# Patient Record
Sex: FEMALE | Race: WHITE | NOT HISPANIC OR LATINO | URBAN - METROPOLITAN AREA
[De-identification: names, ages, dates, MRNs, and addresses within clinical notes are randomized per-mention and may not be internally consistent; named-entity substitution may affect disease eponyms.]

---

## 2017-01-03 ENCOUNTER — OUTPATIENT (OUTPATIENT)
Dept: OUTPATIENT SERVICES | Facility: HOSPITAL | Age: 72
LOS: 1 days | End: 2017-01-03
Payer: MEDICARE

## 2017-01-03 DIAGNOSIS — M86.19 OTHER ACUTE OSTEOMYELITIS, MULTIPLE SITES: ICD-10-CM

## 2017-01-03 PROCEDURE — 36569 INSJ PICC 5 YR+ W/O IMAGING: CPT

## 2017-01-03 PROCEDURE — 76937 US GUIDE VASCULAR ACCESS: CPT | Mod: 26

## 2017-01-03 PROCEDURE — C1751: CPT

## 2017-01-03 PROCEDURE — 76937 US GUIDE VASCULAR ACCESS: CPT

## 2017-01-03 PROCEDURE — 77001 FLUOROGUIDE FOR VEIN DEVICE: CPT

## 2017-01-03 PROCEDURE — 77001 FLUOROGUIDE FOR VEIN DEVICE: CPT | Mod: 26

## 2017-01-05 DIAGNOSIS — M86.9 OSTEOMYELITIS, UNSPECIFIED: ICD-10-CM

## 2017-01-05 DIAGNOSIS — Z45.2 ENCOUNTER FOR ADJUSTMENT AND MANAGEMENT OF VASCULAR ACCESS DEVICE: ICD-10-CM

## 2017-08-01 ENCOUNTER — APPOINTMENT (OUTPATIENT)
Dept: OTOLARYNGOLOGY | Facility: CLINIC | Age: 72
End: 2017-08-01
Payer: MEDICARE

## 2017-08-01 VITALS
WEIGHT: 115 LBS | BODY MASS INDEX: 22.58 KG/M2 | HEIGHT: 60 IN | HEART RATE: 61 BPM | SYSTOLIC BLOOD PRESSURE: 131 MMHG | DIASTOLIC BLOOD PRESSURE: 61 MMHG

## 2017-08-01 PROCEDURE — 92567 TYMPANOMETRY: CPT

## 2017-08-01 PROCEDURE — 92557 COMPREHENSIVE HEARING TEST: CPT

## 2017-08-01 PROCEDURE — G0268 REMOVAL OF IMPACTED WAX MD: CPT

## 2017-08-01 PROCEDURE — 99214 OFFICE O/P EST MOD 30 MIN: CPT | Mod: 25

## 2017-08-01 RX ORDER — REPAGLINIDE 0.5 MG/1
0.5 TABLET ORAL
Refills: 0 | Status: ACTIVE | COMMUNITY

## 2017-08-01 RX ORDER — GLIPIZIDE 5 MG/1
5 TABLET ORAL
Refills: 0 | Status: ACTIVE | COMMUNITY

## 2017-08-01 RX ORDER — IBUPROFEN 200 MG
600 CAPSULE ORAL
Refills: 0 | Status: ACTIVE | COMMUNITY

## 2017-08-01 RX ORDER — FLUTICASONE PROPIONATE AND SALMETEROL 50; 250 UG/1; UG/1
250-50 POWDER RESPIRATORY (INHALATION)
Refills: 0 | Status: ACTIVE | COMMUNITY

## 2017-08-01 RX ORDER — ESOMEPRAZOLE MAGNESIUM 40 MG/1
40 GRANULE, DELAYED RELEASE ORAL
Refills: 0 | Status: ACTIVE | COMMUNITY

## 2017-08-01 RX ORDER — FUROSEMIDE 20 MG/1
20 TABLET ORAL
Refills: 0 | Status: ACTIVE | COMMUNITY

## 2017-08-01 RX ORDER — LACTASE 3000 UNIT
TABLET ORAL
Refills: 0 | Status: ACTIVE | COMMUNITY

## 2017-08-01 RX ORDER — ALENDRONATE SODIUM 70 MG/1
70 TABLET ORAL
Refills: 0 | Status: ACTIVE | COMMUNITY

## 2017-08-01 RX ORDER — METFORMIN HYDROCHLORIDE 1000 MG/1
1000 TABLET, COATED ORAL
Refills: 0 | Status: ACTIVE | COMMUNITY

## 2017-08-01 RX ORDER — ASPIRIN 81 MG
81 TABLET,CHEWABLE ORAL
Refills: 0 | Status: ACTIVE | COMMUNITY

## 2017-08-01 RX ORDER — MULTIVITAMIN
TABLET ORAL
Refills: 0 | Status: ACTIVE | COMMUNITY

## 2017-08-01 RX ORDER — DICYCLOMINE HYDROCHLORIDE 10 MG/5ML
10 SOLUTION ORAL
Refills: 0 | Status: ACTIVE | COMMUNITY

## 2017-08-01 RX ORDER — DILTIAZEM HYDROCHLORIDE 240 MG/1
240 CAPSULE, COATED, EXTENDED RELEASE ORAL
Refills: 0 | Status: ACTIVE | COMMUNITY

## 2017-08-01 RX ORDER — PREDNISONE 5 MG/1
5 TABLET ORAL
Refills: 0 | Status: ACTIVE | COMMUNITY

## 2017-10-18 ENCOUNTER — INPATIENT (INPATIENT)
Facility: HOSPITAL | Age: 72
LOS: 5 days | Discharge: HOME | DRG: 698 | End: 2017-10-24
Attending: INTERNAL MEDICINE | Admitting: INTERNAL MEDICINE
Payer: MEDICARE

## 2017-10-18 VITALS
HEART RATE: 118 BPM | TEMPERATURE: 98 F | RESPIRATION RATE: 18 BRPM | OXYGEN SATURATION: 97 % | DIASTOLIC BLOOD PRESSURE: 99 MMHG | WEIGHT: 115.08 LBS | SYSTOLIC BLOOD PRESSURE: 178 MMHG

## 2017-10-18 DIAGNOSIS — J45.909 UNSPECIFIED ASTHMA, UNCOMPLICATED: ICD-10-CM

## 2017-10-18 DIAGNOSIS — I10 ESSENTIAL (PRIMARY) HYPERTENSION: ICD-10-CM

## 2017-10-18 DIAGNOSIS — F03.90 UNSPECIFIED DEMENTIA, UNSPECIFIED SEVERITY, WITHOUT BEHAVIORAL DISTURBANCE, PSYCHOTIC DISTURBANCE, MOOD DISTURBANCE, AND ANXIETY: ICD-10-CM

## 2017-10-18 DIAGNOSIS — E87.8 OTHER DISORDERS OF ELECTROLYTE AND FLUID BALANCE, NOT ELSEWHERE CLASSIFIED: ICD-10-CM

## 2017-10-18 DIAGNOSIS — E11.9 TYPE 2 DIABETES MELLITUS WITHOUT COMPLICATIONS: ICD-10-CM

## 2017-10-18 DIAGNOSIS — N28.0 ISCHEMIA AND INFARCTION OF KIDNEY: ICD-10-CM

## 2017-10-18 DIAGNOSIS — K52.9 NONINFECTIVE GASTROENTERITIS AND COLITIS, UNSPECIFIED: ICD-10-CM

## 2017-10-18 LAB
ALBUMIN SERPL ELPH-MCNC: 3.6 G/DL — SIGNIFICANT CHANGE UP (ref 3.3–5)
ALP SERPL-CCNC: 95 U/L — SIGNIFICANT CHANGE UP (ref 40–120)
ALT FLD-CCNC: 14 U/L RC — SIGNIFICANT CHANGE UP (ref 10–45)
ANION GAP SERPL CALC-SCNC: 19 MMOL/L — HIGH (ref 5–17)
APPEARANCE UR: CLEAR — SIGNIFICANT CHANGE UP
AST SERPL-CCNC: 18 U/L — SIGNIFICANT CHANGE UP (ref 10–40)
BILIRUB SERPL-MCNC: 0.8 MG/DL — SIGNIFICANT CHANGE UP (ref 0.2–1.2)
BILIRUB UR-MCNC: NEGATIVE — SIGNIFICANT CHANGE UP
BUN SERPL-MCNC: 14 MG/DL — SIGNIFICANT CHANGE UP (ref 7–23)
CALCIUM SERPL-MCNC: 9 MG/DL — SIGNIFICANT CHANGE UP (ref 8.4–10.5)
CHLORIDE SERPL-SCNC: 84 MMOL/L — LOW (ref 96–108)
CO2 SERPL-SCNC: 27 MMOL/L — SIGNIFICANT CHANGE UP (ref 22–31)
COLOR SPEC: YELLOW — SIGNIFICANT CHANGE UP
CREAT SERPL-MCNC: 0.85 MG/DL — SIGNIFICANT CHANGE UP (ref 0.5–1.3)
DIFF PNL FLD: ABNORMAL
GAS PNL BLDV: SIGNIFICANT CHANGE UP
GLUCOSE BLDC GLUCOMTR-MCNC: 137 MG/DL — HIGH (ref 70–99)
GLUCOSE BLDC GLUCOMTR-MCNC: 154 MG/DL — HIGH (ref 70–99)
GLUCOSE SERPL-MCNC: 172 MG/DL — HIGH (ref 70–99)
GLUCOSE UR QL: 500
HCT VFR BLD CALC: 49.7 % — HIGH (ref 34.5–45)
HGB BLD-MCNC: 17.6 G/DL — HIGH (ref 11.5–15.5)
KETONES UR-MCNC: ABNORMAL
LEUKOCYTE ESTERASE UR-ACNC: ABNORMAL
LIDOCAIN IGE QN: 40 U/L — SIGNIFICANT CHANGE UP (ref 7–60)
MCHC RBC-ENTMCNC: 28.9 PG — SIGNIFICANT CHANGE UP (ref 27–34)
MCHC RBC-ENTMCNC: 35.4 GM/DL — SIGNIFICANT CHANGE UP (ref 32–36)
MCV RBC AUTO: 81.6 FL — SIGNIFICANT CHANGE UP (ref 80–100)
NITRITE UR-MCNC: NEGATIVE — SIGNIFICANT CHANGE UP
PH UR: 6.5 — SIGNIFICANT CHANGE UP (ref 5–8)
PLATELET # BLD AUTO: 229 K/UL — SIGNIFICANT CHANGE UP (ref 150–400)
POTASSIUM SERPL-MCNC: 2.9 MMOL/L — CRITICAL LOW (ref 3.5–5.3)
POTASSIUM SERPL-SCNC: 2.9 MMOL/L — CRITICAL LOW (ref 3.5–5.3)
PROT SERPL-MCNC: 6.7 G/DL — SIGNIFICANT CHANGE UP (ref 6–8.3)
PROT UR-MCNC: 150 MG/DL
RBC # BLD: 6.09 M/UL — HIGH (ref 3.8–5.2)
RBC # FLD: 11.8 % — SIGNIFICANT CHANGE UP (ref 10.3–14.5)
SODIUM SERPL-SCNC: 130 MMOL/L — LOW (ref 135–145)
SP GR SPEC: 1.01 — LOW (ref 1.01–1.02)
TROPONIN T SERPL-MCNC: <0.01 NG/ML — SIGNIFICANT CHANGE UP (ref 0–0.06)
UROBILINOGEN FLD QL: NEGATIVE — SIGNIFICANT CHANGE UP
WBC # BLD: 14.6 K/UL — HIGH (ref 3.8–10.5)
WBC # FLD AUTO: 14.6 K/UL — HIGH (ref 3.8–10.5)

## 2017-10-18 PROCEDURE — 76705 ECHO EXAM OF ABDOMEN: CPT | Mod: 26

## 2017-10-18 PROCEDURE — 74177 CT ABD & PELVIS W/CONTRAST: CPT | Mod: 26

## 2017-10-18 PROCEDURE — 99285 EMERGENCY DEPT VISIT HI MDM: CPT | Mod: 25

## 2017-10-18 PROCEDURE — 93010 ELECTROCARDIOGRAM REPORT: CPT

## 2017-10-18 RX ORDER — SIMVASTATIN 20 MG/1
20 TABLET, FILM COATED ORAL AT BEDTIME
Qty: 0 | Refills: 0 | Status: DISCONTINUED | OUTPATIENT
Start: 2017-10-18 | End: 2017-10-24

## 2017-10-18 RX ORDER — DEXTROSE 50 % IN WATER 50 %
12.5 SYRINGE (ML) INTRAVENOUS ONCE
Qty: 0 | Refills: 0 | Status: DISCONTINUED | OUTPATIENT
Start: 2017-10-18 | End: 2017-10-24

## 2017-10-18 RX ORDER — SODIUM CHLORIDE 9 MG/ML
500 INJECTION INTRAMUSCULAR; INTRAVENOUS; SUBCUTANEOUS ONCE
Qty: 0 | Refills: 0 | Status: COMPLETED | OUTPATIENT
Start: 2017-10-18 | End: 2017-10-18

## 2017-10-18 RX ORDER — INSULIN LISPRO 100/ML
VIAL (ML) SUBCUTANEOUS
Qty: 0 | Refills: 0 | Status: DISCONTINUED | OUTPATIENT
Start: 2017-10-18 | End: 2017-10-20

## 2017-10-18 RX ORDER — ASPIRIN/CALCIUM CARB/MAGNESIUM 324 MG
81 TABLET ORAL DAILY
Qty: 0 | Refills: 0 | Status: DISCONTINUED | OUTPATIENT
Start: 2017-10-18 | End: 2017-10-23

## 2017-10-18 RX ORDER — DEXTROSE 50 % IN WATER 50 %
25 SYRINGE (ML) INTRAVENOUS ONCE
Qty: 0 | Refills: 0 | Status: DISCONTINUED | OUTPATIENT
Start: 2017-10-18 | End: 2017-10-24

## 2017-10-18 RX ORDER — PIPERACILLIN AND TAZOBACTAM 4; .5 G/20ML; G/20ML
3.38 INJECTION, POWDER, LYOPHILIZED, FOR SOLUTION INTRAVENOUS EVERY 8 HOURS
Qty: 0 | Refills: 0 | Status: DISCONTINUED | OUTPATIENT
Start: 2017-10-18 | End: 2017-10-23

## 2017-10-18 RX ORDER — PIPERACILLIN AND TAZOBACTAM 4; .5 G/20ML; G/20ML
3.38 INJECTION, POWDER, LYOPHILIZED, FOR SOLUTION INTRAVENOUS ONCE
Qty: 0 | Refills: 0 | Status: COMPLETED | OUTPATIENT
Start: 2017-10-18 | End: 2017-10-18

## 2017-10-18 RX ORDER — DILTIAZEM HCL 120 MG
240 CAPSULE, EXT RELEASE 24 HR ORAL DAILY
Qty: 0 | Refills: 0 | Status: DISCONTINUED | OUTPATIENT
Start: 2017-10-18 | End: 2017-10-24

## 2017-10-18 RX ORDER — ONDANSETRON 8 MG/1
4 TABLET, FILM COATED ORAL ONCE
Qty: 0 | Refills: 0 | Status: COMPLETED | OUTPATIENT
Start: 2017-10-18 | End: 2017-10-18

## 2017-10-18 RX ORDER — POTASSIUM CHLORIDE 20 MEQ
10 PACKET (EA) ORAL ONCE
Qty: 0 | Refills: 0 | Status: COMPLETED | OUTPATIENT
Start: 2017-10-18 | End: 2017-10-18

## 2017-10-18 RX ORDER — ENOXAPARIN SODIUM 100 MG/ML
60 INJECTION SUBCUTANEOUS ONCE
Qty: 0 | Refills: 0 | Status: DISCONTINUED | OUTPATIENT
Start: 2017-10-18 | End: 2017-10-18

## 2017-10-18 RX ORDER — INSULIN LISPRO 100/ML
VIAL (ML) SUBCUTANEOUS AT BEDTIME
Qty: 0 | Refills: 0 | Status: DISCONTINUED | OUTPATIENT
Start: 2017-10-18 | End: 2017-10-24

## 2017-10-18 RX ORDER — MORPHINE SULFATE 50 MG/1
2 CAPSULE, EXTENDED RELEASE ORAL ONCE
Qty: 0 | Refills: 0 | Status: DISCONTINUED | OUTPATIENT
Start: 2017-10-18 | End: 2017-10-18

## 2017-10-18 RX ORDER — ENOXAPARIN SODIUM 100 MG/ML
50 INJECTION SUBCUTANEOUS ONCE
Qty: 0 | Refills: 0 | Status: COMPLETED | OUTPATIENT
Start: 2017-10-18 | End: 2017-10-18

## 2017-10-18 RX ORDER — PANTOPRAZOLE SODIUM 20 MG/1
40 TABLET, DELAYED RELEASE ORAL
Qty: 0 | Refills: 0 | Status: DISCONTINUED | OUTPATIENT
Start: 2017-10-18 | End: 2017-10-24

## 2017-10-18 RX ORDER — DEXTROSE 50 % IN WATER 50 %
1 SYRINGE (ML) INTRAVENOUS ONCE
Qty: 0 | Refills: 0 | Status: DISCONTINUED | OUTPATIENT
Start: 2017-10-18 | End: 2017-10-24

## 2017-10-18 RX ORDER — SODIUM CHLORIDE 9 MG/ML
1000 INJECTION, SOLUTION INTRAVENOUS
Qty: 0 | Refills: 0 | Status: DISCONTINUED | OUTPATIENT
Start: 2017-10-18 | End: 2017-10-19

## 2017-10-18 RX ORDER — GLUCAGON INJECTION, SOLUTION 0.5 MG/.1ML
1 INJECTION, SOLUTION SUBCUTANEOUS ONCE
Qty: 0 | Refills: 0 | Status: DISCONTINUED | OUTPATIENT
Start: 2017-10-18 | End: 2017-10-24

## 2017-10-18 RX ORDER — METFORMIN HYDROCHLORIDE 850 MG/1
0 TABLET ORAL
Qty: 0 | Refills: 0 | COMMUNITY

## 2017-10-18 RX ORDER — SODIUM CHLORIDE 9 MG/ML
1000 INJECTION, SOLUTION INTRAVENOUS
Qty: 0 | Refills: 0 | Status: DISCONTINUED | OUTPATIENT
Start: 2017-10-18 | End: 2017-10-24

## 2017-10-18 RX ORDER — ACETAMINOPHEN WITH CODEINE 300MG-30MG
1 TABLET ORAL EVERY 4 HOURS
Qty: 0 | Refills: 0 | Status: DISCONTINUED | OUTPATIENT
Start: 2017-10-18 | End: 2017-10-24

## 2017-10-18 RX ORDER — PIPERACILLIN AND TAZOBACTAM 4; .5 G/20ML; G/20ML
3.38 INJECTION, POWDER, LYOPHILIZED, FOR SOLUTION INTRAVENOUS ONCE
Qty: 0 | Refills: 0 | Status: DISCONTINUED | OUTPATIENT
Start: 2017-10-18 | End: 2017-10-18

## 2017-10-18 RX ORDER — FAMOTIDINE 10 MG/ML
20 INJECTION INTRAVENOUS
Qty: 0 | Refills: 0 | Status: DISCONTINUED | OUTPATIENT
Start: 2017-10-18 | End: 2017-10-24

## 2017-10-18 RX ORDER — ENOXAPARIN SODIUM 100 MG/ML
40 INJECTION SUBCUTANEOUS DAILY
Qty: 0 | Refills: 0 | Status: DISCONTINUED | OUTPATIENT
Start: 2017-10-18 | End: 2017-10-18

## 2017-10-18 RX ORDER — FAMOTIDINE 10 MG/ML
40 INJECTION INTRAVENOUS ONCE
Qty: 0 | Refills: 0 | Status: DISCONTINUED | OUTPATIENT
Start: 2017-10-18 | End: 2017-10-18

## 2017-10-18 RX ADMIN — SODIUM CHLORIDE 500 MILLILITER(S): 9 INJECTION INTRAMUSCULAR; INTRAVENOUS; SUBCUTANEOUS at 11:36

## 2017-10-18 RX ADMIN — Medication 100 MILLIEQUIVALENT(S): at 13:45

## 2017-10-18 RX ADMIN — FAMOTIDINE 20 MILLIGRAM(S): 10 INJECTION INTRAVENOUS at 22:34

## 2017-10-18 RX ADMIN — Medication 240 MILLIGRAM(S): at 22:34

## 2017-10-18 RX ADMIN — PIPERACILLIN AND TAZOBACTAM 200 GRAM(S): 4; .5 INJECTION, POWDER, LYOPHILIZED, FOR SOLUTION INTRAVENOUS at 18:15

## 2017-10-18 RX ADMIN — SIMVASTATIN 20 MILLIGRAM(S): 20 TABLET, FILM COATED ORAL at 22:35

## 2017-10-18 RX ADMIN — Medication 100 MILLIEQUIVALENT(S): at 15:00

## 2017-10-18 RX ADMIN — ONDANSETRON 4 MILLIGRAM(S): 8 TABLET, FILM COATED ORAL at 11:36

## 2017-10-18 RX ADMIN — ENOXAPARIN SODIUM 50 MILLIGRAM(S): 100 INJECTION SUBCUTANEOUS at 23:28

## 2017-10-18 RX ADMIN — SODIUM CHLORIDE 500 MILLILITER(S): 9 INJECTION INTRAMUSCULAR; INTRAVENOUS; SUBCUTANEOUS at 13:53

## 2017-10-18 RX ADMIN — FAMOTIDINE 20 MILLIGRAM(S): 10 INJECTION INTRAVENOUS at 11:08

## 2017-10-18 RX ADMIN — SODIUM CHLORIDE 50 MILLILITER(S): 9 INJECTION, SOLUTION INTRAVENOUS at 22:55

## 2017-10-18 NOTE — H&P ADULT - HISTORY OF PRESENT ILLNESS
72y F hx of HTN, DM, Asthma ,mild dementia, AFib here with  nausea since Saturday. Today she noticed abdominal pain.  Last BM 2 days ago. Passing flatus. No fever, chills, cp, sob, urinary sx. She stopped anticoagulation few years back .

## 2017-10-18 NOTE — H&P ADULT - PROBLEM SELECTOR PLAN 1
Exact cause not know but has H/O PAF so possibly Embolic infarcts. renal consulted. Will get clearance from GI for starting AC Lovenox .

## 2017-10-18 NOTE — ED ADULT NURSE REASSESSMENT NOTE - NS ED NURSE REASSESS COMMENT FT1
pts ct scan + for colitis. pt pending admission for iv abx. second round of IV potassium currently infusing, zosyn to be administered after potassium completion. VS stable. afebrile. family at the bedside. safety and fall precautions maintained.

## 2017-10-18 NOTE — ED PROVIDER NOTE - ATTENDING CONTRIBUTION TO CARE
72y F hx of HTN, DM, mild dementia, AFib here with abd pain and nausea for last 3-4 days. Pt accompanied by  who reporst that she has been complaining of r sided abd discomfort as well as nausea and dec appetite. Reporst has been laying in bed for past couple of days feeling weak, fatigued. Has not vomited though reports persistent nausea. Last BM 2 days ago. Passing flatus. No fever, chills, cp, sob, urinary sx. On exam   Gen: WNWD thin elderly female NAD  HEENT: NCAT PERRL EOMI normal pharynx dry mucous memb  Neck: supple  CV: RRR, no murmur  Lung: CTA BL  Abd: +BS soft RLQ TTP, no grr, neg murphys  Ext: wwp, palp pulses, FROMx4, no cce  Neuro: Awake alert CN grossly intact, sensation intact, motor 5/5 throughout  AP: 72y F hx HTN, DM, Afib here with RLQ pain on palpation, nausea, anorexia. R/O appy vs colitis vs UTI. Labs, CTAP, UA. 72y F hx of HTN, DM, mild dementia, AFib here with abd pain and nausea for last 3-4 days. Pt accompanied by  who reporst that she has been complaining of r sided abd discomfort as well as nausea and dec appetite. Reporst has been laying in bed for past couple of days feeling weak, fatigued. Minimal PO intake and not taking medications. Has not vomited though reports persistent nausea. Last BM 2 days ago. Passing flatus. No fever, chills, cp, sob, urinary sx. On exam   Gen: WNWD thin elderly female NAD  HEENT: NCAT PERRL EOMI normal pharynx dry mucous memb  Neck: supple  CV: RRR, no murmur  Lung: CTA BL  Abd: +BS soft R mid and and RLQ TTP, no grr, neg murphys  Ext: wwp, palp pulses, FROMx4, no cce  Neuro: Awake alert CN grossly intact, sensation intact, motor 5/5 throughout  AP: 72y F hx HTN, DM, pAF not on AC here with RLQ pain on palpation, nausea, anorexia. R/O appy vs colitis vs UTI. Labs, CTAP, UA, IV hydration, antiemetics, EKG, re-eval

## 2017-10-18 NOTE — ED ADULT NURSE NOTE - PMH
Asthma    Atrial fibrillation, chronic    Dementia    Diabetes mellitus    Hypertension    Type 2 diabetes mellitus

## 2017-10-18 NOTE — H&P ADULT - ASSESSMENT
72y F hx of HTN, DM, Asthma ,mild dementia, AFib here with  nausea since Saturday. Today she noticed abdominal pain.  Last BM 2 days ago. Passing flatus. No fever, chills, cp, sob, urinary sx. She stopped anticoagulation few years back

## 2017-10-18 NOTE — H&P ADULT - NEGATIVE GENERAL GENITOURINARY SYMPTOMS
no flank pain L/no gas in urine/no bladder infections/no dysuria/no urine discoloration/no incontinence/no hematuria/no flank pain R/no urinary hesitancy/no renal colic

## 2017-10-18 NOTE — ED PROVIDER NOTE - PHYSICAL EXAMINATION
Abd: inspection; no purpura, hematoma  Palpation; tenderness upper quadrants bilaterally, no rebound tenderness or rigidity  Bowel sounds in all quadrants

## 2017-10-18 NOTE — CONSULT NOTE ADULT - ASSESSMENT
it is unclear what the etiology of the patients pain and vomiting is from.  This may be ischemic colitis or infarct related.  will review ct with radiology in am.  I agree with antibiotics.  I think that the risk benefit is clearly in favor of a/c at thist [facility].  Discussed with family at [facility].  non surgical abdomen.  may need  colonosocpy at some point. agree with echo tomorrow.

## 2017-10-18 NOTE — CONSULT NOTE ADULT - SUBJECTIVE AND OBJECTIVE BOX
Patient is a 72y old  Female who presents with a chief complaint of Nausea with abdominal pain. (18 Oct 2017 17:14)      HPI:  72y F hx of HTN, DM, Asthma ,mild dementia, AFib here with  nausea since Saturday. Today she noticed abdominal pain.  Last BM 2 days ago. Passing flatus. No fever, chills, cp, sob, urinary sx. She stopped anticoagulation few years back . (18 Oct 2017 17:14)      PAST MEDICAL & SURGICAL HISTORY:  Dementia  Hypertension  Type 2 diabetes mellitus  Atrial fibrillation, chronic  Diabetes mellitus  Asthma  Diabetes Mellitus  HTN (Hypertension)  Asthma  Afib      MEDICATIONS  (STANDING):  dextrose 5%. 1000 milliLiter(s) (50 mL/Hr) IV Continuous <Continuous>  dextrose 50% Injectable 12.5 Gram(s) IV Push once  dextrose 50% Injectable 25 Gram(s) IV Push once  dextrose 50% Injectable 25 Gram(s) IV Push once  enoxaparin Injectable 40 milliGRAM(s) SubCutaneous daily  famotidine    Tablet 20 milliGRAM(s) Oral two times a day  insulin lispro (HumaLOG) corrective regimen sliding scale   SubCutaneous three times a day before meals  insulin lispro (HumaLOG) corrective regimen sliding scale   SubCutaneous at bedtime  piperacillin/tazobactam IVPB. 3.375 Gram(s) IV Intermittent every 8 hours  sodium chloride 0.9% 1000 milliLiter(s) (50 mL/Hr) IV Continuous <Continuous>      Allergies    Avelox (Pruritus)  Levaquin (Unknown)    Intolerances        SOCIAL HISTORY:  Denies ETOh,Smoking,     FAMILY HISTORY:  No pertinent family history in first degree relatives      REVIEW OF SYSTEMS:    CONSTITUTIONAL: No weakness, fevers or chills  EYES/ENT: No visual changes;  No vertigo or throat pain   NECK: No pain or stiffness  RESPIRATORY: No cough, wheezing, hemoptysis; No shortness of breath  CARDIOVASCULAR: No chest pain or palpitations  GASTROINTESTINAL: No abdominal or epigastric pain. No nausea, vomiting, or hematemesis; No diarrhea or constipation. No melena or hematochezia.  GENITOURINARY: No dysuria, frequency or hematuria  NEUROLOGICAL: No numbness or weakness  SKIN: No itching, burning, rashes, or lesions   All other review of systems is negative unless indicated above.    VITAL:  T(C): , Max: 36.7 (10-18-17 @ 18:19)  T(F): , Max: 98.1 (10-18-17 @ 18:19)  HR: 116 (10-18-17 @ 18:19)  BP: 180/97 (10-18-17 @ 18:19)  BP(mean): --  RR: 18 (10-18-17 @ 18:19)  SpO2: 97% (10-18-17 @ 18:19)  Wt(kg): --    I and O's:      Weight (kg): 52.2 (10-18 @ 09:35)    PHYSICAL EXAM:    Constitutional: NAD  HEENT: PERRLA,   Neck: No JVD  Respiratory: CTA B/L  Cardiovascular: S1 and S2  Gastrointestinal: BS+, soft, NT/ND  Extremities: No peripheral edema  Neurological: A/O x 3, no focal deficits  Psychiatric: Normal mood, normal affect  : No Morales  Skin: No rashes  Access: Not applicable  Back: No CVA tenderness    LABS:                        17.6   14.6  )-----------( 229      ( 18 Oct 2017 11:06 )             49.7     10-18    130<L>  |  84<L>  |  14  ----------------------------<  172<H>  2.9<LL>   |  27  |  0.85    Ca    9.0      18 Oct 2017 11:06  Mg     1.7     10-18    TPro  6.7  /  Alb  3.6  /  TBili  0.8  /  DBili  x   /  AST  18  /  ALT  14  /  AlkPhos  95  10-18          RADIOLOGY & ADDITIONAL STUDIES:

## 2017-10-18 NOTE — CONSULT NOTE ADULT - SUBJECTIVE AND OBJECTIVE BOX
HPI:  Ms. Boyle is a 72 year-old woman with history of multiple medical problems including hypertension, diabetes mellitus, atrial fibrillation, who presented to the [facility] ER today with nausea x 3-4 days, and abdominal pain x 1 day. She was noted on admission to have electrolyte abnormalities including sodium of 130 and potassium of 2.9meq/L; therefore, a renal consultation was requested.      PAST MEDICAL & SURGICAL HISTORY:  Dementia  Hypertension  Type 2 diabetes mellitus  Atrial fibrillation, chronic  Diabetes mellitus  Asthma  Diabetes Mellitus  HTN (Hypertension)  Asthma  Afib    Allergies  Avelox (Pruritus)  Levaquin (Unknown)    SOCIAL HISTORY:  Denies ETOh,Smoking,     FAMILY HISTORY:  No pertinent family history in first degree relatives      REVIEW OF SYSTEMS:  CONSTITUTIONAL: No weakness, fevers or chills  EYES/ENT: No visual changes;  No vertigo or throat pain   NECK: No pain or stiffness  RESPIRATORY: No cough, wheezing, hemoptysis; No shortness of breath  CARDIOVASCULAR: No chest pain or palpitations  GASTROINTESTINAL:  (+)nausea, (+)abdominal pain; no diarrhea  GENITOURINARY: No dysuria, frequency or hematuria  NEUROLOGICAL: No numbness or weakness  SKIN: No itching, burning, rashes, or lesions   All other review of systems is negative unless indicated above.    VITAL:  T(C): , Max: 36.5 (10-18-17 @ 09:35)  T(F): , Max: 97.7 (10-18-17 @ 09:35)  HR: 108 (10-18-17 @ 13:56)  BP: 169/95 (10-18-17 @ 13:56)  BP(mean): --  RR: 18 (10-18-17 @ 13:56)  SpO2: 97% (10-18-17 @ 13:56)      PHYSICAL EXAM:  Constitutional: NAD, Alert  HEENT: NCAT, MMM  Neck: Supple, No JVD  Respiratory: CTA-b/l  Cardiovascular: RRR s1s2, no m/r/g  Gastrointestinal: BS+, soft, NT/ND  Extremities: No peripheral edema b/l  Neurological: no focal deficits; strength grossly intact  Psychiatric: Normal mood, normal affect  Back: no CVAT b/l  Skin: No rashes, no nevi    LABS:                        17.6   14.6  )-----------( 229      ( 18 Oct 2017 11:06 )             49.7     Na(130)/K(2.9)/Cl(84)/HCO3(27)/BUN(14)/Cr(0.85)Glu(172)/Ca(9.0)/Mg(1.7)/PO4(--)    10-18 @ 11:06    AG 19    Urinalysis Basic - ( 18 Oct 2017 12:53 )    Color: Yellow / Appearance: Clear / S.008 / pH: x  Gluc: x / Ketone: Moderate  / Bili: Negative / Urobili: Negative   Blood: x / Protein: 150 mg/dL / Nitrite: Negative   Leuk Esterase: Large / RBC: 5-10 /HPF / WBC 26-50 /HPF   Sq Epi: x / Non Sq Epi: OCC /HPF / Bacteria: Moderate /HPF    (2017) - BUN/creatinine 10/0.46    IMAGING:      < from: CT Abdomen and Pelvis w/ Oral Cont and w/ IV Cont (10.18.17 @ 14:51) >  IMPRESSION:  1.  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 Pyelonephritis is felt to be less   likely.  2.  Mural thickening of the ascending colon, possibly colitis.          IMPRESSION:  (1)Hypokalemia - whole body deficit most likely - due to hypovolemia + metabolic alkalosis.    (2)Hyponatremia - hypovolemic/poor osmotic intake-associated    (3)Acid-base  - elevated anion gap (19); mildly elevated HCO3 at 27. Combined ketoacidosis and vomiting-associated metabolic alkalosis? Is the ketoacidosis from DKA? If so, we should not give D5 in the IVF...if it is from starvation, then D5 would be indicated...    (4)SHANE - although her creatinine does not appear elevated (0.85mg/dL), it is substantially higher than it was in January.  Has renal infarcts on CT...likely that the elevated creatinine is in part from parenchymal renal loss. Likely also prerenal azotemia in association with dehydration    (5)Renal infarcts - unclear etiology. Potentially afib-associated. Is anticoagulation indicated here? I would argue for it.    RECOMMEND:  (1)Favor anticoagulation  (2)NS+20meq/L KCl @ 75cc/h - place D5 in the IVF?  (3)BMP daily  (4)Dose new meds for GFR 40-50ml/min      Thank you for involving [facility] in this patient's care.    With warm regards,    Yuniel Boateng MD   [facility]  [phone] HPI:  Ms. Boyle is a 72 year-old woman with history of multiple medical problems including hypertension, diabetes mellitus, atrial fibrillation, who presented to the [facility] ER today with nausea x 3-4 days, and abdominal pain x 1 day. She was noted on admission to have electrolyte abnormalities including sodium of 130 and potassium of 2.9meq/L; therefore, a renal consultation was requested.      PAST MEDICAL & SURGICAL HISTORY:  Dementia  Hypertension  Type 2 diabetes mellitus  Atrial fibrillation, chronic  Diabetes mellitus  Asthma  Diabetes Mellitus  HTN (Hypertension)  Asthma  Afib    Allergies  Avelox (Pruritus)  Levaquin (Unknown)    SOCIAL HISTORY:  Denies ETOh,Smoking,     FAMILY HISTORY:  No pertinent family history in first degree relatives      REVIEW OF SYSTEMS:  CONSTITUTIONAL: No weakness, fevers or chills  EYES/ENT: No visual changes;  No vertigo or throat pain   NECK: No pain or stiffness  RESPIRATORY: No cough, wheezing, hemoptysis; No shortness of breath  CARDIOVASCULAR: No chest pain or palpitations  GASTROINTESTINAL:  (+)nausea, (+)abdominal pain; no diarrhea  GENITOURINARY: No dysuria, frequency or hematuria  NEUROLOGICAL: No numbness or weakness  SKIN: No itching, burning, rashes, or lesions   All other review of systems is negative unless indicated above.    VITAL:  T(C): , Max: 36.5 (10-18-17 @ 09:35)  T(F): , Max: 97.7 (10-18-17 @ 09:35)  HR: 108 (10-18-17 @ 13:56)  BP: 169/95 (10-18-17 @ 13:56)  BP(mean): --  RR: 18 (10-18-17 @ 13:56)  SpO2: 97% (10-18-17 @ 13:56)      PHYSICAL EXAM:  Constitutional: NAD, Alert  HEENT: NCAT, MMM  Neck: Supple, No JVD  Respiratory: CTA-b/l  Cardiovascular: RRR s1s2, no m/r/g  Gastrointestinal: BS+, soft, NT/ND  Extremities: No peripheral edema b/l  Neurological: no focal deficits; strength grossly intact  Psychiatric: Normal mood, normal affect  Back: no CVAT b/l  Skin: No rashes, no nevi    LABS:                        17.6   14.6  )-----------( 229      ( 18 Oct 2017 11:06 )             49.7     Na(130)/K(2.9)/Cl(84)/HCO3(27)/BUN(14)/Cr(0.85)Glu(172)/Ca(9.0)/Mg(1.7)/PO4(--)    10-18 @ 11:06    AG 19    Urinalysis Basic - ( 18 Oct 2017 12:53 )    Color: Yellow / Appearance: Clear / S.008 / pH: x  Gluc: x / Ketone: Moderate  / Bili: Negative / Urobili: Negative   Blood: x / Protein: 150 mg/dL / Nitrite: Negative   Leuk Esterase: Large / RBC: 5-10 /HPF / WBC 26-50 /HPF   Sq Epi: x / Non Sq Epi: OCC /HPF / Bacteria: Moderate /HPF    (2017) - BUN/creatinine 10/0.46    IMAGING:      < from: CT Abdomen and Pelvis w/ Oral Cont and w/ IV Cont (10.18.17 @ 14:51) >  IMPRESSION:  1.  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 Pyelonephritis is felt to be less   likely.  2.  Mural thickening of the ascending colon, possibly colitis.          IMPRESSION:  (1)Hypokalemia - whole body deficit most likely - due to hypovolemia + metabolic alkalosis.    (2)Hyponatremia - hypovolemic/poor osmotic intake-associated    (3)Acid-base  - elevated anion gap (19); mildly elevated HCO3 at 27. Combined ketoacidosis and vomiting-associated metabolic alkalosis? Is the ketoacidosis from DKA? If so, we should not give D5 in the IVF...if it is from starvation, then D5 would be indicated...seems more likely DKA than starvation ketoacidosis.    (4)SHANE - although her creatinine does not appear elevated (0.85mg/dL), it is substantially higher than it was in January.  Has renal infarcts on CT...likely that the elevated creatinine is in part from parenchymal renal loss. Likely also prerenal azotemia in association with dehydration    (5)Renal infarcts - unclear etiology. Potentially afib-associated. Is anticoagulation indicated here? I would argue for it.    RECOMMEND:  (1)Favor anticoagulation  (2)NS+40meq/L KCl @ 60cc/h   (3)BMP daily  (4)Dose new meds for GFR 40-50ml/min      Thank you for involving [facility] in this patient's care.    With warm regards,    Yuniel Boateng MD   [facility]  [phone] HPI:  Ms. Boyle is a 72 year-old woman with history of multiple medical problems including hypertension, diabetes mellitus, atrial fibrillation, who presented to the [facility] ER today with nausea x 3-4 days, and abdominal pain x 1 day. She was noted on admission to have electrolyte abnormalities including sodium of 130 and potassium of 2.9meq/L; she was noted on CT to have renal infarcts. Therefore, a renal consultation was requested.    Ms. Boyle's daughter is at bedside at assists in providing history. They deny notable urinary changes. They deny fever or chills. They deny flank pain/back pain. They deny recent diarrhea. She has not eating anything for the past 4 days, they tell me. They inform me that over the summer, her right 1st toe developed redness and the toenail fell off.    In the ER, she has received isotonic IVF boluses, and she has received 20meq of IV KCl.    PAST MEDICAL & SURGICAL HISTORY:  Dementia  Hypertension  Type 2 diabetes mellitus  Atrial fibrillation, chronic  Diabetes mellitus  Asthma  Diabetes Mellitus  HTN (Hypertension)  Asthma  Afib    Allergies  Avelox (Pruritus)  Levaquin (Unknown)    SOCIAL HISTORY:  Denies ETOh,Smoking,     FAMILY HISTORY:  No pertinent family history in first degree relatives      REVIEW OF SYSTEMS:  CONSTITUTIONAL: No weakness, fevers or chills  EYES/ENT: No visual changes;  No vertigo or throat pain   NECK: No pain or stiffness  RESPIRATORY: No cough, wheezing, hemoptysis; No shortness of breath  CARDIOVASCULAR: No chest pain or palpitations  GASTROINTESTINAL:  (+)nausea, (+)abdominal pain; no diarrhea  GENITOURINARY: No dysuria, frequency or hematuria  NEUROLOGICAL: No numbness or weakness  SKIN: (+)right 1st toe rash/loss of toenail  All other review of systems is negative unless indicated above.    VITAL:  T(C): , Max: 36.5 (10-18-17 @ 09:35)  T(F): , Max: 97.7 (10-18-17 @ 09:35)  HR: 108 (10-18-17 @ 13:56)  BP: 169/95 (10-18-17 @ 13:56)  BP(mean): --  RR: 18 (10-18-17 @ 13:56)  SpO2: 97% (10-18-17 @ 13:56)      PHYSICAL EXAM:  Constitutional: NAD, Alert  HEENT: NCAT, MMM  Neck: Supple, No JVD  Respiratory: CTA-b/l  Cardiovascular: RRR s1s2, no m/r/g  Gastrointestinal: BS+, soft, NT/ND  Extremities: No peripheral edema b/l  Neurological: no focal deficits; strength grossly intact  Psychiatric: Normal mood, normal affect  Back: no CVAT b/l  Skin: No rashes, no nevi    LABS:                        17.6   14.6  )-----------( 229      ( 18 Oct 2017 11:06 )             49.7     Na(130)/K(2.9)/Cl(84)/HCO3(27)/BUN(14)/Cr(0.85)Glu(172)/Ca(9.0)/Mg(1.7)/PO4(--)    10-18 @ 11:06    AG 19    Urinalysis Basic - ( 18 Oct 2017 12:53 )    Color: Yellow / Appearance: Clear / S.008 / pH: x  Gluc: x / Ketone: Moderate  / Bili: Negative / Urobili: Negative   Blood: x / Protein: 150 mg/dL / Nitrite: Negative   Leuk Esterase: Large / RBC: 5-10 /HPF / WBC 26-50 /HPF   Sq Epi: x / Non Sq Epi: OCC /HPF / Bacteria: Moderate /HPF    (2017) - BUN/creatinine 10/0.46    IMAGING:      < from: CT Abdomen and Pelvis w/ Oral Cont and w/ IV Cont (10.18.17 @ 14:51) >  IMPRESSION:  1.  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 Pyelonephritis is felt to be less   likely.  2.  Mural thickening of the ascending colon, possibly colitis.          IMPRESSION:  (1)Hypokalemia - whole body deficit most likely - due to hypovolemia + metabolic alkalosis.    (2)Hyponatremia - hypovolemic/poor osmotic intake-associated    (3)Acid-base  - elevated anion gap (19); mildly elevated HCO3 at 27. Combined ketoacidosis and vomiting-associated metabolic alkalosis? Is the ketoacidosis from DKA? If so, we should not give D5 in the IVF...if it is from starvation, then D5 would be indicated. Risk of giving D5 outweighs benefit for now.    (4)SHANE - although her creatinine does not appear elevated (0.85mg/dL), it is substantially higher than it was in January.  Has renal infarcts on CT...likely that the elevated creatinine is in part from parenchymal renal loss. Likely also prerenal azotemia in association with dehydration    (5)Renal infarcts -  Likely afib-associated. Doubt endocarditis. Is anticoagulation indicated here? I would argue for it.    (6)HTN - acceptable for now    RECOMMEND:  (1)Favor anticoagulation once cleared by GI  (2)NS+40meq/L KCl @ 50cc/h   (3)BMP daily  (4)Dose new meds for GFR 40-50ml/min  (5)No ACEI/ARB/diuretics for now; if antihypertensives are needing, could opt for Norvasc or Hydralazine    Thank you for involving [facility] in this patient's care.    With warm regards,    Yuniel Boateng MD   [facility]  [phone]

## 2017-10-18 NOTE — ED PROVIDER NOTE - CARE PLAN
Principal Discharge DX:	Renal infarct Principal Discharge DX:	Renal infarct  Secondary Diagnosis:	Colitis  Secondary Diagnosis:	Dehydration

## 2017-10-18 NOTE — ED ADULT NURSE NOTE - OBJECTIVE STATEMENT
72 y.o female pmh HTN, a.fib, dementia presenting to ED accompanied by her  c/o N/V x 3 days. pt states she has been unable to tolerate PO and has been vomiting for the passed 3 days with nausea. pt denies any abdominal pain, diarrhea, chills, fevers, body aches, or bloody vomit. moving her bowels as normal. denies any burning upon urination or frequency. abdomen soft and tender upon palpation but denies the need for pain medication. labs obtained.  remains at the bedside,. pt pending ct scan of abdomen. safety and fall precautions maintained. call bell at the bedside.

## 2017-10-18 NOTE — H&P ADULT - PROBLEM SELECTOR PLAN 4
In NSR with few PVCS . D/W Cardiologist  Dr. Tamayo and planned for LEANNA in AM. Will start Lovenox once cleared by GI. Replacing Electrolytes and will recheck her TFT

## 2017-10-18 NOTE — ED PROVIDER NOTE - OBJECTIVE STATEMENT
72y F hx of HTN, DM, mild dementia, AFib here with abd pain and nausea for last 3-4 days. Complains of right sided discomfort as well as nausea and dec appetite. Last BM 2 days ago. Passing flatus. No fever, chills, cp, sob, urinary sx. She is not on any anticoagulation for her paroxysmal atrial fibrillation as per Dr. Adams. PMD.

## 2017-10-18 NOTE — ED PROVIDER NOTE - MEDICAL DECISION MAKING DETAILS
Admit to medical floor. Patient will benefit from inpatient admission and work up of cause of recent renal infarcts and possible ischemic colitis.  Cardio to tentatively perform LEANNA in the morning to evaluate for endocarditic cause. Admit to medical floor. Patient will benefit from inpatient admission and work up of cause of recent renal infarcts and possible ischemic colitis.  Cardio to tentatively perform LEANNA in the morning to evaluate for endocarditic cause. When seen in the ED, she was in normal sinus rhythm. Will let admitting team/cardiology restart anticoagulation if indicated. Admit to medical floor. Patient will benefit from inpatient admission and work up of cause of recent renal infarcts and possible ischemic colitis.  Cardio to tentatively perform LEANNA in the morning to evaluate for endocarditic cause. When seen in the ED, she was in normal sinus rhythm. Will let admitting team/cardiology initiate anticoagulation if indicated.  Sadie: See attending statement below

## 2017-10-18 NOTE — H&P ADULT - NEGATIVE CARDIOVASCULAR SYMPTOMS
no chest pain/no dyspnea on exertion/no paroxysmal nocturnal dyspnea/no peripheral edema/no orthopnea/no palpitations/no claudication

## 2017-10-18 NOTE — PROVIDER CONTACT NOTE (OTHER) - ACTION/TREATMENT ORDERED:
Will continue to monitor pt. Awaiting lab results of Metabolic Will reassess v/s for high BP at 4AM. Will continue to monitor pt. Awaiting lab results of Metabolic

## 2017-10-19 DIAGNOSIS — E87.2 ACIDOSIS: ICD-10-CM

## 2017-10-19 DIAGNOSIS — D72.829 ELEVATED WHITE BLOOD CELL COUNT, UNSPECIFIED: ICD-10-CM

## 2017-10-19 DIAGNOSIS — E78.5 HYPERLIPIDEMIA, UNSPECIFIED: ICD-10-CM

## 2017-10-19 DIAGNOSIS — E07.9 DISORDER OF THYROID, UNSPECIFIED: ICD-10-CM

## 2017-10-19 LAB
-  COAGULASE NEGATIVE STAPHYLOCOCCUS: SIGNIFICANT CHANGE UP
ACETONE SERPL-MCNC: ABNORMAL
ALBUMIN SERPL ELPH-MCNC: 3.1 G/DL — LOW (ref 3.3–5)
ALP SERPL-CCNC: 93 U/L — SIGNIFICANT CHANGE UP (ref 40–120)
ALT FLD-CCNC: 17 U/L — SIGNIFICANT CHANGE UP (ref 10–45)
ANION GAP SERPL CALC-SCNC: 18 MMOL/L — HIGH (ref 5–17)
ANION GAP SERPL CALC-SCNC: 23 MMOL/L — HIGH (ref 5–17)
ANION GAP SERPL CALC-SCNC: 24 MMOL/L — HIGH (ref 5–17)
ANION GAP SERPL CALC-SCNC: 27 MMOL/L — HIGH (ref 5–17)
APPEARANCE UR: CLEAR — SIGNIFICANT CHANGE UP
AST SERPL-CCNC: 24 U/L — SIGNIFICANT CHANGE UP (ref 10–40)
B-OH-BUTYR SERPL-SCNC: 2.3 MMOL/L — HIGH
B-OH-BUTYR SERPL-SCNC: 3.9 MMOL/L — HIGH
B-OH-BUTYR SERPL-SCNC: 4.8 MMOL/L — HIGH
BACTERIA # UR AUTO: ABNORMAL /HPF
BILIRUB SERPL-MCNC: 0.6 MG/DL — SIGNIFICANT CHANGE UP (ref 0.2–1.2)
BILIRUB UR-MCNC: NEGATIVE — SIGNIFICANT CHANGE UP
BUN SERPL-MCNC: 10 MG/DL — SIGNIFICANT CHANGE UP (ref 7–23)
BUN SERPL-MCNC: 10 MG/DL — SIGNIFICANT CHANGE UP (ref 7–23)
BUN SERPL-MCNC: 12 MG/DL — SIGNIFICANT CHANGE UP (ref 7–23)
BUN SERPL-MCNC: 9 MG/DL — SIGNIFICANT CHANGE UP (ref 7–23)
CALCIUM SERPL-MCNC: 7.5 MG/DL — LOW (ref 8.4–10.5)
CALCIUM SERPL-MCNC: 7.7 MG/DL — LOW (ref 8.4–10.5)
CALCIUM SERPL-MCNC: 8.3 MG/DL — LOW (ref 8.4–10.5)
CALCIUM SERPL-MCNC: 8.6 MG/DL — SIGNIFICANT CHANGE UP (ref 8.4–10.5)
CHLORIDE SERPL-SCNC: 87 MMOL/L — LOW (ref 96–108)
CHLORIDE SERPL-SCNC: 88 MMOL/L — LOW (ref 96–108)
CHLORIDE SERPL-SCNC: 98 MMOL/L — SIGNIFICANT CHANGE UP (ref 96–108)
CHLORIDE SERPL-SCNC: 99 MMOL/L — SIGNIFICANT CHANGE UP (ref 96–108)
CO2 SERPL-SCNC: 12 MMOL/L — LOW (ref 22–31)
CO2 SERPL-SCNC: 15 MMOL/L — LOW (ref 22–31)
CO2 SERPL-SCNC: 19 MMOL/L — LOW (ref 22–31)
CO2 SERPL-SCNC: 22 MMOL/L — SIGNIFICANT CHANGE UP (ref 22–31)
COLOR SPEC: SIGNIFICANT CHANGE UP
CORTIS AM PEAK SERPL-MCNC: 30.4 UG/DL — HIGH (ref 6–18.4)
CREAT SERPL-MCNC: 0.44 MG/DL — LOW (ref 0.5–1.3)
CREAT SERPL-MCNC: 0.66 MG/DL — SIGNIFICANT CHANGE UP (ref 0.5–1.3)
CREAT SERPL-MCNC: 0.67 MG/DL — SIGNIFICANT CHANGE UP (ref 0.5–1.3)
CREAT SERPL-MCNC: 0.75 MG/DL — SIGNIFICANT CHANGE UP (ref 0.5–1.3)
CULTURE RESULTS: SIGNIFICANT CHANGE UP
DIFF PNL FLD: ABNORMAL
EPI CELLS # UR: SIGNIFICANT CHANGE UP /HPF
GAS PNL BLDV: SIGNIFICANT CHANGE UP
GLUCOSE BLDC GLUCOMTR-MCNC: 143 MG/DL — HIGH (ref 70–99)
GLUCOSE BLDC GLUCOMTR-MCNC: 149 MG/DL — HIGH (ref 70–99)
GLUCOSE BLDC GLUCOMTR-MCNC: 152 MG/DL — HIGH (ref 70–99)
GLUCOSE BLDC GLUCOMTR-MCNC: 159 MG/DL — HIGH (ref 70–99)
GLUCOSE BLDC GLUCOMTR-MCNC: 166 MG/DL — HIGH (ref 70–99)
GLUCOSE BLDC GLUCOMTR-MCNC: 203 MG/DL — HIGH (ref 70–99)
GLUCOSE SERPL-MCNC: 137 MG/DL — HIGH (ref 70–99)
GLUCOSE SERPL-MCNC: 157 MG/DL — HIGH (ref 70–99)
GLUCOSE SERPL-MCNC: 163 MG/DL — HIGH (ref 70–99)
GLUCOSE SERPL-MCNC: 187 MG/DL — HIGH (ref 70–99)
GLUCOSE UR QL: >1000
GRAM STN FLD: SIGNIFICANT CHANGE UP
HBA1C BLD-MCNC: 6.4 % — HIGH (ref 4–5.6)
HCT VFR BLD CALC: 47.5 % — HIGH (ref 34.5–45)
HCT VFR BLD CALC: 47.8 % — HIGH (ref 34.5–45)
HGB BLD-MCNC: 16.8 G/DL — HIGH (ref 11.5–15.5)
HGB BLD-MCNC: 16.8 G/DL — HIGH (ref 11.5–15.5)
KETONES UR-MCNC: ABNORMAL
LEUKOCYTE ESTERASE UR-ACNC: ABNORMAL
MAGNESIUM SERPL-MCNC: 1.6 MG/DL — SIGNIFICANT CHANGE UP (ref 1.6–2.6)
MAGNESIUM SERPL-MCNC: 1.7 MG/DL — SIGNIFICANT CHANGE UP (ref 1.6–2.6)
MAGNESIUM SERPL-MCNC: 2.3 MG/DL — SIGNIFICANT CHANGE UP (ref 1.6–2.6)
MCHC RBC-ENTMCNC: 27.4 PG — SIGNIFICANT CHANGE UP (ref 27–34)
MCHC RBC-ENTMCNC: 27.7 PG — SIGNIFICANT CHANGE UP (ref 27–34)
MCHC RBC-ENTMCNC: 35.1 GM/DL — SIGNIFICANT CHANGE UP (ref 32–36)
MCHC RBC-ENTMCNC: 35.4 GM/DL — SIGNIFICANT CHANGE UP (ref 32–36)
MCV RBC AUTO: 78 FL — LOW (ref 80–100)
MCV RBC AUTO: 78.3 FL — LOW (ref 80–100)
METHOD TYPE: SIGNIFICANT CHANGE UP
NITRITE UR-MCNC: NEGATIVE — SIGNIFICANT CHANGE UP
PH UR: 5.5 — SIGNIFICANT CHANGE UP (ref 5–8)
PHOSPHATE SERPL-MCNC: 2.9 MG/DL — SIGNIFICANT CHANGE UP (ref 2.5–4.5)
PHOSPHATE SERPL-MCNC: 3.6 MG/DL — SIGNIFICANT CHANGE UP (ref 2.5–4.5)
PHOSPHATE SERPL-MCNC: 4.7 MG/DL — HIGH (ref 2.5–4.5)
PLATELET # BLD AUTO: 232 K/UL — SIGNIFICANT CHANGE UP (ref 150–400)
PLATELET # BLD AUTO: 252 K/UL — SIGNIFICANT CHANGE UP (ref 150–400)
POTASSIUM SERPL-MCNC: 3.1 MMOL/L — LOW (ref 3.5–5.3)
POTASSIUM SERPL-MCNC: 3.4 MMOL/L — LOW (ref 3.5–5.3)
POTASSIUM SERPL-MCNC: 3.5 MMOL/L — SIGNIFICANT CHANGE UP (ref 3.5–5.3)
POTASSIUM SERPL-MCNC: 3.6 MMOL/L — SIGNIFICANT CHANGE UP (ref 3.5–5.3)
POTASSIUM SERPL-SCNC: 3.1 MMOL/L — LOW (ref 3.5–5.3)
POTASSIUM SERPL-SCNC: 3.4 MMOL/L — LOW (ref 3.5–5.3)
POTASSIUM SERPL-SCNC: 3.5 MMOL/L — SIGNIFICANT CHANGE UP (ref 3.5–5.3)
POTASSIUM SERPL-SCNC: 3.6 MMOL/L — SIGNIFICANT CHANGE UP (ref 3.5–5.3)
PROT SERPL-MCNC: 6.6 G/DL — SIGNIFICANT CHANGE UP (ref 6–8.3)
PROT UR-MCNC: 30 MG/DL
RBC # BLD: 6.07 M/UL — HIGH (ref 3.8–5.2)
RBC # BLD: 6.13 M/UL — HIGH (ref 3.8–5.2)
RBC # FLD: 12.9 % — SIGNIFICANT CHANGE UP (ref 10.3–14.5)
RBC # FLD: 13.3 % — SIGNIFICANT CHANGE UP (ref 10.3–14.5)
RBC CASTS # UR COMP ASSIST: ABNORMAL /HPF (ref 0–2)
SODIUM SERPL-SCNC: 132 MMOL/L — LOW (ref 135–145)
SODIUM SERPL-SCNC: 132 MMOL/L — LOW (ref 135–145)
SODIUM SERPL-SCNC: 134 MMOL/L — LOW (ref 135–145)
SODIUM SERPL-SCNC: 134 MMOL/L — LOW (ref 135–145)
SP GR SPEC: 1.01 — SIGNIFICANT CHANGE UP (ref 1.01–1.02)
SPECIMEN SOURCE: SIGNIFICANT CHANGE UP
SPECIMEN SOURCE: SIGNIFICANT CHANGE UP
T4 FREE SERPL-MCNC: 1.9 NG/DL — HIGH (ref 0.9–1.8)
TSH SERPL-MCNC: 0.7 UIU/ML — SIGNIFICANT CHANGE UP (ref 0.27–4.2)
UROBILINOGEN FLD QL: NEGATIVE — SIGNIFICANT CHANGE UP
WBC # BLD: 20.69 K/UL — HIGH (ref 3.8–10.5)
WBC # BLD: 21.54 K/UL — HIGH (ref 3.8–10.5)
WBC # FLD AUTO: 20.69 K/UL — HIGH (ref 3.8–10.5)
WBC # FLD AUTO: 21.54 K/UL — HIGH (ref 3.8–10.5)
WBC UR QL: SIGNIFICANT CHANGE UP /HPF (ref 0–5)

## 2017-10-19 PROCEDURE — 99291 CRITICAL CARE FIRST HOUR: CPT

## 2017-10-19 PROCEDURE — 93306 TTE W/DOPPLER COMPLETE: CPT | Mod: 26

## 2017-10-19 PROCEDURE — 93312 ECHO TRANSESOPHAGEAL: CPT | Mod: 26

## 2017-10-19 PROCEDURE — 99223 1ST HOSP IP/OBS HIGH 75: CPT | Mod: GC

## 2017-10-19 PROCEDURE — 99222 1ST HOSP IP/OBS MODERATE 55: CPT

## 2017-10-19 RX ORDER — SODIUM CHLORIDE 9 MG/ML
1000 INJECTION, SOLUTION INTRAVENOUS
Qty: 0 | Refills: 0 | Status: DISCONTINUED | OUTPATIENT
Start: 2017-10-19 | End: 2017-10-22

## 2017-10-19 RX ORDER — INSULIN GLARGINE 100 [IU]/ML
5 INJECTION, SOLUTION SUBCUTANEOUS AT BEDTIME
Qty: 0 | Refills: 0 | Status: DISCONTINUED | OUTPATIENT
Start: 2017-10-19 | End: 2017-10-24

## 2017-10-19 RX ORDER — POTASSIUM CHLORIDE 20 MEQ
10 PACKET (EA) ORAL
Qty: 0 | Refills: 0 | Status: DISCONTINUED | OUTPATIENT
Start: 2017-10-19 | End: 2017-10-19

## 2017-10-19 RX ORDER — SODIUM CHLORIDE 9 MG/ML
1000 INJECTION INTRAMUSCULAR; INTRAVENOUS; SUBCUTANEOUS ONCE
Qty: 0 | Refills: 0 | Status: COMPLETED | OUTPATIENT
Start: 2017-10-19 | End: 2017-10-19

## 2017-10-19 RX ORDER — POTASSIUM CHLORIDE 20 MEQ
10 PACKET (EA) ORAL
Qty: 0 | Refills: 0 | Status: COMPLETED | OUTPATIENT
Start: 2017-10-19 | End: 2017-10-19

## 2017-10-19 RX ORDER — MAGNESIUM SULFATE 500 MG/ML
2 VIAL (ML) INJECTION ONCE
Qty: 0 | Refills: 0 | Status: COMPLETED | OUTPATIENT
Start: 2017-10-19 | End: 2017-10-19

## 2017-10-19 RX ORDER — METOCLOPRAMIDE HCL 10 MG
5 TABLET ORAL ONCE
Qty: 0 | Refills: 0 | Status: COMPLETED | OUTPATIENT
Start: 2017-10-19 | End: 2017-10-19

## 2017-10-19 RX ORDER — ENOXAPARIN SODIUM 100 MG/ML
50 INJECTION SUBCUTANEOUS
Qty: 0 | Refills: 0 | Status: DISCONTINUED | OUTPATIENT
Start: 2017-10-19 | End: 2017-10-23

## 2017-10-19 RX ORDER — SODIUM CHLORIDE 9 MG/ML
1000 INJECTION INTRAMUSCULAR; INTRAVENOUS; SUBCUTANEOUS ONCE
Qty: 0 | Refills: 0 | Status: DISCONTINUED | OUTPATIENT
Start: 2017-10-19 | End: 2017-10-19

## 2017-10-19 RX ORDER — LISINOPRIL 2.5 MG/1
5 TABLET ORAL DAILY
Qty: 0 | Refills: 0 | Status: DISCONTINUED | OUTPATIENT
Start: 2017-10-19 | End: 2017-10-24

## 2017-10-19 RX ORDER — CALCIUM GLUCONATE 100 MG/ML
1 VIAL (ML) INTRAVENOUS ONCE
Qty: 0 | Refills: 0 | Status: COMPLETED | OUTPATIENT
Start: 2017-10-19 | End: 2017-10-19

## 2017-10-19 RX ORDER — VANCOMYCIN HCL 1 G
1000 VIAL (EA) INTRAVENOUS EVERY 12 HOURS
Qty: 0 | Refills: 0 | Status: DISCONTINUED | OUTPATIENT
Start: 2017-10-19 | End: 2017-10-23

## 2017-10-19 RX ADMIN — INSULIN GLARGINE 5 UNIT(S): 100 INJECTION, SOLUTION SUBCUTANEOUS at 22:19

## 2017-10-19 RX ADMIN — SODIUM CHLORIDE 1000 MILLILITER(S): 9 INJECTION INTRAMUSCULAR; INTRAVENOUS; SUBCUTANEOUS at 10:30

## 2017-10-19 RX ADMIN — PIPERACILLIN AND TAZOBACTAM 25 GRAM(S): 4; .5 INJECTION, POWDER, LYOPHILIZED, FOR SOLUTION INTRAVENOUS at 04:09

## 2017-10-19 RX ADMIN — Medication 200 GRAM(S): at 20:16

## 2017-10-19 RX ADMIN — Medication 240 MILLIGRAM(S): at 05:23

## 2017-10-19 RX ADMIN — SODIUM CHLORIDE 1333.33 MILLILITER(S): 9 INJECTION INTRAMUSCULAR; INTRAVENOUS; SUBCUTANEOUS at 09:01

## 2017-10-19 RX ADMIN — SIMVASTATIN 20 MILLIGRAM(S): 20 TABLET, FILM COATED ORAL at 21:07

## 2017-10-19 RX ADMIN — FAMOTIDINE 20 MILLIGRAM(S): 10 INJECTION INTRAVENOUS at 17:15

## 2017-10-19 RX ADMIN — Medication 81 MILLIGRAM(S): at 12:14

## 2017-10-19 RX ADMIN — PANTOPRAZOLE SODIUM 40 MILLIGRAM(S): 20 TABLET, DELAYED RELEASE ORAL at 05:24

## 2017-10-19 RX ADMIN — SODIUM CHLORIDE 500 MILLILITER(S): 9 INJECTION INTRAMUSCULAR; INTRAVENOUS; SUBCUTANEOUS at 05:20

## 2017-10-19 RX ADMIN — ENOXAPARIN SODIUM 50 MILLIGRAM(S): 100 INJECTION SUBCUTANEOUS at 12:18

## 2017-10-19 RX ADMIN — Medication 100 MILLIEQUIVALENT(S): at 05:20

## 2017-10-19 RX ADMIN — PIPERACILLIN AND TAZOBACTAM 25 GRAM(S): 4; .5 INJECTION, POWDER, LYOPHILIZED, FOR SOLUTION INTRAVENOUS at 12:14

## 2017-10-19 RX ADMIN — FAMOTIDINE 20 MILLIGRAM(S): 10 INJECTION INTRAVENOUS at 05:23

## 2017-10-19 RX ADMIN — Medication 100 MILLIEQUIVALENT(S): at 06:34

## 2017-10-19 RX ADMIN — SODIUM CHLORIDE 75 MILLILITER(S): 9 INJECTION, SOLUTION INTRAVENOUS at 17:16

## 2017-10-19 RX ADMIN — SODIUM CHLORIDE 2000 MILLILITER(S): 9 INJECTION INTRAMUSCULAR; INTRAVENOUS; SUBCUTANEOUS at 05:30

## 2017-10-19 RX ADMIN — LISINOPRIL 5 MILLIGRAM(S): 2.5 TABLET ORAL at 12:18

## 2017-10-19 RX ADMIN — Medication 5 MILLIGRAM(S): at 04:08

## 2017-10-19 RX ADMIN — PIPERACILLIN AND TAZOBACTAM 25 GRAM(S): 4; .5 INJECTION, POWDER, LYOPHILIZED, FOR SOLUTION INTRAVENOUS at 20:27

## 2017-10-19 RX ADMIN — SODIUM CHLORIDE 50 MILLILITER(S): 9 INJECTION, SOLUTION INTRAVENOUS at 12:14

## 2017-10-19 RX ADMIN — Medication 100 MILLIEQUIVALENT(S): at 04:08

## 2017-10-19 RX ADMIN — Medication 50 GRAM(S): at 17:15

## 2017-10-19 RX ADMIN — Medication 5 MILLIGRAM(S): at 05:24

## 2017-10-19 NOTE — CONSULT NOTE ADULT - ASSESSMENT
72y F hx of HTN, T2DM on metformin, Asthma ,mild dementia, AFib who presents with abn,nv found to have colitis, uti and severe dehydration MICU consulted for DKA.     # HAGMA with ketoacidosis 2/2 to likely starvation ketoacidosis, less likely DKA.   - FS low, 150's, not on invokana like medications on only oral agents at home, no po intake for 1 week, severe hemoconcentration, positive UTI, high AG, positive ketones. Ketones in the urine and glucose in the urine.   - Start aggressive IVF at least 3 L, severe dehydration, NPO, Q4h ISS and FS.  - At risk for thiamine depletion and refeeding syndrome. Need frequent, BMP for refeeding including mg,phos,k,ca.   - Would start thiamin supplementation now.  - After IVF, please repeat Ua, serum ketones, bmp and will reassess.    # UTI/colitis  - F/U culture results, c/w zosyn.   - Would consider infectious workup for colitis.    Dae Hyeon Kim [id number]  Department of Internal Medicine  Pager [phone]

## 2017-10-19 NOTE — CONSULT NOTE ADULT - PROBLEM SELECTOR RECOMMENDATION 9
-patient with anion gap acidosis with AG uptrending at 27. Normal pH. Positive ketone and B-hyroxybuturate 4.8. Patient also with moderate ketones in urine and 500 glucose.  -patient with FS consistently <160 and receiving no insulin coverage.   -Patient does reports to not eating/drinking for past 5 days and may be due to starvation ketosis with evidence of hemoconcentration and electrolyte abnormalities, however also r/o euglycemic DKA. Patient not on any SGLT2 inhibitors that would predispose and patient Type DM  -would recheck UA  -monitor BMP and serum ketones  -c/w IVF  -monitor FS -patient with anion gap acidosis with AG uptrending at 27. Normal pH. Positive ketone and B-hyroxybuturate 4.8. Patient also with moderate ketones in urine and 500 glucose. No lactate.  -patient with FS consistently <160 and receiving no insulin coverage.   -Patient does reports to not eating/drinking for past 5 days and may be due to starvation ketosis with evidence of hemoconcentration and electrolyte abnormalities, however also r/o euglycemic DKA. Patient not on any SGLT2 inhibitors that would predispose and patient Type DM  -would recheck UA  -monitor BMP and serum ketones  -c/w IVF  -monitor FS -patient with anion gap acidosis with AG uptrending at 27. Normal pH. Positive ketone and B-hyroxybuturate 4.8. Patient also with moderate ketones in urine and 500 glucose. No lactate.  -patient with FS consistently <160 and receiving no insulin coverage.   -Patient does reports to not eating/drinking for past 5 days and may be due to starvation ketosis with evidence of hemoconcentration and electrolyte abnormalities, less likely euglycemic DKA. Patient not on any SGLT2 inhibitors that would predispose.  -would recheck UA  -monitor BMP and serum ketones  -start a diet for a patient, add glucerna. monitor AG and ketones q12  -c/w IVF  -monitor FS and c/w HISS -patient with anion gap acidosis with AG uptrending at 27. Normal pH. Positive ketone and B-hyroxybuturate 4.8. Patient also with moderate ketones in urine and 500 glucose. No lactate.  -patient with FS consistently <160 and receiving no insulin coverage.   -Patient does reports to not eating/drinking for past 5 days and may be due to starvation ketosis with evidence of hemoconcentration and electrolyte abnormalities, less likely euglycemic DKA. Patient not on any SGLT2 inhibitors that would predispose on previous insulin, or pregnancy. Most likely from starvation.   -would recheck UA  -monitor BMP and serum ketones  -start a diet for a patient, add glucerna. Nutrition eval and calor count.  monitor AG and ketones q12  -c/w IVF  -monitor FS and c/w HISS, add Lantus 5 units qhs.

## 2017-10-19 NOTE — CONSULT NOTE ADULT - PROBLEM SELECTOR RECOMMENDATION 4
-on cardizem and lisinopril. Monitor BP. Consider adding other agent, goal SBP<140/90.
-planning LEANNA  -cardio input noted

## 2017-10-19 NOTE — CHART NOTE - NSCHARTNOTEFT_GEN_A_CORE
1:50 AM 10/19    Notified by RN patient with PAF for 4.5 seconds HR up to 190s. Patient seen and examined at bedside. Complained of nausea and weakness associated with her stomach pain for which she was admitted for. Denies chest pain, palpitations, dyspnea, n/v, dizziness, headache. Vital signs stable. Patient with history of PAF, received home dose of Cardizem today. Will continue to monitor on tele, give Reglan x 1 for nausea, increase fluids to 75 x 2 hours. Will add magnesium and phosphorous to AM labs.     Nisha Marrero PA-C  #[id number]

## 2017-10-19 NOTE — CONSULT NOTE ADULT - PROBLEM SELECTOR RECOMMENDATION 5
-check lipid profile and consider if needs higher intensity statin given risk factors  -currently on simvastatin 20mg.

## 2017-10-19 NOTE — PROGRESS NOTE ADULT - SUBJECTIVE AND OBJECTIVE BOX
No pain, no shortness of breath      VITAL:  T(C): , Max: 36.9 (10-19-17 @ 01:41)  T(F): , Max: 98.4 (10-19-17 @ 01:41)  HR: 89 (10-19-17 @ 04:23)  BP: 157/77 (10-19-17 @ 04:23)  BP(mean): --  RR: 20 (10-19-17 @ 04:23)  SpO2: 95% (10-19-17 @ 04:23)      PHYSICAL EXAM:  Constitutional: NAD, Alert  HEENT: NCAT, MMM  Neck: Supple, No JVD  Respiratory: CTA-b/l  Cardiovascular: RRR s1s2, no m/r/g  Gastrointestinal: BS+, soft, NT/ND  Extremities: No peripheral edema b/l  Neurological: no focal deficits; strength grossly intact  Psychiatric: Normal mood, normal affect  Back: no CVAT b/l  Skin: No rashes, no nevi      LABS:                        16.8   21.54 )-----------( 252      ( 19 Oct 2017 07:25 )             47.5     Na(132)/K(3.4)/Cl(87)/HCO3(22)/BUN(10)/Cr(0.44)Glu(137)/Ca(8.3)/Mg(--)/PO4(--)    10-19 @ 02:01  Na(130)/K(2.9)/Cl(84)/HCO3(27)/BUN(14)/Cr(0.85)Glu(172)/Ca(9.0)/Mg(1.7)/PO4(--)    10-18 @ 11:06    Urinalysis Basic - ( 18 Oct 2017 12:53 )  Color: Yellow / Appearance: Clear / S.008 / pH: x  Gluc: x / Ketone: Moderate  / Bili: Negative / Urobili: Negative   Blood: x / Protein: 150 mg/dL / Nitrite: Negative   Leuk Esterase: Large / RBC: 5-10 /HPF / WBC 26-50 /HPF   Sq Epi: x / Non Sq Epi: OCC /HPF / Bacteria: Moderate /HPF      IMPRESSION: 72F w/ HTN, DM2, and AFib, 10/18/17 a/w abdominal pain/nausea, c/b appreciation of SHANE, hypokalemia, colitis, and renal infarcts    (1)Hypokalemia - whole body deficit; improving with repletion    (2)Hyponatremia - hypovolemic/poor osmotic intake-associated; improving    (3)Acid-base  - metabolic acidosis with increased anion gap - ketoacidosis - starvation versus diabetic ketoacidosis. Metabolic alkalosis due to contraction/vomiting. AG trending up (19==>23)    (4)SHANE - prerenal - resolved as of today s/p IVF resuscitation    (5)Renal infarcts -  Likely afib-associated. Doubt endocarditis. Likely to need A/C.    (6)HTN - acceptable for now      RECOMMEND:  (1)F/U LEANNA; A/C once endocarditis ruled out  (2)Can continue NS+40meq/L KCl @ 50cc/h   (3)BMP daily  (4)Dose new meds for GFR >60 ml/min  (5)No ACEI/ARB/diuretics for now; if antihypertensives are needed, could opt for Norvasc or Hydralazine        Yuniel Boateng MD  [facility]  [phone] (+)nausea, (+)abdominal pain      VITAL:  T(C): , Max: 36.9 (10-19-17 @ 01:41)  T(F): , Max: 98.4 (10-19-17 @ 01:41)  HR: 89 (10-19-17 @ 04:23)  BP: 157/77 (10-19-17 @ 04:23)  BP(mean): --  RR: 20 (10-19-17 @ 04:23)  SpO2: 95% (10-19-17 @ 04:23)      PHYSICAL EXAM:  Constitutional: Anxious, mildly confused  HEENT: NCAT, MMM  Neck: Supple, No JVD  Respiratory: CTA-b/l  Cardiovascular: RRR s1s2, no m/r/g  Gastrointestinal: BS+, soft, NT/ND  Extremities: No peripheral edema b/l  Neurological: no focal deficits; strength grossly intact  Psychiatric: Normal mood, normal affect  Back: no CVAT b/l  Skin: No rashes, no nevi      LABS:                        16.8   21.54 )-----------( 252      ( 19 Oct 2017 07:25 )             47.5     Na(132)/K(3.4)/Cl(87)/HCO3(22)/BUN(10)/Cr(0.44)Glu(137)/Ca(8.3)/Mg(--)/PO4(--)    10-19 @ 02:01  Na(130)/K(2.9)/Cl(84)/HCO3(27)/BUN(14)/Cr(0.85)Glu(172)/Ca(9.0)/Mg(1.7)/PO4(--)    10-18 @ 11:06    Urinalysis Basic - ( 18 Oct 2017 12:53 )  Color: Yellow / Appearance: Clear / S.008 / pH: x  Gluc: x / Ketone: Moderate  / Bili: Negative / Urobili: Negative   Blood: x / Protein: 150 mg/dL / Nitrite: Negative   Leuk Esterase: Large / RBC: 5-10 /HPF / WBC 26-50 /HPF   Sq Epi: x / Non Sq Epi: OCC /HPF / Bacteria: Moderate /HPF      IMPRESSION: 72F w/ HTN, DM2, and AFib, 10/18/17 a/w abdominal pain/nausea, c/b appreciation of SHANE, hypokalemia, colitis, and renal infarcts    (1)Hypokalemia - whole body deficit; improving with repletion    (2)Hyponatremia - hypovolemic/poor osmotic intake-associated; improving    (3)Acid-base  - metabolic acidosis with increased anion gap - ketoacidosis - starvation versus diabetic ketoacidosis. Metabolic alkalosis due to contraction/vomiting. AG trending up (19==>23)    (4)SHANE - prerenal - resolved as of today s/p IVF resuscitation    (5)Renal infarcts -  Likely afib-associated. Doubt endocarditis. Likely to need A/C.    (6)HTN - acceptable for now      RECOMMEND:  (1)F/U LEANNA; A/C once endocarditis ruled out  (2)Can continue NS+40meq/L KCl @ 50cc/h   (3)BMP daily  (4)Dose new meds for GFR >60 ml/min  (5)No ACEI/ARB/diuretics for now; if antihypertensives are needed, could opt for Norvasc or Hydralazine        Yuniel Boateng MD  [facility]  [phone]

## 2017-10-19 NOTE — CHART NOTE - NSCHARTNOTEFT_GEN_A_CORE
23:20 10/19/17     Notified by RN patient with episode of PATS  x 2 seconds. Patient seen and examined resting comfortably in bed. Denies any acute chest pain, palpitations, dyspnea, headache, lightheadedness, dizziness. BMP pending currently, will follow electrolytes and replete as needed. Will add magnesium and phosphorous to AM labs. Patient with elevated blood pressure since admission, had not received home dose of blood pressure medications, asymptomatic. Will give home medications and continue to monitor. Will continue to monitor on telemetry.      Nisha Marrero PA-C  #[id number]

## 2017-10-19 NOTE — PROVIDER CONTACT NOTE (OTHER) - ACTION/TREATMENT ORDERED:
Awaiting orders at this time. As per MICU consult, awaiting orders for STAT f/s, followed by f/s q4hrs. Awaiting order for 1 L NS bolus X3 (over 3 hours). Will continue to monitor pt.

## 2017-10-19 NOTE — CONSULT NOTE ADULT - ASSESSMENT
72y F hx of HTN, DM (non-insulin dep, a1C 6.4), Asthma (on chronic steroids) ,mild dementia, AFib (not on A/C), osteoporosis (on bisphosphonate) here with  nausea. Found with colitis, possible b/l renal infarcts, and UTI. Also with anion gap metabolic acidosis with positive ketones which may be 2/2 severe dehydration vs. r/o euglycemic DKA. 72y F hx of HTN, DM (non-insulin dep, a1C 6.4), Asthma (on chronic steroids) ,mild dementia, AFib (not on A/C), osteoporosis (on bisphosphonate) here with  nausea. Found with colitis, possible b/l renal infarcts, and UTI. Also with anion gap metabolic acidosis with positive ketones which may be 2/2 severe dehydration vs. r/o euglycemic DKA (high risk patient with high level decision-making).

## 2017-10-19 NOTE — CONSULT NOTE ADULT - PROBLEM SELECTOR RECOMMENDATION 3
-patient found with Ft4 1.9.   -check TSH, TT3, and recheck FT4
-likely related to colitis and renal infarcts  -f/u cx

## 2017-10-19 NOTE — PROGRESS NOTE ADULT - SUBJECTIVE AND OBJECTIVE BOX
INTERVAL HPI/OVERNIGHT EVENTS: S/P LEANNA .Seen and examined In cath recovery with daughter by bedside. Doing well and want to eat.   Vital Signs Last 24 Hrs  T(C): 36.6 (19 Oct 2017 17:07), Max: 36.9 (19 Oct 2017 01:41)  T(F): 97.8 (19 Oct 2017 17:07), Max: 98.4 (19 Oct 2017 01:41)  HR: 78 (19 Oct 2017 17:07) (70 - 116)  BP: 115/63 (19 Oct 2017 17:07) (115/63 - 185/80)  BP(mean): --  RR: 18 (19 Oct 2017 17:07) (17 - 20)  SpO2: 97% (19 Oct 2017 17:07) (93% - 97%)  I&O's Summary    18 Oct 2017 07:01  -  19 Oct 2017 07:00  --------------------------------------------------------  IN: 1600 mL / OUT: 0 mL / NET: 1600 mL    19 Oct 2017 07:01  -  19 Oct 2017 17:23  --------------------------------------------------------  IN: 2250 mL / OUT: 1100 mL / NET: 1150 mL      MEDICATIONS  (STANDING):  aspirin enteric coated 81 milliGRAM(s) Oral daily  calcium gluconate IVPB 1 Gram(s) IV Intermittent once  dextrose 5%. 1000 milliLiter(s) (50 mL/Hr) IV Continuous <Continuous>  dextrose 50% Injectable 12.5 Gram(s) IV Push once  dextrose 50% Injectable 25 Gram(s) IV Push once  dextrose 50% Injectable 25 Gram(s) IV Push once  diltiazem    milliGRAM(s) Oral daily  enoxaparin Injectable 50 milliGRAM(s) SubCutaneous two times a day  famotidine    Tablet 20 milliGRAM(s) Oral two times a day  insulin glargine Injectable (LANTUS) 5 Unit(s) SubCutaneous at bedtime  insulin lispro (HumaLOG) corrective regimen sliding scale   SubCutaneous three times a day before meals  insulin lispro (HumaLOG) corrective regimen sliding scale   SubCutaneous at bedtime  lisinopril 5 milliGRAM(s) Oral daily  pantoprazole    Tablet 40 milliGRAM(s) Oral before breakfast  piperacillin/tazobactam IVPB. 3.375 Gram(s) IV Intermittent every 8 hours  predniSONE   Tablet 5 milliGRAM(s) Oral daily  simvastatin 20 milliGRAM(s) Oral at bedtime  sodium chloride 0.9% 1000 milliLiter(s) (75 mL/Hr) IV Continuous <Continuous>    MEDICATIONS  (PRN):  acetaminophen 300 mG/codeine 30 mG 1 Tablet(s) Oral every 4 hours PRN Moderate Pain (4 - 6)  dextrose Gel 1 Dose(s) Oral once PRN Blood Glucose LESS THAN 70 milliGRAM(s)/deciliter  glucagon  Injectable 1 milliGRAM(s) IntraMuscular once PRN Glucose LESS THAN 70 milligrams/deciliter    LABS:                        16.8   21.54 )-----------( 252      ( 19 Oct 2017 07:25 )             47.5     10-19    134<L>  |  98  |  9   ----------------------------<  157<H>  3.6   |  12<L>  |  0.67    Ca    7.5<L>      19 Oct 2017 12:54  Phos  3.6     10-19  Mg     1.6     10-19    TPro  6.6  /  Alb  3.1<L>  /  TBili  0.6  /  DBili  x   /  AST  24  /  ALT  17  /  AlkPhos  93  10-19      Urinalysis Basic - ( 18 Oct 2017 12:53 )    Color: Yellow / Appearance: Clear / S.008 / pH: x  Gluc: x / Ketone: Moderate  / Bili: Negative / Urobili: Negative   Blood: x / Protein: 150 mg/dL / Nitrite: Negative   Leuk Esterase: Large / RBC: 5-10 /HPF / WBC 26-50 /HPF   Sq Epi: x / Non Sq Epi: OCC /HPF / Bacteria: Moderate /HPF      CAPILLARY BLOOD GLUCOSE      POCT Blood Glucose.: 149 mg/dL (19 Oct 2017 15:10)  POCT Blood Glucose.: 143 mg/dL (19 Oct 2017 08:55)  POCT Blood Glucose.: 159 mg/dL (19 Oct 2017 05:17)  POCT Blood Glucose.: 152 mg/dL (19 Oct 2017 01:58)  POCT Blood Glucose.: 154 mg/dL (18 Oct 2017 21:40)  POCT Blood Glucose.: 137 mg/dL (18 Oct 2017 18:25)        Urinalysis Basic - ( 18 Oct 2017 12:53 )    Color: Yellow / Appearance: Clear / S.008 / pH: x  Gluc: x / Ketone: Moderate  / Bili: Negative / Urobili: Negative   Blood: x / Protein: 150 mg/dL / Nitrite: Negative   Leuk Esterase: Large / RBC: 5-10 /HPF / WBC 26-50 /HPF   Sq Epi: x / Non Sq Epi: OCC /HPF / Bacteria: Moderate /HPF      REVIEW OF SYSTEMS:  CONSTITUTIONAL: No fever, weight loss, or fatigue  EYES: No eye pain, visual disturbances, or discharge  ENMT:  No difficulty hearing, tinnitus, vertigo; No sinus or throat pain  NECK: No pain or stiffness  BREASTS: No pain, masses, or nipple discharge  RESPIRATORY: No cough, wheezing, chills or hemoptysis; No shortness of breath  CARDIOVASCULAR: No chest pain, palpitations, dizziness, or leg swelling  GASTROINTESTINAL: No abdominal or epigastric pain. No nausea, vomiting, or hematemesis; No diarrhea or constipation. No melena or hematochezia.  GENITOURINARY: No dysuria, frequency, hematuria, or incontinence  NEUROLOGICAL: No headaches, memory loss, loss of strength, numbness, or tremors  SKIN: No itching, burning, rashes, or lesions     RADIOLOGY & ADDITIONAL TESTS:    Consultant(s) Notes Reviewed:  [x ] YES  [ ] NO    PHYSICAL EXAM:  GENERAL: NAD, well-groomed, well-developed, not in any distress ,  HEAD:  Atraumatic, Normocephalic  EYES: EOMI, PERRLA, conjunctiva and sclera clear  ENMT: No tonsillar erythema, exudates, or enlargement; Moist mucous membranes, Good dentition, No lesions  NECK: Supple, No JVD, Normal thyroid  NERVOUS SYSTEM:  Alert & Oriented X3, No focal deficit   CHEST/LUNG: Good air entry bilateral with no  rales, rhonchi, wheezing, or rubs  HEART: Regular rate and rhythm; SS murmurs,  ABDOMEN: Soft, Nontender, Nondistended; Bowel sounds present  EXTREMITIES:  2+ Peripheral Pulses, No clubbing, cyanosis, or edema  SKIN: No rashes or lesions    Care Discussed with Consultants/Other Providers [ x] YES  [ ] NO

## 2017-10-19 NOTE — PROVIDER CONTACT NOTE (OTHER) - SITUATION
MICU consulted for concern that pt is in DKA. Discussed plan with PARKER Cameron and MD Julien (MICU); due to severe dehydration, 1L NS Bolus X3 (over 3 hours) to be ordered

## 2017-10-19 NOTE — CONSULT NOTE ADULT - ASSESSMENT
72y old  Female who presents with a chief complaint of Nausea with abdominal pain with colitis on CT and renal infarcts

## 2017-10-19 NOTE — CONSULT NOTE ADULT - SUBJECTIVE AND OBJECTIVE BOX
CRUZITO BATES 72y Female  MRN-[id number]    Patient is a 72y old  Female who presents with a chief complaint of Nausea with abdominal pain. (18 Oct 2017 17:14)      HPI:  72y F hx of HTN, DM, Asthma ,mild dementia, AFib here with  nausea since Saturday. Today she noticed abdominal pain.  Last BM 2 days ago. Passing flatus. No fever, chills, cp, sob, urinary sx. She stopped anticoagulation few years back . (18 Oct 2017 17:14)      PAST MEDICAL & SURGICAL HISTORY:  Dementia  Hypertension  Type 2 diabetes mellitus  Atrial fibrillation, chronic  Diabetes mellitus  Asthma  Diabetes Mellitus  HTN (Hypertension)  Asthma  Afib      Allergies    Avelox (Pruritus)  Levaquin (Unknown)    Intolerances        ANTIMICROBIALS:  piperacillin/tazobactam IVPB. 3.375 every 8 hours      MEDICATIONS  (STANDING):  aspirin enteric coated 81 milliGRAM(s) Oral daily  dextrose 5%. 1000 milliLiter(s) (50 mL/Hr) IV Continuous <Continuous>  dextrose 50% Injectable 12.5 Gram(s) IV Push once  dextrose 50% Injectable 25 Gram(s) IV Push once  dextrose 50% Injectable 25 Gram(s) IV Push once  diltiazem    milliGRAM(s) Oral daily  enoxaparin Injectable 50 milliGRAM(s) SubCutaneous two times a day  famotidine    Tablet 20 milliGRAM(s) Oral two times a day  insulin lispro (HumaLOG) corrective regimen sliding scale   SubCutaneous three times a day before meals  insulin lispro (HumaLOG) corrective regimen sliding scale   SubCutaneous at bedtime  lisinopril 5 milliGRAM(s) Oral daily  pantoprazole    Tablet 40 milliGRAM(s) Oral before breakfast  piperacillin/tazobactam IVPB. 3.375 Gram(s) IV Intermittent every 8 hours  predniSONE   Tablet 5 milliGRAM(s) Oral daily  simvastatin 20 milliGRAM(s) Oral at bedtime  sodium chloride 0.9% 1000 milliLiter(s) (50 mL/Hr) IV Continuous <Continuous>      Social History  Smoking:  Etoh:  Drug use:      FAMILY HISTORY:  No pertinent family history in first degree relatives      Vital Signs Last 24 Hrs  T(C): 36.4 (19 Oct 2017 04:23), Max: 36.9 (19 Oct 2017 01:41)  T(F): 97.6 (19 Oct 2017 04:23), Max: 98.4 (19 Oct 2017 01:41)  HR: 89 (19 Oct 2017 04:23) (70 - 116)  BP: 157/77 (19 Oct 2017 04:23) (127/72 - 185/80)  BP(mean): --  RR: 20 (19 Oct 2017 04:23) (17 - 20)  SpO2: 95% (19 Oct 2017 04:23) (93% - 97%)    CBC Full  -  ( 19 Oct 2017 07:25 )  WBC Count : 21.54 K/uL  Hemoglobin : 16.8 g/dL  Hematocrit : 47.5 %  Platelet Count - Automated : 252 K/uL  Mean Cell Volume : 78.3 fl  Mean Cell Hemoglobin : 27.7 pg  Mean Cell Hemoglobin Concentration : 35.4 gm/dL  Auto Neutrophil # : x  Auto Lymphocyte # : x  Auto Monocyte # : x  Auto Eosinophil # : x  Auto Basophil # : x  Auto Neutrophil % : x  Auto Lymphocyte % : x  Auto Monocyte % : x  Auto Eosinophil % : x  Auto Basophil % : x    10-19    134<L>  |  88<L>  |  12  ----------------------------<  163<H>  3.1<L>   |  19<L>  |  0.66    Ca    8.6      19 Oct 2017 07:25  Phos  4.7     10-19  Mg     1.7     10-19    TPro  6.6  /  Alb  3.1<L>  /  TBili  0.6  /  DBili  x   /  AST  24  /  ALT  17  /  AlkPhos  93  10-19    LIVER FUNCTIONS - ( 19 Oct 2017 07:25 )  Alb: 3.1 g/dL / Pro: 6.6 g/dL / ALK PHOS: 93 U/L / ALT: 17 U/L / AST: 24 U/L / GGT: x           Urinalysis Basic - ( 18 Oct 2017 12:53 )    Color: Yellow / Appearance: Clear / S.008 / pH: x  Gluc: x / Ketone: Moderate  / Bili: Negative / Urobili: Negative   Blood: x / Protein: 150 mg/dL / Nitrite: Negative   Leuk Esterase: Large / RBC: 5-10 /HPF / WBC 26-50 /HPF   Sq Epi: x / Non Sq Epi: OCC /HPF / Bacteria: Moderate /HPF        MICROBIOLOGY:          v              RADIOLOGY CRUZITO BATES 72y Female  [id number]    Patient is a 72y old  Female who presents with a chief complaint of Nausea with abdominal pain. (18 Oct 2017 17:14)      HPI:  72y F hx of HTN, DM, Asthma ,mild dementia, AFib here with  nausea since Saturday. Today she noticed abdominal pain.  Last BM 2 days ago. Passing flatus. No fever, chills, cp, sob, urinary sx. She stopped anticoagulation few years back . (18 Oct 2017 17:14)    Ct abd shows colitis and B/L renal infarcts    No vomiting/diarrhea/dysuria/hematuria     Also with Afib    PAST MEDICAL & SURGICAL HISTORY:  Dementia  Hypertension  Type 2 diabetes mellitus  Atrial fibrillation, chronic  Diabetes mellitus  Asthma  Diabetes Mellitus  HTN (Hypertension)  Asthma  Afib      Allergies    Avelox (Pruritus)  Levaquin (Unknown)    Intolerances        ANTIMICROBIALS:  piperacillin/tazobactam IVPB. 3.375 every 8 hours      MEDICATIONS  (STANDING):  aspirin enteric coated 81 milliGRAM(s) Oral daily  dextrose 5%. 1000 milliLiter(s) (50 mL/Hr) IV Continuous <Continuous>  dextrose 50% Injectable 12.5 Gram(s) IV Push once  dextrose 50% Injectable 25 Gram(s) IV Push once  dextrose 50% Injectable 25 Gram(s) IV Push once  diltiazem    milliGRAM(s) Oral daily  enoxaparin Injectable 50 milliGRAM(s) SubCutaneous two times a day  famotidine    Tablet 20 milliGRAM(s) Oral two times a day  insulin lispro (HumaLOG) corrective regimen sliding scale   SubCutaneous three times a day before meals  insulin lispro (HumaLOG) corrective regimen sliding scale   SubCutaneous at bedtime  lisinopril 5 milliGRAM(s) Oral daily  pantoprazole    Tablet 40 milliGRAM(s) Oral before breakfast  piperacillin/tazobactam IVPB. 3.375 Gram(s) IV Intermittent every 8 hours  predniSONE   Tablet 5 milliGRAM(s) Oral daily  simvastatin 20 milliGRAM(s) Oral at bedtime  sodium chloride 0.9% 1000 milliLiter(s) (50 mL/Hr) IV Continuous <Continuous>      Social History  Smoking: no   Etoh: no  Drug use: no      FAMILY HISTORY:  No pertinent family history in first degree relatives      Vital Signs Last 24 Hrs  T(C): 36.4 (19 Oct 2017 04:23), Max: 36.9 (19 Oct 2017 01:41)  T(F): 97.6 (19 Oct 2017 04:23), Max: 98.4 (19 Oct 2017 01:41)  HR: 89 (19 Oct 2017 04:23) (70 - 116)  BP: 157/77 (19 Oct 2017 04:23) (127/72 - 185/80)  BP(mean): --  RR: 20 (19 Oct 2017 04:23) (17 - 20)  SpO2: 95% (19 Oct 2017 04:23) (93% - 97%)    CBC Full  -  ( 19 Oct 2017 07:25 )  WBC Count : 21.54 K/uL  Hemoglobin : 16.8 g/dL  Hematocrit : 47.5 %  Platelet Count - Automated : 252 K/uL  Mean Cell Volume : 78.3 fl  Mean Cell Hemoglobin : 27.7 pg  Mean Cell Hemoglobin Concentration : 35.4 gm/dL  Auto Neutrophil # : x  Auto Lymphocyte # : x  Auto Monocyte # : x  Auto Eosinophil # : x  Auto Basophil # : x  Auto Neutrophil % : x  Auto Lymphocyte % : x  Auto Monocyte % : x  Auto Eosinophil % : x  Auto Basophil % : x    10-19    134<L>  |  88<L>  |  12  ----------------------------<  163<H>  3.1<L>   |  19<L>  |  0.66    Ca    8.6      19 Oct 2017 07:25  Phos  4.7     10-19  Mg     1.7     10-19    TPro  6.6  /  Alb  3.1<L>  /  TBili  0.6  /  DBili  x   /  AST  24  /  ALT  17  /  AlkPhos  93  10-19    LIVER FUNCTIONS - ( 19 Oct 2017 07:25 )  Alb: 3.1 g/dL / Pro: 6.6 g/dL / ALK PHOS: 93 U/L / ALT: 17 U/L / AST: 24 U/L / GGT: x           Urinalysis Basic - ( 18 Oct 2017 12:53 )    Color: Yellow / Appearance: Clear / S.008 / pH: x  Gluc: x / Ketone: Moderate  / Bili: Negative / Urobili: Negative   Blood: x / Protein: 150 mg/dL / Nitrite: Negative   Leuk Esterase: Large / RBC: 5-10 /HPF / WBC 26-50 /HPF   Sq Epi: x / Non Sq Epi: OCC /HPF / Bacteria: Moderate /HPF        MICROBIOLOGY:  Bcx/urine cx testing    RADIOLOGY  CT Abdomen and Pelvis w/ Oral Cont and w/ IV Cont (10.18.17 @ 14:51) >  1.  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 Pyelonephritis is felt to be less   likely.  2.  Mural thickening of the ascending colon, possibly colitis.

## 2017-10-19 NOTE — CHART NOTE - NSCHARTNOTEFT_GEN_A_CORE
Notified by RN; blood cultures returned positive for gram positive cocci in aerobic bottle. Pt. only currently on Zosyn. Will add vancomycin 1000 mg BID for now. F/u w/ ID in AM. Continue to monitor pt. throughout the night. F/u w/ primary team in AM.    -Yeyo Light PA-C. #[id number]

## 2017-10-19 NOTE — CHART NOTE - NSCHARTNOTEFT_GEN_A_CORE
5:00 AM 10/19    Endorsed by ED NP to follow up pt. anion gap for possible concern of DKA given history of DM and elevated anion gap although normal sugars. Repeat anion gap of 23 discussed with Dr. Julien, further lab work to rule out DKA was ordered, endocrine was consulted, MICU consulted. Given patients recent history of decreased PO intake and review of labs, endocrine and MICU agree lab abnormalities secondary to diagnosis of severe dehydration rather than DKA. Patient to be transferred to MICU for further management and observation. Dr. Julien aware of patients change of status.     Nisha Marrero PA-C  #[id number]

## 2017-10-19 NOTE — PROVIDER CONTACT NOTE (OTHER) - RECOMMENDATIONS
ANTONIO Cameron made aware; awaiting orders
PA notified
PA notified.
Plan discussed with PA and MD Julien

## 2017-10-19 NOTE — PROGRESS NOTE ADULT - ASSESSMENT
likely ischemic colitis. continue abx.  poss colon at some point. no contrainidcation to a/c.  for echo today.

## 2017-10-19 NOTE — CONSULT NOTE ADULT - CONSULT REASON
Afib with renal infarcts
HAGMA, ketoacidosis.
acidosis, r/o DKA
colitis
colitis, need for a/c
Renal infarcts/hypokalemia

## 2017-10-19 NOTE — PROGRESS NOTE ADULT - SUBJECTIVE AND OBJECTIVE BOX
CRUZITO BATES:[id number],   72yFemale followed for: abdominal pain  Avelox (Pruritus)  Levaquin (Unknown)    PAST MEDICAL & SURGICAL HISTORY:  Dementia  Hypertension  Type 2 diabetes mellitus  Atrial fibrillation, chronic  Diabetes mellitus  Asthma  Diabetes Mellitus  HTN (Hypertension)  Asthma  Afib    FAMILY HISTORY:  No pertinent family history in first degree relatives    MEDICATIONS  (STANDING):  aspirin enteric coated 81 milliGRAM(s) Oral daily  dextrose 5%. 1000 milliLiter(s) (50 mL/Hr) IV Continuous <Continuous>  dextrose 50% Injectable 12.5 Gram(s) IV Push once  dextrose 50% Injectable 25 Gram(s) IV Push once  dextrose 50% Injectable 25 Gram(s) IV Push once  diltiazem    milliGRAM(s) Oral daily  famotidine    Tablet 20 milliGRAM(s) Oral two times a day  insulin lispro (HumaLOG) corrective regimen sliding scale   SubCutaneous three times a day before meals  insulin lispro (HumaLOG) corrective regimen sliding scale   SubCutaneous at bedtime  pantoprazole    Tablet 40 milliGRAM(s) Oral before breakfast  piperacillin/tazobactam IVPB. 3.375 Gram(s) IV Intermittent every 8 hours  predniSONE   Tablet 5 milliGRAM(s) Oral daily  simvastatin 20 milliGRAM(s) Oral at bedtime  sodium chloride 0.9% 1000 milliLiter(s) (50 mL/Hr) IV Continuous <Continuous>  sodium chloride 0.9% Bolus 1000 milliLiter(s) IV Bolus once    MEDICATIONS  (PRN):  acetaminophen 300 mG/codeine 30 mG 1 Tablet(s) Oral every 4 hours PRN Moderate Pain (4 - 6)  dextrose Gel 1 Dose(s) Oral once PRN Blood Glucose LESS THAN 70 milliGRAM(s)/deciliter  glucagon  Injectable 1 milliGRAM(s) IntraMuscular once PRN Glucose LESS THAN 70 milligrams/deciliter      Vital Signs Last 24 Hrs  T(C): 36.4 (19 Oct 2017 04:23), Max: 36.9 (19 Oct 2017 01:41)  T(F): 97.6 (19 Oct 2017 04:23), Max: 98.4 (19 Oct 2017 01:41)  HR: 89 (19 Oct 2017 04:23) (70 - 118)  BP: 157/77 (19 Oct 2017 04:23) (127/72 - 185/80)  BP(mean): --  RR: 20 (19 Oct 2017 04:23) (17 - 20)  SpO2: 95% (19 Oct 2017 04:23) (93% - 97%)  nc/at  s1s2  cta  soft, nt, nd no guarding or rebound  no c/c/e    CBC Full  -  ( 19 Oct 2017 04:46 )  WBC Count : 20.69 K/uL  Hemoglobin : 16.8 g/dL  Hematocrit : 47.8 %  Platelet Count - Automated : 232 K/uL  Mean Cell Volume : 78.0 fl  Mean Cell Hemoglobin : 27.4 pg  Mean Cell Hemoglobin Concentration : 35.1 gm/dL  Auto Neutrophil # : x  Auto Lymphocyte # : x  Auto Monocyte # : x  Auto Eosinophil # : x  Auto Basophil # : x  Auto Neutrophil % : x  Auto Lymphocyte % : x  Auto Monocyte % : x  Auto Eosinophil % : x  Auto Basophil % : x    10-19    132<L>  |  87<L>  |  10  ----------------------------<  137<H>  3.4<L>   |  22  |  0.44<L>    Ca    8.3<L>      19 Oct 2017 02:01  Mg     1.7     10-18    TPro  6.7  /  Alb  3.6  /  TBili  0.8  /  DBili  x   /  AST  18  /  ALT  14  /  AlkPhos  95  10-18

## 2017-10-19 NOTE — CONSULT NOTE ADULT - SUBJECTIVE AND OBJECTIVE BOX
CARDIOLOGY CONSULT NOTE  PROVIDER: Polo Tamayo MD  DATE: 10/19/17  REASON: Afib with renal infarcts    HPI:  72y F hx of paroxysmal Afib (not on AC), HTN, DM, Asthma, mild dementia, originally presenting with nausea and abdominal pain. She also complains of right flank pain that started three days ago. No fever, chills, urinary sx. No hematuria, hematomchezia, melena, hematemesis. Denies CP, SOB, palpitations, diaphoresis, dizziness, syncope. She was on anticoagulation in the past but stopped it a few years back (unknown reason).    PAST MEDICAL & SURGICAL HISTORY:  Dementia  Hypertension  Type 2 diabetes mellitus  Atrial fibrillation, paroxysmal  Diabetes mellitus  Asthma  Diabetes Mellitus  HTN (Hypertension)  Asthma  Afib        MEDICATIONS:  MEDICATIONS  (STANDING):  aspirin enteric coated 81 milliGRAM(s) Oral daily  dextrose 5%. 1000 milliLiter(s) (50 mL/Hr) IV Continuous <Continuous>  dextrose 50% Injectable 12.5 Gram(s) IV Push once  dextrose 50% Injectable 25 Gram(s) IV Push once  dextrose 50% Injectable 25 Gram(s) IV Push once  diltiazem    milliGRAM(s) Oral daily  famotidine    Tablet 20 milliGRAM(s) Oral two times a day  insulin lispro (HumaLOG) corrective regimen sliding scale   SubCutaneous three times a day before meals  insulin lispro (HumaLOG) corrective regimen sliding scale   SubCutaneous at bedtime  pantoprazole    Tablet 40 milliGRAM(s) Oral before breakfast  piperacillin/tazobactam IVPB. 3.375 Gram(s) IV Intermittent every 8 hours  predniSONE   Tablet 5 milliGRAM(s) Oral daily  simvastatin 20 milliGRAM(s) Oral at bedtime  sodium chloride 0.9% 1000 milliLiter(s) (50 mL/Hr) IV Continuous <Continuous>  sodium chloride 0.9% Bolus 1000 milliLiter(s) IV Bolus once    MEDICATIONS  (PRN):  acetaminophen 300 mG/codeine 30 mG 1 Tablet(s) Oral every 4 hours PRN Moderate Pain (4 - 6)  dextrose Gel 1 Dose(s) Oral once PRN Blood Glucose LESS THAN 70 milliGRAM(s)/deciliter  glucagon  Injectable 1 milliGRAM(s) IntraMuscular once PRN Glucose LESS THAN 70 milligrams/deciliter      FAMILY HISTORY:  No pertinent family history in first degree relatives      SOCIAL HISTORY:  no tobacco, alcohol or drugs      Allergies  Avelox (Pruritus)  Levaquin (Unknown)      REVIEW OF SYSTEMS:  CONSTITUTIONAL: No fever, weight loss, or fatigue  EYES: No eye pain, visual disturbances, or discharge  ENMT:  No difficulty hearing, tinnitus, vertigo; No sinus or throat pain  NECK: No pain or stiffness  RESPIRATORY: No cough, wheezing, chills or hemoptysis; No Shortness of Breath  CARDIOVASCULAR: No chest pain, palpitations, loss of consciousness, dizziness, or leg swelling  GASTROINTESTINAL: No abdominal or epigastric pain. No nausea, vomiting, or hematemesis; No diarrhea or constipation. No melena or hematochezia.  GENITOURINARY: No dysuria, frequency, hematuria, or incontinence  NEUROLOGICAL: No headaches, memory loss, loss of strength, numbness, or tremors  SKIN: No itching, burning, rashes, or lesions   	    PHYSICAL EXAM:  Vital Signs Last 24 Hrs  T(C): 36.4 (19 Oct 2017 04:23), Max: 36.9 (19 Oct 2017 01:41)  T(F): 97.6 (19 Oct 2017 04:23), Max: 98.4 (19 Oct 2017 01:41)  HR: 89 (19 Oct 2017 04:23) (70 - 116)  BP: 157/77 (19 Oct 2017 04:23) (127/72 - 185/80)  BP(mean): --  RR: 20 (19 Oct 2017 04:23) (17 - 20)  SpO2: 95% (19 Oct 2017 04:23) (93% - 97%)  I&O's Summary    18 Oct 2017 07:01  -  19 Oct 2017 07:00  --------------------------------------------------------  IN: 1600 mL / OUT: 0 mL / NET: 1600 mL    19 Oct 2017 07:01  -  19 Oct 2017 10:20  --------------------------------------------------------  IN: 2000 mL / OUT: 300 mL / NET: 1700 mL        Telemetry:  sinus/sinus tach 90's-120's, PAF x 5 seconds    General: NAD, A+Ox3	  HEENT:   No JVD, no carotid bruit	  Lymphatic: No lymphadenopathy  Cardiovascular: RRR, Normal S1 and S2, 2/6 HSM  Respiratory: Lungs clear to auscultation	  Gastrointestinal:  Soft, Non-tender, + BS	  Skin: No rashes, No ecchymoses, No cyanosis	  Neurologic: Non-focal  Extremities: No clubbing, cyanosis or edema  Vascular: normal peripheral pulses palpable bilaterally    	  LABS:	 	                          16.8   21.54 )-----------( 252      ( 19 Oct 2017 07:25 )             47.5     10-19    132<L>  |  87<L>  |  10  ----------------------------<  137<H>  3.4<L>   |  22  |  0.44<L>    Ca    8.3<L>      19 Oct 2017 02:01  Mg     1.7     10-18    TPro  6.7  /  Alb  3.6  /  TBili  0.8  /  DBili  x   /  AST  18  /  ALT  14  /  AlkPhos  95  10-18      TSH: Thyroid Stimulating Hormone, Serum: 0.70 uIU/mL (10-19 @ 07:25)      HEALTH ISSUES - PROBLEM Dx:  Hypertension: Hypertension  Dementia: Dementia  Asthma: Asthma  Type 2 diabetes mellitus: Type 2 diabetes mellitus  Electrolyte abnormality: Electrolyte abnormality  Colitis: Colitis  Renal infarct: Renal infarct          ASSESSMENT/PLAN: 	  72y F hx of paroxysmal Afib (not on AC), HTN, DM, Asthma, mild dementia  1. PAF - pt with renal infarcts on CT a/p. Also with likely ischemic bowel. Suspicion for cardiac source. Will obtain a LEANNA today. GI OK with AC, but will hold dose this AM, and resume after LEANNA. Can start lovenox post-LEANNA. Will rule out endocarditis or thrombus.   2. Renal infarcts - Pt also has an elevated WBC count and a murmur, making endocarditis possible. She is on antibiotics. AC to be resumed post-LEANNA.  3. HTN - pt is on cardizem. Will increase regimen for better BP control.

## 2017-10-19 NOTE — CONSULT NOTE ADULT - ATTENDING COMMENTS
Santo Hall  Attending Physician   Division of Infectious Disease  Pager #[phone]  After 5pm/weekend #[phone]    I am away on 10/20 and will return 10/23. ID available #[phone].
This is a 72y F hx of HTN, T2DM on metformin, Asthma ,mild dementia, AFib who presents with abdominal pain ,nv found to have colitis, utI- Micu consulted for ketoacidosis- Patient now with -  1. Sepsis due to colitis and rt. pyelonephritis- Panculture with broad spectrum ABX and de-escalate accordingly ( please send stool studies etc), serial abdominal exams, monitor UOP and lactate.   2. Severe dehydration and advance starvation ketosis (HAGMA)  - patient endorses decrease PO intake X several  days coupled with colitis.- aggressive fluid hydrations , patient has DM2 but no history of prior DKA and not on any medications known to cause euglycemic DKA-  NPO with RISS, repeat all labs (acetone, BMP),  as well as UA post hydration , endocrine c/s , will continue to monitor if deemed euglycemic DKA will be treated accordingly in MICU  3. Hypo-osmolar hyponatremia in the setting of hypovolemia and hemoconcentration- correct by 10 Meq / 24 hours  4  Other metabolic derangements ( hypokalemia, hypomagnesemia and hypochloremia)- monitor and replete,    At risk for thiamine depletion and refeeding syndrome. Need frequent, BMP for refeeding including mg, phos ,k, ca.   - Would start thiamin supplementation now.  Care and treatment as detailed above unless otherwise stated .  No need for MICU at this juncture. Case discussed with patient, staff, team and specialist on board.
Agree with assessment and plan as above by Dr. Frederick. Reviewed all pertinent labs, glucose values, and imaging studies. Modifications made as indicated above.     Rod Sosa D.O  [phone]

## 2017-10-19 NOTE — CONSULT NOTE ADULT - SUBJECTIVE AND OBJECTIVE BOX
CHIEF COMPLAINT: Decreased PO, abn pain, Nausea.    HPI:    72y F hx of HTN, T2DM on metformin, Asthma ,mild dementia, AFib who presents with abn,nv found to have colitis, uti and severe dehydration MICU consulted for DKA.     The patient presented to ED 2/2 decreased PO abn pain since monday. Since that time she had nearly no po intake. She denies any diarrhea, constipation since sat. No change in stool prior. no fever, chills. States decreased urine output, no dysruia but suspected poor historian. IN the ED CT shows possible renal infarcts colitis. Unclear etiology normal lactate. Workup pending. Also severely dehydrated with baseline hg of 10-11 on admission at 17, [facility],     PAST MEDICAL & SURGICAL HISTORY:  Dementia  Hypertension  Type 2 diabetes mellitus  Atrial fibrillation, chronic  Diabetes mellitus  Asthma  Diabetes Mellitus  HTN (Hypertension)  Asthma  Afib      FAMILY HISTORY:  No pertinent family history in first degree relatives      SOCIAL HISTORY:  Smoking: [ ] Never Smoked [ ] Former Smoker (__ packs x ___ years) [ ] Current Smoker  (__ packs x ___ years)  Substance Use: [ ] Never Used [ ] Used ____  EtOH Use:  Marital Status: [ ] Single [ ]  [ ]  [ ]   Sexual History:   Occupation:  Recent Travel:  Country of Birth:  Advance Directives:    Allergies    Avelox (Pruritus)  Levaquin (Unknown)    Intolerances        HOME MEDICATIONS:    REVIEW OF SYSTEMS:  Constitutional: [ ] negative [ ] fevers [ ] chills [ ] weight loss [ ] weight gain  HEENT: [ ] negative [ ] dry eyes [ ] eye irritation [ ] postnasal drip [ ] nasal congestion  CV: [ ] negative  [ ] chest pain [ ] orthopnea [ ] palpitations [ ] murmur  Resp: [ ] negative [ ] cough [ ] shortness of breath [ ] dyspnea [ ] wheezing [ ] sputum [ ] hemoptysis  GI: [ ] negative [ ] nausea [ ] vomiting [ ] diarrhea [ ] constipation [ ] abd pain [ ] dysphagia   : [ ] negative [ ] dysuria [ ] nocturia [ ] hematuria [ ] increased urinary frequency  Musculoskeletal: [ ] negative [ ] back pain [ ] myalgias [ ] arthralgias [ ] fracture  Skin: [ ] negative [ ] rash [ ] itch  Neurological: [ ] negative [ ] headache [ ] dizziness [ ] syncope [ ] weakness [ ] numbness  Psychiatric: [ ] negative [ ] anxiety [ ] depression  Endocrine: [ ] negative [ ] diabetes [ ] thyroid problem  Hematologic/Lymphatic: [ ] negative [ ] anemia [ ] bleeding problem  Allergic/Immunologic: [ ] negative [ ] itchy eyes [ ] nasal discharge [ ] hives [ ] angioedema  [ ] All other systems negative  [ ] Unable to assess ROS because ________    OBJECTIVE:  ICU Vital Signs Last 24 Hrs  T(C): 36.4 (19 Oct 2017 04:23), Max: 36.9 (19 Oct 2017 01:41)  T(F): 97.6 (19 Oct 2017 04:23), Max: 98.4 (19 Oct 2017 01:41)  HR: 89 (19 Oct 2017 04:23) (70 - 118)  BP: 157/77 (19 Oct 2017 04:23) (127/72 - 185/80)  BP(mean): --  ABP: --  ABP(mean): --  RR: 20 (19 Oct 2017 04:23) (17 - 20)  SpO2: 95% (19 Oct 2017 04:23) (93% - 97%)        CAPILLARY BLOOD GLUCOSE      POCT Blood Glucose.: 152 mg/dL (19 Oct 2017 01:58)      PHYSICAL EXAM:  General:   HEENT:   Lymph Nodes:  Neck:   Respiratory:   Cardiovascular:   Abdomen:   Extremities:   Skin:   Neurological:  Psychiatry:    LINES:     HOSPITAL MEDICATIONS:  aspirin enteric coated 81 milliGRAM(s) Oral daily    piperacillin/tazobactam IVPB. 3.375 Gram(s) IV Intermittent every 8 hours    diltiazem    milliGRAM(s) Oral daily    dextrose 50% Injectable 12.5 Gram(s) IV Push once  dextrose 50% Injectable 25 Gram(s) IV Push once  dextrose 50% Injectable 25 Gram(s) IV Push once  dextrose Gel 1 Dose(s) Oral once PRN  glucagon  Injectable 1 milliGRAM(s) IntraMuscular once PRN  insulin lispro (HumaLOG) corrective regimen sliding scale   SubCutaneous three times a day before meals  insulin lispro (HumaLOG) corrective regimen sliding scale   SubCutaneous at bedtime  predniSONE   Tablet 5 milliGRAM(s) Oral daily  simvastatin 20 milliGRAM(s) Oral at bedtime      acetaminophen 300 mG/codeine 30 mG 1 Tablet(s) Oral every 4 hours PRN    famotidine    Tablet 20 milliGRAM(s) Oral two times a day  pantoprazole    Tablet 40 milliGRAM(s) Oral before breakfast        dextrose 5%. 1000 milliLiter(s) IV Continuous <Continuous>  potassium chloride  10 mEq/100 mL IVPB 10 milliEquivalent(s) IV Intermittent every 1 hour  sodium chloride 0.9% 1000 milliLiter(s) IV Continuous <Continuous>  sodium chloride 0.9% Bolus 1000 milliLiter(s) IV Bolus once  sodium chloride 0.9% Bolus 1000 milliLiter(s) IV Bolus once            LABS:                        16.8   20.69 )-----------( 232      ( 19 Oct 2017 04:46 )             47.8     Hgb Trend: 16.8<--, 17.6<--  10-19    132<L>  |  87<L>  |  10  ----------------------------<  137<H>  3.4<L>   |  22  |  0.44<L>    Ca    8.3<L>      19 Oct 2017 02:01  Mg     1.7     10-18    TPro  6.7  /  Alb  3.6  /  TBili  0.8  /  DBili  x   /  AST  18  /  ALT  14  /  AlkPhos  95  10-18    Creatinine Trend: 0.44<--, 0.85<--    Urinalysis Basic - ( 18 Oct 2017 12:53 )    Color: Yellow / Appearance: Clear / S.008 / pH: x  Gluc: x / Ketone: Moderate  / Bili: Negative / Urobili: Negative   Blood: x / Protein: 150 mg/dL / Nitrite: Negative   Leuk Esterase: Large / RBC: 5-10 /HPF / WBC 26-50 /HPF   Sq Epi: x / Non Sq Epi: OCC /HPF / Bacteria: Moderate /HPF        Venous Blood Gas:  10-19 @ 03:40  7.36/39/54/21/85  VBG Lactate: 1.4  Venous Blood Gas:  10-18 @ 11:06  7.43/47/37/31/68  VBG Lactate: 2.0      MICROBIOLOGY:     RADIOLOGY:  [ ] Reviewed and interpreted by me    EKG: CHIEF COMPLAINT: Decreased PO, abn pain, Nausea.    HPI:    72y F hx of HTN, T2DM on metformin, Asthma ,mild dementia, AFib who presents with abn,nv found to have colitis, uti and severe dehydration MICU consulted for DKA.     The patient presented to ED 2/2 decreased PO abn pain since monday. Since that time she had nearly no po intake. She denies any diarrhea, constipation since sat. No change in stool prior. no fever, chills. States decreased urine output, no dysruia but suspected poor historian. IN the ED CT shows possible renal infarcts colitis. Unclear etiology normal lactate. Workup pending. Also severely dehydrated with baseline hg of 10-11 on admission at 17, [facility], HAGMA with ketosis. normal pH.  s/p 1 L fluids.     PAST MEDICAL & SURGICAL HISTORY:  Dementia  Hypertension  Type 2 diabetes mellitus  Atrial fibrillation, chronic  Diabetes mellitus  Asthma  Diabetes Mellitus  HTN (Hypertension)  Asthma  Afib      FAMILY HISTORY:  No pertinent family history in first degree relatives      SOCIAL HISTORY:  Smoking: [ x] Never Smoked [ ] Former Smoker (__ packs x ___ years) [ ] Current Smoker  (__ packs x ___ years)  Substance Use: [x ] Never Used [ ] Used ____  EtOH Use: Rare social issues  Marital Status: [ ] Single [ x]  [ ]  [ ]   Sexual History:   Occupation:  Recent Travel:  Country of Birth:  Advance Directives:    Allergies    Avelox (Pruritus)  Levaquin (Unknown)    Intolerances        HOME MEDICATIONS:    REVIEW OF SYSTEMS:  Constitutional: [x ] negative [ ] fevers [ ] chills [ ] weight loss [ ] weight gain  HEENT: [x ] negative [ ] dry eyes [ ] eye irritation [ ] postnasal drip [ ] nasal congestion  CV: [x ] negative  [ ] chest pain [ ] orthopnea [ ] palpitations [ ] murmur  Resp: [x ] negative [ ] cough [ ] shortness of breath [ ] dyspnea [ ] wheezing [ ] sputum [ ] hemoptysis  GI: [ ] negative [x ] nausea [x ] vomiting [ ] diarrhea [x ] constipation [x ] abd pain [ ] dysphagia   : [ ] negative [ ] dysuria [ ] nocturia [ ] hematuria [ ] increased urinary frequency  Musculoskeletal: [ ] negative [ ] back pain [ ] myalgias [ ] arthralgias [ ] fracture, chronic hip pain  Skin: [ ] negative [ ] rash [ ] itch  Neurological: [ x] negative [ ] headache [ ] dizziness [ ] syncope [ ] weakness [ ] numbness  Psychiatric: [x ] negative [ ] anxiety [ ] depression  Endocrine: [ ] negative [ ] diabetes [ ] thyroid problem  Hematologic/Lymphatic: [ ] negative [ ] anemia [ ] bleeding problem  Allergic/Immunologic: [ ] negative [ ] itchy eyes [ ] nasal discharge [ ] hives [ ] angioedema  [ ] All other systems negative  [ ] Unable to assess ROS because ________    OBJECTIVE:  ICU Vital Signs Last 24 Hrs  T(C): 36.4 (19 Oct 2017 04:23), Max: 36.9 (19 Oct 2017 01:41)  T(F): 97.6 (19 Oct 2017 04:23), Max: 98.4 (19 Oct 2017 01:41)  HR: 89 (19 Oct 2017 04:23) (70 - 118)  BP: 157/77 (19 Oct 2017 04:23) (127/72 - 185/80)  BP(mean): --  ABP: --  ABP(mean): --  RR: 20 (19 Oct 2017 04:23) (17 - 20)  SpO2: 95% (19 Oct 2017 04:23) (93% - 97%)        CAPILLARY BLOOD GLUCOSE      POCT Blood Glucose.: 152 mg/dL (19 Oct 2017 01:58)    PHYSICAL EXAM:    Constitutional: well-developed; thin female.  Eyes: PERRL; EOMI  ENMT: Normal oropharnxy, no oral lesions, no erythema, no exudates, dry mucosal membranes.   Neck:  Supple; no JVD; normal thyroid gland  MSK/Back: Moving all ext, no effusions, erythema.  Respiratory: airway patent; breath sounds equal; good air movement, no wheezing, no crackes, no rhonchi. no increase in WOB  Cardiovascular: regular rate and rhythm  no rub , no murmur, no gallops.   Gastrointestinal: soft; no distention, normal BS, mild non focal tenderness, no rebound, no guarding, no mass, no organomegaly, no ascites.  Genitourinary:  Extremities: no clubbing; no cyanosis; no pedal edema, non-tender to palpation, DP and Radial pulses intact.  Neurological: alert and oriented x 2; responds to pain; responds to verbal commands; sensation intact: CN nerves grossly intact.   Skin: warm and dry; color normal: no rash: no ulcers  Psychiatric: Calm, no SI/HI        LINES:     HOSPITAL MEDICATIONS:  aspirin enteric coated 81 milliGRAM(s) Oral daily    piperacillin/tazobactam IVPB. 3.375 Gram(s) IV Intermittent every 8 hours    diltiazem    milliGRAM(s) Oral daily    dextrose 50% Injectable 12.5 Gram(s) IV Push once  dextrose 50% Injectable 25 Gram(s) IV Push once  dextrose 50% Injectable 25 Gram(s) IV Push once  dextrose Gel 1 Dose(s) Oral once PRN  glucagon  Injectable 1 milliGRAM(s) IntraMuscular once PRN  insulin lispro (HumaLOG) corrective regimen sliding scale   SubCutaneous three times a day before meals  insulin lispro (HumaLOG) corrective regimen sliding scale   SubCutaneous at bedtime  predniSONE   Tablet 5 milliGRAM(s) Oral daily  simvastatin 20 milliGRAM(s) Oral at bedtime      acetaminophen 300 mG/codeine 30 mG 1 Tablet(s) Oral every 4 hours PRN    famotidine    Tablet 20 milliGRAM(s) Oral two times a day  pantoprazole    Tablet 40 milliGRAM(s) Oral before breakfast        dextrose 5%. 1000 milliLiter(s) IV Continuous <Continuous>  potassium chloride  10 mEq/100 mL IVPB 10 milliEquivalent(s) IV Intermittent every 1 hour  sodium chloride 0.9% 1000 milliLiter(s) IV Continuous <Continuous>  sodium chloride 0.9% Bolus 1000 milliLiter(s) IV Bolus once  sodium chloride 0.9% Bolus 1000 milliLiter(s) IV Bolus once            LABS:                        16.8   20.69 )-----------( 232      ( 19 Oct 2017 04:46 )             47.8     Hgb Trend: 16.8<--, 17.6<--  10-19    132<L>  |  87<L>  |  10  ----------------------------<  137<H>  3.4<L>   |  22  |  0.44<L>    Ca    8.3<L>      19 Oct 2017 02:01  Mg     1.7     10-18    TPro  6.7  /  Alb  3.6  /  TBili  0.8  /  DBili  x   /  AST  18  /  ALT  14  /  AlkPhos  95  10-    Creatinine Trend: 0.44<--, 0.85<--    Urinalysis Basic - ( 18 Oct 2017 12:53 )    Color: Yellow / Appearance: Clear / S.008 / pH: x  Gluc: x / Ketone: Moderate  / Bili: Negative / Urobili: Negative   Blood: x / Protein: 150 mg/dL / Nitrite: Negative   Leuk Esterase: Large / RBC: 5-10 /HPF / WBC 26-50 /HPF   Sq Epi: x / Non Sq Epi: OCC /HPF / Bacteria: Moderate /HPF        Venous Blood Gas:  10-19 @ 03:40  7.36/39/54/21/85  VBG Lactate: 1.4  Venous Blood Gas:  10-18 @ 11:06  7.43/47/37/31/68  VBG Lactate: 2.0      MICROBIOLOGY:     RADIOLOGY:  [ ] Reviewed and interpreted by me    CT A/P  1. 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 Pyelonephritis is felt to be less  likely.  2. Mural thickening of the ascending colon, possibly colitis.    EKG:  CHIEF COMPLAINT: Decreased PO, abn pain, Nausea.    HPI:    72y F hx of HTN, T2DM on metformin, Asthma ,mild dementia, AFib who presents with abn,nv found to have colitis, uti and severe dehydration MICU consulted for DKA.     The patient presented to ED 2/2 decreased PO abn pain since monday. Since that time she had nearly no po intake. She denies any diarrhea, constipation since sat. No change in stool prior. no fever, chills. States decreased urine output, no dysruia but suspected poor historian. IN the ED CT shows possible renal infarcts colitis. Unclear etiology normal lactate. Workup pending. Also severely dehydrated with baseline hg of 10-11 on admission at 17, [facility], HAGMA with ketosis. normal pH.  s/p 1 L fluids.     PAST MEDICAL & SURGICAL HISTORY:  Dementia  Hypertension  Type 2 diabetes mellitus  Atrial fibrillation, chronic  Diabetes mellitus  Asthma  Diabetes Mellitus  HTN (Hypertension)  Asthma  Afib      FAMILY HISTORY:  No pertinent family history in first degree relatives      SOCIAL HISTORY:  Smoking: [ x] Never Smoked [ ] Former Smoker (__ packs x ___ years) [ ] Current Smoker  (__ packs x ___ years)  Substance Use: [x ] Never Used [ ] Used ____  EtOH Use: Rare social issues  Marital Status: [ ] Single [ x]  [ ]  [ ]   Sexual History:   Occupation:  Recent Travel:  Country of Birth:  Advance Directives:    Allergies    Avelox (Pruritus)  Levaquin (Unknown)    Intolerances        HOME MEDICATIONS:    REVIEW OF SYSTEMS:  Constitutional: [x ] negative [ ] fevers [ ] chills [ ] weight loss [ ] weight gain  HEENT: [x ] negative [ ] dry eyes [ ] eye irritation [ ] postnasal drip [ ] nasal congestion  CV: [x ] negative  [ ] chest pain [ ] orthopnea [ ] palpitations [ ] murmur  Resp: [x ] negative [ ] cough [ ] shortness of breath [ ] dyspnea [ ] wheezing [ ] sputum [ ] hemoptysis  GI: [ ] negative [x ] nausea [x ] vomiting [ ] diarrhea [x ] constipation [x ] abd pain [ ] dysphagia   : [ ] negative [ ] dysuria [ ] nocturia [ ] hematuria [ ] increased urinary frequency  Musculoskeletal: [ ] negative [ ] back pain [ ] myalgias [ ] arthralgias [ ] fracture, chronic hip pain  Skin: [ ] negative [ ] rash [ ] itch  Neurological: [ x] negative [ ] headache [ ] dizziness [ ] syncope [ ] weakness [ ] numbness  Psychiatric: [x ] negative [ ] anxiety [ ] depression  Endocrine: [ ] negative [ ] diabetes [ ] thyroid problem  Hematologic/Lymphatic: [ ] negative [ ] anemia [ ] bleeding problem  Allergic/Immunologic: [ ] negative [ ] itchy eyes [ ] nasal discharge [ ] hives [ ] angioedema  [ ] All other systems negative  [ ] Unable to assess ROS because ________    OBJECTIVE:  ICU Vital Signs Last 24 Hrs  T(C): 36.4 (19 Oct 2017 04:23), Max: 36.9 (19 Oct 2017 01:41)  T(F): 97.6 (19 Oct 2017 04:23), Max: 98.4 (19 Oct 2017 01:41)  HR: 89 (19 Oct 2017 04:23) (70 - 118)  BP: 157/77 (19 Oct 2017 04:23) (127/72 - 185/80)  BP(mean): --  ABP: --  ABP(mean): --  RR: 20 (19 Oct 2017 04:23) (17 - 20)  SpO2: 95% (19 Oct 2017 04:23) (93% - 97%)        CAPILLARY BLOOD GLUCOSE      POCT Blood Glucose.: 152 mg/dL (19 Oct 2017 01:58)    PHYSICAL EXAM:    Constitutional: well-developed; thin female.  Eyes: PERRL; EOMI  ENMT: Normal oropharnxy, no oral lesions, no erythema, no exudates, dry mucosal membranes.   Neck:  Supple; no JVD; normal thyroid gland  MSK/Back: Moving all ext, no effusions, erythema.  Respiratory: airway patent; breath sounds equal; good air movement, no wheezing, no crackes, no rhonchi. no increase in WOB  Cardiovascular: regular rate and rhythm  no rub , no murmur, no gallops.   Gastrointestinal: soft; no distention, normal BS, mild non focal tenderness, no rebound, no guarding, no mass, no organomegaly, no ascites.  Genitourinary:  Extremities: no clubbing; no cyanosis; no pedal edema, non-tender to palpation, DP and Radial pulses intact.  Neurological: alert and oriented x 2; responds to pain; responds to verbal commands; sensation intact: CN nerves grossly intact.   Skin: warm and dry; color normal: no rash: no ulcers  Psychiatric: Calm, no SI/HI        LINES:     HOSPITAL MEDICATIONS:  aspirin enteric coated 81 milliGRAM(s) Oral daily    piperacillin/tazobactam IVPB. 3.375 Gram(s) IV Intermittent every 8 hours    diltiazem    milliGRAM(s) Oral daily    dextrose 50% Injectable 12.5 Gram(s) IV Push once  dextrose 50% Injectable 25 Gram(s) IV Push once  dextrose 50% Injectable 25 Gram(s) IV Push once  dextrose Gel 1 Dose(s) Oral once PRN  glucagon  Injectable 1 milliGRAM(s) IntraMuscular once PRN  insulin lispro (HumaLOG) corrective regimen sliding scale   SubCutaneous three times a day before meals  insulin lispro (HumaLOG) corrective regimen sliding scale   SubCutaneous at bedtime  predniSONE   Tablet 5 milliGRAM(s) Oral daily  simvastatin 20 milliGRAM(s) Oral at bedtime      acetaminophen 300 mG/codeine 30 mG 1 Tablet(s) Oral every 4 hours PRN    famotidine    Tablet 20 milliGRAM(s) Oral two times a day  pantoprazole    Tablet 40 milliGRAM(s) Oral before breakfast        dextrose 5%. 1000 milliLiter(s) IV Continuous <Continuous>  potassium chloride  10 mEq/100 mL IVPB 10 milliEquivalent(s) IV Intermittent every 1 hour  sodium chloride 0.9% 1000 milliLiter(s) IV Continuous <Continuous>  sodium chloride 0.9% Bolus 1000 milliLiter(s) IV Bolus once  sodium chloride 0.9% Bolus 1000 milliLiter(s) IV Bolus once            LABS:                        16.8   20.69 )-----------( 232      ( 19 Oct 2017 04:46 )             47.8     Hgb Trend: 16.8<--, 17.6<--  10-19    132<L>  |  87<L>  |  10  ----------------------------<  137<H>  3.4<L>   |  22  |  0.44<L>    Ca    8.3<L>      19 Oct 2017 02:01  Mg     1.7     10-18    TPro  6.7  /  Alb  3.6  /  TBili  0.8  /  DBili  x   /  AST  18  /  ALT  14  /  AlkPhos  95  10-18    Creatinine Trend: 0.44<--, 0.85<--    Urinalysis Basic - ( 18 Oct 2017 12:53 )    Color: Yellow / Appearance: Clear / S.008 / pH: x  Gluc: x / Ketone: Moderate  / Bili: Negative / Urobili: Negative   Blood: x / Protein: 150 mg/dL / Nitrite: Negative   Leuk Esterase: Large / RBC: 5-10 /HPF / WBC 26-50 /HPF   Sq Epi: x / Non Sq Epi: OCC /HPF / Bacteria: Moderate /HPF        Venous Blood Gas:  10-19 @ 03:40  7.36/39/54/21/85  VBG Lactate: 1.4  Venous Blood Gas:  10-18 @ 11:06  7.43/47/37/31/68  VBG Lactate: 2.0      MICROBIOLOGY:     RADIOLOGY:  [ ] Reviewed and interpreted by me    CT A/P  1. 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 Pyelonephritis is felt to be less  likely.  2. Mural thickening of the ascending colon, possibly colitis.    EKG:

## 2017-10-19 NOTE — CONSULT NOTE ADULT - SUBJECTIVE AND OBJECTIVE BOX
HPI:  72y F hx of HTN, DM (non-insulin dep, a1C 6.4), Asthma (on chronic steroids) ,mild dementia, AFib (not on A/C), osteoporosis (on bisphosphonate) here with  nausea since Saturday. Today she noticed abdominal pain.  Last BM 2 days ago. Passing flatus. No fever, chills, cp, sob, urinary sx. She stopped anticoagulation few years back . As per patient and daughter at bedside patient had not been eating/drinking much for 5 days. Had some nausea but no vomiting. Denies any abd pain, just reports feeling lethargy and no appetite. No recent travel, sick contacts. Patient was admitted to medicine. Found on CT scan with b/l renal infarcts and concern for colitis. Pending LEANNA to r/o thrombotic event. Patient also found with electroytes abnormalities with concern for re-feeding syndrome as well as an AG metabolic acidosis. MICU consulted for possible DKA and deemed not a candidate.    Patient reports having DM for over 40 years. Has never been on insulin. No reported complications of DM of retinipathy, nephropathy, or neuropathy. Patient currently on metformin 1000mg BID, prandin 0.5mg TID, glipizide 5mg BID. Patient also currently on prednisone 5mg qd for her asthma and reports taking same dose for at least 5 yrs. Denies ever having DKA or being hospitalized for complications related to DM. Has been taking all of her medications.          PAST MEDICAL & SURGICAL HISTORY:  Dementia  Hypertension  Type 2 diabetes mellitus  Atrial fibrillation, chronic  Diabetes mellitus  Asthma  Diabetes Mellitus  HTN (Hypertension)  Asthma  Afib      FAMILY HISTORY:  No pertinent family history in first degree relatives      Social History: No smoking, alcohol use.    Outpatient Medications:  metformin 1000mg BID  prandin 0.5mg TID  glipizide 5mg BID  prednisone 5mg qd  furosemide 20mg qd  diltiazem ER 240mg qd  nexium 40mg  diclycomine 10mg TID  lactaid  calcium 600mg BID  vitamin D 2000U  B6 100mg  B12 500mg  [id number] qd  alendronate 70mg q weekly  advair 250/50 BID    MEDICATIONS  (STANDING):  aspirin enteric coated 81 milliGRAM(s) Oral daily  dextrose 5%. 1000 milliLiter(s) (50 mL/Hr) IV Continuous <Continuous>  dextrose 50% Injectable 12.5 Gram(s) IV Push once  dextrose 50% Injectable 25 Gram(s) IV Push once  dextrose 50% Injectable 25 Gram(s) IV Push once  diltiazem    milliGRAM(s) Oral daily  enoxaparin Injectable 50 milliGRAM(s) SubCutaneous two times a day  famotidine    Tablet 20 milliGRAM(s) Oral two times a day  insulin lispro (HumaLOG) corrective regimen sliding scale   SubCutaneous three times a day before meals  insulin lispro (HumaLOG) corrective regimen sliding scale   SubCutaneous at bedtime  lisinopril 5 milliGRAM(s) Oral daily  pantoprazole    Tablet 40 milliGRAM(s) Oral before breakfast  piperacillin/tazobactam IVPB. 3.375 Gram(s) IV Intermittent every 8 hours  predniSONE   Tablet 5 milliGRAM(s) Oral daily  simvastatin 20 milliGRAM(s) Oral at bedtime  sodium chloride 0.9% 1000 milliLiter(s) (50 mL/Hr) IV Continuous <Continuous>    MEDICATIONS  (PRN):  acetaminophen 300 mG/codeine 30 mG 1 Tablet(s) Oral every 4 hours PRN Moderate Pain (4 - 6)  dextrose Gel 1 Dose(s) Oral once PRN Blood Glucose LESS THAN 70 milliGRAM(s)/deciliter  glucagon  Injectable 1 milliGRAM(s) IntraMuscular once PRN Glucose LESS THAN 70 milligrams/deciliter      Allergies    Avelox (Pruritus)  Levaquin (Unknown)    Intolerances      Review of Systems:  Constitutional: No fever. Has decreased appetite  Eyes: No blurry vision  Neuro: No tremors  HEENT: No pain  Cardiovascular: No chest pain, palpitations  Respiratory: No SOB, no cough  GI: Has nausea. No vomiting, abdominal pain  : No dysuria  Skin: no rash  Endocrine: no polyuria, polydipsia  Hem/lymph: no swelling  Osteoporosis: no fractures    ALL OTHER SYSTEMS REVIEWED AND NEGATIVE      PHYSICAL EXAM:  VITALS: T(C): 36.4 (10-19-17 @ 04:23)  T(F): 97.6 (10-19-17 @ 04:23), Max: 98.4 (10-19-17 @ 01:41)  HR: 98 (10-19-17 @ 12:25) (70 - 116)  BP: 153/68 (10-19-17 @ 12:25) (127/72 - 185/80)  RR:  (17 - 20)  SpO2:  (93% - 97%)    GENERAL: NAD, well-groomed, well-developed, thin appearing  EYES: No proptosis, no lid lag, anicteric  HEENT:  Atraumatic, Normocephalic, moist mucous membranes  THYROID: Normal size, no palpable nodules  RESPIRATORY: Clear to auscultation bilaterally; No rales, rhonchi, wheezing  CARDIOVASCULAR: Regular rate and rhythm; No murmurs; no peripheral edema  GI: Soft, nontender, non distended, normal bowel sounds  SKIN: Dry, intact, No rashes or lesions  MUSCULOSKELETAL: Full range of motion, generalized weakness  NEURO: sensation intact, extraocular movements intact, no tremor  PSYCH: Alert and oriented x 3  CUSHING'S SIGNS: no striae    CAPILLARY BLOOD GLUCOSE    POCT Blood Glucose.: 143 mg/dL (19 Oct 2017 08:55)  POCT Blood Glucose.: 159 mg/dL (19 Oct 2017 05:17)  POCT Blood Glucose.: 152 mg/dL (19 Oct 2017 01:58)  POCT Blood Glucose.: 154 mg/dL (18 Oct 2017 21:40)  POCT Blood Glucose.: 137 mg/dL (18 Oct 2017 18:25)                            16.8   21.54 )-----------( 252      ( 19 Oct 2017 07:25 )             47.5       10-19    134<L>  |  88<L>  |  12  ----------------------------<  163<H>  3.1<L>   |  19<L>  |  0.66    EGFR if : 101  EGFR if non : 87    Ca    8.6      10-19  Mg     1.7     10-19  Phos  4.7     10-19    TPro  6.6  /  Alb  3.1<L>  /  TBili  0.6  /  DBili  x   /  AST  24  /  ALT  17  /  AlkPhos  93  10-19      Thyroid Function Tests:  10-19 @ 07:25 TSH 0.70 FreeT4 1.9 T3 -- Anti TPO -- Anti Thyroglobulin Ab -- TSI --      Hemoglobin A1C, Whole Blood: 6.4 % <H> [4.0 - 5.6] (10-19-17 @ 07:25) B-hydroxybut 4.8            Radiology:

## 2017-10-19 NOTE — CONSULT NOTE ADULT - PROBLEM SELECTOR RECOMMENDATION 2
-well controlled with A1C 6.4 with no evidence of DM-related complications  -hold PO meds of metformin, prandin, and glipizide -well controlled with A1C 6.4 with no evidence of DM-related complications  -hold PO meds of metformin, prandin, and glipizide  Start Lantus 5 units qhs. May need humalog as patient on steroids. Will c/w correction scale and adjust as needed.  Goal glucose 100-180  Plan to d/c on metformin

## 2017-10-20 DIAGNOSIS — E88.89 OTHER SPECIFIED METABOLIC DISORDERS: ICD-10-CM

## 2017-10-20 DIAGNOSIS — E11.65 TYPE 2 DIABETES MELLITUS WITH HYPERGLYCEMIA: ICD-10-CM

## 2017-10-20 LAB
ANION GAP SERPL CALC-SCNC: 14 MMOL/L — SIGNIFICANT CHANGE UP (ref 5–17)
BUN SERPL-MCNC: 9 MG/DL — SIGNIFICANT CHANGE UP (ref 7–23)
CALCIUM SERPL-MCNC: 8.1 MG/DL — LOW (ref 8.4–10.5)
CHLORIDE SERPL-SCNC: 102 MMOL/L — SIGNIFICANT CHANGE UP (ref 96–108)
CO2 SERPL-SCNC: 18 MMOL/L — LOW (ref 22–31)
CREAT SERPL-MCNC: 0.73 MG/DL — SIGNIFICANT CHANGE UP (ref 0.5–1.3)
CULTURE RESULTS: SIGNIFICANT CHANGE UP
GLUCOSE BLDC GLUCOMTR-MCNC: 129 MG/DL — HIGH (ref 70–99)
GLUCOSE BLDC GLUCOMTR-MCNC: 135 MG/DL — HIGH (ref 70–99)
GLUCOSE BLDC GLUCOMTR-MCNC: 136 MG/DL — HIGH (ref 70–99)
GLUCOSE BLDC GLUCOMTR-MCNC: 188 MG/DL — HIGH (ref 70–99)
GLUCOSE BLDC GLUCOMTR-MCNC: 206 MG/DL — HIGH (ref 70–99)
GLUCOSE SERPL-MCNC: 141 MG/DL — HIGH (ref 70–99)
HCT VFR BLD CALC: 36.4 % — SIGNIFICANT CHANGE UP (ref 34.5–45)
HGB BLD-MCNC: 12.5 G/DL — SIGNIFICANT CHANGE UP (ref 11.5–15.5)
MAGNESIUM SERPL-MCNC: 2.1 MG/DL — SIGNIFICANT CHANGE UP (ref 1.6–2.6)
MCHC RBC-ENTMCNC: 27.1 PG — SIGNIFICANT CHANGE UP (ref 27–34)
MCHC RBC-ENTMCNC: 34.3 GM/DL — SIGNIFICANT CHANGE UP (ref 32–36)
MCV RBC AUTO: 78.8 FL — LOW (ref 80–100)
ORGANISM # SPEC MICROSCOPIC CNT: SIGNIFICANT CHANGE UP
ORGANISM # SPEC MICROSCOPIC CNT: SIGNIFICANT CHANGE UP
PHOSPHATE SERPL-MCNC: 2.2 MG/DL — LOW (ref 2.5–4.5)
PLATELET # BLD AUTO: 176 K/UL — SIGNIFICANT CHANGE UP (ref 150–400)
POTASSIUM SERPL-MCNC: 3.1 MMOL/L — LOW (ref 3.5–5.3)
POTASSIUM SERPL-SCNC: 3.1 MMOL/L — LOW (ref 3.5–5.3)
RBC # BLD: 4.62 M/UL — SIGNIFICANT CHANGE UP (ref 3.8–5.2)
RBC # FLD: 13.6 % — SIGNIFICANT CHANGE UP (ref 10.3–14.5)
SODIUM SERPL-SCNC: 134 MMOL/L — LOW (ref 135–145)
SPECIMEN SOURCE: SIGNIFICANT CHANGE UP
T3 SERPL-MCNC: 67 NG/DL — LOW (ref 80–200)
T4 FREE SERPL-MCNC: 1.7 NG/DL — SIGNIFICANT CHANGE UP (ref 0.9–1.8)
TSH SERPL-MCNC: 0.64 UIU/ML — SIGNIFICANT CHANGE UP (ref 0.27–4.2)
WBC # BLD: 15.59 K/UL — HIGH (ref 3.8–10.5)
WBC # FLD AUTO: 15.59 K/UL — HIGH (ref 3.8–10.5)

## 2017-10-20 PROCEDURE — 99232 SBSQ HOSP IP/OBS MODERATE 35: CPT

## 2017-10-20 RX ORDER — BUDESONIDE AND FORMOTEROL FUMARATE DIHYDRATE 160; 4.5 UG/1; UG/1
2 AEROSOL RESPIRATORY (INHALATION)
Qty: 0 | Refills: 0 | Status: DISCONTINUED | OUTPATIENT
Start: 2017-10-20 | End: 2017-10-24

## 2017-10-20 RX ORDER — INSULIN LISPRO 100/ML
VIAL (ML) SUBCUTANEOUS
Qty: 0 | Refills: 0 | Status: DISCONTINUED | OUTPATIENT
Start: 2017-10-20 | End: 2017-10-24

## 2017-10-20 RX ORDER — POTASSIUM CHLORIDE 20 MEQ
40 PACKET (EA) ORAL EVERY 4 HOURS
Qty: 0 | Refills: 0 | Status: COMPLETED | OUTPATIENT
Start: 2017-10-20 | End: 2017-10-20

## 2017-10-20 RX ORDER — INSULIN LISPRO 100/ML
2 VIAL (ML) SUBCUTANEOUS
Qty: 0 | Refills: 0 | Status: DISCONTINUED | OUTPATIENT
Start: 2017-10-20 | End: 2017-10-24

## 2017-10-20 RX ADMIN — ENOXAPARIN SODIUM 50 MILLIGRAM(S): 100 INJECTION SUBCUTANEOUS at 11:16

## 2017-10-20 RX ADMIN — PIPERACILLIN AND TAZOBACTAM 25 GRAM(S): 4; .5 INJECTION, POWDER, LYOPHILIZED, FOR SOLUTION INTRAVENOUS at 11:17

## 2017-10-20 RX ADMIN — SODIUM CHLORIDE 75 MILLILITER(S): 9 INJECTION, SOLUTION INTRAVENOUS at 18:09

## 2017-10-20 RX ADMIN — Medication 40 MILLIEQUIVALENT(S): at 17:24

## 2017-10-20 RX ADMIN — Medication 40 MILLIEQUIVALENT(S): at 14:30

## 2017-10-20 RX ADMIN — ENOXAPARIN SODIUM 50 MILLIGRAM(S): 100 INJECTION SUBCUTANEOUS at 00:30

## 2017-10-20 RX ADMIN — Medication 240 MILLIGRAM(S): at 05:14

## 2017-10-20 RX ADMIN — INSULIN GLARGINE 5 UNIT(S): 100 INJECTION, SOLUTION SUBCUTANEOUS at 22:13

## 2017-10-20 RX ADMIN — FAMOTIDINE 20 MILLIGRAM(S): 10 INJECTION INTRAVENOUS at 17:24

## 2017-10-20 RX ADMIN — SIMVASTATIN 20 MILLIGRAM(S): 20 TABLET, FILM COATED ORAL at 21:09

## 2017-10-20 RX ADMIN — Medication 81 MILLIGRAM(S): at 11:16

## 2017-10-20 RX ADMIN — LISINOPRIL 5 MILLIGRAM(S): 2.5 TABLET ORAL at 05:14

## 2017-10-20 RX ADMIN — Medication 1: at 17:26

## 2017-10-20 RX ADMIN — Medication 5 MILLIGRAM(S): at 05:14

## 2017-10-20 RX ADMIN — BUDESONIDE AND FORMOTEROL FUMARATE DIHYDRATE 2 PUFF(S): 160; 4.5 AEROSOL RESPIRATORY (INHALATION) at 18:09

## 2017-10-20 RX ADMIN — ENOXAPARIN SODIUM 50 MILLIGRAM(S): 100 INJECTION SUBCUTANEOUS at 17:24

## 2017-10-20 RX ADMIN — Medication 2 UNIT(S): at 17:26

## 2017-10-20 RX ADMIN — PIPERACILLIN AND TAZOBACTAM 25 GRAM(S): 4; .5 INJECTION, POWDER, LYOPHILIZED, FOR SOLUTION INTRAVENOUS at 03:58

## 2017-10-20 RX ADMIN — FAMOTIDINE 20 MILLIGRAM(S): 10 INJECTION INTRAVENOUS at 05:14

## 2017-10-20 RX ADMIN — PANTOPRAZOLE SODIUM 40 MILLIGRAM(S): 20 TABLET, DELAYED RELEASE ORAL at 05:14

## 2017-10-20 RX ADMIN — Medication 250 MILLIGRAM(S): at 07:30

## 2017-10-20 RX ADMIN — Medication 250 MILLIGRAM(S): at 17:27

## 2017-10-20 RX ADMIN — PIPERACILLIN AND TAZOBACTAM 25 GRAM(S): 4; .5 INJECTION, POWDER, LYOPHILIZED, FOR SOLUTION INTRAVENOUS at 20:02

## 2017-10-20 RX ADMIN — Medication 2: at 11:16

## 2017-10-20 NOTE — DIETITIAN INITIAL EVALUATION ADULT. - PERTINENT LABORATORY DATA
Point of Care Testing BG: (10/19) 143-203, (10/20) 135-188. (10/19) Potassium 3.1, Glucose 141, Phosphorus 2.2

## 2017-10-20 NOTE — PROGRESS NOTE ADULT - SUBJECTIVE AND OBJECTIVE BOX
INTERVAL HPI/OVERNIGHT EVENTS: feeling better .   Vital Signs Last 24 Hrs  T(C): 36.4 (20 Oct 2017 04:14), Max: 36.9 (19 Oct 2017 20:29)  T(F): 97.6 (20 Oct 2017 04:14), Max: 98.4 (19 Oct 2017 20:29)  HR: 80 (20 Oct 2017 08:00) (78 - 85)  BP: 145/69 (20 Oct 2017 04:14) (111/62 - 145/69)  BP(mean): --  RR: 19 (20 Oct 2017 04:14) (18 - 19)  SpO2: 95% (20 Oct 2017 04:14) (93% - 97%)  I&O's Summary    19 Oct 2017 07:01  -  20 Oct 2017 07:00  --------------------------------------------------------  IN: 3870 mL / OUT: 1700 mL / NET: 2170 mL    20 Oct 2017 07:01  -  20 Oct 2017 14:28  --------------------------------------------------------  IN: 800 mL / OUT: 300 mL / NET: 500 mL      MEDICATIONS  (STANDING):  aspirin enteric coated 81 milliGRAM(s) Oral daily  dextrose 5%. 1000 milliLiter(s) (50 mL/Hr) IV Continuous <Continuous>  dextrose 50% Injectable 12.5 Gram(s) IV Push once  dextrose 50% Injectable 25 Gram(s) IV Push once  dextrose 50% Injectable 25 Gram(s) IV Push once  diltiazem    milliGRAM(s) Oral daily  enoxaparin Injectable 50 milliGRAM(s) SubCutaneous two times a day  famotidine    Tablet 20 milliGRAM(s) Oral two times a day  insulin glargine Injectable (LANTUS) 5 Unit(s) SubCutaneous at bedtime  insulin lispro (HumaLOG) corrective regimen sliding scale   SubCutaneous three times a day before meals  insulin lispro (HumaLOG) corrective regimen sliding scale   SubCutaneous at bedtime  insulin lispro Injectable (HumaLOG) 2 Unit(s) SubCutaneous three times a day before meals  lisinopril 5 milliGRAM(s) Oral daily  pantoprazole    Tablet 40 milliGRAM(s) Oral before breakfast  piperacillin/tazobactam IVPB. 3.375 Gram(s) IV Intermittent every 8 hours  potassium chloride    Tablet ER 40 milliEquivalent(s) Oral every 4 hours  predniSONE   Tablet 5 milliGRAM(s) Oral daily  simvastatin 20 milliGRAM(s) Oral at bedtime  sodium chloride 0.9% 1000 milliLiter(s) (75 mL/Hr) IV Continuous <Continuous>  vancomycin  IVPB 1000 milliGRAM(s) IV Intermittent every 12 hours    MEDICATIONS  (PRN):  acetaminophen 300 mG/codeine 30 mG 1 Tablet(s) Oral every 4 hours PRN Moderate Pain (4 - 6)  dextrose Gel 1 Dose(s) Oral once PRN Blood Glucose LESS THAN 70 milliGRAM(s)/deciliter  glucagon  Injectable 1 milliGRAM(s) IntraMuscular once PRN Glucose LESS THAN 70 milligrams/deciliter    LABS:                        12.5   15.59 )-----------( 176      ( 20 Oct 2017 07:44 )             36.4     10-20    134<L>  |  102  |  9   ----------------------------<  141<H>  3.1<L>   |  18<L>  |  0.73    Ca    8.1<L>      20 Oct 2017 08:02  Phos  2.2     10-20  Mg     2.1     10-20    TPro  6.6  /  Alb  3.1<L>  /  TBili  0.6  /  DBili  x   /  AST  24  /  ALT  17  /  AlkPhos  93  10-19      Urinalysis Basic - ( 19 Oct 2017 17:17 )    Color: PL Yellow / Appearance: Clear / S.014 / pH: x  Gluc: x / Ketone: Large  / Bili: Negative / Urobili: Negative   Blood: x / Protein: 30 mg/dL / Nitrite: Negative   Leuk Esterase: Trace / RBC: 2-5 /HPF / WBC 2-5 /HPF   Sq Epi: x / Non Sq Epi: Occasional /HPF / Bacteria: Few /HPF      CAPILLARY BLOOD GLUCOSE      POCT Blood Glucose.: 188 mg/dL (20 Oct 2017 10:09)  POCT Blood Glucose.: 136 mg/dL (20 Oct 2017 06:12)  POCT Blood Glucose.: 135 mg/dL (20 Oct 2017 02:30)  POCT Blood Glucose.: 166 mg/dL (19 Oct 2017 22:16)  POCT Blood Glucose.: 203 mg/dL (19 Oct 2017 19:14)  POCT Blood Glucose.: 149 mg/dL (19 Oct 2017 15:10)        Urinalysis Basic - ( 19 Oct 2017 17:17 )    Color: PL Yellow / Appearance: Clear / S.014 / pH: x  Gluc: x / Ketone: Large  / Bili: Negative / Urobili: Negative   Blood: x / Protein: 30 mg/dL / Nitrite: Negative   Leuk Esterase: Trace / RBC: 2-5 /HPF / WBC 2-5 /HPF   Sq Epi: x / Non Sq Epi: Occasional /HPF / Bacteria: Few /HPF          Consultant(s) Notes Reviewed:  [x ] YES  [ ] NO    PHYSICAL EXAM:  GENERAL: NAD, well-groomed, well-developed ,not in any distress ,  HEAD:  Atraumatic, Normocephalic  EYES: EOMI, PERRLA, conjunctiva and sclera clear  ENMT: No tonsillar erythema, exudates, or enlargement; Moist mucous membranes, Good dentition, No lesions  NECK: Supple, No JVD, Normal thyroid  NERVOUS SYSTEM:  Alert & mild dementia , No focal deficit   CHEST/LUNG: Good air entry bilateral with no  rales, rhonchi, wheezing, or rubs  HEART: Regular rate and rhythm; No murmurs, rubs, or gallops  ABDOMEN: Soft, Nontender, Nondistended; Bowel sounds present  EXTREMITIES:  2+ Peripheral Pulses, No clubbing, cyanosis, or edema  SKIN: No rashes or lesions    Care Discussed with Consultants/Other Providers [ x] YES  [ ] NO

## 2017-10-20 NOTE — DIETITIAN INITIAL EVALUATION ADULT. - OTHER INFO
Nutrition consult received for calorie count. Per chart pt is a 72F w/ HTN, DM2, and AFib, 10/18/17 a/w abdominal pain/nausea, c/b appreciation of SHANE, hypokalemia, colitis, and renal infarcts. Pt was on clear liquid diet since last night, plan to be advanced for dinner tonight.  Three day calorie count to be initiated tomorrow. Pt denies any GI distress at this time. Last BM today, noted to be loose- likely due to starting antibiotics. Per pt  pounds, current weight 106 pounds- 9 pound weight loss. Pt unsure of exact time frame of weight loss, within last few months.  Per previous RD note (2/16/12) 115 pounds. Nutrition consult received for calorie count. Per chart pt is a 72F w/ HTN, DM2, and AFib, 10/18/17 a/w abdominal pain/nausea, c/b appreciation of SHANE, hypokalemia, colitis, and renal infarcts. Pt was on clear liquid diet since last night, plan to be advanced for dinner tonight.  Three day calorie count to be initiated tomorrow. Pt denies any GI distress at this time. Last BM today, noted to be loose- likely due to starting antibiotics. Per pt  pounds, current weight 106 pounds- 9 pound weight loss. Pt unsure of exact time frame of weight loss, approximately 1 month.  Per previous RD note (2/16/12) 115 pounds.

## 2017-10-20 NOTE — DIETITIAN INITIAL EVALUATION ADULT. - ENERGY NEEDS
Ht:  60 inches Wt:106 pounds BMI: 20.8 kg/m2 IBW: 100 pounds(+/-10%) %%  +1 bilateral foot and ankle edema.No pressure ulcers documented.

## 2017-10-20 NOTE — CHART NOTE - NSCHARTNOTEFT_GEN_A_CORE
Upon Nutritional Assessment by the Registered Dietitian your patient was determined to meet criteria / has evidence of the following diagnosis/diagnoses:          [ ]  Mild Protein Calorie Malnutrition        [ ]  Moderate Protein Calorie Malnutrition        [X ] Severe Protein Calorie Malnutrition        [ ] Unspecified Protein Calorie Malnutrition        [ ] Underweight / BMI <19        [ ] Morbid Obesity / BMI > 40      Findings as based on:  [X] Comprehensive nutrition assessment   [ X] Nutrition Focused Physical Exam: Mild orbital fat loss, moderate muscle loss (shoulders), mild muscle loss (clavicles).  [X ] Other:   1. 7.8% wt loss [id number]   2. suboptimal po intake x1week    Nutrition Plan/Recommendations:    1. Medical team to advance diet to Consistent CHO later today  2. Provide Glucerna- 2 per day (additional 440cal, 19.8gm protein daily).   3. Per NP three day calorie count to be initiated tomorrow  (10/21)  4. RD to follow up with completion of calorie count    PROVIDER Section:     By signing this assessment you are acknowledging and agree with the diagnosis/diagnoses assigned by the Registered Dietitian    Comments:

## 2017-10-20 NOTE — PROGRESS NOTE ADULT - SUBJECTIVE AND OBJECTIVE BOX
CARDIOLOGY FOLLOW UP NOTE - DR. RAYO WORKMAN    Patient states no new complaints.    MEDICATIONS  (STANDING):  aspirin enteric coated 81 milliGRAM(s) Oral daily  dextrose 5%. 1000 milliLiter(s) (50 mL/Hr) IV Continuous <Continuous>  dextrose 50% Injectable 12.5 Gram(s) IV Push once  dextrose 50% Injectable 25 Gram(s) IV Push once  dextrose 50% Injectable 25 Gram(s) IV Push once  diltiazem    milliGRAM(s) Oral daily  enoxaparin Injectable 50 milliGRAM(s) SubCutaneous two times a day  famotidine    Tablet 20 milliGRAM(s) Oral two times a day  insulin glargine Injectable (LANTUS) 5 Unit(s) SubCutaneous at bedtime  insulin lispro (HumaLOG) corrective regimen sliding scale   SubCutaneous three times a day before meals  insulin lispro (HumaLOG) corrective regimen sliding scale   SubCutaneous at bedtime  lisinopril 5 milliGRAM(s) Oral daily  pantoprazole    Tablet 40 milliGRAM(s) Oral before breakfast  piperacillin/tazobactam IVPB. 3.375 Gram(s) IV Intermittent every 8 hours  predniSONE   Tablet 5 milliGRAM(s) Oral daily  simvastatin 20 milliGRAM(s) Oral at bedtime  sodium chloride 0.9% 1000 milliLiter(s) (75 mL/Hr) IV Continuous <Continuous>  vancomycin  IVPB 1000 milliGRAM(s) IV Intermittent every 12 hours        PHYSICAL EXAM:  Vital Signs Last 24 Hrs  T(C): 36.4 (20 Oct 2017 04:14), Max: 36.9 (19 Oct 2017 20:29)  T(F): 97.6 (20 Oct 2017 04:14), Max: 98.4 (19 Oct 2017 20:29)  HR: 84 (20 Oct 2017 04:14) (78 - 98)  BP: 145/69 (20 Oct 2017 04:14) (111/62 - 153/68)  BP(mean): --  RR: 19 (20 Oct 2017 04:14) (18 - 19)  SpO2: 95% (20 Oct 2017 04:14) (93% - 97%)  I&O's Summary    19 Oct 2017 07:01  -  20 Oct 2017 07:00  --------------------------------------------------------  IN: 3870 mL / OUT: 1700 mL / NET: 2170 mL    20 Oct 2017 07:01  -  20 Oct 2017 09:50  --------------------------------------------------------  IN: 250 mL / OUT: 0 mL / NET: 250 mL        Telemetry:  sinus 80's-90's, APCs    General: NAD	  HEENT:   No JVD	  Cardiovascular: RRR, Normal S1 and S2, 2/6 HSM  Respiratory: Lungs clear to auscultation	  Gastrointestinal:  Soft, Non-tender, + BS	  Extremities: No clubbing, cyanosis or edema    	    LABS:                          16.8   21.54 )-----------( 252      ( 19 Oct 2017 07:25 )             47.5     10-19    132<L>  |  99  |  10  ----------------------------<  187<H>  3.5   |  15<L>  |  0.75    Ca    7.7<L>      19 Oct 2017 22:20  Phos  2.9     10-19  Mg     2.3     10-19    TPro  6.6  /  Alb  3.1<L>  /  TBili  0.6  /  DBili  x   /  AST  24  /  ALT  17  /  AlkPhos  93  10-19    < from: Transesophageal Echocardiogram (10.19.17 @ 20:38) >  Conclusions:  1. Aortic Root: 2.6 cm.  Moderate calcific atheroma noted in aortic arch/descending  aorta.  2. Normal left atrium.  LA volume index = 16 cc/m2.  Lipomatous hypertrophy of interatrial septum.   No left  atrial or left atrial appendage thrombus.   Normal left  atrial appendage function (velocity= 0.9m/s).  3. Increased relative wall thickness with normal left  ventricular mass index, consistent with concentric left  ventricular remodeling.  4. Hyperdynamic left ventricle.  5. Mild diastolicdysfunction (Stage I).    < end of copied text >      HEALTH ISSUES - PROBLEM Dx:  Hyperlipidemia: Hyperlipidemia  Thyroid dysfunction: Thyroid dysfunction  Acidosis: Acidosis  Leukocytosis: Leukocytosis  Hypertension: Hypertension  Dementia: Dementia  Asthma: Asthma  Type 2 diabetes mellitus: Type 2 diabetes mellitus  Electrolyte abnormality: Electrolyte abnormality  Colitis: Colitis  Renal infarct: Renal infarct        Assessment and Plan:  72y F hx of paroxysmal Afib (not on AC), HTN, DM, Asthma, mild dementia - with renal infarcts and colitis (likely ischemic)  1. PAF - pt with renal infarcts on CT a/p. Also with likely ischemic bowel. Suspicion for cardiac source. LEANNA showed no PFO, no thrombus, and no endocarditis. Still this may be due to an small atrial thrombus that embolized. Continue lovenox. Switch to Eliquis when no further procedures are planned. Continue cardizem.  2. Renal infarcts - as above.  3. HTN - better with addition of lisinopril. Continue cardizem.

## 2017-10-20 NOTE — PROGRESS NOTE ADULT - ASSESSMENT
71 y/o F w/ hx of Type 2 DM on oral meds controlled with A1c of 6.4% admitted with metabolic acidosis likely starvation ketosis given poor po intake for the past few days with dehydration.

## 2017-10-20 NOTE — PROGRESS NOTE ADULT - SUBJECTIVE AND OBJECTIVE BOX
No pain, no shortness of breath      VITAL:  T(C): , Max: 36.9 (10-19-17 @ 20:29)  T(F): , Max: 98.4 (10-19-17 @ 20:29)  HR: 84 (10-20-17 @ 04:14)  BP: 145/69 (10-20-17 @ 04:14)  BP(mean): --  RR: 19 (10-20-17 @ 04:14)  SpO2: 95% (10-20-17 @ 04:14)  Wt(kg): --      PHYSICAL EXAM:  Constitutional: NAD; Alert  HEENT:  NCAT; DMM  Neck: No JVD; suppler  Respiratory: CTA-b/l  Cardiac: RRR s1s2  Gastrointestinal: BS+, soft, NT/ND  Urologic: No andrade  Extremities: No peripheral edema  Back: No CVAT b/l    LABS:                        16.8   21.54 )-----------( 252      ( 19 Oct 2017 07:25 )             47.5     Na(132)/K(3.5)/Cl(99)/HCO3(15)/BUN(10)/Cr(0.75)Glu(187)/Ca(7.7)/Mg(2.3)/PO4(2.9)    10-19 @ 22:20  Na(134)/K(3.6)/Cl(98)/HCO3(12)/BUN(9)/Cr(0.67)Glu(157)/Ca(7.5)/Mg(1.6)/PO4(3.6)    10-19 @ 12:54  Na(134)/K(3.1)/Cl(88)/HCO3(19)/BUN(12)/Cr(0.66)Glu(163)/Ca(8.6)/Mg(1.7)/PO4(4.7)    10-19 @ 07:25  Na(132)/K(3.4)/Cl(87)/HCO3(22)/BUN(10)/Cr(0.44)Glu(137)/Ca(8.3)/Mg(--)/PO4(--)    10-19 @ 02:01  Na(130)/K(2.9)/Cl(84)/HCO3(27)/BUN(14)/Cr(0.85)Glu(172)/Ca(9.0)/Mg(1.7)/PO4(--)    10-18 @ 11:06    Urinalysis Basic - ( 19 Oct 2017 17:17 )  Color: PL Yellow / Appearance: Clear / S.014 / pH: x  Gluc: x / Ketone: Large  / Bili: Negative / Urobili: Negative   Blood: x / Protein: 30 mg/dL / Nitrite: Negative   Leuk Esterase: Trace / RBC: 2-5 /HPF / WBC 2-5 /HPF   Sq Epi: x / Non Sq Epi: Occasional /HPF / Bacteria: Few /HPF    BCX (10/18) -  btl (+)staph    IMAGING:  < from: Transesophageal Echocardiogram (10.19.17 @ 20:38) >  Conclusions:  1. Aortic Root: 2.6 cm.  Moderate calcific atheroma noted in aortic arch/descending  aorta.  2. Normal left atrium.  LA volume index = 16 cc/m2.  Lipomatous hypertrophy of interatrial septum.   No left  atrial or left atrial appendage thrombus.   Normal left  atrial appendage function (velocity= 0.9m/s).  3. Increased relative wall thickness with normal left  ventricular mass index, consistent with concentric left  ventricular remodeling.  4. Hyperdynamic left ventricle.  5. Mild diastolicdysfunction (Stage I).  *** Compared with echocardiogram of 2012, no  significant changes noted.        IMPRESSION: 72F w/ HTN, DM2, and AFib, 10/18/17 a/w abdominal pain/nausea, c/b appreciation of SHANE, hypokalemia, colitis, and renal infarcts    (1)Hypokalemia - whole body deficit; improving with repletion    (2)Hyponatremia - hypovolemic/poor osmotic intake-associated; acceptable for now    (3)Acid-base  - metabolic acidosis with increased anion gap - ketoacidosis - improving    (4)SHANE - fluctuating creatinine based on volume status - appears that baseline creatinine is 0.4-0.5mg/dL    (5)Renal infarcts -  Likely afib-associated. No vegetation seen on LEANNA.    (6)HTN - acceptable for now      RECOMMEND:  (1)Anticoagulation  (2)Can continue NS+40meq/L KCl @ 50cc/h   (3)BMP daily  (4)Dose new meds for GFR >60 ml/min  (5)No ACEI/ARB/diuretics for now; if antihypertensives are needed, could opt for Norvasc or Hydralazine              Yuniel Boateng MD  [facility],   [phone] No pain, no shortness of breath      VITAL:  T(C): , Max: 36.9 (10-19-17 @ 20:29)  T(F): , Max: 98.4 (10-19-17 @ 20:29)  HR: 84 (10-20-17 @ 04:14)  BP: 145/69 (10-20-17 @ 04:14)  BP(mean): --  RR: 19 (10-20-17 @ 04:14)  SpO2: 95% (10-20-17 @ 04:14)  Wt(kg): --      PHYSICAL EXAM:  Constitutional: NAD; Alert  HEENT:  NCAT; DMM  Neck: No JVD; suppler  Respiratory: CTA-b/l  Cardiac: RRR s1s2  Gastrointestinal: BS+, soft, NT/ND  Urologic: No andrade  Extremities: No peripheral edema  Back: No CVAT b/l    LABS:                        16.8   21.54 )-----------( 252      ( 19 Oct 2017 07:25 )             47.5     Na(132)/K(3.5)/Cl(99)/HCO3(15)/BUN(10)/Cr(0.75)Glu(187)/Ca(7.7)/Mg(2.3)/PO4(2.9)    10-19 @ 22:20  Na(134)/K(3.6)/Cl(98)/HCO3(12)/BUN(9)/Cr(0.67)Glu(157)/Ca(7.5)/Mg(1.6)/PO4(3.6)    10-19 @ 12:54  Na(134)/K(3.1)/Cl(88)/HCO3(19)/BUN(12)/Cr(0.66)Glu(163)/Ca(8.6)/Mg(1.7)/PO4(4.7)    10-19 @ 07:25  Na(132)/K(3.4)/Cl(87)/HCO3(22)/BUN(10)/Cr(0.44)Glu(137)/Ca(8.3)/Mg(--)/PO4(--)    10-19 @ 02:01  Na(130)/K(2.9)/Cl(84)/HCO3(27)/BUN(14)/Cr(0.85)Glu(172)/Ca(9.0)/Mg(1.7)/PO4(--)    10-18 @ 11:06    Urinalysis Basic - ( 19 Oct 2017 17:17 )  Color: PL Yellow / Appearance: Clear / S.014 / pH: x  Gluc: x / Ketone: Large  / Bili: Negative / Urobili: Negative   Blood: x / Protein: 30 mg/dL / Nitrite: Negative   Leuk Esterase: Trace / RBC: 2-5 /HPF / WBC 2-5 /HPF   Sq Epi: x / Non Sq Epi: Occasional /HPF / Bacteria: Few /HPF    BCX (10/18) -  btl (+)staph    IMAGING:  < from: Transesophageal Echocardiogram (10.19.17 @ 20:38) >  Conclusions:  1. Aortic Root: 2.6 cm.  Moderate calcific atheroma noted in aortic arch/descending  aorta.  2. Normal left atrium.  LA volume index = 16 cc/m2.  Lipomatous hypertrophy of interatrial septum.   No left  atrial or left atrial appendage thrombus.   Normal left  atrial appendage function (velocity= 0.9m/s).  3. Increased relative wall thickness with normal left  ventricular mass index, consistent with concentric left  ventricular remodeling.  4. Hyperdynamic left ventricle.  5. Mild diastolicdysfunction (Stage I).  *** Compared with echocardiogram of 2012, no  significant changes noted.        IMPRESSION: 72F w/ HTN, DM2, and AFib, 10/18/17 a/w abdominal pain/nausea, c/b appreciation of SHANE, hypokalemia, colitis, and renal infarcts    (1)Hypokalemia - whole body deficit; improving with repletion    (2)Hyponatremia - hypovolemic/poor osmotic intake-associated; acceptable for now. On mildly hypertonic IVF.    (3)Acid-base  - metabolic acidosis with increased anion gap - ketoacidosis - improving    (4)SHANE - fluctuating creatinine based on volume status - appears that baseline creatinine is 0.4-0.5mg/dL. ACEI was just added; this may account for the mild rise in creatinine over the past 24 hours.    (5)Renal infarcts -  Likely afib-associated. No vegetation seen on LEANNA.    (6)HTN - now on Lisinopril and Cardizem. Acceptable for now.      RECOMMEND:  (1)Anticoagulation  (2)Can continue IVF as ordered  (3)Antihypertensives as ordered  (4)BMP daily  (5)Encourage PO intake; Glucerna if needed              Yuniel Boateng MD  [facility]  [phone] No pain, no shortness of breath      VITAL:  T(C): , Max: 36.9 (10-19-17 @ 20:29)  T(F): , Max: 98.4 (10-19-17 @ 20:29)  HR: 84 (10-20-17 @ 04:14)  BP: 145/69 (10-20-17 @ 04:14)  BP(mean): --  RR: 19 (10-20-17 @ 04:14)  SpO2: 95% (10-20-17 @ 04:14)  Wt(kg): --      PHYSICAL EXAM:  Constitutional: NAD; Alert  HEENT:  NCAT; DMM  Neck: No JVD; suppler  Respiratory: CTA-b/l  Cardiac: RRR s1s2  Gastrointestinal: BS+, soft, NT/ND  Urologic: No andrade  Extremities: No peripheral edema  Back: No CVAT b/l    LABS:                        16.8   21.54 )-----------( 252      ( 19 Oct 2017 07:25 )             47.5     Na(132)/K(3.5)/Cl(99)/HCO3(15)/BUN(10)/Cr(0.75)Glu(187)/Ca(7.7)/Mg(2.3)/PO4(2.9)    10-19 @ 22:20  Na(134)/K(3.6)/Cl(98)/HCO3(12)/BUN(9)/Cr(0.67)Glu(157)/Ca(7.5)/Mg(1.6)/PO4(3.6)    10-19 @ 12:54  Na(134)/K(3.1)/Cl(88)/HCO3(19)/BUN(12)/Cr(0.66)Glu(163)/Ca(8.6)/Mg(1.7)/PO4(4.7)    10-19 @ 07:25  Na(132)/K(3.4)/Cl(87)/HCO3(22)/BUN(10)/Cr(0.44)Glu(137)/Ca(8.3)/Mg(--)/PO4(--)    10-19 @ 02:01  Na(130)/K(2.9)/Cl(84)/HCO3(27)/BUN(14)/Cr(0.85)Glu(172)/Ca(9.0)/Mg(1.7)/PO4(--)    10-18 @ 11:06    Urinalysis Basic - ( 19 Oct 2017 17:17 )  Color: PL Yellow / Appearance: Clear / S.014 / pH: x  Gluc: x / Ketone: Large  / Bili: Negative / Urobili: Negative   Blood: x / Protein: 30 mg/dL / Nitrite: Negative   Leuk Esterase: Trace / RBC: 2-5 /HPF / WBC 2-5 /HPF   Sq Epi: x / Non Sq Epi: Occasional /HPF / Bacteria: Few /HPF    BCX (10/18) -  btl (+)staph    IMAGING:  < from: Transesophageal Echocardiogram (10.19.17 @ 20:38) >  Conclusions:  1. Aortic Root: 2.6 cm.  Moderate calcific atheroma noted in aortic arch/descending  aorta.  2. Normal left atrium.  LA volume index = 16 cc/m2.  Lipomatous hypertrophy of interatrial septum.   No left  atrial or left atrial appendage thrombus.   Normal left  atrial appendage function (velocity= 0.9m/s).  3. Increased relative wall thickness with normal left  ventricular mass index, consistent with concentric left  ventricular remodeling.  4. Hyperdynamic left ventricle.  5. Mild diastolicdysfunction (Stage I).  *** Compared with echocardiogram of 2012, no  significant changes noted.        IMPRESSION: 72F w/ HTN, DM2, and AFib, 10/18/17 a/w abdominal pain/nausea, c/b appreciation of SHANE, hypokalemia, colitis, and renal infarcts    (1)Hypokalemia - whole body deficit; improving with repletion    (2)Hyponatremia - hypovolemic/poor osmotic intake-associated; acceptable for now. On mildly hypertonic IVF.    (3)Acid-base  - metabolic acidosis with increased anion gap - ketoacidosis - improving    (4)SHANE - fluctuating creatinine based on volume status - appears that baseline creatinine is 0.4-0.5mg/dL. ACEI was just added; this may account for the mild rise in creatinine over the past 24 hours.    (5)Renal infarcts -  Likely afib-associated. No vegetation seen on LEANNA.    (6)HTN - now on Lisinopril and Cardizem. Acceptable for now.      RECOMMEND:  (1)Anticoagulation  (2)Can continue IVF as ordered  (3)Antihypertensives as ordered  (4)BMP daily  (5)Encourage PO intake; Glucerna TID              Yuniel Boateng MD  [facility]  [phone] Complains of fatigue and malaise. Denies nausea.      VITAL:  T(C): , Max: 36.9 (10-19-17 @ 20:29)  T(F): , Max: 98.4 (10-19-17 @ 20:29)  HR: 84 (10-20-17 @ 04:14)  BP: 145/69 (10-20-17 @ 04:14)  BP(mean): --  RR: 19 (10-20-17 @ 04:14)  SpO2: 95% (10-20-17 @ 04:14)  Wt(kg): --      PHYSICAL EXAM:  Constitutional: lethargic, mildly confused  HEENT:  NCAT; DMM  Neck: No JVD; supple  Respiratory: CTA-b/l  Cardiac: RRR s1s2  Gastrointestinal: BS+, soft, NT/ND  Urologic: No andrade  Extremities: No peripheral edema  Back: No CVAT b/l    LABS:                        16.8   21.54 )-----------( 252      ( 19 Oct 2017 07:25 )             47.5     Na(132)/K(3.5)/Cl(99)/HCO3(15)/BUN(10)/Cr(0.75)Glu(187)/Ca(7.7)/Mg(2.3)/PO4(2.9)    10-19 @ 22:20  Na(134)/K(3.6)/Cl(98)/HCO3(12)/BUN(9)/Cr(0.67)Glu(157)/Ca(7.5)/Mg(1.6)/PO4(3.6)    10-19 @ 12:54  Na(134)/K(3.1)/Cl(88)/HCO3(19)/BUN(12)/Cr(0.66)Glu(163)/Ca(8.6)/Mg(1.7)/PO4(4.7)    10-19 @ 07:25  Na(132)/K(3.4)/Cl(87)/HCO3(22)/BUN(10)/Cr(0.44)Glu(137)/Ca(8.3)/Mg(--)/PO4(--)    10-19 @ 02:01  Na(130)/K(2.9)/Cl(84)/HCO3(27)/BUN(14)/Cr(0.85)Glu(172)/Ca(9.0)/Mg(1.7)/PO4(--)    10-18 @ 11:06    Urinalysis Basic - ( 19 Oct 2017 17:17 )  Color: PL Yellow / Appearance: Clear / S.014 / pH: x  Gluc: x / Ketone: Large  / Bili: Negative / Urobili: Negative   Blood: x / Protein: 30 mg/dL / Nitrite: Negative   Leuk Esterase: Trace / RBC: 2-5 /HPF / WBC 2-5 /HPF   Sq Epi: x / Non Sq Epi: Occasional /HPF / Bacteria: Few /HPF    BCX (10/18) -  btl (+)staph    IMAGING:  < from: Transesophageal Echocardiogram (10.19.17 @ 20:38) >  Conclusions:  1. Aortic Root: 2.6 cm.  Moderate calcific atheroma noted in aortic arch/descending  aorta.  2. Normal left atrium.  LA volume index = 16 cc/m2.  Lipomatous hypertrophy of interatrial septum.   No left  atrial or left atrial appendage thrombus.   Normal left  atrial appendage function (velocity= 0.9m/s).  3. Increased relative wall thickness with normal left  ventricular mass index, consistent with concentric left  ventricular remodeling.  4. Hyperdynamic left ventricle.  5. Mild diastolicdysfunction (Stage I).  *** Compared with echocardiogram of 2012, no  significant changes noted.        IMPRESSION: 72F w/ HTN, DM2, and AFib, 10/18/17 a/w abdominal pain/nausea, c/b appreciation of SHANE, hypokalemia, colitis, and renal infarcts    (1)Hypokalemia - whole body deficit; improving with repletion    (2)Hyponatremia - hypovolemic/poor osmotic intake-associated; acceptable for now. On mildly hypertonic IVF.    (3)Acid-base  - metabolic acidosis with increased anion gap - ketoacidosis - improving    (4)SHANE - fluctuating creatinine based on volume status - appears that baseline creatinine is 0.4-0.5mg/dL. ACEI was just added; this may account for the mild rise in creatinine over the past 24 hours.    (5)Renal infarcts -  Likely afib-associated. No vegetation seen on LEANNA.    (6)HTN - now on Lisinopril and Cardizem. Acceptable for now.      RECOMMEND:  (1)Anticoagulation  (2)Can continue IVF as ordered  (3)Antihypertensives as ordered  (4)BMP daily  (5)Encourage PO intake; Glucerna TID              Yuniel Boateng MD  [facility]  [phone]

## 2017-10-20 NOTE — PROGRESS NOTE ADULT - ASSESSMENT
72y F hx of HTN, DM, Asthma ,mild dementia, AFib here with  nausea since Saturday. Today she noticed abdominal pain.  Last BM 2 days ago. Passing flatus. No fever, chills, cp, sob, urinary sx. She stopped anticoagulation few years back     Problem/Plan - 1:  ·  Problem: Renal infarct.  Plan: Exact cause not know but has H/O PAF so possibly Embolic infarcts. On AC Lovenox . Will change to PO Eliquis on DC . cardiology follow up noted.      Problem/Plan - 2:  ·  Problem: Colitis with Leucocytosis.  Plan: IV ABXS . GI and  ID helping. Blood Cultures positive for G+ cocci . ID helping. IV Abxs per ID.       Problem/Plan - 3:  ·  Problem: Electrolyte abnormality.  Plan: Replacing. Renal following.      Problem/Plan - 4:  ·  Problem: R/O Atrial fibrillation, chronic.  Plan: S/P LEANNA < from: Transesophageal Echocardiogram (10.19.17 @ 20:38) >  ------------------------------------------------------------------------  Conclusions:  1. Aortic Root: 2.6 cm.  Moderate calcific atheroma noted in aortic arch/descending  aorta.  2. Normal left atrium.  LA volume index = 16 cc/m2.  Lipomatous hypertrophy of interatrial septum.   No left  atrial or left atrial appendage thrombus.   Normal left  atrial appendage function (velocity= 0.9m/s).  3. Increased relative wall thickness with normal left  ventricular mass index, consistent with concentric left  ventricular remodeling.  4. Hyperdynamic left ventricle.  5. Mild diastolicdysfunction (Stage I).  *** Compared with echocardiogram of 6/14/2012, no  significant changes noted.    < end of copied text >   . On Lovenox . Replacing Electrolytes and will recheck her TFT. Will Dc on Eliquis.      Problem/Plan - 5:  ·  Problem: Type 2 diabetes mellitus .  Plan: Holding PO meds and only SSI for now as not eating . Endo helping.       Problem/Plan - 6:  Problem: Anion Gap Metabolic Acidosis with DM : Plan: Improving  Endo and Renal helping.      Problem/Plan - 7:  ·  Problem: Dementia.  Plan: Mild . Supportive care .      Problem/Plan - 8:  ·  Problem: Hypertension.  Plan: BP meds with parameters.

## 2017-10-20 NOTE — DIETITIAN INITIAL EVALUATION ADULT. - ADHERENCE
Pt does not follow a modified diet PTA, pt with T2DM, noted to have HgbA1c (10/19) 6.4%, indicating good glycemic control.

## 2017-10-20 NOTE — DIETITIAN INITIAL EVALUATION ADULT. - ORAL INTAKE PTA
poor/Pt reports poor intake PTA for the past week, pt says she is not a good eater. Typical day as follows: breakfast: coffee and toast, lunch: bologna sandwich, dinner: pasta. Pt takes MVI PTA. NKFA

## 2017-10-20 NOTE — PROGRESS NOTE ADULT - SUBJECTIVE AND OBJECTIVE BOX
Chief Complaint: Evaluating this 73 y/o F for starvation ketosis and hx of Type 2 DM      Interval History: Reports fatigue and malaise, feels like she is going to "pass out." Denies chest pain, shortness of breath, nausea or vomiting. Has no appetite. Not eating much.     MEDICATIONS  (STANDING):  aspirin enteric coated 81 milliGRAM(s) Oral daily  dextrose 5%. 1000 milliLiter(s) (50 mL/Hr) IV Continuous <Continuous>  dextrose 50% Injectable 12.5 Gram(s) IV Push once  dextrose 50% Injectable 25 Gram(s) IV Push once  dextrose 50% Injectable 25 Gram(s) IV Push once  diltiazem    milliGRAM(s) Oral daily  enoxaparin Injectable 50 milliGRAM(s) SubCutaneous two times a day  famotidine    Tablet 20 milliGRAM(s) Oral two times a day  insulin glargine Injectable (LANTUS) 5 Unit(s) SubCutaneous at bedtime  insulin lispro (HumaLOG) corrective regimen sliding scale   SubCutaneous three times a day before meals  insulin lispro (HumaLOG) corrective regimen sliding scale   SubCutaneous at bedtime  insulin lispro Injectable (HumaLOG) 2 Unit(s) SubCutaneous three times a day before meals  lisinopril 5 milliGRAM(s) Oral daily  pantoprazole    Tablet 40 milliGRAM(s) Oral before breakfast  piperacillin/tazobactam IVPB. 3.375 Gram(s) IV Intermittent every 8 hours  potassium chloride    Tablet ER 40 milliEquivalent(s) Oral every 4 hours  predniSONE   Tablet 5 milliGRAM(s) Oral daily  simvastatin 20 milliGRAM(s) Oral at bedtime  sodium chloride 0.9% 1000 milliLiter(s) (75 mL/Hr) IV Continuous <Continuous>  vancomycin  IVPB 1000 milliGRAM(s) IV Intermittent every 12 hours    MEDICATIONS  (PRN):  acetaminophen 300 mG/codeine 30 mG 1 Tablet(s) Oral every 4 hours PRN Moderate Pain (4 - 6)  dextrose Gel 1 Dose(s) Oral once PRN Blood Glucose LESS THAN 70 milliGRAM(s)/deciliter  glucagon  Injectable 1 milliGRAM(s) IntraMuscular once PRN Glucose LESS THAN 70 milligrams/deciliter      Allergies    Avelox (Pruritus)  Levaquin (Unknown)    Intolerances      Review of Systems:  Constitutional: No fever +fatigue  Eyes: No blurry vision  Cardiovascular: No chest pain  Respiratory: No SOB  GI: No abdominal pain, No nausea, No vomiting  Endocrine: as noted in HPI    All other negative      PHYSICAL EXAM:  VITALS: T(C): 36.6 (10-20-17 @ 12:28)  T(F): 97.9 (10-20-17 @ 12:28), Max: 98.4 (10-19-17 @ 20:29)  HR: 82 (10-20-17 @ 12:28) (78 - 85)  BP: 112/72 (10-20-17 @ 12:28) (111/62 - 145/69)  RR:  (18 - 19)  SpO2:  (93% - 97%)  Wt(kg): --  GENERAL: NAD at this time  EYES: EOMI, No proptosis  HEENT:  Atraumatic, Normocephalic, poor dentition  RESPIRATORY: Clear to auscultation bilaterally, full excursion, non labored  CARDIOVASCULAR: Regular rhythm; normal S1/S2, no peripheral edema  GI: Soft, nontender, non distended, normal bowel sounds  SKIN: Warm and dry  PSYCH: flat affect      POCT Blood Glucose.: 188 mg/dL (10-20-17 @ 10:09)  POCT Blood Glucose.: 136 mg/dL (10-20-17 @ 06:12)  POCT Blood Glucose.: 135 mg/dL (10-20-17 @ 02:30)  POCT Blood Glucose.: 166 mg/dL (10-19-17 @ 22:16)  POCT Blood Glucose.: 203 mg/dL (10-19-17 @ 19:14)  POCT Blood Glucose.: 149 mg/dL (10-19-17 @ 15:10)  POCT Blood Glucose.: 143 mg/dL (10-19-17 @ 08:55)  POCT Blood Glucose.: 159 mg/dL (10-19-17 @ 05:17)  POCT Blood Glucose.: 152 mg/dL (10-19-17 @ 01:58)  POCT Blood Glucose.: 154 mg/dL (10-18-17 @ 21:40)  POCT Blood Glucose.: 137 mg/dL (10-18-17 @ 18:25)        10-20    134<L>  |  102  |  9   ----------------------------<  141<H>  3.1<L>   |  18<L>  |  0.73    EGFR if : 94  EGFR if non : 81    Ca    8.1<L>      10-20  Mg     2.1     10-20  Phos  2.2     10-20    TPro  6.6  /  Alb  3.1<L>  /  TBili  0.6  /  DBili  x   /  AST  24  /  ALT  17  /  AlkPhos  93  10-19        Thyroid Function Tests:  10-20 @ 08:02 TSH 0.64 FreeT4 1.7 T3 67 Anti TPO -- Anti Thyroglobulin Ab -- TSI --  10-19 @ 07:25 TSH 0.70 FreeT4 1.9 T3 -- Anti TPO -- Anti Thyroglobulin Ab -- TSI --      Hemoglobin A1C, Whole Blood: 6.4 % <H> [4.0 - 5.6] (10-19-17 @ 07:25)

## 2017-10-20 NOTE — PROGRESS NOTE ADULT - SUBJECTIVE AND OBJECTIVE BOX
Patient is a 72y old  Female who presents with a chief complaint of Nausea with abdominal pain. (18 Oct 2017 17:14)    Being followed by ID for leucocytosis      pt resting quietly  daughter in room  nausea improved  No cough,SOB,CP  No N/V/D  No abd pain  No urinary complaints  No HA  No joint or limb pain  No other complaints    PAST MEDICAL & SURGICAL HISTORY:  Dementia  Hypertension  Type 2 diabetes mellitus  Atrial fibrillation, chronic  Diabetes mellitus  Asthma  Diabetes Mellitus  HTN (Hypertension)  Asthma  Afib      Antimicrobials:    piperacillin/tazobactam IVPB. 3.375 Gram(s) IV Intermittent every 8 hours  vancomycin  IVPB 1000 milliGRAM(s) IV Intermittent every 12 hours    MEDICATIONS  (STANDING):  aspirin enteric coated 81 milliGRAM(s) Oral daily  dextrose 5%. 1000 milliLiter(s) (50 mL/Hr) IV Continuous <Continuous>  dextrose 50% Injectable 12.5 Gram(s) IV Push once  dextrose 50% Injectable 25 Gram(s) IV Push once  dextrose 50% Injectable 25 Gram(s) IV Push once  diltiazem    milliGRAM(s) Oral daily  enoxaparin Injectable 50 milliGRAM(s) SubCutaneous two times a day  famotidine    Tablet 20 milliGRAM(s) Oral two times a day  insulin glargine Injectable (LANTUS) 5 Unit(s) SubCutaneous at bedtime  insulin lispro (HumaLOG) corrective regimen sliding scale   SubCutaneous three times a day before meals  insulin lispro (HumaLOG) corrective regimen sliding scale   SubCutaneous at bedtime  insulin lispro Injectable (HumaLOG) 2 Unit(s) SubCutaneous three times a day before meals  lisinopril 5 milliGRAM(s) Oral daily  pantoprazole    Tablet 40 milliGRAM(s) Oral before breakfast  piperacillin/tazobactam IVPB. 3.375 Gram(s) IV Intermittent every 8 hours  potassium chloride    Tablet ER 40 milliEquivalent(s) Oral every 4 hours  predniSONE   Tablet 5 milliGRAM(s) Oral daily  simvastatin 20 milliGRAM(s) Oral at bedtime  sodium chloride 0.9% 1000 milliLiter(s) (75 mL/Hr) IV Continuous <Continuous>  vancomycin  IVPB 1000 milliGRAM(s) IV Intermittent every 12 hours      Vital Signs Last 24 Hrs  T(C): 36.6 (10-20-17 @ 12:28), Max: 36.9 (10-19-17 @ 20:29)  T(F): 97.9 (10-20-17 @ 12:28), Max: 98.4 (10-19-17 @ 20:29)  HR: 82 (10-20-17 @ 12:28) (78 - 85)  BP: 112/72 (10-20-17 @ 12:28) (111/62 - 145/69)  BP(mean): --  RR: 18 (10-20-17 @ 12:28) (18 - 19)  SpO2: 96% (10-20-17 @ 12:28) (93% - 97%)    Physical Exam:    Constitutional well preserved,comfortable,pleasant    HEENT PERRLA EOMI,No pallor or icterus  missing teeth    No oral exudate or erythema    Neck supple no JVD or LN    Chest Good AE,CTA    CVS  S1 S2     Abd soft BS normal No tenderness no masses    Ext No cyanosis clubbing  slight edema  partially missing toe nail on left    IV site no erythema tenderness or discharge    Joints no swelling or LOM    CNS- moves all 4 syymetrically    Lab Data:                          12.5   15.59 )-----------( 176      ( 20 Oct 2017 07:44 )             36.4       10-20    134<L>  |  102  |  9   ----------------------------<  141<H>  3.1<L>   |  18<L>  |  0.73    Ca    8.1<L>      20 Oct 2017 08:02  Phos  2.2     10-20  Mg     2.1     10-20    TPro  6.6  /  Alb  3.1<L>  /  TBili  0.6  /  DBili  x   /  AST  24  /  ALT  17  /  AlkPhos  93  10-19      Urinalysis Basic - ( 19 Oct 2017 17:17 )    Color: PL Yellow / Appearance: Clear / S.014 / pH: x  Gluc: x / Ketone: Large  / Bili: Negative / Urobili: Negative   Blood: x / Protein: 30 mg/dL / Nitrite: Negative   Leuk Esterase: Trace / RBC: 2-5 /HPF / WBC 2-5 /HPF   Sq Epi: x / Non Sq Epi: Occasional /HPF / Bacteria: Few /HPF        .Blood Blood  10-18-17   Growth in aerobic bottle:  Gram Positive Cocci in Clusters  ***Blood Panel PCR results on this specimen are available  approximately 3 hours after the Gram stain result.***  Gram stain, PCR, and/or culture results may not always  correspond due to difference in methodologies.  ************************************************************  This PCR assay was performed using [employer].  The following targets are tested for: Enterococcus,  vancomycin resistant enterococci, Listeria monocytogenes,  coagulase negative staphylococci, S. aureus,  methicillin resistant S. aureus, Streptococcus agalactiae  (Group B), S. pneumoniae, S. pyogenes (Group A),  Acinetobacter baumannii, Enterobacter cloacae, E. coli,  Klebsiella oxytoca, K. pneumoniae, Proteus sp.,  Serratia marcescens, Haemophilus influenzae,  Neisseria meningitidis, Pseudomonas aeruginosa, Candida  albicans, C. glabrata, C krusei, C parapsilosis,  C. tropicalis and the KPC resistance gene.  --  Blood Culture PCR      .Urine Clean Catch (Midstream)  10-18-17   >100,000 CFU/ml Streptococcus agalactiae (Group B)  Group B streptococci are susceptible to ampicillin,  penicillin and cefazolin, but may be resistant to  erythromycin and clindamycin.  Recommendations for intrapartum prophylaxis for Group B  streptococci are penicillin or ampicillin.  <10,000 CFU/ml Normal Urogenital speedy present  --  --

## 2017-10-20 NOTE — PROGRESS NOTE ADULT - ASSESSMENT
72y old  Female who presents with a chief complaint of Nausea with abdominal pain with colitis on CT and renal infarcts      Team suspects findings are more likely are from embolic events from a fib but will continue the abs none the less, in case    Repeat blood cultures pending, suspect CNS is a procurement contaminant    vanco dose seems high for pts weight and age, please check    ID service will be covering over the weekend. Please call for acute issues or questions. [phone]

## 2017-10-20 NOTE — DIETITIAN INITIAL EVALUATION ADULT. - PHYSICAL APPEARANCE
thin appearance, Nutrition focused physical exam performed after obtaining consent from patient. Mild orbital fat loss, moderate muscle loss shoulders, mild muscle loss clavicles./other (specify) other (specify)/thin appearance, Nutrition focused physical exam performed after obtaining consent from patient. Mild orbital fat loss, moderate muscle loss (shoulders), mild muscle loss (clavicles).

## 2017-10-21 LAB
ANION GAP SERPL CALC-SCNC: 12 MMOL/L — SIGNIFICANT CHANGE UP (ref 5–17)
BUN SERPL-MCNC: 5 MG/DL — LOW (ref 7–23)
C DIFF GDH STL QL: NEGATIVE — SIGNIFICANT CHANGE UP
C DIFF GDH STL QL: SIGNIFICANT CHANGE UP
CALCIUM SERPL-MCNC: 7.9 MG/DL — LOW (ref 8.4–10.5)
CHLORIDE SERPL-SCNC: 103 MMOL/L — SIGNIFICANT CHANGE UP (ref 96–108)
CO2 SERPL-SCNC: 20 MMOL/L — LOW (ref 22–31)
CREAT SERPL-MCNC: 0.8 MG/DL — SIGNIFICANT CHANGE UP (ref 0.5–1.3)
GLUCOSE BLDC GLUCOMTR-MCNC: 110 MG/DL — HIGH (ref 70–99)
GLUCOSE BLDC GLUCOMTR-MCNC: 120 MG/DL — HIGH (ref 70–99)
GLUCOSE BLDC GLUCOMTR-MCNC: 172 MG/DL — HIGH (ref 70–99)
GLUCOSE BLDC GLUCOMTR-MCNC: 174 MG/DL — HIGH (ref 70–99)
GLUCOSE BLDC GLUCOMTR-MCNC: 223 MG/DL — HIGH (ref 70–99)
GLUCOSE BLDC GLUCOMTR-MCNC: 87 MG/DL — SIGNIFICANT CHANGE UP (ref 70–99)
GLUCOSE SERPL-MCNC: 130 MG/DL — HIGH (ref 70–99)
HCT VFR BLD CALC: 35.6 % — SIGNIFICANT CHANGE UP (ref 34.5–45)
HGB BLD-MCNC: 12.3 G/DL — SIGNIFICANT CHANGE UP (ref 11.5–15.5)
MCHC RBC-ENTMCNC: 27.2 PG — SIGNIFICANT CHANGE UP (ref 27–34)
MCHC RBC-ENTMCNC: 34.6 GM/DL — SIGNIFICANT CHANGE UP (ref 32–36)
MCV RBC AUTO: 78.6 FL — LOW (ref 80–100)
PLATELET # BLD AUTO: 120 K/UL — LOW (ref 150–400)
POTASSIUM SERPL-MCNC: 3.1 MMOL/L — LOW (ref 3.5–5.3)
POTASSIUM SERPL-SCNC: 3.1 MMOL/L — LOW (ref 3.5–5.3)
RBC # BLD: 4.53 M/UL — SIGNIFICANT CHANGE UP (ref 3.8–5.2)
RBC # FLD: 13.6 % — SIGNIFICANT CHANGE UP (ref 10.3–14.5)
SODIUM SERPL-SCNC: 135 MMOL/L — SIGNIFICANT CHANGE UP (ref 135–145)
VANCOMYCIN TROUGH SERPL-MCNC: 14.5 UG/ML — SIGNIFICANT CHANGE UP (ref 10–20)
WBC # BLD: 12.05 K/UL — HIGH (ref 3.8–10.5)
WBC # FLD AUTO: 12.05 K/UL — HIGH (ref 3.8–10.5)

## 2017-10-21 PROCEDURE — 99232 SBSQ HOSP IP/OBS MODERATE 35: CPT

## 2017-10-21 RX ORDER — POTASSIUM CHLORIDE 20 MEQ
40 PACKET (EA) ORAL EVERY 4 HOURS
Qty: 0 | Refills: 0 | Status: COMPLETED | OUTPATIENT
Start: 2017-10-21 | End: 2017-10-21

## 2017-10-21 RX ADMIN — Medication 81 MILLIGRAM(S): at 11:32

## 2017-10-21 RX ADMIN — ENOXAPARIN SODIUM 50 MILLIGRAM(S): 100 INJECTION SUBCUTANEOUS at 17:43

## 2017-10-21 RX ADMIN — Medication 1: at 16:48

## 2017-10-21 RX ADMIN — PIPERACILLIN AND TAZOBACTAM 25 GRAM(S): 4; .5 INJECTION, POWDER, LYOPHILIZED, FOR SOLUTION INTRAVENOUS at 11:32

## 2017-10-21 RX ADMIN — PIPERACILLIN AND TAZOBACTAM 25 GRAM(S): 4; .5 INJECTION, POWDER, LYOPHILIZED, FOR SOLUTION INTRAVENOUS at 20:47

## 2017-10-21 RX ADMIN — SIMVASTATIN 20 MILLIGRAM(S): 20 TABLET, FILM COATED ORAL at 21:41

## 2017-10-21 RX ADMIN — Medication 2 UNIT(S): at 16:48

## 2017-10-21 RX ADMIN — Medication 250 MILLIGRAM(S): at 18:46

## 2017-10-21 RX ADMIN — Medication 40 MILLIEQUIVALENT(S): at 17:43

## 2017-10-21 RX ADMIN — Medication 5 MILLIGRAM(S): at 05:22

## 2017-10-21 RX ADMIN — Medication 250 MILLIGRAM(S): at 07:46

## 2017-10-21 RX ADMIN — Medication 40 MILLIEQUIVALENT(S): at 15:12

## 2017-10-21 RX ADMIN — Medication 1: at 11:34

## 2017-10-21 RX ADMIN — Medication 2 UNIT(S): at 08:01

## 2017-10-21 RX ADMIN — FAMOTIDINE 20 MILLIGRAM(S): 10 INJECTION INTRAVENOUS at 05:22

## 2017-10-21 RX ADMIN — BUDESONIDE AND FORMOTEROL FUMARATE DIHYDRATE 2 PUFF(S): 160; 4.5 AEROSOL RESPIRATORY (INHALATION) at 17:43

## 2017-10-21 RX ADMIN — ENOXAPARIN SODIUM 50 MILLIGRAM(S): 100 INJECTION SUBCUTANEOUS at 05:22

## 2017-10-21 RX ADMIN — LISINOPRIL 5 MILLIGRAM(S): 2.5 TABLET ORAL at 05:22

## 2017-10-21 RX ADMIN — Medication 240 MILLIGRAM(S): at 05:22

## 2017-10-21 RX ADMIN — FAMOTIDINE 20 MILLIGRAM(S): 10 INJECTION INTRAVENOUS at 17:43

## 2017-10-21 RX ADMIN — PANTOPRAZOLE SODIUM 40 MILLIGRAM(S): 20 TABLET, DELAYED RELEASE ORAL at 05:22

## 2017-10-21 RX ADMIN — Medication 2 UNIT(S): at 11:34

## 2017-10-21 RX ADMIN — PIPERACILLIN AND TAZOBACTAM 25 GRAM(S): 4; .5 INJECTION, POWDER, LYOPHILIZED, FOR SOLUTION INTRAVENOUS at 03:26

## 2017-10-21 RX ADMIN — BUDESONIDE AND FORMOTEROL FUMARATE DIHYDRATE 2 PUFF(S): 160; 4.5 AEROSOL RESPIRATORY (INHALATION) at 05:23

## 2017-10-21 RX ADMIN — Medication 40 MILLIEQUIVALENT(S): at 11:45

## 2017-10-21 RX ADMIN — INSULIN GLARGINE 5 UNIT(S): 100 INJECTION, SOLUTION SUBCUTANEOUS at 21:41

## 2017-10-21 NOTE — PROGRESS NOTE ADULT - SUBJECTIVE AND OBJECTIVE BOX
CRUZITO BATES:[id number],   72yFemale followed for:  Avelox (Pruritus)  Levaquin (Unknown)    PAST MEDICAL & SURGICAL HISTORY:  Dementia  Hypertension  Type 2 diabetes mellitus  Atrial fibrillation, chronic  Diabetes mellitus  Asthma  Diabetes Mellitus  HTN (Hypertension)  Asthma  Afib    FAMILY HISTORY:  No pertinent family history in first degree relatives    MEDICATIONS  (STANDING):  aspirin enteric coated 81 milliGRAM(s) Oral daily  buDESOnide 160 MICROgram(s)/formoterol 4.5 MICROgram(s) Inhaler 2 Puff(s) Inhalation two times a day  dextrose 5%. 1000 milliLiter(s) (50 mL/Hr) IV Continuous <Continuous>  dextrose 50% Injectable 12.5 Gram(s) IV Push once  dextrose 50% Injectable 25 Gram(s) IV Push once  dextrose 50% Injectable 25 Gram(s) IV Push once  diltiazem    milliGRAM(s) Oral daily  enoxaparin Injectable 50 milliGRAM(s) SubCutaneous two times a day  famotidine    Tablet 20 milliGRAM(s) Oral two times a day  insulin glargine Injectable (LANTUS) 5 Unit(s) SubCutaneous at bedtime  insulin lispro (HumaLOG) corrective regimen sliding scale   SubCutaneous three times a day before meals  insulin lispro (HumaLOG) corrective regimen sliding scale   SubCutaneous at bedtime  insulin lispro Injectable (HumaLOG) 2 Unit(s) SubCutaneous three times a day before meals  lisinopril 5 milliGRAM(s) Oral daily  pantoprazole    Tablet 40 milliGRAM(s) Oral before breakfast  piperacillin/tazobactam IVPB. 3.375 Gram(s) IV Intermittent every 8 hours  predniSONE   Tablet 5 milliGRAM(s) Oral daily  simvastatin 20 milliGRAM(s) Oral at bedtime  sodium chloride 0.9% 1000 milliLiter(s) (75 mL/Hr) IV Continuous <Continuous>  vancomycin  IVPB 1000 milliGRAM(s) IV Intermittent every 12 hours    MEDICATIONS  (PRN):  acetaminophen 300 mG/codeine 30 mG 1 Tablet(s) Oral every 4 hours PRN Moderate Pain (4 - 6)  dextrose Gel 1 Dose(s) Oral once PRN Blood Glucose LESS THAN 70 milliGRAM(s)/deciliter  glucagon  Injectable 1 milliGRAM(s) IntraMuscular once PRN Glucose LESS THAN 70 milligrams/deciliter      Vital Signs Last 24 Hrs  T(C): 36.6 (21 Oct 2017 09:00), Max: 37.2 (20 Oct 2017 20:51)  T(F): 97.9 (21 Oct 2017 09:00), Max: 98.9 (20 Oct 2017 20:51)  HR: 72 (21 Oct 2017 09:00) (72 - 95)  BP: 142/68 (21 Oct 2017 09:00) (112/72 - 153/70)  BP(mean): --  RR: 18 (21 Oct 2017 09:00) (18 - 20)  SpO2: 97% (21 Oct 2017 09:00) (95% - 98%)  nc/at  s1s2  cta  soft, nt, nd no guarding or rebound  no c/c/e    CBC Full  -  ( 21 Oct 2017 08:48 )  WBC Count : 12.05 K/uL  Hemoglobin : 12.3 g/dL  Hematocrit : 35.6 %  Platelet Count - Automated : 120 K/uL  Mean Cell Volume : 78.6 fl  Mean Cell Hemoglobin : 27.2 pg  Mean Cell Hemoglobin Concentration : 34.6 gm/dL  Auto Neutrophil # : x  Auto Lymphocyte # : x  Auto Monocyte # : x  Auto Eosinophil # : x  Auto Basophil # : x  Auto Neutrophil % : x  Auto Lymphocyte % : x  Auto Monocyte % : x  Auto Eosinophil % : x  Auto Basophil % : x    10-21    135  |  103  |  5<L>  ----------------------------<  130<H>  3.1<L>   |  20<L>  |  0.80    Ca    7.9<L>      21 Oct 2017 09:04  Phos  2.2     10-20  Mg     2.1     10-20

## 2017-10-21 NOTE — PROGRESS NOTE ADULT - ASSESSMENT
72y F hx of HTN, DM, Asthma ,mild dementia, AFib here with  nausea since Saturday. Today she noticed abdominal pain.  Last BM 2 days ago. Passing flatus. No fever, chills, cp, sob, urinary sx. She stopped anticoagulation few years back     Problem/Plan - 1:  ·  Problem: Renal infarct.  Plan: Exact cause not know but has H/O PAF so possibly Embolic infarcts. On AC Lovenox . Will change to PO Eliquis on DC .      Problem/Plan - 2:  ·  Problem: Colitis with Leucocytosis with Bacteremia .  Plan: IV ABXS . GI and  ID helping. Blood Cultures positive for G+ cocci .  IV Abxs per ID.       Problem/Plan - 3:  ·  Problem: Electrolyte abnormality.  Plan: Replacing. Renal following.      Problem/Plan - 4:  ·  Problem: R/O Atrial fibrillation, chronic.  Plan: S/P LEANNA < from: Transesophageal Echocardiogram (10.19.17 @ 20:38) >  ------------------------------------------------------------------------  Conclusions:  1. Aortic Root: 2.6 cm.  Moderate calcific atheroma noted in aortic arch/descending  aorta.  2. Normal left atrium.  LA volume index = 16 cc/m2.  Lipomatous hypertrophy of interatrial septum.   No left  atrial or left atrial appendage thrombus.   Normal left  atrial appendage function (velocity= 0.9m/s).  3. Increased relative wall thickness with normal left  ventricular mass index, consistent with concentric left  ventricular remodeling.  4. Hyperdynamic left ventricle.  5. Mild diastolicdysfunction (Stage I).  *** Compared with echocardiogram of 6/14/2012, no  significant changes noted.    < end of copied text >   . On Lovenox . Replacing Electrolytes and Free T4 little high but endo helping .  Will Dc on Eliquis.      Problem/Plan - 5:  ·  Problem: Type 2 diabetes mellitus .  Plan: Holding PO meds and only SSI for now as not eating . Endo helping.       Problem/Plan - 6:  Problem: Anion Gap Metabolic Acidosis with DM : Plan: Improving  Endo and Renal helping.      Problem/Plan - 7:  ·  Problem: Diarrhea ; .  Plan: Stool tests pending and GI helping.      Problem/Plan - 8:  ·  Problem: Hypertension.  Plan: BP meds with parameters.

## 2017-10-21 NOTE — PROGRESS NOTE ADULT - ASSESSMENT
IMPRESSION: 72F w/ HTN, DM2, and AFib, 10/18/17 a/w abdominal pain/nausea, c/b appreciation of SHANE, hypokalemia, colitis, and renal infarcts  (1)Hypokalemia - whole body deficit; improving with repletion  (2)Hyponatremia - hypovolemic/poor osmotic intake-associated; acceptable for now. On mildly hypertonic IVF. Improved  (3)Acid-base  - metabolic acidosis with increased anion gap - ketoacidosis - improving  (4)SHANE - fluctuating creatinine based on volume status - appears that baseline creatinine is 0.4-0.5mg/dL. Renal fxn stable  (5)Renal infarcts -  Likely afib-associated. No vegetation seen on LEANNA.  (6)HTN - now on Lisinopril and Cardizem. Acceptable for now.    RECOMMEND:  (1)Anticoagulation per primary team  (2)Can limit IVF  (3)Antihypertensives as ordered  (4)BMP daily  (5)Encourage PO intake  (6)avoid NSAIDs and nephrotoxins as able    Cecy Samayoa NP  [facility]  [phone] IMPRESSION: 72F w/ HTN, DM2, and AFib, 10/18/17 a/w abdominal pain/nausea, c/b appreciation of SHANE, hypokalemia, colitis, and renal infarcts  (1)Hypokalemia - whole body deficit; supplemented  (2)Hyponatremia - hypovolemic/poor osmotic intake-associated; acceptable for now. On mildly hypertonic IVF. Improved  (3)Acid-base  - metabolic acidosis with increased anion gap - ketoacidosis - improving  (4)SHANE - fluctuating creatinine based on volume status - appears that baseline creatinine is 0.4-0.5mg/dL. Renal fxn stable  (5)Renal infarcts -  Likely afib-associated. No vegetation seen on LEANNA.  (6)HTN - now on Lisinopril and Cardizem. Acceptable for now.    RECOMMEND:  (1)Anticoagulation per primary team  (2)Can limit IVF  (3)Antihypertensives as ordered  (4)BMP daily  (5)Encourage PO intake  (6)avoid NSAIDs and nephrotoxins as able  (7)a/w KCL 40 meq po x 3 doses     Cecy Samayoa NP  [facility]  [phone]

## 2017-10-21 NOTE — PROGRESS NOTE ADULT - SUBJECTIVE AND OBJECTIVE BOX
INTERVAL HPI/OVERNIGHT EVENTS: Feel better. Had Diarrhea yesterday.   Vital Signs Last 24 Hrs  T(C): 36.6 (21 Oct 2017 11:56), Max: 37.2 (20 Oct 2017 20:51)  T(F): 97.9 (21 Oct 2017 11:56), Max: 98.9 (20 Oct 2017 20:51)  HR: 80 (21 Oct 2017 11:56) (72 - 95)  BP: 122/69 (21 Oct 2017 11:56) (122/69 - 153/70)  BP(mean): --  RR: 18 (21 Oct 2017 11:56) (18 - 20)  SpO2: 100% (21 Oct 2017 11:56) (95% - 100%)  I&O's Summary    20 Oct 2017 07:  -  21 Oct 2017 07:00  --------------------------------------------------------  IN: 3810 mL / OUT: 950 mL / NET: 2860 mL    21 Oct 2017 07:01  -  21 Oct 2017 13:14  --------------------------------------------------------  IN: 720 mL / OUT: 0 mL / NET: 720 mL      MEDICATIONS  (STANDING):  aspirin enteric coated 81 milliGRAM(s) Oral daily  buDESOnide 160 MICROgram(s)/formoterol 4.5 MICROgram(s) Inhaler 2 Puff(s) Inhalation two times a day  dextrose 5%. 1000 milliLiter(s) (50 mL/Hr) IV Continuous <Continuous>  dextrose 50% Injectable 12.5 Gram(s) IV Push once  dextrose 50% Injectable 25 Gram(s) IV Push once  dextrose 50% Injectable 25 Gram(s) IV Push once  diltiazem    milliGRAM(s) Oral daily  enoxaparin Injectable 50 milliGRAM(s) SubCutaneous two times a day  famotidine    Tablet 20 milliGRAM(s) Oral two times a day  insulin glargine Injectable (LANTUS) 5 Unit(s) SubCutaneous at bedtime  insulin lispro (HumaLOG) corrective regimen sliding scale   SubCutaneous three times a day before meals  insulin lispro (HumaLOG) corrective regimen sliding scale   SubCutaneous at bedtime  insulin lispro Injectable (HumaLOG) 2 Unit(s) SubCutaneous three times a day before meals  lisinopril 5 milliGRAM(s) Oral daily  pantoprazole    Tablet 40 milliGRAM(s) Oral before breakfast  piperacillin/tazobactam IVPB. 3.375 Gram(s) IV Intermittent every 8 hours  potassium chloride    Tablet ER 40 milliEquivalent(s) Oral every 4 hours  predniSONE   Tablet 5 milliGRAM(s) Oral daily  simvastatin 20 milliGRAM(s) Oral at bedtime  sodium chloride 0.9% 1000 milliLiter(s) (75 mL/Hr) IV Continuous <Continuous>  vancomycin  IVPB 1000 milliGRAM(s) IV Intermittent every 12 hours    MEDICATIONS  (PRN):  acetaminophen 300 mG/codeine 30 mG 1 Tablet(s) Oral every 4 hours PRN Moderate Pain (4 - 6)  dextrose Gel 1 Dose(s) Oral once PRN Blood Glucose LESS THAN 70 milliGRAM(s)/deciliter  glucagon  Injectable 1 milliGRAM(s) IntraMuscular once PRN Glucose LESS THAN 70 milligrams/deciliter    LABS:                        12.3   12.05 )-----------( 120      ( 21 Oct 2017 08:48 )             35.6     10-21    135  |  103  |  5<L>  ----------------------------<  130<H>  3.1<L>   |  20<L>  |  0.80    Ca    7.9<L>      21 Oct 2017 09:04  Phos  2.2     10-20  Mg     2.1     10-20        Urinalysis Basic - ( 19 Oct 2017 17:17 )    Color: PL Yellow / Appearance: Clear / S.014 / pH: x  Gluc: x / Ketone: Large  / Bili: Negative / Urobili: Negative   Blood: x / Protein: 30 mg/dL / Nitrite: Negative   Leuk Esterase: Trace / RBC: 2-5 /HPF / WBC 2-5 /HPF   Sq Epi: x / Non Sq Epi: Occasional /HPF / Bacteria: Few /HPF      CAPILLARY BLOOD GLUCOSE      POCT Blood Glucose.: 174 mg/dL (21 Oct 2017 11:09)  POCT Blood Glucose.: 120 mg/dL (21 Oct 2017 07:31)  POCT Blood Glucose.: 87 mg/dL (21 Oct 2017 05:16)  POCT Blood Glucose.: 110 mg/dL (21 Oct 2017 00:56)  POCT Blood Glucose.: 129 mg/dL (20 Oct 2017 21:15)  POCT Blood Glucose.: 206 mg/dL (20 Oct 2017 16:25)        Urinalysis Basic - ( 19 Oct 2017 17:17 )    Color: PL Yellow / Appearance: Clear / S.014 / pH: x  Gluc: x / Ketone: Large  / Bili: Negative / Urobili: Negative   Blood: x / Protein: 30 mg/dL / Nitrite: Negative   Leuk Esterase: Trace / RBC: 2-5 /HPF / WBC 2-5 /HPF   Sq Epi: x / Non Sq Epi: Occasional /HPF / Bacteria: Few /HPF      Consultant(s) Notes Reviewed:  [x ] YES  [ ] NO    PHYSICAL EXAM:  GENERAL: NAD, well-groomed, well-developed, not in any distress ,  HEAD:  Atraumatic, Normocephalic  EYES: EOMI, PERRLA, conjunctiva and sclera clear  ENMT: No tonsillar erythema, exudates, or enlargement; Moist mucous membranes, Good dentition, No lesions  NECK: Supple, No JVD, Normal thyroid  NERVOUS SYSTEM:  Alert & Oriented X3 with mild dementia . No focal deficit   CHEST/LUNG: Good air entry bilateral with no  rales, rhonchi, wheezing, or rubs  HEART: Regular rate and rhythm; No murmurs, rubs, or gallops  ABDOMEN: Soft, Nontender, Nondistended; Bowel sounds present  EXTREMITIES:  2+ Peripheral Pulses, No clubbing, cyanosis, or edema  SKIN: No rashes or lesions    Care Discussed with Consultants/Other Providers [ x] YES  [ ] NO

## 2017-10-21 NOTE — PROGRESS NOTE ADULT - ASSESSMENT
72 f with h/o Paroxysmal A-fib was admitted for abd pain and nausea was found to have colitis and also b/l renal infarcts vs pyelo with leukocytosis. She was started on vanco and zosyn but the colitis and renal findings appear to be due to embolic events and A-fib. She has no fever or chills, no dysuria or CVAT with still diarrhea ( with blood)  had negative echo and a blood cx with coag neg staph likely contaminant, awaiting repeat culture results     colitis likely ischemic with renal infarcts due to A-fib, leukocytosis likely reactive  coag neg staph in blood cx likely contaminant    c/w zosyn 3.375 q 8 for now  decrease vanco to 1 g q d, for now till the blood culture comes back  f/u the repeat cultures

## 2017-10-21 NOTE — PROGRESS NOTE ADULT - SUBJECTIVE AND OBJECTIVE BOX
Follow Up:  leukocytosis colitis     Interval History: 72 f with h/o Paroxismal A-fib was admitted for abd pain and nausea was found to have colitis and also b/l renal infarcts vs pyelo with leukocytosis. She was started on vanco and zosyn but the colitis and renal findings appear to be due to embolic events and A-fib. She has no fever or chills, no dysuria or CVAT with still diarrhea ( with blood)  had negative echo and a blood cx with coag neg staph likely contaminant, awaiting repeat culture results    ROS:      All other systems negative    Constitutional: no fever, no chills  HEENT:  no vision changes, no sore throat, no rhinorrhea  Cardiovascular:  no chest pain, no palpitation  Respiratory:  no SOB, no cough  GI:  +_dirrhea with occasional blood, no abd pain, no vomiting  urinary: no dysuria, no hematuria, no flank pain  musculoskeletal:  no joint pain, no joint swelling  skin:  no rash  neurology:  no headache, no seizure, no change in mental status      Allergies  Avelox (Pruritus)  Levaquin (Unknown)        ANTIMICROBIALS:  piperacillin/tazobactam IVPB. 3.375 every 8 hours  vancomycin  IVPB 1000 every 12 hours      OTHER MEDS:  acetaminophen 300 mG/codeine 30 mG 1 Tablet(s) Oral every 4 hours PRN  aspirin enteric coated 81 milliGRAM(s) Oral daily  buDESOnide 160 MICROgram(s)/formoterol 4.5 MICROgram(s) Inhaler 2 Puff(s) Inhalation two times a day  dextrose 5%. 1000 milliLiter(s) IV Continuous <Continuous>  dextrose 50% Injectable 12.5 Gram(s) IV Push once  dextrose 50% Injectable 25 Gram(s) IV Push once  dextrose 50% Injectable 25 Gram(s) IV Push once  dextrose Gel 1 Dose(s) Oral once PRN  diltiazem    milliGRAM(s) Oral daily  enoxaparin Injectable 50 milliGRAM(s) SubCutaneous two times a day  famotidine    Tablet 20 milliGRAM(s) Oral two times a day  glucagon  Injectable 1 milliGRAM(s) IntraMuscular once PRN  insulin glargine Injectable (LANTUS) 5 Unit(s) SubCutaneous at bedtime  insulin lispro (HumaLOG) corrective regimen sliding scale   SubCutaneous three times a day before meals  insulin lispro (HumaLOG) corrective regimen sliding scale   SubCutaneous at bedtime  insulin lispro Injectable (HumaLOG) 2 Unit(s) SubCutaneous three times a day before meals  lisinopril 5 milliGRAM(s) Oral daily  pantoprazole    Tablet 40 milliGRAM(s) Oral before breakfast  potassium chloride    Tablet ER 40 milliEquivalent(s) Oral every 4 hours  predniSONE   Tablet 5 milliGRAM(s) Oral daily  simvastatin 20 milliGRAM(s) Oral at bedtime  sodium chloride 0.9% 1000 milliLiter(s) IV Continuous <Continuous>      Vital Signs Last 24 Hrs  T(C): 36.6 (21 Oct 2017 11:56), Max: 37.2 (20 Oct 2017 20:51)  T(F): 97.9 (21 Oct 2017 11:56), Max: 98.9 (20 Oct 2017 20:51)  HR: 80 (21 Oct 2017 11:56) (72 - 95)  BP: 122/69 (21 Oct 2017 11:56) (112/72 - 153/70)  BP(mean): --  RR: 18 (21 Oct 2017 11:56) (18 - 20)  SpO2: 100% (21 Oct 2017 11:56) (95% - 100%)    Physical Exam:  General:    NAD,  non toxic  HEENT:    no oropharyngeal lesions,   no LAD,   neck supple  Cardiovascular:     irregular S1, S2,  no murmur  Respiratory:    clear b/l,    no wheezing  abd:     soft,   BS +,   no tenderness,    no organomegaly  :   no CVAT,  no suprapubic tenderness,   no  andrade  Musculoskeletal:   no joint swelling,   no edema  Skin:    no rash  Neurologic:     no focal deficit                          12.3   12.05 )-----------( 120      ( 21 Oct 2017 08:48 )             35.6       10-21    135  |  103  |  5<L>  ----------------------------<  130<H>  3.1<L>   |  20<L>  |  0.80    Ca    7.9<L>      21 Oct 2017 09:04  Phos  2.2     10-20  Mg     2.1     10-20        Urinalysis Basic - ( 19 Oct 2017 17:17 )    Color: PL Yellow / Appearance: Clear / S.014 / pH: x  Gluc: x / Ketone: Large  / Bili: Negative / Urobili: Negative   Blood: x / Protein: 30 mg/dL / Nitrite: Negative   Leuk Esterase: Trace / RBC: 2-5 /HPF / WBC 2-5 /HPF   Sq Epi: x / Non Sq Epi: Occasional /HPF / Bacteria: Few /HPF        MICROBIOLOGY:  v  .Blood Blood  10-18-17   Growth in aerobic bottle: Coag Negative Staphylococcus  Single set isolate, possible contaminant. Contact  Microbiology if susceptibility testing clinically  indicated.  ***Blood Panel PCR results on this specimen are available  approximately 3 hours after the Gram stain result.***  Gram stain, PCR, and/or culture results may not always  correspond due to difference in methodologies.  ************************************************************  This PCR assay was performed using [employer].  The following targets are tested for: Enterococcus,  vancomycin resistant enterococci, Listeria monocytogenes,  coagulase negative staphylococci, S. aureus,  methicillin resistant S. aureus, Streptococcus agalactiae  (Group B), S. pneumoniae, S.pyogenes (Group A),  Acinetobacter baumannii, Enterobacter cloacae, E. coli,  Klebsiella oxytoca, K. pneumoniae, Proteus sp.,  Serratia marcescens, Haemophilus influenzae,  Neisseria meningitidis, Pseudomonas aeruginosa, Candida  albicans, C. glabrata, C krusei, C parapsilosis,  C. tropicalis and the KPC resistance gene.  --  Blood Culture PCR      .Urine Clean Catch (Midstream)  10-18-17   >100,000 CFU/ml Streptococcus agalactiae (Group B)  Group B streptococci are susceptible to ampicillin,  penicillin and cefazolin, but may be resistant to  erythromycin and clindamycin.  Recommendations for intrapartum prophylaxis for Group B  streptococci are penicillin or ampicillin.  <10,000 CFU/ml Normal Urogenital speedy present  --  --                RADIOLOGY:    < from: CT Abdomen and Pelvis w/ Oral Cont and w/ IV Cont (10.18.17 @ 14:51) >  1.  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 Pyelonephritis is felt to be less   likely.  2.  Mural thickening of the ascending colon, possibly colitis.

## 2017-10-21 NOTE — PROGRESS NOTE ADULT - SUBJECTIVE AND OBJECTIVE BOX
[facility] [phone]    Patient seen and examined; NAD except she is stating she wants her advair prescribed.     MEDICATIONS  (STANDING):  aspirin enteric coated 81 milliGRAM(s) Oral daily  buDESOnide 160 MICROgram(s)/formoterol 4.5 MICROgram(s) Inhaler 2 Puff(s) Inhalation two times a day  dextrose 5%. 1000 milliLiter(s) (50 mL/Hr) IV Continuous <Continuous>  dextrose 50% Injectable 12.5 Gram(s) IV Push once  dextrose 50% Injectable 25 Gram(s) IV Push once  dextrose 50% Injectable 25 Gram(s) IV Push once  diltiazem    milliGRAM(s) Oral daily  enoxaparin Injectable 50 milliGRAM(s) SubCutaneous two times a day  famotidine    Tablet 20 milliGRAM(s) Oral two times a day  insulin glargine Injectable (LANTUS) 5 Unit(s) SubCutaneous at bedtime  insulin lispro (HumaLOG) corrective regimen sliding scale   SubCutaneous three times a day before meals  insulin lispro (HumaLOG) corrective regimen sliding scale   SubCutaneous at bedtime  insulin lispro Injectable (HumaLOG) 2 Unit(s) SubCutaneous three times a day before meals  lisinopril 5 milliGRAM(s) Oral daily  pantoprazole    Tablet 40 milliGRAM(s) Oral before breakfast  piperacillin/tazobactam IVPB. 3.375 Gram(s) IV Intermittent every 8 hours  potassium chloride    Tablet ER 40 milliEquivalent(s) Oral every 4 hours  predniSONE   Tablet 5 milliGRAM(s) Oral daily  simvastatin 20 milliGRAM(s) Oral at bedtime  sodium chloride 0.9% 1000 milliLiter(s) (75 mL/Hr) IV Continuous <Continuous>  vancomycin  IVPB 1000 milliGRAM(s) IV Intermittent every 12 hours    VITAL:  T(C): , Max: 37.2 (10-20-17 @ 20:51)  T(F): , Max: 98.9 (10-20-17 @ 20:51)  HR: 80 (10-21-17 @ 11:56)  BP: 122/69 (10-21-17 @ 11:56)  BP(mean): --  RR: 18 (10-21-17 @ 11:56)  SpO2: 100% (10-21-17 @ 11:56)  Wt(kg): --  I and O's:    10-20 @ 07:  -  10-21 @ 07:00  --------------------------------------------------------  IN: 3810 mL / OUT: 950 mL / NET: 2860 mL    10-21 @ 07:  -  10-21 @ 13:38  --------------------------------------------------------  IN: 720 mL / OUT: 0 mL / NET: 720 mL    PHYSICAL EXAM:  Constitutional: NAD  Respiratory: diminished BS, R pleurex  Cardiovascular: S1 and S2  Gastrointestinal: BS+, soft, NT/ND  Extremities: + edema  Neurological: AAO  : No Morales  Access: Not applicable    LABS:                        12.3   12.05 )-----------( 120      ( 21 Oct 2017 08:48 )             35.6     10-21    135  |  103  |  5<L>  ----------------------------<  130<H>  3.1<L>   |  20<L>  |  0.80    Ca    7.9<L>      21 Oct 2017 09:04  Phos  2.2     10-20  Mg     2.1     10-20    Urine Studies:  Urinalysis Basic - ( 19 Oct 2017 17:17 )    Color: PL Yellow / Appearance: Clear / S.014 / pH: x  Gluc: x / Ketone: Large  / Bili: Negative / Urobili: Negative   Blood: x / Protein: 30 mg/dL / Nitrite: Negative   Leuk Esterase: Trace / RBC: 2-5 /HPF / WBC 2-5 /HPF   Sq Epi: x / Non Sq Epi: Occasional /HPF / Bacteria: Few /HPF [facility] [phone]    Patient seen and examined; NAD except she is stating she wants her advair prescribed.     MEDICATIONS  (STANDING):  aspirin enteric coated 81 milliGRAM(s) Oral daily  buDESOnide 160 MICROgram(s)/formoterol 4.5 MICROgram(s) Inhaler 2 Puff(s) Inhalation two times a day  dextrose 5%. 1000 milliLiter(s) (50 mL/Hr) IV Continuous <Continuous>  dextrose 50% Injectable 12.5 Gram(s) IV Push once  dextrose 50% Injectable 25 Gram(s) IV Push once  dextrose 50% Injectable 25 Gram(s) IV Push once  diltiazem    milliGRAM(s) Oral daily  enoxaparin Injectable 50 milliGRAM(s) SubCutaneous two times a day  famotidine    Tablet 20 milliGRAM(s) Oral two times a day  insulin glargine Injectable (LANTUS) 5 Unit(s) SubCutaneous at bedtime  insulin lispro (HumaLOG) corrective regimen sliding scale   SubCutaneous three times a day before meals  insulin lispro (HumaLOG) corrective regimen sliding scale   SubCutaneous at bedtime  insulin lispro Injectable (HumaLOG) 2 Unit(s) SubCutaneous three times a day before meals  lisinopril 5 milliGRAM(s) Oral daily  pantoprazole    Tablet 40 milliGRAM(s) Oral before breakfast  piperacillin/tazobactam IVPB. 3.375 Gram(s) IV Intermittent every 8 hours  potassium chloride    Tablet ER 40 milliEquivalent(s) Oral every 4 hours  predniSONE   Tablet 5 milliGRAM(s) Oral daily  simvastatin 20 milliGRAM(s) Oral at bedtime  sodium chloride 0.9% 1000 milliLiter(s) (75 mL/Hr) IV Continuous <Continuous>  vancomycin  IVPB 1000 milliGRAM(s) IV Intermittent every 12 hours    VITAL:  T(C): , Max: 37.2 (10-20-17 @ 20:51)  T(F): , Max: 98.9 (10-20-17 @ 20:51)  HR: 80 (10-21-17 @ 11:56)  BP: 122/69 (10-21-17 @ 11:56)  BP(mean): --  RR: 18 (10-21-17 @ 11:56)  SpO2: 100% (10-21-17 @ 11:56)  Wt(kg): --  I and O's:    10-20 @ 07:  -  10-21 @ 07:00  --------------------------------------------------------  IN: 3810 mL / OUT: 950 mL / NET: 2860 mL    10-21 @ 07:01  -  10-21 @ 13:38  --------------------------------------------------------  IN: 720 mL / OUT: 0 mL / NET: 720 mL    PHYSICAL EXAM:  Constitutional: NAD  Respiratory: diminished BS  Cardiovascular: S1 and S2  Gastrointestinal: BS+, soft, NT/ND  Extremities: + edema  Neurological: AAO  : No Morales  Access: Not applicable    LABS:                        12.3   12.05 )-----------( 120      ( 21 Oct 2017 08:48 )             35.6     10-21    135  |  103  |  5<L>  ----------------------------<  130<H>  3.1<L>   |  20<L>  |  0.80    Ca    7.9<L>      21 Oct 2017 09:04  Phos  2.2     10-20  Mg     2.1     10-20    Urine Studies:  Urinalysis Basic - ( 19 Oct 2017 17:17 )    Color: PL Yellow / Appearance: Clear / S.014 / pH: x  Gluc: x / Ketone: Large  / Bili: Negative / Urobili: Negative   Blood: x / Protein: 30 mg/dL / Nitrite: Negative   Leuk Esterase: Trace / RBC: 2-5 /HPF / WBC 2-5 /HPF   Sq Epi: x / Non Sq Epi: Occasional /HPF / Bacteria: Few /HPF

## 2017-10-22 LAB
ANION GAP SERPL CALC-SCNC: 10 MMOL/L — SIGNIFICANT CHANGE UP (ref 5–17)
BUN SERPL-MCNC: 5 MG/DL — LOW (ref 7–23)
CALCIUM SERPL-MCNC: 8.4 MG/DL — SIGNIFICANT CHANGE UP (ref 8.4–10.5)
CHLORIDE SERPL-SCNC: 106 MMOL/L — SIGNIFICANT CHANGE UP (ref 96–108)
CO2 SERPL-SCNC: 21 MMOL/L — LOW (ref 22–31)
CREAT SERPL-MCNC: 0.91 MG/DL — SIGNIFICANT CHANGE UP (ref 0.5–1.3)
GLUCOSE BLDC GLUCOMTR-MCNC: 118 MG/DL — HIGH (ref 70–99)
GLUCOSE BLDC GLUCOMTR-MCNC: 144 MG/DL — HIGH (ref 70–99)
GLUCOSE BLDC GLUCOMTR-MCNC: 181 MG/DL — HIGH (ref 70–99)
GLUCOSE BLDC GLUCOMTR-MCNC: 189 MG/DL — HIGH (ref 70–99)
GLUCOSE SERPL-MCNC: 145 MG/DL — HIGH (ref 70–99)
HCT VFR BLD CALC: 34.5 % — SIGNIFICANT CHANGE UP (ref 34.5–45)
HGB BLD-MCNC: 11.5 G/DL — SIGNIFICANT CHANGE UP (ref 11.5–15.5)
MAGNESIUM SERPL-MCNC: 1.7 MG/DL — SIGNIFICANT CHANGE UP (ref 1.6–2.6)
MCHC RBC-ENTMCNC: 26.7 PG — LOW (ref 27–34)
MCHC RBC-ENTMCNC: 33.3 GM/DL — SIGNIFICANT CHANGE UP (ref 32–36)
MCV RBC AUTO: 80 FL — SIGNIFICANT CHANGE UP (ref 80–100)
PLATELET # BLD AUTO: 142 K/UL — LOW (ref 150–400)
POTASSIUM SERPL-MCNC: 4.3 MMOL/L — SIGNIFICANT CHANGE UP (ref 3.5–5.3)
POTASSIUM SERPL-SCNC: 4.3 MMOL/L — SIGNIFICANT CHANGE UP (ref 3.5–5.3)
RBC # BLD: 4.31 M/UL — SIGNIFICANT CHANGE UP (ref 3.8–5.2)
RBC # FLD: 14.3 % — SIGNIFICANT CHANGE UP (ref 10.3–14.5)
SODIUM SERPL-SCNC: 137 MMOL/L — SIGNIFICANT CHANGE UP (ref 135–145)
WBC # BLD: 10.05 K/UL — SIGNIFICANT CHANGE UP (ref 3.8–10.5)
WBC # FLD AUTO: 10.05 K/UL — SIGNIFICANT CHANGE UP (ref 3.8–10.5)

## 2017-10-22 PROCEDURE — 71010: CPT | Mod: 26

## 2017-10-22 RX ORDER — INFLUENZA VIRUS VACCINE 15; 15; 15; 15 UG/.5ML; UG/.5ML; UG/.5ML; UG/.5ML
0.5 SUSPENSION INTRAMUSCULAR ONCE
Qty: 0 | Refills: 0 | Status: DISCONTINUED | OUTPATIENT
Start: 2017-10-22 | End: 2017-10-24

## 2017-10-22 RX ORDER — MAGNESIUM OXIDE 400 MG ORAL TABLET 241.3 MG
400 TABLET ORAL DAILY
Qty: 0 | Refills: 0 | Status: COMPLETED | OUTPATIENT
Start: 2017-10-22 | End: 2017-10-24

## 2017-10-22 RX ADMIN — Medication 2 UNIT(S): at 07:45

## 2017-10-22 RX ADMIN — Medication 1: at 17:01

## 2017-10-22 RX ADMIN — Medication 2 UNIT(S): at 17:01

## 2017-10-22 RX ADMIN — ENOXAPARIN SODIUM 50 MILLIGRAM(S): 100 INJECTION SUBCUTANEOUS at 17:00

## 2017-10-22 RX ADMIN — PIPERACILLIN AND TAZOBACTAM 25 GRAM(S): 4; .5 INJECTION, POWDER, LYOPHILIZED, FOR SOLUTION INTRAVENOUS at 11:43

## 2017-10-22 RX ADMIN — Medication 5 MILLIGRAM(S): at 06:32

## 2017-10-22 RX ADMIN — SODIUM CHLORIDE 75 MILLILITER(S): 9 INJECTION, SOLUTION INTRAVENOUS at 00:26

## 2017-10-22 RX ADMIN — PIPERACILLIN AND TAZOBACTAM 25 GRAM(S): 4; .5 INJECTION, POWDER, LYOPHILIZED, FOR SOLUTION INTRAVENOUS at 21:05

## 2017-10-22 RX ADMIN — Medication 240 MILLIGRAM(S): at 06:31

## 2017-10-22 RX ADMIN — PANTOPRAZOLE SODIUM 40 MILLIGRAM(S): 20 TABLET, DELAYED RELEASE ORAL at 06:31

## 2017-10-22 RX ADMIN — MAGNESIUM OXIDE 400 MG ORAL TABLET 400 MILLIGRAM(S): 241.3 TABLET ORAL at 17:00

## 2017-10-22 RX ADMIN — INSULIN GLARGINE 5 UNIT(S): 100 INJECTION, SOLUTION SUBCUTANEOUS at 21:40

## 2017-10-22 RX ADMIN — FAMOTIDINE 20 MILLIGRAM(S): 10 INJECTION INTRAVENOUS at 17:00

## 2017-10-22 RX ADMIN — Medication 81 MILLIGRAM(S): at 11:44

## 2017-10-22 RX ADMIN — Medication 250 MILLIGRAM(S): at 06:46

## 2017-10-22 RX ADMIN — Medication 250 MILLIGRAM(S): at 17:04

## 2017-10-22 RX ADMIN — Medication 2 UNIT(S): at 11:52

## 2017-10-22 RX ADMIN — BUDESONIDE AND FORMOTEROL FUMARATE DIHYDRATE 2 PUFF(S): 160; 4.5 AEROSOL RESPIRATORY (INHALATION) at 17:00

## 2017-10-22 RX ADMIN — SIMVASTATIN 20 MILLIGRAM(S): 20 TABLET, FILM COATED ORAL at 21:05

## 2017-10-22 RX ADMIN — LISINOPRIL 5 MILLIGRAM(S): 2.5 TABLET ORAL at 06:32

## 2017-10-22 RX ADMIN — PIPERACILLIN AND TAZOBACTAM 25 GRAM(S): 4; .5 INJECTION, POWDER, LYOPHILIZED, FOR SOLUTION INTRAVENOUS at 04:07

## 2017-10-22 RX ADMIN — Medication 100 MILLIGRAM(S): at 04:31

## 2017-10-22 RX ADMIN — BUDESONIDE AND FORMOTEROL FUMARATE DIHYDRATE 2 PUFF(S): 160; 4.5 AEROSOL RESPIRATORY (INHALATION) at 06:32

## 2017-10-22 RX ADMIN — ENOXAPARIN SODIUM 50 MILLIGRAM(S): 100 INJECTION SUBCUTANEOUS at 06:32

## 2017-10-22 RX ADMIN — FAMOTIDINE 20 MILLIGRAM(S): 10 INJECTION INTRAVENOUS at 06:31

## 2017-10-22 NOTE — PROGRESS NOTE ADULT - ASSESSMENT
IMPRESSION: 72F w/ HTN, DM2, and AFib, 10/18/17 a/w abdominal pain/nausea, c/b appreciation of SHANE, hypokalemia, colitis, and renal infarcts  (1)Hypokalemia - whole body deficit; improved  (2)Hyponatremia - hypovolemic/poor osmotic intake-associated; acceptable for now. On mildly hypertonic IVF. improved  (3)Acid-base  - metabolic acidosis with increased anion gap - ketoacidosis - improved  (4)SHANE - fluctuating creatinine based on volume status - appears that baseline creatinine is 0.4-0.5mg/dL. Renal fxn stable  (5)Renal infarcts -  Likely afib-associated. No vegetation seen on LEANNA.  (6)HTN - now on Lisinopril and Cardizem. Acceptable for now.    RECOMMEND:  (1)Anticoagulation per primary team  (2)A/W d/c IVF, although likely not vol overload as cxray appears clear  (3)Antihypertensives as ordered  (4)BMP daily  (5)Encourage PO intake  (6)avoid NSAIDs and nephrotoxins as able  (7)give mag ox 400 mg po daily x 3 days     Daughter updated at bedside    Cecy Samayoa NP  [facility]  [phone]

## 2017-10-22 NOTE — PROGRESS NOTE ADULT - SUBJECTIVE AND OBJECTIVE BOX
CRUZITO BATES:[id number],   72yFemale followed for:  Avelox (Pruritus)  Levaquin (Unknown)    PAST MEDICAL & SURGICAL HISTORY:  Dementia  Hypertension  Type 2 diabetes mellitus  Atrial fibrillation, chronic  Diabetes mellitus  Asthma  Diabetes Mellitus  HTN (Hypertension)  Asthma  Afib    FAMILY HISTORY:  No pertinent family history in first degree relatives    MEDICATIONS  (STANDING):  aspirin enteric coated 81 milliGRAM(s) Oral daily  buDESOnide 160 MICROgram(s)/formoterol 4.5 MICROgram(s) Inhaler 2 Puff(s) Inhalation two times a day  dextrose 5%. 1000 milliLiter(s) (50 mL/Hr) IV Continuous <Continuous>  dextrose 50% Injectable 12.5 Gram(s) IV Push once  dextrose 50% Injectable 25 Gram(s) IV Push once  dextrose 50% Injectable 25 Gram(s) IV Push once  diltiazem    milliGRAM(s) Oral daily  enoxaparin Injectable 50 milliGRAM(s) SubCutaneous two times a day  famotidine    Tablet 20 milliGRAM(s) Oral two times a day  insulin glargine Injectable (LANTUS) 5 Unit(s) SubCutaneous at bedtime  insulin lispro (HumaLOG) corrective regimen sliding scale   SubCutaneous three times a day before meals  insulin lispro (HumaLOG) corrective regimen sliding scale   SubCutaneous at bedtime  insulin lispro Injectable (HumaLOG) 2 Unit(s) SubCutaneous three times a day before meals  lisinopril 5 milliGRAM(s) Oral daily  pantoprazole    Tablet 40 milliGRAM(s) Oral before breakfast  piperacillin/tazobactam IVPB. 3.375 Gram(s) IV Intermittent every 8 hours  predniSONE   Tablet 5 milliGRAM(s) Oral daily  simvastatin 20 milliGRAM(s) Oral at bedtime  sodium chloride 0.9% 1000 milliLiter(s) (75 mL/Hr) IV Continuous <Continuous>  vancomycin  IVPB 1000 milliGRAM(s) IV Intermittent every 12 hours    MEDICATIONS  (PRN):  acetaminophen 300 mG/codeine 30 mG 1 Tablet(s) Oral every 4 hours PRN Moderate Pain (4 - 6)  dextrose Gel 1 Dose(s) Oral once PRN Blood Glucose LESS THAN 70 milliGRAM(s)/deciliter  glucagon  Injectable 1 milliGRAM(s) IntraMuscular once PRN Glucose LESS THAN 70 milligrams/deciliter      Vital Signs Last 24 Hrs  T(C): 36.4 (22 Oct 2017 04:41), Max: 36.8 (21 Oct 2017 21:02)  T(F): 97.6 (22 Oct 2017 04:41), Max: 98.3 (21 Oct 2017 21:02)  HR: 87 (22 Oct 2017 04:41) (80 - 98)  BP: 136/75 (22 Oct 2017 04:41) (122/69 - 150/64)  BP(mean): --  RR: 18 (22 Oct 2017 04:41) (18 - 18)  SpO2: 100% (22 Oct 2017 04:41) (99% - 100%)  nc/at  s1s2  cta  soft, nt, nd no guarding or rebound  no c/c/e    CBC Full  -  ( 21 Oct 2017 08:48 )  WBC Count : 12.05 K/uL  Hemoglobin : 12.3 g/dL  Hematocrit : 35.6 %  Platelet Count - Automated : 120 K/uL  Mean Cell Volume : 78.6 fl  Mean Cell Hemoglobin : 27.2 pg  Mean Cell Hemoglobin Concentration : 34.6 gm/dL  Auto Neutrophil # : x  Auto Lymphocyte # : x  Auto Monocyte # : x  Auto Eosinophil # : x  Auto Basophil # : x  Auto Neutrophil % : x  Auto Lymphocyte % : x  Auto Monocyte % : x  Auto Eosinophil % : x  Auto Basophil % : x    10-21    135  |  103  |  5<L>  ----------------------------<  130<H>  3.1<L>   |  20<L>  |  0.80    Ca    7.9<L>      21 Oct 2017 09:04

## 2017-10-22 NOTE — PROGRESS NOTE ADULT - SUBJECTIVE AND OBJECTIVE BOX
[facility] [phone]    Patient seen and examined; she is in NAD    MEDICATIONS  (STANDING):  aspirin enteric coated 81 milliGRAM(s) Oral daily  buDESOnide 160 MICROgram(s)/formoterol 4.5 MICROgram(s) Inhaler 2 Puff(s) Inhalation two times a day  dextrose 5%. 1000 milliLiter(s) (50 mL/Hr) IV Continuous <Continuous>  dextrose 50% Injectable 12.5 Gram(s) IV Push once  dextrose 50% Injectable 25 Gram(s) IV Push once  dextrose 50% Injectable 25 Gram(s) IV Push once  diltiazem    milliGRAM(s) Oral daily  enoxaparin Injectable 50 milliGRAM(s) SubCutaneous two times a day  famotidine    Tablet 20 milliGRAM(s) Oral two times a day  influenza   Vaccine 0.5 milliLiter(s) IntraMuscular once  insulin glargine Injectable (LANTUS) 5 Unit(s) SubCutaneous at bedtime  insulin lispro (HumaLOG) corrective regimen sliding scale   SubCutaneous three times a day before meals  insulin lispro (HumaLOG) corrective regimen sliding scale   SubCutaneous at bedtime  insulin lispro Injectable (HumaLOG) 2 Unit(s) SubCutaneous three times a day before meals  lisinopril 5 milliGRAM(s) Oral daily  magnesium oxide 400 milliGRAM(s) Oral daily  pantoprazole    Tablet 40 milliGRAM(s) Oral before breakfast  piperacillin/tazobactam IVPB. 3.375 Gram(s) IV Intermittent every 8 hours  predniSONE   Tablet 5 milliGRAM(s) Oral daily  simvastatin 20 milliGRAM(s) Oral at bedtime  vancomycin  IVPB 1000 milliGRAM(s) IV Intermittent every 12 hours    VITAL:  T(C): , Max: 36.8 (10-21-17 @ 21:02)  T(F): , Max: 98.3 (10-21-17 @ 21:02)  HR: 101 (10-22-17 @ 11:54)  BP: 154/73 (10-22-17 @ 11:54)  BP(mean): --  RR: 18 (10-22-17 @ 11:54)  SpO2: 97% (10-22-17 @ 11:54)  Wt(kg): --  I and O's:    10-21 @ 07:01  -  10-22 @ 07:00  --------------------------------------------------------  IN: 2320 mL / OUT: 850 mL / NET: 1470 mL    10-22 @ 07:01  -  10-22 @ 13:55  --------------------------------------------------------  IN: 720 mL / OUT: 0 mL / NET: 720 mL      REVIEW OF SYSTEMS:  CONSTITUTIONAL: No fevers or chills  RESPIRATORY: + dyspnea   CARDIOVASCULAR: No CP or palpitations  GASTROINTESTINAL: No abd pain, n/v/d/constipation  GENITOURINARY: No dysuria, frequency or hematuria  NEUROLOGICAL: No numbness or weakness  All other review of systems is negative unless indicated above.    PHYSICAL EXAM:  Constitutional: NAD  Respiratory: CTA B/L  Cardiovascular: S1 and S2  Gastrointestinal: BS+, soft, NT/ND  Extremities: + edema  Neurological: AAO   : No Morales  Access: Not applicable    LABS:                        11.5   10.05 )-----------( 142      ( 22 Oct 2017 08:55 )             34.5     10-22    137  |  106  |  5<L>  ----------------------------<  145<H>  4.3   |  21<L>  |  0.91    Ca    8.4      22 Oct 2017 08:53  Mg     1.7     10-22

## 2017-10-22 NOTE — PROGRESS NOTE ADULT - SUBJECTIVE AND OBJECTIVE BOX
INTERVAL HPI/OVERNIGHT EVENTS: daughter in room . I feel weak and sob otherwise fine.   Vital Signs Last 24 Hrs  T(C): 36.7 (22 Oct 2017 11:54), Max: 36.8 (21 Oct 2017 21:02)  T(F): 98 (22 Oct 2017 11:54), Max: 98.3 (21 Oct 2017 21:02)  HR: 101 (22 Oct 2017 11:54) (87 - 101)  BP: 154/73 (22 Oct 2017 11:54) (136/75 - 154/73)  BP(mean): --  RR: 18 (22 Oct 2017 11:54) (18 - 18)  SpO2: 97% (22 Oct 2017 11:54) (97% - 100%)  I&O's Summary    21 Oct 2017 07:01  -  22 Oct 2017 07:00  --------------------------------------------------------  IN: 2320 mL / OUT: 850 mL / NET: 1470 mL    22 Oct 2017 07:01  -  22 Oct 2017 13:11  --------------------------------------------------------  IN: 720 mL / OUT: 0 mL / NET: 720 mL      MEDICATIONS  (STANDING):  aspirin enteric coated 81 milliGRAM(s) Oral daily  buDESOnide 160 MICROgram(s)/formoterol 4.5 MICROgram(s) Inhaler 2 Puff(s) Inhalation two times a day  dextrose 5%. 1000 milliLiter(s) (50 mL/Hr) IV Continuous <Continuous>  dextrose 50% Injectable 12.5 Gram(s) IV Push once  dextrose 50% Injectable 25 Gram(s) IV Push once  dextrose 50% Injectable 25 Gram(s) IV Push once  diltiazem    milliGRAM(s) Oral daily  enoxaparin Injectable 50 milliGRAM(s) SubCutaneous two times a day  famotidine    Tablet 20 milliGRAM(s) Oral two times a day  influenza   Vaccine 0.5 milliLiter(s) IntraMuscular once  insulin glargine Injectable (LANTUS) 5 Unit(s) SubCutaneous at bedtime  insulin lispro (HumaLOG) corrective regimen sliding scale   SubCutaneous three times a day before meals  insulin lispro (HumaLOG) corrective regimen sliding scale   SubCutaneous at bedtime  insulin lispro Injectable (HumaLOG) 2 Unit(s) SubCutaneous three times a day before meals  lisinopril 5 milliGRAM(s) Oral daily  magnesium oxide 400 milliGRAM(s) Oral daily  pantoprazole    Tablet 40 milliGRAM(s) Oral before breakfast  piperacillin/tazobactam IVPB. 3.375 Gram(s) IV Intermittent every 8 hours  predniSONE   Tablet 5 milliGRAM(s) Oral daily  simvastatin 20 milliGRAM(s) Oral at bedtime  vancomycin  IVPB 1000 milliGRAM(s) IV Intermittent every 12 hours    MEDICATIONS  (PRN):  acetaminophen 300 mG/codeine 30 mG 1 Tablet(s) Oral every 4 hours PRN Moderate Pain (4 - 6)  dextrose Gel 1 Dose(s) Oral once PRN Blood Glucose LESS THAN 70 milliGRAM(s)/deciliter  glucagon  Injectable 1 milliGRAM(s) IntraMuscular once PRN Glucose LESS THAN 70 milligrams/deciliter    LABS:                        11.5   10.05 )-----------( 142      ( 22 Oct 2017 08:55 )             34.5     10-22    137  |  106  |  5<L>  ----------------------------<  145<H>  4.3   |  21<L>  |  0.91    Ca    8.4      22 Oct 2017 08:53  Mg     1.7     10-22          CAPILLARY BLOOD GLUCOSE      POCT Blood Glucose.: 144 mg/dL (22 Oct 2017 11:22)  POCT Blood Glucose.: 118 mg/dL (22 Oct 2017 07:21)  POCT Blood Glucose.: 223 mg/dL (21 Oct 2017 21:14)  POCT Blood Glucose.: 172 mg/dL (21 Oct 2017 15:54)          REVIEW OF SYSTEMS:  CONSTITUTIONAL: No fever, weight loss, or fatigue  EYES: No eye pain, visual disturbances, or discharge  ENMT:  No difficulty hearing, tinnitus, vertigo; No sinus or throat pain  NECK: No pain or stiffness  BREASTS: No pain, masses, or nipple discharge  RESPIRATORY: No cough, wheezing, chills or hemoptysis; No shortness of breath  CARDIOVASCULAR: No chest pain, palpitations, dizziness, or leg swelling  GASTROINTESTINAL: No abdominal or epigastric pain. No nausea, vomiting, or hematemesis; No diarrhea or constipation. No melena or hematochezia.  GENITOURINARY: No dysuria, frequency, hematuria, or incontinence  NEUROLOGICAL: No headaches, memory loss, loss of strength, numbness, or tremors  SKIN: No itching, burning, rashes, or lesions   LYMPH NODES: No enlarged glands  ENDOCRINE: No heat or cold intolerance; No hair loss  MUSCULOSKELETAL: No joint pain or swelling; No muscle, back, or extremity pain  PSYCHIATRIC: No depression, anxiety, mood swings, or difficulty sleeping  HEME/LYMPH: No easy bruising, or bleeding gums  ALLERY AND IMMUNOLOGIC: No hives or eczema    RADIOLOGY & ADDITIONAL TESTS:    Consultant(s) Notes Reviewed:  [x ] YES  [ ] NO    PHYSICAL EXAM:  GENERAL: NAD, well-groomed, well-developed, not in any distress ,  HEAD:  Atraumatic, Normocephalic  EYES: EOMI, PERRLA, conjunctiva and sclera clear  ENMT: No tonsillar erythema, exudates, or enlargement; Moist mucous membranes, Good dentition, No lesions  NECK: Supple, No JVD, Normal thyroid  NERVOUS SYSTEM:  Alert & Oriented X3, No focal deficit   CHEST/LUNG: Good air entry bilateral with few rales, rhonchi,   HEART: Regular rate and rhythm; No murmurs, rubs, or gallops  ABDOMEN: Soft, Nontender, Nondistended; Bowel sounds present  EXTREMITIES:  2+ Peripheral Pulses, No clubbing, cyanosis, or edema  SKIN: No rashes or lesions    Care Discussed with Consultants/Other Providers [ x] YES  [ ] NO

## 2017-10-22 NOTE — PROGRESS NOTE ADULT - ASSESSMENT
72y F hx of HTN, DM, Asthma ,mild dementia, AFib here with  nausea since Saturday. Today she noticed abdominal pain.  Last BM 2 days ago. Passing flatus. No fever, chills, cp, sob, urinary sx. She stopped anticoagulation few years back     Problem/Plan - 1:  ·  Problem: Renal infarct.  Plan: Exact cause not know but has H/O PAF so possibly Embolic infarcts. On AC Lovenox . Will change to PO Eliquis on DC .      Problem/Plan - 2:  ·  Problem: Colitis with Leucocytosis with Bacteremia .  Plan: IV ABXS . GI and  ID helping. Blood Cultures positive for G+ cocci .Repeat blood cultures negative so far.      Problem/Plan - 3:  ·  Problem: Electrolyte abnormality.  Plan: Corrected .  Renal following.      Problem/Plan - 4:  ·  Problem: R/O Atrial fibrillation, chronic.  Plan: S/P LEANNA < from: Transesophageal Echocardiogram (10.19.17 @ 20:38) >  ------------------------------------------------------------------------  Conclusions:  1. Aortic Root: 2.6 cm.  Moderate calcific atheroma noted in aortic arch/descending  aorta.  2. Normal left atrium.  LA volume index = 16 cc/m2.  Lipomatous hypertrophy of interatrial septum.   No left  atrial or left atrial appendage thrombus.   Normal left  atrial appendage function (velocity= 0.9m/s).  3. Increased relative wall thickness with normal left  ventricular mass index, consistent with concentric left  ventricular remodeling.  4. Hyperdynamic left ventricle.  5. Mild diastolicdysfunction (Stage I).  *** Compared with echocardiogram of 6/14/2012, no  significant changes noted.    < end of copied text >   . On Lovenox . Electrolytes normal and Free T4 little high but endo helping .  Will Dc on Eliquis.      Problem/Plan - 5:  ·  Problem: Type 2 diabetes mellitus .  Plan: Holding PO meds and only SSI for now as not eating . Endo helping.       Problem/Plan - 6:  Problem: Anion Gap Metabolic Acidosis with DM : Plan: Anion Gap corrected.        Problem/Plan - 7:  ·  Problem: Diarrhea ; .  Plan: C diff negative.        Problem/Plan - 8:  ·  Problem: Hypertension.  Plan: BP meds with parameters.   Problem/Plan - 8:  . problem : Dyspnea; ? fluid Overload so IVF dcd . X ray chest pending.

## 2017-10-23 LAB
ANION GAP SERPL CALC-SCNC: 12 MMOL/L — SIGNIFICANT CHANGE UP (ref 5–17)
BUN SERPL-MCNC: 6 MG/DL — LOW (ref 7–23)
CALCIUM SERPL-MCNC: 9.2 MG/DL — SIGNIFICANT CHANGE UP (ref 8.4–10.5)
CHLORIDE SERPL-SCNC: 104 MMOL/L — SIGNIFICANT CHANGE UP (ref 96–108)
CO2 SERPL-SCNC: 25 MMOL/L — SIGNIFICANT CHANGE UP (ref 22–31)
CREAT SERPL-MCNC: 0.94 MG/DL — SIGNIFICANT CHANGE UP (ref 0.5–1.3)
CULTURE RESULTS: SIGNIFICANT CHANGE UP
GLUCOSE BLDC GLUCOMTR-MCNC: 103 MG/DL — HIGH (ref 70–99)
GLUCOSE BLDC GLUCOMTR-MCNC: 150 MG/DL — HIGH (ref 70–99)
GLUCOSE BLDC GLUCOMTR-MCNC: 186 MG/DL — HIGH (ref 70–99)
GLUCOSE BLDC GLUCOMTR-MCNC: 222 MG/DL — HIGH (ref 70–99)
GLUCOSE SERPL-MCNC: 135 MG/DL — HIGH (ref 70–99)
HCT VFR BLD CALC: 36.9 % — SIGNIFICANT CHANGE UP (ref 34.5–45)
HGB BLD-MCNC: 12.6 G/DL — SIGNIFICANT CHANGE UP (ref 11.5–15.5)
MAGNESIUM SERPL-MCNC: 1.8 MG/DL — SIGNIFICANT CHANGE UP (ref 1.6–2.6)
MCHC RBC-ENTMCNC: 27.2 PG — SIGNIFICANT CHANGE UP (ref 27–34)
MCHC RBC-ENTMCNC: 34.1 GM/DL — SIGNIFICANT CHANGE UP (ref 32–36)
MCV RBC AUTO: 79.7 FL — LOW (ref 80–100)
PLATELET # BLD AUTO: 152 K/UL — SIGNIFICANT CHANGE UP (ref 150–400)
POTASSIUM SERPL-MCNC: 3.5 MMOL/L — SIGNIFICANT CHANGE UP (ref 3.5–5.3)
POTASSIUM SERPL-SCNC: 3.5 MMOL/L — SIGNIFICANT CHANGE UP (ref 3.5–5.3)
RBC # BLD: 4.63 M/UL — SIGNIFICANT CHANGE UP (ref 3.8–5.2)
RBC # FLD: 14.6 % — HIGH (ref 10.3–14.5)
SODIUM SERPL-SCNC: 141 MMOL/L — SIGNIFICANT CHANGE UP (ref 135–145)
SPECIMEN SOURCE: SIGNIFICANT CHANGE UP
WBC # BLD: 8.84 K/UL — SIGNIFICANT CHANGE UP (ref 3.8–10.5)
WBC # FLD AUTO: 8.84 K/UL — SIGNIFICANT CHANGE UP (ref 3.8–10.5)

## 2017-10-23 PROCEDURE — 99232 SBSQ HOSP IP/OBS MODERATE 35: CPT | Mod: GC

## 2017-10-23 PROCEDURE — 99232 SBSQ HOSP IP/OBS MODERATE 35: CPT

## 2017-10-23 PROCEDURE — 93010 ELECTROCARDIOGRAM REPORT: CPT

## 2017-10-23 RX ORDER — POTASSIUM CHLORIDE 20 MEQ
40 PACKET (EA) ORAL ONCE
Qty: 0 | Refills: 0 | Status: COMPLETED | OUTPATIENT
Start: 2017-10-23 | End: 2017-10-23

## 2017-10-23 RX ORDER — APIXABAN 2.5 MG/1
2.5 TABLET, FILM COATED ORAL EVERY 12 HOURS
Qty: 0 | Refills: 0 | Status: DISCONTINUED | OUTPATIENT
Start: 2017-10-23 | End: 2017-10-24

## 2017-10-23 RX ADMIN — ENOXAPARIN SODIUM 50 MILLIGRAM(S): 100 INJECTION SUBCUTANEOUS at 05:01

## 2017-10-23 RX ADMIN — Medication 2 UNIT(S): at 18:18

## 2017-10-23 RX ADMIN — Medication 250 MILLIGRAM(S): at 05:01

## 2017-10-23 RX ADMIN — INSULIN GLARGINE 5 UNIT(S): 100 INJECTION, SOLUTION SUBCUTANEOUS at 21:37

## 2017-10-23 RX ADMIN — Medication 81 MILLIGRAM(S): at 13:03

## 2017-10-23 RX ADMIN — FAMOTIDINE 20 MILLIGRAM(S): 10 INJECTION INTRAVENOUS at 05:00

## 2017-10-23 RX ADMIN — Medication 40 MILLIEQUIVALENT(S): at 21:36

## 2017-10-23 RX ADMIN — Medication: at 13:04

## 2017-10-23 RX ADMIN — FAMOTIDINE 20 MILLIGRAM(S): 10 INJECTION INTRAVENOUS at 18:14

## 2017-10-23 RX ADMIN — LISINOPRIL 5 MILLIGRAM(S): 2.5 TABLET ORAL at 05:00

## 2017-10-23 RX ADMIN — PANTOPRAZOLE SODIUM 40 MILLIGRAM(S): 20 TABLET, DELAYED RELEASE ORAL at 05:00

## 2017-10-23 RX ADMIN — Medication 5 MILLIGRAM(S): at 05:01

## 2017-10-23 RX ADMIN — BUDESONIDE AND FORMOTEROL FUMARATE DIHYDRATE 2 PUFF(S): 160; 4.5 AEROSOL RESPIRATORY (INHALATION) at 18:14

## 2017-10-23 RX ADMIN — Medication 240 MILLIGRAM(S): at 05:00

## 2017-10-23 RX ADMIN — Medication 2 UNIT(S): at 13:04

## 2017-10-23 RX ADMIN — Medication 100 MILLIGRAM(S): at 05:00

## 2017-10-23 RX ADMIN — Medication 2 UNIT(S): at 08:43

## 2017-10-23 RX ADMIN — PIPERACILLIN AND TAZOBACTAM 25 GRAM(S): 4; .5 INJECTION, POWDER, LYOPHILIZED, FOR SOLUTION INTRAVENOUS at 13:00

## 2017-10-23 RX ADMIN — PIPERACILLIN AND TAZOBACTAM 25 GRAM(S): 4; .5 INJECTION, POWDER, LYOPHILIZED, FOR SOLUTION INTRAVENOUS at 05:01

## 2017-10-23 RX ADMIN — BUDESONIDE AND FORMOTEROL FUMARATE DIHYDRATE 2 PUFF(S): 160; 4.5 AEROSOL RESPIRATORY (INHALATION) at 05:01

## 2017-10-23 RX ADMIN — ENOXAPARIN SODIUM 50 MILLIGRAM(S): 100 INJECTION SUBCUTANEOUS at 18:14

## 2017-10-23 RX ADMIN — MAGNESIUM OXIDE 400 MG ORAL TABLET 400 MILLIGRAM(S): 241.3 TABLET ORAL at 13:04

## 2017-10-23 RX ADMIN — SIMVASTATIN 20 MILLIGRAM(S): 20 TABLET, FILM COATED ORAL at 21:36

## 2017-10-23 NOTE — PROGRESS NOTE ADULT - SUBJECTIVE AND OBJECTIVE BOX
CRUZITO BATES 72y [id number]    Patient is a 72y old  Female who presents with a chief complaint of Nausea with abdominal pain. (18 Oct 2017 17:14)      Follow Up/CC:  leukocytosis    Interval History/ROS: no complaints    Allergies    Avelox (Pruritus)  Levaquin (Unknown)    Intolerances        ANTIMICROBIALS:  piperacillin/tazobactam IVPB. 3.375 every 8 hours  vancomycin  IVPB 1000 every 12 hours      MEDICATIONS  (STANDING):  aspirin enteric coated 81 milliGRAM(s) Oral daily  buDESOnide 160 MICROgram(s)/formoterol 4.5 MICROgram(s) Inhaler 2 Puff(s) Inhalation two times a day  dextrose 5%. 1000 milliLiter(s) (50 mL/Hr) IV Continuous <Continuous>  dextrose 50% Injectable 12.5 Gram(s) IV Push once  dextrose 50% Injectable 25 Gram(s) IV Push once  dextrose 50% Injectable 25 Gram(s) IV Push once  diltiazem    milliGRAM(s) Oral daily  enoxaparin Injectable 50 milliGRAM(s) SubCutaneous two times a day  famotidine    Tablet 20 milliGRAM(s) Oral two times a day  influenza   Vaccine 0.5 milliLiter(s) IntraMuscular once  insulin glargine Injectable (LANTUS) 5 Unit(s) SubCutaneous at bedtime  insulin lispro (HumaLOG) corrective regimen sliding scale   SubCutaneous three times a day before meals  insulin lispro (HumaLOG) corrective regimen sliding scale   SubCutaneous at bedtime  insulin lispro Injectable (HumaLOG) 2 Unit(s) SubCutaneous three times a day before meals  lisinopril 5 milliGRAM(s) Oral daily  magnesium oxide 400 milliGRAM(s) Oral daily  pantoprazole    Tablet 40 milliGRAM(s) Oral before breakfast  piperacillin/tazobactam IVPB. 3.375 Gram(s) IV Intermittent every 8 hours  predniSONE   Tablet 5 milliGRAM(s) Oral daily  simvastatin 20 milliGRAM(s) Oral at bedtime  vancomycin  IVPB 1000 milliGRAM(s) IV Intermittent every 12 hours    MEDICATIONS  (PRN):  acetaminophen 300 mG/codeine 30 mG 1 Tablet(s) Oral every 4 hours PRN Moderate Pain (4 - 6)  dextrose Gel 1 Dose(s) Oral once PRN Blood Glucose LESS THAN 70 milliGRAM(s)/deciliter  glucagon  Injectable 1 milliGRAM(s) IntraMuscular once PRN Glucose LESS THAN 70 milligrams/deciliter        Vital Signs Last 24 Hrs  T(C): 36.7 (23 Oct 2017 11:57), Max: 36.8 (22 Oct 2017 18:37)  T(F): 98 (23 Oct 2017 11:57), Max: 98.2 (22 Oct 2017 18:37)  HR: 98 (23 Oct 2017 11:57) (98 - 110)  BP: 108/82 (23 Oct 2017 11:57) (108/82 - 154/70)  BP(mean): --  RR: 18 (23 Oct 2017 11:57) (18 - 18)  SpO2: 99% (23 Oct 2017 11:57) (98% - 99%)    CBC Full  -  ( 23 Oct 2017 07:17 )  WBC Count : 8.84 K/uL  Hemoglobin : 12.6 g/dL  Hematocrit : 36.9 %  Platelet Count - Automated : 152 K/uL  Mean Cell Volume : 79.7 fl  Mean Cell Hemoglobin : 27.2 pg  Mean Cell Hemoglobin Concentration : 34.1 gm/dL  Auto Neutrophil # : x  Auto Lymphocyte # : x  Auto Monocyte # : x  Auto Eosinophil # : x  Auto Basophil # : x  Auto Neutrophil % : x  Auto Lymphocyte % : x  Auto Monocyte % : x  Auto Eosinophil % : x  Auto Basophil % : x    10-23    141  |  104  |  6<L>  ----------------------------<  135<H>  3.5   |  25  |  0.94    Ca    9.2      23 Oct 2017 07:31  Mg     1.7     10-22            MICROBIOLOGY:  .Blood Blood-Peripheral  10-21-17   No growth to date.  --  --      .Blood Blood  10-18-17   Growth in aerobic bottle: Coag Negative Staphylococcus  Single set isolate, possible contaminant. Contact  Microbiology if susceptibility testing clinically  indicated.  ***Blood Panel PCR results on this specimen are available  approximately 3 hours after the Gram stain result.***  Gram stain, PCR, and/or culture results may not always  correspond due to difference in methodologies.  ************************************************************  This PCR assay was performed using [employer].  The following targets are tested for: Enterococcus,  vancomycin resistant enterococci, Listeria monocytogenes,  coagulase negative staphylococci, S. aureus,  methicillin resistant S. aureus, Streptococcus agalactiae  (Group B), S. pneumoniae, S.pyogenes (Group A),  Acinetobacter baumannii, Enterobacter cloacae, E. coli,  Klebsiella oxytoca, K. pneumoniae, Proteus sp.,  Serratia marcescens, Haemophilus influenzae,  Neisseria meningitidis, Pseudomonas aeruginosa, Candida  albicans, C. glabrata, C krusei, C parapsilosis,  C. tropicalis and the KPC resistance gene.  --  Blood Culture PCR      .Urine Clean Catch (Midstream)  10-18-17   >100,000 CFU/ml Streptococcus agalactiae (Group B)  Group B streptococci are susceptible to ampicillin,  penicillin and cefazolin, but may be resistant to  erythromycin and clindamycin.  Recommendations for intrapartum prophylaxis for Group B  streptococci are penicillin or ampicillin.  <10,000 CFU/ml Normal Urogenital speedy present  --  --      Clostridium difficile GDH Toxins A&amp;B, EIA:   Negative (10-21-17 @ 13:12)  Clostridium difficile GDH Interpretation: Negative for toxigenic C. Difficile.  This specimen is negative for C.  Difficile glutamate dehydrogenase (GDH) antigen and negative for C.  Difficile Toxins A & B, by EIA.  GDH is a highly sensitive screening  marker for C. Difficile that is produced in large amounts by all C.  Difficile strains, both toxigenic and nontoxigenic.  This assay has not  been validated as a test of cure.  Repeat testing during the same episode  of diarrhea is of limited value and is discouraged.  The results of this  assay should always be interpreted in conjunction with pateint's clinical  history. (10-21-17 @ 13:12)      RADIOLOGY

## 2017-10-23 NOTE — CHART NOTE - NSCHARTNOTEFT_GEN_A_CORE
Nutrition Follow-up for calorie count.   Pt admitted with metabolic acidosis likely starvation ketosis given poor po intake with dehydration- now resolved. Pt with hx HTN, DM and mild dementia   Calorie count started 10/20 for dinner- 10/22. Pt noted with fair-poor po intake in flow sheets, items from outside of hospital on calorie count- unclear details.     Source: Patient [X]    Family [ ]     other [X] Comprehensive review of medical records, PCA     Diet : Consistent Carbohydrate with snack, Glucerna x2/day.       Patient reports nausea has resolved no reports of emesis. Pt reports diarrhea has also resolved. +BMs today     PO intake:  Pt reports improving po intake since admission. Pt wasn't sure if she had Glucerna shake, none documented on calorie count. Per PCA pt did not like vanilla. Pt willing to try chocolate Glucerna and also chocolate diet health shake.      Source for PO intake [x] Patient [ ] family [x] chart [x] staff [X] calorie count      Enteral /Parenteral Nutrition:       Current Weight: Weight (kg): 48.2 (10-18 @ 21:45) 10/18: 106.2 pounds   % Weight Change    Pertinent Medications: MEDICATIONS  (STANDING):  aspirin enteric coated 81 milliGRAM(s) Oral daily  buDESOnide 160 MICROgram(s)/formoterol 4.5 MICROgram(s) Inhaler 2 Puff(s) Inhalation two times a day  dextrose 5%. 1000 milliLiter(s) (50 mL/Hr) IV Continuous <Continuous>  dextrose 50% Injectable 12.5 Gram(s) IV Push once  dextrose 50% Injectable 25 Gram(s) IV Push once  dextrose 50% Injectable 25 Gram(s) IV Push once  diltiazem    milliGRAM(s) Oral daily  enoxaparin Injectable 50 milliGRAM(s) SubCutaneous two times a day  famotidine    Tablet 20 milliGRAM(s) Oral two times a day  influenza   Vaccine 0.5 milliLiter(s) IntraMuscular once  insulin glargine Injectable (LANTUS) 5 Unit(s) SubCutaneous at bedtime  insulin lispro (HumaLOG) corrective regimen sliding scale   SubCutaneous three times a day before meals  insulin lispro (HumaLOG) corrective regimen sliding scale   SubCutaneous at bedtime  insulin lispro Injectable (HumaLOG) 2 Unit(s) SubCutaneous three times a day before meals  lisinopril 5 milliGRAM(s) Oral daily  magnesium oxide 400 milliGRAM(s) Oral daily  pantoprazole    Tablet 40 milliGRAM(s) Oral before breakfast  predniSONE   Tablet 5 milliGRAM(s) Oral daily  simvastatin 20 milliGRAM(s) Oral at bedtime    MEDICATIONS  (PRN):  acetaminophen 300 mG/codeine 30 mG 1 Tablet(s) Oral every 4 hours PRN Moderate Pain (4 - 6)  dextrose Gel 1 Dose(s) Oral once PRN Blood Glucose LESS THAN 70 milliGRAM(s)/deciliter  glucagon  Injectable 1 milliGRAM(s) IntraMuscular once PRN Glucose LESS THAN 70 milligrams/deciliter    Pertinent Labs:  10-23 Na141 mmol/L Glu 135 mg/dL<H> K+ 3.5 mmol/L Cr  0.94 mg/dL BUN 6 mg/dL<L> Phos n/a   Alb n/a   PAB n/a     fingersticks x2/days: 118-189mg/dL     Skin: 1+ right leg, ankle and foot and 2+ left leg, ankle and foot edema.     Estimated Needs:   [x] no change since previous assessment  [ ] recalculated:       Previous Nutrition Diagnosis:      [x] Malnutrition          Nutrition Diagnosis is [X] ongoing  [ ] resolved [ ] not applicable          New Nutrition Diagnosis: [X] not applicable    [ ] Inadequate Protein Energy Intake [ ]Inadequate Oral Intake [ ] Excessive Energy Intake     [ ] Underweight [ ] Increased Nutrient Needs [ ] Overweight/Obesity     [ ] Altered GI Function [ ] Unintended Weight Loss [ ] Food & Nutrition Related Knowledge Deficit[ ] Limited Adherence to nutrition related recommendations [ ] Malnutrition  [ ] other: Free text       Related to:      As evidenced by:      Interventions:     Recommend    [ ] Change Diet To:    [ ] Nutrition Supplement    [ ] Nutrition Support    [ ] Other:        Monitoring and Evaluation:     [ ] PO intake [ ] Tolerance to diet prescription [ ] weights [ ] follow up per protocol    [ ] other: Nutrition Follow-up for calorie count.   Pt admitted with metabolic acidosis likely starvation ketosis given poor po intake with dehydration- now resolved. Pt with hx HTN, DM and mild dementia       Source: Patient [X]    Family [ ]     other [X] Comprehensive review of medical records, PCA     Diet : Consistent Carbohydrate with snack, Glucerna x2/day.       Patient reports nausea has resolved no reports of emesis. Pt reports diarrhea has also resolved. +BMs today     PO intake:  Pt reports improving po intake since admission. Pt wasn't sure if she had Glucerna shake, none documented on calorie count. Per PCA pt did not like vanilla. Pt willing to try chocolate Glucerna and also chocolate diet health shake. Per PCA pt with variable po intake. Calorie count started 10/20 for dinner through 10/22. Pt noted with fair-poor po intake in flow sheets, items from outside of hospital on calorie count- unclear details, unable to accurately calculate calories and protein. Pt diagnosed with severe Malnutrition, no plan for enteral nutrition at this time. Repeat calorie count deemed unnecessary at this time.      Source for PO intake [x] Patient [ ] family [x] chart [x] staff [X] calorie count      Enteral /Parenteral Nutrition: n/a       Current Weight: Weight (kg): 48.2 (10-18 @ 21:45) 10/18: 106.2 pounds   % Weight Change    Pertinent Medications: MEDICATIONS  (STANDING):  aspirin enteric coated 81 milliGRAM(s) Oral daily  buDESOnide 160 MICROgram(s)/formoterol 4.5 MICROgram(s) Inhaler 2 Puff(s) Inhalation two times a day  dextrose 5%. 1000 milliLiter(s) (50 mL/Hr) IV Continuous <Continuous>  dextrose 50% Injectable 12.5 Gram(s) IV Push once  dextrose 50% Injectable 25 Gram(s) IV Push once  dextrose 50% Injectable 25 Gram(s) IV Push once  diltiazem    milliGRAM(s) Oral daily  enoxaparin Injectable 50 milliGRAM(s) SubCutaneous two times a day  famotidine    Tablet 20 milliGRAM(s) Oral two times a day  influenza   Vaccine 0.5 milliLiter(s) IntraMuscular once  insulin glargine Injectable (LANTUS) 5 Unit(s) SubCutaneous at bedtime  insulin lispro (HumaLOG) corrective regimen sliding scale   SubCutaneous three times a day before meals  insulin lispro (HumaLOG) corrective regimen sliding scale   SubCutaneous at bedtime  insulin lispro Injectable (HumaLOG) 2 Unit(s) SubCutaneous three times a day before meals  lisinopril 5 milliGRAM(s) Oral daily  magnesium oxide 400 milliGRAM(s) Oral daily  pantoprazole    Tablet 40 milliGRAM(s) Oral before breakfast  predniSONE   Tablet 5 milliGRAM(s) Oral daily  simvastatin 20 milliGRAM(s) Oral at bedtime    MEDICATIONS  (PRN):  acetaminophen 300 mG/codeine 30 mG 1 Tablet(s) Oral every 4 hours PRN Moderate Pain (4 - 6)  dextrose Gel 1 Dose(s) Oral once PRN Blood Glucose LESS THAN 70 milliGRAM(s)/deciliter  glucagon  Injectable 1 milliGRAM(s) IntraMuscular once PRN Glucose LESS THAN 70 milligrams/deciliter    Pertinent Labs:  10-23 Na141 mmol/L Glu 135 mg/dL<H> K+ 3.5 mmol/L Cr  0.94 mg/dL BUN 6 mg/dL<L> Phos n/a   Alb n/a   PAB n/a     fingersticks x2/days: 118-189mg/dL     Skin: 1+ right leg, ankle and foot and 2+ left leg, ankle and foot edema.     Estimated Needs:   [x] no change since previous assessment  [ ] recalculated:       Previous Nutrition Diagnosis:      [x] Malnutrition          Nutrition Diagnosis is [X] ongoing  [ ] resolved [ ] not applicable          New Nutrition Diagnosis: [X] not applicable       Interventions: Continue Glucerna x2/day and pt will try diet health shake.       Recommend  1) Continue Glucerna x2/day and pt will try diet health shake.   2) Encourage po intake with nutrient dense foods.   3) Provide food preferences as able.   4) Monitor weight, lab values, skin, po intake and GI tolerance.        Monitoring and Evaluation:   follow up per protocol  RD to remain available for further nutritional interventions as indicated.   Cecy Sandoval, MS, RD, CDN #[phone]

## 2017-10-23 NOTE — PROGRESS NOTE ADULT - SUBJECTIVE AND OBJECTIVE BOX
INTERVAL HPI/OVERNIGHT EVENTS: Feel fine and eating lunch   Vital Signs Last 24 Hrs  T(C): 36.7 (23 Oct 2017 11:57), Max: 36.8 (22 Oct 2017 18:37)  T(F): 98 (23 Oct 2017 11:57), Max: 98.2 (22 Oct 2017 18:37)  HR: 98 (23 Oct 2017 11:57) (98 - 110)  BP: 108/82 (23 Oct 2017 11:57) (108/82 - 154/70)  BP(mean): --  RR: 18 (23 Oct 2017 11:57) (18 - 18)  SpO2: 99% (23 Oct 2017 11:57) (98% - 99%)  I&O's Summary    22 Oct 2017 07:01  -  23 Oct 2017 07:00  --------------------------------------------------------  IN: 2060 mL / OUT: 850 mL / NET: 1210 mL      MEDICATIONS  (STANDING):  aspirin enteric coated 81 milliGRAM(s) Oral daily  buDESOnide 160 MICROgram(s)/formoterol 4.5 MICROgram(s) Inhaler 2 Puff(s) Inhalation two times a day  dextrose 5%. 1000 milliLiter(s) (50 mL/Hr) IV Continuous <Continuous>  dextrose 50% Injectable 12.5 Gram(s) IV Push once  dextrose 50% Injectable 25 Gram(s) IV Push once  dextrose 50% Injectable 25 Gram(s) IV Push once  diltiazem    milliGRAM(s) Oral daily  enoxaparin Injectable 50 milliGRAM(s) SubCutaneous two times a day  famotidine    Tablet 20 milliGRAM(s) Oral two times a day  influenza   Vaccine 0.5 milliLiter(s) IntraMuscular once  insulin glargine Injectable (LANTUS) 5 Unit(s) SubCutaneous at bedtime  insulin lispro (HumaLOG) corrective regimen sliding scale   SubCutaneous three times a day before meals  insulin lispro (HumaLOG) corrective regimen sliding scale   SubCutaneous at bedtime  insulin lispro Injectable (HumaLOG) 2 Unit(s) SubCutaneous three times a day before meals  lisinopril 5 milliGRAM(s) Oral daily  magnesium oxide 400 milliGRAM(s) Oral daily  pantoprazole    Tablet 40 milliGRAM(s) Oral before breakfast  piperacillin/tazobactam IVPB. 3.375 Gram(s) IV Intermittent every 8 hours  predniSONE   Tablet 5 milliGRAM(s) Oral daily  simvastatin 20 milliGRAM(s) Oral at bedtime  vancomycin  IVPB 1000 milliGRAM(s) IV Intermittent every 12 hours    MEDICATIONS  (PRN):  acetaminophen 300 mG/codeine 30 mG 1 Tablet(s) Oral every 4 hours PRN Moderate Pain (4 - 6)  dextrose Gel 1 Dose(s) Oral once PRN Blood Glucose LESS THAN 70 milliGRAM(s)/deciliter  glucagon  Injectable 1 milliGRAM(s) IntraMuscular once PRN Glucose LESS THAN 70 milligrams/deciliter    LABS:                        12.6   8.84  )-----------( 152      ( 23 Oct 2017 07:17 )             36.9     10-23    141  |  104  |  6<L>  ----------------------------<  135<H>  3.5   |  25  |  0.94    Ca    9.2      23 Oct 2017 07:31  Mg     1.7     10-22          CAPILLARY BLOOD GLUCOSE      POCT Blood Glucose.: 186 mg/dL (23 Oct 2017 11:10)  POCT Blood Glucose.: 150 mg/dL (23 Oct 2017 07:16)  POCT Blood Glucose.: 189 mg/dL (22 Oct 2017 21:30)  POCT Blood Glucose.: 181 mg/dL (22 Oct 2017 16:22)          Consultant(s) Notes Reviewed:  [x ] YES  [ ] NO    PHYSICAL EXAM:  GENERAL: NAD, well-groomed, well-developed, not in any distress ,  HEAD:  Atraumatic, Normocephalic  EYES: EOMI, PERRLA, conjunctiva and sclera clear  ENMT: No tonsillar erythema, exudates, or enlargement; Moist mucous membranes, Good dentition, No lesions  NECK: Supple, No JVD, Normal thyroid  NERVOUS SYSTEM:  Alert & mild dementia , No focal deficit   CHEST/LUNG: Good air entry bilateral with no  rales, rhonchi, wheezing, or rubs  HEART: Regular rate and rhythm; No murmurs, rubs, or gallops  ABDOMEN: Soft, Nontender, Nondistended; Bowel sounds present  EXTREMITIES:  2+ Peripheral Pulses, No clubbing, cyanosis, or edema      Care Discussed with Consultants/Other Providers [ x] YES  [ ] NO

## 2017-10-23 NOTE — PROGRESS NOTE ADULT - ASSESSMENT
72y F hx of HTN, DM, Asthma ,mild dementia, AFib here with  nausea since Saturday. Today she noticed abdominal pain.  Last BM 2 days ago. Passing flatus. No fever, chills, cp, sob, urinary sx. She stopped anticoagulation few years back     Problem/Plan - 1:  ·  Problem: Renal infarct.  Plan: Exact cause not know but has H/O PAF so possibly Embolic infarcts. On AC Lovenox . Will change to PO Eliquis on DC .      Problem/Plan - 2:  ·  Problem: Colitis with Leucocytosis with Bacteremia .  Plan: IV ABXS . GI and  ID helping. Blood Cultures positive for G+ cocci .Repeat blood cultures negative so far.      Problem/Plan - 3:  ·  Problem: Electrolyte abnormality.  Plan: Corrected .  Renal following.      Problem/Plan - 4:  ·  Problem: R/O Atrial fibrillation, chronic.  Plan: S/P LEANNA < from: Transesophageal Echocardiogram (10.19.17 @ 20:38) >  ------------------------------------------------------------------------  Conclusions:  1. Aortic Root: 2.6 cm.  Moderate calcific atheroma noted in aortic arch/descending  aorta.  2. Normal left atrium.  LA volume index = 16 cc/m2.  Lipomatous hypertrophy of interatrial septum.   No left  atrial or left atrial appendage thrombus.   Normal left  atrial appendage function (velocity= 0.9m/s).  3. Increased relative wall thickness with normal left  ventricular mass index, consistent with concentric left  ventricular remodeling.  4. Hyperdynamic left ventricle.  5. Mild diastolicdysfunction (Stage I).  *** Compared with echocardiogram of 6/14/2012, no  significant changes noted.    < end of copied text >   . On Lovenox . Electrolytes normal and Free T4 little high but endo helping .  Will Dc on Eliquis.      Problem/Plan - 5:  ·  Problem: Type 2 diabetes mellitus .  Plan: Holding PO meds and only SSI for now as not eating . Endo helping.       Problem/Plan - 6:  Problem: Anion Gap Metabolic Acidosis with DM : Plan: Secondary to Diabetic ketoacidosis . Corrected.       Problem/Plan - 7:  ·  Problem: Diarrhea ; .  Plan: C diff negative.  Resolved ,      Problem/Plan - 8:  ·  Problem: Hypertension.  Plan: BP meds with parameters.   Problem/Plan - 8:  . problem : Dyspnea; ? fluid Overload so IVF dcd . X ray chest pending.     Disposition : DC planning . PT consulted.

## 2017-10-23 NOTE — PROGRESS NOTE ADULT - SUBJECTIVE AND OBJECTIVE BOX
CRUZITO BATES:[id number],   72yFemale followed for:  Avelox (Pruritus)  Levaquin (Unknown)    PAST MEDICAL & SURGICAL HISTORY:  Dementia  Hypertension  Type 2 diabetes mellitus  Atrial fibrillation, chronic  Diabetes mellitus  Asthma  Diabetes Mellitus  HTN (Hypertension)  Asthma  Afib    FAMILY HISTORY:  No pertinent family history in first degree relatives    MEDICATIONS  (STANDING):  aspirin enteric coated 81 milliGRAM(s) Oral daily  buDESOnide 160 MICROgram(s)/formoterol 4.5 MICROgram(s) Inhaler 2 Puff(s) Inhalation two times a day  dextrose 5%. 1000 milliLiter(s) (50 mL/Hr) IV Continuous <Continuous>  dextrose 50% Injectable 12.5 Gram(s) IV Push once  dextrose 50% Injectable 25 Gram(s) IV Push once  dextrose 50% Injectable 25 Gram(s) IV Push once  diltiazem    milliGRAM(s) Oral daily  enoxaparin Injectable 50 milliGRAM(s) SubCutaneous two times a day  famotidine    Tablet 20 milliGRAM(s) Oral two times a day  influenza   Vaccine 0.5 milliLiter(s) IntraMuscular once  insulin glargine Injectable (LANTUS) 5 Unit(s) SubCutaneous at bedtime  insulin lispro (HumaLOG) corrective regimen sliding scale   SubCutaneous three times a day before meals  insulin lispro (HumaLOG) corrective regimen sliding scale   SubCutaneous at bedtime  insulin lispro Injectable (HumaLOG) 2 Unit(s) SubCutaneous three times a day before meals  lisinopril 5 milliGRAM(s) Oral daily  magnesium oxide 400 milliGRAM(s) Oral daily  pantoprazole    Tablet 40 milliGRAM(s) Oral before breakfast  piperacillin/tazobactam IVPB. 3.375 Gram(s) IV Intermittent every 8 hours  predniSONE   Tablet 5 milliGRAM(s) Oral daily  simvastatin 20 milliGRAM(s) Oral at bedtime  vancomycin  IVPB 1000 milliGRAM(s) IV Intermittent every 12 hours    MEDICATIONS  (PRN):  acetaminophen 300 mG/codeine 30 mG 1 Tablet(s) Oral every 4 hours PRN Moderate Pain (4 - 6)  dextrose Gel 1 Dose(s) Oral once PRN Blood Glucose LESS THAN 70 milliGRAM(s)/deciliter  glucagon  Injectable 1 milliGRAM(s) IntraMuscular once PRN Glucose LESS THAN 70 milligrams/deciliter      Vital Signs Last 24 Hrs  T(C): 36.4 (23 Oct 2017 05:00), Max: 36.8 (22 Oct 2017 18:37)  T(F): 97.5 (23 Oct 2017 05:00), Max: 98.2 (22 Oct 2017 18:37)  HR: 108 (23 Oct 2017 05:00) (101 - 110)  BP: 154/70 (23 Oct 2017 05:00) (151/79 - 154/73)  BP(mean): --  RR: 18 (23 Oct 2017 05:00) (18 - 18)  SpO2: 98% (23 Oct 2017 05:00) (97% - 98%)  nc/at  s1s2  cta  soft, nt, nd no guarding or rebound  no c/c/e    CBC Full  -  ( 23 Oct 2017 07:17 )  WBC Count : 8.84 K/uL  Hemoglobin : 12.6 g/dL  Hematocrit : 36.9 %  Platelet Count - Automated : 152 K/uL  Mean Cell Volume : 79.7 fl  Mean Cell Hemoglobin : 27.2 pg  Mean Cell Hemoglobin Concentration : 34.1 gm/dL  Auto Neutrophil # : x  Auto Lymphocyte # : x  Auto Monocyte # : x  Auto Eosinophil # : x  Auto Basophil # : x  Auto Neutrophil % : x  Auto Lymphocyte % : x  Auto Monocyte % : x  Auto Eosinophil % : x  Auto Basophil % : x    10-23    141  |  104  |  6<L>  ----------------------------<  135<H>  3.5   |  25  |  0.94    Ca    9.2      23 Oct 2017 07:31  Mg     1.7     10-22

## 2017-10-23 NOTE — PROGRESS NOTE ADULT - SUBJECTIVE AND OBJECTIVE BOX
CARDIOLOGY FOLLOW UP NOTE - DR. RAYO WORKMAN    Patient states no new complaints.    MEDICATIONS  (STANDING):  aspirin enteric coated 81 milliGRAM(s) Oral daily  buDESOnide 160 MICROgram(s)/formoterol 4.5 MICROgram(s) Inhaler 2 Puff(s) Inhalation two times a day  dextrose 5%. 1000 milliLiter(s) (50 mL/Hr) IV Continuous <Continuous>  dextrose 50% Injectable 12.5 Gram(s) IV Push once  dextrose 50% Injectable 25 Gram(s) IV Push once  dextrose 50% Injectable 25 Gram(s) IV Push once  diltiazem    milliGRAM(s) Oral daily  enoxaparin Injectable 50 milliGRAM(s) SubCutaneous two times a day  famotidine    Tablet 20 milliGRAM(s) Oral two times a day  influenza   Vaccine 0.5 milliLiter(s) IntraMuscular once  insulin glargine Injectable (LANTUS) 5 Unit(s) SubCutaneous at bedtime  insulin lispro (HumaLOG) corrective regimen sliding scale   SubCutaneous three times a day before meals  insulin lispro (HumaLOG) corrective regimen sliding scale   SubCutaneous at bedtime  insulin lispro Injectable (HumaLOG) 2 Unit(s) SubCutaneous three times a day before meals  lisinopril 5 milliGRAM(s) Oral daily  magnesium oxide 400 milliGRAM(s) Oral daily  pantoprazole    Tablet 40 milliGRAM(s) Oral before breakfast  potassium chloride    Tablet ER 40 milliEquivalent(s) Oral once  predniSONE   Tablet 5 milliGRAM(s) Oral daily  simvastatin 20 milliGRAM(s) Oral at bedtime        PHYSICAL EXAM:  Vital Signs Last 24 Hrs  T(C): 36.7 (23 Oct 2017 11:57), Max: 36.8 (22 Oct 2017 18:37)  T(F): 98 (23 Oct 2017 11:57), Max: 98.2 (22 Oct 2017 18:37)  HR: 98 (23 Oct 2017 11:57) (98 - 110)  BP: 108/82 (23 Oct 2017 11:57) (108/82 - 154/70)  BP(mean): --  RR: 18 (23 Oct 2017 11:57) (18 - 18)  SpO2: 99% (23 Oct 2017 11:57) (98% - 99%)  I&O's Summary    22 Oct 2017 07:01  -  23 Oct 2017 07:00  --------------------------------------------------------  IN: 2060 mL / OUT: 850 mL / NET: 1210 mL    23 Oct 2017 07:01  -  23 Oct 2017 18:36  --------------------------------------------------------  IN: 160 mL / OUT: 550 mL / NET: -390 mL        Telemetry:  sinus/sinus tach 80's-110's, PACs    General: NAD	  HEENT:   No JVD	  Cardiovascular: RRR, Normal S1 and S2, 2/6 HSM  Respiratory: decreased BS in bases	  Gastrointestinal:  Soft, Non-tender, + BS	  Extremities: trace-1+ bilateral lower extremity edema    	    LABS:                          12.6   8.84  )-----------( 152      ( 23 Oct 2017 07:17 )             36.9     10-23    141  |  104  |  6<L>  ----------------------------<  135<H>  3.5   |  25  |  0.94    Ca    9.2      23 Oct 2017 07:31  Mg     1.7     10-22        HEALTH ISSUES - PROBLEM Dx:  Type 2 diabetes mellitus with hyperglycemia, without long-term current use of insulin: Type 2 diabetes mellitus with hyperglycemia, without long-term current use of insulin  Ketosis: Ketosis  Hyperlipidemia: Hyperlipidemia  Thyroid dysfunction: Thyroid dysfunction  Acidosis: Acidosis  Leukocytosis: Leukocytosis  Hypertension: Hypertension  Dementia: Dementia  Asthma: Asthma  Type 2 diabetes mellitus: Type 2 diabetes mellitus  Electrolyte abnormality: Electrolyte abnormality  Colitis: Colitis  Renal infarct: Renal infarct        Assessment and Plan:  72y F hx of paroxysmal Afib (not on AC), HTN, DM, Asthma, mild dementia - with renal infarcts and colitis (likely ischemic)  1. PAF - pt with renal infarcts on CT a/p. Also with likely ischemic bowel. Suspicion for cardiac source. LEANNA showed no PFO, no thrombus, and no endocarditis. Still this may be due to an small atrial thrombus that embolized. Switch to Eliquis 2.5mg BID. Continue cardizem.  2. Renal infarcts - as above.  3. HTN - Continue cardizem and lisniopril.  4. leg edema - pt's daughter states that it is chronic. Better when her legs are up. Pt just ate dinner in a chair. Recommend leg elevation when possible. Cardizem may also be playing a role, but it is important medication in the setting of Afib. Will consider switching to a B-blocker (pt has asthma).

## 2017-10-23 NOTE — PROGRESS NOTE ADULT - ASSESSMENT
71 y/o F w/ hx of Type 2 DM on oral meds controlled with A1c of 6.4%, hx of asthma on chronic prednisone, afibb admitted with metabolic acidosis likely starvation ketosis given poor po intake for the past few days with dehydration, now resolving.

## 2017-10-23 NOTE — PROGRESS NOTE ADULT - SUBJECTIVE AND OBJECTIVE BOX
No pain, no shortness of breath      VITAL:  T(C): , Max: 36.8 (10-22-17 @ 18:37)  T(F): , Max: 98.2 (10-22-17 @ 18:37)  HR: 108 (10-23-17 @ 05:00)  BP: 154/70 (10-23-17 @ 05:00)  BP(mean): --  RR: 18 (10-23-17 @ 05:00)  SpO2: 98% (10-23-17 @ 05:00)  Wt(kg): --      PHYSICAL EXAM:  Constitutional: lethargic, mildly confused  HEENT:  NCAT; DMM  Neck: No JVD; supple  Respiratory: CTA-b/l  Cardiac: RRR s1s2  Gastrointestinal: BS+, soft, NT/ND  Urologic: No andrade  Extremities: No peripheral edema  Back: No CVAT b/l      LABS:                        12.6   8.84  )-----------( 152      ( 23 Oct 2017 07:17 )             36.9     Na(141)/K(3.5)/Cl(104)/HCO3(25)/BUN(6)/Cr(0.94)Glu(135)/Ca(9.2)/Mg(--)/PO4(--)    10-23 @ 07:31  Na(137)/K(4.3)/Cl(106)/HCO3(21)/BUN(5)/Cr(0.91)Glu(145)/Ca(8.4)/Mg(1.7)/PO4(--)    10-22 @ 08:53  Na(135)/K(3.1)/Cl(103)/HCO3(20)/BUN(5)/Cr(0.80)Glu(130)/Ca(7.9)/Mg(--)/PO4(--)    10-21 @ 09:04      IMPRESSION: 72F w/ HTN, DM2, and AFib, 10/18/17 a/w abdominal pain/nausea, c/b appreciation of SHANE, hypokalemia, colitis, and renal infarcts    (1)Hypokalemia - whole body deficit; improved, s/p repletion    (2)Hyponatremia - hypovolemic/poor osmotic intake-associated; resolved    (3)Acid-base  - ketoacidosis (starvation/diabetes) - resolved    (4)ID - GPC baceteremia - on IV vanco    (5)Renal infarcts -  Likely afib-associated. No vegetation seen on LEANNA.    (6)HTN - now on Lisinopril and Cardizem. Acceptable for now.      RECOMMEND:  (1)Lovenox as ordered  (2)Antihypertensives as ordered  (3)BMP daily  (4)Encourage PO intake; Glucerna TID  (5)Trend Vanco levels          Yuniel Boateng MD  [facility]  [phone] Admits to generalized weakness and malaise. Admits to cough. Denies pain.      VITAL:  T(C): , Max: 36.8 (10-22-17 @ 18:37)  T(F): , Max: 98.2 (10-22-17 @ 18:37)  HR: 108 (10-23-17 @ 05:00)  BP: 154/70 (10-23-17 @ 05:00)  BP(mean): --  RR: 18 (10-23-17 @ 05:00)  SpO2: 98% (10-23-17 @ 05:00)  Wt(kg): --      PHYSICAL EXAM:  Constitutional: lethargic, mildly confused  HEENT:  NCAT; DMM  Neck: No JVD; supple  Respiratory: CTA-b/l  Cardiac: RRR s1s2  Gastrointestinal: BS+, soft, NT/ND  Urologic: No andrade  Extremities: No peripheral edema  Back: No CVAT b/l      LABS:                        12.6   8.84  )-----------( 152      ( 23 Oct 2017 07:17 )             36.9     Na(141)/K(3.5)/Cl(104)/HCO3(25)/BUN(6)/Cr(0.94)Glu(135)/Ca(9.2)/Mg(--)/PO4(--)    10-23 @ 07:31  Na(137)/K(4.3)/Cl(106)/HCO3(21)/BUN(5)/Cr(0.91)Glu(145)/Ca(8.4)/Mg(1.7)/PO4(--)    10-22 @ 08:53  Na(135)/K(3.1)/Cl(103)/HCO3(20)/BUN(5)/Cr(0.80)Glu(130)/Ca(7.9)/Mg(--)/PO4(--)    10-21 @ 09:04      IMPRESSION: 72F w/ HTN, DM2, and AFib, 10/18/17 a/w abdominal pain/nausea, c/b appreciation of SHANE, hypokalemia, colitis, and renal infarcts    (1)Hypokalemia - whole body deficit; improved, s/p repletion    (2)Hyponatremia - hypovolemic/poor osmotic intake-associated; resolved    (3)Acid-base  - ketoacidosis (starvation/diabetes) - resolved    (4)ID - GPC baceteremia - on IV vanco    (5)Renal infarcts -  Likely afib-associated. No vegetation seen on LEANNA.    (6)HTN - now on Lisinopril and Cardizem. Acceptable for now.      RECOMMEND:  (1)Lovenox as ordered  (2)Antihypertensives as ordered  (3)BMP daily  (4)Encourage PO intake; Glucerna TID  (5)Trend Vanco levels          Yuniel Boateng MD  [facility]  [phone]

## 2017-10-23 NOTE — PROGRESS NOTE ADULT - SUBJECTIVE AND OBJECTIVE BOX
Chief Complaint: Evaluating this 73 y/o F for starvation ketosis and hx of Type 2 DM      Interval History: Patient reports feeling overall better. Has been eating, but still not much appetite. Has been eating small meals. No N/V, abd pain, D/C.     MEDICATIONS  (STANDING):  aspirin enteric coated 81 milliGRAM(s) Oral daily  buDESOnide 160 MICROgram(s)/formoterol 4.5 MICROgram(s) Inhaler 2 Puff(s) Inhalation two times a day  dextrose 5%. 1000 milliLiter(s) (50 mL/Hr) IV Continuous <Continuous>  dextrose 50% Injectable 12.5 Gram(s) IV Push once  dextrose 50% Injectable 25 Gram(s) IV Push once  dextrose 50% Injectable 25 Gram(s) IV Push once  diltiazem    milliGRAM(s) Oral daily  enoxaparin Injectable 50 milliGRAM(s) SubCutaneous two times a day  famotidine    Tablet 20 milliGRAM(s) Oral two times a day  influenza   Vaccine 0.5 milliLiter(s) IntraMuscular once  insulin glargine Injectable (LANTUS) 5 Unit(s) SubCutaneous at bedtime  insulin lispro (HumaLOG) corrective regimen sliding scale   SubCutaneous three times a day before meals  insulin lispro (HumaLOG) corrective regimen sliding scale   SubCutaneous at bedtime  insulin lispro Injectable (HumaLOG) 2 Unit(s) SubCutaneous three times a day before meals  lisinopril 5 milliGRAM(s) Oral daily  magnesium oxide 400 milliGRAM(s) Oral daily  pantoprazole    Tablet 40 milliGRAM(s) Oral before breakfast  piperacillin/tazobactam IVPB. 3.375 Gram(s) IV Intermittent every 8 hours  predniSONE   Tablet 5 milliGRAM(s) Oral daily  simvastatin 20 milliGRAM(s) Oral at bedtime  vancomycin  IVPB 1000 milliGRAM(s) IV Intermittent every 12 hours    MEDICATIONS  (PRN):  acetaminophen 300 mG/codeine 30 mG 1 Tablet(s) Oral every 4 hours PRN Moderate Pain (4 - 6)  dextrose Gel 1 Dose(s) Oral once PRN Blood Glucose LESS THAN 70 milliGRAM(s)/deciliter  glucagon  Injectable 1 milliGRAM(s) IntraMuscular once PRN Glucose LESS THAN 70 milligrams/deciliter      Allergies    Avelox (Pruritus)  Levaquin (Unknown)    Intolerances      ROS All other negative except as above      PHYSICAL EXAM:  Vital Signs Last 24 Hrs  T(C): 36.4 (23 Oct 2017 05:00), Max: 36.8 (22 Oct 2017 18:37)  T(F): 97.5 (23 Oct 2017 05:00), Max: 98.2 (22 Oct 2017 18:37)  HR: 108 (23 Oct 2017 05:00) (101 - 110)  BP: 154/70 (23 Oct 2017 05:00) (151/79 - 154/73)  BP(mean): --  RR: 18 (23 Oct 2017 05:00) (18 - 18)  SpO2: 98% (23 Oct 2017 05:00) (97% - 98%)  Wt(kg): --    GENERAL: NAD at this time  EYES: EOMI, No proptosis  HEENT:  Atraumatic, Normocephalic, poor dentition  RESPIRATORY: Clear to auscultation bilaterally  CARDIOVASCULAR: Regular rhythm; normal S1/S2, no peripheral edema  GI: Soft, nontender, non distended, normal bowel sounds  SKIN: Warm and dry  PSYCH: flat affect, AAOX3      CAPILLARY BLOOD GLUCOSE      POCT Blood Glucose.: 150 mg/dL (23 Oct 2017 07:16)                                 Hum 2U  POCT Blood Glucose.: 189 mg/dL (22 Oct 2017 21:30)        Lantus 5U  POCT Blood Glucose.: 181 mg/dL (22 Oct 2017 16:22)                                 Hum 2U                 HISS 1U  POCT Blood Glucose.: 144 mg/dL (22 Oct 2017 11:22)                                 Hum 2U        LABS  10-23    141  |  104  |  6<L>  ----------------------------<  135<H>  3.5   |  25  |  0.94  10-22    137  |  106  |  5<L>  ----------------------------<  145<H>  4.3   |  21<L>  |  0.91  10-21    135  |  103  |  5<L>  ----------------------------<  130<H>  3.1<L>   |  20<L>  |  0.80    Ca    9.2      23 Oct 2017 07:31  Ca    8.4      22 Oct 2017 08:53  Ca    7.9<L>      21 Oct 2017 09:04  Mg     1.7     10-22                              12.6   8.84  )-----------( 152      ( 23 Oct 2017 07:17 )             36.9                         11.5   10.05 )-----------( 142      ( 22 Oct 2017 08:55 )             34.5                         12.3   12.05 )-----------( 120      ( 21 Oct 2017 08:48 )             35.6        Thyroid Function Tests:  10-20 @ 08:02 TSH 0.64 FreeT4 1.7 T3 67 Anti TPO -- Anti Thyroglobulin Ab -- TSI --  10-19 @ 07:25 TSH 0.70 FreeT4 1.9 T3 -- Anti TPO -- Anti Thyroglobulin Ab -- TSI --      Hemoglobin A1C, Whole Blood: 6.4 % <H> [4.0 - 5.6] (10-19-17 @ 07:25)

## 2017-10-23 NOTE — PROGRESS NOTE ADULT - ASSESSMENT
72 f with h/o Paroxysmal A-fib was admitted for abd pain and nausea was found to have colitis and also b/l renal infarcts vs pyelo with leukocytosis.

## 2017-10-24 ENCOUNTER — TRANSCRIPTION ENCOUNTER (OUTPATIENT)
Age: 72
End: 2017-10-24

## 2017-10-24 VITALS
RESPIRATION RATE: 18 BRPM | SYSTOLIC BLOOD PRESSURE: 138 MMHG | HEART RATE: 107 BPM | DIASTOLIC BLOOD PRESSURE: 80 MMHG | OXYGEN SATURATION: 96 % | TEMPERATURE: 98 F

## 2017-10-24 LAB
ANION GAP SERPL CALC-SCNC: 11 MMOL/L — SIGNIFICANT CHANGE UP (ref 5–17)
APTT BLD: 34 SEC — SIGNIFICANT CHANGE UP (ref 27.5–37.4)
BUN SERPL-MCNC: 6 MG/DL — LOW (ref 7–23)
CALCIUM SERPL-MCNC: 8.6 MG/DL — SIGNIFICANT CHANGE UP (ref 8.4–10.5)
CHLORIDE SERPL-SCNC: 105 MMOL/L — SIGNIFICANT CHANGE UP (ref 96–108)
CO2 SERPL-SCNC: 27 MMOL/L — SIGNIFICANT CHANGE UP (ref 22–31)
CREAT SERPL-MCNC: 0.72 MG/DL — SIGNIFICANT CHANGE UP (ref 0.5–1.3)
GLUCOSE BLDC GLUCOMTR-MCNC: 111 MG/DL — HIGH (ref 70–99)
GLUCOSE BLDC GLUCOMTR-MCNC: 140 MG/DL — HIGH (ref 70–99)
GLUCOSE BLDC GLUCOMTR-MCNC: 179 MG/DL — HIGH (ref 70–99)
GLUCOSE SERPL-MCNC: 97 MG/DL — SIGNIFICANT CHANGE UP (ref 70–99)
HCT VFR BLD CALC: 34 % — LOW (ref 34.5–45)
HGB BLD-MCNC: 11.3 G/DL — LOW (ref 11.5–15.5)
INR BLD: 1.14 RATIO — SIGNIFICANT CHANGE UP (ref 0.88–1.16)
MCHC RBC-ENTMCNC: 26.8 PG — LOW (ref 27–34)
MCHC RBC-ENTMCNC: 33.2 GM/DL — SIGNIFICANT CHANGE UP (ref 32–36)
MCV RBC AUTO: 80.6 FL — SIGNIFICANT CHANGE UP (ref 80–100)
PLATELET # BLD AUTO: 175 K/UL — SIGNIFICANT CHANGE UP (ref 150–400)
POTASSIUM SERPL-MCNC: 3.8 MMOL/L — SIGNIFICANT CHANGE UP (ref 3.5–5.3)
POTASSIUM SERPL-SCNC: 3.8 MMOL/L — SIGNIFICANT CHANGE UP (ref 3.5–5.3)
PROTHROM AB SERPL-ACNC: 12.4 SEC — SIGNIFICANT CHANGE UP (ref 9.8–12.7)
RBC # BLD: 4.22 M/UL — SIGNIFICANT CHANGE UP (ref 3.8–5.2)
RBC # FLD: 14.8 % — HIGH (ref 10.3–14.5)
SODIUM SERPL-SCNC: 143 MMOL/L — SIGNIFICANT CHANGE UP (ref 135–145)
WBC # BLD: 7.78 K/UL — SIGNIFICANT CHANGE UP (ref 3.8–10.5)
WBC # FLD AUTO: 7.78 K/UL — SIGNIFICANT CHANGE UP (ref 3.8–10.5)

## 2017-10-24 PROCEDURE — 99232 SBSQ HOSP IP/OBS MODERATE 35: CPT

## 2017-10-24 RX ORDER — REPAGLINIDE 1 MG/1
1 TABLET ORAL
Qty: 0 | Refills: 0 | COMMUNITY

## 2017-10-24 RX ORDER — DILTIAZEM HCL 120 MG
1 CAPSULE, EXT RELEASE 24 HR ORAL
Qty: 0 | Refills: 0 | COMMUNITY

## 2017-10-24 RX ORDER — B-COMPLEX WITH VITAMIN C
3 CAPSULE ORAL
Qty: 0 | Refills: 0 | COMMUNITY

## 2017-10-24 RX ORDER — DILTIAZEM HCL 120 MG
1 CAPSULE, EXT RELEASE 24 HR ORAL
Qty: 0 | Refills: 0 | COMMUNITY
Start: 2017-10-24

## 2017-10-24 RX ORDER — ASPIRIN/CALCIUM CARB/MAGNESIUM 324 MG
1 TABLET ORAL
Qty: 0 | Refills: 0 | COMMUNITY

## 2017-10-24 RX ORDER — WARFARIN SODIUM 2.5 MG/1
1 TABLET ORAL
Qty: 30 | Refills: 0 | OUTPATIENT
Start: 2017-10-24 | End: 2017-11-23

## 2017-10-24 RX ORDER — LISINOPRIL 2.5 MG/1
1 TABLET ORAL
Qty: 30 | Refills: 0 | OUTPATIENT
Start: 2017-10-24 | End: 2017-11-23

## 2017-10-24 RX ORDER — APIXABAN 2.5 MG/1
1 TABLET, FILM COATED ORAL
Qty: 60 | Refills: 0 | OUTPATIENT
Start: 2017-10-24 | End: 2017-11-23

## 2017-10-24 RX ORDER — CALCIUM CARBONATE 500(1250)
1 TABLET ORAL
Qty: 0 | Refills: 0 | COMMUNITY

## 2017-10-24 RX ORDER — WARFARIN SODIUM 2.5 MG/1
2.5 TABLET ORAL ONCE
Qty: 0 | Refills: 0 | Status: COMPLETED | OUTPATIENT
Start: 2017-10-24 | End: 2017-10-24

## 2017-10-24 RX ORDER — FUROSEMIDE 40 MG
1 TABLET ORAL
Qty: 0 | Refills: 0 | COMMUNITY

## 2017-10-24 RX ORDER — SIMVASTATIN 20 MG/1
1 TABLET, FILM COATED ORAL
Qty: 0 | Refills: 0 | COMMUNITY
Start: 2017-10-24

## 2017-10-24 RX ORDER — ECHINACEA PURPUREA EXTRACT 125 MG
1 TABLET ORAL
Qty: 0 | Refills: 0 | COMMUNITY

## 2017-10-24 RX ORDER — SIMVASTATIN 20 MG/1
1 TABLET, FILM COATED ORAL
Qty: 0 | Refills: 0 | COMMUNITY

## 2017-10-24 RX ADMIN — Medication 2 UNIT(S): at 17:49

## 2017-10-24 RX ADMIN — MAGNESIUM OXIDE 400 MG ORAL TABLET 400 MILLIGRAM(S): 241.3 TABLET ORAL at 12:34

## 2017-10-24 RX ADMIN — Medication: at 17:49

## 2017-10-24 RX ADMIN — PANTOPRAZOLE SODIUM 40 MILLIGRAM(S): 20 TABLET, DELAYED RELEASE ORAL at 06:02

## 2017-10-24 RX ADMIN — APIXABAN 2.5 MILLIGRAM(S): 2.5 TABLET, FILM COATED ORAL at 06:02

## 2017-10-24 RX ADMIN — LISINOPRIL 5 MILLIGRAM(S): 2.5 TABLET ORAL at 09:55

## 2017-10-24 RX ADMIN — WARFARIN SODIUM 2.5 MILLIGRAM(S): 2.5 TABLET ORAL at 19:57

## 2017-10-24 RX ADMIN — BUDESONIDE AND FORMOTEROL FUMARATE DIHYDRATE 2 PUFF(S): 160; 4.5 AEROSOL RESPIRATORY (INHALATION) at 09:56

## 2017-10-24 RX ADMIN — FAMOTIDINE 20 MILLIGRAM(S): 10 INJECTION INTRAVENOUS at 17:49

## 2017-10-24 RX ADMIN — Medication 5 MILLIGRAM(S): at 09:56

## 2017-10-24 RX ADMIN — Medication 2 UNIT(S): at 08:13

## 2017-10-24 RX ADMIN — BUDESONIDE AND FORMOTEROL FUMARATE DIHYDRATE 2 PUFF(S): 160; 4.5 AEROSOL RESPIRATORY (INHALATION) at 17:49

## 2017-10-24 RX ADMIN — Medication 2 UNIT(S): at 12:33

## 2017-10-24 RX ADMIN — FAMOTIDINE 20 MILLIGRAM(S): 10 INJECTION INTRAVENOUS at 06:02

## 2017-10-24 RX ADMIN — Medication 240 MILLIGRAM(S): at 06:02

## 2017-10-24 NOTE — PROGRESS NOTE ADULT - SUBJECTIVE AND OBJECTIVE BOX
CARDIOLOGY FOLLOW UP NOTE - DR. RAYO WORKMAN    Patient states no new complaints.    MEDICATIONS  (STANDING):  apixaban 2.5 milliGRAM(s) Oral every 12 hours  buDESOnide 160 MICROgram(s)/formoterol 4.5 MICROgram(s) Inhaler 2 Puff(s) Inhalation two times a day  dextrose 5%. 1000 milliLiter(s) (50 mL/Hr) IV Continuous <Continuous>  dextrose 50% Injectable 12.5 Gram(s) IV Push once  dextrose 50% Injectable 25 Gram(s) IV Push once  dextrose 50% Injectable 25 Gram(s) IV Push once  diltiazem    milliGRAM(s) Oral daily  famotidine    Tablet 20 milliGRAM(s) Oral two times a day  influenza   Vaccine 0.5 milliLiter(s) IntraMuscular once  insulin glargine Injectable (LANTUS) 5 Unit(s) SubCutaneous at bedtime  insulin lispro (HumaLOG) corrective regimen sliding scale   SubCutaneous three times a day before meals  insulin lispro (HumaLOG) corrective regimen sliding scale   SubCutaneous at bedtime  insulin lispro Injectable (HumaLOG) 2 Unit(s) SubCutaneous three times a day before meals  lisinopril 5 milliGRAM(s) Oral daily  magnesium oxide 400 milliGRAM(s) Oral daily  pantoprazole    Tablet 40 milliGRAM(s) Oral before breakfast  predniSONE   Tablet 5 milliGRAM(s) Oral daily  simvastatin 20 milliGRAM(s) Oral at bedtime        PHYSICAL EXAM:  Vital Signs Last 24 Hrs  T(C): 36.6 (24 Oct 2017 04:00), Max: 36.8 (23 Oct 2017 20:30)  T(F): 97.8 (24 Oct 2017 04:00), Max: 98.3 (23 Oct 2017 20:30)  HR: 93 (24 Oct 2017 04:00) (93 - 105)  BP: 137/69 (24 Oct 2017 04:00) (108/82 - 144/79)  BP(mean): --  RR: 18 (24 Oct 2017 04:00) (18 - 18)  SpO2: 96% (24 Oct 2017 04:00) (96% - 99%)  I&O's Summary    23 Oct 2017 07:01  -  24 Oct 2017 07:00  --------------------------------------------------------  IN: 400 mL / OUT: 550 mL / NET: -150 mL        Telemetry:  sinus/sinus tach 80's-100's, PACs    General: NAD	  HEENT:   No JVD	  Cardiovascular: RRR, Normal S1 and S2, 2/6 HSM  Respiratory: decreased BS in bases	  Gastrointestinal:  Soft, Non-tender, + BS	  Extremities: trace bilateral lower extremity edema (pt is in bed)    	    LABS:                          11.3   7.78  )-----------( 175      ( 24 Oct 2017 07:35 )             34.0     10-24    143  |  105  |  6<L>  ----------------------------<  97  3.8   |  27  |  0.72    Ca    8.6      24 Oct 2017 07:26        HEALTH ISSUES - PROBLEM Dx:  Type 2 diabetes mellitus with hyperglycemia, without long-term current use of insulin: Type 2 diabetes mellitus with hyperglycemia, without long-term current use of insulin  Ketosis: Ketosis  Hyperlipidemia: Hyperlipidemia  Thyroid dysfunction: Thyroid dysfunction  Acidosis: Acidosis  Leukocytosis: Leukocytosis  Hypertension: Hypertension  Dementia: Dementia  Asthma: Asthma  Type 2 diabetes mellitus: Type 2 diabetes mellitus  Electrolyte abnormality: Electrolyte abnormality  Colitis: Colitis  Renal infarct: Renal infarct        Assessment and Plan:  72y F hx of paroxysmal Afib (not on AC), HTN, DM, Asthma, mild dementia - with renal infarcts and colitis (likely ischemic)  1. PAF - pt with renal infarcts on CT a/p. Also with likely ischemic bowel. Suspicion for cardiac source. LEANNA showed no PFO, no thrombus, and no endocarditis. Still this may be due to an small atrial thrombus that embolized. On Eliquis 2.5mg BID. Continue cardizem.  2. Renal infarcts/ischemic colitius - as above. GI is OK with AC.  3. HTN - Continue cardizem and lisniopril.  4. leg edema - chronic. Recommend leg elevation when possible. Cardizem may also be playing a role, but it is important medication in the setting of Afib. If edema worsens will consider switching to a different AV obed blocker.

## 2017-10-24 NOTE — PROGRESS NOTE ADULT - SUBJECTIVE AND OBJECTIVE BOX
INTERVAL HPI/OVERNIGHT EVENTS:  Eating lunch   Vital Signs Last 24 Hrs  T(C): 36.6 (24 Oct 2017 12:15), Max: 36.8 (23 Oct 2017 20:30)  T(F): 97.8 (24 Oct 2017 12:15), Max: 98.3 (23 Oct 2017 20:30)  HR: 102 (24 Oct 2017 12:15) (93 - 111)  BP: 133/63 (24 Oct 2017 12:15) (133/63 - 144/79)  BP(mean): --  RR: 18 (24 Oct 2017 12:15) (18 - 18)  SpO2: 97% (24 Oct 2017 12:15) (96% - 98%)  I&O's Summary    23 Oct 2017 07:01  -  24 Oct 2017 07:00  --------------------------------------------------------  IN: 400 mL / OUT: 550 mL / NET: -150 mL      MEDICATIONS  (STANDING):  apixaban 2.5 milliGRAM(s) Oral every 12 hours  buDESOnide 160 MICROgram(s)/formoterol 4.5 MICROgram(s) Inhaler 2 Puff(s) Inhalation two times a day  dextrose 5%. 1000 milliLiter(s) (50 mL/Hr) IV Continuous <Continuous>  dextrose 50% Injectable 12.5 Gram(s) IV Push once  dextrose 50% Injectable 25 Gram(s) IV Push once  dextrose 50% Injectable 25 Gram(s) IV Push once  diltiazem    milliGRAM(s) Oral daily  famotidine    Tablet 20 milliGRAM(s) Oral two times a day  influenza   Vaccine 0.5 milliLiter(s) IntraMuscular once  insulin glargine Injectable (LANTUS) 5 Unit(s) SubCutaneous at bedtime  insulin lispro (HumaLOG) corrective regimen sliding scale   SubCutaneous three times a day before meals  insulin lispro (HumaLOG) corrective regimen sliding scale   SubCutaneous at bedtime  insulin lispro Injectable (HumaLOG) 2 Unit(s) SubCutaneous three times a day before meals  lisinopril 5 milliGRAM(s) Oral daily  pantoprazole    Tablet 40 milliGRAM(s) Oral before breakfast  predniSONE   Tablet 5 milliGRAM(s) Oral daily  simvastatin 20 milliGRAM(s) Oral at bedtime    MEDICATIONS  (PRN):  acetaminophen 300 mG/codeine 30 mG 1 Tablet(s) Oral every 4 hours PRN Moderate Pain (4 - 6)  dextrose Gel 1 Dose(s) Oral once PRN Blood Glucose LESS THAN 70 milliGRAM(s)/deciliter  glucagon  Injectable 1 milliGRAM(s) IntraMuscular once PRN Glucose LESS THAN 70 milligrams/deciliter    LABS:                        11.3   7.78  )-----------( 175      ( 24 Oct 2017 07:35 )             34.0     10-24    143  |  105  |  6<L>  ----------------------------<  97  3.8   |  27  |  0.72    Ca    8.6      24 Oct 2017 07:26          CAPILLARY BLOOD GLUCOSE      POCT Blood Glucose.: 140 mg/dL (24 Oct 2017 11:27)  POCT Blood Glucose.: 111 mg/dL (24 Oct 2017 07:31)  POCT Blood Glucose.: 222 mg/dL (23 Oct 2017 21:14)  POCT Blood Glucose.: 103 mg/dL (23 Oct 2017 16:30)        Consultant(s) Notes Reviewed:  [x ] YES  [ ] NO    PHYSICAL EXAM:  GENERAL: NAD, well-groomed, well-developed ,not in any distress ,  HEAD:  Atraumatic, Normocephalic  EYES: EOMI, PERRLA, conjunctiva and sclera clear  ENMT: No tonsillar erythema, exudates, or enlargement; Moist mucous membranes, Good dentition, No lesions  NECK: Supple, No JVD, Normal thyroid  NERVOUS SYSTEM:  Alert & mild dementia , No focal deficit   CHEST/LUNG: Good air entry bilateral with no  rales, rhonchi, wheezing, or rubs  HEART: Regular rate and rhythm; No murmurs, rubs, or gallops  ABDOMEN: Soft, Nontender, Nondistended; Bowel sounds present  EXTREMITIES:  2+ Peripheral Pulses, No clubbing, cyanosis, or edema  SKIN: No rashes or lesions    Care Discussed with Consultants/Other Providers [ x] YES  [ ] NO

## 2017-10-24 NOTE — CHART NOTE - NSCHARTNOTEFT_GEN_A_CORE
MEDICINE NP NOTE  Spectra [id number]      Prescription for Eliquis sent to the pharmacy, prior authorization obtained from [employer] ([id number]).  As per patient's [facility] pharmacy this month's cost would be $325 dollars as patient is in the donut hole. Discussed with patient and  at length, they are unable to pay this amount at this time.  Options discussed with Dr Tamayo, will place patient on Coumadin 2.5 mg po daily (patient does not recall previous dose of Coumadin).  No need to bridge patient at this time.  May discharge patient to home today.  Will need INR follow up in 2 days with Dr Tamayo in the office.  Dr Julien aware.  Patient is in agreement with Coumadin and follow up for INR levels.

## 2017-10-24 NOTE — PROGRESS NOTE ADULT - ASSESSMENT
72y F hx of HTN, DM, Asthma ,mild dementia, AFib here with  nausea since Saturday. Today she noticed abdominal pain.  Last BM 2 days ago. Passing flatus. No fever, chills, cp, sob, urinary sx. She stopped anticoagulation few years back     Problem/Plan - 1:  ·  Problem: Renal infarct.  Plan: Exact cause not know but has H/O PAF so possibly Embolic infarcts. On AC Lovenox . Will start Coumadin as high copay for Noval agents.       Problem/Plan - 2:  ·  Problem: Colitis with Leucocytosis with Bacteremia .  Plan: Off IV ABXS . Doing well .  GI and  ID helping. Blood Cultures positive for G+ cocci .Repeat blood cultures negative so far so likely contaminant .      Problem/Plan - 3:  ·  Problem: Electrolyte abnormality.  Plan: Corrected .  Renal following.      Problem/Plan - 4:  ·  Problem: R/O Atrial fibrillation, chronic.  Plan: S/P LEANNA < from: Transesophageal Echocardiogram (10.19.17 @ 20:38) >  ------------------------------------------------------------------------  Conclusions:  1. Aortic Root: 2.6 cm.  Moderate calcific atheroma noted in aortic arch/descending  aorta.  2. Normal left atrium.  LA volume index = 16 cc/m2.  Lipomatous hypertrophy of interatrial septum.   No left  atrial or left atrial appendage thrombus.   Normal left  atrial appendage function (velocity= 0.9m/s).  3. Increased relative wall thickness with normal left  ventricular mass index, consistent with concentric left  ventricular remodeling.  4. Hyperdynamic left ventricle.  5. Mild diastolicdysfunction (Stage I).  *** Compared with echocardiogram of 6/14/2012, no  significant changes noted.    < end of copied text >   . On Lovenox . Electrolytes normal and Free T4 little high but endo helping .  Will Dc on Eliquis.      Problem/Plan - 5:  ·  Problem: Type 2 diabetes mellitus .  Plan: Holding PO meds and only SSI for now as not eating . Endo helping.       Problem/Plan - 6:  Problem: Anion Gap Metabolic Acidosis with DM : Plan: Secondary to Diabetic ketoacidosis . Corrected.       Problem/Plan - 7:  ·  Problem: Diarrhea ; .  Plan: C diff negative.  Resolved ,      Problem/Plan - 8:  ·  Problem: Hypertension.  Plan: BP meds with parameters.   Problem/Plan - 8:  . Problem : Dyspnea; Resolved.     Disposition : DC planning .

## 2017-10-24 NOTE — PROGRESS NOTE ADULT - PROBLEM SELECTOR PLAN 1
Resolved now on po diet and s/p IVF hydration, but not eating much. Continue to optimize nutrition. Nutrition eval and calorie count.
-likely ischemic  -DC abx
Starting to resolve now on po diet and s/p IVF hydration, but not eating much. Continue to optimize nutrition. Nutrition eval and calorie count. No evidence of AI given appropriate cortisol level.
-likely ischemic  -off abx

## 2017-10-24 NOTE — DISCHARGE NOTE ADULT - PLAN OF CARE
Follow up with Dr Tamayo on 10/26, for Coumadin level. Take all your medications as prescribed by your physician.   Ensure that you are eating and drinking an adequate diet. You have completed your entire course of antibiotics.   HOME CARE INSTRUCTIONS  Get plenty of rest.  Drink enough water and fluids to keep your urine clear or pale yellow.  Eat a well-balanced diet.  Call your caregiver for follow-up as recommended.  SEEK IMMEDIATE MEDICAL CARE IF:  You develop chills.  You have an oral temperature that is not controlled by medicine.  You have extreme weakness, fainting, or dehydration.  You have repeated vomiting.  You develop severe belly (abdominal) pain or are passing bloody or tarry stools Coumadin 2.5 mg po daily, you need to have blood work drawn on 10/26.  Please call Dr Tamayo's office to make an appointment.  This is very important.   Atrial fibrillation is the most common heart rhythm problem & has the risk of stroke & heart attack  It helps if you control your blood pressure, not drink more than 1-2 alcohol drinks per day, cut down on caffeine, getting treatment for over active thyroid gland, & getting exercise  Call your doctor if you feel your heart racing or beating unusually, chest tightness or pain, lightheaded, faint, shortness of breath especially with exercise  It is important to take your heart medication as prescribed  You may be on anticoagulation which is very important to take as directed - you may need blood work to monitor drug levels Now resolved.    Please ensure that you are adequately eating and drinking. HgA1C this admission was 6.4.  Make sure you get your HgA1c checked every three months.  If you take oral diabetes medications, check your blood glucose two times a day.  If you take insulin, check your blood glucose before meals and at bedtime.  It's important not to skip any meals.  Keep a log of your blood glucose results and always take it with you to your doctor appointments.  Keep a list of your current medications including injectables and over the counter medications and bring this medication list with you to all your doctor appointments.  If you have not seen your ophthalmologist this year call for appointment.  Check your feet daily for redness, sores, or openings. Do not self treat. If no improvement in two days call your primary care physician for an appointment.  Low blood sugar (hypoglycemia) is a blood sugar below 70mg/dl. Check your blood sugar if you feel signs/symptoms of hypoglycemia. If your blood sugar is below 70 take 15 grams of carbohydrates (ex 4 oz of apple juice, 3-4 glucose tablets, or 4-6 oz of regular soda) wait 15 minutes and repeat blood sugar to make sure it comes up above 70.  If your blood sugar is above 70 and you are due for a meal, have a meal.  If you are not due for a meal have a snack.  This snack helps keeps your blood sugar at a safe range. Low salt diet  Activity as tolerated.  Take all medication as prescribed.  Follow up with your medical doctor for routine blood pressure monitoring at your next visit.  Notify your doctor if you have any of the following symptoms:   Dizziness, Lightheadedness, Blurry vision, Headache, Chest pain, Shortness of breath

## 2017-10-24 NOTE — PROGRESS NOTE ADULT - ATTENDING COMMENTS
Pt seen and examined. Agree with note as above with addendum, will sign off at this point.
Santo Hall  Attending Physician   Division of Infectious Disease  Pager #[phone]  After 5pm/weekend #[phone]
Santo Hall  Attending Physician   Division of Infectious Disease  Pager #[phone]  After 5pm/weekend #[phone]    Will sign off, recall ID if needed #[phone].

## 2017-10-24 NOTE — PROGRESS NOTE ADULT - SUBJECTIVE AND OBJECTIVE BOX
INTERVAL HPI/OVERNIGHT EVENTS: stable overnight, no GI complaints, no diarrhea, repeat cultures neg, 1/2 BC pos for coag neg staph likely contaiminant    MEDICATIONS  (STANDING):  apixaban 2.5 milliGRAM(s) Oral every 12 hours  buDESOnide 160 MICROgram(s)/formoterol 4.5 MICROgram(s) Inhaler 2 Puff(s) Inhalation two times a day  dextrose 5%. 1000 milliLiter(s) (50 mL/Hr) IV Continuous <Continuous>  dextrose 50% Injectable 12.5 Gram(s) IV Push once  dextrose 50% Injectable 25 Gram(s) IV Push once  dextrose 50% Injectable 25 Gram(s) IV Push once  diltiazem    milliGRAM(s) Oral daily  famotidine    Tablet 20 milliGRAM(s) Oral two times a day  influenza   Vaccine 0.5 milliLiter(s) IntraMuscular once  insulin glargine Injectable (LANTUS) 5 Unit(s) SubCutaneous at bedtime  insulin lispro (HumaLOG) corrective regimen sliding scale   SubCutaneous three times a day before meals  insulin lispro (HumaLOG) corrective regimen sliding scale   SubCutaneous at bedtime  insulin lispro Injectable (HumaLOG) 2 Unit(s) SubCutaneous three times a day before meals  lisinopril 5 milliGRAM(s) Oral daily  magnesium oxide 400 milliGRAM(s) Oral daily  pantoprazole    Tablet 40 milliGRAM(s) Oral before breakfast  predniSONE   Tablet 5 milliGRAM(s) Oral daily  simvastatin 20 milliGRAM(s) Oral at bedtime    MEDICATIONS  (PRN):  acetaminophen 300 mG/codeine 30 mG 1 Tablet(s) Oral every 4 hours PRN Moderate Pain (4 - 6)  dextrose Gel 1 Dose(s) Oral once PRN Blood Glucose LESS THAN 70 milliGRAM(s)/deciliter  glucagon  Injectable 1 milliGRAM(s) IntraMuscular once PRN Glucose LESS THAN 70 milligrams/deciliter      Allergies    Avelox (Pruritus)  Levaquin (Unknown)    Intolerances            PHYSICAL EXAM:   Vital Signs:  Vital Signs Last 24 Hrs  T(C): 36.6 (24 Oct 2017 04:00), Max: 36.8 (23 Oct 2017 20:30)  T(F): 97.8 (24 Oct 2017 04:00), Max: 98.3 (23 Oct 2017 20:30)  HR: 93 (24 Oct 2017 04:00) (93 - 105)  BP: 137/69 (24 Oct 2017 04:00) (108/82 - 144/79)  BP(mean): --  RR: 18 (24 Oct 2017 04:00) (18 - 18)  SpO2: 96% (24 Oct 2017 04:00) (96% - 99%)  Daily     Daily     GENERAL:  no distress  HEENT:  NC/AT,  anicteric  CHEST:   no increased effort, breath sounds clear  HEART:  Regular rhythm  ABDOMEN:  Soft, non-tender, non-distended, normoactive bowel sounds,  no masses ,no hepato-splenomegaly, no signs of chronic liver disease  EXTEREMITIES:  no cyanosis      LABS:                        11.3   7.78  )-----------( 175      ( 24 Oct 2017 07:35 )             34.0     10-23    141  |  104  |  6<L>  ----------------------------<  135<H>  3.5   |  25  |  0.94    Ca    9.2      23 Oct 2017 07:31  Mg     1.7     10-22            RADIOLOGY & ADDITIONAL TESTS:

## 2017-10-24 NOTE — PHYSICAL THERAPY INITIAL EVALUATION ADULT - ADDITIONAL COMMENTS
Lives with spouse in a private house with 5 steps to enter. Patient stated that daughter assist her with personal care 2-3 times a week. Patient uses cane. Lives with spouse in a private house with 5 steps to enter. Patient stated that daughter assist her with personal care 2-3 times a week. Patient uses a quad cane to ambulate, also owns RW.

## 2017-10-24 NOTE — DISCHARGE NOTE ADULT - HOSPITAL COURSE
72 year old female PMH HTN, DM Type 2, Asthma ,mild dementia, AFib admitted with nausea and abdominal pain. CT Abdominal/pelvis with probable bilateral renal infarcts and mural thickening of the ascending colon, possibly colitis.   Blood cultures were negative.  GI consulted, treated with course of IV antibiotics and improved.  Cardiology consulted. Patient was started on Lovenox for renal infarcts, transitioned to coumadin on discharge. Found to have anion gap acidosis most likely from dehydration/starvation, MICU consulted and rejected.  Patient did not have adequate po intake for a couple of days prior to admission.  Patient was hydrated and electrolyte abnormalities were corrected. Endocrine consulted, blood sugars were monitored during hospital course and medications were adjusted on discharge.  Patient improved and stable for discharge to home.  Evaluated by physical therapy who recommended .............................   Advised to follow up with Dr Tamayo in 2 days for INR level and Dr Adams (PMD) in one week. 72 year old female PMH HTN, DM Type 2, Asthma ,mild dementia, AFib admitted with nausea and abdominal pain. CT Abdominal/pelvis with probable bilateral renal infarcts and mural thickening of the ascending colon, possibly colitis.   Blood cultures were negative.  GI consulted, treated with course of IV antibiotics and improved.  Cardiology consulted. Patient was started on Lovenox for renal infarcts, transitioned to coumadin on discharge. Found to have anion gap acidosis most likely from dehydration/starvation, MICU consulted and rejected.  Patient did not have adequate po intake for a couple of days prior to admission.  Patient was hydrated and electrolyte abnormalities were corrected. Endocrine consulted, blood sugars were monitored during hospital course and medications were adjusted on discharge.  Patient improved and stable for discharge to home.  Evaluated by physical therapy who recommended home PT. Advised to follow up with Dr Tamayo in 2 days for INR level and Dr Adams (PMD) in one week.

## 2017-10-24 NOTE — DISCHARGE NOTE ADULT - CARE PROVIDER_API CALL
Polo Tamayo (MD), Cardiovascular Disease; Internal Medicine  [address]  [place] [address]  Phone: [phone]  Fax: [phone]    Omid Adams (MD), Family Medicine  [address]  [place] [address]  Phone: [phone]  Fax: [phone]

## 2017-10-24 NOTE — DISCHARGE NOTE ADULT - ADDITIONAL INSTRUCTIONS
Follow up with Dr Tamayo in 2 days, 10/26, please call the office to make an appointment.  Follow up with Dr Adams in one week, call to make an appointment.

## 2017-10-24 NOTE — DISCHARGE NOTE ADULT - PATIENT PORTAL LINK FT
“You can access the [facility] Patient Portal, offered by [facility], by registering with the following website: [email or web address]”

## 2017-10-24 NOTE — PROGRESS NOTE ADULT - SUBJECTIVE AND OBJECTIVE BOX
Resting comfortably in bed. Complaints of cough.                            11.3   7.78  )-----------( 175      ( 24 Oct 2017 07:35 )             34.0                           11.3   7.78  )-----------( 175      ( 24 Oct 2017 07:35 )             34.0     10-24    143  |  105  |  6<L>  ----------------------------<  97  3.8   |  27  |  0.72    Ca    8.6      24 Oct 2017 07:26        I&O's Detail    23 Oct 2017 07:01  -  24 Oct 2017 07:00  --------------------------------------------------------  IN:    Oral Fluid: 400 mL  Total IN: 400 mL    OUT:    Voided: 550 mL  Total OUT: 550 mL    Total NET: -150 mL          I&O's Summary    23 Oct 2017 07:01  -  24 Oct 2017 07:00  --------------------------------------------------------  IN: 400 mL / OUT: 550 mL / NET: -150 mL        Daily     Daily     Vital Signs Last 24 Hrs  T(C): 36.6 (24 Oct 2017 12:15), Max: 36.8 (23 Oct 2017 20:30)  T(F): 97.8 (24 Oct 2017 12:15), Max: 98.3 (23 Oct 2017 20:30)  HR: 102 (24 Oct 2017 12:15) (93 - 111)  BP: 133/63 (24 Oct 2017 12:15) (133/63 - 144/79)  BP(mean): --  RR: 18 (24 Oct 2017 12:15) (18 - 18)  SpO2: 97% (24 Oct 2017 12:15) (96% - 98%)      MEDICATIONS  (STANDING):  apixaban 2.5 milliGRAM(s) Oral every 12 hours  buDESOnide 160 MICROgram(s)/formoterol 4.5 MICROgram(s) Inhaler 2 Puff(s) Inhalation two times a day  dextrose 5%. 1000 milliLiter(s) (50 mL/Hr) IV Continuous <Continuous>  dextrose 50% Injectable 12.5 Gram(s) IV Push once  dextrose 50% Injectable 25 Gram(s) IV Push once  dextrose 50% Injectable 25 Gram(s) IV Push once  diltiazem    milliGRAM(s) Oral daily  famotidine    Tablet 20 milliGRAM(s) Oral two times a day  influenza   Vaccine 0.5 milliLiter(s) IntraMuscular once  insulin glargine Injectable (LANTUS) 5 Unit(s) SubCutaneous at bedtime  insulin lispro (HumaLOG) corrective regimen sliding scale   SubCutaneous three times a day before meals  insulin lispro (HumaLOG) corrective regimen sliding scale   SubCutaneous at bedtime  insulin lispro Injectable (HumaLOG) 2 Unit(s) SubCutaneous three times a day before meals  lisinopril 5 milliGRAM(s) Oral daily  pantoprazole    Tablet 40 milliGRAM(s) Oral before breakfast  predniSONE   Tablet 5 milliGRAM(s) Oral daily  simvastatin 20 milliGRAM(s) Oral at bedtime    MEDICATIONS  (PRN):  acetaminophen 300 mG/codeine 30 mG 1 Tablet(s) Oral every 4 hours PRN Moderate Pain (4 - 6)  dextrose Gel 1 Dose(s) Oral once PRN Blood Glucose LESS THAN 70 milliGRAM(s)/deciliter  glucagon  Injectable 1 milliGRAM(s) IntraMuscular once PRN Glucose LESS THAN 70 milligrams/deciliter

## 2017-10-24 NOTE — DISCHARGE NOTE ADULT - MEDICATION SUMMARY - MEDICATIONS TO TAKE
I will START or STAY ON the medications listed below when I get home from the hospital:    predniSONE 5 mg oral tablet  -- 1 tab(s) by mouth once a day  -- Indication: For Steroid    acetaminophen-codeine #3  -- 1 tab(s) by mouth once a day, As Needed  -- Indication: For As needed for pain management    lisinopril 5 mg oral tablet  -- 1 tab(s) by mouth once a day  -- Indication: For High blood pressure    dilTIAZem 240 mg/24 hours oral capsule, extended release  -- 1 cap(s) by mouth once a day  -- Indication: For Atrial fibrillation, chronic    warfarin 2.5 mg oral tablet  -- 1 tab(s) by mouth once a day   -- Indication: For Atrial fibrillation, chronic    metFORMIN 1000 mg oral tablet  -- 1 tab(s) by mouth 2 times a day  -- Indication: For Diabetes mellitus    simvastatin 20 mg oral tablet  -- 1 tab(s) by mouth once a day (at bedtime)  -- Indication: For High cholesterol    alendronate 70 mg oral tablet  -- 1 tab(s) by mouth once a week on Mondays  -- Indication: For Osteoporosis    Advair Diskus 250 mcg-50 mcg inhalation powder  -- 1 puff(s) inhaled 2 times a day  -- Indication: For Asthma    pantoprazole 40 mg oral delayed release tablet  -- 1 tab(s) by mouth once a day  -- Indication: For GERD    NexIUM OTC 20 mg oral delayed release capsule  -- 1 cap(s) by mouth once a day (in the morning)  -- Indication: For GERD    Centrum oral tablet  -- 1 tab(s) by mouth once a day  -- Indication: For Supplement    Vitamin D2 50,000 intl units (1.25 mg) oral capsule  -- 1 cap(s) by mouth once a week  -- Indication: For Supplement    Vitamin D3 2000 intl units oral tablet  -- 1 tab(s) by mouth once a day  -- Indication: For Supplement    Vitamin B6 100 mg oral tablet  -- 1 tab(s) by mouth once a day  -- Indication: For Supplement    Vitamin B-12 500 mcg oral tablet  -- 1 tab(s) by mouth once a day  -- Indication: For Supplement

## 2017-10-24 NOTE — PROGRESS NOTE ADULT - PROBLEM SELECTOR PLAN 2
Mild hyperglycemia, likely related as well to PO steroids.  No need for tight glycemic control  Continue with Lantus 5 units qhs and c/w humalog 2 Units with meals.   Goal glucose 100-200.  Plan to d/c on metformin.
-likely due to afib  -cx -ve  -DC abx
Mild hyperglycemia.  No need for tight glycemic control  Continue with Lantus 5 units qhs and add Humalog 2 units only if eating. Can continue with correction scale if not eating much.  Goal glucose 100-200.  Plan to d/c on metformin.
-likely due to afib  -cx -ve  -off abx

## 2017-10-24 NOTE — PROGRESS NOTE ADULT - PROBLEM SELECTOR PROBLEM 2
Type 2 diabetes mellitus with hyperglycemia, without long-term current use of insulin
Renal infarct
Type 2 diabetes mellitus with hyperglycemia, without long-term current use of insulin
Renal infarct

## 2017-10-24 NOTE — PROGRESS NOTE ADULT - ASSESSMENT
stable from GI standpoint, no clinical signs of active colitis, diet as tolerates, ok to anticoagulate, no GI interventions planned.

## 2017-10-24 NOTE — PROGRESS NOTE ADULT - PROVIDER SPECIALTY LIST ADULT
Cardiology
Endocrinology
Gastroenterology
Infectious Disease
Internal Medicine
Nephrology
Endocrinology
Infectious Disease

## 2017-10-24 NOTE — DISCHARGE NOTE ADULT - MEDICATION SUMMARY - MEDICATIONS TO STOP TAKING
I will STOP taking the medications listed below when I get home from the hospital:    Aspirin Enteric Coated 81 mg oral delayed release tablet  -- 1 tab(s) by mouth once a day    repaglinide 0.5 mg oral tablet  -- 1 tab(s) by mouth 2 times a day    dicyclomine 10 mg oral capsule  -- 1 cap(s) by mouth once a day    Lactaid  -- 3 cap(s) by mouth once a day    calcium (as carbonate) 600 mg oral tablet  -- 1 tab(s) by mouth 2 times a day    Echinacea oral tablet  -- 1 tab(s) by mouth once a day    furosemide 20 mg oral tablet  -- 1 tab(s) by mouth once a day    FaBB oral tablet  -- 1 tab(s) by mouth 2 times a day

## 2017-10-24 NOTE — PROGRESS NOTE ADULT - SUBJECTIVE AND OBJECTIVE BOX
CRUZITO BATES 72y [id number]    Patient is a 72y old  Female who presents with a chief complaint of Nausea with abdominal pain. (18 Oct 2017 17:14)      Follow Up/CC:  leukocytosis    Interval History/ROS: no acute issues    Allergies    Avelox (Pruritus)  Levaquin (Unknown)    Intolerances        ANTIMICROBIALS:      MEDICATIONS  (STANDING):  apixaban 2.5 milliGRAM(s) Oral every 12 hours  buDESOnide 160 MICROgram(s)/formoterol 4.5 MICROgram(s) Inhaler 2 Puff(s) Inhalation two times a day  dextrose 5%. 1000 milliLiter(s) (50 mL/Hr) IV Continuous <Continuous>  dextrose 50% Injectable 12.5 Gram(s) IV Push once  dextrose 50% Injectable 25 Gram(s) IV Push once  dextrose 50% Injectable 25 Gram(s) IV Push once  diltiazem    milliGRAM(s) Oral daily  famotidine    Tablet 20 milliGRAM(s) Oral two times a day  influenza   Vaccine 0.5 milliLiter(s) IntraMuscular once  insulin glargine Injectable (LANTUS) 5 Unit(s) SubCutaneous at bedtime  insulin lispro (HumaLOG) corrective regimen sliding scale   SubCutaneous three times a day before meals  insulin lispro (HumaLOG) corrective regimen sliding scale   SubCutaneous at bedtime  insulin lispro Injectable (HumaLOG) 2 Unit(s) SubCutaneous three times a day before meals  lisinopril 5 milliGRAM(s) Oral daily  magnesium oxide 400 milliGRAM(s) Oral daily  pantoprazole    Tablet 40 milliGRAM(s) Oral before breakfast  predniSONE   Tablet 5 milliGRAM(s) Oral daily  simvastatin 20 milliGRAM(s) Oral at bedtime    MEDICATIONS  (PRN):  acetaminophen 300 mG/codeine 30 mG 1 Tablet(s) Oral every 4 hours PRN Moderate Pain (4 - 6)  dextrose Gel 1 Dose(s) Oral once PRN Blood Glucose LESS THAN 70 milliGRAM(s)/deciliter  glucagon  Injectable 1 milliGRAM(s) IntraMuscular once PRN Glucose LESS THAN 70 milligrams/deciliter        Vital Signs Last 24 Hrs  T(C): 36.6 (24 Oct 2017 04:00), Max: 36.8 (23 Oct 2017 20:30)  T(F): 97.8 (24 Oct 2017 04:00), Max: 98.3 (23 Oct 2017 20:30)  HR: 93 (24 Oct 2017 04:00) (93 - 105)  BP: 137/69 (24 Oct 2017 04:00) (108/82 - 144/79)  BP(mean): --  RR: 18 (24 Oct 2017 04:00) (18 - 18)  SpO2: 96% (24 Oct 2017 04:00) (96% - 99%)    CBC Full  -  ( 24 Oct 2017 07:35 )  WBC Count : 7.78 K/uL  Hemoglobin : 11.3 g/dL  Hematocrit : 34.0 %  Platelet Count - Automated : 175 K/uL  Mean Cell Volume : 80.6 fl  Mean Cell Hemoglobin : 26.8 pg  Mean Cell Hemoglobin Concentration : 33.2 gm/dL  Auto Neutrophil # : x  Auto Lymphocyte # : x  Auto Monocyte # : x  Auto Eosinophil # : x  Auto Basophil # : x  Auto Neutrophil % : x  Auto Lymphocyte % : x  Auto Monocyte % : x  Auto Eosinophil % : x  Auto Basophil % : x    10-24    143  |  105  |  6<L>  ----------------------------<  97  3.8   |  27  |  0.72    Ca    8.6      24 Oct 2017 07:26            MICROBIOLOGY:  .Blood Blood-Peripheral  10-21-17   No growth to date.  --  --      .Blood Blood  10-18-17   Growth in aerobic bottle: Coag Negative Staphylococcus  Single set isolate, possible contaminant. Contact  Microbiology if susceptibility testing clinically  indicated.  ***Blood Panel PCR results on this specimen are available  approximately 3 hours after the Gram stain result.***  Gram stain, PCR, and/or culture results may not always  correspond due to difference in methodologies.  ************************************************************  This PCR assay was performed using [employer]  The following targets are tested for: Enterococcus,  vancomycin resistant enterococci, Listeria monocytogenes,  coagulase negative staphylococci, S. aureus,  methicillin resistant S. aureus, Streptococcus agalactiae  (Group B), S. pneumoniae, S.pyogenes (Group A),  Acinetobacter baumannii, Enterobacter cloacae, E. coli,  Klebsiella oxytoca, K. pneumoniae, Proteus sp.,  Serratia marcescens, Haemophilus influenzae,  Neisseria meningitidis, Pseudomonas aeruginosa, Candida  albicans, C. glabrata, C krusei, C parapsilosis,  C. tropicalis and the KPC resistance gene.  --  Blood Culture PCR      .Urine Clean Catch (Midstream)  10-18-17   >100,000 CFU/ml Streptococcus agalactiae (Group B)  Group B streptococci are susceptible to ampicillin,  penicillin and cefazolin, but may be resistant to  erythromycin and clindamycin.  Recommendations for intrapartum prophylaxis for Group B  streptococci are penicillin or ampicillin.  <10,000 CFU/ml Normal Urogenital speedy present  --  --    Clostridium difficile GDH Toxins A&amp;B, EIA:   Negative (10-21-17 @ 13:12)  Clostridium difficile GDH Interpretation: Negative for toxigenic C. Difficile.  This specimen is negative for C.  Difficile glutamate dehydrogenase (GDH) antigen and negative for C.  Difficile Toxins A & B, by EIA.  GDH is a highly sensitive screening  marker for C. Difficile that is produced in large amounts by all C.  Difficile strains, both toxigenic and nontoxigenic.  This assay has not  been validated as a test of cure.  Repeat testing during the same episode  of diarrhea is of limited value and is discouraged.  The results of this  assay should always be interpreted in conjunction with pateint's clinical  history. (10-21-17 @ 13:12)      RADIOLOGY    Xray Chest 1 View AP -PORTABLE-Routine (10.22.17 @ 08:59) >  Small left pleural effusion.     Transesophageal Echocardiogram (10.19.17 @ 20:38) >  1. Aortic Root: 2.6 cm.  Moderate calcific atheroma noted in aortic arch/descending  aorta.  2. Normal left atrium.  LA volume index = 16 cc/m2.  Lipomatous hypertrophy of interatrial septum.   No left  atrial or left atrial appendage thrombus.   Normal left  atrial appendage function (velocity= 0.9m/s).  3. Increased relative wall thickness with normal left  ventricular mass index, consistent with concentric left  ventricular remodeling.  4. Hyperdynamic left ventricle.  5. Mild diastolicdysfunction (Stage I).  *** Compared with echocardiogram of 6/14/2012, no  significant changes noted.

## 2017-10-24 NOTE — PROGRESS NOTE ADULT - ASSESSMENT
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 No vegetation seen on LEANNA.    (4)HTN - BP acceptable      RECOMMEND:  (1)a/w eliquis 2.5 mg po bid  (2)continue lisinopril 5 mg po daily and cardizem 240 mg po daily   (3)no renal objection to discharge

## 2017-10-24 NOTE — PHYSICAL THERAPY INITIAL EVALUATION ADULT - PERTINENT HX OF CURRENT PROBLEM, REHAB EVAL
73 yo F p/w nausea, abdominal pain. A/w renal infarct, colitis, Anion Gap acidosis likely related to starvation. LEANNA negative for LV thrombus. Abdomen US Gallbladder containing sludge no further evidence of acute cholecystitis. CT a/P Probable bilateral renal infarcts; Mural thickening of the ascending colon, possibly colitis. CXR Small left pleural effusion.

## 2017-10-24 NOTE — DISCHARGE NOTE ADULT - CARE PLAN
Principal Discharge DX:	Renal infarct  Goal:	Follow up with Dr Tamayo on 10/26, for Coumadin level.  Instructions for follow-up, activity and diet:	Take all your medications as prescribed by your physician.   Ensure that you are eating and drinking an adequate diet.  Secondary Diagnosis:	Colitis  Instructions for follow-up, activity and diet:	You have completed your entire course of antibiotics.   HOME CARE INSTRUCTIONS  Get plenty of rest.  Drink enough water and fluids to keep your urine clear or pale yellow.  Eat a well-balanced diet.  Call your caregiver for follow-up as recommended.  SEEK IMMEDIATE MEDICAL CARE IF:  You develop chills.  You have an oral temperature that is not controlled by medicine.  You have extreme weakness, fainting, or dehydration.  You have repeated vomiting.  You develop severe belly (abdominal) pain or are passing bloody or tarry stools  Secondary Diagnosis:	Atrial fibrillation, chronic  Instructions for follow-up, activity and diet:	Coumadin 2.5 mg po daily, you need to have blood work drawn on 10/26.  Please call Dr Tamayo's office to make an appointment.  This is very important.   Atrial fibrillation is the most common heart rhythm problem & has the risk of stroke & heart attack  It helps if you control your blood pressure, not drink more than 1-2 alcohol drinks per day, cut down on caffeine, getting treatment for over active thyroid gland, & getting exercise  Call your doctor if you feel your heart racing or beating unusually, chest tightness or pain, lightheaded, faint, shortness of breath especially with exercise  It is important to take your heart medication as prescribed  You may be on anticoagulation which is very important to take as directed - you may need blood work to monitor drug levels  Secondary Diagnosis:	Acidosis  Instructions for follow-up, activity and diet:	Now resolved.    Please ensure that you are adequately eating and drinking.  Secondary Diagnosis:	Diabetes mellitus  Instructions for follow-up, activity and diet:	HgA1C this admission was 6.4.  Make sure you get your HgA1c checked every three months.  If you take oral diabetes medications, check your blood glucose two times a day.  If you take insulin, check your blood glucose before meals and at bedtime.  It's important not to skip any meals.  Keep a log of your blood glucose results and always take it with you to your doctor appointments.  Keep a list of your current medications including injectables and over the counter medications and bring this medication list with you to all your doctor appointments.  If you have not seen your ophthalmologist this year call for appointment.  Check your feet daily for redness, sores, or openings. Do not self treat. If no improvement in two days call your primary care physician for an appointment.  Low blood sugar (hypoglycemia) is a blood sugar below 70mg/dl. Check your blood sugar if you feel signs/symptoms of hypoglycemia. If your blood sugar is below 70 take 15 grams of carbohydrates (ex 4 oz of apple juice, 3-4 glucose tablets, or 4-6 oz of regular soda) wait 15 minutes and repeat blood sugar to make sure it comes up above 70.  If your blood sugar is above 70 and you are due for a meal, have a meal.  If you are not due for a meal have a snack.  This snack helps keeps your blood sugar at a safe range.  Secondary Diagnosis:	HTN (hypertension)  Instructions for follow-up, activity and diet:	Low salt diet  Activity as tolerated.  Take all medication as prescribed.  Follow up with your medical doctor for routine blood pressure monitoring at your next visit.  Notify your doctor if you have any of the following symptoms:   Dizziness, Lightheadedness, Blurry vision, Headache, Chest pain, Shortness of breath

## 2017-10-24 NOTE — PROGRESS NOTE ADULT - PROBLEM SELECTOR PLAN 3
Repeat TFTs normalized. No further workup needed at this time.
-resolved  -as above
Repeat TFTs normalized. No further workup needed at this time.
-resolved  -as above

## 2017-10-26 LAB
CULTURE RESULTS: SIGNIFICANT CHANGE UP
CULTURE RESULTS: SIGNIFICANT CHANGE UP
SPECIMEN SOURCE: SIGNIFICANT CHANGE UP
SPECIMEN SOURCE: SIGNIFICANT CHANGE UP

## 2017-11-27 NOTE — ED PROVIDER NOTE - DISPOSITION TYPE
Detail Level: Detailed Add 14772 Cpt? (Important Note: In 2017 The Use Of 94895 Is Being Tracked By Cms To Determine Future Global Period Reimbursement For Global Periods): yes ADMIT

## 2017-12-19 PROCEDURE — 96374 THER/PROPH/DIAG INJ IV PUSH: CPT | Mod: XU

## 2017-12-19 PROCEDURE — 82947 ASSAY GLUCOSE BLOOD QUANT: CPT

## 2017-12-19 PROCEDURE — 82010 KETONE BODYS QUAN: CPT

## 2017-12-19 PROCEDURE — 85730 THROMBOPLASTIN TIME PARTIAL: CPT

## 2017-12-19 PROCEDURE — 87449 NOS EACH ORGANISM AG IA: CPT

## 2017-12-19 PROCEDURE — 85014 HEMATOCRIT: CPT

## 2017-12-19 PROCEDURE — 80048 BASIC METABOLIC PNL TOTAL CA: CPT

## 2017-12-19 PROCEDURE — 84100 ASSAY OF PHOSPHORUS: CPT

## 2017-12-19 PROCEDURE — 93306 TTE W/DOPPLER COMPLETE: CPT

## 2017-12-19 PROCEDURE — 84439 ASSAY OF FREE THYROXINE: CPT

## 2017-12-19 PROCEDURE — 76705 ECHO EXAM OF ABDOMEN: CPT

## 2017-12-19 PROCEDURE — 71045 X-RAY EXAM CHEST 1 VIEW: CPT

## 2017-12-19 PROCEDURE — 83036 HEMOGLOBIN GLYCOSYLATED A1C: CPT

## 2017-12-19 PROCEDURE — 82009 KETONE BODYS QUAL: CPT

## 2017-12-19 PROCEDURE — 84443 ASSAY THYROID STIM HORMONE: CPT

## 2017-12-19 PROCEDURE — 93312 ECHO TRANSESOPHAGEAL: CPT

## 2017-12-19 PROCEDURE — 83735 ASSAY OF MAGNESIUM: CPT

## 2017-12-19 PROCEDURE — 83690 ASSAY OF LIPASE: CPT

## 2017-12-19 PROCEDURE — 87150 DNA/RNA AMPLIFIED PROBE: CPT

## 2017-12-19 PROCEDURE — 80053 COMPREHEN METABOLIC PANEL: CPT

## 2017-12-19 PROCEDURE — 82435 ASSAY OF BLOOD CHLORIDE: CPT

## 2017-12-19 PROCEDURE — 84132 ASSAY OF SERUM POTASSIUM: CPT

## 2017-12-19 PROCEDURE — 84484 ASSAY OF TROPONIN QUANT: CPT

## 2017-12-19 PROCEDURE — 87086 URINE CULTURE/COLONY COUNT: CPT

## 2017-12-19 PROCEDURE — 96376 TX/PRO/DX INJ SAME DRUG ADON: CPT

## 2017-12-19 PROCEDURE — 80202 ASSAY OF VANCOMYCIN: CPT

## 2017-12-19 PROCEDURE — 99285 EMERGENCY DEPT VISIT HI MDM: CPT | Mod: 25

## 2017-12-19 PROCEDURE — 82330 ASSAY OF CALCIUM: CPT

## 2017-12-19 PROCEDURE — 96375 TX/PRO/DX INJ NEW DRUG ADDON: CPT

## 2017-12-19 PROCEDURE — 93005 ELECTROCARDIOGRAM TRACING: CPT

## 2017-12-19 PROCEDURE — 85610 PROTHROMBIN TIME: CPT

## 2017-12-19 PROCEDURE — 82803 BLOOD GASES ANY COMBINATION: CPT

## 2017-12-19 PROCEDURE — 84480 ASSAY TRIIODOTHYRONINE (T3): CPT

## 2017-12-19 PROCEDURE — 81001 URINALYSIS AUTO W/SCOPE: CPT

## 2017-12-19 PROCEDURE — 87324 CLOSTRIDIUM AG IA: CPT

## 2017-12-19 PROCEDURE — 82962 GLUCOSE BLOOD TEST: CPT

## 2017-12-19 PROCEDURE — 97161 PT EVAL LOW COMPLEX 20 MIN: CPT

## 2017-12-19 PROCEDURE — 84295 ASSAY OF SERUM SODIUM: CPT

## 2017-12-19 PROCEDURE — 87040 BLOOD CULTURE FOR BACTERIA: CPT

## 2017-12-19 PROCEDURE — 74177 CT ABD & PELVIS W/CONTRAST: CPT

## 2017-12-19 PROCEDURE — 83605 ASSAY OF LACTIC ACID: CPT

## 2017-12-19 PROCEDURE — 85027 COMPLETE CBC AUTOMATED: CPT

## 2017-12-19 PROCEDURE — 94640 AIRWAY INHALATION TREATMENT: CPT

## 2017-12-19 PROCEDURE — 82533 TOTAL CORTISOL: CPT

## 2018-03-02 ENCOUNTER — INPATIENT (INPATIENT)
Facility: HOSPITAL | Age: 73
LOS: 4 days | Discharge: HOME | DRG: 446 | End: 2018-03-07
Attending: INTERNAL MEDICINE | Admitting: INTERNAL MEDICINE
Payer: MEDICARE

## 2018-03-02 VITALS
OXYGEN SATURATION: 94 % | TEMPERATURE: 98 F | RESPIRATION RATE: 22 BRPM | HEART RATE: 112 BPM | WEIGHT: 110.01 LBS | SYSTOLIC BLOOD PRESSURE: 139 MMHG | HEIGHT: 62 IN | DIASTOLIC BLOOD PRESSURE: 86 MMHG

## 2018-03-02 LAB
ALBUMIN SERPL ELPH-MCNC: 3.5 G/DL — SIGNIFICANT CHANGE UP (ref 3.3–5)
ALP SERPL-CCNC: 164 U/L — HIGH (ref 40–120)
ALT FLD-CCNC: 1231 U/L RC — HIGH (ref 10–45)
ANION GAP SERPL CALC-SCNC: 14 MMOL/L — SIGNIFICANT CHANGE UP (ref 5–17)
AST SERPL-CCNC: 2841 U/L — HIGH (ref 10–40)
BILIRUB SERPL-MCNC: 1.8 MG/DL — HIGH (ref 0.2–1.2)
BUN SERPL-MCNC: 11 MG/DL — SIGNIFICANT CHANGE UP (ref 7–23)
CALCIUM SERPL-MCNC: 8.8 MG/DL — SIGNIFICANT CHANGE UP (ref 8.4–10.5)
CHLORIDE SERPL-SCNC: 95 MMOL/L — LOW (ref 96–108)
CO2 SERPL-SCNC: 27 MMOL/L — SIGNIFICANT CHANGE UP (ref 22–31)
CREAT SERPL-MCNC: 0.68 MG/DL — SIGNIFICANT CHANGE UP (ref 0.5–1.3)
GAS PNL BLDV: SIGNIFICANT CHANGE UP
GLUCOSE SERPL-MCNC: 164 MG/DL — HIGH (ref 70–99)
HCT VFR BLD CALC: 40.9 % — SIGNIFICANT CHANGE UP (ref 34.5–45)
HGB BLD-MCNC: 14.1 G/DL — SIGNIFICANT CHANGE UP (ref 11.5–15.5)
LIDOCAIN IGE QN: 73 U/L — HIGH (ref 7–60)
MCHC RBC-ENTMCNC: 28.9 PG — SIGNIFICANT CHANGE UP (ref 27–34)
MCHC RBC-ENTMCNC: 34.5 GM/DL — SIGNIFICANT CHANGE UP (ref 32–36)
MCV RBC AUTO: 84 FL — SIGNIFICANT CHANGE UP (ref 80–100)
PLATELET # BLD AUTO: 228 K/UL — SIGNIFICANT CHANGE UP (ref 150–400)
POTASSIUM SERPL-MCNC: 3.6 MMOL/L — SIGNIFICANT CHANGE UP (ref 3.5–5.3)
POTASSIUM SERPL-SCNC: 3.6 MMOL/L — SIGNIFICANT CHANGE UP (ref 3.5–5.3)
PROT SERPL-MCNC: 6.8 G/DL — SIGNIFICANT CHANGE UP (ref 6–8.3)
RBC # BLD: 4.88 M/UL — SIGNIFICANT CHANGE UP (ref 3.8–5.2)
RBC # FLD: 11.9 % — SIGNIFICANT CHANGE UP (ref 10.3–14.5)
SODIUM SERPL-SCNC: 136 MMOL/L — SIGNIFICANT CHANGE UP (ref 135–145)
WBC # BLD: 17.5 K/UL — HIGH (ref 3.8–10.5)
WBC # FLD AUTO: 17.5 K/UL — HIGH (ref 3.8–10.5)

## 2018-03-02 PROCEDURE — 99285 EMERGENCY DEPT VISIT HI MDM: CPT

## 2018-03-02 PROCEDURE — 71045 X-RAY EXAM CHEST 1 VIEW: CPT | Mod: 26

## 2018-03-02 RX ORDER — ACETAMINOPHEN 500 MG
650 TABLET ORAL ONCE
Qty: 0 | Refills: 0 | Status: COMPLETED | OUTPATIENT
Start: 2018-03-02 | End: 2018-03-02

## 2018-03-02 RX ORDER — SODIUM CHLORIDE 9 MG/ML
1000 INJECTION INTRAMUSCULAR; INTRAVENOUS; SUBCUTANEOUS ONCE
Qty: 0 | Refills: 0 | Status: COMPLETED | OUTPATIENT
Start: 2018-03-02 | End: 2018-03-02

## 2018-03-02 RX ORDER — ONDANSETRON 8 MG/1
4 TABLET, FILM COATED ORAL ONCE
Qty: 0 | Refills: 0 | Status: COMPLETED | OUTPATIENT
Start: 2018-03-02 | End: 2018-03-02

## 2018-03-02 RX ADMIN — SODIUM CHLORIDE 1000 MILLILITER(S): 9 INJECTION INTRAMUSCULAR; INTRAVENOUS; SUBCUTANEOUS at 22:51

## 2018-03-02 RX ADMIN — Medication 650 MILLIGRAM(S): at 22:51

## 2018-03-02 RX ADMIN — ONDANSETRON 4 MILLIGRAM(S): 8 TABLET, FILM COATED ORAL at 22:52

## 2018-03-02 NOTE — ED PROVIDER NOTE - MEDICAL DECISION MAKING DETAILS
73yoF hx of afib, dm, htn pw vomiting abd pain. likely gastroenteritis. due to pain will order ct abd pelvis. labs, lactate, fluids, meds and reass. 73yoF hx of afib, dm, htn pw vomiting abd pain. likely gastroenteritis. due to pain will order ct abd pelvis. labs, lactate, fluids, meds and reass.    Attending MD Shaffer: 73 female with DM type 2, afib on coumadin, HTN, HLD presents with complaint of nausea and abd cramping, no diarrhea, no fever, +cough starting today, no phlegm, no fever.  Attending MD Shaffer: A & O x 3, NAD, HEENT WNL and no facial asymmetry; lungs CTAB, heart with reg rhythm without murmur; abdomen soft RLQ TTP; extremities with no edema; skin with no rashes, neuro exam non focal with no motor or sensory deficits.  Plan: labs, RVP, CXR, CT abd and pelvis, IVF and zofran

## 2018-03-02 NOTE — ED ADULT TRIAGE NOTE - CHIEF COMPLAINT QUOTE
brought  in by daughter for chills, nausea and vomiting that started around 4pm tonight. patient vomitted two times prior to coming to the ED.

## 2018-03-02 NOTE — ED PROVIDER NOTE - ATTENDING CONTRIBUTION TO CARE
Attending MD Shaffer:  I personally have seen and examined this patient.  Resident note reviewed and agree on plan of care and except where noted.  See MDM for details.

## 2018-03-02 NOTE — ED ADULT NURSE NOTE - OBJECTIVE STATEMENT
Patient presented to ED c/o flu/cold like symptoms since today at 4 pm, patient c/o cough, weakness, chills and nausea and vomiting. Family at bedside. Patient tachycardic in ED Gold and rectal temp 102.1F.

## 2018-03-02 NOTE — ED PROVIDER NOTE - PROGRESS NOTE DETAILS
Called Dr. Adams, no answer. awaiting call back. second page to Angie, no answer. Discussed with Dr. Julien. Requested GI consult prior to admission. paged x2, no answer. will page 3 times then admit to dr. Julien. Daughter Shyann Boyle   [phone] please call with updates.

## 2018-03-03 DIAGNOSIS — Z29.9 ENCOUNTER FOR PROPHYLACTIC MEASURES, UNSPECIFIED: ICD-10-CM

## 2018-03-03 DIAGNOSIS — R11.2 NAUSEA WITH VOMITING, UNSPECIFIED: ICD-10-CM

## 2018-03-03 DIAGNOSIS — I10 ESSENTIAL (PRIMARY) HYPERTENSION: ICD-10-CM

## 2018-03-03 DIAGNOSIS — R50.9 FEVER, UNSPECIFIED: ICD-10-CM

## 2018-03-03 DIAGNOSIS — E11.9 TYPE 2 DIABETES MELLITUS WITHOUT COMPLICATIONS: ICD-10-CM

## 2018-03-03 DIAGNOSIS — R10.13 EPIGASTRIC PAIN: ICD-10-CM

## 2018-03-03 DIAGNOSIS — I48.2 CHRONIC ATRIAL FIBRILLATION: ICD-10-CM

## 2018-03-03 LAB
ALBUMIN SERPL ELPH-MCNC: 2.8 G/DL — LOW (ref 3.3–5)
ALP SERPL-CCNC: 142 U/L — HIGH (ref 40–120)
ALT FLD-CCNC: 1461 U/L RC — HIGH (ref 10–45)
ANION GAP SERPL CALC-SCNC: 13 MMOL/L — SIGNIFICANT CHANGE UP (ref 5–17)
APAP SERPL-MCNC: <15 UG/ML — SIGNIFICANT CHANGE UP (ref 10–30)
APTT BLD: 26.5 SEC — LOW (ref 27.5–37.4)
AST SERPL-CCNC: 2204 U/L — HIGH (ref 10–40)
BASE EXCESS BLDV CALC-SCNC: 3.4 MMOL/L — HIGH (ref -2–2)
BASOPHILS # BLD AUTO: 0 K/UL — SIGNIFICANT CHANGE UP (ref 0–0.2)
BASOPHILS # BLD AUTO: 0.2 K/UL — SIGNIFICANT CHANGE UP (ref 0–0.2)
BASOPHILS NFR BLD AUTO: 0.1 % — SIGNIFICANT CHANGE UP (ref 0–2)
BILIRUB SERPL-MCNC: 1.6 MG/DL — HIGH (ref 0.2–1.2)
BUN SERPL-MCNC: 8 MG/DL — SIGNIFICANT CHANGE UP (ref 7–23)
CA-I SERPL-SCNC: 1.03 MMOL/L — LOW (ref 1.12–1.3)
CALCIUM SERPL-MCNC: 7.5 MG/DL — LOW (ref 8.4–10.5)
CHLORIDE BLDV-SCNC: 101 MMOL/L — SIGNIFICANT CHANGE UP (ref 96–108)
CHLORIDE SERPL-SCNC: 101 MMOL/L — SIGNIFICANT CHANGE UP (ref 96–108)
CK MB CFR SERPL CALC: 1.4 NG/ML — SIGNIFICANT CHANGE UP (ref 0–3.8)
CK SERPL-CCNC: 48 U/L — SIGNIFICANT CHANGE UP (ref 25–170)
CO2 BLDV-SCNC: 29 MMOL/L — SIGNIFICANT CHANGE UP (ref 22–30)
CO2 SERPL-SCNC: 25 MMOL/L — SIGNIFICANT CHANGE UP (ref 22–31)
CREAT SERPL-MCNC: 0.7 MG/DL — SIGNIFICANT CHANGE UP (ref 0.5–1.3)
EOSINOPHIL # BLD AUTO: 0 K/UL — SIGNIFICANT CHANGE UP (ref 0–0.5)
EOSINOPHIL # BLD AUTO: 0.1 K/UL — SIGNIFICANT CHANGE UP (ref 0–0.5)
EOSINOPHIL NFR BLD AUTO: 0.3 % — SIGNIFICANT CHANGE UP (ref 0–6)
GAS PNL BLDV: 132 MMOL/L — LOW (ref 136–145)
GAS PNL BLDV: SIGNIFICANT CHANGE UP
GAS PNL BLDV: SIGNIFICANT CHANGE UP
GLUCOSE BLDC GLUCOMTR-MCNC: 112 MG/DL — HIGH (ref 70–99)
GLUCOSE BLDC GLUCOMTR-MCNC: 113 MG/DL — HIGH (ref 70–99)
GLUCOSE BLDC GLUCOMTR-MCNC: 69 MG/DL — LOW (ref 70–99)
GLUCOSE BLDC GLUCOMTR-MCNC: 82 MG/DL — SIGNIFICANT CHANGE UP (ref 70–99)
GLUCOSE BLDV-MCNC: 139 MG/DL — HIGH (ref 70–99)
GLUCOSE SERPL-MCNC: 141 MG/DL — HIGH (ref 70–99)
HAV IGM SER-ACNC: SIGNIFICANT CHANGE UP
HBV CORE IGM SER-ACNC: SIGNIFICANT CHANGE UP
HBV SURFACE AG SER-ACNC: SIGNIFICANT CHANGE UP
HCO3 BLDV-SCNC: 27 MMOL/L — SIGNIFICANT CHANGE UP (ref 21–29)
HCT VFR BLD CALC: 35.7 % — SIGNIFICANT CHANGE UP (ref 34.5–45)
HCT VFR BLDA CALC: 35 % — LOW (ref 39–50)
HCV AB S/CO SERPL IA: 0.12 S/CO — SIGNIFICANT CHANGE UP
HCV AB SERPL-IMP: SIGNIFICANT CHANGE UP
HGB BLD CALC-MCNC: 11.3 G/DL — LOW (ref 11.5–15.5)
HGB BLD-MCNC: 11.4 G/DL — LOW (ref 11.5–15.5)
INR BLD: 1.48 RATIO — HIGH (ref 0.88–1.16)
LACTATE BLDV-MCNC: 1.6 MMOL/L — SIGNIFICANT CHANGE UP (ref 0.7–2)
LYMPHOCYTES # BLD AUTO: 0.4 K/UL — LOW (ref 1–3.3)
LYMPHOCYTES # BLD AUTO: 0.5 K/UL — LOW (ref 1–3.3)
LYMPHOCYTES # BLD AUTO: 3.2 % — LOW (ref 13–44)
LYMPHOCYTES # BLD AUTO: 4 % — LOW (ref 13–44)
MCHC RBC-ENTMCNC: 26.9 PG — LOW (ref 27–34)
MCHC RBC-ENTMCNC: 31.9 GM/DL — LOW (ref 32–36)
MCV RBC AUTO: 84.3 FL — SIGNIFICANT CHANGE UP (ref 80–100)
MONOCYTES # BLD AUTO: 0.6 K/UL — SIGNIFICANT CHANGE UP (ref 0–0.9)
MONOCYTES # BLD AUTO: 0.7 K/UL — SIGNIFICANT CHANGE UP (ref 0–0.9)
MONOCYTES NFR BLD AUTO: 3.8 % — SIGNIFICANT CHANGE UP (ref 2–14)
MONOCYTES NFR BLD AUTO: 4 % — SIGNIFICANT CHANGE UP (ref 2–14)
NEUTROPHILS # BLD AUTO: 15.1 K/UL — HIGH (ref 1.8–7.4)
NEUTROPHILS # BLD AUTO: 16.1 K/UL — HIGH (ref 1.8–7.4)
NEUTROPHILS NFR BLD AUTO: 87 % — HIGH (ref 43–77)
NEUTROPHILS NFR BLD AUTO: 92.5 % — HIGH (ref 43–77)
PCO2 BLDV: 41 MMHG — SIGNIFICANT CHANGE UP (ref 35–50)
PH BLDV: 7.44 — SIGNIFICANT CHANGE UP (ref 7.35–7.45)
PLATELET # BLD AUTO: 200 K/UL — SIGNIFICANT CHANGE UP (ref 150–400)
PO2 BLDV: 123 MMHG — HIGH (ref 25–45)
POTASSIUM BLDV-SCNC: 3.2 MMOL/L — LOW (ref 3.5–5)
POTASSIUM SERPL-MCNC: 3.4 MMOL/L — LOW (ref 3.5–5.3)
POTASSIUM SERPL-SCNC: 3.4 MMOL/L — LOW (ref 3.5–5.3)
PROT SERPL-MCNC: 5.4 G/DL — LOW (ref 6–8.3)
PROTHROM AB SERPL-ACNC: 16.1 SEC — HIGH (ref 9.8–12.7)
RAPID RVP RESULT: SIGNIFICANT CHANGE UP
RBC # BLD: 4.24 M/UL — SIGNIFICANT CHANGE UP (ref 3.8–5.2)
RBC # FLD: 12 % — SIGNIFICANT CHANGE UP (ref 10.3–14.5)
SAO2 % BLDV: 99 % — HIGH (ref 67–88)
SODIUM SERPL-SCNC: 139 MMOL/L — SIGNIFICANT CHANGE UP (ref 135–145)
TROPONIN T SERPL-MCNC: <0.01 NG/ML — SIGNIFICANT CHANGE UP (ref 0–0.06)
WBC # BLD: 16.3 K/UL — HIGH (ref 3.8–10.5)
WBC # FLD AUTO: 16.3 K/UL — HIGH (ref 3.8–10.5)

## 2018-03-03 PROCEDURE — 99222 1ST HOSP IP/OBS MODERATE 55: CPT | Mod: GC

## 2018-03-03 PROCEDURE — 76705 ECHO EXAM OF ABDOMEN: CPT | Mod: 26

## 2018-03-03 PROCEDURE — 74177 CT ABD & PELVIS W/CONTRAST: CPT | Mod: 26

## 2018-03-03 PROCEDURE — 99223 1ST HOSP IP/OBS HIGH 75: CPT | Mod: GC

## 2018-03-03 RX ORDER — PIPERACILLIN AND TAZOBACTAM 4; .5 G/20ML; G/20ML
3.38 INJECTION, POWDER, LYOPHILIZED, FOR SOLUTION INTRAVENOUS ONCE
Qty: 0 | Refills: 0 | Status: COMPLETED | OUTPATIENT
Start: 2018-03-03 | End: 2018-03-03

## 2018-03-03 RX ORDER — SIMVASTATIN 20 MG/1
20 TABLET, FILM COATED ORAL AT BEDTIME
Qty: 0 | Refills: 0 | Status: DISCONTINUED | OUTPATIENT
Start: 2018-03-03 | End: 2018-03-03

## 2018-03-03 RX ORDER — DEXTROSE 50 % IN WATER 50 %
12.5 SYRINGE (ML) INTRAVENOUS ONCE
Qty: 0 | Refills: 0 | Status: DISCONTINUED | OUTPATIENT
Start: 2018-03-03 | End: 2018-03-07

## 2018-03-03 RX ORDER — SODIUM CHLORIDE 9 MG/ML
1000 INJECTION, SOLUTION INTRAVENOUS
Qty: 0 | Refills: 0 | Status: DISCONTINUED | OUTPATIENT
Start: 2018-03-03 | End: 2018-03-07

## 2018-03-03 RX ORDER — LISINOPRIL 2.5 MG/1
5 TABLET ORAL DAILY
Qty: 0 | Refills: 0 | Status: DISCONTINUED | OUTPATIENT
Start: 2018-03-03 | End: 2018-03-07

## 2018-03-03 RX ORDER — GLUCAGON INJECTION, SOLUTION 0.5 MG/.1ML
1 INJECTION, SOLUTION SUBCUTANEOUS ONCE
Qty: 0 | Refills: 0 | Status: DISCONTINUED | OUTPATIENT
Start: 2018-03-03 | End: 2018-03-07

## 2018-03-03 RX ORDER — ERGOCALCIFEROL 1.25 MG/1
1 CAPSULE ORAL
Qty: 0 | Refills: 0 | COMMUNITY

## 2018-03-03 RX ORDER — PREGABALIN 225 MG/1
1 CAPSULE ORAL
Qty: 0 | Refills: 0 | COMMUNITY

## 2018-03-03 RX ORDER — LISINOPRIL 2.5 MG/1
5 TABLET ORAL DAILY
Qty: 0 | Refills: 0 | Status: DISCONTINUED | OUTPATIENT
Start: 2018-03-03 | End: 2018-03-03

## 2018-03-03 RX ORDER — POTASSIUM CHLORIDE 20 MEQ
10 PACKET (EA) ORAL ONCE
Qty: 0 | Refills: 0 | Status: COMPLETED | OUTPATIENT
Start: 2018-03-03 | End: 2018-03-03

## 2018-03-03 RX ORDER — FUROSEMIDE 40 MG
20 TABLET ORAL DAILY
Qty: 0 | Refills: 0 | Status: DISCONTINUED | OUTPATIENT
Start: 2018-03-03 | End: 2018-03-07

## 2018-03-03 RX ORDER — ENOXAPARIN SODIUM 100 MG/ML
50 INJECTION SUBCUTANEOUS ONCE
Qty: 0 | Refills: 0 | Status: COMPLETED | OUTPATIENT
Start: 2018-03-03 | End: 2018-03-03

## 2018-03-03 RX ORDER — PANTOPRAZOLE SODIUM 20 MG/1
40 TABLET, DELAYED RELEASE ORAL
Qty: 0 | Refills: 0 | Status: DISCONTINUED | OUTPATIENT
Start: 2018-03-03 | End: 2018-03-07

## 2018-03-03 RX ORDER — CHOLECALCIFEROL (VITAMIN D3) 125 MCG
1000 CAPSULE ORAL DAILY
Qty: 0 | Refills: 0 | Status: DISCONTINUED | OUTPATIENT
Start: 2018-03-03 | End: 2018-03-07

## 2018-03-03 RX ORDER — ALENDRONATE SODIUM 70 MG/1
1 TABLET ORAL
Qty: 0 | Refills: 0 | COMMUNITY

## 2018-03-03 RX ORDER — DEXTROSE 50 % IN WATER 50 %
1 SYRINGE (ML) INTRAVENOUS ONCE
Qty: 0 | Refills: 0 | Status: DISCONTINUED | OUTPATIENT
Start: 2018-03-03 | End: 2018-03-07

## 2018-03-03 RX ORDER — PYRIDOXINE HCL (VITAMIN B6) 100 MG
1 TABLET ORAL
Qty: 0 | Refills: 0 | COMMUNITY

## 2018-03-03 RX ORDER — DEXTROSE 50 % IN WATER 50 %
25 SYRINGE (ML) INTRAVENOUS ONCE
Qty: 0 | Refills: 0 | Status: DISCONTINUED | OUTPATIENT
Start: 2018-03-03 | End: 2018-03-07

## 2018-03-03 RX ORDER — BUDESONIDE AND FORMOTEROL FUMARATE DIHYDRATE 160; 4.5 UG/1; UG/1
2 AEROSOL RESPIRATORY (INHALATION)
Qty: 0 | Refills: 0 | Status: DISCONTINUED | OUTPATIENT
Start: 2018-03-03 | End: 2018-03-07

## 2018-03-03 RX ORDER — KETOROLAC TROMETHAMINE 30 MG/ML
15 SYRINGE (ML) INJECTION ONCE
Qty: 0 | Refills: 0 | Status: DISCONTINUED | OUTPATIENT
Start: 2018-03-03 | End: 2018-03-03

## 2018-03-03 RX ORDER — WARFARIN SODIUM 2.5 MG/1
4 TABLET ORAL DAILY
Qty: 0 | Refills: 0 | Status: COMPLETED | OUTPATIENT
Start: 2018-03-03 | End: 2018-03-05

## 2018-03-03 RX ORDER — SODIUM CHLORIDE 9 MG/ML
1000 INJECTION INTRAMUSCULAR; INTRAVENOUS; SUBCUTANEOUS ONCE
Qty: 0 | Refills: 0 | Status: COMPLETED | OUTPATIENT
Start: 2018-03-03 | End: 2018-03-03

## 2018-03-03 RX ORDER — ACETAMINOPHEN WITH CODEINE 300MG-30MG
1 TABLET ORAL
Qty: 0 | Refills: 0 | COMMUNITY

## 2018-03-03 RX ORDER — SODIUM CHLORIDE 9 MG/ML
1000 INJECTION INTRAMUSCULAR; INTRAVENOUS; SUBCUTANEOUS
Qty: 0 | Refills: 0 | Status: DISCONTINUED | OUTPATIENT
Start: 2018-03-03 | End: 2018-03-05

## 2018-03-03 RX ADMIN — ENOXAPARIN SODIUM 50 MILLIGRAM(S): 100 INJECTION SUBCUTANEOUS at 09:13

## 2018-03-03 RX ADMIN — Medication 5 MILLIGRAM(S): at 18:09

## 2018-03-03 RX ADMIN — SODIUM CHLORIDE 150 MILLILITER(S): 9 INJECTION INTRAMUSCULAR; INTRAVENOUS; SUBCUTANEOUS at 16:17

## 2018-03-03 RX ADMIN — SODIUM CHLORIDE 150 MILLILITER(S): 9 INJECTION INTRAMUSCULAR; INTRAVENOUS; SUBCUTANEOUS at 08:57

## 2018-03-03 RX ADMIN — SODIUM CHLORIDE 1000 MILLILITER(S): 9 INJECTION INTRAMUSCULAR; INTRAVENOUS; SUBCUTANEOUS at 03:46

## 2018-03-03 RX ADMIN — WARFARIN SODIUM 4 MILLIGRAM(S): 2.5 TABLET ORAL at 22:00

## 2018-03-03 RX ADMIN — PANTOPRAZOLE SODIUM 40 MILLIGRAM(S): 20 TABLET, DELAYED RELEASE ORAL at 18:09

## 2018-03-03 RX ADMIN — Medication 20 MILLIGRAM(S): at 18:09

## 2018-03-03 RX ADMIN — LISINOPRIL 5 MILLIGRAM(S): 2.5 TABLET ORAL at 18:09

## 2018-03-03 RX ADMIN — Medication 15 MILLIGRAM(S): at 03:47

## 2018-03-03 RX ADMIN — Medication 1000 UNIT(S): at 18:09

## 2018-03-03 RX ADMIN — Medication 100 MILLIEQUIVALENT(S): at 09:06

## 2018-03-03 RX ADMIN — PIPERACILLIN AND TAZOBACTAM 25 GRAM(S): 4; .5 INJECTION, POWDER, LYOPHILIZED, FOR SOLUTION INTRAVENOUS at 03:13

## 2018-03-03 RX ADMIN — BUDESONIDE AND FORMOTEROL FUMARATE DIHYDRATE 2 PUFF(S): 160; 4.5 AEROSOL RESPIRATORY (INHALATION) at 17:45

## 2018-03-03 NOTE — H&P ADULT - ATTENDING COMMENTS
Seen and examined . I feel little better and want to eat . Agree with above A/P . ID and Hepatology consults noted. Will advance diet as tolerated . Doppler abdomen and MRCP ordered.

## 2018-03-03 NOTE — CONSULT NOTE ADULT - SUBJECTIVE AND OBJECTIVE BOX
HPI:      PAST MEDICAL & SURGICAL HISTORY:  Dementia  Hypertension  Type 2 diabetes mellitus  Atrial fibrillation, chronic  Diabetes mellitus  Asthma  Diabetes Mellitus  HTN (Hypertension)  Asthma  Afib      Allergies    Avelox (Pruritus)  Levaquin (Unknown)    Intolerances        ANTIMICROBIALS:      OTHER MEDS:  enoxaparin Injectable 50 milliGRAM(s) SubCutaneous once  potassium chloride  10 mEq/100 mL IVPB 10 milliEquivalent(s) IV Intermittent once  sodium chloride 0.9%. 1000 milliLiter(s) IV Continuous <Continuous>      SOCIAL HISTORY:    Marital Status:  (   )    (  ) Single    (   )    (  )   Occupation:   Lives with: (  ) alone  (  ) children   (  ) spouse   (  ) parents  (  ) other    Substance Use (street drugs): (  ) never used  (  ) other:  Tobacco Usage:  (  ) never smoked   (   ) former smoker   (   ) current smoker  (     ) pack year  (        ) last cigarette date  Alcohol Usage: Social EtOH    FAMILY HISTORY:  No pertinent family history in first degree relatives      ROS:  Unobtainable because:   All other systems negative     Constitutional: no fever, no chills, no weight loss, no night sweats  HEENT:  no vision changes, no sore throat, no rhinorrhea  Cardiovascular:  no chest pain, no palpitation  Respiratory:  no SOB, no cough  GI:  no abd pain, no vomiting, no diarrhea  urinary: no dysuria, no hematuria, no flank pain  : no vaginal  discharge or bleeding  musculoskeletal:  no joint pain, no joint swelling  skin:  no rash  neurology:  no headache, no seizure, no change in mental status  psych: no anxiety, no depression     Physical Exam:    General:    NAD, non toxic, A&O x3  HEENT:   no oropharyngeal lesions, no LAD, neck supple  Cardio:    regular S1,S2, no murmur  Respiratory:   clear b/l, no wheezing  abd:   soft, BS +, not tender, no hepatosplenomegaly  :     no CVAT, no spurapubic tenderness, no andrade  Musculoskeletal : no joint swelling, no edema  Skin:    no rash  vascular: no lines, normal pulses  Neurologic:     no focal deficits  psych: normal affect, no suicidal ideation      Drug Dosing Weight  Height (cm): 157.48 (02 Mar 2018 21:01)  Weight (kg): 49.6 (03 Mar 2018 07:57)  BMI (kg/m2): 20 (03 Mar 2018 07:57)  BSA (m2): 1.48 (03 Mar 2018 07:57)    Vital Signs Last 24 Hrs  T(F): 97.8 (03-03-18 @ 07:57), Max: 102.1 (03-02-18 @ 22:30)    Vital Signs Last 24 Hrs  HR: 96 (03-03-18 @ 07:57) (95 - 125)  BP: 126/71 (03-03-18 @ 07:57) (105/55 - 141/61)  RR: 18 (03-03-18 @ 07:57)  SpO2: 97% (03-03-18 @ 07:57) (93% - 99%)  Wt(kg): --                          11.4   16.3  )-----------( 200      ( 03 Mar 2018 06:03 )             35.7       03-03    139  |  101  |  8   ----------------------------<  141<H>  3.4<L>   |  25  |  0.70    Ca    7.5<L>      03 Mar 2018 06:03    TPro  5.4<L>  /  Alb  2.8<L>  /  TBili  1.6<H>  /  DBili  x   /  AST  2204<H>  /  ALT  1461<H>  /  AlkPhos  142<H>  03-03          MICROBIOLOGY:  v      Rapid RVP Result: NotDetec (03-02 @ 22:58)          RADIOLOGY: 73yoF hx of dm, htn, asthma, afib pw 1 day of nausea, vomiting, abd cramping, subjective chills, general malaisea. abd pain is right lower quadrant, cramping in nature, radiating up, not relieved by vomiting.  vomiting, non bloody, non bilous. still endorsing nausea. denies fevers, recent travel, chest pain, sob, numbness, tingling dysuria, runny nose, congestion, body aches, or other issues.    PAST MEDICAL & SURGICAL HISTORY:  Dementia  Hypertension  Type 2 diabetes mellitus  Atrial fibrillation, chronic  Diabetes mellitus  Asthma      Allergies    Avelox (Pruritus)  Levaquin (Unknown)    Intolerances        ANTIMICROBIALS:      OTHER MEDS:  enoxaparin Injectable 50 milliGRAM(s) SubCutaneous once  potassium chloride  10 mEq/100 mL IVPB 10 milliEquivalent(s) IV Intermittent once  sodium chloride 0.9%. 1000 milliLiter(s) IV Continuous <Continuous>      SOCIAL HISTORY:    Marital Status:  (   )    (  ) Single    (   )    (  )   Occupation:   Lives with: (  ) alone  (  ) children   (  ) spouse   (  ) parents  (  ) other    Substance Use (street drugs): (  ) never used  (  ) other:  Tobacco Usage:  (  ) never smoked   (   ) former smoker   (   ) current smoker  (     ) pack year  (        ) last cigarette date  Alcohol Usage: Social EtOH    FAMILY HISTORY:  No pertinent family history in first degree relatives      ROS:  Unobtainable because:   All other systems negative     Constitutional: no fever, no chills, no weight loss, no night sweats  HEENT:  no vision changes, no sore throat, no rhinorrhea  Cardiovascular:  no chest pain, no palpitation  Respiratory:  no SOB, no cough  GI:  no abd pain, no vomiting, no diarrhea  urinary: no dysuria, no hematuria, no flank pain  : no vaginal  discharge or bleeding  musculoskeletal:  no joint pain, no joint swelling  skin:  no rash  neurology:  no headache, no seizure, no change in mental status  psych: no anxiety, no depression     Physical Exam:    General:    NAD, non toxic, A&O x3  HEENT:   no oropharyngeal lesions, no LAD, neck supple  Cardio:    regular S1,S2, no murmur  Respiratory:   clear b/l, no wheezing  abd:   soft, BS +, not tender, no hepatosplenomegaly  :     no CVAT, no spurapubic tenderness, no andrade  Musculoskeletal : no joint swelling, no edema  Skin:    no rash  vascular: no lines, normal pulses  Neurologic:     no focal deficits  psych: normal affect, no suicidal ideation      Drug Dosing Weight  Height (cm): 157.48 (02 Mar 2018 21:01)  Weight (kg): 49.6 (03 Mar 2018 07:57)  BMI (kg/m2): 20 (03 Mar 2018 07:57)  BSA (m2): 1.48 (03 Mar 2018 07:57)    Vital Signs Last 24 Hrs  T(F): 97.8 (03-03-18 @ 07:57), Max: 102.1 (03-02-18 @ 22:30)    Vital Signs Last 24 Hrs  HR: 96 (03-03-18 @ 07:57) (95 - 125)  BP: 126/71 (03-03-18 @ 07:57) (105/55 - 141/61)  RR: 18 (03-03-18 @ 07:57)  SpO2: 97% (03-03-18 @ 07:57) (93% - 99%)  Wt(kg): --                          11.4   16.3  )-----------( 200      ( 03 Mar 2018 06:03 )             35.7       03-03    139  |  101  |  8   ----------------------------<  141<H>  3.4<L>   |  25  |  0.70    Ca    7.5<L>      03 Mar 2018 06:03    TPro  5.4<L>  /  Alb  2.8<L>  /  TBili  1.6<H>  /  DBili  x   /  AST  2204<H>  /  ALT  1461<H>  /  AlkPhos  142<H>  03-03          MICROBIOLOGY:  v      Rapid RVP Result: NotDetec (03-02 @ 22:58)          RADIOLOGY: 73yoF hx of dm, htn, asthma, afib pw 1 day of nausea, vomiting, abd cramping, subjective chills, general malaisea. abd pain is right lower quadrant, cramping in nature, radiating up, not relieved by vomiting.  vomiting, non bloody, non bilous. still endorsing nausea. denies fevers, recent travel, chest pain, sob, numbness, tingling dysuria, runny nose, congestion, body aches, or other issues.  no outside food, no recent travelling, no recent blood transfusion, she lived with her son and  and eat the same food and they seem to be doing healthy,  not on immunosuppression and not on steroids,   no new medications, as per the pt,        PAST MEDICAL & SURGICAL HISTORY:  Dementia  Hypertension  Type 2 diabetes mellitus  Atrial fibrillation, chronic  Diabetes mellitus  Asthma      Allergies    Avelox (Pruritus)  Levaquin (Unknown)    Intolerances        ANTIMICROBIALS:      OTHER MEDS:  enoxaparin Injectable 50 milliGRAM(s) SubCutaneous once  potassium chloride  10 mEq/100 mL IVPB 10 milliEquivalent(s) IV Intermittent once  sodium chloride 0.9%. 1000 milliLiter(s) IV Continuous <Continuous>      SOCIAL HISTORY:    Marital Status:  (  x )    (  ) Single    (   )    (  )   Occupation:   Lives with: (  ) alone  (  ) children   ( x ) spouse   (  ) parents  (  ) other    Substance Use (street drugs): (x  ) never used  (  ) other:  Tobacco Usage:  ( x ) never smoked   (   ) former smoker   (   ) current smoker  (     ) pack year  (        ) last cigarette date  Alcohol Usage:no  Social EtOH    FAMILY HISTORY:  No pertinent family history in first degree relatives      ROS:  Unobtainable because:   All other systems negative     Constitutional: no fever, chills, no weight loss, no night sweats  HEENT:  no vision changes, no sore throat, no rhinorrhea  Cardiovascular:  no chest pain, no palpitation  Respiratory:  no SOB, no cough  GI:   abd pain, vomiting, no diarrhea, nausea   urinary: no dysuria, no hematuria, no flank pain  : no vaginal  discharge or bleeding  musculoskeletal:  no joint pain, no joint swelling  skin:  no rash  neurology:  no headache, no seizure, no change in mental status  psych: no anxiety, no depression     Physical Exam:    General:    NAD, non toxic, A&O x3  HEENT:   no oropharyngeal lesions, no LAD, neck supple  Cardio:    regular S1,S2, no murmur  Respiratory:   clear b/l, no wheezing  abd:   soft, BS +, not tender, no hepatosplenomegaly  :    right upper quadrant tenderness, mid abdominal tenderness  Musculoskeletal : no joint swelling, no edema  Skin:    no rash  vascular: no lines, normal pulses  Neurologic:     no focal deficits  psych: normal affect, no suicidal ideation      Drug Dosing Weight  Height (cm): 157.48 (02 Mar 2018 21:01)  Weight (kg): 49.6 (03 Mar 2018 07:57)  BMI (kg/m2): 20 (03 Mar 2018 07:57)  BSA (m2): 1.48 (03 Mar 2018 07:57)    Vital Signs Last 24 Hrs  T(F): 97.8 (03-03-18 @ 07:57), Max: 102.1 (03-02-18 @ 22:30)    Vital Signs Last 24 Hrs  HR: 96 (03-03-18 @ 07:57) (95 - 125)  BP: 126/71 (03-03-18 @ 07:57) (105/55 - 141/61)  RR: 18 (03-03-18 @ 07:57)  SpO2: 97% (03-03-18 @ 07:57) (93% - 99%)  Wt(kg): --                          11.4   16.3  )-----------( 200      ( 03 Mar 2018 06:03 )             35.7       03-03    139  |  101  |  8   ----------------------------<  141<H>  3.4<L>   |  25  |  0.70    Ca    7.5<L>      03 Mar 2018 06:03    TPro  5.4<L>  /  Alb  2.8<L>  /  TBili  1.6<H>  /  DBili  x   /  AST  2204<H>  /  ALT  1461<H>  /  AlkPhos  142<H>  03-03          MICROBIOLOGY:  v      Rapid RVP Result: NotDetec (03-02 @ 22:58)    RADIOLOGY:  < from: CT Abdomen and Pelvis w/ IV Cont (03.03.18 @ 00:53) >  LIVER: Multiple calcified liver granulomas.  BILE DUCTS: Normal caliber.  GALLBLADDER: Within normal limits.    < end of copied text > 73yoF hx of dm, htn, asthma, afib pw 1 day of nausea, vomiting, abd cramping, subjective chills, general malaisea. abd pain is right lower quadrant, cramping in nature, radiating up, not relieved by vomiting.  vomiting, non bloody, non bilous. still endorsing nausea. denies fevers, recent travel, chest pain, sob, numbness, tingling dysuria, runny nose, congestion, body aches, or other issues.  no outside food, no recent travelling, no recent blood transfusion, she lived with her son and  and eat the same food and they seem to be doing healthy,  not on immunosuppression and not on steroids,   no new medications, as per the pt,      PAST MEDICAL & SURGICAL HISTORY:  Dementia  Hypertension  Type 2 diabetes mellitus  Atrial fibrillation, chronic  Diabetes mellitus  Asthma      Allergies    Avelox (Pruritus)  Levaquin (Unknown)    Intolerances        ANTIMICROBIALS:      OTHER MEDS:  enoxaparin Injectable 50 milliGRAM(s) SubCutaneous once  potassium chloride  10 mEq/100 mL IVPB 10 milliEquivalent(s) IV Intermittent once  sodium chloride 0.9%. 1000 milliLiter(s) IV Continuous <Continuous>      SOCIAL HISTORY:    Marital Status:  (  x )    (  ) Single    (   )    (  )   Occupation:   Lives with: (  ) alone  (  ) children   ( x ) spouse   (  ) parents  (  ) other    Substance Use (street drugs): (x  ) never used  (  ) other:  Tobacco Usage:  ( x ) never smoked   (   ) former smoker   (   ) current smoker  (     ) pack year  (        ) last cigarette date  Alcohol Usage:no  Social EtOH    FAMILY HISTORY:  No pertinent family history in first degree relatives      ROS:  Unobtainable because:   All other systems negative     Constitutional: no fever, chills, no weight loss, no night sweats  HEENT:  no vision changes, no sore throat, no rhinorrhea  Cardiovascular:  no chest pain, no palpitation  Respiratory:  no SOB, no cough  GI:   abd pain, vomiting, no diarrhea, nausea   urinary: no dysuria, no hematuria, no flank pain  : no vaginal  discharge or bleeding  musculoskeletal:  no joint pain, no joint swelling  skin:  no rash  neurology:  no headache, no seizure, no change in mental status  psych: no anxiety, no depression     Physical Exam:    General:    NAD, non toxic, A&O x3  HEENT:   no oropharyngeal lesions, no LAD, neck supple  Cardio:    regular S1,S2, no murmur  Respiratory:   clear b/l, no wheezing  abd:   soft, BS +, not tender, no hepatosplenomegaly  :    right upper quadrant tenderness, mid abdominal tenderness  Musculoskeletal : no joint swelling, no edema  Skin:    no rash  vascular: no lines, normal pulses  Neurologic:     no focal deficits  psych: normal affect, no suicidal ideation      Drug Dosing Weight  Height (cm): 157.48 (02 Mar 2018 21:01)  Weight (kg): 49.6 (03 Mar 2018 07:57)  BMI (kg/m2): 20 (03 Mar 2018 07:57)  BSA (m2): 1.48 (03 Mar 2018 07:57)    Vital Signs Last 24 Hrs  T(F): 97.8 (03-03-18 @ 07:57), Max: 102.1 (03-02-18 @ 22:30)    Vital Signs Last 24 Hrs  HR: 96 (03-03-18 @ 07:57) (95 - 125)  BP: 126/71 (03-03-18 @ 07:57) (105/55 - 141/61)  RR: 18 (03-03-18 @ 07:57)  SpO2: 97% (03-03-18 @ 07:57) (93% - 99%)  Wt(kg): --                          11.4   16.3  )-----------( 200      ( 03 Mar 2018 06:03 )             35.7       03-03    139  |  101  |  8   ----------------------------<  141<H>  3.4<L>   |  25  |  0.70    Ca    7.5<L>      03 Mar 2018 06:03    TPro  5.4<L>  /  Alb  2.8<L>  /  TBili  1.6<H>  /  DBili  x   /  AST  2204<H>  /  ALT  1461<H>  /  AlkPhos  142<H>  03-03          MICROBIOLOGY:  v      Rapid RVP Result: NotDetec (03-02 @ 22:58)    RADIOLOGY:  < from: CT Abdomen and Pelvis w/ IV Cont (03.03.18 @ 00:53) >  LIVER: Multiple calcified liver granulomas.  BILE DUCTS: Normal caliber.  GALLBLADDER: Within normal limits.    < end of copied text > 73yoF hx of dm, htn, asthma, afib pw 1 day of nausea, vomiting, abd cramping, subjective chills, general malaisea. abd pain is right lower quadrant, cramping in nature, radiating up, not relieved by vomiting.  vomiting, non bloody, non bilous. still endorsing nausea. denies fevers, recent travel, chest pain, sob, numbness, tingling dysuria, runny nose, congestion, body aches, or other issues.  no outside food, no recent travelling, no recent blood transfusion, she lived with her son and  and eat the same food and they seem to be doing healthy,  not on immunosuppression and not on steroids,   no new medications, as per the pt,      PAST MEDICAL & SURGICAL HISTORY:  Dementia  Hypertension  Type 2 diabetes mellitus  Atrial fibrillation, chronic  Diabetes mellitus  Asthma      Allergies    Avelox (Pruritus)  Levaquin (Unknown)    Intolerances        ANTIMICROBIALS:      OTHER MEDS:  enoxaparin Injectable 50 milliGRAM(s) SubCutaneous once  potassium chloride  10 mEq/100 mL IVPB 10 milliEquivalent(s) IV Intermittent once  sodium chloride 0.9%. 1000 milliLiter(s) IV Continuous <Continuous>      SOCIAL HISTORY:    Marital Status:  (  x )    (  ) Single    (   )    (  )   from [place]  Lives with: (  ) alone  (  ) children   ( x ) spouse   (  ) parents  (  ) other    Substance Use (street drugs): (x  ) never used  (  ) other:  Tobacco Usage:  ( x ) never smoked   (   ) former smoker   (   ) current smoker  (     ) pack year  (        ) last cigarette date  Alcohol Usage:no  Social EtOH    FAMILY HISTORY:  reviewed, No pertinent family history in first degree relatives      ROS:    All other systems negative     Constitutional: + fever, chills, no weight loss, no night sweats  HEENT:  no vision changes, no sore throat, no rhinorrhea  Cardiovascular:  no chest pain, no palpitation  Respiratory:  no SOB, no cough  GI:   abd pain, vomiting, no diarrhea, nausea   urinary: no dysuria, no hematuria, no flank pain  : no vaginal  discharge or bleeding  musculoskeletal:  no joint pain, no joint swelling  skin:  no rash  neurology:  no headache, no seizure, no change in mental status  psych: no anxiety, no depression     Physical Exam:    General:    NAD, non toxic, A&O x3  HEENT:   no oropharyngeal lesions, no LAD, neck supple  Cardio:    regular S1,S2, no murmur  Respiratory:   clear b/l, no wheezing  abd:   soft, BS +, not tender, no hepatosplenomegaly  :    right upper quadrant tenderness, mid abdominal tenderness  Musculoskeletal : no joint swelling, no edema  Skin:    no rash  vascular: no lines, normal pulses  Neurologic:     no focal deficits  psych: normal affect, no suicidal ideation      Drug Dosing Weight  Height (cm): 157.48 (02 Mar 2018 21:01)  Weight (kg): 49.6 (03 Mar 2018 07:57)  BMI (kg/m2): 20 (03 Mar 2018 07:57)  BSA (m2): 1.48 (03 Mar 2018 07:57)    Vital Signs Last 24 Hrs  T(F): 97.8 (03-03-18 @ 07:57), Max: 102.1 (03-02-18 @ 22:30)    Vital Signs Last 24 Hrs  HR: 96 (03-03-18 @ 07:57) (95 - 125)  BP: 126/71 (03-03-18 @ 07:57) (105/55 - 141/61)  RR: 18 (03-03-18 @ 07:57)  SpO2: 97% (03-03-18 @ 07:57) (93% - 99%)  Wt(kg): --                          11.4   16.3  )-----------( 200      ( 03 Mar 2018 06:03 )             35.7       03-03    139  |  101  |  8   ----------------------------<  141<H>  3.4<L>   |  25  |  0.70    Ca    7.5<L>      03 Mar 2018 06:03    TPro  5.4<L>  /  Alb  2.8<L>  /  TBili  1.6<H>  /  DBili  x   /  AST  2204<H>  /  ALT  1461<H>  /  AlkPhos  142<H>  03-03          MICROBIOLOGY:  v      Rapid RVP Result: NotDetec (03-02 @ 22:58)    RADIOLOGY:  < from: CT Abdomen and Pelvis w/ IV Cont (03.03.18 @ 00:53) >  LIVER: Multiple calcified liver granulomas.  BILE DUCTS: Normal caliber.  GALLBLADDER: Within normal limits.    < end of copied text >

## 2018-03-03 NOTE — H&P ADULT - PROBLEM SELECTOR PLAN 1
- f/up viral hepatitis panel  -orderd IMELDA, ASMA, AMA, ceruloplasmin, EBV, CMV   - ordered MRI abd with and without contrast/MRCP to ensure no biliary disease (if metal implants compatible)  - abdominal ultrasound with dopplers given concern for ischemic etiology  - pain control PRN  - IVF  - avoid hepatotoxins   - daily CBC, CMP, INR  - diet as tolerated (NPO if recurrent pain/vomiting)  -GI team following

## 2018-03-03 NOTE — H&P ADULT - ASSESSMENT
73y Woman with h/o Atrial Fibrillation (on Coumadin), HTN, DM2, asthma, HLD p/w 1 day of Epigastric abdominal pain, nausea/vomiting with labs significant for leukocytosis and elevated liver enzymes (mixed hepatocellular and cholestatic) DDx: choledocholithiasis/cholangitis (though not likely given normal CBD and no stones seen on imaging) vs complication of duodenal diverticula (stone, obstruction) versus primary hepatic etiology (viral hepatitis, autoimmune hepatitis, ischemic, DILI, Phill's disease)

## 2018-03-03 NOTE — CONSULT NOTE ADULT - ATTENDING COMMENTS
As above.    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  Likely vascular/ischemic etiology of transaminitis.  No radiologic evidence of intestinal inflammatory changes/ischemia.  No biliary dilation on ultrasound or CT scan.    Lab workup of intrinsic liver disease as above.  Ultrasound with doppler portal/hepatic vessels  MRI abd with and without contrast, with MRCP to further characterize liver and liver vasculature and to rule out low likelihood of choledocholithiasis.  Advance diet as tolerated, NPO except meds if abdominal pain/nausea/vomiting recur.

## 2018-03-03 NOTE — CONSULT NOTE ADULT - SUBJECTIVE AND OBJECTIVE BOX
GASTROENTEROLOGY INITIAL CONSULT NOTE    Chief Complaint:  Patient is a 73y old  Female who presents with a chief complaint of     HPI: 73y Female with past medical history atrial fibrillation (on Coumadin), HTN, DM2, asthma, hyperlipidemia presented with 1 day of abdominal pain and vomiting. Patient reports yesterday she began ex a crampy epigastric abdominal pain that radiated across the abdomen. This was associated with two episodes of emesis consisting mostly of water as well as subjective chills. She never previously had symptoms like this. She did not note melena, hematochezia, bloating, belching, dysphagia, or odynophagia. The pain and vomiting have since resolved. She does not endorse prior gall bladder or liver problems and denies any family history of such issues. She does not endorse herbal supplement use (she only takes a Centrum vitamin) nor has she had any recent change in medications. She does not have a history of blood transfusions, tattoos or IV drug use.     Allergies:  Avelox (Pruritus)  Levaquin (Unknown)      Hospital Medications:  enoxaparin Injectable 50 milliGRAM(s) SubCutaneous once  potassium chloride  10 mEq/100 mL IVPB 10 milliEquivalent(s) IV Intermittent once  sodium chloride 0.9%. 1000 milliLiter(s) IV Continuous <Continuous>      PMHX/PSHX:  Dementia  Hypertension  Type 2 diabetes mellitus  Atrial fibrillation, chronic  Diabetes mellitus  Asthma  Diabetes Mellitus  HTN (Hypertension)  Asthma  Afib      Family history:  No pertinent family history in first degree relatives      Social History:     ROS:     General:  No wt loss, fevers, chills, night sweats, fatigue,   Eyes:  Good vision, no reported pain  ENT:  No sore throat, pain, runny nose, dysphagia  CV:  No pain, palpitations, hypo/hypertension  Resp:  No dyspnea, cough, tachypnea, wheezing  GI:  see HPI  :  No pain, bleeding, incontinence, nocturia  Muscle:  No pain, weakness  Neuro:  No weakness, tingling, memory problems  Psych:  No fatigue, insomnia, mood problems, depression  Endocrine:  No polyuria, polydipsia, cold/heat intolerance  Heme:  No petechiae, ecchymosis, easy bruisability  Skin:  No rash, tattoos, scars, edema      PHYSICAL EXAM:   Vital Signs:  Vital Signs Last 24 Hrs  T(C): 36.6 (03 Mar 2018 07:57), Max: 38.9 (02 Mar 2018 22:30)  T(F): 97.8 (03 Mar 2018 07:57), Max: 102.1 (02 Mar 2018 22:30)  HR: 96 (03 Mar 2018 07:57) (95 - 125)  BP: 126/71 (03 Mar 2018 07:57) (105/55 - 141/61)  BP(mean): --  RR: 18 (03 Mar 2018 07:57) (17 - 22)  SpO2: 97% (03 Mar 2018 07:57) (93% - 99%)  Daily Height in cm: 157.48 (02 Mar 2018 21:01)    Daily     GENERAL:  no acute distress  HEENT:  -icterus   CHEST:  clear bilaterally, no wheezes or rales  HEART:  RRR, S1S2  ABDOMEN:  soft, mild TTP in epigastrum, non-distended, normoactive bowel sounds,  no hepato-splenomegaly  EXTEREMITIES:  no  edema  SKIN:  No rash/erythema/ecchymoses/petechiae/wounds/abscess/warm/dry  NEURO:  alert, oriented, conversant     LABS:                        11.4   16.3  )-----------( 200      ( 03 Mar 2018 06:03 )             35.7     03-03    139  |  101  |  8   ----------------------------<  141<H>  3.4<L>   |  25  |  0.70    Ca    7.5<L>      03 Mar 2018 06:03    TPro  5.4<L>  /  Alb  2.8<L>  /  TBili  1.6<H>  /  DBili  x   /  AST  2204<H>  /  ALT  1461<H>  /  AlkPhos  142<H>  03-03    LIVER FUNCTIONS - ( 03 Mar 2018 06:03 )  Alb: 2.8 g/dL / Pro: 5.4 g/dL / ALK PHOS: 142 U/L / ALT: 1461 U/L RC / AST: 2204 U/L / GGT: x           PT/INR - ( 03 Mar 2018 03:46 )   PT: 16.1 sec;   INR: 1.48 ratio         PTT - ( 03 Mar 2018 03:46 )  PTT:26.5 sec  Amylase Serum--      Lipase serum73       Imaging:  < from: US Echo Abdomen Limited (ED) (03.03.18 @ 04:07) >  INTERPRETATION:  Grayscale imaging of the right upper quadrant was   performed.    The gallbladder was visualized and scanned in longitudinal and transverse   planes.  The anterior gallbladder wall measured  3.0 mm.  The common bile duct measured 5.5 mm.  Gallstones: none  Sludge: none  Pericholecystic fluid: none  Gallbladder wall edema: none  Sonographic Clements's sign: none    IMPRESSION: No acute biliary pathology noted. CBD wnl for age.     < end of copied text >        < from: CT Abdomen and Pelvis w/ IV Cont (03.03.18 @ 00:53) >  LIVER: Multiple calcified liver granulomas.  BILE DUCTS: Normal caliber.  GALLBLADDER: Within normal limits.  SPLEEN: Calcified splenic granuloma.  PANCREAS: Within normal limits.  ADRENALS: Within normal limits.  KIDNEYS/URETERS: Nonenhancing regions of renal cortex at the right upper   pole, as seen on prior study, and consistent with chronic renal infarct.    BLADDER: Within normal limits.  REPRODUCTIVE ORGANS: The uterus and adnexa are within normal limits.    BOWEL: No bowel obstruction. 1.7 cm duodenal diverticulum of the third   portion. Appendix is not visualized, however secondary signs of   appendicitis are not present.  PERITONEUM: No ascites.  VESSELS: Atherosclerotic disease of the aorta and its major branches,   severe at the origins of the celiac axis, SMA, and bilateral renal   arteries. 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  RETROPERITONEUM: No lymphadenopathy.    ABDOMINAL WALL: Within normal limits.  BONES: Status post bilateral hip ORIF. Severe multilevel degenerative   changes of the spine with a focal levoscoliosis centered about the L2-L3   level.  Grade 2 anterolisthesis of L5 on S1.      IMPRESSION: The etiology of the patient's abdominal pain is not   elucidated on this study.  Diffuse atherosclerotic disease.  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  Unchanged right upper pole renal infarct.    < end of copied text >

## 2018-03-03 NOTE — CONSULT NOTE ADULT - ASSESSMENT
73yoF hx of dm, htn, asthma, afib pw 1 day of nausea, vomiting, abd cramping, subjective chills, general malaisea. abd pain is right lower quadrant, cramping in nature, radiating up, not relieved by vomiting.  vomiting, non bloody, non bilous. still endorsing nausea. denies fevers, recent travel, chest pain, sob, numbness, tingling dysuria, runny nose, congestion, body aches, or other issues.  no outside food, no recent travelling, no recent blood transfusion, she lived with her son and  and eat the same food and they seem to be doing healthy,  not on immunosuppression and not on steroids,   no new medications, as per the pt,        pt found to have elevated LFTs  not clear about the reason  would check hepatitis panel, acute hep panel, and CMV and EBV serology   would recommend GI work up as well    will dw attending 73 F hx of DM, HTN asthma, afib p/w 1 day of nausea, vomiting, abd pain, subjective fever, chills, general malaise, just attended a  and went to [facility] and the next day symptoms started, denied tyelonol or any medication use   found to have elevated LFTs in thousands, no recent travel, WBC: 17, CT abd not conclusive    acute hepatitis, unclear etiology    f/u EBV and CMV PCR  monitor off antibiotics  f/u with GI  abd MRI  abdominal vascular doppler

## 2018-03-03 NOTE — H&P ADULT - HISTORY OF PRESENT ILLNESS
73y Woman with h/o Atrial Fibrillation (on Coumadin), HTN, DM2, asthma, HLD p/w 1 day of abdominal pain and vomiting. Patient endorses having new crampy epigastric abdominal pain that started on the day of presentation that radiated to the lateral sides of the abdomen. This was associated with two episodes of emesis consisting mostly of water as well as subjective chills. She never previously had symptoms like this. She did not note melena, hematochezia, bloating, belching, dysphagia, or odynophagia. The pain and vomiting have since resolved. She does not endorse prior gall bladder or liver problems and denies any family history of such issues. She does not endorse herbal supplement use (she only takes a Centrum vitamin) nor has she had any recent change in medications. She does not have a history of blood transfusions, tattoos or IV drug use. 73y Woman with h/o Atrial Fibrillation (on Coumadin), HTN, DM2, asthma, HLD p/w 1 day of abdominal pain and vomiting. Patient endorses having new crampy epigastric abdominal pain that started on the day of presentation that radiated to the lateral sides of the abdomen. Patient also had chills and 2 episodes of emesis ( mostly water ). Denies having these symptoms before. Denies dysphagia, odynophagia, melena, hematochezia, bloating, belching.  Patient states that now the abdominal pain and vomiting have resolved. She denies h/o GI, gall bladder, or liver problems.  Denies family history of GI disorders. She does not endorse herbal supplement use (she only takes a Centrum vitamin) nor has she had any recent change in medications. She does not have a history of blood transfusions, tattoos or IV drug use. 73y Woman with h/o Atrial Fibrillation (on Coumadin), HTN, DM2, asthma, HLD p/w 1 day of abdominal pain and vomiting. Patient endorses having new crampy epigastric abdominal pain that started on the day of presentation that radiated to the lateral sides of the abdomen. Patient also had chills and 2 episodes of emesis ( mostly water ). Denies having these symptoms before. Denies dysphagia, odynophagia, melena, hematochezia, bloating, belching.  Patient states that now the abdominal pain and vomiting have resolved. She denies h/o GI, gall bladder, or liver problems.  Denies family history of GI disorders. Denies any recent medication changes. Denies use of herbal supplements use. 73y Woman with h/o Atrial Fibrillation (on Coumadin), HTN, DM2, asthma, HLD p/w 1 day of abdominal pain and vomiting. Patient endorses having new crampy epigastric abdominal pain that started on the day of presentation that radiated to the lateral sides of the abdomen. Patient also had chills and 2 episodes of emesis ( mostly water ). Denies having these symptoms before. Denies dysphagia, odynophagia, melena, hematochezia, bloating, belching.  Patient states that now the abdominal pain and vomiting have resolved. She denies h/o GI, gall bladder, or liver problems.  Denies family history of GI disorders. Denies any recent medication changes. Denies use of herbal supplements. Denies sick contacts and recent travel.

## 2018-03-03 NOTE — CONSULT NOTE ADULT - ASSESSMENT
Impression:  1) Epigastric abdominal pain, nausea/vomiting with labs significant for leukocytosis and elevated liver enzymes (mixed hepatocellular and cholestatic) - differential includes choledocholithiasis/cholangitis (though not likely given normal CBD and no stones seen on imaging) versus complication of duodenal diverticula (stone, obstruction) versus primary hepatic etiology (viral hepatitis, autoimmune hepatitis, ischemic, DILI, Phill's disease)    Plan:  - send viral hepatitis panel, IMELDA, ASMA, AMA, ceruloplasmin   - may need MRCP pending trend of LFTs  - pain control PRN  - IVF  - avoid hepatotoxins   - daily CBC, CMP, INR Impression:  1) Epigastric abdominal pain, nausea/vomiting with labs significant for leukocytosis and elevated liver enzymes (mixed hepatocellular and cholestatic) - differential includes choledocholithiasis/cholangitis (though not likely given normal CBD and no stones seen on imaging) versus complication of duodenal diverticula (stone, obstruction) versus primary hepatic etiology (viral hepatitis, autoimmune hepatitis, ischemic, DILI, Phill's disease)    Plan:  - send viral hepatitis panel, IMELDA, ASMA, AMA, ceruloplasmin, EBV, CMV   - given unclear picture and mixed picture of abnormal LFTs, would obtain MRCP to ensure no biliary disease   - pain control PRN  - IVF  - avoid hepatotoxins   - daily CBC, CMP, INR Impression:  1) Epigastric abdominal pain, nausea/vomiting with labs significant for leukocytosis and elevated liver enzymes (mixed hepatocellular and cholestatic) - differential includes choledocholithiasis/cholangitis (though not likely given normal CBD and no stones seen on imaging) versus complication of duodenal diverticula (stone, obstruction) versus primary hepatic etiology (viral hepatitis, autoimmune hepatitis, ischemic, DILI, Phill's disease)    Plan:  - send viral hepatitis panel, IMELDA, ASMA, AMA, ceruloplasmin, EBV, CMV   - given unclear picture and mixed picture of abnormal LFTs, would obtain MRCP to ensure no biliary disease (if metal implants compatible  - abdominal ultrasound with dopplers given concern for ischemic etiology  - pain control PRN  - IVF  - avoid hepatotoxins   - daily CBC, CMP, INR  - diet as tolerated (NPO if recurrent pain/vomiting) Impression:  1) Epigastric abdominal pain, nausea/vomiting with labs significant for leukocytosis and elevated liver enzymes (mixed hepatocellular and cholestatic) - differential includes choledocholithiasis/cholangitis (though not likely given normal CBD and no stones seen on imaging) versus complication of duodenal diverticula (stone, obstruction) versus primary hepatic etiology (viral hepatitis, autoimmune hepatitis, ischemic, DILI, Phill's disease)    Plan:  - send viral hepatitis panel, IMELDA, ASMA, AMA, ceruloplasmin, EBV, CMV   - given unclear picture and mixed picture of abnormal LFTs, would obtain MRI abd with and without contrast/MRCP to ensure no biliary disease (if metal implants compatible  - abdominal ultrasound with dopplers given concern for ischemic etiology  - pain control PRN  - IVF  - avoid hepatotoxins   - daily CBC, CMP, INR  - diet as tolerated (NPO if recurrent pain/vomiting)

## 2018-03-04 LAB
ALBUMIN SERPL ELPH-MCNC: 2.6 G/DL — LOW (ref 3.3–5)
ALP SERPL-CCNC: 144 U/L — HIGH (ref 40–120)
ALT FLD-CCNC: 830 U/L RC — HIGH (ref 10–45)
ANION GAP SERPL CALC-SCNC: 12 MMOL/L — SIGNIFICANT CHANGE UP (ref 5–17)
APTT BLD: 31.4 SEC — SIGNIFICANT CHANGE UP (ref 27.5–37.4)
AST SERPL-CCNC: 425 U/L — HIGH (ref 10–40)
BASOPHILS # BLD AUTO: 0 K/UL — SIGNIFICANT CHANGE UP (ref 0–0.2)
BASOPHILS NFR BLD AUTO: 0.1 % — SIGNIFICANT CHANGE UP (ref 0–2)
BILIRUB SERPL-MCNC: 1.5 MG/DL — HIGH (ref 0.2–1.2)
BUN SERPL-MCNC: 4 MG/DL — LOW (ref 7–23)
CALCIUM SERPL-MCNC: 7.4 MG/DL — LOW (ref 8.4–10.5)
CHLORIDE SERPL-SCNC: 101 MMOL/L — SIGNIFICANT CHANGE UP (ref 96–108)
CO2 SERPL-SCNC: 25 MMOL/L — SIGNIFICANT CHANGE UP (ref 22–31)
CREAT SERPL-MCNC: 0.65 MG/DL — SIGNIFICANT CHANGE UP (ref 0.5–1.3)
EOSINOPHIL # BLD AUTO: 0.2 K/UL — SIGNIFICANT CHANGE UP (ref 0–0.5)
EOSINOPHIL NFR BLD AUTO: 1.9 % — SIGNIFICANT CHANGE UP (ref 0–6)
GLUCOSE BLDC GLUCOMTR-MCNC: 128 MG/DL — HIGH (ref 70–99)
GLUCOSE BLDC GLUCOMTR-MCNC: 131 MG/DL — HIGH (ref 70–99)
GLUCOSE BLDC GLUCOMTR-MCNC: 140 MG/DL — HIGH (ref 70–99)
GLUCOSE BLDC GLUCOMTR-MCNC: 186 MG/DL — HIGH (ref 70–99)
GLUCOSE SERPL-MCNC: 149 MG/DL — HIGH (ref 70–99)
HBA1C BLD-MCNC: 6.7 % — HIGH (ref 4–5.6)
HCT VFR BLD CALC: 37.1 % — SIGNIFICANT CHANGE UP (ref 34.5–45)
HGB BLD-MCNC: 11.8 G/DL — SIGNIFICANT CHANGE UP (ref 11.5–15.5)
INR BLD: 2.05 RATIO — HIGH (ref 0.88–1.16)
LYMPHOCYTES # BLD AUTO: 0.6 K/UL — LOW (ref 1–3.3)
LYMPHOCYTES # BLD AUTO: 6.1 % — LOW (ref 13–44)
MAGNESIUM SERPL-MCNC: 1.4 MG/DL — LOW (ref 1.6–2.6)
MCHC RBC-ENTMCNC: 27.4 PG — SIGNIFICANT CHANGE UP (ref 27–34)
MCHC RBC-ENTMCNC: 31.8 GM/DL — LOW (ref 32–36)
MCV RBC AUTO: 85.9 FL — SIGNIFICANT CHANGE UP (ref 80–100)
MONOCYTES # BLD AUTO: 0.3 K/UL — SIGNIFICANT CHANGE UP (ref 0–0.9)
MONOCYTES NFR BLD AUTO: 3.8 % — SIGNIFICANT CHANGE UP (ref 2–14)
NEUTROPHILS # BLD AUTO: 8 K/UL — HIGH (ref 1.8–7.4)
NEUTROPHILS NFR BLD AUTO: 88.1 % — HIGH (ref 43–77)
PHOSPHATE SERPL-MCNC: 3 MG/DL — SIGNIFICANT CHANGE UP (ref 2.5–4.5)
PLATELET # BLD AUTO: 169 K/UL — SIGNIFICANT CHANGE UP (ref 150–400)
POTASSIUM SERPL-MCNC: 3.3 MMOL/L — LOW (ref 3.5–5.3)
POTASSIUM SERPL-SCNC: 3.3 MMOL/L — LOW (ref 3.5–5.3)
PROT SERPL-MCNC: 5.5 G/DL — LOW (ref 6–8.3)
PROTHROM AB SERPL-ACNC: 22.5 SEC — HIGH (ref 9.8–12.7)
RBC # BLD: 4.32 M/UL — SIGNIFICANT CHANGE UP (ref 3.8–5.2)
RBC # FLD: 11.9 % — SIGNIFICANT CHANGE UP (ref 10.3–14.5)
SODIUM SERPL-SCNC: 138 MMOL/L — SIGNIFICANT CHANGE UP (ref 135–145)
WBC # BLD: 9.1 K/UL — SIGNIFICANT CHANGE UP (ref 3.8–10.5)
WBC # FLD AUTO: 9.1 K/UL — SIGNIFICANT CHANGE UP (ref 3.8–10.5)

## 2018-03-04 PROCEDURE — 74181 MRI ABDOMEN W/O CONTRAST: CPT | Mod: 26

## 2018-03-04 PROCEDURE — 99232 SBSQ HOSP IP/OBS MODERATE 35: CPT | Mod: GC

## 2018-03-04 RX ORDER — INSULIN LISPRO 100/ML
VIAL (ML) SUBCUTANEOUS
Qty: 0 | Refills: 0 | Status: DISCONTINUED | OUTPATIENT
Start: 2018-03-04 | End: 2018-03-07

## 2018-03-04 RX ORDER — ALPRAZOLAM 0.25 MG
0.25 TABLET ORAL ONCE
Qty: 0 | Refills: 0 | Status: DISCONTINUED | OUTPATIENT
Start: 2018-03-04 | End: 2018-03-07

## 2018-03-04 RX ORDER — INSULIN LISPRO 100/ML
VIAL (ML) SUBCUTANEOUS AT BEDTIME
Qty: 0 | Refills: 0 | Status: DISCONTINUED | OUTPATIENT
Start: 2018-03-04 | End: 2018-03-07

## 2018-03-04 RX ORDER — MAGNESIUM SULFATE 500 MG/ML
1 VIAL (ML) INJECTION ONCE
Qty: 0 | Refills: 0 | Status: COMPLETED | OUTPATIENT
Start: 2018-03-04 | End: 2018-03-04

## 2018-03-04 RX ADMIN — Medication 20 MILLIGRAM(S): at 05:48

## 2018-03-04 RX ADMIN — BUDESONIDE AND FORMOTEROL FUMARATE DIHYDRATE 2 PUFF(S): 160; 4.5 AEROSOL RESPIRATORY (INHALATION) at 17:58

## 2018-03-04 RX ADMIN — BUDESONIDE AND FORMOTEROL FUMARATE DIHYDRATE 2 PUFF(S): 160; 4.5 AEROSOL RESPIRATORY (INHALATION) at 05:55

## 2018-03-04 RX ADMIN — SODIUM CHLORIDE 150 MILLILITER(S): 9 INJECTION INTRAMUSCULAR; INTRAVENOUS; SUBCUTANEOUS at 05:52

## 2018-03-04 RX ADMIN — Medication 5 MILLIGRAM(S): at 05:48

## 2018-03-04 RX ADMIN — Medication 100 GRAM(S): at 17:49

## 2018-03-04 RX ADMIN — LISINOPRIL 5 MILLIGRAM(S): 2.5 TABLET ORAL at 05:48

## 2018-03-04 RX ADMIN — Medication 1: at 13:10

## 2018-03-04 RX ADMIN — PANTOPRAZOLE SODIUM 40 MILLIGRAM(S): 20 TABLET, DELAYED RELEASE ORAL at 05:49

## 2018-03-04 RX ADMIN — WARFARIN SODIUM 4 MILLIGRAM(S): 2.5 TABLET ORAL at 22:44

## 2018-03-04 RX ADMIN — SODIUM CHLORIDE 150 MILLILITER(S): 9 INJECTION INTRAMUSCULAR; INTRAVENOUS; SUBCUTANEOUS at 22:44

## 2018-03-04 NOTE — PROGRESS NOTE ADULT - SUBJECTIVE AND OBJECTIVE BOX
GASTROENTEROLOGY FOLLOW-UP NOTE    Chief Complaint:  Patient is a 73y old  Female who presents with a chief complaint of Abdominal pain, fevers (03 Mar 2018 13:25)      Interval Events:   - Continues to be without abdominal pain, nausea, vomiting  - Tolerating PO diet   - Afebrile    Allergies:  Avelox (Pruritus)  Levaquin (Unknown)      Hospital Medications:  buDESOnide  80 MICROgram(s)/formoterol 4.5 MICROgram(s) Inhaler 2 Puff(s) Inhalation two times a day  cholecalciferol 1000 Unit(s) Oral daily  dextrose 5%. 1000 milliLiter(s) IV Continuous <Continuous>  dextrose 50% Injectable 12.5 Gram(s) IV Push once  dextrose 50% Injectable 25 Gram(s) IV Push once  dextrose 50% Injectable 25 Gram(s) IV Push once  dextrose Gel 1 Dose(s) Oral once PRN  furosemide    Tablet 20 milliGRAM(s) Oral daily  glucagon  Injectable 1 milliGRAM(s) IntraMuscular once PRN  lisinopril 5 milliGRAM(s) Oral daily  pantoprazole    Tablet 40 milliGRAM(s) Oral before breakfast  predniSONE   Tablet 5 milliGRAM(s) Oral daily  sodium chloride 0.9%. 1000 milliLiter(s) IV Continuous <Continuous>  warfarin 4 milliGRAM(s) Oral daily      PMHX/PSHX:  Dementia  Hypertension  Type 2 diabetes mellitus  Atrial fibrillation, chronic  Diabetes mellitus  Asthma  Diabetes Mellitus  HTN (Hypertension)  Asthma  Afib      Family history:  No pertinent family history in first degree relatives      ROS:     General:  No wt loss, fevers, chills, night sweats, fatigue,   Eyes:  Good vision, no reported pain  ENT:  No sore throat, pain, runny nose, dysphagia  CV:  No pain, palpitations, hypo/hypertension  Resp:  No dyspnea, cough, tachypnea, wheezing  GI:  see HPI  :  No pain, bleeding, incontinence, nocturia  Muscle:  No pain, weakness  Neuro:  No weakness, tingling, memory problems  Psych:  No fatigue, insomnia, mood problems, depression  Endocrine:  No polyuria, polydipsia, cold/heat intolerance  Heme:  No petechiae, ecchymosis, easy bruisability  Skin:  No rash, tattoos, scars, edema      PHYSICAL EXAM:   Vital Signs:  Vital Signs Last 24 Hrs  T(C): 36.7 (04 Mar 2018 05:11), Max: 37.1 (03 Mar 2018 20:17)  T(F): 98.1 (04 Mar 2018 05:11), Max: 98.7 (03 Mar 2018 20:17)  HR: 80 (04 Mar 2018 05:55) (80 - 97)  BP: 125/74 (04 Mar 2018 05:11) (122/67 - 144/72)  BP(mean): --  RR: 18 (04 Mar 2018 05:11) (18 - 20)  SpO2: 95% (04 Mar 2018 05:11) (95% - 98%)  Daily     Daily     GENERAL:  no acute distress  HEENT:  -icterus   CHEST:  clear bilaterally, no wheezes or rales  HEART:  RRR, S1S2  ABDOMEN:  soft, non-tender, non-distended, normoactive bowel sounds,  no hepato-splenomegaly  EXTEREMITIES:  no  edema  SKIN:  No rash/erythema/ecchymoses/petechiae/wounds/abscess/warm/dry  NEURO:  alert, oriented, conversant     LABS:                        11.8   9.1   )-----------( 169      ( 04 Mar 2018 06:47 )             37.1     03-04    138  |  101  |  4<L>  ----------------------------<  149<H>  3.3<L>   |  25  |  0.65    Ca    7.4<L>      04 Mar 2018 06:47  Phos  3.0     03-04  Mg     1.4     03-04    TPro  5.5<L>  /  Alb  2.6<L>  /  TBili  1.5<H>  /  DBili  x   /  AST  425<H>  /  ALT  830<H>  /  AlkPhos  144<H>  03-04    LIVER FUNCTIONS - ( 04 Mar 2018 06:47 )  Alb: 2.6 g/dL / Pro: 5.5 g/dL / ALK PHOS: 144 U/L / ALT: 830 U/L RC / AST: 425 U/L / GGT: x           PT/INR - ( 04 Mar 2018 06:47 )   PT: 22.5 sec;   INR: 2.05 ratio         PTT - ( 04 Mar 2018 06:47 )  PTT:31.4 sec        Imaging:

## 2018-03-04 NOTE — PROGRESS NOTE ADULT - ASSESSMENT
73y Woman with h/o Atrial Fibrillation (on Coumadin), HTN, DM2, asthma, HLD p/w 1 day of Epigastric abdominal pain, nausea/vomiting with labs significant for leukocytosis and elevated liver enzymes (mixed hepatocellular and cholestatic) DDx: choledocholithiasis/cholangitis (though not likely given normal CBD and no stones seen on imaging) vs complication of duodenal diverticula (stone, obstruction) versus primary hepatic etiology (viral hepatitis, autoimmune hepatitis, ischemic, DILI, Phill's disease)     Problem/Plan - 1:  ·  Problem: Epigastric abdominal pain with abnormal LFT .  Plan: - Improving.   -f/up viral hepatitis panel  -orderd IMELDA, ASMA, AMA, ceruloplasmin, EBV, CMV   - ordered MRI abd with and without contrast/MRCP to ensure no biliary disease (if metal implants compatible)  - abdominal ultrasound with dopplers given concern for ischemic etiology  - pain control PRN  - IVF  - avoid hepatotoxins   - daily CBC, CMP, INR  - diet as tolerated (NPO if recurrent pain/vomiting)  -GI help appreciated.      Problem/Plan - 2:  ·  Problem: Fever.  Plan: Now resolved. No more Leucocytosis .   ID team following  -f/up EBV, CMV, hep panel.      Problem/Plan - 3:  ·  Problem: Nausea and vomiting.  Plan: Now resolved.  -zofran PRN.      Problem/Plan - 4:  ·  Problem: Atrial fibrillation, chronic.  Plan: INR therapeutic .   -c/w home coumadin dose.      Problem/Plan - 5:  ·  Problem: Type 2 diabetes mellitus.  Plan: Blood sugars in good control.  ISS.      Problem/Plan - 6:  Problem: Hypertension. Plan: c/w home medications.     Problem/Plan - 7:  ·  Problem: Need for prophylactic measure.  Plan: c/w home coumadin.

## 2018-03-04 NOTE — PROGRESS NOTE ADULT - ASSESSMENT
73 F hx of DM, HTN asthma, afib p/w 1 day of nausea, vomiting, abd pain, subjective fever, chills, general malaise, just attended a  and went to [place] and the next day symptoms started, denied tyelonol or any medication use   found to have elevated LFTs in thousands, no recent travel, WBC: 17, CT abd not conclusive, hepatitis panel negative    acute hepatitis, unclear etiology    f/u EBV and CMV PCR  f/u auto immune w/u  monitor off antibiotics  f/u with GI  abd MRI  abdominal vascular doppler

## 2018-03-04 NOTE — PROGRESS NOTE ADULT - ASSESSMENT
Impression:  1) Epigastric abdominal pain, nausea/vomiting and elevated liver enzymes (hepatocellular >> cholestatic) - suspect vascular etiology given abrupt onset/offset, history of vascular disease/renal infarct, rapid resolution in transaminitis. Other possibilities includes choledocholithiasis/cholangitis (though not likely given normal CBD and no stones seen on imaging)  versus other primary hepatic etiologies (autoimmune hepatitis, DILI, Phill's disease, infection)    Plan:  - follow-up remainder of primary liver workup (IMELDA, ASMA, AMA, ceruloplasmin, EBV, CMV)  - viral hepatitis panel noted (HCV nonreactive, HAV IgM negative, HBsAg/HBcIgM negative)   - obtain MRCP and abdominal ultrasound with doppler   - avoid hepatotoxins   - daily CBC, CMP, INR  - diet as tolerated (NPO if recurrent pain/vomiting)

## 2018-03-04 NOTE — PROGRESS NOTE ADULT - SUBJECTIVE AND OBJECTIVE BOX
Follow Up:  acute hepatitis    Interval History: pt clinically improved, no more abd pain or nausea, vomiting, the LFTs, significantly better    ROS:      All other systems negative    Constitutional: no fever, no chills  HEENT:  no vision changes, no sore throat, no rhinorrhea  Cardiovascular:  no chest pain, no palpitation  Respiratory:  no SOB, no cough  GI:  no abd pain, no vomiting, no diarrhea  urinary: no dysuria, no hematuria, no flank pain  musculoskeletal:  no joint pain, no joint swelling  skin:  no rash  neurology:  no headache, no seizure, no change in mental status        Allergies  Avelox (Pruritus)  Levaquin (Unknown)        ANTIMICROBIALS:      OTHER MEDS:  buDESOnide  80 MICROgram(s)/formoterol 4.5 MICROgram(s) Inhaler 2 Puff(s) Inhalation two times a day  cholecalciferol 1000 Unit(s) Oral daily  dextrose 5%. 1000 milliLiter(s) IV Continuous <Continuous>  dextrose 50% Injectable 12.5 Gram(s) IV Push once  dextrose 50% Injectable 25 Gram(s) IV Push once  dextrose 50% Injectable 25 Gram(s) IV Push once  dextrose Gel 1 Dose(s) Oral once PRN  furosemide    Tablet 20 milliGRAM(s) Oral daily  glucagon  Injectable 1 milliGRAM(s) IntraMuscular once PRN  insulin lispro (HumaLOG) corrective regimen sliding scale   SubCutaneous three times a day before meals  insulin lispro (HumaLOG) corrective regimen sliding scale   SubCutaneous at bedtime  lisinopril 5 milliGRAM(s) Oral daily  pantoprazole    Tablet 40 milliGRAM(s) Oral before breakfast  predniSONE   Tablet 5 milliGRAM(s) Oral daily  sodium chloride 0.9%. 1000 milliLiter(s) IV Continuous <Continuous>  warfarin 4 milliGRAM(s) Oral daily      Vital Signs Last 24 Hrs  T(C): 36.7 (04 Mar 2018 12:32), Max: 37.1 (03 Mar 2018 20:17)  T(F): 98.1 (04 Mar 2018 12:32), Max: 98.7 (03 Mar 2018 20:17)  HR: 87 (04 Mar 2018 12:32) (80 - 97)  BP: 128/71 (04 Mar 2018 12:32) (122/67 - 144/72)  BP(mean): --  RR: 18 (04 Mar 2018 12:32) (18 - 20)  SpO2: 96% (04 Mar 2018 12:32) (95% - 98%)    Physical Exam:  General:    NAD,  non toxic, A&O x 3  HEENT:    no oropharyngeal lesions,   no LAD,   neck supple  Cardio:     regular S1, S2,  no murmur  Respiratory:    clear b/l,    no wheezing  abd:     soft,   BS +,   no tenderness,    no organomegaly  :   no CVAT,  no suprapubic tenderness,   no  andrade  Musculoskeletal:   no joint swelling,   no edema  vascular: no lines, normal pulses  Skin:    no rash  Neurologic:     no focal deficit  psych: normal affect, no suicidal ideation                          11.8   9.1   )-----------( 169      ( 04 Mar 2018 06:47 )             37.1       03-04    138  |  101  |  4<L>  ----------------------------<  149<H>  3.3<L>   |  25  |  0.65    Ca    7.4<L>      04 Mar 2018 06:47  Phos  3.0     03-04  Mg     1.4     03-04    TPro  5.5<L>  /  Alb  2.6<L>  /  TBili  1.5<H>  /  DBili  x   /  AST  425<H>  /  ALT  830<H>  /  AlkPhos  144<H>  03-04          MICROBIOLOGY:  v      Rapid RVP Result: NotDetec (03-02 @ 22:58)        RADIOLOGY:    < from: CT Abdomen and Pelvis w/ IV Cont (03.03.18 @ 00:53) >    IMPRESSION: The etiology of the patient's abdominal pain is not   elucidated on this study.  Diffuse atherosclerotic disease.  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  Unchanged right upper pole renal infarct.        < end of copied text >

## 2018-03-04 NOTE — PROGRESS NOTE ADULT - SUBJECTIVE AND OBJECTIVE BOX
INTERVAL HPI/OVERNIGHT EVENTS: I feel better .   Vital Signs Last 24 Hrs  T(C): 36.7 (04 Mar 2018 12:32), Max: 37.1 (03 Mar 2018 20:17)  T(F): 98.1 (04 Mar 2018 12:32), Max: 98.7 (03 Mar 2018 20:17)  HR: 87 (04 Mar 2018 12:32) (80 - 97)  BP: 128/71 (04 Mar 2018 12:32) (122/67 - 144/72)  BP(mean): --  RR: 18 (04 Mar 2018 12:32) (18 - 20)  SpO2: 96% (04 Mar 2018 12:32) (95% - 98%)  I&O's Summary    03 Mar 2018 07:01  -  04 Mar 2018 07:00  --------------------------------------------------------  IN: 3040 mL / OUT: 0 mL / NET: 3040 mL      MEDICATIONS  (STANDING):  buDESOnide  80 MICROgram(s)/formoterol 4.5 MICROgram(s) Inhaler 2 Puff(s) Inhalation two times a day  cholecalciferol 1000 Unit(s) Oral daily  dextrose 5%. 1000 milliLiter(s) (50 mL/Hr) IV Continuous <Continuous>  dextrose 50% Injectable 12.5 Gram(s) IV Push once  dextrose 50% Injectable 25 Gram(s) IV Push once  dextrose 50% Injectable 25 Gram(s) IV Push once  furosemide    Tablet 20 milliGRAM(s) Oral daily  insulin lispro (HumaLOG) corrective regimen sliding scale   SubCutaneous three times a day before meals  insulin lispro (HumaLOG) corrective regimen sliding scale   SubCutaneous at bedtime  lisinopril 5 milliGRAM(s) Oral daily  pantoprazole    Tablet 40 milliGRAM(s) Oral before breakfast  predniSONE   Tablet 5 milliGRAM(s) Oral daily  sodium chloride 0.9%. 1000 milliLiter(s) (150 mL/Hr) IV Continuous <Continuous>  warfarin 4 milliGRAM(s) Oral daily    MEDICATIONS  (PRN):  ALPRAZolam 0.25 milliGRAM(s) Oral once PRN anxiety  dextrose Gel 1 Dose(s) Oral once PRN Blood Glucose LESS THAN 70 milliGRAM(s)/deciliter  glucagon  Injectable 1 milliGRAM(s) IntraMuscular once PRN Glucose LESS THAN 70 milligrams/deciliter    LABS:                        11.8   9.1   )-----------( 169      ( 04 Mar 2018 06:47 )             37.1     03-04    138  |  101  |  4<L>  ----------------------------<  149<H>  3.3<L>   |  25  |  0.65    Ca    7.4<L>      04 Mar 2018 06:47  Phos  3.0     03-04  Mg     1.4     03-04    TPro  5.5<L>  /  Alb  2.6<L>  /  TBili  1.5<H>  /  DBili  x   /  AST  425<H>  /  ALT  830<H>  /  AlkPhos  144<H>  03-04    PT/INR - ( 04 Mar 2018 06:47 )   PT: 22.5 sec;   INR: 2.05 ratio         PTT - ( 04 Mar 2018 06:47 )  PTT:31.4 sec    CAPILLARY BLOOD GLUCOSE      POCT Blood Glucose.: 186 mg/dL (04 Mar 2018 12:59)  POCT Blood Glucose.: 140 mg/dL (04 Mar 2018 08:34)  POCT Blood Glucose.: 113 mg/dL (03 Mar 2018 18:15)  POCT Blood Glucose.: 69 mg/dL (03 Mar 2018 17:54)          REVIEW OF SYSTEMS:  CONSTITUTIONAL: No fever, weight loss, or fatigue  EYES: No eye pain, visual disturbances, or discharge  ENMT:  No difficulty hearing, tinnitus, vertigo; No sinus or throat pain  NECK: No pain or stiffness  BREASTS: No pain, masses, or nipple discharge  RESPIRATORY: No cough, wheezing, chills or hemoptysis; No shortness of breath  CARDIOVASCULAR: No chest pain, palpitations, dizziness, or leg swelling  GASTROINTESTINAL: No abdominal or epigastric pain. No nausea, vomiting, or hematemesis; No diarrhea or constipation. No melena or hematochezia.  GENITOURINARY: No dysuria, frequency, hematuria, or incontinence  NEUROLOGICAL: No headaches, memory loss, loss of strength, numbness, or tremors  SKIN: No itching, burning, rashes, or lesions   LYMPH NODES: No enlarged glands  ENDOCRINE: No heat or cold intolerance; No hair loss  MUSCULOSKELETAL: No joint pain or swelling; No muscle, back, or extremity pain  PSYCHIATRIC: No depression, anxiety, mood swings, or difficulty sleeping  HEME/LYMPH: No easy bruising, or bleeding gums  ALLERY AND IMMUNOLOGIC: No hives or eczema    RADIOLOGY & ADDITIONAL TESTS:    Consultant(s) Notes Reviewed:  [x ] YES  [ ] NO    PHYSICAL EXAM:  GENERAL: NAD, well-groomed, well-developed, not in any distress ,  HEAD:  Atraumatic, Normocephalic  EYES: EOMI, PERRLA, conjunctiva and sclera clear  ENMT: No tonsillar erythema, exudates, or enlargement; Moist mucous membranes, Good dentition, No lesions  NECK: Supple, No JVD, Normal thyroid  NERVOUS SYSTEM:  Alert & Oriented X3, No focal deficit   CHEST/LUNG: Good air entry bilateral with no  rales, rhonchi, wheezing, or rubs  HEART: Regular rate and rhythm; No murmurs, rubs, or gallops  ABDOMEN: Soft, Nontender, Nondistended; Bowel sounds present  EXTREMITIES:  2+ Peripheral Pulses, No clubbing, cyanosis, or edema  SKIN: No rashes or lesions    Care Discussed with Consultants/Other Providers [ x] YES  [ ] NO

## 2018-03-05 ENCOUNTER — TRANSCRIPTION ENCOUNTER (OUTPATIENT)
Age: 73
End: 2018-03-05

## 2018-03-05 LAB
ALBUMIN SERPL ELPH-MCNC: 3.1 G/DL — LOW (ref 3.3–5)
ALP SERPL-CCNC: 182 U/L — HIGH (ref 40–120)
ALT FLD-CCNC: 632 U/L RC — HIGH (ref 10–45)
ANA TITR SER: NEGATIVE — SIGNIFICANT CHANGE UP
ANION GAP SERPL CALC-SCNC: 10 MMOL/L — SIGNIFICANT CHANGE UP (ref 5–17)
ANION GAP SERPL CALC-SCNC: 14 MMOL/L — SIGNIFICANT CHANGE UP (ref 5–17)
AST SERPL-CCNC: 142 U/L — HIGH (ref 10–40)
BILIRUB SERPL-MCNC: 0.8 MG/DL — SIGNIFICANT CHANGE UP (ref 0.2–1.2)
BUN SERPL-MCNC: <4 MG/DL — LOW (ref 7–23)
BUN SERPL-MCNC: <4 MG/DL — LOW (ref 7–23)
CALCIUM SERPL-MCNC: 8.3 MG/DL — LOW (ref 8.4–10.5)
CALCIUM SERPL-MCNC: 8.3 MG/DL — LOW (ref 8.4–10.5)
CERULOPLASMIN SERPL-MCNC: 28 MG/DL — SIGNIFICANT CHANGE UP (ref 20–60)
CHLORIDE SERPL-SCNC: 103 MMOL/L — SIGNIFICANT CHANGE UP (ref 96–108)
CHLORIDE SERPL-SCNC: 104 MMOL/L — SIGNIFICANT CHANGE UP (ref 96–108)
CMV DNA CSF QL NAA+PROBE: SIGNIFICANT CHANGE UP
CO2 SERPL-SCNC: 26 MMOL/L — SIGNIFICANT CHANGE UP (ref 22–31)
CO2 SERPL-SCNC: 27 MMOL/L — SIGNIFICANT CHANGE UP (ref 22–31)
CREAT SERPL-MCNC: 0.62 MG/DL — SIGNIFICANT CHANGE UP (ref 0.5–1.3)
CREAT SERPL-MCNC: 0.68 MG/DL — SIGNIFICANT CHANGE UP (ref 0.5–1.3)
EBV EA AB SER IA-ACNC: <5 U/ML — SIGNIFICANT CHANGE UP
EBV EA AB TITR SER IF: POSITIVE
EBV EA IGG SER-ACNC: NEGATIVE — SIGNIFICANT CHANGE UP
EBV NA IGG SER IA-ACNC: 186 U/ML — HIGH
EBV PATRN SPEC IB-IMP: SIGNIFICANT CHANGE UP
EBV VCA IGG AVIDITY SER QL IA: POSITIVE
EBV VCA IGM SER IA-ACNC: 111 U/ML — HIGH
EBV VCA IGM SER IA-ACNC: <10 U/ML — SIGNIFICANT CHANGE UP
EBV VCA IGM TITR FLD: NEGATIVE — SIGNIFICANT CHANGE UP
GLUCOSE BLDC GLUCOMTR-MCNC: 104 MG/DL — HIGH (ref 70–99)
GLUCOSE BLDC GLUCOMTR-MCNC: 120 MG/DL — HIGH (ref 70–99)
GLUCOSE BLDC GLUCOMTR-MCNC: 147 MG/DL — HIGH (ref 70–99)
GLUCOSE BLDC GLUCOMTR-MCNC: 226 MG/DL — HIGH (ref 70–99)
GLUCOSE SERPL-MCNC: 115 MG/DL — HIGH (ref 70–99)
GLUCOSE SERPL-MCNC: 163 MG/DL — HIGH (ref 70–99)
HCT VFR BLD CALC: 40.2 % — SIGNIFICANT CHANGE UP (ref 34.5–45)
HGB BLD-MCNC: 13.7 G/DL — SIGNIFICANT CHANGE UP (ref 11.5–15.5)
INR BLD: 2.56 RATIO — HIGH (ref 0.88–1.16)
MAGNESIUM SERPL-MCNC: 1.6 MG/DL — SIGNIFICANT CHANGE UP (ref 1.6–2.6)
MCHC RBC-ENTMCNC: 29.2 PG — SIGNIFICANT CHANGE UP (ref 27–34)
MCHC RBC-ENTMCNC: 34.2 GM/DL — SIGNIFICANT CHANGE UP (ref 32–36)
MCV RBC AUTO: 85.5 FL — SIGNIFICANT CHANGE UP (ref 80–100)
PHOSPHATE SERPL-MCNC: 3 MG/DL — SIGNIFICANT CHANGE UP (ref 2.5–4.5)
PLATELET # BLD AUTO: 206 K/UL — SIGNIFICANT CHANGE UP (ref 150–400)
POTASSIUM SERPL-MCNC: 2.8 MMOL/L — CRITICAL LOW (ref 3.5–5.3)
POTASSIUM SERPL-MCNC: 3.7 MMOL/L — SIGNIFICANT CHANGE UP (ref 3.5–5.3)
POTASSIUM SERPL-SCNC: 2.8 MMOL/L — CRITICAL LOW (ref 3.5–5.3)
POTASSIUM SERPL-SCNC: 3.7 MMOL/L — SIGNIFICANT CHANGE UP (ref 3.5–5.3)
PROT SERPL-MCNC: 6.5 G/DL — SIGNIFICANT CHANGE UP (ref 6–8.3)
PROTHROM AB SERPL-ACNC: 28.2 SEC — HIGH (ref 9.8–12.7)
RBC # BLD: 4.71 M/UL — SIGNIFICANT CHANGE UP (ref 3.8–5.2)
RBC # FLD: 12 % — SIGNIFICANT CHANGE UP (ref 10.3–14.5)
SODIUM SERPL-SCNC: 141 MMOL/L — SIGNIFICANT CHANGE UP (ref 135–145)
SODIUM SERPL-SCNC: 143 MMOL/L — SIGNIFICANT CHANGE UP (ref 135–145)
WBC # BLD: 8.8 K/UL — SIGNIFICANT CHANGE UP (ref 3.8–10.5)
WBC # FLD AUTO: 8.8 K/UL — SIGNIFICANT CHANGE UP (ref 3.8–10.5)

## 2018-03-05 PROCEDURE — 99232 SBSQ HOSP IP/OBS MODERATE 35: CPT

## 2018-03-05 PROCEDURE — 76705 ECHO EXAM OF ABDOMEN: CPT | Mod: 26,RT

## 2018-03-05 PROCEDURE — 99232 SBSQ HOSP IP/OBS MODERATE 35: CPT | Mod: GC

## 2018-03-05 PROCEDURE — 93975 VASCULAR STUDY: CPT | Mod: 26

## 2018-03-05 RX ORDER — SODIUM CHLORIDE 9 MG/ML
1000 INJECTION INTRAMUSCULAR; INTRAVENOUS; SUBCUTANEOUS
Qty: 0 | Refills: 0 | Status: DISCONTINUED | OUTPATIENT
Start: 2018-03-05 | End: 2018-03-05

## 2018-03-05 RX ORDER — POTASSIUM CHLORIDE 20 MEQ
40 PACKET (EA) ORAL ONCE
Qty: 0 | Refills: 0 | Status: COMPLETED | OUTPATIENT
Start: 2018-03-05 | End: 2018-03-05

## 2018-03-05 RX ORDER — POTASSIUM CHLORIDE 20 MEQ
10 PACKET (EA) ORAL
Qty: 0 | Refills: 0 | Status: COMPLETED | OUTPATIENT
Start: 2018-03-05 | End: 2018-03-05

## 2018-03-05 RX ADMIN — Medication 100 MILLIEQUIVALENT(S): at 08:50

## 2018-03-05 RX ADMIN — SODIUM CHLORIDE 75 MILLILITER(S): 9 INJECTION INTRAMUSCULAR; INTRAVENOUS; SUBCUTANEOUS at 12:47

## 2018-03-05 RX ADMIN — Medication 40 MILLIEQUIVALENT(S): at 08:50

## 2018-03-05 RX ADMIN — BUDESONIDE AND FORMOTEROL FUMARATE DIHYDRATE 2 PUFF(S): 160; 4.5 AEROSOL RESPIRATORY (INHALATION) at 17:24

## 2018-03-05 RX ADMIN — WARFARIN SODIUM 4 MILLIGRAM(S): 2.5 TABLET ORAL at 21:17

## 2018-03-05 RX ADMIN — PANTOPRAZOLE SODIUM 40 MILLIGRAM(S): 20 TABLET, DELAYED RELEASE ORAL at 05:16

## 2018-03-05 RX ADMIN — Medication 20 MILLIGRAM(S): at 05:17

## 2018-03-05 RX ADMIN — SODIUM CHLORIDE 75 MILLILITER(S): 9 INJECTION INTRAMUSCULAR; INTRAVENOUS; SUBCUTANEOUS at 09:56

## 2018-03-05 RX ADMIN — Medication 100 MILLIEQUIVALENT(S): at 10:32

## 2018-03-05 RX ADMIN — BUDESONIDE AND FORMOTEROL FUMARATE DIHYDRATE 2 PUFF(S): 160; 4.5 AEROSOL RESPIRATORY (INHALATION) at 06:38

## 2018-03-05 RX ADMIN — Medication 100 MILLIEQUIVALENT(S): at 12:47

## 2018-03-05 RX ADMIN — Medication 1000 UNIT(S): at 12:48

## 2018-03-05 RX ADMIN — LISINOPRIL 5 MILLIGRAM(S): 2.5 TABLET ORAL at 05:15

## 2018-03-05 RX ADMIN — Medication 5 MILLIGRAM(S): at 05:15

## 2018-03-05 NOTE — DISCHARGE NOTE ADULT - HOSPITAL COURSE
73y Woman with h/o Atrial Fibrillation (on Coumadin), HTN, DM2, asthma, HLD p/w 1 day of Epigastric abdominal pain, nausea/vomiting with labs significant for leukocytosis and elevated liver enzymes (mixed hepatocellular and cholestatic) DDx: choledocholithiasis/cholangitis (though not likely given normal CBD and no stones seen on imaging) vs complication of duodenal diverticula (stone, obstruction) versus primary hepatic etiology (viral hepatitis, autoimmune hepatitis, ischemic, DILI, Phill's disease) 73y Woman with h/o Atrial Fibrillation (on Coumadin), HTN, DM2, asthma, HLD p/w 1 day of Epigastric abdominal pain, nausea/vomiting with labs significant for leukocytosis and elevated liver enzymes (mixed hepatocellular and cholestatic) DDx: choledocholithiasis/cholangitis (though not likely given normal CBD and no stones seen on imaging) vs complication of duodenal diverticula (stone, obstruction) versus primary hepatic etiology (viral hepatitis, autoimmune hepatitis, ischemic, DILI, Phill's disease) Ultrasound of abdomen showed cholelithiasis without sonographic evidence of cholecystitis. No biliary   ductal dilatation. Patent hepatic vasculature with normal direction portal flow. MR abdomen showed no focal hepatic lesion, steatosis or biliary ductal dilatation. Likely cholelithiasis. Surgery was consulted and recommended cholecystectomy during this admission, however, patient refused and wished to go home and follow up as an outpatient. Patient was [place] on risks and benefits and given a follow up appointment. 73y Woman with h/o Atrial Fibrillation (on Coumadin), HTN, DM2, asthma, HLD p/w 1 day of Epigastric abdominal pain, nausea/vomiting with labs significant for leukocytosis and elevated liver enzymes (mixed hepatocellular and cholestatic) DDx: choledocholithiasis/cholangitis (though not likely given normal CBD and no stones seen on imaging) vs complication of duodenal diverticula (stone, obstruction) versus primary hepatic etiology (viral hepatitis, autoimmune hepatitis, ischemic, DILI, Phill's disease) Ultrasound of abdomen showed cholelithiasis without sonographic evidence of cholecystitis. No biliary ductal dilatation. Patent hepatic vasculature with normal direction portal flow. MR abdomen showed no focal hepatic lesion, steatosis or biliary ductal dilatation. Likely cholelithiasis. Surgery was consulted and recommended cholecystectomy during this admission, however, patient refused and wished to go home and follow up as an outpatient. Patient was [place] on risks and benefits and given a follow up appointment.

## 2018-03-05 NOTE — DISCHARGE NOTE ADULT - MEDICATION SUMMARY - MEDICATIONS TO TAKE
I will START or STAY ON the medications listed below when I get home from the hospital:    predniSONE 5 mg oral tablet  -- 1 tab(s) by mouth once a day  -- Indication: For Need for prophylactic measure    lisinopril 5 mg oral tablet  -- 1 tab(s) by mouth once a day  -- Indication: For Hypertension    warfarin 4 mg oral tablet  -- 1 tab(s) by mouth once a day  -- Indication: For Atrial fibrillation, chronic    metFORMIN 1000 mg oral tablet  -- 1 tab(s) by mouth 2 times a day  -- Indication: For Type 2 diabetes mellitus    simvastatin 20 mg oral tablet  -- 1 tab(s) by mouth once a day (at bedtime)  -- Indication: For Need for prophylactic measure    Advair Diskus 250 mcg-50 mcg inhalation powder  -- 1 puff(s) inhaled 2 times a day  -- Indication: For Need for prophylactic measure    furosemide 20 mg oral tablet  -- 1 tab(s) by mouth once a day  -- Indication: For Hypertension    pantoprazole 40 mg oral delayed release tablet  -- 1 tab(s) by mouth once a day  -- Indication: For GERD    NexIUM OTC 20 mg oral delayed release capsule  -- 1 cap(s) by mouth once a day (in the morning)  -- Indication: For GERD    Centrum oral tablet  -- 1 tab(s) by mouth once a day  -- Indication: For Need for prophylactic measure    Vitamin D3 2000 intl units oral tablet  -- 1 tab(s) by mouth once a day  -- Indication: For Need for prophylactic measure I will START or STAY ON the medications listed below when I get home from the hospital:    predniSONE 5 mg oral tablet  -- 1 tab(s) by mouth once a day  -- Indication: For Need for prophylactic measure    lisinopril 5 mg oral tablet  -- 1 tab(s) by mouth once a day  -- Indication: For Hypertension    warfarin 3 mg oral tablet  -- 1 tab(s) by mouth once a day  -- Indication: For Atrial fibrillation, chronic    metFORMIN 1000 mg oral tablet  -- 1 tab(s) by mouth 2 times a day  -- Indication: For Type 2 diabetes mellitus    simvastatin 20 mg oral tablet  -- 1 tab(s) by mouth once a day (at bedtime)  -- Indication: For Need for prophylactic measure    Advair Diskus 250 mcg-50 mcg inhalation powder  -- 1 puff(s) inhaled 2 times a day  -- Indication: For Need for prophylactic measure    furosemide 20 mg oral tablet  -- 1 tab(s) by mouth once a day  -- Indication: For Hypertension    pantoprazole 40 mg oral delayed release tablet  -- 1 tab(s) by mouth once a day  -- Indication: For GERD    NexIUM OTC 20 mg oral delayed release capsule  -- 1 cap(s) by mouth once a day (in the morning)  -- Indication: For GERD    Centrum oral tablet  -- 1 tab(s) by mouth once a day  -- Indication: For Need for prophylactic measure    Vitamin D3 2000 intl units oral tablet  -- 1 tab(s) by mouth once a day  -- Indication: For Need for prophylactic measure I will START or STAY ON the medications listed below when I get home from the hospital:    predniSONE 5 mg oral tablet  -- 1 tab(s) by mouth once a day  -- Indication: For Need for prophylactic measure    lisinopril 5 mg oral tablet  -- 1 tab(s) by mouth once a day  -- Indication: For Hypertension    warfarin 3 mg oral tablet  -- 1 tab(s) by mouth once a day  -- Indication: For Atrial fibrillation, chronic    metFORMIN 1000 mg oral tablet  -- 1 tab(s) by mouth 2 times a day  -- Indication: For Type 2 diabetes mellitus    simvastatin 20 mg oral tablet  -- 1 tab(s) by mouth once a day (at bedtime)  -- Indication: For Need for prophylactic measure    Advair Diskus 250 mcg-50 mcg inhalation powder  -- 1 puff(s) inhaled 2 times a day  -- Indication: For Need for prophylactic measure    furosemide 20 mg oral tablet  -- 1 tab(s) by mouth once a day  -- Indication: For Hypertension    K-Tab 20 mEq oral tablet, extended release  -- 1 tab(s) by mouth once a day   -- It is very important that you take or use this exactly as directed.  Do not skip doses or discontinue unless directed by your doctor.  Medication should be taken with plenty of water.  Take with food or milk.    -- Indication: For Hypokalemia    pantoprazole 40 mg oral delayed release tablet  -- 1 tab(s) by mouth once a day  -- Indication: For GERD    NexIUM OTC 20 mg oral delayed release capsule  -- 1 cap(s) by mouth once a day (in the morning)  -- Indication: For GERD    Centrum oral tablet  -- 1 tab(s) by mouth once a day  -- Indication: For Need for prophylactic measure    Vitamin D3 2000 intl units oral tablet  -- 1 tab(s) by mouth once a day  -- Indication: For Need for prophylactic measure I will START or STAY ON the medications listed below when I get home from the hospital:    predniSONE 5 mg oral tablet  -- 1 tab(s) by mouth once a day  -- Indication: For Need for prophylactic measure    lisinopril 5 mg oral tablet  -- 1 tab(s) by mouth once a day  -- Indication: For Hypertension    warfarin 3 mg oral tablet  -- 1 tab(s) by mouth once a day  -- Indication: For Atrial fibrillation, chronic    metFORMIN 1000 mg oral tablet  -- 1 tab(s) by mouth 2 times a day  -- Indication: For Type 2 diabetes mellitus    simvastatin 20 mg oral tablet  -- 1 tab(s) by mouth once a day (at bedtime)  -- Indication: For Need for prophylactic measure    Advair Diskus 250 mcg-50 mcg inhalation powder  -- 1 puff(s) inhaled 2 times a day  -- Indication: For Need for prophylactic measure    furosemide 20 mg oral tablet  -- 1 tab(s) by mouth once a day  -- Indication: For Hypertension    K-Tab 20 mEq oral tablet, extended release  -- 1 tab(s) by mouth once a day   -- It is very important that you take or use this exactly as directed.  Do not skip doses or discontinue unless directed by your doctor.  Medication should be taken with plenty of water.  Take with food or milk.    -- Indication: For Hypokalemia    NexIUM OTC 20 mg oral delayed release capsule  -- 1 cap(s) by mouth once a day (in the morning)  -- Indication: For GERD    Centrum oral tablet  -- 1 tab(s) by mouth once a day  -- Indication: For Need for prophylactic measure    Vitamin D3 2000 intl units oral tablet  -- 1 tab(s) by mouth once a day  -- Indication: For Need for prophylactic measure

## 2018-03-05 NOTE — DISCHARGE NOTE ADULT - ADDITIONAL INSTRUCTIONS
Please follow up with Dr. Cuevas within 1-2 weeks after discharge from the hospital. You may call [phone] to schedule an appointment.    Please continue to take medications as prescribed. Please follow up with your PCP, Dr. Adams, within 1 week of discharge. Please follow up with Dr. Cuevas within 1-2 weeks after discharge from the hospital. You may call [phone] to schedule an appointment.    Please continue to take medications as prescribed. Please follow up with your PCP, Dr. Adams, within 1-2 week of discharge.    Please continue taking Warfarin at 3mg and follow up with Dr. Adams regarding your INR.

## 2018-03-05 NOTE — DISCHARGE NOTE ADULT - MEDICATION SUMMARY - MEDICATIONS TO STOP TAKING
I will STOP taking the medications listed below when I get home from the hospital:    warfarin 2.5 mg oral tablet  -- 1 tab(s) by mouth once a day    dilTIAZem 240 mg/24 hours oral capsule, extended release  -- 1 cap(s) by mouth once a day

## 2018-03-05 NOTE — CONSULT NOTE ADULT - SUBJECTIVE AND OBJECTIVE BOX
CC: Patient is a 73y old  Female who presents with a chief complaint of Abdominal pain, fevers (05 Mar 2018 10:05)      HPI:  73y Woman with h/o Atrial Fibrillation (on Coumadin), HTN, DM2, asthma, HLD p/w 1 day of abdominal pain and vomiting. Patient endorses having new crampy epigastric abdominal pain that started on the day of presentation that radiated to the lateral sides of the abdomen. Patient also had chills and 2 episodes of emesis ( mostly water ). Denies having these symptoms before. Denies dysphagia, odynophagia, melena, hematochezia, bloating, belching.  Patient states that now the abdominal pain and vomiting have resolved. She denies h/o GI, gall bladder, or liver problems.  Denies family history of GI disorders. Denies any recent medication changes. Denies use of herbal supplements. Denies sick contacts and recent travel. (03 Mar 2018 13:25)  She currently reports that all of her abdominal pain has resolved. She does not have any nausea or vomiting and had some soup, bread and butter. She has not had fevers or chills.  She insists that she wants to go home.    PMH  Dementia  Hypertension  Type 2 diabetes mellitus  Atrial fibrillation, chronic  Diabetes mellitus  Asthma    PSH  Diabetes Mellitus  HTN (Hypertension)  Asthma  Afib    MEDS  Home Medications:  Advair Diskus 250 mcg-50 mcg inhalation powder: 1 puff(s) inhaled 2 times a day (18 Oct 2017 19:38)  Centrum oral tablet: 1 tab(s) orally once a day (18 Oct 2017 19:38)  furosemide 20 mg oral tablet: 1 tab(s) orally once a day (03 Mar 2018 13:51)  metFORMIN 1000 mg oral tablet: 1 tab(s) orally 2 times a day (18 Oct 2017 19:38)  NexIUM OTC 20 mg oral delayed release capsule: 1 cap(s) orally once a day (in the morning) (18 Oct 2017 19:38)  pantoprazole 40 mg oral delayed release tablet: 1 tab(s) orally once a day (18 Oct 2017 19:38)  predniSONE 5 mg oral tablet: 1 tab(s) orally once a day (24 Oct 2017 13:10)  simvastatin 20 mg oral tablet: 1 tab(s) orally once a day (at bedtime) (24 Oct 2017 13:10)  Vitamin D3 2000 intl units oral tablet: 1 tab(s) orally once a day (18 Oct 2017 19:38)  warfarin 4 mg oral tablet: 1 tab(s) orally once a day (03 Mar 2018 13:45)    Allergies    Avelox (Pruritus)  Levaquin (Unknown)    Intolerances        Social: Denies smoking or alcohol use.        Physical Exam  T(C): 36.6 (03-05-18 @ 20:52), Max: 36.6 (03-05-18 @ 04:27)  HR: 107 (03-05-18 @ 20:52) (80 - 107)  BP: 149/82 (03-05-18 @ 20:52) (127/73 - 149/82)  RR: 18 (03-05-18 @ 20:52) (18 - 18)  SpO2: 96% (03-05-18 @ 20:52) (96% - 98%)  Wt(kg): --  Tmax: T(C): , Max: 36.6 (03-05-18 @ 04:27)  Wt(kg): --    Gen: NAD  HEENT: normocephalic, atraumatic, no scleral icterus  CV: S1, S2, RRR  Pulm: CTA B/L  Abd: Soft, ND, NTP, no rebound, no guarding, no palpable organomegaly/masses  Ext: warm, no edema, palp dp/pt    03-05-18  -  03-05-18  --------------------------------------------------------  IN:    IV PiggyBack: 300 mL    Oral Fluid: 360 mL    sodium chloride 0.9%: 1050 mL  Total IN: 1710 mL    OUT:  Total OUT: 0 mL    Total NET: 1710 mL          Labs:                        13.7   8.8   )-----------( 206      ( 05 Mar 2018 06:52 )             40.2     03-05    141  |  104  |  <4<L>  ----------------------------<  163<H>  3.7   |  27  |  0.68    Ca    8.3<L>      05 Mar 2018 17:57  Phos  3.0     03-05  Mg     1.6     03-05    TPro  6.5  /  Alb  3.1<L>  /  TBili  0.8  /  DBili  x   /  AST  142<H>  /  ALT  632<H>  /  AlkPhos  182<H>  03-05    PT/INR - ( 05 Mar 2018 06:51 )   PT: 28.2 sec;   INR: 2.56 ratio         PTT - ( 04 Mar 2018 06:47 )  PTT:31.4 sec          Imaging  MRCP  IMPRESSION:     No focal hepatic lesion, steatosis or biliary ductal dilatation.    Likely cholelithiasis.    U/S  IMPRESSION:     Cholelithiasis without sonographic evidence of cholecystitis. No biliary   ductal dilatation.    Patent hepatic vasculature with normal direction portal flow. CC: Patient is a 73y old  Female who presents with a chief complaint of Abdominal pain, fevers (05 Mar 2018 10:05)      HPI:  73y Woman with h/o Atrial Fibrillation (on Coumadin), HTN, DM2, asthma, HLD p/w 1 day of abdominal pain and vomiting. Patient endorses having new crampy epigastric abdominal pain that started on the day of presentation that radiated to the lateral sides of the abdomen. Patient also had chills and 2 episodes of emesis ( mostly water ). Denies having these symptoms before. Denies dysphagia, odynophagia, melena, hematochezia, bloating, belching.  Patient states that now the abdominal pain and vomiting have resolved. She denies h/o GI, gall bladder, or liver problems.  Denies family history of GI disorders. Denies any recent medication changes. Denies use of herbal supplements. Denies sick contacts and recent travel. (03 Mar 2018 13:25)  She currently reports that all of her abdominal pain has resolved. She does not have any nausea or vomiting and had some soup, bread and butter. She has not had fevers or chills.  She insists that she wants to go home.    PMH  Dementia  Hypertension  Type 2 diabetes mellitus  Atrial fibrillation, chronic  Diabetes mellitus  Asthma    PSH  b/l hip surgeries  patient states she has not had abdominal surgery    MEDS  Home Medications:  Advair Diskus 250 mcg-50 mcg inhalation powder: 1 puff(s) inhaled 2 times a day (18 Oct 2017 19:38)  Centrum oral tablet: 1 tab(s) orally once a day (18 Oct 2017 19:38)  furosemide 20 mg oral tablet: 1 tab(s) orally once a day (03 Mar 2018 13:51)  metFORMIN 1000 mg oral tablet: 1 tab(s) orally 2 times a day (18 Oct 2017 19:38)  NexIUM OTC 20 mg oral delayed release capsule: 1 cap(s) orally once a day (in the morning) (18 Oct 2017 19:38)  pantoprazole 40 mg oral delayed release tablet: 1 tab(s) orally once a day (18 Oct 2017 19:38)  predniSONE 5 mg oral tablet: 1 tab(s) orally once a day (24 Oct 2017 13:10)  simvastatin 20 mg oral tablet: 1 tab(s) orally once a day (at bedtime) (24 Oct 2017 13:10)  Vitamin D3 2000 intl units oral tablet: 1 tab(s) orally once a day (18 Oct 2017 19:38)  warfarin 4 mg oral tablet: 1 tab(s) orally once a day (03 Mar 2018 13:45)    Allergies    Avelox (Pruritus)  Levaquin (Unknown)    Intolerances        Social: Denies smoking or alcohol use.        Physical Exam  T(C): 36.6 (03-05-18 @ 20:52), Max: 36.6 (03-05-18 @ 04:27)  HR: 107 (03-05-18 @ 20:52) (80 - 107)  BP: 149/82 (03-05-18 @ 20:52) (127/73 - 149/82)  RR: 18 (03-05-18 @ 20:52) (18 - 18)  SpO2: 96% (03-05-18 @ 20:52) (96% - 98%)  Wt(kg): --  Tmax: T(C): , Max: 36.6 (03-05-18 @ 04:27)  Wt(kg): --    Gen: NAD  HEENT: normocephalic, atraumatic, no scleral icterus  CV: S1, S2, RRR  Pulm: CTA B/L  Abd: Soft, ND, NTP, no rebound, no guarding, no palpable organomegaly/masses  Ext: warm, no edema, palp dp/pt    03-05-18  -  03-05-18  --------------------------------------------------------  IN:    IV PiggyBack: 300 mL    Oral Fluid: 360 mL    sodium chloride 0.9%: 1050 mL  Total IN: 1710 mL    OUT:  Total OUT: 0 mL    Total NET: 1710 mL          Labs:                        13.7   8.8   )-----------( 206      ( 05 Mar 2018 06:52 )             40.2     03-05    141  |  104  |  <4<L>  ----------------------------<  163<H>  3.7   |  27  |  0.68    Ca    8.3<L>      05 Mar 2018 17:57  Phos  3.0     03-05  Mg     1.6     03-05    TPro  6.5  /  Alb  3.1<L>  /  TBili  0.8  /  DBili  x   /  AST  142<H>  /  ALT  632<H>  /  AlkPhos  182<H>  03-05    PT/INR - ( 05 Mar 2018 06:51 )   PT: 28.2 sec;   INR: 2.56 ratio         PTT - ( 04 Mar 2018 06:47 )  PTT:31.4 sec          Imaging  MRCP  IMPRESSION:     No focal hepatic lesion, steatosis or biliary ductal dilatation.    Likely cholelithiasis.    U/S  IMPRESSION:     Cholelithiasis without sonographic evidence of cholecystitis. No biliary   ductal dilatation.    Patent hepatic vasculature with normal direction portal flow.

## 2018-03-05 NOTE — DISCHARGE NOTE ADULT - PLAN OF CARE
Follow up with surgery You presented to hospital with epigastric pain, fevers, nausea/vomiting. You were found to have elevated liver enzymes. You were treated symptomatically and you improved clinically. Imaging studies showed no acute condition, however, did not multiple stones in the gallbladder which represent a possible source for biliary colic. Please follow up with Dr. Cuevas within 1-2 weeks after discharge from the hospital. You may call [phone] to schedule an appointment. You presented to hospital with epigastric pain, fevers, nausea/vomiting. You were found to have elevated liver enzymes. You were treated symptomatically and you improved clinically. Imaging studies showed no acute condition, however, did not multiple stones in the gallbladder which represent a possible source for biliary colic. Please follow up with Dr. Cuevas within 1-2 weeks after discharge from the hospital. You may call [phone] to schedule an appointment. Please follow up with your PCP within 1-2 weeks of discharge to follow up LFTs.

## 2018-03-05 NOTE — DISCHARGE NOTE ADULT - PATIENT PORTAL LINK FT
You can access the [facility] Patient Portal, offered by [facility], by registering with the following website: [email or web address]

## 2018-03-05 NOTE — PROGRESS NOTE ADULT - ASSESSMENT
73 F hx of DM, HTN asthma, afib p/w 1 day of nausea, vomiting, abd pain, subjective fever, chills, general malaise, just attended a  and went to [facility] and the next day symptoms started, denied tyelonol or any medication use   found to have elevated LFTs in thousands, no recent travel, WBC: 17, CT abd not conclusive, hepatitis panel negative, CMV PCR negative, EBV past infection, MRCP and abd doppler negative, just cholelithiasis, WBC normalized, symptoms resolved and LFTs improving    acute hepatitis, unclear etiology, negative infectious w/u, MRCP abd doppler just with cholelithiasis  improving with no intervention, ? passed stone    no further infectious w/u  f/u with GI  please call with questions

## 2018-03-05 NOTE — DISCHARGE NOTE ADULT - CARE PLAN
Principal Discharge DX:	Epigastric abdominal pain  Goal:	Follow up with surgery  Assessment and plan of treatment:	You presented to hospital with epigastric pain, fevers, nausea/vomiting. You were found to have elevated liver enzymes. You were treated symptomatically and you improved clinically. Imaging studies showed no acute condition, however, did not multiple stones in the gallbladder which represent a possible source for biliary colic. Please follow up with Dr. Cuevas within 1-2 weeks after discharge from the hospital. You may call [phone] to schedule an appointment. Principal Discharge DX:	Epigastric abdominal pain  Goal:	Follow up with surgery  Assessment and plan of treatment:	You presented to hospital with epigastric pain, fevers, nausea/vomiting. You were found to have elevated liver enzymes. You were treated symptomatically and you improved clinically. Imaging studies showed no acute condition, however, did not multiple stones in the gallbladder which represent a possible source for biliary colic. Please follow up with Dr. Cuevas within 1-2 weeks after discharge from the hospital. You may call [phone] to schedule an appointment. Please follow up with your PCP within 1-2 weeks of discharge to follow up LFTs.

## 2018-03-05 NOTE — PROGRESS NOTE ADULT - SUBJECTIVE AND OBJECTIVE BOX
INTERVAL HPI/OVERNIGHT EVENTS: Feel better and ate little today.   Vital Signs Last 24 Hrs  T(C): 36.6 (05 Mar 2018 14:09), Max: 36.6 (05 Mar 2018 04:27)  T(F): 97.8 (05 Mar 2018 14:09), Max: 97.9 (05 Mar 2018 04:27)  HR: 86 (05 Mar 2018 14:09) (80 - 86)  BP: 127/73 (05 Mar 2018 14:09) (127/73 - 138/77)  BP(mean): --  RR: 18 (05 Mar 2018 14:09) (18 - 18)  SpO2: 97% (05 Mar 2018 14:09) (97% - 98%)  I&O's Summary    05 Mar 2018 07:01  -  05 Mar 2018 20:30  --------------------------------------------------------  IN: 1710 mL / OUT: 0 mL / NET: 1710 mL      MEDICATIONS  (STANDING):  buDESOnide  80 MICROgram(s)/formoterol 4.5 MICROgram(s) Inhaler 2 Puff(s) Inhalation two times a day  cholecalciferol 1000 Unit(s) Oral daily  dextrose 5%. 1000 milliLiter(s) (50 mL/Hr) IV Continuous <Continuous>  dextrose 50% Injectable 12.5 Gram(s) IV Push once  dextrose 50% Injectable 25 Gram(s) IV Push once  dextrose 50% Injectable 25 Gram(s) IV Push once  furosemide    Tablet 20 milliGRAM(s) Oral daily  insulin lispro (HumaLOG) corrective regimen sliding scale   SubCutaneous three times a day before meals  insulin lispro (HumaLOG) corrective regimen sliding scale   SubCutaneous at bedtime  lisinopril 5 milliGRAM(s) Oral daily  pantoprazole    Tablet 40 milliGRAM(s) Oral before breakfast  predniSONE   Tablet 5 milliGRAM(s) Oral daily  warfarin 4 milliGRAM(s) Oral daily    MEDICATIONS  (PRN):  ALPRAZolam 0.25 milliGRAM(s) Oral once PRN anxiety  dextrose Gel 1 Dose(s) Oral once PRN Blood Glucose LESS THAN 70 milliGRAM(s)/deciliter  glucagon  Injectable 1 milliGRAM(s) IntraMuscular once PRN Glucose LESS THAN 70 milligrams/deciliter    LABS:                        13.7   8.8   )-----------( 206      ( 05 Mar 2018 06:52 )             40.2     03-05    141  |  104  |  <4<L>  ----------------------------<  163<H>  3.7   |  27  |  0.68    Ca    8.3<L>      05 Mar 2018 17:57  Phos  3.0     03-05  Mg     1.6     03-05    TPro  6.5  /  Alb  3.1<L>  /  TBili  0.8  /  DBili  x   /  AST  142<H>  /  ALT  632<H>  /  AlkPhos  182<H>  03-05    PT/INR - ( 05 Mar 2018 06:51 )   PT: 28.2 sec;   INR: 2.56 ratio         PTT - ( 04 Mar 2018 06:47 )  PTT:31.4 sec    CAPILLARY BLOOD GLUCOSE      POCT Blood Glucose.: 147 mg/dL (05 Mar 2018 17:48)  POCT Blood Glucose.: 104 mg/dL (05 Mar 2018 12:37)  POCT Blood Glucose.: 120 mg/dL (05 Mar 2018 09:10)  POCT Blood Glucose.: 131 mg/dL (04 Mar 2018 22:35)          REVIEW OF SYSTEMS:  CONSTITUTIONAL: No fever, weight loss, or fatigue  EYES: No eye pain, visual disturbances, or discharge  ENMT:  No difficulty hearing, tinnitus, vertigo; No sinus or throat pain  NECK: No pain or stiffness  BREASTS: No pain, masses, or nipple discharge  RESPIRATORY: No cough, wheezing, chills or hemoptysis; No shortness of breath  CARDIOVASCULAR: No chest pain, palpitations, dizziness, or leg swelling  GASTROINTESTINAL: No abdominal or epigastric pain. No nausea, vomiting, or hematemesis; No diarrhea or constipation. No melena or hematochezia.  GENITOURINARY: No dysuria, frequency, hematuria, or incontinence  NEUROLOGICAL: No headaches, memory loss, loss of strength, numbness, or tremors  SKIN: No itching, burning, rashes, or lesions   LYMPH NODES: No enlarged glands  ENDOCRINE: No heat or cold intolerance; No hair loss  MUSCULOSKELETAL: No joint pain or swelling; No muscle, back, or extremity pain  PSYCHIATRIC: No depression, anxiety, mood swings, or difficulty sleeping  HEME/LYMPH: No easy bruising, or bleeding gums  ALLERY AND IMMUNOLOGIC: No hives or eczema    RADIOLOGY & ADDITIONAL TESTS:    Consultant(s) Notes Reviewed:  [x ] YES  [ ] NO    PHYSICAL EXAM:  GENERAL: NAD, well-groomed, well-developed, not in any distress ,  HEAD:  Atraumatic, Normocephalic  EYES: EOMI, PERRLA, conjunctiva and sclera clear  ENMT: No tonsillar erythema, exudates, or enlargement; Moist mucous membranes, Good dentition, No lesions  NECK: Supple, No JVD, Normal thyroid  NERVOUS SYSTEM:  Alert & Oriented X3, No focal deficit   CHEST/LUNG: Good air entry bilateral with no  rales, rhonchi, wheezing, or rubs  HEART: Regular rate and rhythm; No murmurs, rubs, or gallops  ABDOMEN: Soft, Nontender, Nondistended; Bowel sounds present  EXTREMITIES:  2+ Peripheral Pulses, No clubbing, cyanosis, or edema  SKIN: No rashes or lesions    Care Discussed with Consultants/Other Providers [ x] YES  [ ] NO

## 2018-03-05 NOTE — PROGRESS NOTE ADULT - PROBLEM SELECTOR PLAN 1
- f/up viral hepatitis panel  -orderd IMELDA, ASMA, AMA, ceruloplasmin, EBV, CMV   - ordered MRI abd with and without contrast/MRCP to ensure no biliary disease (if metal implants compatible)  - abdominal ultrasound with dopplers given concern for ischemic etiology  - pain control PRN  - IVF  - avoid hepatotoxins   - daily CBC, CMP, INR  - diet as tolerated (NPO if recurrent pain/vomiting)  -GI team following - Follow up viral hepatitis panel  - ordered IMELDA, ASMA, AMA, ceruloplasmin, EBV, CMV   - ordered MRI abd with and without contrast/MRCP to ensure no biliary disease (if metal implants compatible)  - abdominal ultrasound with dopplers given concern for ischemic etiology  - pain control PRN  - IVF  - avoid hepatotoxins   - daily CBC, CMP, INR  - diet as tolerated (NPO if recurrent pain/vomiting)  -GI team following Improved.  - Hep panel negative  - will follow up IMELDA, ASMA, AMA, ceruloplasmin, EBV, CMV, Iron/Ferritin  - MR abdomen with likely cholelithiasis  - abdominal ultrasound with dopplers wnl  - pain control PRN  - IVF  - avoid hepatotoxins   - daily CBC, CMP, INR  - diet as tolerated (NPO if recurrent pain/vomiting)  - GI team following

## 2018-03-05 NOTE — DISCHARGE NOTE ADULT - CARE PROVIDER_API CALL
Danilo Cuevas (MD), Surgery  [address] [facility] [address]  [address]  [place] [address]  Phone: [phone]  Fax: [phone] Danilo Cuevas), Surgery  [address] [facility]  [address]  [place] [address]  Phone: [phone]  Fax: [phone]    Omid Adams), Family Medicine  [address] [facility]  [address]  [place] [address]  Phone: [phone]  Fax: [phone]

## 2018-03-05 NOTE — PROGRESS NOTE ADULT - SUBJECTIVE AND OBJECTIVE BOX
Follow Up:  hepatitis    Interval History: pt stable, afebrile, now asymptomatic, no fever, no abdominal pain or vomiting    ROS:      All other systems negative    Constitutional: no fever, no chills  HEENT:  no vision changes, no sore throat, no rhinorrhea  Cardiovascular:  no chest pain, no palpitation  Respiratory:  no SOB, no cough  GI:  no abd pain, no vomiting, no diarrhea  urinary: no dysuria, no hematuria, no flank pain  musculoskeletal:  no joint pain, no joint swelling  skin:  no rash  neurology:  no headache, no seizure, no change in mental status        Allergies  Avelox (Pruritus)  Levaquin (Unknown)        ANTIMICROBIALS:      OTHER MEDS:  ALPRAZolam 0.25 milliGRAM(s) Oral once PRN  buDESOnide  80 MICROgram(s)/formoterol 4.5 MICROgram(s) Inhaler 2 Puff(s) Inhalation two times a day  cholecalciferol 1000 Unit(s) Oral daily  dextrose 5%. 1000 milliLiter(s) IV Continuous <Continuous>  dextrose 50% Injectable 12.5 Gram(s) IV Push once  dextrose 50% Injectable 25 Gram(s) IV Push once  dextrose 50% Injectable 25 Gram(s) IV Push once  dextrose Gel 1 Dose(s) Oral once PRN  furosemide    Tablet 20 milliGRAM(s) Oral daily  glucagon  Injectable 1 milliGRAM(s) IntraMuscular once PRN  insulin lispro (HumaLOG) corrective regimen sliding scale   SubCutaneous three times a day before meals  insulin lispro (HumaLOG) corrective regimen sliding scale   SubCutaneous at bedtime  lisinopril 5 milliGRAM(s) Oral daily  pantoprazole    Tablet 40 milliGRAM(s) Oral before breakfast  predniSONE   Tablet 5 milliGRAM(s) Oral daily  sodium chloride 0.9%. 1000 milliLiter(s) IV Continuous <Continuous>  warfarin 4 milliGRAM(s) Oral daily      Vital Signs Last 24 Hrs  T(C): 36.6 (05 Mar 2018 14:09), Max: 36.8 (04 Mar 2018 20:06)  T(F): 97.8 (05 Mar 2018 14:09), Max: 98.2 (04 Mar 2018 20:06)  HR: 86 (05 Mar 2018 14:09) (80 - 86)  BP: 127/73 (05 Mar 2018 14:09) (127/73 - 138/77)  BP(mean): --  RR: 18 (05 Mar 2018 14:09) (18 - 20)  SpO2: 97% (05 Mar 2018 14:09) (94% - 98%)    Physical Exam:  General:    NAD,  non toxic, A&O x 3  HEENT:    no oropharyngeal lesions,   no LAD,   neck supple  Cardio:     regular S1, S2,  no murmur  Respiratory:    clear b/l,    no wheezing  abd:     soft,   BS +,   no tenderness,    no organomegaly  :   no CVAT,  no suprapubic tenderness,   no  andrade  Musculoskeletal:   no joint swelling,   no edema  vascular: no lines, normal pulses  Skin:    no rash  Neurologic:     no focal deficit  psych: normal affect, no suicidal ideation                          13.7   8.8   )-----------( 206      ( 05 Mar 2018 06:52 )             40.2       03-05    143  |  103  |  <4<L>  ----------------------------<  115<H>  2.8<LL>   |  26  |  0.62    Ca    8.3<L>      05 Mar 2018 06:51  Phos  3.0     03-05  Mg     1.6     03-05    TPro  6.5  /  Alb  3.1<L>  /  TBili  0.8  /  DBili  x   /  AST  142<H>  /  ALT  632<H>  /  AlkPhos  182<H>  03-05          MICROBIOLOGY:  v  .Blood Blood-Peripheral  03-03-18   No growth to date.  --  --          Rapid RVP Result: NotDetec (03-02 @ 22:58)        Culture - Blood (collected 03 Mar 2018 14:02)  Source: .Blood Blood  Preliminary Report (04 Mar 2018 15:01):    No growth to date.    Culture - Blood (collected 03 Mar 2018 14:02)  Source: .Blood Blood-Peripheral  Preliminary Report (04 Mar 2018 15:01):    No growth to date.      RADIOLOGY:    < from: US Abdomen Upper Quadrant Right (03.05.18 @ 12:20) >  IMPRESSION:     Cholelithiasis without sonographic evidence of cholecystitis.No biliary   ductal dilatation.    Patent hepatic vasculature with normal direction portal flow.      < from: MR Abdomen No Cont (03.04.18 @ 22:28) >    IMPRESSION:     No focal hepatic lesion, steatosis or biliary ductal dilatation.    Likely cholelithiasis.

## 2018-03-05 NOTE — PROGRESS NOTE ADULT - SUBJECTIVE AND OBJECTIVE BOX
[facility] Intern: Bashir Sauceda  Pager: [phone], [phone]    Patient is a 73y old  Female who presents with a chief complaint of Abdominal pain, fevers (05 Mar 2018 10:05)    INTERVAL HPI/OVERNIGHT EVENTS: Overnight, no acute events. Patient seen and examined at bedside this AM. Patient denies fevers/chills, chest pain, shortness of breath, palpitations, diaphoresis, nausea/vomiting, diarrhea/constipation, blurry vision, melena/hematochezia, headaches, dysuria, and frequency. Patient states improvement in abdominal pain.    VITAL SIGNS  T(C): 36.6 (03-05-18 @ 14:09), Max: 36.8 (03-04-18 @ 20:06)  HR: 86 (03-05-18 @ 14:09) (80 - 86)  BP: 127/73 (03-05-18 @ 14:09) (127/73 - 138/77)  RR: 18 (03-05-18 @ 14:09) (18 - 20)  SpO2: 97% (03-05-18 @ 14:09) (94% - 98%)  Wt(kg): --Vital Signs Last 24 Hrs  T(C): 36.6 (05 Mar 2018 14:09), Max: 36.8 (04 Mar 2018 20:06)  T(F): 97.8 (05 Mar 2018 14:09), Max: 98.2 (04 Mar 2018 20:06)  HR: 86 (05 Mar 2018 14:09) (80 - 86)  BP: 127/73 (05 Mar 2018 14:09) (127/73 - 138/77)  BP(mean): --  RR: 18 (05 Mar 2018 14:09) (18 - 20)  SpO2: 97% (05 Mar 2018 14:09) (94% - 98%)    PHYSICAL EXAM:  GENERAL: NAD, AOx3, well-groomed, well-developed  HEAD:  Atraumatic, Normocephalic  EYES: EOMI, PERRLA, conjunctiva and sclera clear  ENMT: No tonsillar erythema, exudates, or enlargement; Moist mucous membranes, Good dentition, No lesions  NECK: Supple, No JVD, Normal thyroid  NERVOUS SYSTEM: AOx3, no   CHEST/LUNG: CTA b/l; No rales, rhonchi, wheezing, or rubs  HEART: Regular rate and rhythm; No murmurs, rubs, or gallops  ABDOMEN: Soft, Nontender, Nondistended; Bowel sounds present  EXTREMITIES:  2+ Peripheral Pulses, No clubbing, cyanosis, or edema  LYMPH: No lymphadenopathy noted  SKIN: No rashes or lesions    Consultant(s) Notes Reviewed:  [x ] YES  [ ] NO  Care Discussed with Consultants/Other Providers [ x] YES  [ ] NO    LABS:                        13.7   8.8   )-----------( 206      ( 05 Mar 2018 06:52 )             40.2     03-05    143  |  103  |  <4<L>  ----------------------------<  115<H>  2.8<LL>   |  26  |  0.62    Ca    8.3<L>      05 Mar 2018 06:51  Phos  3.0     03-05  Mg     1.6     03-05    TPro  6.5  /  Alb  3.1<L>  /  TBili  0.8  /  DBili  x   /  AST  142<H>  /  ALT  632<H>  /  AlkPhos  182<H>  03-05    PT/INR - ( 05 Mar 2018 06:51 )   PT: 28.2 sec;   INR: 2.56 ratio    PTT - ( 04 Mar 2018 06:47 )  PTT:31.4 sec    CAPILLARY BLOOD GLUCOSE  POCT Blood Glucose.: 104 mg/dL (05 Mar 2018 12:37)  POCT Blood Glucose.: 120 mg/dL (05 Mar 2018 09:10)  POCT Blood Glucose.: 131 mg/dL (04 Mar 2018 22:35)  POCT Blood Glucose.: 128 mg/dL (04 Mar 2018 18:18)            RADIOLOGY & ADDITIONAL TESTS:    Imaging Personally Reviewed:  [ ] YES  [ ] NO

## 2018-03-05 NOTE — PROGRESS NOTE ADULT - ASSESSMENT
73y Woman with h/o Atrial Fibrillation (on Coumadin), HTN, DM2, asthma, HLD p/w 1 day of Epigastric abdominal pain, nausea/vomiting with labs significant for leukocytosis and elevated liver enzymes (mixed hepatocellular and cholestatic) DDx: choledocholithiasis/cholangitis (though not likely given normal CBD and no stones seen on imaging) vs complication of duodenal diverticula (stone, obstruction) versus primary hepatic etiology (viral hepatitis, autoimmune hepatitis, ischemic, DILI, Phill's disease_ This is a 74yo woman with a PMHx of Atrial Fibrillation (on Coumadin), HTN, DM2, asthma, HLD p/w 1 day of epigastric abdominal pain, nausea/vomiting with labs significant for leukocytosis and elevated liver enzymes (mixed hepatocellular and cholestatic)

## 2018-03-05 NOTE — PROGRESS NOTE ADULT - SUBJECTIVE AND OBJECTIVE BOX
GASTROENTEROLOGY FOLLOW-UP NOTE    Chief Complaint:  Patient is a 73y old  Female who presents with a chief complaint of Abdominal pain, fevers (03 Mar 2018 13:25)      Interval Events:   - Continues to be pain free, no nausea/vomiting  - Tolerating PO diet     Allergies:  Avelox (Pruritus)  Levaquin (Unknown)      Hospital Medications:  ALPRAZolam 0.25 milliGRAM(s) Oral once PRN  buDESOnide  80 MICROgram(s)/formoterol 4.5 MICROgram(s) Inhaler 2 Puff(s) Inhalation two times a day  cholecalciferol 1000 Unit(s) Oral daily  dextrose 5%. 1000 milliLiter(s) IV Continuous <Continuous>  dextrose 50% Injectable 12.5 Gram(s) IV Push once  dextrose 50% Injectable 25 Gram(s) IV Push once  dextrose 50% Injectable 25 Gram(s) IV Push once  dextrose Gel 1 Dose(s) Oral once PRN  furosemide    Tablet 20 milliGRAM(s) Oral daily  glucagon  Injectable 1 milliGRAM(s) IntraMuscular once PRN  insulin lispro (HumaLOG) corrective regimen sliding scale   SubCutaneous three times a day before meals  insulin lispro (HumaLOG) corrective regimen sliding scale   SubCutaneous at bedtime  lisinopril 5 milliGRAM(s) Oral daily  pantoprazole    Tablet 40 milliGRAM(s) Oral before breakfast  predniSONE   Tablet 5 milliGRAM(s) Oral daily  sodium chloride 0.9%. 1000 milliLiter(s) IV Continuous <Continuous>  warfarin 4 milliGRAM(s) Oral daily      PMHX/PSHX:  Dementia  Hypertension  Type 2 diabetes mellitus  Atrial fibrillation, chronic  Diabetes mellitus  Asthma  Diabetes Mellitus  HTN (Hypertension)  Asthma  Afib      Family history:  No pertinent family history in first degree relatives      ROS:     General:  No wt loss, fevers, chills, night sweats, fatigue,   Eyes:  Good vision, no reported pain  ENT:  No sore throat, pain, runny nose, dysphagia  CV:  No pain, palpitations, hypo/hypertension  Resp:  No dyspnea, cough, tachypnea, wheezing  GI:  see HPI  :  No pain, bleeding, incontinence, nocturia  Muscle:  No pain, weakness  Neuro:  No weakness, tingling, memory problems  Psych:  No fatigue, insomnia, mood problems, depression  Endocrine:  No polyuria, polydipsia, cold/heat intolerance  Heme:  No petechiae, ecchymosis, easy bruisability  Skin:  No rash, tattoos, scars, edema      PHYSICAL EXAM:   Vital Signs:  Vital Signs Last 24 Hrs  T(C): 36.6 (05 Mar 2018 04:27), Max: 36.8 (04 Mar 2018 20:06)  T(F): 97.9 (05 Mar 2018 04:27), Max: 98.2 (04 Mar 2018 20:06)  HR: 80 (05 Mar 2018 06:39) (80 - 87)  BP: 138/77 (05 Mar 2018 04:27) (128/71 - 138/77)  BP(mean): --  RR: 18 (05 Mar 2018 04:27) (18 - 20)  SpO2: 98% (05 Mar 2018 04:27) (94% - 98%)  Daily     Daily     GENERAL:  no acute distress  HEENT:  -icterus   CHEST:  clear bilaterally, no wheezes or rales  HEART:  RRR, S1S2  ABDOMEN:  soft, non-tender, non-distended, normoactive bowel sounds,  no hepato-splenomegaly  EXTEREMITIES:  no  edema  SKIN:  No rash/erythema/ecchymoses/petechiae/wounds/abscess/warm/dry  NEURO:  alert, oriented, conversant     LABS:                        11.8   9.1   )-----------( 169      ( 04 Mar 2018 06:47 )             37.1     03-04    138  |  101  |  4<L>  ----------------------------<  149<H>  3.3<L>   |  25  |  0.65    Ca    7.4<L>      04 Mar 2018 06:47  Phos  3.0     03-04  Mg     1.4     03-04    TPro  5.5<L>  /  Alb  2.6<L>  /  TBili  1.5<H>  /  DBili  x   /  AST  425<H>  /  ALT  830<H>  /  AlkPhos  144<H>  03-04    LIVER FUNCTIONS - ( 04 Mar 2018 06:47 )  Alb: 2.6 g/dL / Pro: 5.5 g/dL / ALK PHOS: 144 U/L / ALT: 830 U/L RC / AST: 425 U/L / GGT: x           PT/INR - ( 05 Mar 2018 06:51 )   PT: 28.2 sec;   INR: 2.56 ratio         PTT - ( 04 Mar 2018 06:47 )  PTT:31.4 sec        Imaging:

## 2018-03-05 NOTE — DISCHARGE NOTE ADULT - MEDICATION SUMMARY - MEDICATIONS TO CHANGE
I will SWITCH the dose or number of times a day I take the medications listed below when I get home from the hospital:  None I will SWITCH the dose or number of times a day I take the medications listed below when I get home from the hospital:    warfarin 4 mg oral tablet  -- 1 tab(s) by mouth once a day

## 2018-03-05 NOTE — DISCHARGE NOTE ADULT - CARE PROVIDERS DIRECT ADDRESSES
,[email or web address]_Unknown

## 2018-03-05 NOTE — CONSULT NOTE ADULT - ASSESSMENT
73 year old woman with elevated transaminases and T. bili, currently resolving consistent with choledocholithiasis and passed stone. Patient feeling better    Discussion was had with the patient and her son about the recommendation that she undergo cholecystectomy on this admission. She insists that she feels better, wants to go home and does not want surgery.  She states that she was very relieved that she would not have to undergo procedures.  She was counseled on the risks of avoiding surgery including that there is a chance that this could happen again.  She agreed to follow up if she has symptoms again.  Please call back with any questions or concerns.  If discharged, please follow up with Dr. Cuevas within 1-2 weeks after discharge from the hospital. You may call [phone] to schedule an appointment.     - MERI Nix, PGY2  # [id number] 73 year old woman with elevated transaminases and T. bili, currently resolving consistent with choledocholithiasis and passed stone. Patient feeling better    Discussion was had with the patient and her son about the recommendation that she undergo cholecystectomy on this admission. She insists that she feels better, wants to go home and does not want surgery.  She states that she was very relieved that she would not have to undergo procedures.  She was counseled on the risks of avoiding surgery including that there is a chance that this could happen again.  She agreed to follow up if she has symptoms again.  Please call back with any questions or concerns.  If discharged, please follow up with Dr. Cuevas within 1-2 weeks after discharge from the hospital. You may call [phone] to schedule an appointment.     - MERI Nix, PGY2  # [id number]

## 2018-03-05 NOTE — PROGRESS NOTE ADULT - ASSESSMENT
73y Woman with h/o Atrial Fibrillation (on Coumadin), HTN, DM2, asthma, HLD p/w 1 day of Epigastric abdominal pain, nausea/vomiting with labs significant for leukocytosis and elevated liver enzymes (mixed hepatocellular and cholestatic) DDx: choledocholithiasis/cholangitis (though not likely given normal CBD and no stones seen on imaging) vs complication of duodenal diverticula (stone, obstruction) versus primary hepatic etiology (viral hepatitis, autoimmune hepatitis, ischemic, DILI, Phill's disease)     Problem/Plan - 1:  ·  Problem: Epigastric abdominal pain with abnormal LFT .  Plan: - Improving.   -viral hepatitis panel negative .   - IMELDA, ASMA, AMA, ceruloplasmin, EBV, CMV pending   - ordered MRI abd with and without contrast/MRCP noted   - abdominal ultrasound with dopplers noted . Cholelithiasis .Surgery consulted.   - avoid hepatotoxins   - diet as tolerated (NPO if recurrent pain/vomiting)  -GI help appreciated.      Problem/Plan - 2:  ·  Problem: Fever.  Plan: Now resolved. No more Leucocytosis .   ID team following  -f/up EBV, CMV, hep panel.      Problem/Plan - 3:  ·  Problem: Nausea and vomiting.  Plan: Now resolved.  -zofran PRN.      Problem/Plan - 4:  ·  Problem: Atrial fibrillation, chronic.  Plan: INR therapeutic .   -c/w home coumadin dose.      Problem/Plan - 5:  ·  Problem: Type 2 diabetes mellitus.  Plan: Blood sugars in good control.  ISS.      Problem/Plan - 6:  Problem: Hypertension. Plan: c/w home medications.     Problem/Plan - 7:  ·  Problem: Need for prophylactic measure.  Plan: c/w home coumadin.

## 2018-03-05 NOTE — PROGRESS NOTE ADULT - PROBLEM SELECTOR PLAN 2
Now resolved.  ID team following  -f/up EBV, CMV, hep panel Now resolved.  ID team following  - f/up EBV, CMV, hep panel

## 2018-03-06 DIAGNOSIS — R74.0 NONSPECIFIC ELEVATION OF LEVELS OF TRANSAMINASE AND LACTIC ACID DEHYDROGENASE [LDH]: ICD-10-CM

## 2018-03-06 DIAGNOSIS — R11.2 NAUSEA WITH VOMITING, UNSPECIFIED: ICD-10-CM

## 2018-03-06 DIAGNOSIS — K80.21 CALCULUS OF GALLBLADDER WITHOUT CHOLECYSTITIS WITH OBSTRUCTION: ICD-10-CM

## 2018-03-06 LAB
ALBUMIN SERPL ELPH-MCNC: 2.5 G/DL — LOW (ref 3.3–5)
ALP SERPL-CCNC: 199 U/L — HIGH (ref 40–120)
ALT FLD-CCNC: 387 U/L RC — HIGH (ref 10–45)
ANION GAP SERPL CALC-SCNC: 9 MMOL/L — SIGNIFICANT CHANGE UP (ref 5–17)
APTT BLD: 38.7 SEC — HIGH (ref 27.5–37.4)
AST SERPL-CCNC: 72 U/L — HIGH (ref 10–40)
BILIRUB SERPL-MCNC: 0.6 MG/DL — SIGNIFICANT CHANGE UP (ref 0.2–1.2)
BUN SERPL-MCNC: <4 MG/DL — LOW (ref 7–23)
CALCIUM SERPL-MCNC: 8.3 MG/DL — LOW (ref 8.4–10.5)
CHLORIDE SERPL-SCNC: 103 MMOL/L — SIGNIFICANT CHANGE UP (ref 96–108)
CO2 SERPL-SCNC: 28 MMOL/L — SIGNIFICANT CHANGE UP (ref 22–31)
CREAT SERPL-MCNC: 0.65 MG/DL — SIGNIFICANT CHANGE UP (ref 0.5–1.3)
FERRITIN SERPL-MCNC: 55 NG/ML — SIGNIFICANT CHANGE UP (ref 15–150)
GLUCOSE BLDC GLUCOMTR-MCNC: 120 MG/DL — HIGH (ref 70–99)
GLUCOSE BLDC GLUCOMTR-MCNC: 157 MG/DL — HIGH (ref 70–99)
GLUCOSE BLDC GLUCOMTR-MCNC: 173 MG/DL — HIGH (ref 70–99)
GLUCOSE BLDC GLUCOMTR-MCNC: 242 MG/DL — HIGH (ref 70–99)
GLUCOSE SERPL-MCNC: 131 MG/DL — HIGH (ref 70–99)
HCT VFR BLD CALC: 36.7 % — SIGNIFICANT CHANGE UP (ref 34.5–45)
HGB BLD-MCNC: 12.3 G/DL — SIGNIFICANT CHANGE UP (ref 11.5–15.5)
INR BLD: 3.52 RATIO — HIGH (ref 0.88–1.16)
IRON SATN MFR SERPL: 53 UG/DL — SIGNIFICANT CHANGE UP (ref 30–160)
MCHC RBC-ENTMCNC: 28.3 PG — SIGNIFICANT CHANGE UP (ref 27–34)
MCHC RBC-ENTMCNC: 33.5 GM/DL — SIGNIFICANT CHANGE UP (ref 32–36)
MCV RBC AUTO: 84.5 FL — SIGNIFICANT CHANGE UP (ref 80–100)
PLATELET # BLD AUTO: 197 K/UL — SIGNIFICANT CHANGE UP (ref 150–400)
POTASSIUM SERPL-MCNC: 3.1 MMOL/L — LOW (ref 3.5–5.3)
POTASSIUM SERPL-SCNC: 3.1 MMOL/L — LOW (ref 3.5–5.3)
PROT SERPL-MCNC: 5.8 G/DL — LOW (ref 6–8.3)
PROTHROM AB SERPL-ACNC: 39 SEC — HIGH (ref 9.8–12.7)
RBC # BLD: 4.34 M/UL — SIGNIFICANT CHANGE UP (ref 3.8–5.2)
RBC # FLD: 12.1 % — SIGNIFICANT CHANGE UP (ref 10.3–14.5)
SODIUM SERPL-SCNC: 140 MMOL/L — SIGNIFICANT CHANGE UP (ref 135–145)
WBC # BLD: 6.3 K/UL — SIGNIFICANT CHANGE UP (ref 3.8–10.5)
WBC # FLD AUTO: 6.3 K/UL — SIGNIFICANT CHANGE UP (ref 3.8–10.5)

## 2018-03-06 RX ORDER — POTASSIUM CHLORIDE 20 MEQ
40 PACKET (EA) ORAL EVERY 4 HOURS
Qty: 0 | Refills: 0 | Status: COMPLETED | OUTPATIENT
Start: 2018-03-06 | End: 2018-03-06

## 2018-03-06 RX ADMIN — Medication 20 MILLIGRAM(S): at 05:28

## 2018-03-06 RX ADMIN — Medication 5 MILLIGRAM(S): at 05:28

## 2018-03-06 RX ADMIN — Medication 1000 UNIT(S): at 11:37

## 2018-03-06 RX ADMIN — Medication 40 MILLIEQUIVALENT(S): at 15:36

## 2018-03-06 RX ADMIN — PANTOPRAZOLE SODIUM 40 MILLIGRAM(S): 20 TABLET, DELAYED RELEASE ORAL at 05:28

## 2018-03-06 RX ADMIN — Medication 1: at 08:56

## 2018-03-06 RX ADMIN — BUDESONIDE AND FORMOTEROL FUMARATE DIHYDRATE 2 PUFF(S): 160; 4.5 AEROSOL RESPIRATORY (INHALATION) at 17:32

## 2018-03-06 RX ADMIN — Medication 40 MILLIEQUIVALENT(S): at 11:37

## 2018-03-06 RX ADMIN — BUDESONIDE AND FORMOTEROL FUMARATE DIHYDRATE 2 PUFF(S): 160; 4.5 AEROSOL RESPIRATORY (INHALATION) at 06:00

## 2018-03-06 RX ADMIN — LISINOPRIL 5 MILLIGRAM(S): 2.5 TABLET ORAL at 05:28

## 2018-03-06 RX ADMIN — Medication 1: at 13:17

## 2018-03-06 NOTE — PROGRESS NOTE ADULT - PROBLEM SELECTOR PLAN 1
Likely 2/2 to cholelithiasis. Improved now.  - Surgical consult placed, recommending cholecystectomy however patient refusing inpatient surgery and would like to go home.  - Hep panel negative  - Ceruloplasmin negative, EBV no acute infection, Iron/Ferritin wnl,   - Follow up IMELDA, ASMA, AMA  - MR abdomen with likely cholelithiasis  - abdominal ultrasound with dopplers wnl  - pain control PRN  - avoid hepatotoxins   - daily CBC, CMP, INR  - diet as tolerated (NPO if recurrent pain/vomiting)  - GI team following

## 2018-03-06 NOTE — PROGRESS NOTE ADULT - SUBJECTIVE AND OBJECTIVE BOX
INTERVAL HPI/OVERNIGHT EVENTS:      pt seen and examined. tolerating diet, no complaints. +BM/flatus        MEDICATIONS  (STANDING):  buDESOnide  80 MICROgram(s)/formoterol 4.5 MICROgram(s) Inhaler 2 Puff(s) Inhalation two times a day  cholecalciferol 1000 Unit(s) Oral daily  dextrose 5%. 1000 milliLiter(s) (50 mL/Hr) IV Continuous <Continuous>  dextrose 50% Injectable 12.5 Gram(s) IV Push once  dextrose 50% Injectable 25 Gram(s) IV Push once  dextrose 50% Injectable 25 Gram(s) IV Push once  furosemide    Tablet 20 milliGRAM(s) Oral daily  insulin lispro (HumaLOG) corrective regimen sliding scale   SubCutaneous three times a day before meals  insulin lispro (HumaLOG) corrective regimen sliding scale   SubCutaneous at bedtime  lisinopril 5 milliGRAM(s) Oral daily  pantoprazole    Tablet 40 milliGRAM(s) Oral before breakfast  potassium chloride    Tablet ER 40 milliEquivalent(s) Oral every 4 hours  predniSONE   Tablet 5 milliGRAM(s) Oral daily    MEDICATIONS  (PRN):  ALPRAZolam 0.25 milliGRAM(s) Oral once PRN anxiety  dextrose Gel 1 Dose(s) Oral once PRN Blood Glucose LESS THAN 70 milliGRAM(s)/deciliter  glucagon  Injectable 1 milliGRAM(s) IntraMuscular once PRN Glucose LESS THAN 70 milligrams/deciliter      Vital Signs Last 24 Hrs  T(C): 36.3 (06 Mar 2018 05:14), Max: 36.6 (05 Mar 2018 14:09)  T(F): 97.3 (06 Mar 2018 05:14), Max: 97.9 (05 Mar 2018 20:52)  HR: 62 (06 Mar 2018 06:01) (62 - 107)  BP: 143/80 (06 Mar 2018 05:14) (127/73 - 149/82)  BP(mean): --  RR: 18 (06 Mar 2018 05:14) (18 - 18)  SpO2: 98% (06 Mar 2018 06:01) (95% - 98%)  I&O's Detail    05 Mar 2018 07:01  -  06 Mar 2018 07:00  --------------------------------------------------------  IN:    IV PiggyBack: 300 mL    Oral Fluid: 440 mL    sodium chloride 0.9%: 1050 mL  Total IN: 1790 mL    OUT:  Total OUT: 0 mL    Total NET: 1790 mL      06 Mar 2018 07:01  -  06 Mar 2018 13:09  --------------------------------------------------------  IN:    Oral Fluid: 240 mL  Total IN: 240 mL    OUT:  Total OUT: 0 mL    Total NET: 240 mL          REVIEW OF SYSTEMS:  CONSTITUTIONAL: No weakness, fevers or chills  EYES/ENT: No visual changes;  No vertigo or throat pain   NECK: No pain or stiffness  RESPIRATORY: No cough, wheezing, hemoptysis; No shortness of breath  CARDIOVASCULAR: No chest pain or palpitations  GASTROINTESTINAL: No abdominal or epigastric pain. No nausea, vomiting, or hematemesis; No diarrhea or constipation. No melena or hematochezia.  GENITOURINARY: No dysuria, frequency or hematuria  NEUROLOGICAL: No numbness or weakness  SKIN: No itching, burning, rashes, or lesions   All other review of systems is negative unless indicated above.    Physical Exam:  General: WN/WD NAD  Neurology: A&Ox3, nonfocal, LEONARD x 4  Respiratory: CTA B/L  CV: RRR, S1S2, no murmurs, rubs or gallops  Abdominal: Soft, NT, ND +BS, Last BM  Extremities: No edema, + peripheral pulses        LABS:                        12.3   6.3   )-----------( 197      ( 06 Mar 2018 07:01 )             36.7     03-06    140  |  103  |  <4<L>  ----------------------------<  131<H>  3.1<L>   |  28  |  0.65    Ca    8.3<L>      06 Mar 2018 07:00  Phos  3.0     03-05  Mg     1.6     03-05    TPro  5.8<L>  /  Alb  2.5<L>  /  TBili  0.6  /  DBili  x   /  AST  72<H>  /  ALT  387<H>  /  AlkPhos  199<H>  03-06    PT/INR - ( 06 Mar 2018 07:01 )   PT: 39.0 sec;   INR: 3.52 ratio         PTT - ( 06 Mar 2018 07:01 )  PTT:38.7 sec      RADIOLOGY & ADDITIONAL STUDIES:

## 2018-03-06 NOTE — PROGRESS NOTE ADULT - ASSESSMENT
73y Woman with h/o Atrial Fibrillation (on Coumadin), HTN, DM2, asthma, HLD p/w 1 day of Epigastric abdominal pain, nausea/vomiting with labs significant for leukocytosis and elevated liver enzymes (mixed hepatocellular and cholestatic) DDx: choledocholithiasis/cholangitis (though not likely given normal CBD and no stones seen on imaging) vs complication of duodenal diverticula (stone, obstruction) versus primary hepatic etiology (viral hepatitis, autoimmune hepatitis, ischemic, DILI, Phill's disease)     Problem/Plan - 1:  ·  Problem: Epigastric abdominal pain with abnormal LFT .  Plan: - Improving.   -viral hepatitis panel negative .   - IMELDA, ASMA, AMA, ceruloplasmin, EBV, CMV pending   - ordered MRI abd with and without contrast/MRCP noted   - abdominal ultrasound with dopplers noted . Cholelithiasis .Surgery consulted. Refusing Lap [facility] this time and wants to f/u outpt.   - avoid hepatotoxins   - diet as tolerated (NPO if recurrent pain/vomiting)  -GI help appreciated.      Problem/Plan - 2:  ·  Problem: Fever.  Plan: Now resolved. No more Leucocytosis .   ID team following  -f/up EBV, CMV, hep panel.      Problem/Plan - 3:  ·  Problem: Nausea and vomiting.  Plan: Now resolved. Tolerating po.  -zofran PRN.      Problem/Plan - 4:  ·  Problem: Atrial fibrillation, chronic.  Plan: INR supratherapeutic .   -Will hold coumadin tonight as went up quickly .      Problem/Plan - 5:  ·  Problem: Type 2 diabetes mellitus.  Plan: Blood sugars in good control.  ISS.      Problem/Plan - 6:  Problem: Hypertension. Plan: c/w home medications.     Problem/Plan - 7:  ·  Problem: Need for prophylactic measure.  Plan: c/w home coumadin.

## 2018-03-06 NOTE — PHYSICAL THERAPY INITIAL EVALUATION ADULT - STRENGTHENING, PT EVAL
Pt will increase BUE and BLEs by 1/2 in 2 weeks to improve independence and safety during all standing activities and activities of daily living.

## 2018-03-06 NOTE — PHYSICAL THERAPY INITIAL EVALUATION ADULT - BALANCE TRAINING, PT EVAL
Pt will increase standing balance to good in 2 weeks to improve independence and safety during all standing activities and activities of daily living.

## 2018-03-06 NOTE — PROGRESS NOTE ADULT - SUBJECTIVE AND OBJECTIVE BOX
[facility] B Medicine Intern: Bashir Sauceda  Pager: [phone], [phone]    Patient is a 73y old  Female who presents with a chief complaint of Abdominal pain, fevers (05 Mar 2018 10:05)    INTERVAL HPI/OVERNIGHT EVENTS: Overnight, no acute events. Patient seen and examined at bedside this AM. Patient denies fevers/chills, chest pain, shortness of breath, palpitations, diaphoresis, nausea/vomiting, diarrhea/constipation, blurry vision, melena/hematochezia, headaches, dysuria, and frequency. Patient tolerating PO intake.    VITAL SIGNS  T(C): 36.3 (03-06-18 @ 13:30), Max: 36.6 (03-05-18 @ 14:09)  HR: 107 (03-06-18 @ 13:30) (62 - 107)  BP: 156/89 (03-06-18 @ 13:30) (127/73 - 156/89)  RR: 18 (03-06-18 @ 13:30) (18 - 18)  SpO2: 96% (03-06-18 @ 13:30) (95% - 98%)  Wt(kg): --Vital Signs Last 24 Hrs  T(C): 36.3 (06 Mar 2018 13:30), Max: 36.6 (05 Mar 2018 14:09)  T(F): 97.4 (06 Mar 2018 13:30), Max: 97.9 (05 Mar 2018 20:52)  HR: 107 (06 Mar 2018 13:30) (62 - 107)  BP: 156/89 (06 Mar 2018 13:30) (127/73 - 156/89)  BP(mean): --  RR: 18 (06 Mar 2018 13:30) (18 - 18)  SpO2: 96% (06 Mar 2018 13:30) (95% - 98%)    PHYSICAL EXAM:  GENERAL: NAD, AOx3, well-groomed, well-developed  HEAD:  Atraumatic, Normocephalic  EYES: EOMI, PERRLA, conjunctiva and sclera clear  ENMT: No tonsillar erythema, exudates, or enlargement; Moist mucous membranes,  NECK: Supple, No JVD, Normal thyroid  NERVOUS SYSTEM: AOx3, no focal deficits  CHEST/LUNG: CTA b/l; No rales, rhonchi, wheezing, or rubs  HEART: Regular rate and rhythm; No murmurs, rubs, or gallops  ABDOMEN: Soft, Nontender, Nondistended; Bowel sounds present  EXTREMITIES:  2+ Peripheral Pulses, No clubbing, cyanosis, or edema  LYMPH: No lymphadenopathy noted  SKIN: No rashes or lesions    Consultant(s) Notes Reviewed:  [x ] YES  [ ] NO  Care Discussed with Consultants/Other Providers [ x] YES  [ ] NO    LABS:                        12.3   6.3   )-----------( 197      ( 06 Mar 2018 07:01 )             36.7     03-06    140  |  103  |  <4<L>  ----------------------------<  131<H>  3.1<L>   |  28  |  0.65    Ca    8.3<L>      06 Mar 2018 07:00  Phos  3.0     03-05  Mg     1.6     03-05    TPro  5.8<L>  /  Alb  2.5<L>  /  TBili  0.6  /  DBili  x   /  AST  72<H>  /  ALT  387<H>  /  AlkPhos  199<H>  03-06    PT/INR - ( 06 Mar 2018 07:01 )   PT: 39.0 sec;   INR: 3.52 ratio         PTT - ( 06 Mar 2018 07:01 )  PTT:38.7 sec    CAPILLARY BLOOD GLUCOSE  POCT Blood Glucose.: 173 mg/dL (06 Mar 2018 13:08)  POCT Blood Glucose.: 157 mg/dL (06 Mar 2018 08:52)  POCT Blood Glucose.: 226 mg/dL (05 Mar 2018 21:45)  POCT Blood Glucose.: 147 mg/dL (05 Mar 2018 17:48)    RADIOLOGY & ADDITIONAL TESTS:    Imaging Personally Reviewed:  [ ] YES  [ ] NO

## 2018-03-06 NOTE — PROGRESS NOTE ADULT - SUBJECTIVE AND OBJECTIVE BOX
INTERVAL HPI/OVERNIGHT EVENTS: Little weak otherwise fine.   Vital Signs Last 24 Hrs  T(C): 36.7 (06 Mar 2018 15:27), Max: 36.7 (06 Mar 2018 15:27)  T(F): 98.1 (06 Mar 2018 15:27), Max: 98.1 (06 Mar 2018 15:27)  HR: 62 (06 Mar 2018 17:53) (62 - 112)  BP: 144/74 (06 Mar 2018 15:27) (143/80 - 156/89)  BP(mean): --  RR: 16 (06 Mar 2018 15:27) (16 - 18)  SpO2: 97% (06 Mar 2018 17:53) (95% - 98%)  I&O's Summary    05 Mar 2018 07:01  -  06 Mar 2018 07:00  --------------------------------------------------------  IN: 1790 mL / OUT: 0 mL / NET: 1790 mL    06 Mar 2018 07:01  -  06 Mar 2018 18:07  --------------------------------------------------------  IN: 480 mL / OUT: 0 mL / NET: 480 mL      MEDICATIONS  (STANDING):  buDESOnide  80 MICROgram(s)/formoterol 4.5 MICROgram(s) Inhaler 2 Puff(s) Inhalation two times a day  cholecalciferol 1000 Unit(s) Oral daily  dextrose 5%. 1000 milliLiter(s) (50 mL/Hr) IV Continuous <Continuous>  dextrose 50% Injectable 12.5 Gram(s) IV Push once  dextrose 50% Injectable 25 Gram(s) IV Push once  dextrose 50% Injectable 25 Gram(s) IV Push once  furosemide    Tablet 20 milliGRAM(s) Oral daily  insulin lispro (HumaLOG) corrective regimen sliding scale   SubCutaneous three times a day before meals  insulin lispro (HumaLOG) corrective regimen sliding scale   SubCutaneous at bedtime  lisinopril 5 milliGRAM(s) Oral daily  pantoprazole    Tablet 40 milliGRAM(s) Oral before breakfast  predniSONE   Tablet 5 milliGRAM(s) Oral daily    MEDICATIONS  (PRN):  ALPRAZolam 0.25 milliGRAM(s) Oral once PRN anxiety  dextrose Gel 1 Dose(s) Oral once PRN Blood Glucose LESS THAN 70 milliGRAM(s)/deciliter  glucagon  Injectable 1 milliGRAM(s) IntraMuscular once PRN Glucose LESS THAN 70 milligrams/deciliter    LABS:                        12.3   6.3   )-----------( 197      ( 06 Mar 2018 07:01 )             36.7     03-06    140  |  103  |  <4<L>  ----------------------------<  131<H>  3.1<L>   |  28  |  0.65    Ca    8.3<L>      06 Mar 2018 07:00  Phos  3.0     03-05  Mg     1.6     03-05    TPro  5.8<L>  /  Alb  2.5<L>  /  TBili  0.6  /  DBili  x   /  AST  72<H>  /  ALT  387<H>  /  AlkPhos  199<H>  03-06    PT/INR - ( 06 Mar 2018 07:01 )   PT: 39.0 sec;   INR: 3.52 ratio         PTT - ( 06 Mar 2018 07:01 )  PTT:38.7 sec    CAPILLARY BLOOD GLUCOSE      POCT Blood Glucose.: 120 mg/dL (06 Mar 2018 17:59)  POCT Blood Glucose.: 173 mg/dL (06 Mar 2018 13:08)  POCT Blood Glucose.: 157 mg/dL (06 Mar 2018 08:52)  POCT Blood Glucose.: 226 mg/dL (05 Mar 2018 21:45)          REVIEW OF SYSTEMS:  CONSTITUTIONAL:  fatigue  EYES: No eye pain, visual disturbances, or discharge  ENMT:  No difficulty hearing, tinnitus, vertigo; No sinus or throat pain  NECK: No pain or stiffness  BREASTS: No pain, masses, or nipple discharge  RESPIRATORY: No cough, wheezing, chills or hemoptysis; No shortness of breath  CARDIOVASCULAR: No chest pain, palpitations, dizziness, or leg swelling  GASTROINTESTINAL: No abdominal or epigastric pain. No nausea, vomiting, or hematemesis; No diarrhea or constipation. No melena or hematochezia.  GENITOURINARY: No dysuria, frequency, hematuria, or incontinence  NEUROLOGICAL: No headaches, memory loss, loss of strength, numbness, or tremors  SKIN: No itching, burning, rashes, or lesions   LYMPH NODES: No enlarged glands  ENDOCRINE: No heat or cold intolerance; No hair loss  MUSCULOSKELETAL: No joint pain or swelling; No muscle, back, or extremity pain  PSYCHIATRIC: No depression, anxiety, mood swings, or difficulty sleeping  HEME/LYMPH: No easy bruising, or bleeding gums  ALLERY AND IMMUNOLOGIC: No hives or eczema    RADIOLOGY & ADDITIONAL TESTS:    Consultant(s) Notes Reviewed:  [x ] YES  [ ] NO    PHYSICAL EXAM:  GENERAL: NAD, well-groomed, well-developed,not in any distress ,  HEAD:  Atraumatic, Normocephalic  EYES: EOMI, PERRLA, conjunctiva and sclera clear  ENMT: No tonsillar erythema, exudates, or enlargement; Moist mucous membranes, Good dentition, No lesions  NECK: Supple, No JVD, Normal thyroid  NERVOUS SYSTEM:  Alert & Oriented X3, No focal deficit   CHEST/LUNG: Good air entry bilateral with no  rales, rhonchi, wheezing, or rubs  HEART: Regular rate and rhythm; No murmurs, rubs, or gallops  ABDOMEN: Soft, Nontender, Nondistended; Bowel sounds present  EXTREMITIES:  2+ Peripheral Pulses, No clubbing, cyanosis, or edema  SKIN: No rashes or lesions    Care Discussed with Consultants/Other Providers [ x] YES  [ ] NO

## 2018-03-06 NOTE — PHYSICAL THERAPY INITIAL EVALUATION ADULT - WEIGHT-BEARING RESTRICTIONS: STAND/SIT, REHAB EVAL
Indication for Procedure: Patient is here for the third of three left knee joint injection with Euflexxa because of chronic knee pain unresponsive to previous conservative therapy with medications and physical therapy.     Preoperative Diagnosis: left knee osteoarthritis and chronic knee pain     Postoperative Diagnosis:  Same        Procedure: left knee joint injection with Euflexxa     Local Anesthesia     Complications: None     Blood Loss: None     Procedure Note:     After informed consent was obtained from the patient, the patient was then placed in the supine position.      Correct site of injection was marked before the procedure and time out was performed to ensure the correct site and type of injection and correct patient with 2 identifiers.     Sterile prep with alcohol solution, ethylene chloride spray used for skin local anesthesia. A 25-gauge needle was used to inject 2 cc of  Euflexxa (1% sodium hyaluronate) solution was injected into the left knee joint lateral approach. The needle was removed at the end of the procedure and sterile dressing placed.      Patient tolerated the procedure well without any adverse reactions, discharge instructions given to the patient and will be followed up for repeat injection if necessary.     Discharge instructions were given which included application of ice pack over injection site and to continue taking any anti-inflammatory or pain medications. Activity limitations include avoiding strenuous bending, twisting of hips.     Lei Calhoun MD     
full weight-bearing

## 2018-03-06 NOTE — PHYSICAL THERAPY INITIAL EVALUATION ADULT - ADDITIONAL COMMENTS
Pt states she lives in a private home with spouse and son with 6 steps to enter (+handrail), pt states she lives on first floor. Pt states prior to admission being independent with all functional mobility using RW, and states spouse assists her with ADLs as needed.

## 2018-03-06 NOTE — PHYSICAL THERAPY INITIAL EVALUATION ADULT - PERTINENT HX OF CURRENT PROBLEM, REHAB EVAL
74 y/o female admitted with c/o abdominal pain and vomiting. Labs significant for leukocytosis and elevated liver enzymes (mixed hepatocellular and cholestatic). MRCP Impression no focal hepatic lesion, steatosis or biliary ductal dilatation; likely cholelithiasis. US impression cholelithiases without sonographic evidence of cholecystitis. Pt symptoms c/w choledocholithiasis and passed stone. Pt recommended for cholecystectomy on this admission, pt declining at this time.

## 2018-03-06 NOTE — PROGRESS NOTE ADULT - PROBLEM SELECTOR PLAN 1
pts lft continue to improve  offered lap femi this admission pt not interested  recommend outpt f/u  d/w pt r/b/a including potential recurrent symptoms.  supportive care per med/gi  dc planning

## 2018-03-06 NOTE — PROGRESS NOTE ADULT - ASSESSMENT
This is a 74yo woman with a PMHx of Atrial Fibrillation (on Coumadin), HTN, DM2, asthma, HLD p/w 1 day of epigastric abdominal pain, nausea/vomiting with labs significant for leukocytosis and elevated liver enzymes (mixed hepatocellular and cholestatic) likely 2/2 to ischemic hepatopathy in setting of cholelithiasis

## 2018-03-06 NOTE — PHYSICAL THERAPY INITIAL EVALUATION ADULT - PLANNED THERAPY INTERVENTIONS, PT EVAL
strengthening/gait training/balance training/Pt will negotiate 6 steps with unilateral handrail and straight cane independently

## 2018-03-07 VITALS
HEART RATE: 96 BPM | RESPIRATION RATE: 18 BRPM | TEMPERATURE: 98 F | OXYGEN SATURATION: 97 % | SYSTOLIC BLOOD PRESSURE: 140 MMHG | DIASTOLIC BLOOD PRESSURE: 82 MMHG

## 2018-03-07 LAB
ALBUMIN SERPL ELPH-MCNC: 3 G/DL — LOW (ref 3.3–5)
ALP SERPL-CCNC: 222 U/L — HIGH (ref 40–120)
ALT FLD-CCNC: 306 U/L RC — HIGH (ref 10–45)
ANION GAP SERPL CALC-SCNC: 11 MMOL/L — SIGNIFICANT CHANGE UP (ref 5–17)
APTT BLD: 40.6 SEC — HIGH (ref 27.5–37.4)
AST SERPL-CCNC: 56 U/L — HIGH (ref 10–40)
BILIRUB SERPL-MCNC: 0.6 MG/DL — SIGNIFICANT CHANGE UP (ref 0.2–1.2)
BUN SERPL-MCNC: 5 MG/DL — LOW (ref 7–23)
CALCIUM SERPL-MCNC: 9.2 MG/DL — SIGNIFICANT CHANGE UP (ref 8.4–10.5)
CHLORIDE SERPL-SCNC: 103 MMOL/L — SIGNIFICANT CHANGE UP (ref 96–108)
CO2 SERPL-SCNC: 26 MMOL/L — SIGNIFICANT CHANGE UP (ref 22–31)
CREAT SERPL-MCNC: 0.73 MG/DL — SIGNIFICANT CHANGE UP (ref 0.5–1.3)
GLUCOSE BLDC GLUCOMTR-MCNC: 162 MG/DL — HIGH (ref 70–99)
GLUCOSE BLDC GLUCOMTR-MCNC: 208 MG/DL — HIGH (ref 70–99)
GLUCOSE SERPL-MCNC: 132 MG/DL — HIGH (ref 70–99)
HCT VFR BLD CALC: 39.1 % — SIGNIFICANT CHANGE UP (ref 34.5–45)
HGB BLD-MCNC: 12.9 G/DL — SIGNIFICANT CHANGE UP (ref 11.5–15.5)
INR BLD: 2.89 RATIO — HIGH (ref 0.88–1.16)
MCHC RBC-ENTMCNC: 28 PG — SIGNIFICANT CHANGE UP (ref 27–34)
MCHC RBC-ENTMCNC: 33.1 GM/DL — SIGNIFICANT CHANGE UP (ref 32–36)
MCV RBC AUTO: 84.6 FL — SIGNIFICANT CHANGE UP (ref 80–100)
MITOCHONDRIA AB SER-ACNC: SIGNIFICANT CHANGE UP
PLATELET # BLD AUTO: 230 K/UL — SIGNIFICANT CHANGE UP (ref 150–400)
POTASSIUM SERPL-MCNC: 3.9 MMOL/L — SIGNIFICANT CHANGE UP (ref 3.5–5.3)
POTASSIUM SERPL-SCNC: 3.9 MMOL/L — SIGNIFICANT CHANGE UP (ref 3.5–5.3)
PROT SERPL-MCNC: 6.4 G/DL — SIGNIFICANT CHANGE UP (ref 6–8.3)
PROTHROM AB SERPL-ACNC: 32.2 SEC — HIGH (ref 9.8–12.7)
RBC # BLD: 4.62 M/UL — SIGNIFICANT CHANGE UP (ref 3.8–5.2)
RBC # FLD: 12.2 % — SIGNIFICANT CHANGE UP (ref 10.3–14.5)
SMOOTH MUSCLE AB SER-ACNC: SIGNIFICANT CHANGE UP
SODIUM SERPL-SCNC: 140 MMOL/L — SIGNIFICANT CHANGE UP (ref 135–145)
WBC # BLD: 7.4 K/UL — SIGNIFICANT CHANGE UP (ref 3.8–10.5)
WBC # FLD AUTO: 7.4 K/UL — SIGNIFICANT CHANGE UP (ref 3.8–10.5)

## 2018-03-07 PROCEDURE — 93975 VASCULAR STUDY: CPT

## 2018-03-07 PROCEDURE — 84132 ASSAY OF SERUM POTASSIUM: CPT

## 2018-03-07 PROCEDURE — 86665 EPSTEIN-BARR CAPSID VCA: CPT

## 2018-03-07 PROCEDURE — 80053 COMPREHEN METABOLIC PANEL: CPT

## 2018-03-07 PROCEDURE — 85027 COMPLETE CBC AUTOMATED: CPT

## 2018-03-07 PROCEDURE — 82553 CREATINE MB FRACTION: CPT

## 2018-03-07 PROCEDURE — 86381 MITOCHONDRIAL ANTIBODY EACH: CPT

## 2018-03-07 PROCEDURE — 87040 BLOOD CULTURE FOR BACTERIA: CPT

## 2018-03-07 PROCEDURE — 87581 M.PNEUMON DNA AMP PROBE: CPT

## 2018-03-07 PROCEDURE — 97161 PT EVAL LOW COMPLEX 20 MIN: CPT

## 2018-03-07 PROCEDURE — 96374 THER/PROPH/DIAG INJ IV PUSH: CPT | Mod: XU

## 2018-03-07 PROCEDURE — 87798 DETECT AGENT NOS DNA AMP: CPT

## 2018-03-07 PROCEDURE — 83605 ASSAY OF LACTIC ACID: CPT

## 2018-03-07 PROCEDURE — 82728 ASSAY OF FERRITIN: CPT

## 2018-03-07 PROCEDURE — 84484 ASSAY OF TROPONIN QUANT: CPT

## 2018-03-07 PROCEDURE — 80048 BASIC METABOLIC PNL TOTAL CA: CPT

## 2018-03-07 PROCEDURE — 86664 EPSTEIN-BARR NUCLEAR ANTIGEN: CPT

## 2018-03-07 PROCEDURE — 74177 CT ABD & PELVIS W/CONTRAST: CPT

## 2018-03-07 PROCEDURE — 86376 MICROSOMAL ANTIBODY EACH: CPT

## 2018-03-07 PROCEDURE — 87633 RESP VIRUS 12-25 TARGETS: CPT

## 2018-03-07 PROCEDURE — 82330 ASSAY OF CALCIUM: CPT

## 2018-03-07 PROCEDURE — 87486 CHLMYD PNEUM DNA AMP PROBE: CPT

## 2018-03-07 PROCEDURE — 82962 GLUCOSE BLOOD TEST: CPT

## 2018-03-07 PROCEDURE — 85610 PROTHROMBIN TIME: CPT

## 2018-03-07 PROCEDURE — 85014 HEMATOCRIT: CPT

## 2018-03-07 PROCEDURE — 99285 EMERGENCY DEPT VISIT HI MDM: CPT | Mod: 25

## 2018-03-07 PROCEDURE — 74181 MRI ABDOMEN W/O CONTRAST: CPT

## 2018-03-07 PROCEDURE — 80074 ACUTE HEPATITIS PANEL: CPT

## 2018-03-07 PROCEDURE — 86663 EPSTEIN-BARR ANTIBODY: CPT

## 2018-03-07 PROCEDURE — 82550 ASSAY OF CK (CPK): CPT

## 2018-03-07 PROCEDURE — 82435 ASSAY OF BLOOD CHLORIDE: CPT

## 2018-03-07 PROCEDURE — 96375 TX/PRO/DX INJ NEW DRUG ADDON: CPT

## 2018-03-07 PROCEDURE — 71045 X-RAY EXAM CHEST 1 VIEW: CPT

## 2018-03-07 PROCEDURE — 82390 ASSAY OF CERULOPLASMIN: CPT

## 2018-03-07 PROCEDURE — 84295 ASSAY OF SERUM SODIUM: CPT

## 2018-03-07 PROCEDURE — 80307 DRUG TEST PRSMV CHEM ANLYZR: CPT

## 2018-03-07 PROCEDURE — 94640 AIRWAY INHALATION TREATMENT: CPT

## 2018-03-07 PROCEDURE — 83540 ASSAY OF IRON: CPT

## 2018-03-07 PROCEDURE — 76705 ECHO EXAM OF ABDOMEN: CPT

## 2018-03-07 PROCEDURE — 83735 ASSAY OF MAGNESIUM: CPT

## 2018-03-07 PROCEDURE — 82947 ASSAY GLUCOSE BLOOD QUANT: CPT

## 2018-03-07 PROCEDURE — 85730 THROMBOPLASTIN TIME PARTIAL: CPT

## 2018-03-07 PROCEDURE — 83690 ASSAY OF LIPASE: CPT

## 2018-03-07 PROCEDURE — 83036 HEMOGLOBIN GLYCOSYLATED A1C: CPT

## 2018-03-07 PROCEDURE — 84100 ASSAY OF PHOSPHORUS: CPT

## 2018-03-07 PROCEDURE — 86038 ANTINUCLEAR ANTIBODIES: CPT

## 2018-03-07 PROCEDURE — 86255 FLUORESCENT ANTIBODY SCREEN: CPT

## 2018-03-07 PROCEDURE — 82803 BLOOD GASES ANY COMBINATION: CPT

## 2018-03-07 RX ORDER — WARFARIN SODIUM 2.5 MG/1
1 TABLET ORAL
Qty: 0 | Refills: 0 | COMMUNITY

## 2018-03-07 RX ORDER — POTASSIUM CHLORIDE 20 MEQ
1 PACKET (EA) ORAL
Qty: 14 | Refills: 0 | OUTPATIENT
Start: 2018-03-07 | End: 2018-03-20

## 2018-03-07 RX ORDER — WARFARIN SODIUM 2.5 MG/1
1 TABLET ORAL
Qty: 0 | Refills: 0 | COMMUNITY
Start: 2018-03-07

## 2018-03-07 RX ORDER — PANTOPRAZOLE SODIUM 20 MG/1
1 TABLET, DELAYED RELEASE ORAL
Qty: 0 | Refills: 0 | COMMUNITY

## 2018-03-07 RX ADMIN — Medication 20 MILLIGRAM(S): at 05:29

## 2018-03-07 RX ADMIN — LISINOPRIL 5 MILLIGRAM(S): 2.5 TABLET ORAL at 05:29

## 2018-03-07 RX ADMIN — PANTOPRAZOLE SODIUM 40 MILLIGRAM(S): 20 TABLET, DELAYED RELEASE ORAL at 05:29

## 2018-03-07 RX ADMIN — Medication 1000 UNIT(S): at 12:50

## 2018-03-07 RX ADMIN — Medication 5 MILLIGRAM(S): at 05:29

## 2018-03-07 RX ADMIN — Medication 1: at 09:00

## 2018-03-07 RX ADMIN — BUDESONIDE AND FORMOTEROL FUMARATE DIHYDRATE 2 PUFF(S): 160; 4.5 AEROSOL RESPIRATORY (INHALATION) at 05:27

## 2018-03-07 RX ADMIN — Medication 2: at 13:10

## 2018-03-07 NOTE — PROGRESS NOTE ADULT - ASSESSMENT
73 year old woman with elevated transaminases and Tbili, w/ resolving symptoms & labs consistent with choledocholithiasis and passed stone. Patient feeling better. Despite recommendation for surgery, not interested in surgery this admission. Feels better, wants to go home and does not want surgery.    On discharged, please follow up with Dr. Cuevas within 1-2 weeks after discharge from the hospital. You may call [phone] to schedule an appointment.

## 2018-03-07 NOTE — PROGRESS NOTE ADULT - PROBLEM SELECTOR PLAN 1
LFT's gradually down trending & improving  Patient offered laparoscopic cholecystectomy this admission, however she is not interested  -recommend outpt f/u on discharge to schedule interval surgery  -patient aware of r/b/a including potential recurrent symptoms.  -supportive care per med/gi  -d/c planning

## 2018-03-07 NOTE — PROGRESS NOTE ADULT - SUBJECTIVE AND OBJECTIVE BOX
General Surgery Progress Note  Red Team [id number]    INTERVAL HPI/OVERNIGHT EVENTS:    -Pt seen and examined.   -Tolerating diet, denies abdominal pain.   -Reports having BM/flatus.         MEDICATIONS  (STANDING):  buDESOnide  80 MICROgram(s)/formoterol 4.5 MICROgram(s) Inhaler 2 Puff(s) Inhalation two times a day  cholecalciferol 1000 Unit(s) Oral daily  dextrose 5%. 1000 milliLiter(s) (50 mL/Hr) IV Continuous <Continuous>  dextrose 50% Injectable 12.5 Gram(s) IV Push once  dextrose 50% Injectable 25 Gram(s) IV Push once  dextrose 50% Injectable 25 Gram(s) IV Push once  furosemide    Tablet 20 milliGRAM(s) Oral daily  insulin lispro (HumaLOG) corrective regimen sliding scale   SubCutaneous three times a day before meals  insulin lispro (HumaLOG) corrective regimen sliding scale   SubCutaneous at bedtime  lisinopril 5 milliGRAM(s) Oral daily  pantoprazole    Tablet 40 milliGRAM(s) Oral before breakfast  predniSONE   Tablet 5 milliGRAM(s) Oral daily    MEDICATIONS  (PRN):  ALPRAZolam 0.25 milliGRAM(s) Oral once PRN anxiety  dextrose Gel 1 Dose(s) Oral once PRN Blood Glucose LESS THAN 70 milliGRAM(s)/deciliter  glucagon  Injectable 1 milliGRAM(s) IntraMuscular once PRN Glucose LESS THAN 70 milligrams/deciliter        Vital Signs Last 24 Hrs  T(C): 36.4 (07 Mar 2018 05:00), Max: 36.7 (06 Mar 2018 15:27)  T(F): 97.5 (07 Mar 2018 05:00), Max: 98.1 (06 Mar 2018 15:27)  HR: 66 (07 Mar 2018 05:27) (62 - 112)  BP: 132/66 (07 Mar 2018 05:00) (113/70 - 156/89)  BP(mean): --  RR: 18 (07 Mar 2018 05:00) (16 - 20)  SpO2: 96% (07 Mar 2018 05:00) (96% - 97%)    I&O's Detail    06 Mar 2018 07:01  -  07 Mar 2018 07:00  --------------------------------------------------------  IN:    Oral Fluid: 480 mL  Total IN: 480 mL    OUT:  Total OUT: 0 mL    Total NET: 480 mL        Physical Exam:  General: NAD  Neurology: A&Ox3  Respiratory: breathing comfortably on RA  Abdominal: Soft, NT/ND; no rebound/guarding  Extremities: No edema of b/l LE, + peripheral pulses        LABS:                                   12.3   6.3   )-----------( 197      ( 06 Mar 2018 07:01 )             36.7     03-07    140  |  103  |  5<L>  ----------------------------<  132<H>  3.9   |  26  |  0.73    Ca    9.2      07 Mar 2018 06:31    TPro  6.4  /  Alb  3.0<L>  /  TBili  0.6  /  DBili  x   /  AST  56<H>  /  ALT  306<H>  /  AlkPhos  222<H>  03-07 03-06    140  |  103  |  <4<L>  ----------------------------<  131<H>  3.1<L>   |  28  |  0.65    Ca    8.3<L>      06 Mar 2018 07:00  Phos  3.0     03-05  Mg     1.6     03-05    TPro  5.8<L>  /  Alb  2.5<L>  /  TBili  0.6  /  DBili  x   /  AST  72<H>  /  ALT  387<H>  /  AlkPhos  199<H>  03-06      PT/INR - ( 07 Mar 2018 06:31 )   PT: 32.2 sec;   INR: 2.89 ratio    PTT - ( 07 Mar 2018 06:31 )  PTT:40.6 sec    PT/INR - ( 06 Mar 2018 07:01 )   PT: 39.0 sec;   INR: 3.52 ratio    PTT - ( 06 Mar 2018 07:01 )  PTT:38.7 sec      RADIOLOGY & ADDITIONAL STUDIES:    US Abdomen Doppler (03.05.18 @ 12:20) >  Liver: A few scattered calcified granuloma.    Bile ducts: Normal caliber. Common bile duct measures 5 mm.     Gallbladder: Cholelithiasis. No wall thickening or focal tenderness.        Pancreas: Visualized portions are within normal limits.    Right kidney: Atrophic and poorly visualized.    Ascites: None.    Vasculature: IVC and hepatic veins are patent with normalphasicity.   Portal veins are patent with normal direction flow. A low resistance   hepatic arterial waveform is present.    Miscellaneous: Small right pleural effusion.    IMPRESSION:     Cholelithiasis without sonographic evidence of cholecystitis.No biliary   ductal dilatation.    Patent hepatic vasculature with normal direction portal flow.      MR Abdomen No Cont (03.04.18 @ 22:28) >  LOWER CHEST: Small right and trace left pleural effusions.    LIVER: Normal morphology. No steatosis. No focal lesion.  BILE DUCTS: Normal caliber.  GALLBLADDER: Suggestion of small gallstones.  SPLEEN: Within normal limits.  PANCREAS: Within normal limits.  ADRENALS: Within normal limits.  KIDNEYS/URETERS: Right renal atrophy.     VISUALIZED PORTIONS:    BOWEL: Small duodenal diverticulum of the third portion.   PERITONEUM: No ascites.  VESSELS: Atherosclerotic changes.   RETROPERITONEUM: No lymphadenopathy.    ABDOMINAL WALL: Within normal limits.  BONES: Scoliosis and degenerative changes.    IMPRESSION:     No focal hepatic lesion, steatosis or biliary ductal dilatation.    Likely cholelithiasis.      CT Abdomen and Pelvis w/ IV Cont (03.03.18 @ 00:53) >  LOWER CHEST: Within normal limits.    LIVER: Multiple calcified liver granulomas.  BILE DUCTS: Normal caliber.  GALLBLADDER: Within normal limits.  SPLEEN: Calcified splenic granuloma.  PANCREAS: Within normal limits.  ADRENALS: Within normal limits.  KIDNEYS/URETERS: Nonenhancing regions of renal cortex at the right upper   pole, as seen on prior study, and consistent with chronic renal infarct.    BLADDER: Within normal limits.  REPRODUCTIVE ORGANS: The uterus and adnexa are within normal limits.    BOWEL: No bowel obstruction. 1.7 cm duodenal diverticulum of the third   portion. Appendix is not visualized, however secondary signs of   appendicitis are not present.  PERITONEUM: No ascites.  VESSELS: Atherosclerotic disease of the aorta and its major branches,   severe at the origins of the celiac axis, SMA, and bilateral renal   arteries. 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  RETROPERITONEUM: No lymphadenopathy.    ABDOMINAL WALL: Within normal limits.  BONES: Status post bilateral hip ORIF. Severe multilevel degenerative   changes of the spine with a focal levoscoliosis centered about the L2-L3   level.  Grade 2 anterolisthesis of L5 on S1.      IMPRESSION: The etiology of the patient's abdominal pain is not   elucidated on this study.  Diffuse atherosclerotic disease.  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  Unchanged right upper pole renal infarct.

## 2018-03-07 NOTE — PROGRESS NOTE ADULT - PROBLEM SELECTOR PROBLEM 3
Nausea and vomiting, intractability of vomiting not specified, unspecified vomiting type
Nausea and vomiting
Nausea and vomiting, intractability of vomiting not specified, unspecified vomiting type

## 2018-03-07 NOTE — PROGRESS NOTE ADULT - PROBLEM SELECTOR PROBLEM 1
Cholelithiasis with biliary obstruction
Cholelithiasis with biliary obstruction
Epigastric abdominal pain

## 2018-03-07 NOTE — PROGRESS NOTE ADULT - ATTENDING COMMENTS
- RUQ and epigastric pain 3d ago after a meal, initial fever and leukocytosis 17.5. Transaminases AST/ALT initially 2800/1200, with rapid decline since then to 142/632. Bili init. 1.8, now normal. Had similar episode one year ago. Lipase barely elevated to 72.   - pain resolved, pt. now has mild RUQ and epigastric tenderness with deep palpation.  - US shows cholelithiasis  - no hypotension; workup for acute viral hepatitis negative; Pt. denies new medication or recent intake of any OTC medication    Her findings are consistent with passage of a gallstone, which can lead to elevation of transaminases to >1000 U/mL.  - as it is likely the second episode, would discuss cholecystectomy with the patient.
As above.
Seen and examined . I feel better so going home . I will see my PCP on Friday .

## 2018-03-07 NOTE — PROGRESS NOTE ADULT - SUBJECTIVE AND OBJECTIVE BOX
[facility] Intern: Bashir Sauceda  Pager: [phone], [phone]    Patient is a 73y old  Female who presents with a chief complaint of Abdominal pain, fevers (05 Mar 2018 10:05)    INTERVAL HPI/OVERNIGHT EVENTS: Overnight, no acute events. Patient seen and examined at bedside this AM. Patient denies fevers/chills, chest pain, shortness of breath, palpitations, diaphoresis, nausea/vomiting, diarrhea/constipation, blurry vision, melena/hematochezia, headaches, dysuria, and frequency. Patient is tolerating oral diet and no longer endorses any abdominal symptoms, including nausea/vomiting and pain.    VITAL SIGNS  T(C): 36.4 (03-07-18 @ 05:00), Max: 36.7 (03-06-18 @ 15:27)  HR: 66 (03-07-18 @ 05:27) (62 - 112)  BP: 132/66 (03-07-18 @ 05:00) (113/70 - 156/89)  RR: 18 (03-07-18 @ 05:00) (16 - 20)  SpO2: 96% (03-07-18 @ 05:00) (96% - 97%)  Wt(kg): --Vital Signs Last 24 Hrs  T(C): 36.4 (07 Mar 2018 05:00), Max: 36.7 (06 Mar 2018 15:27)  T(F): 97.5 (07 Mar 2018 05:00), Max: 98.1 (06 Mar 2018 15:27)  HR: 66 (07 Mar 2018 05:27) (62 - 112)  BP: 132/66 (07 Mar 2018 05:00) (113/70 - 156/89)  BP(mean): --  RR: 18 (07 Mar 2018 05:00) (16 - 20)  SpO2: 96% (07 Mar 2018 05:00) (96% - 97%)    PHYSICAL EXAM:  GENERAL: NAD, AO, well-groomed, well-developed  HEAD:  Atraumatic, Normocephalic  EYES: EOMI, PERRLA, conjunctiva and sclera clear  ENMT: No tonsillar erythema, exudates, or enlargement; Moist mucous membranes,  NECK: Supple, No JVD, Normal thyroid  NERVOUS SYSTEM:  No focal deficits  CHEST/LUNG: Clear to auscultation bilaterally; No rales, rhonchi, wheezing, or rubs  HEART: Regular rate and rhythm; No murmurs, rubs, or gallops  ABDOMEN: Soft, Nontender, Nondistended; Bowel sounds present  EXTREMITIES:  2+ Peripheral Pulses, No clubbing, cyanosis, or edema  LYMPH: No lymphadenopathy noted  SKIN: No rashes or lesions    Consultant(s) Notes Reviewed:  [x ] YES  [ ] NO  Care Discussed with Consultants/Other Providers [ x] YES  [ ] NO    LABS:                        12.9   7.4   )-----------( 230      ( 07 Mar 2018 06:31 )             39.1     03-07    140  |  103  |  5<L>  ----------------------------<  132<H>  3.9   |  26  |  0.73    Ca    9.2      07 Mar 2018 06:31    TPro  6.4  /  Alb  3.0<L>  /  TBili  0.6  /  DBili  x   /  AST  56<H>  /  ALT  306<H>  /  AlkPhos  222<H>  03-07    PT/INR - ( 07 Mar 2018 06:31 )   PT: 32.2 sec;   INR: 2.89 ratio         PTT - ( 07 Mar 2018 06:31 )  PTT:40.6 sec    CAPILLARY BLOOD GLUCOSE  POCT Blood Glucose.: 162 mg/dL (07 Mar 2018 08:57)  POCT Blood Glucose.: 242 mg/dL (06 Mar 2018 22:05)  POCT Blood Glucose.: 120 mg/dL (06 Mar 2018 17:59)  POCT Blood Glucose.: 173 mg/dL (06 Mar 2018 13:08)    RADIOLOGY & ADDITIONAL TESTS:    Imaging Personally Reviewed:  [ ] YES  [ ] NO

## 2018-03-08 LAB
CULTURE RESULTS: SIGNIFICANT CHANGE UP
CULTURE RESULTS: SIGNIFICANT CHANGE UP
LKM AB SER-ACNC: 1.9 UNITS — SIGNIFICANT CHANGE UP (ref 0–20)
SPECIMEN SOURCE: SIGNIFICANT CHANGE UP
SPECIMEN SOURCE: SIGNIFICANT CHANGE UP

## 2018-04-30 ENCOUNTER — INPATIENT (INPATIENT)
Facility: HOSPITAL | Age: 73
LOS: 9 days | Discharge: HOME HEALTH | DRG: 872 | End: 2018-05-10
Attending: INTERNAL MEDICINE | Admitting: INTERNAL MEDICINE
Payer: MEDICARE

## 2018-04-30 VITALS
RESPIRATION RATE: 18 BRPM | DIASTOLIC BLOOD PRESSURE: 54 MMHG | HEART RATE: 118 BPM | OXYGEN SATURATION: 95 % | SYSTOLIC BLOOD PRESSURE: 115 MMHG | TEMPERATURE: 99 F

## 2018-04-30 DIAGNOSIS — N39.0 URINARY TRACT INFECTION, SITE NOT SPECIFIED: ICD-10-CM

## 2018-04-30 LAB
ALBUMIN SERPL ELPH-MCNC: 3.4 G/DL — SIGNIFICANT CHANGE UP (ref 3.3–5)
ALP SERPL-CCNC: 83 U/L — SIGNIFICANT CHANGE UP (ref 40–120)
ALT FLD-CCNC: 25 U/L — SIGNIFICANT CHANGE UP (ref 10–45)
ANION GAP SERPL CALC-SCNC: 17 MMOL/L — SIGNIFICANT CHANGE UP (ref 5–17)
APPEARANCE UR: CLEAR — SIGNIFICANT CHANGE UP
APTT BLD: 36.8 SEC — SIGNIFICANT CHANGE UP (ref 27.5–37.4)
AST SERPL-CCNC: 20 U/L — SIGNIFICANT CHANGE UP (ref 10–40)
BACTERIA # UR AUTO: ABNORMAL /HPF
BASOPHILS # BLD AUTO: 0 K/UL — SIGNIFICANT CHANGE UP (ref 0–0.2)
BASOPHILS NFR BLD AUTO: 0 % — SIGNIFICANT CHANGE UP (ref 0–2)
BILIRUB SERPL-MCNC: 0.6 MG/DL — SIGNIFICANT CHANGE UP (ref 0.2–1.2)
BILIRUB UR-MCNC: NEGATIVE — SIGNIFICANT CHANGE UP
BUN SERPL-MCNC: 12 MG/DL — SIGNIFICANT CHANGE UP (ref 7–23)
CALCIUM SERPL-MCNC: 8.8 MG/DL — SIGNIFICANT CHANGE UP (ref 8.4–10.5)
CHLORIDE SERPL-SCNC: 96 MMOL/L — SIGNIFICANT CHANGE UP (ref 96–108)
CO2 SERPL-SCNC: 22 MMOL/L — SIGNIFICANT CHANGE UP (ref 22–31)
COLOR SPEC: YELLOW — SIGNIFICANT CHANGE UP
COMMENT - URINE: SIGNIFICANT CHANGE UP
CREAT SERPL-MCNC: 0.79 MG/DL — SIGNIFICANT CHANGE UP (ref 0.5–1.3)
DIFF PNL FLD: ABNORMAL
EOSINOPHIL # BLD AUTO: 0 K/UL — SIGNIFICANT CHANGE UP (ref 0–0.5)
EOSINOPHIL NFR BLD AUTO: 0 % — SIGNIFICANT CHANGE UP (ref 0–6)
EPI CELLS # UR: SIGNIFICANT CHANGE UP /HPF
GAS PNL BLDV: SIGNIFICANT CHANGE UP
GLUCOSE SERPL-MCNC: 205 MG/DL — HIGH (ref 70–99)
GLUCOSE UR QL: 1000 MG/DL
HCT VFR BLD CALC: 43 % — SIGNIFICANT CHANGE UP (ref 34.5–45)
HGB BLD-MCNC: 14.6 G/DL — SIGNIFICANT CHANGE UP (ref 11.5–15.5)
INR BLD: 3.26 RATIO — HIGH (ref 0.88–1.16)
KETONES UR-MCNC: ABNORMAL
LEUKOCYTE ESTERASE UR-ACNC: ABNORMAL
LIDOCAIN IGE QN: 26 U/L — SIGNIFICANT CHANGE UP (ref 7–60)
LYMPHOCYTES # BLD AUTO: 0.8 K/UL — LOW (ref 1–3.3)
LYMPHOCYTES # BLD AUTO: 4 % — LOW (ref 13–44)
MCHC RBC-ENTMCNC: 28.2 PG — SIGNIFICANT CHANGE UP (ref 27–34)
MCHC RBC-ENTMCNC: 34 GM/DL — SIGNIFICANT CHANGE UP (ref 32–36)
MCV RBC AUTO: 83 FL — SIGNIFICANT CHANGE UP (ref 80–100)
MONOCYTES # BLD AUTO: SIGNIFICANT CHANGE UP (ref 0–0.9)
MONOCYTES NFR BLD AUTO: 8 % — SIGNIFICANT CHANGE UP (ref 2–14)
NEUTROPHILS # BLD AUTO: 21.4 K/UL — HIGH (ref 1.8–7.4)
NEUTROPHILS NFR BLD AUTO: 82 % — HIGH (ref 43–77)
NITRITE UR-MCNC: NEGATIVE — SIGNIFICANT CHANGE UP
PH UR: 6 — SIGNIFICANT CHANGE UP (ref 5–8)
PLATELET # BLD AUTO: 301 K/UL — SIGNIFICANT CHANGE UP (ref 150–400)
POTASSIUM SERPL-MCNC: 3.9 MMOL/L — SIGNIFICANT CHANGE UP (ref 3.5–5.3)
POTASSIUM SERPL-SCNC: 3.9 MMOL/L — SIGNIFICANT CHANGE UP (ref 3.5–5.3)
PROT SERPL-MCNC: 7.1 G/DL — SIGNIFICANT CHANGE UP (ref 6–8.3)
PROT UR-MCNC: 30 MG/DL
PROTHROM AB SERPL-ACNC: 36.4 SEC — HIGH (ref 9.8–12.7)
RBC # BLD: 5.18 M/UL — SIGNIFICANT CHANGE UP (ref 3.8–5.2)
RBC # FLD: 11.8 % — SIGNIFICANT CHANGE UP (ref 10.3–14.5)
RBC CASTS # UR COMP ASSIST: ABNORMAL /HPF (ref 0–2)
SODIUM SERPL-SCNC: 135 MMOL/L — SIGNIFICANT CHANGE UP (ref 135–145)
SP GR SPEC: 1.02 — SIGNIFICANT CHANGE UP (ref 1.01–1.02)
UROBILINOGEN FLD QL: NEGATIVE — SIGNIFICANT CHANGE UP
WBC # BLD: 23.2 K/UL — HIGH (ref 3.8–10.5)
WBC # FLD AUTO: 23.2 K/UL — HIGH (ref 3.8–10.5)
WBC UR QL: SIGNIFICANT CHANGE UP /HPF (ref 0–5)

## 2018-04-30 PROCEDURE — 74177 CT ABD & PELVIS W/CONTRAST: CPT | Mod: 26

## 2018-04-30 PROCEDURE — 71046 X-RAY EXAM CHEST 2 VIEWS: CPT | Mod: 26

## 2018-04-30 PROCEDURE — 99285 EMERGENCY DEPT VISIT HI MDM: CPT

## 2018-04-30 RX ORDER — ACETAMINOPHEN 500 MG
650 TABLET ORAL ONCE
Qty: 0 | Refills: 0 | Status: COMPLETED | OUTPATIENT
Start: 2018-04-30 | End: 2018-04-30

## 2018-04-30 RX ORDER — SODIUM CHLORIDE 9 MG/ML
1000 INJECTION INTRAMUSCULAR; INTRAVENOUS; SUBCUTANEOUS ONCE
Qty: 0 | Refills: 0 | Status: COMPLETED | OUTPATIENT
Start: 2018-04-30 | End: 2018-04-30

## 2018-04-30 RX ORDER — ONDANSETRON 8 MG/1
4 TABLET, FILM COATED ORAL ONCE
Qty: 0 | Refills: 0 | Status: COMPLETED | OUTPATIENT
Start: 2018-04-30 | End: 2018-04-30

## 2018-04-30 RX ORDER — ACETAMINOPHEN 500 MG
1000 TABLET ORAL ONCE
Qty: 0 | Refills: 0 | Status: COMPLETED | OUTPATIENT
Start: 2018-04-30 | End: 2018-04-30

## 2018-04-30 RX ADMIN — Medication 400 MILLIGRAM(S): at 20:13

## 2018-04-30 RX ADMIN — SODIUM CHLORIDE 1000 MILLILITER(S): 9 INJECTION INTRAMUSCULAR; INTRAVENOUS; SUBCUTANEOUS at 22:33

## 2018-04-30 RX ADMIN — Medication 1000 MILLIGRAM(S): at 20:13

## 2018-04-30 RX ADMIN — ONDANSETRON 4 MILLIGRAM(S): 8 TABLET, FILM COATED ORAL at 20:05

## 2018-04-30 RX ADMIN — SODIUM CHLORIDE 1000 MILLILITER(S): 9 INJECTION INTRAMUSCULAR; INTRAVENOUS; SUBCUTANEOUS at 20:05

## 2018-04-30 NOTE — ED PROVIDER NOTE - ABDOMINAL EXAM
nondistended/soft/tender.../Abdomen soft, TTP to RLQ and epigastric area. No rebound, non-distended.

## 2018-04-30 NOTE — ED PROVIDER NOTE - OBJECTIVE STATEMENT
72y/o F with PMHx of Atrial Fibrillation (on Warfarin), HTN, DM, asthma, presents to the ED with nausea, vomiting beginning this morning. She also c/o mild abdominal pain and decreased appetite, she has not eaten anything today. Pt vomited "a few times," had 2 episodes of diarrhea this morning. Per daughter: pt visited the [facility] ED 1.5mo ago and was told she needed a cholecystectomy, which she did not have. Denies fever, blood in stool, burning urination, urinary urgency, urinary frequency, cough, chest pain, difficulty breathing, any other complaints. Allergic to Levaquin, Avelox. 72y/o F with PMHx of Atrial Fibrillation (on Warfarin), HTN, DM, asthma, presents to the ED with nausea, vomiting beginning this morning. She also c/o mild abdominal pain and decreased appetite, she has not eaten anything today. Pt vomited "a few times," had 2 episodes of diarrhea this morning. abdominal pain is dull mid abdominal. Per daughter: pt visited the [facility] ED 1.5mo ago, diagnosed with cholecystitis, she was told she needed a cholecystectomy, but she declined all treatment. Denies fever, blood in stool, burning urination, urinary urgency, urinary frequency, cough, chest pain, difficulty breathing, any other complaints. Allergic to Levaquin, Avelox.

## 2018-04-30 NOTE — ED PROVIDER NOTE - CARE PLAN
Principal Discharge DX:	Generalized abdominal pain  Secondary Diagnosis:	Nausea vomiting and diarrhea

## 2018-04-30 NOTE — ED ADULT NURSE NOTE - OBJECTIVE STATEMENT
pt has upper abd pain n.v pt was to have her gallbladder out 2 months ago and declined to have removed.  Pt has the same pian IVL placed and [facility]

## 2018-04-30 NOTE — ED PROVIDER NOTE - CHPI ED SYMPTOMS NEG
no urinary urgency, no urinary frequency, no cough, no chest pain, no difficulty breathing/no blood in stool/no hematuria/no burning urination/no fever

## 2018-04-30 NOTE — ED PROVIDER NOTE - MEDICAL DECISION MAKING DETAILS
72y/o F with PMHx of cholecystitis, managed conservatively, non-op, presents to the ED with abdominal pain, nausea/vomiting/diarrhea today. 74y/o F with PMHx of cholecystitis, managed conservatively non-op, presents to the ED with abdominal pain, nausea/vomiting/diarrhea today. 72y/o F with PMHx of cholecystitis, managed conservatively non-op, presents to the ED with abdominal pain, nausea/vomiting/diarrhea today.  ed workup shows Leukocytosis 23.2. INR 3.26.  UA with some evidence of infection including 11 to 25 WBCs and small LE.  initial lactate elevated 3.0.   CT abdomen and pelvis obtained which demonstrates no acute pathology. 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  Given abdominal pain, leukocytosis, and elevated lactate with these CAT scan fundings my concern is for mesenteric ischemia. I consult in vascular surgery who came to evaluate patient in the ER. They  recommended mesenteric ultrasound with no other intervention at this time.  Ultrasound ordered, results pending  At time of admission. Patient was treated with IV fluids and empiric antibiotics with zosyn in the ER.  Repeat lactate cleared 1.6.  Patient was admitted in stable condition  Based on patient's HPI as well as the results of today's workup, I felt that patient warranted admission to the hospital for further workup/evaluation and continued management. I discussed the findings of today's workup with the patient and addressed the patient's questions and concerns. The patient was agreeable with admission. I spoke with the hospitalist who accepted the patient for admission and subsequently took over the patient's care.

## 2018-05-01 DIAGNOSIS — R10.84 GENERALIZED ABDOMINAL PAIN: ICD-10-CM

## 2018-05-01 DIAGNOSIS — I10 ESSENTIAL (PRIMARY) HYPERTENSION: ICD-10-CM

## 2018-05-01 DIAGNOSIS — I77.4 CELIAC ARTERY COMPRESSION SYNDROME: ICD-10-CM

## 2018-05-01 DIAGNOSIS — E11.9 TYPE 2 DIABETES MELLITUS WITHOUT COMPLICATIONS: ICD-10-CM

## 2018-05-01 DIAGNOSIS — K55.1 CHRONIC VASCULAR DISORDERS OF INTESTINE: ICD-10-CM

## 2018-05-01 DIAGNOSIS — I48.2 CHRONIC ATRIAL FIBRILLATION: ICD-10-CM

## 2018-05-01 DIAGNOSIS — A41.9 SEPSIS, UNSPECIFIED ORGANISM: ICD-10-CM

## 2018-05-01 DIAGNOSIS — Z29.9 ENCOUNTER FOR PROPHYLACTIC MEASURES, UNSPECIFIED: ICD-10-CM

## 2018-05-01 DIAGNOSIS — R11.2 NAUSEA WITH VOMITING, UNSPECIFIED: ICD-10-CM

## 2018-05-01 LAB
ALBUMIN SERPL ELPH-MCNC: 2.7 G/DL — LOW (ref 3.3–5)
ALP SERPL-CCNC: 59 U/L — SIGNIFICANT CHANGE UP (ref 40–120)
ALT FLD-CCNC: 21 U/L — SIGNIFICANT CHANGE UP (ref 10–45)
ANION GAP SERPL CALC-SCNC: 12 MMOL/L — SIGNIFICANT CHANGE UP (ref 5–17)
AST SERPL-CCNC: 18 U/L — SIGNIFICANT CHANGE UP (ref 10–40)
BASOPHILS # BLD AUTO: 0.01 K/UL — SIGNIFICANT CHANGE UP (ref 0–0.2)
BASOPHILS NFR BLD AUTO: 0.1 % — SIGNIFICANT CHANGE UP (ref 0–2)
BILIRUB SERPL-MCNC: 0.4 MG/DL — SIGNIFICANT CHANGE UP (ref 0.2–1.2)
BUN SERPL-MCNC: 9 MG/DL — SIGNIFICANT CHANGE UP (ref 7–23)
CALCIUM SERPL-MCNC: 7.8 MG/DL — LOW (ref 8.4–10.5)
CHLORIDE SERPL-SCNC: 103 MMOL/L — SIGNIFICANT CHANGE UP (ref 96–108)
CO2 SERPL-SCNC: 23 MMOL/L — SIGNIFICANT CHANGE UP (ref 22–31)
CREAT SERPL-MCNC: 0.82 MG/DL — SIGNIFICANT CHANGE UP (ref 0.5–1.3)
EOSINOPHIL # BLD AUTO: 0.11 K/UL — SIGNIFICANT CHANGE UP (ref 0–0.5)
EOSINOPHIL NFR BLD AUTO: 1.2 % — SIGNIFICANT CHANGE UP (ref 0–6)
GAS PNL BLDV: SIGNIFICANT CHANGE UP
GLUCOSE BLDC GLUCOMTR-MCNC: 144 MG/DL — HIGH (ref 70–99)
GLUCOSE BLDC GLUCOMTR-MCNC: 233 MG/DL — HIGH (ref 70–99)
GLUCOSE BLDC GLUCOMTR-MCNC: 65 MG/DL — LOW (ref 70–99)
GLUCOSE BLDC GLUCOMTR-MCNC: 67 MG/DL — LOW (ref 70–99)
GLUCOSE BLDC GLUCOMTR-MCNC: 68 MG/DL — LOW (ref 70–99)
GLUCOSE BLDC GLUCOMTR-MCNC: 88 MG/DL — SIGNIFICANT CHANGE UP (ref 70–99)
GLUCOSE SERPL-MCNC: 91 MG/DL — SIGNIFICANT CHANGE UP (ref 70–99)
HCT VFR BLD CALC: 35.5 % — SIGNIFICANT CHANGE UP (ref 34.5–45)
HGB BLD-MCNC: 11.6 G/DL — SIGNIFICANT CHANGE UP (ref 11.5–15.5)
IMM GRANULOCYTES NFR BLD AUTO: 0.2 % — SIGNIFICANT CHANGE UP (ref 0–1.5)
INR BLD: 2.69 RATIO — HIGH (ref 0.88–1.16)
LYMPHOCYTES # BLD AUTO: 0.68 K/UL — LOW (ref 1–3.3)
LYMPHOCYTES # BLD AUTO: 7.3 % — LOW (ref 13–44)
MAGNESIUM SERPL-MCNC: 1.4 MG/DL — LOW (ref 1.6–2.6)
MCHC RBC-ENTMCNC: 27.2 PG — SIGNIFICANT CHANGE UP (ref 27–34)
MCHC RBC-ENTMCNC: 32.7 GM/DL — SIGNIFICANT CHANGE UP (ref 32–36)
MCV RBC AUTO: 83.1 FL — SIGNIFICANT CHANGE UP (ref 80–100)
MONOCYTES # BLD AUTO: 0.41 K/UL — SIGNIFICANT CHANGE UP (ref 0–0.9)
MONOCYTES NFR BLD AUTO: 4.4 % — SIGNIFICANT CHANGE UP (ref 2–14)
NEUTROPHILS # BLD AUTO: 8.12 K/UL — HIGH (ref 1.8–7.4)
NEUTROPHILS NFR BLD AUTO: 86.8 % — HIGH (ref 43–77)
PHOSPHATE SERPL-MCNC: 3.4 MG/DL — SIGNIFICANT CHANGE UP (ref 2.5–4.5)
PLATELET # BLD AUTO: 221 K/UL — SIGNIFICANT CHANGE UP (ref 150–400)
POTASSIUM SERPL-MCNC: 3.3 MMOL/L — LOW (ref 3.5–5.3)
POTASSIUM SERPL-SCNC: 3.3 MMOL/L — LOW (ref 3.5–5.3)
PROT SERPL-MCNC: 5.5 G/DL — LOW (ref 6–8.3)
PROTHROM AB SERPL-ACNC: 29.6 SEC — HIGH (ref 9.8–12.7)
RBC # BLD: 4.27 M/UL — SIGNIFICANT CHANGE UP (ref 3.8–5.2)
RBC # FLD: 13.2 % — SIGNIFICANT CHANGE UP (ref 10.3–14.5)
SODIUM SERPL-SCNC: 138 MMOL/L — SIGNIFICANT CHANGE UP (ref 135–145)
WBC # BLD: 9.35 K/UL — SIGNIFICANT CHANGE UP (ref 3.8–10.5)
WBC # FLD AUTO: 9.35 K/UL — SIGNIFICANT CHANGE UP (ref 3.8–10.5)

## 2018-05-01 PROCEDURE — 78226 HEPATOBILIARY SYSTEM IMAGING: CPT | Mod: 26

## 2018-05-01 PROCEDURE — 99232 SBSQ HOSP IP/OBS MODERATE 35: CPT | Mod: GC

## 2018-05-01 PROCEDURE — 99223 1ST HOSP IP/OBS HIGH 75: CPT

## 2018-05-01 PROCEDURE — 99221 1ST HOSP IP/OBS SF/LOW 40: CPT

## 2018-05-01 RX ORDER — INSULIN LISPRO 100/ML
VIAL (ML) SUBCUTANEOUS EVERY 6 HOURS
Qty: 0 | Refills: 0 | Status: DISCONTINUED | OUTPATIENT
Start: 2018-05-01 | End: 2018-05-04

## 2018-05-01 RX ORDER — DEXTROSE 50 % IN WATER 50 %
25 SYRINGE (ML) INTRAVENOUS ONCE
Qty: 0 | Refills: 0 | Status: COMPLETED | OUTPATIENT
Start: 2018-05-01 | End: 2018-05-01

## 2018-05-01 RX ORDER — SODIUM CHLORIDE 9 MG/ML
1000 INJECTION INTRAMUSCULAR; INTRAVENOUS; SUBCUTANEOUS
Qty: 0 | Refills: 0 | Status: DISCONTINUED | OUTPATIENT
Start: 2018-05-01 | End: 2018-05-01

## 2018-05-01 RX ORDER — PIPERACILLIN AND TAZOBACTAM 4; .5 G/20ML; G/20ML
3.38 INJECTION, POWDER, LYOPHILIZED, FOR SOLUTION INTRAVENOUS ONCE
Qty: 0 | Refills: 0 | Status: COMPLETED | OUTPATIENT
Start: 2018-05-01 | End: 2018-05-01

## 2018-05-01 RX ORDER — SODIUM CHLORIDE 9 MG/ML
1000 INJECTION INTRAMUSCULAR; INTRAVENOUS; SUBCUTANEOUS
Qty: 0 | Refills: 0 | Status: DISCONTINUED | OUTPATIENT
Start: 2018-05-01 | End: 2018-05-02

## 2018-05-01 RX ORDER — SODIUM CHLORIDE 9 MG/ML
1000 INJECTION, SOLUTION INTRAVENOUS
Qty: 0 | Refills: 0 | Status: DISCONTINUED | OUTPATIENT
Start: 2018-05-01 | End: 2018-05-10

## 2018-05-01 RX ORDER — PIPERACILLIN AND TAZOBACTAM 4; .5 G/20ML; G/20ML
3.38 INJECTION, POWDER, LYOPHILIZED, FOR SOLUTION INTRAVENOUS EVERY 8 HOURS
Qty: 0 | Refills: 0 | Status: DISCONTINUED | OUTPATIENT
Start: 2018-05-01 | End: 2018-05-04

## 2018-05-01 RX ORDER — DEXTROSE 50 % IN WATER 50 %
25 SYRINGE (ML) INTRAVENOUS ONCE
Qty: 0 | Refills: 0 | Status: DISCONTINUED | OUTPATIENT
Start: 2018-05-01 | End: 2018-05-10

## 2018-05-01 RX ORDER — PANTOPRAZOLE SODIUM 20 MG/1
40 TABLET, DELAYED RELEASE ORAL
Qty: 0 | Refills: 0 | Status: DISCONTINUED | OUTPATIENT
Start: 2018-05-01 | End: 2018-05-02

## 2018-05-01 RX ORDER — SIMVASTATIN 20 MG/1
20 TABLET, FILM COATED ORAL AT BEDTIME
Qty: 0 | Refills: 0 | Status: DISCONTINUED | OUTPATIENT
Start: 2018-05-01 | End: 2018-05-10

## 2018-05-01 RX ORDER — ONDANSETRON 8 MG/1
4 TABLET, FILM COATED ORAL EVERY 6 HOURS
Qty: 0 | Refills: 0 | Status: DISCONTINUED | OUTPATIENT
Start: 2018-05-01 | End: 2018-05-10

## 2018-05-01 RX ORDER — DEXTROSE 50 % IN WATER 50 %
12.5 SYRINGE (ML) INTRAVENOUS ONCE
Qty: 0 | Refills: 0 | Status: COMPLETED | OUTPATIENT
Start: 2018-05-01 | End: 2018-05-01

## 2018-05-01 RX ORDER — GLUCAGON INJECTION, SOLUTION 0.5 MG/.1ML
1 INJECTION, SOLUTION SUBCUTANEOUS ONCE
Qty: 0 | Refills: 0 | Status: DISCONTINUED | OUTPATIENT
Start: 2018-05-01 | End: 2018-05-10

## 2018-05-01 RX ORDER — DEXTROSE 50 % IN WATER 50 %
1 SYRINGE (ML) INTRAVENOUS ONCE
Qty: 0 | Refills: 0 | Status: DISCONTINUED | OUTPATIENT
Start: 2018-05-01 | End: 2018-05-10

## 2018-05-01 RX ORDER — WARFARIN SODIUM 2.5 MG/1
2 TABLET ORAL ONCE
Qty: 0 | Refills: 0 | Status: COMPLETED | OUTPATIENT
Start: 2018-05-01 | End: 2018-05-01

## 2018-05-01 RX ORDER — BUDESONIDE AND FORMOTEROL FUMARATE DIHYDRATE 160; 4.5 UG/1; UG/1
2 AEROSOL RESPIRATORY (INHALATION)
Qty: 0 | Refills: 0 | Status: DISCONTINUED | OUTPATIENT
Start: 2018-05-01 | End: 2018-05-10

## 2018-05-01 RX ORDER — DEXTROSE 50 % IN WATER 50 %
12.5 SYRINGE (ML) INTRAVENOUS ONCE
Qty: 0 | Refills: 0 | Status: DISCONTINUED | OUTPATIENT
Start: 2018-05-01 | End: 2018-05-10

## 2018-05-01 RX ADMIN — SODIUM CHLORIDE 200 MILLILITER(S): 9 INJECTION INTRAMUSCULAR; INTRAVENOUS; SUBCUTANEOUS at 12:00

## 2018-05-01 RX ADMIN — PANTOPRAZOLE SODIUM 40 MILLIGRAM(S): 20 TABLET, DELAYED RELEASE ORAL at 06:04

## 2018-05-01 RX ADMIN — SIMVASTATIN 20 MILLIGRAM(S): 20 TABLET, FILM COATED ORAL at 21:21

## 2018-05-01 RX ADMIN — SODIUM CHLORIDE 200 MILLILITER(S): 9 INJECTION INTRAMUSCULAR; INTRAVENOUS; SUBCUTANEOUS at 06:03

## 2018-05-01 RX ADMIN — BUDESONIDE AND FORMOTEROL FUMARATE DIHYDRATE 2 PUFF(S): 160; 4.5 AEROSOL RESPIRATORY (INHALATION) at 06:03

## 2018-05-01 RX ADMIN — PIPERACILLIN AND TAZOBACTAM 25 GRAM(S): 4; .5 INJECTION, POWDER, LYOPHILIZED, FOR SOLUTION INTRAVENOUS at 12:58

## 2018-05-01 RX ADMIN — Medication 12.5 GRAM(S): at 17:58

## 2018-05-01 RX ADMIN — BUDESONIDE AND FORMOTEROL FUMARATE DIHYDRATE 2 PUFF(S): 160; 4.5 AEROSOL RESPIRATORY (INHALATION) at 17:57

## 2018-05-01 RX ADMIN — PIPERACILLIN AND TAZOBACTAM 200 GRAM(S): 4; .5 INJECTION, POWDER, LYOPHILIZED, FOR SOLUTION INTRAVENOUS at 01:07

## 2018-05-01 RX ADMIN — SODIUM CHLORIDE 200 MILLILITER(S): 9 INJECTION INTRAMUSCULAR; INTRAVENOUS; SUBCUTANEOUS at 01:08

## 2018-05-01 RX ADMIN — PIPERACILLIN AND TAZOBACTAM 25 GRAM(S): 4; .5 INJECTION, POWDER, LYOPHILIZED, FOR SOLUTION INTRAVENOUS at 21:20

## 2018-05-01 RX ADMIN — WARFARIN SODIUM 2 MILLIGRAM(S): 2.5 TABLET ORAL at 23:32

## 2018-05-01 NOTE — CONSULT NOTE ADULT - ASSESSMENT
A/P: 73y year old female who presents with abdominal pain, history of cholelithiases, refused surgery in the past. CT scan suggesting SMA and Celiac calcification , possible stenosis. General surgery called to rule mesenteric ischemia.     - recommend mesenteric ultrasound  - f/u lactate  - Recommend GI evaluation to rule out peptic ulcer disease.   - discussed with Dr. Danilo Cuevas     [id number]  (Red Surgery) A/P: 73y year old female who presents with abdominal pain, history of cholelithiases, refused surgery in the past. CT scan suggesting SMA and Celiac calcification , possible stenosis. General surgery called to rule mesenteric ischemia.     - recommend mesenteric ultrasound  - f/u lactate  - Recommend formal RUQ ultrasound   - Recommend GI evaluation to rule out peptic ulcer disease.   - discussed with Dr. Danilo Cuevas     0501  (Red Surgery)

## 2018-05-01 NOTE — H&P ADULT - HISTORY OF PRESENT ILLNESS
74 yo female with PMH of Atrial fibrillation on coumadin, HTN, DM2, Asthma, HLD, presents here with nausea, vomiting and abdominal pain.  Patient was admitted for similar reason in March.  At that time she was found to have elevated leukocytosis and elevated liver enzymes.  Ultrasound of abdomen showed cholelithiasis without cholecystitis and without biliary ductal dilatation.  MR abdomen showed no hepatic lesion.  Reason for symptoms were thought to be due to cholelithiasis.  Patient was seen by surgery and was recommended for cholecystectomy, however patient refused and she was discharged.  Patient now states that yesterday morning she woke and started vomiting.  She had 3 episodes of NBNB vomiting.  She had one episode of loose stool.  She also had mild midepigastric pain. She denies any fever, sick contact, outside food.  She denies any dysuria.

## 2018-05-01 NOTE — CONSULT NOTE ADULT - ASSESSMENT
73y year old Female who presents with abdominal pain, history of cholecystitis, refused surgery in the past. CT scan suggesting SMA and Celiac calcification , possible stenosis  - Medical management per primary team  - recommend mesenteric ultrasound  - f/u lactate  - NPO/IVF  - discussed with Vascular Surgery fellow. 73y year old Female who presents with abdominal pain, history of cholecystitis, refused surgery in the past. CT scan suggesting SMA and Celiac calcification , possible stenosis    - Medical management per primary team  - recommend mesenteric ultrasound to confirm celiac and SMA dz   - f/u lactate  - NPO/IVF  - discussed with Vascular Surgery fellow.

## 2018-05-01 NOTE — H&P ADULT - NSHPLABSRESULTS_GEN_ALL_CORE
Labs personally reviewed:                          14.6   23.2  )-----------( 301      ( 2018 20:10 )             43.0         135  |  96  |  12  ----------------------------<  205<H>  3.9   |  22  |  0.79    Ca    8.8      2018 20:10    TPro  7.1  /  Alb  3.4  /  TBili  0.6  /  DBili  x   /  AST  20  /  ALT  25  /  AlkPhos  83          LIVER FUNCTIONS - ( 2018 20:10 )  Alb: 3.4 g/dL / Pro: 7.1 g/dL / ALK PHOS: 83 U/L / ALT: 25 U/L / AST: 20 U/L / GGT: x           PT/INR - ( 2018 20:10 )   PT: 36.4 sec;   INR: 3.26 ratio         PTT - ( 2018 20:10 )  PTT:36.8 sec  Urinalysis Basic - ( 2018 20:20 )    Color: Yellow / Appearance: Clear / S.019 / pH: x  Gluc: x / Ketone: Small  / Bili: Negative / Urobili: Negative   Blood: x / Protein: 30 mg/dL / Nitrite: Negative   Leuk Esterase: Small / RBC: 10-25 /HPF / WBC 11-25 /HPF   Sq Epi: x / Non Sq Epi: OCC /HPF / Bacteria: Few /HPF      CAPILLARY BLOOD GLUCOSE          Imaging:  CXR personally reviewed: no focal opacity  CT abd/pelv reviewed: +cholelithiasis; subcentemeter hypodense right renal lesion; diverticulosis without diverticulitis    EKG ordered Labs personally reviewed:                          14.6   23.2  )-----------( 301      ( 2018 20:10 )             43.0         135  |  96  |  12  ----------------------------<  205<H>  3.9   |  22  |  0.79    Ca    8.8      2018 20:10    TPro  7.1  /  Alb  3.4  /  TBili  0.6  /  DBili  x   /  AST  20  /  ALT  25  /  AlkPhos  83          LIVER FUNCTIONS - ( 2018 20:10 )  Alb: 3.4 g/dL / Pro: 7.1 g/dL / ALK PHOS: 83 U/L / ALT: 25 U/L / AST: 20 U/L / GGT: x           PT/INR - ( 2018 20:10 )   PT: 36.4 sec;   INR: 3.26 ratio         PTT - ( 2018 20:10 )  PTT:36.8 sec  Urinalysis Basic - ( 2018 20:20 )    Color: Yellow / Appearance: Clear / S.019 / pH: x  Gluc: x / Ketone: Small  / Bili: Negative / Urobili: Negative   Blood: x / Protein: 30 mg/dL / Nitrite: Negative   Leuk Esterase: Small / RBC: 10-25 /HPF / WBC 11-25 /HPF   Sq Epi: x / Non Sq Epi: OCC /HPF / Bacteria: Few /HPF      CAPILLARY BLOOD GLUCOSE          Imaging:  CXR personally reviewed: no focal opacity  CT abd/pelv reviewed: +cholelithiasis; subcentemeter hypodense right renal lesion; diverticulosis without diverticulitis; Severe atherosclerotic calcifications of the origins of the celiac axis and SMA with likely stenosis of the SMA origin (unchanged from previous)    EKG ordered

## 2018-05-01 NOTE — CONSULT NOTE ADULT - ASSESSMENT
72 y/o F with hx of Afib, HTN, Gallstones p/w abdominal pain.    Impression:  1) Abdominal Pain - differential includes Biliary Colic, Mesenteric Ischemia. Pt with overt signs of GI bleeding. Symptoms not consistent of PUD.  2) Cholelithiasis  3) Afib    Plan:  - Begin Protonix 40mg po daily for possible Gastritis/PUD  - F/u vascular and general surgery recommendations  - PRN Antiemetics  - IVF  - No urgent plan for EGD at this time

## 2018-05-01 NOTE — CONSULT NOTE ADULT - ATTENDING COMMENTS
ABnormal duodenum  on ct scan, still with some abdominal discomfort on PPI proceed with EGd tomorrow.
as above

## 2018-05-01 NOTE — CONSULT NOTE ADULT - SUBJECTIVE AND OBJECTIVE BOX
HPI: 72 y/o F with hx of Afib on Coumadin, HTN, DMII, HLD. He presents with 1 day of nausea, emesis and diffuse abdominal pain. Note that the pt had a recent admission for similar symptoms in addition to elevated liver enzymes. She had workup showing gallstones and MRI showed no CBD obstruction. Liver enzyme elevations were attributed to ischemia. She now presents with abdominal pain with 3 episodes of emesis that work her up in the morning. She had a CT that showed SMA stenosis, and a HIDA scan showed no evidence of cholecystitis.     PAST MEDICAL & SURGICAL HISTORY:  Dementia  Hypertension  Type 2 diabetes mellitus  Atrial fibrillation, chronic  Diabetes mellitus  Asthma  No significant past surgical history    REVIEW OF SYSTEMS: negative except as per the HPI.    MEDICATIONS  (STANDING):  buDESOnide 160 MICROgram(s)/formoterol 4.5 MICROgram(s) Inhaler 2 Puff(s) Inhalation two times a day  dextrose 5%. 1000 milliLiter(s) (50 mL/Hr) IV Continuous <Continuous>  dextrose 50% Injectable 12.5 Gram(s) IV Push once  dextrose 50% Injectable 25 Gram(s) IV Push once  dextrose 50% Injectable 25 Gram(s) IV Push once  insulin lispro (HumaLOG) corrective regimen sliding scale   SubCutaneous every 6 hours  pantoprazole    Tablet 40 milliGRAM(s) Oral before breakfast  piperacillin/tazobactam IVPB. 3.375 Gram(s) IV Intermittent every 8 hours  simvastatin 20 milliGRAM(s) Oral at bedtime  sodium chloride 0.9%. 1000 milliLiter(s) (200 mL/Hr) IV Continuous <Continuous>    ALLERGIES: Levaquin    SOCIAL HISTORY:  - Alcohol: denies  - Recreational drug use: denies    FAMILY HISTORY:  Family history of diabetes mellitus (Father)    Vital Signs Last 24 Hrs  T(C): 36.7 (01 May 2018 13:28), Max: 37.2 (30 Apr 2018 19:08)  T(F): 98.1 (01 May 2018 13:28), Max: 99 (30 Apr 2018 19:08)  HR: 90 (01 May 2018 13:28) (90 - 105)  BP: 113/49 (01 May 2018 13:28) (110/70 - 138/87)  BP(mean): --  RR: 18 (01 May 2018 13:28) (16 - 20)  SpO2: 97% (01 May 2018 13:28) (94% - 97%)    PHYSICAL EXAM:  Constitutional: no acute distress  Eyes: no icterus  Neck: no masses, no LAD  Respiratory: normal inspiratory effort; no wheezing or crackles  Cardiovascular: RRR, normal S1/S2, no murmurs/rubs/gallops  Gastrointestinal: soft, nondistended, nontender, +BS  Extremities: no LE edema  Neurological: AAOx3, no asterixis  Skin: no rashes, bruises, petechiae    LABS:                        11.6   9.35  )-----------( 221      ( 01 May 2018 07:55 )             35.5     138  |  103  |  9   ----------------------------<  91  3.3<L>   |  23  |  0.82    Ca    7.8<L>      01 May 2018 05:47  Phos  3.4     05-01  Mg     1.4     05-01    TPro  5.5<L>  /  Alb  2.7<L>  /  TBili  0.4  /  DBili  x   /  AST  18  /  ALT  21  /  AlkPhos  59  05-01  PT/INR - ( 30 Apr 2018 20:10 )   PT: 36.4 sec;   INR: 3.26 ratio    PTT - ( 30 Apr 2018 20:10 )  PTT:36.8 sec  LIVER FUNCTIONS - ( 01 May 2018 05:47 )  Alb: 2.7 g/dL / Pro: 5.5 g/dL / ALK PHOS: 59 U/L / ALT: 21 U/L / AST: 18 U/L / GGT: x

## 2018-05-01 NOTE — CONSULT NOTE ADULT - SUBJECTIVE AND OBJECTIVE BOX
CC: Patient is a 73y old  Female who presents with a chief complaint of  abdominal pain     HPI: 74 yo female with PMH of Atrial fibrillation on coumadin, HTN, DM2, Asthma, HLD, presents here with nausea, vomiting and abdominal pain.  Patient was admitted for similar reason in March.  At that time she was found to have elevated leukocytosis and elevated liver enzymes.  Ultrasound of abdomen showed cholelithiasis without cholecystitis and without biliary ductal dilatation.  MR abdomen showed no hepatic lesion.  Reason for symptoms were thought to be due to cholelithiasis.  Patient was seen by surgery and was recommended for cholecystectomy, however patient refused and she was discharged.  Patient now states that yesterday morning she woke and started vomiting.  She had 3 episodes of NBNB vomiting.  She had one episode of loose stool.  She also had mild midepigastric pain. She denies any fever, sick contact, outside food.  She denies any dysuria. Her pain is not post prandial. She does not have appetite as in the past.     At ED, patient is alert and interactive, oriented. Patient denies any abdominal pain at present time, mild tenderness at epigastric area. Patient had CT scan suggested calcification around SMA and celiac trunk,  possible SMA and celiac trunk stenosis. Vascular Surgery was called, recommended a mesenteric duplex. General surgery called to rule out mesenteric ischemia.       PMH  Dementia  Hypertension  Type 2 diabetes mellitus  Atrial fibrillation, chronic  Diabetes mellitus  Asthma    PSH  No significant past surgical history  Diabetes Mellitus  HTN (Hypertension)  Asthma  Afib    MEDS  Home Medications:  Advair Diskus 250 mcg-50 mcg inhalation powder: 1 puff(s) inhaled 2 times a day (18 Oct 2017 19:38)  Centrum oral tablet: 1 tab(s) orally once a day (18 Oct 2017 19:38)  furosemide 20 mg oral tablet: 1 tab(s) orally once a day (03 Mar 2018 13:51)  metFORMIN 1000 mg oral tablet: 1 tab(s) orally 2 times a day (18 Oct 2017 19:38)  NexIUM OTC 20 mg oral delayed release capsule: 1 cap(s) orally once a day (in the morning) (18 Oct 2017 19:38)  predniSONE 5 mg oral tablet: 1 tab(s) orally once a day (24 Oct 2017 13:10)  simvastatin 20 mg oral tablet: 1 tab(s) orally once a day (at bedtime) (24 Oct 2017 13:10)  Vitamin D3 2000 intl units oral tablet: 1 tab(s) orally once a day (18 Oct 2017 19:38)  warfarin 3 mg oral tablet: 1 tab(s) orally once a day (07 Mar 2018 12:39)    Allergies    Avelox (Pruritus)  Levaquin (Unknown)      Physical Exam  Vital Signs Last 24 Hrs  T(C): 36.8 (01 May 2018 07:58), Max: 37.2 (30 Apr 2018 19:08)  T(F): 98.2 (01 May 2018 07:58), Max: 99 (30 Apr 2018 19:08)  HR: 100 (01 May 2018 07:58) (94 - 118)  BP: 124/71 (01 May 2018 07:58) (110/70 - 138/87)  BP(mean): --  RR: 18 (01 May 2018 07:58) (16 - 20)  SpO2: 96% (01 May 2018 07:58) (94% - 97%)      Gen: NAD, alert and interactive, oriented  CV: S1, S2, RRR  Pulm: CTA B/L  Abd: Soft, tender in the epigastric area, no rebound tenderness, no generalized peritonitis.   Ext: warm, no edema, palp dp/pt  Skin: no rash, no ecchymoses  Neuro: no focal deficits, CN grossly normal.     Labs:                                   11.6   9.35  )-----------( 221      ( 01 May 2018 07:55 )             35.5             LABS:      05-01    138  |  103  |  9   ----------------------------<  91  3.3<L>   |  23  |  0.82    Ca    7.8<L>      01 May 2018 05:47  Phos  3.4     05-01  Mg     1.4     05-01    TPro  5.5<L>  /  Alb  2.7<L>  /  TBili  0.4  /  DBili  x   /  AST  18  /  ALT  21  /  AlkPhos  59  05-01    PT/INR - ( 30 Apr 2018 20:10 )   PT: 36.4 sec;   INR: 3.26 ratio         PTT - ( 30 Apr 2018 20:10 )  PTT:36.8 sec      Imaging    CT Abdomen     FINDINGS:    LOWER CHEST: No airspace consolidations. Coronary calcifications.    LIVER: Unchanged calcified hepatic granulomas.  BILE DUCTS: Normal caliber.  GALLBLADDER: Cholelithiasis.  SPLEEN: Within normal limits.  PANCREAS: Within normal limits.  ADRENALS: Within normal limits.  KIDNEYS/URETERS: Subcentimeter hypodense is in the right kidney is too   small for accurate characterization. Nonenhancing regions of cortex in   the right upper pole consistent with chronic renal infarct is unchanged.   No hydronephrosis.    BLADDER: Within normal limits.  REPRODUCTIVE ORGANS: Small foci of air within the vagina.    BOWEL: No bowel obstruction. 1.7 cm duodenal diverticulum of the 3rd   portion is unchanged. Sigmoid diverticulosis without diverticulitis.   Appendix is notvisualized and it was not seen on the prior CT of 3/3/18.  PERITONEUM: No ascites.  VESSELS:  Atherosclerotic disease of the aorta and its branches with   severe atherosclerotic calcifications of the origins of the celiac axis   and SMA. likely stenosis of the SMA origin.  RETROPERITONEUM/SUBPERITONEUM: No lymphadenopathy.    ABDOMINAL WALL: Within normal limits.  MUSCULOSKELETAL: Intramedullary nailing of the proximal femurs with   helical femoral neck screw in the right hip and gamma nail in the left   hip. Severe degenerative changes of visualized spine with severe lumbar   dextroscoliosis with apex at L3. Grade 2 anterolisthesis of L5 on S1 with   degenerative fusion of L5-S1.    IMPRESSION:     The etiology of abdominal pain is not elucidated.    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

## 2018-05-01 NOTE — PROGRESS NOTE ADULT - SUBJECTIVE AND OBJECTIVE BOX
Seen and examined . MARYBETH neg . Surgery and vascular helping . Mesenteric Doppler and US abdomen.

## 2018-05-01 NOTE — CONSULT NOTE ADULT - SUBJECTIVE AND OBJECTIVE BOX
CC: Patient is a 73y old  Female who presents with a chief complaint of nausea, vomiting and abdominal pain     HPI: 74 yo female with PMH of Atrial fibrillation on coumadin, HTN, DM2, Asthma, HLD, presents here with nausea, vomiting and abdominal pain.  Patient was admitted for similar reason in March.  At that time she was found to have elevated leukocytosis and elevated liver enzymes.  Ultrasound of abdomen showed cholelithiasis without cholecystitis and without biliary ductal dilatation.  MR abdomen showed no hepatic lesion.  Reason for symptoms were thought to be due to cholelithiasis.  Patient was seen by surgery and was recommended for cholecystectomy, however patient refused and she was discharged.  Patient now states that yesterday morning she woke and started vomiting.  She had 3 episodes of NBNB vomiting.  She had one episode of loose stool.  She also had mild midepigastric pain. She denies any fever, sick contact, outside food.  She denies any dysuria.     At ED, patient is alert and interactive, oriented. Patient denies any abdominal pain at present time, mild tenderness at epigastric area. Patient had CT scan suggested calcification around SMA and celiac trunk,  possible SMA and celiac trunk stenosis      PMH  Dementia  Hypertension  Type 2 diabetes mellitus  Atrial fibrillation, chronic  Diabetes mellitus  Asthma    PSH  No significant past surgical history  Diabetes Mellitus  HTN (Hypertension)  Asthma  Afib    MEDS  Home Medications:  Advair Diskus 250 mcg-50 mcg inhalation powder: 1 puff(s) inhaled 2 times a day (18 Oct 2017 19:38)  Centrum oral tablet: 1 tab(s) orally once a day (18 Oct 2017 19:38)  furosemide 20 mg oral tablet: 1 tab(s) orally once a day (03 Mar 2018 13:51)  metFORMIN 1000 mg oral tablet: 1 tab(s) orally 2 times a day (18 Oct 2017 19:38)  NexIUM OTC 20 mg oral delayed release capsule: 1 cap(s) orally once a day (in the morning) (18 Oct 2017 19:38)  predniSONE 5 mg oral tablet: 1 tab(s) orally once a day (24 Oct 2017 13:10)  simvastatin 20 mg oral tablet: 1 tab(s) orally once a day (at bedtime) (24 Oct 2017 13:10)  Vitamin D3 2000 intl units oral tablet: 1 tab(s) orally once a day (18 Oct 2017 19:38)  warfarin 3 mg oral tablet: 1 tab(s) orally once a day (07 Mar 2018 12:39)    Allergies    Avelox (Pruritus)  Levaquin (Unknown)        Physical Exam  T(C): 36.4 (18 @ 03:05), Max: 37.2 (18 @ 19:08)  HR: 99 (18 @ 03:05) (95 - 118)  BP: 110/70 (18 @ 03:05) (110/70 - 138/87)  RR: 18 (18 @ 03:05) (16 - 20)  SpO2: 94% (18 @ 03:05) (94% - 96%)  Tmax: T(C): , Max: 37.2 (18 @ 19:08)      Gen: NAD, alert and interactive, oriented  HEENT: normocephalic, atraumatic, no scleral icterus  CV: S1, S2, RRR  Pulm: CTA B/L  Abd: Soft, ND, NTP, no rebound, no guarding, no palpable organomegaly/masses  Ext: warm, no edema, palp dp/pt  Skin: no rash, no ecchymoses  Neuro: no focal deficits, CN grossly normal.     Labs:                        14.6   23.2  )-----------( 301      ( 2018 20:10 )             43.0         135  |  96  |  12  ----------------------------<  205<H>  3.9   |  22  |  0.79    Ca    8.8      2018 20:10    TPro  7.1  /  Alb  3.4  /  TBili  0.6  /  DBili  x   /  AST  20  /  ALT  25  /  AlkPhos  83  04-30    PT/INR - ( 2018 20:10 )   PT: 36.4 sec;   INR: 3.26 ratio         PTT - ( 2018 20:10 )  PTT:36.8 sec  Urinalysis Basic - ( 2018 20:20 )    Color: Yellow / Appearance: Clear / S.019 / pH: x  Gluc: x / Ketone: Small  / Bili: Negative / Urobili: Negative   Blood: x / Protein: 30 mg/dL / Nitrite: Negative   Leuk Esterase: Small / RBC: 10-25 /HPF / WBC 11-25 /HPF   Sq Epi: x / Non Sq Epi: OCC /HPF / Bacteria: Few /HPF      Imaging    CT Abdomen     FINDINGS:    LOWER CHEST: No airspace consolidations. Coronary calcifications.    LIVER: Unchanged calcified hepatic granulomas.  BILE DUCTS: Normal caliber.  GALLBLADDER: Cholelithiasis.  SPLEEN: Within normal limits.  PANCREAS: Within normal limits.  ADRENALS: Within normal limits.  KIDNEYS/URETERS: Subcentimeter hypodense is in the right kidney is too   small for accurate characterization. Nonenhancing regions of cortex in   the right upper pole consistent with chronic renal infarct is unchanged.   No hydronephrosis.    BLADDER: Within normal limits.  REPRODUCTIVE ORGANS: Small foci of air within the vagina.    BOWEL: No bowel obstruction. 1.7 cm duodenal diverticulum of the 3rd   portion is unchanged. Sigmoid diverticulosis without diverticulitis.   Appendix is notvisualized and it was not seen on the prior CT of 3/3/18.  PERITONEUM: No ascites.  VESSELS:  Atherosclerotic disease of the aorta and its branches with   severe atherosclerotic calcifications of the origins of the celiac axis   and SMA. likely stenosis of the SMA origin.  RETROPERITONEUM/SUBPERITONEUM: No lymphadenopathy.    ABDOMINAL WALL: Within normal limits.  MUSCULOSKELETAL: Intramedullary nailing of the proximal femurs with   helical femoral neck screw in the right hip and gamma nail in the left   hip. Severe degenerative changes of visualized spine with severe lumbar   dextroscoliosis with apex at L3. Grade 2 anterolisthesis of L5 on S1 with   degenerative fusion of L5-S1.    IMPRESSION:     The etiology of abdominal pain is not elucidated.    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

## 2018-05-01 NOTE — H&P ADULT - ASSESSMENT
74 yo female with PMH of Atrial fibrillation on coumadin, HTN, DM2, Asthma, HLD, presents here with nausea, vomiting and abdominal pain.

## 2018-05-01 NOTE — H&P ADULT - NSHPREVIEWOFSYSTEMS_GEN_ALL_CORE
CONSTITUTIONAL: No weakness, fevers or chills  EYES/ENT: No visual changes;  No dysphagia  NECK: No pain or stiffness  RESPIRATORY: No cough, wheezing, hemoptysis; No shortness of breath  CARDIOVASCULAR: No chest pain or palpitations; No lower extremity edema  EXTREMITIES: no le edema, cyanosis, clubbing  MUSCULOSKELETAL: no joint pain, swelling  GASTROINTESTINAL: +abdominal pain, nausea, vomiting.  BACK: No back pain  GENITOURINARY: No dysuria, frequency or hematuria  NEUROLOGICAL: No numbness or weakness  SKIN: No itching, burning, rashes, or lesions   PSYCH: no agitation  All other review of systems is negative unless indicated above.

## 2018-05-01 NOTE — H&P ADULT - NSHPPHYSICALEXAM_GEN_ALL_CORE
PHYSICAL EXAM:  Vital Signs Last 24 Hrs  T(C): 36.6 (05-01-18 @ 01:30)  T(F): 97.9 (05-01-18 @ 01:30), Max: 99 (04-30-18 @ 19:08)  HR: 95 (05-01-18 @ 01:30) (95 - 118)  BP: 138/87 (05-01-18 @ 01:30)  BP(mean): --  RR: 18 (05-01-18 @ 01:30) (16 - 20)  SpO2: 94% (05-01-18 @ 01:30) (94% - 96%)  Wt(kg): --    Constitutional: NAD, awake and alert  EYES: EOMI  ENT:  Normal Hearing, no tonsillar exudates   Neck: Soft and supple, No JVD  Lungs: Breath sounds are clear bilaterally, No wheezing, rales or rhonchi  Heart: S1 and S2, regular rate and rhythm, no Murmurs, gallops or rubs  Abdomen: Bowel Sounds present, soft, nontender, nondistended, no guarding, no rebound; no lemus sign  Extremities: No cyanosis or clubbing; warm to touch  Vascular: 2+ peripheral pulses lower ex  Neurological: A/O x 3, no focal deficits  Musculoskeletal: 5/5 strength b/l upper and lower extremities  Skin: No rashes  Psych: no depression or anhedonia  HEME: no bruises, no nose bleeds

## 2018-05-01 NOTE — H&P ADULT - PROBLEM SELECTOR PLAN 3
leukocytosis, tachycaric, lactic acidosis  c/w IVF  will send blood culture if patient develops fever  HIDA scan  c/w IV zosyn

## 2018-05-01 NOTE — H&P ADULT - PROBLEM SELECTOR PLAN 1
unclear etiology, possibly gastritis vs. gastroenteritis; r/o cholecystitis  ? cholecystitis, CT abd/pelv shows cholelithiasis  LFTs are normal  will keep patient NPO  will get HIDA scan given elevated leukocytosis and lactate  s/p IV zosyn, which will continue for now  s/p 2L NS, now getting IVF @200cc/hr; will observe closely for fluid overload given underlying diastolic dysfunction  will send blood culture if patient develops fever  IV zofran for nausea unclear etiology, possibly gastritis vs. gastroenteritis; r/o cholecystitis, r/o vascular anomalies  ? 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

## 2018-05-02 LAB
-  AMIKACIN: SIGNIFICANT CHANGE UP
-  AMOXICILLIN/CLAVULANIC ACID: SIGNIFICANT CHANGE UP
-  AMPICILLIN/SULBACTAM: SIGNIFICANT CHANGE UP
-  AMPICILLIN: SIGNIFICANT CHANGE UP
-  AZTREONAM: SIGNIFICANT CHANGE UP
-  CEFAZOLIN: SIGNIFICANT CHANGE UP
-  CEFEPIME: SIGNIFICANT CHANGE UP
-  CEFOXITIN: SIGNIFICANT CHANGE UP
-  CEFTRIAXONE: SIGNIFICANT CHANGE UP
-  CIPROFLOXACIN: SIGNIFICANT CHANGE UP
-  ERTAPENEM: SIGNIFICANT CHANGE UP
-  GENTAMICIN: SIGNIFICANT CHANGE UP
-  IMIPENEM: SIGNIFICANT CHANGE UP
-  LEVOFLOXACIN: SIGNIFICANT CHANGE UP
-  MEROPENEM: SIGNIFICANT CHANGE UP
-  NITROFURANTOIN: SIGNIFICANT CHANGE UP
-  PIPERACILLIN/TAZOBACTAM: SIGNIFICANT CHANGE UP
-  TIGECYCLINE: SIGNIFICANT CHANGE UP
-  TOBRAMYCIN: SIGNIFICANT CHANGE UP
-  TRIMETHOPRIM/SULFAMETHOXAZOLE: SIGNIFICANT CHANGE UP
ALBUMIN SERPL ELPH-MCNC: 2.6 G/DL — LOW (ref 3.3–5)
ALP SERPL-CCNC: 55 U/L — SIGNIFICANT CHANGE UP (ref 40–120)
ALT FLD-CCNC: 15 U/L — SIGNIFICANT CHANGE UP (ref 10–45)
ANION GAP SERPL CALC-SCNC: 13 MMOL/L — SIGNIFICANT CHANGE UP (ref 5–17)
AST SERPL-CCNC: 20 U/L — SIGNIFICANT CHANGE UP (ref 10–40)
BASOPHILS # BLD AUTO: 0.01 K/UL — SIGNIFICANT CHANGE UP (ref 0–0.2)
BASOPHILS NFR BLD AUTO: 0.1 % — SIGNIFICANT CHANGE UP (ref 0–2)
BILIRUB DIRECT SERPL-MCNC: <0.1 MG/DL — SIGNIFICANT CHANGE UP (ref 0–0.2)
BILIRUB INDIRECT FLD-MCNC: >0.2 MG/DL — SIGNIFICANT CHANGE UP (ref 0.2–1)
BILIRUB SERPL-MCNC: 0.3 MG/DL — SIGNIFICANT CHANGE UP (ref 0.2–1.2)
BUN SERPL-MCNC: 4 MG/DL — LOW (ref 7–23)
CALCIUM SERPL-MCNC: 7.7 MG/DL — LOW (ref 8.4–10.5)
CHLORIDE SERPL-SCNC: 107 MMOL/L — SIGNIFICANT CHANGE UP (ref 96–108)
CO2 SERPL-SCNC: 23 MMOL/L — SIGNIFICANT CHANGE UP (ref 22–31)
CREAT SERPL-MCNC: 0.76 MG/DL — SIGNIFICANT CHANGE UP (ref 0.5–1.3)
CULTURE RESULTS: SIGNIFICANT CHANGE UP
EOSINOPHIL # BLD AUTO: 0.19 K/UL — SIGNIFICANT CHANGE UP (ref 0–0.5)
EOSINOPHIL NFR BLD AUTO: 2.4 % — SIGNIFICANT CHANGE UP (ref 0–6)
GLUCOSE BLDC GLUCOMTR-MCNC: 111 MG/DL — HIGH (ref 70–99)
GLUCOSE BLDC GLUCOMTR-MCNC: 166 MG/DL — HIGH (ref 70–99)
GLUCOSE BLDC GLUCOMTR-MCNC: 170 MG/DL — HIGH (ref 70–99)
GLUCOSE BLDC GLUCOMTR-MCNC: 183 MG/DL — HIGH (ref 70–99)
GLUCOSE BLDC GLUCOMTR-MCNC: 189 MG/DL — HIGH (ref 70–99)
GLUCOSE BLDC GLUCOMTR-MCNC: 65 MG/DL — LOW (ref 70–99)
GLUCOSE BLDC GLUCOMTR-MCNC: 70 MG/DL — SIGNIFICANT CHANGE UP (ref 70–99)
GLUCOSE SERPL-MCNC: 61 MG/DL — LOW (ref 70–99)
HCT VFR BLD CALC: 35.1 % — SIGNIFICANT CHANGE UP (ref 34.5–45)
HGB BLD-MCNC: 11.3 G/DL — LOW (ref 11.5–15.5)
IMM GRANULOCYTES NFR BLD AUTO: 0.3 % — SIGNIFICANT CHANGE UP (ref 0–1.5)
INR BLD: 2.45 RATIO — HIGH (ref 0.88–1.16)
LYMPHOCYTES # BLD AUTO: 1.1 K/UL — SIGNIFICANT CHANGE UP (ref 1–3.3)
LYMPHOCYTES # BLD AUTO: 14.1 % — SIGNIFICANT CHANGE UP (ref 13–44)
MCHC RBC-ENTMCNC: 27 PG — SIGNIFICANT CHANGE UP (ref 27–34)
MCHC RBC-ENTMCNC: 32.2 GM/DL — SIGNIFICANT CHANGE UP (ref 32–36)
MCV RBC AUTO: 83.8 FL — SIGNIFICANT CHANGE UP (ref 80–100)
METHOD TYPE: SIGNIFICANT CHANGE UP
MONOCYTES # BLD AUTO: 0.49 K/UL — SIGNIFICANT CHANGE UP (ref 0–0.9)
MONOCYTES NFR BLD AUTO: 6.3 % — SIGNIFICANT CHANGE UP (ref 2–14)
NEUTROPHILS # BLD AUTO: 6.01 K/UL — SIGNIFICANT CHANGE UP (ref 1.8–7.4)
NEUTROPHILS NFR BLD AUTO: 76.8 % — SIGNIFICANT CHANGE UP (ref 43–77)
ORGANISM # SPEC MICROSCOPIC CNT: SIGNIFICANT CHANGE UP
ORGANISM # SPEC MICROSCOPIC CNT: SIGNIFICANT CHANGE UP
PLATELET # BLD AUTO: 191 K/UL — SIGNIFICANT CHANGE UP (ref 150–400)
POTASSIUM SERPL-MCNC: 3.4 MMOL/L — LOW (ref 3.5–5.3)
POTASSIUM SERPL-SCNC: 3.4 MMOL/L — LOW (ref 3.5–5.3)
PROT SERPL-MCNC: 5.3 G/DL — LOW (ref 6–8.3)
PROTHROM AB SERPL-ACNC: 28.2 SEC — HIGH (ref 10–13.1)
RBC # BLD: 4.19 M/UL — SIGNIFICANT CHANGE UP (ref 3.8–5.2)
RBC # FLD: 13.3 % — SIGNIFICANT CHANGE UP (ref 10.3–14.5)
SODIUM SERPL-SCNC: 143 MMOL/L — SIGNIFICANT CHANGE UP (ref 135–145)
SPECIMEN SOURCE: SIGNIFICANT CHANGE UP
WBC # BLD: 7.82 K/UL — SIGNIFICANT CHANGE UP (ref 3.8–10.5)
WBC # FLD AUTO: 7.82 K/UL — SIGNIFICANT CHANGE UP (ref 3.8–10.5)

## 2018-05-02 PROCEDURE — 93976 VASCULAR STUDY: CPT | Mod: 26

## 2018-05-02 PROCEDURE — 99232 SBSQ HOSP IP/OBS MODERATE 35: CPT

## 2018-05-02 PROCEDURE — 76700 US EXAM ABDOM COMPLETE: CPT | Mod: 26,59

## 2018-05-02 PROCEDURE — 43235 EGD DIAGNOSTIC BRUSH WASH: CPT | Mod: GC

## 2018-05-02 RX ORDER — WARFARIN SODIUM 2.5 MG/1
2.5 TABLET ORAL ONCE
Qty: 0 | Refills: 0 | Status: DISCONTINUED | OUTPATIENT
Start: 2018-05-02 | End: 2018-05-02

## 2018-05-02 RX ORDER — SODIUM CHLORIDE 9 MG/ML
1000 INJECTION, SOLUTION INTRAVENOUS
Qty: 0 | Refills: 0 | Status: DISCONTINUED | OUTPATIENT
Start: 2018-05-02 | End: 2018-05-04

## 2018-05-02 RX ORDER — PANTOPRAZOLE SODIUM 20 MG/1
40 TABLET, DELAYED RELEASE ORAL
Qty: 0 | Refills: 0 | Status: DISCONTINUED | OUTPATIENT
Start: 2018-05-02 | End: 2018-05-10

## 2018-05-02 RX ORDER — DEXTROSE 50 % IN WATER 50 %
12.5 SYRINGE (ML) INTRAVENOUS ONCE
Qty: 0 | Refills: 0 | Status: COMPLETED | OUTPATIENT
Start: 2018-05-02 | End: 2018-05-02

## 2018-05-02 RX ADMIN — BUDESONIDE AND FORMOTEROL FUMARATE DIHYDRATE 2 PUFF(S): 160; 4.5 AEROSOL RESPIRATORY (INHALATION) at 17:19

## 2018-05-02 RX ADMIN — Medication 12.5 GRAM(S): at 06:38

## 2018-05-02 RX ADMIN — PANTOPRAZOLE SODIUM 40 MILLIGRAM(S): 20 TABLET, DELAYED RELEASE ORAL at 05:10

## 2018-05-02 RX ADMIN — BUDESONIDE AND FORMOTEROL FUMARATE DIHYDRATE 2 PUFF(S): 160; 4.5 AEROSOL RESPIRATORY (INHALATION) at 05:10

## 2018-05-02 RX ADMIN — SIMVASTATIN 20 MILLIGRAM(S): 20 TABLET, FILM COATED ORAL at 21:54

## 2018-05-02 RX ADMIN — SODIUM CHLORIDE 100 MILLILITER(S): 9 INJECTION INTRAMUSCULAR; INTRAVENOUS; SUBCUTANEOUS at 05:10

## 2018-05-02 RX ADMIN — Medication 1: at 12:35

## 2018-05-02 RX ADMIN — PIPERACILLIN AND TAZOBACTAM 25 GRAM(S): 4; .5 INJECTION, POWDER, LYOPHILIZED, FOR SOLUTION INTRAVENOUS at 05:10

## 2018-05-02 RX ADMIN — PIPERACILLIN AND TAZOBACTAM 25 GRAM(S): 4; .5 INJECTION, POWDER, LYOPHILIZED, FOR SOLUTION INTRAVENOUS at 12:06

## 2018-05-02 NOTE — PROGRESS NOTE ADULT - SUBJECTIVE AND OBJECTIVE BOX
INTERVAL HPI/OVERNIGHT EVENTS: I feel better and want to eat.   Vital Signs Last 24 Hrs  T(C): 36.3 (02 May 2018 08:02), Max: 36.4 (01 May 2018 23:46)  T(F): 97.3 (02 May 2018 08:02), Max: 97.5 (01 May 2018 23:46)  HR: 71 (02 May 2018 08:02) (58 - 71)  BP: 128/57 (02 May 2018 08:02) (125/77 - 128/57)  BP(mean): --  RR: 18 (02 May 2018 08:02) (18 - 18)  SpO2: 96% (02 May 2018 08:02) (96% - 97%)  I&O's Summary    01 May 2018 07:01  -  02 May 2018 07:00  --------------------------------------------------------  IN: 0 mL / OUT: 0 mL / NET: 0 mL    02 May 2018 07:01  -  02 May 2018 16:44  --------------------------------------------------------  IN: 0 mL / OUT: 0 mL / NET: 0 mL      MEDICATIONS  (STANDING):  buDESOnide 160 MICROgram(s)/formoterol 4.5 MICROgram(s) Inhaler 2 Puff(s) Inhalation two times a day  dextrose 5% + sodium chloride 0.9%. 1000 milliLiter(s) (100 mL/Hr) IV Continuous <Continuous>  dextrose 5%. 1000 milliLiter(s) (50 mL/Hr) IV Continuous <Continuous>  dextrose 50% Injectable 12.5 Gram(s) IV Push once  dextrose 50% Injectable 25 Gram(s) IV Push once  dextrose 50% Injectable 25 Gram(s) IV Push once  insulin lispro (HumaLOG) corrective regimen sliding scale   SubCutaneous every 6 hours  pantoprazole    Tablet 40 milliGRAM(s) Oral before breakfast  piperacillin/tazobactam IVPB. 3.375 Gram(s) IV Intermittent every 8 hours  simvastatin 20 milliGRAM(s) Oral at bedtime    MEDICATIONS  (PRN):  dextrose Gel 1 Dose(s) Oral once PRN Blood Glucose LESS THAN 70 milliGRAM(s)/deciliter  glucagon  Injectable 1 milliGRAM(s) IntraMuscular once PRN Glucose LESS THAN 70 milligrams/deciliter  ondansetron Injectable 4 milliGRAM(s) IV Push every 6 hours PRN Nausea    LABS:                        11.3   7.82  )-----------( 191      ( 02 May 2018 07:35 )             35.1     05-02    143  |  107  |  4<L>  ----------------------------<  61<L>  3.4<L>   |  23  |  0.76    Ca    7.7<L>      02 May 2018 06:46  Phos  3.4     05-  Mg     1.4     05-    TPro  5.3<L>  /  Alb  2.6<L>  /  TBili  0.3  /  DBili  <0.1  /  AST  20  /  ALT  15  /  AlkPhos  55  05-02    PT/INR - ( 02 May 2018 07:35 )   PT: 28.2 sec;   INR: 2.45 ratio         PTT - ( 2018 20:10 )  PTT:36.8 sec  Urinalysis Basic - ( 2018 20:20 )    Color: Yellow / Appearance: Clear / S.019 / pH: x  Gluc: x / Ketone: Small  / Bili: Negative / Urobili: Negative   Blood: x / Protein: 30 mg/dL / Nitrite: Negative   Leuk Esterase: Small / RBC: 10-25 /HPF / WBC 11-25 /HPF   Sq Epi: x / Non Sq Epi: OCC /HPF / Bacteria: Few /HPF      CAPILLARY BLOOD GLUCOSE      POCT Blood Glucose.: 189 mg/dL (02 May 2018 12:26)  POCT Blood Glucose.: 166 mg/dL (02 May 2018 08:28)  POCT Blood Glucose.: 170 mg/dL (02 May 2018 07:08)  POCT Blood Glucose.: 70 mg/dL (02 May 2018 06:25)  POCT Blood Glucose.: 65 mg/dL (02 May 2018 06:23)  POCT Blood Glucose.: 183 mg/dL (02 May 2018 00:00)  POCT Blood Glucose.: 233 mg/dL (01 May 2018 23:56)  POCT Blood Glucose.: 67 mg/dL (01 May 2018 23:52)  POCT Blood Glucose.: 144 mg/dL (01 May 2018 18:23)  POCT Blood Glucose.: 65 mg/dL (01 May 2018 17:13)  POCT Blood Glucose.: 68 mg/dL (01 May 2018 17:12)        Urinalysis Basic - ( 2018 20:20 )    Color: Yellow / Appearance: Clear / S.019 / pH: x  < from: US Doppler Mesenteric (18 @ 11:30) >  IMPRESSION: Diffuse calcifiedatherosclerotic disease of the visualized   abdominal aorta and interrogated branches.    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 Findings suggest   mesenteric ischemia.    Cholelithiasis without cholecystitis.    < end of copied text >  Gluc: x / Ketone: Small  / Bili: Negative / Urobili: Negative   Blood: x / Protein: 30 mg/dL / Nitrite: Negative   Leuk Esterase: Small / RBC: 10-25 /HPF / WBC 11-25 /HPF   Sq Epi: x / Non Sq Epi: OCC /HPF / Bacteria: Few /HPF      REVIEW OF SYSTEMS:  CONSTITUTIONAL: No fever, weight loss, or fatigue  EYES: No eye pain, visual disturbances, or discharge  ENMT:  No difficulty hearing, tinnitus, vertigo; No sinus or throat pain  NECK: No pain or stiffness  RESPIRATORY: No cough, wheezing, chills or hemoptysis; No shortness of breath  CARDIOVASCULAR: No chest pain, palpitations, dizziness, or leg swelling  GASTROINTESTINAL: No abdominal or epigastric pain. No nausea, vomiting, or hematemesis; No diarrhea or constipation. No melena or hematochezia.  GENITOURINARY: No dysuria, frequency, hematuria, or incontinence  NEUROLOGICAL: No headaches, memory loss, loss of strength, numbness, or tremors  SKIN: No itching, burning, rashes, or lesions   ENDOCRINE: No heat or cold intolerance; No hair loss  MUSCULOSKELETAL: No joint pain or swelling; No muscle, back, or extremity pain    Consultant(s) Notes Reviewed:  [x ] YES  [ ] NO    PHYSICAL EXAM:  GENERAL: NAD, well-groomed, well-developed,not in any distress ,  HEAD:  Atraumatic, Normocephalic  EYES: EOMI, PERRLA, conjunctiva and sclera clear  ENMT: No tonsillar erythema, exudates, or enlargement; Moist mucous membranes, Good dentition, No lesions  NECK: Supple, No JVD, Normal thyroid  NERVOUS SYSTEM:  Alert & Oriented X3, No focal deficit   CHEST/LUNG: Good air entry bilateral with no  rales, rhonchi, wheezing, or rubs  HEART: Regular rate and rhythm; No murmurs, rubs, or gallops  ABDOMEN: Soft, Nontender, Nondistended; Bowel sounds present  EXTREMITIES:  2+ Peripheral Pulses, No clubbing, cyanosis, or edema  SKIN: No rashes or lesions    Care Discussed with Consultants/Other Providers [ x] YES  [ ] NO

## 2018-05-02 NOTE — PROGRESS NOTE ADULT - SUBJECTIVE AND OBJECTIVE BOX
Team [facility] PROGRESS NOTE        SUBJECTIVE: Pt seen at examined at bedside. AVSS. Denies pain. Denies fever, CP, SOB, and NV.        Vital Signs Last 24 Hrs  T(C): 36.3 (02 May 2018 08:02), Max: 36.7 (01 May 2018 13:28)  T(F): 97.3 (02 May 2018 08:02), Max: 98.1 (01 May 2018 13:28)  HR: 71 (02 May 2018 08:02) (58 - 92)  BP: 128/57 (02 May 2018 08:02) (113/49 - 128/57)  BP(mean): --  RR: 18 (02 May 2018 08:02) (18 - 18)  SpO2: 96% (02 May 2018 08:02) (96% - 97%)    Physical Exam  General: awake, alert, NAD    Pulm: respirations unlabored, no increased WOB  Abdomen: soft, mildly distended, NT  Extremities: Grossly symmetric    I&O's Summary    01 May 2018 07:  -  02 May 2018 07:00  --------------------------------------------------------  IN: 0 mL / OUT: 0 mL / NET: 0 mL      I&O's Detail    01 May 2018 07:01  -  02 May 2018 07:00  --------------------------------------------------------  IN:  Total IN: 0 mL    OUT:  Total OUT: 0 mL    Total NET: 0 mL          MEDICATIONS  (STANDING):  buDESOnide 160 MICROgram(s)/formoterol 4.5 MICROgram(s) Inhaler 2 Puff(s) Inhalation two times a day  dextrose 5% + sodium chloride 0.9%. 1000 milliLiter(s) (100 mL/Hr) IV Continuous <Continuous>  dextrose 5%. 1000 milliLiter(s) (50 mL/Hr) IV Continuous <Continuous>  dextrose 50% Injectable 12.5 Gram(s) IV Push once  dextrose 50% Injectable 25 Gram(s) IV Push once  dextrose 50% Injectable 25 Gram(s) IV Push once  insulin lispro (HumaLOG) corrective regimen sliding scale   SubCutaneous every 6 hours  pantoprazole    Tablet 40 milliGRAM(s) Oral before breakfast  piperacillin/tazobactam IVPB. 3.375 Gram(s) IV Intermittent every 8 hours  simvastatin 20 milliGRAM(s) Oral at bedtime    MEDICATIONS  (PRN):  dextrose Gel 1 Dose(s) Oral once PRN Blood Glucose LESS THAN 70 milliGRAM(s)/deciliter  glucagon  Injectable 1 milliGRAM(s) IntraMuscular once PRN Glucose LESS THAN 70 milligrams/deciliter  ondansetron Injectable 4 milliGRAM(s) IV Push every 6 hours PRN Nausea      LABS:                        11.6   9.35  )-----------( 221      ( 01 May 2018 07:55 )             35.5     05-02    143  |  107  |  4<L>  ----------------------------<  61<L>  3.4<L>   |  23  |  0.76    Ca    7.7<L>      02 May 2018 06:46  Phos  3.4     05-  Mg     1.4     05-    TPro  5.3<L>  /  Alb  2.6<L>  /  TBili  0.3  /  DBili  <0.1  /  AST  20  /  ALT  15  /  AlkPhos  55  05-02    PT/INR - ( 01 May 2018 22:54 )   PT: 29.6 sec;   INR: 2.69 ratio         PTT - ( 2018 20:10 )  PTT:36.8 sec  Urinalysis Basic - ( 2018 20:20 )    Color: Yellow / Appearance: Clear / S.019 / pH: x  Gluc: x / Ketone: Small  / Bili: Negative / Urobili: Negative   Blood: x / Protein: 30 mg/dL / Nitrite: Negative   Leuk Esterase: Small / RBC: 10-25 /HPF / WBC 11-25 /HPF   Sq Epi: x / Non Sq Epi: OCC /HPF / Bacteria: Few /HPF        RADIOLOGY & ADDITIONAL STUDIES:  EXAM: NM HEPATOBILIARY IMG           PROCEDURE DATE: 2018         INTERPRETATION: RADIOPHARMACEUTICAL: 3.1 mCi Tc-99m-Mebrofenin, I.V.     CLINICAL STATEMENT: 73-year-old female with generalized abdominal pain and   vomiting.     TECHNIQUE: Dynamic imaging of the anterior abdomen was performed for 80   minutes following injection of radiotracer. Static images of the abdomen in   the anterior, right lateral, and right anterior oblique views were obtained   at 120 minutes.     FINDINGS: There is prompt, homogeneous uptake of radiotracer by the   hepatocytes. Activity is first seen in the bowel at 35 minutes and in the   gallbladder in the static images. There is good clearance of activity from   the liver by the end of the study.     IMPRESSION: Normal hepatobiliary scan.     No evidence of acute cholecystitis.

## 2018-05-02 NOTE — PROGRESS NOTE ADULT - SUBJECTIVE AND OBJECTIVE BOX
Pre-Endoscopy Evaluation      Referring Physician:   Dr. Julien                              Procedure: EGD    Indication for Procedure: Abdominal pain    Sedation by Anesthesia [x]    PAST MEDICAL & SURGICAL HISTORY:  Dementia  Hypertension  Type 2 diabetes mellitus  Atrial fibrillation, chronic  Diabetes mellitus  Asthma  No significant past surgical history      PMH of Gastroparesis [ ]  Gastric Surgery [ ]  Gastric Outlet Obstruction [ ]    Allergies    Avelox (Pruritus)  Levaquin (Unknown)    Intolerances    Latex allergy: [ ] yes [x] no    Medications:MEDICATIONS  (STANDING):  buDESOnide 160 MICROgram(s)/formoterol 4.5 MICROgram(s) Inhaler 2 Puff(s) Inhalation two times a day  dextrose 5% + sodium chloride 0.9%. 1000 milliLiter(s) (100 mL/Hr) IV Continuous <Continuous>  dextrose 5%. 1000 milliLiter(s) (50 mL/Hr) IV Continuous <Continuous>  dextrose 50% Injectable 12.5 Gram(s) IV Push once  dextrose 50% Injectable 25 Gram(s) IV Push once  dextrose 50% Injectable 25 Gram(s) IV Push once  insulin lispro (HumaLOG) corrective regimen sliding scale   SubCutaneous every 6 hours  pantoprazole    Tablet 40 milliGRAM(s) Oral before breakfast  piperacillin/tazobactam IVPB. 3.375 Gram(s) IV Intermittent every 8 hours  simvastatin 20 milliGRAM(s) Oral at bedtime    MEDICATIONS  (PRN):  dextrose Gel 1 Dose(s) Oral once PRN Blood Glucose LESS THAN 70 milliGRAM(s)/deciliter  glucagon  Injectable 1 milliGRAM(s) IntraMuscular once PRN Glucose LESS THAN 70 milligrams/deciliter  ondansetron Injectable 4 milliGRAM(s) IV Push every 6 hours PRN Nausea      Smoking: [ ] yes  [x] no    AICD/PPM: [ ] yes   [x] no    Pertinent lab data:                        11.3   7.82  )-----------( 191      ( 02 May 2018 07:35 )             35.1     05-02    143  |  107  |  4<L>  ----------------------------<  61<L>  3.4<L>   |  23  |  0.76    Ca    7.7<L>      02 May 2018 06:46  Phos  3.4     05-01  Mg     1.4     05-01    TPro  5.3<L>  /  Alb  2.6<L>  /  TBili  0.3  /  DBili  <0.1  /  AST  20  /  ALT  15  /  AlkPhos  55  05-02    PT/INR - ( 02 May 2018 07:35 )   PT: 28.2 sec;   INR: 2.45 ratio    PTT - ( 2018 20:10 )  PTT:36.8 sec    CAPILLARY BLOOD GLUCOSE  POCT Blood Glucose.: 189 mg/dL (02 May 2018 12:26)      Physical Examination:  Daily     Daily Weight in k (02 May 2018 09:44)  Vital Signs Last 24 Hrs  T(C): 36.3 (02 May 2018 08:02), Max: 36.4 (01 May 2018 23:46)  T(F): 97.3 (02 May 2018 08:02), Max: 97.5 (01 May 2018 23:46)  HR: 71 (02 May 2018 08:02) (58 - 71)  BP: 128/57 (02 May 2018 08:02) (125/77 - 128/57)  BP(mean): --  RR: 18 (02 May 2018 08:02) (18 - 18)  SpO2: 96% (02 May 2018 08:02) (96% - 97%)    Drug Dosing Weight  Height (cm): 157.48 (01 May 2018 13:28)  Weight (kg): 50 (01 May 2018 13:28)  BMI (kg/m2): 20.2 (01 May 2018 13:28)  BSA (m2): 1.48 (01 May 2018 13:28)    Constitutional: NAD  Neck:  No JVD  Respiratory: CTAB/L  Cardiovascular: S1 and S2  Gastrointestinal: BS+, soft, NT/ND  Extremities: No peripheral edema  Neurological: A/O x 3, no focal deficits  : No Morales  Skin: No rashes    Comments:    ASA Class: I [ ]  II [ ]  III [x]  IV [ ]    The patient is a suitable candidate for the planned procedure unless box checked [ ]  No, explain:

## 2018-05-02 NOTE — CHART NOTE - NSCHARTNOTEFT_GEN_A_CORE
Notified by RN that patient hypoglycemic with F/S 68 mg/dl, repeat F/S 70 mg/dl. patient asymptomatic, on NPO status with Iv fluid NS in progress.  patient not on basal insulin, on ISS coverage    Follow diabetic protocol (Patient received D50 25Gram IVP)  IV fluid changed to D5NS in setting of hypoglycemia  Monitor for S/S of Hypoglycemia  F/S [id number]  Will continue to monitor  Will update with primary team    Shima RIVERA BC  Spectra# [id number]

## 2018-05-02 NOTE — PROGRESS NOTE ADULT - ASSESSMENT
74 y/o F with hx of Afib, HTN, Gallstones p/w abdominal pain.    Impression:  1) Abdominal Pain - differential includes Biliary Colic, Mesenteric Ischemia. Pt with overt signs of GI bleeding. Symptoms not consistent of PUD.  2) Cholelithiasis  3) Afib    Plan:  - Begin Protonix 40mg po daily for possible Gastritis/PUD  - Consider EGD tomorrow, keep npo after midnight  - F/u vascular and general surgery recommendations  - PRN Antiemetics  - IVF 72 y/o F with hx of Afib, HTN, Gallstones p/w abdominal pain.    Impression:  1) Abdominal Pain - differential includes Biliary Colic, Mesenteric Ischemia. Pt with overt signs of GI bleeding. Symptoms not consistent of PUD.  2) Cholelithiasis  3) Afib    Plan:  - Begin Protonix 40mg po daily for possible Gastritis/PUD  - Plan for EGD today or tomorrow, npo  - F/u vascular and general surgery recommendations  - PRN Antiemetics  - IVF

## 2018-05-02 NOTE — PROVIDER CONTACT NOTE (OTHER) - ACTION/TREATMENT ORDERED:
pt fsbs this am was low. pt given dextrose ivp. pt ivf changed to D5 and 0.9% NS. pt fsbs rechecked and it was 170. pt stable.

## 2018-05-02 NOTE — PROGRESS NOTE ADULT - ASSESSMENT
A/P: 73y year old female who presents with abdominal pain, history of cholelithiases, refused surgery in the past. CT scan suggesting SMA and Celiac calcification , possible stenosis. General surgery called to rule mesenteric ischemia.     - recommend mesenteric ultrasound  - Recommend formal RUQ ultrasound   - f/u GI evaluation to rule out peptic ulcer disease.   - HIDA scan negative     [id number] A/P: 73y year old female who presents with abdominal pain, history of cholelithiases, refused surgery in the past. CT scan suggesting SMA and Celiac calcification , possible stenosis. General surgery called to rule mesenteric ischemia.     - recommend mesenteric ultrasound  - Recommend formal RUQ ultrasound   - f/u GI evaluation to rule out peptic ulcer disease.   - HIDA scan negative     [id number]      Addendum  No food fear, weight loss, and no abdominal pain or nausea this am. Mesenteric duplex reviewed. Please follow up with vascular for further management regarding SMA stenosis.  D/w with Dr. Mason Kwan   chief resident [facility] surgery team  [phone]

## 2018-05-02 NOTE — PROGRESS NOTE ADULT - ASSESSMENT
73y year old Female who presents with abdominal pain, history of cholecystitis, refused surgery in the past. CT scan suggesting SMA and Celiac calcification , possible stenosis    - Medical management per primary team  - complete gi w/u  d/w pt and daughters  mesenteric angio w poss BAS of sma if gi w/u is neg   will follow

## 2018-05-02 NOTE — PROGRESS NOTE ADULT - SUBJECTIVE AND OBJECTIVE BOX
Patient is a 73y old  Female who presents with a chief complaint of nausea, vomiting and abdominal pain (01 May 2018 02:46)      Vascular Surgery Attending Progress Note    Interval HPI: pt states no abd pains at this time  bedside d/w w pt and daughters who translate in Georgian pt  does have food fear and post prandrial  pain and  loss of wt     Medications:  buDESOnide 160 MICROgram(s)/formoterol 4.5 MICROgram(s) Inhaler 2 Puff(s) Inhalation two times a day  dextrose 5% + sodium chloride 0.9%. 1000 milliLiter(s) IV Continuous <Continuous>  dextrose 5%. 1000 milliLiter(s) IV Continuous <Continuous>  dextrose 50% Injectable 12.5 Gram(s) IV Push once  dextrose 50% Injectable 25 Gram(s) IV Push once  dextrose 50% Injectable 25 Gram(s) IV Push once  dextrose Gel 1 Dose(s) Oral once PRN  glucagon  Injectable 1 milliGRAM(s) IntraMuscular once PRN  insulin lispro (HumaLOG) corrective regimen sliding scale   SubCutaneous every 6 hours  ondansetron Injectable 4 milliGRAM(s) IV Push every 6 hours PRN  pantoprazole    Tablet 40 milliGRAM(s) Oral before breakfast  piperacillin/tazobactam IVPB. 3.375 Gram(s) IV Intermittent every 8 hours  simvastatin 20 milliGRAM(s) Oral at bedtime      Vital Signs Last 24 Hrs  T(C): 36.3 (02 May 2018 08:02), Max: 36.4 (01 May 2018 23:46)  T(F): 97.3 (02 May 2018 08:02), Max: 97.5 (01 May 2018 23:46)  HR: 71 (02 May 2018 08:02) (58 - 71)  BP: 128/57 (02 May 2018 08:02) (125/77 - 128/57)  BP(mean): --  RR: 18 (02 May 2018 08:02) (18 - 18)  SpO2: 96% (02 May 2018 08:02) (96% - 97%)  I&O's Summary    01 May 2018 07:01  -  02 May 2018 07:00  --------------------------------------------------------  IN: 0 mL / OUT: 0 mL / NET: 0 mL    02 May 2018 07:01  -  02 May 2018 19:57  --------------------------------------------------------  IN: 0 mL / OUT: 0 mL / NET: 0 mL        Physical Exam:  Neuro  A&Ox3 VSS  Abd soft  nt nd at this time   Vascular:  stable     LABS:                        11.3   7.82  )-----------( 191      ( 02 May 2018 07:35 )             35.1     05-02    143  |  107  |  4<L>  ----------------------------<  61<L>  3.4<L>   |  23  |  0.76    Ca    7.7<L>      02 May 2018 06:46  Phos  3.4     05-01  Mg     1.4     05-01    TPro  5.3<L>  /  Alb  2.6<L>  /  TBili  0.3  /  DBili  <0.1  /  AST  20  /  ALT  15  /  AlkPhos  55  05-02    PT/INR - ( 02 May 2018 07:35 )   PT: 28.2 sec;   INR: 2.45 ratio         PTT - ( 30 Apr 2018 20:10 )  PTT:36.8 sec    US findings reviewed     KAREN SÁNCHEZ MD  [phone]

## 2018-05-02 NOTE — CHART NOTE - NSCHARTNOTEFT_GEN_A_CORE
PROCEDURE NOTE:    EGD:  - normal esophagus  - gastritis, no gastric or duodenal ulcers  - normal duodenal bulb and 2nd portion  - no etiology to explain the patient's abdominal pain identified    PLAN:  - Protonix 40mg po daily x 14 days  - F/u surgery recommendations  - No additional GI intervention at this time

## 2018-05-02 NOTE — PROGRESS NOTE ADULT - SUBJECTIVE AND OBJECTIVE BOX
VASCULAR  SURGERY PROGRESS NOTE        SUBJECTIVE: Pt seen at examined at bedside. AVSS. Denies pain. Denies fever, CP, SOB, and NV. Feels hungry.        Vital Signs Last 24 Hrs  T(C): 36.3 (02 May 2018 08:02), Max: 36.7 (01 May 2018 13:28)  T(F): 97.3 (02 May 2018 08:02), Max: 98.1 (01 May 2018 13:28)  HR: 71 (02 May 2018 08:02) (58 - 92)  BP: 128/57 (02 May 2018 08:02) (113/49 - 128/57)  BP(mean): --  RR: 18 (02 May 2018 08:02) (18 - 18)  SpO2: 96% (02 May 2018 08:02) (96% - 97%)    Physical Exam  General: awake, alert, NAD    Pulm: respirations unlabored, no increased WOB  Abdomen: soft, mildly distended, NT  Extremities: Grossly symmetric        LABS:                        11.6   9.35  )-----------( 221      ( 01 May 2018 07:55 )             35.5     05-02    143  |  107  |  4<L>  ----------------------------<  61<L>  3.4<L>   |  23  |  0.76    Ca    7.7<L>      02 May 2018 06:46  Phos  3.4     05-01  Mg     1.4     05-01    TPro  5.3<L>  /  Alb  2.6<L>  /  TBili  0.3  /  DBili  <0.1  /  AST  20  /  ALT  15  /  AlkPhos  55  05-02    PT/INR - ( 01 May 2018 22:54 )   PT: 29.6 sec;   INR: 2.69 ratio        RADIOLOGY & ADDITIONAL STUDIES:  EXAM: NM HEPATOBILIARY IMG           PROCEDURE DATE: 05/01/2018         INTERPRETATION: RADIOPHARMACEUTICAL: 3.1 mCi Tc-99m-Mebrofenin, I.V.     CLINICAL STATEMENT: 73-year-old female with generalized abdominal pain and   vomiting.     TECHNIQUE: Dynamic imaging of the anterior abdomen was performed for 80   minutes following injection of radiotracer. Static images of the abdomen in   the anterior, right lateral, and right anterior oblique views were obtained   at 120 minutes.     FINDINGS: There is prompt, homogeneous uptake of radiotracer by the   hepatocytes. Activity is first seen in the bowel at 35 minutes and in the   gallbladder in the static images. There is good clearance of activity from   the liver by the end of the study.     IMPRESSION: Normal hepatobiliary scan.     No evidence of acute cholecystitis.     A/P: 73y year old female who presents with abdominal pain, history of cholelithiases, refused surgery in the past. CT scan suggesting SMA and Celiac calcification , possible stenosis. Vascular and general surgery called to rule mesenteric ischemia.     - recommend mesenteric ultrasound, will call US to expedite study   - Recommend formal RUQ ultrasound   - Appreciate GI input.   - Discussed with vascular surgery fellow     [id number]

## 2018-05-02 NOTE — PROGRESS NOTE ADULT - ASSESSMENT
72 yo female with PMH of Atrial fibrillation on coumadin, HTN, DM2, Asthma, HLD, presents here with nausea, vomiting and abdominal pain.     {[id number]} Problem/Plan - 1:  ·  Problem: Generalized abdominal pain.  Plan:S/P Doppler US showing ..< from: US Doppler Mesenteric (05.02.18 @ 11:30) >  IMPRESSION: Diffuse calcifiedatherosclerotic disease of the visualized   abdominal aorta and interrogated branches.    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 Findings suggest   mesenteric ischemia.    Cholelithiasis without cholecystitis.    < end of copied text >  .  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    {[id number]} Problem/Plan - 2:  ·  Problem: Nausea vomiting and diarrhea.  Plan: Likely due to gastritis vs. gastroenteritis ; r/o cholecystitis  c/w IVF  c/w IV zofran  IV zosyn.     {[id number]} Problem/Plan - 3:  ·  Problem: Sepsis.  Plan: Resolving .   c/w IVF  will send blood culture if patient develops fever  HIDA scan  c/w IV zosyn.     {[id number]} Problem/Plan - 4:  ·  Problem: Type 2 diabetes mellitus.  Plan: c/w sliding scale  hold oral meds.     {[id number]} Problem/Plan - 5:  ·  Problem: Hypertension.  Plan: hold lisinopril, lasix for now.     {[id number]} Problem/Plan - 6:  Problem: Atrial fibrillation, chronic. Plan: on coumadin, therapeutic INR  monitor PT/INR  not on BB.    {[id number]} Problem/Plan - 7:  ·  Problem: Prophylactic measure.  Plan: on coumadin.

## 2018-05-03 LAB
ALBUMIN SERPL ELPH-MCNC: 2.6 G/DL — LOW (ref 3.3–5)
ALP SERPL-CCNC: 54 U/L — SIGNIFICANT CHANGE UP (ref 40–120)
ALT FLD-CCNC: 13 U/L — SIGNIFICANT CHANGE UP (ref 10–45)
ANION GAP SERPL CALC-SCNC: 12 MMOL/L — SIGNIFICANT CHANGE UP (ref 5–17)
AST SERPL-CCNC: 17 U/L — SIGNIFICANT CHANGE UP (ref 10–40)
BILIRUB SERPL-MCNC: 0.3 MG/DL — SIGNIFICANT CHANGE UP (ref 0.2–1.2)
BUN SERPL-MCNC: <4 MG/DL — LOW (ref 7–23)
CALCIUM SERPL-MCNC: 8.1 MG/DL — LOW (ref 8.4–10.5)
CHLORIDE SERPL-SCNC: 104 MMOL/L — SIGNIFICANT CHANGE UP (ref 96–108)
CO2 SERPL-SCNC: 25 MMOL/L — SIGNIFICANT CHANGE UP (ref 22–31)
CREAT SERPL-MCNC: 0.82 MG/DL — SIGNIFICANT CHANGE UP (ref 0.5–1.3)
GLUCOSE BLDC GLUCOMTR-MCNC: 107 MG/DL — HIGH (ref 70–99)
GLUCOSE BLDC GLUCOMTR-MCNC: 164 MG/DL — HIGH (ref 70–99)
GLUCOSE BLDC GLUCOMTR-MCNC: 165 MG/DL — HIGH (ref 70–99)
GLUCOSE BLDC GLUCOMTR-MCNC: 188 MG/DL — HIGH (ref 70–99)
GLUCOSE BLDC GLUCOMTR-MCNC: 81 MG/DL — SIGNIFICANT CHANGE UP (ref 70–99)
GLUCOSE SERPL-MCNC: 136 MG/DL — HIGH (ref 70–99)
HCT VFR BLD CALC: 36.9 % — SIGNIFICANT CHANGE UP (ref 34.5–45)
HGB BLD-MCNC: 11.9 G/DL — SIGNIFICANT CHANGE UP (ref 11.5–15.5)
INR BLD: 2.72 RATIO — HIGH (ref 0.88–1.16)
MAGNESIUM SERPL-MCNC: 1.4 MG/DL — LOW (ref 1.6–2.6)
MCHC RBC-ENTMCNC: 26.4 PG — LOW (ref 27–34)
MCHC RBC-ENTMCNC: 32.2 GM/DL — SIGNIFICANT CHANGE UP (ref 32–36)
MCV RBC AUTO: 81.8 FL — SIGNIFICANT CHANGE UP (ref 80–100)
PLATELET # BLD AUTO: 211 K/UL — SIGNIFICANT CHANGE UP (ref 150–400)
POTASSIUM SERPL-MCNC: 2.8 MMOL/L — CRITICAL LOW (ref 3.5–5.3)
POTASSIUM SERPL-MCNC: 3 MMOL/L — LOW (ref 3.5–5.3)
POTASSIUM SERPL-SCNC: 2.8 MMOL/L — CRITICAL LOW (ref 3.5–5.3)
POTASSIUM SERPL-SCNC: 3 MMOL/L — LOW (ref 3.5–5.3)
PROT SERPL-MCNC: 5.4 G/DL — LOW (ref 6–8.3)
PROTHROM AB SERPL-ACNC: 31.4 SEC — HIGH (ref 10–13.1)
RBC # BLD: 4.51 M/UL — SIGNIFICANT CHANGE UP (ref 3.8–5.2)
RBC # FLD: 13.2 % — SIGNIFICANT CHANGE UP (ref 10.3–14.5)
SODIUM SERPL-SCNC: 141 MMOL/L — SIGNIFICANT CHANGE UP (ref 135–145)
WBC # BLD: 5.8 K/UL — SIGNIFICANT CHANGE UP (ref 3.8–10.5)
WBC # FLD AUTO: 5.8 K/UL — SIGNIFICANT CHANGE UP (ref 3.8–10.5)

## 2018-05-03 PROCEDURE — 99232 SBSQ HOSP IP/OBS MODERATE 35: CPT

## 2018-05-03 RX ORDER — POTASSIUM CHLORIDE 20 MEQ
40 PACKET (EA) ORAL ONCE
Qty: 0 | Refills: 0 | Status: COMPLETED | OUTPATIENT
Start: 2018-05-03 | End: 2018-05-03

## 2018-05-03 RX ORDER — POTASSIUM CHLORIDE 20 MEQ
10 PACKET (EA) ORAL
Qty: 0 | Refills: 0 | Status: COMPLETED | OUTPATIENT
Start: 2018-05-03 | End: 2018-05-03

## 2018-05-03 RX ORDER — MAGNESIUM SULFATE 500 MG/ML
2 VIAL (ML) INJECTION ONCE
Qty: 0 | Refills: 0 | Status: COMPLETED | OUTPATIENT
Start: 2018-05-03 | End: 2018-05-03

## 2018-05-03 RX ORDER — POTASSIUM CHLORIDE 20 MEQ
10 PACKET (EA) ORAL ONCE
Qty: 0 | Refills: 0 | Status: COMPLETED | OUTPATIENT
Start: 2018-05-03 | End: 2018-05-03

## 2018-05-03 RX ADMIN — PANTOPRAZOLE SODIUM 40 MILLIGRAM(S): 20 TABLET, DELAYED RELEASE ORAL at 06:11

## 2018-05-03 RX ADMIN — SIMVASTATIN 20 MILLIGRAM(S): 20 TABLET, FILM COATED ORAL at 21:36

## 2018-05-03 RX ADMIN — SODIUM CHLORIDE 100 MILLILITER(S): 9 INJECTION, SOLUTION INTRAVENOUS at 00:35

## 2018-05-03 RX ADMIN — Medication 100 MILLIEQUIVALENT(S): at 08:19

## 2018-05-03 RX ADMIN — Medication 100 MILLIEQUIVALENT(S): at 14:50

## 2018-05-03 RX ADMIN — Medication 100 MILLIEQUIVALENT(S): at 23:14

## 2018-05-03 RX ADMIN — Medication 1: at 06:12

## 2018-05-03 RX ADMIN — PIPERACILLIN AND TAZOBACTAM 25 GRAM(S): 4; .5 INJECTION, POWDER, LYOPHILIZED, FOR SOLUTION INTRAVENOUS at 17:39

## 2018-05-03 RX ADMIN — Medication 1: at 17:24

## 2018-05-03 RX ADMIN — Medication 100 MILLIEQUIVALENT(S): at 11:01

## 2018-05-03 RX ADMIN — Medication 40 MILLIEQUIVALENT(S): at 20:36

## 2018-05-03 RX ADMIN — BUDESONIDE AND FORMOTEROL FUMARATE DIHYDRATE 2 PUFF(S): 160; 4.5 AEROSOL RESPIRATORY (INHALATION) at 17:39

## 2018-05-03 RX ADMIN — Medication 50 GRAM(S): at 21:34

## 2018-05-03 RX ADMIN — PIPERACILLIN AND TAZOBACTAM 25 GRAM(S): 4; .5 INJECTION, POWDER, LYOPHILIZED, FOR SOLUTION INTRAVENOUS at 00:35

## 2018-05-03 RX ADMIN — SODIUM CHLORIDE 100 MILLILITER(S): 9 INJECTION, SOLUTION INTRAVENOUS at 21:43

## 2018-05-03 RX ADMIN — BUDESONIDE AND FORMOTEROL FUMARATE DIHYDRATE 2 PUFF(S): 160; 4.5 AEROSOL RESPIRATORY (INHALATION) at 06:12

## 2018-05-03 NOTE — PROGRESS NOTE ADULT - ASSESSMENT
73y year old Female who presents with abdominal pain, history of cholecystitis, refused surgery in the past. CT scan suggesting SMA and Celiac calcification , possible stenosis    - Medical management per primary team  - complete gi w/u  d/w pt and daughters  mesenteric angio w poss BAS of sma if gi w/u is neg   pt to decide  will follow

## 2018-05-03 NOTE — PROGRESS NOTE ADULT - SUBJECTIVE AND OBJECTIVE BOX
Team [facility] SURGERY PROGRESS NOTE    POST OPERATIVE DAY #:       SUBJECTIVE: Pt seen at examined at bedside during am rounds. AVSS. Denies fever, CP, SOB, and NV.        Vital Signs Last 24 Hrs  T(C): 36.5 (03 May 2018 07:34), Max: 36.5 (03 May 2018 00:18)  T(F): 97.7 (03 May 2018 07:34), Max: 97.7 (03 May 2018 00:18)  HR: 82 (03 May 2018 07:34) (69 - 82)  BP: 139/81 (03 May 2018 07:34) (137/65 - 139/81)  BP(mean): --  RR: 18 (03 May 2018 07:34) (18 - 18)  SpO2: 96% (03 May 2018 07:34) (96% - 96%)    Physical Exam  General: awake, alert, NAD    Pulm: respirations unlabored, no increased WOB  Abdomen: soft, non distended, NT  Extremities: Grossly symmetric    I&O's Summary    02 May 2018 07:01  -  03 May 2018 07:00  --------------------------------------------------------  IN: 700 mL / OUT: 0 mL / NET: 700 mL    03 May 2018 07:01  -  03 May 2018 15:55  --------------------------------------------------------  IN: 50 mL / OUT: 0 mL / NET: 50 mL      I&O's Detail    02 May 2018 07:01  -  03 May 2018 07:00  --------------------------------------------------------  IN:    dextrose 5% + sodium chloride 0.9%.: 600 mL    IV PiggyBack: 100 mL  Total IN: 700 mL    OUT:  Total OUT: 0 mL    Total NET: 700 mL      03 May 2018 07:01  -  03 May 2018 15:55  --------------------------------------------------------  IN:    Oral Fluid: 50 mL  Total IN: 50 mL    OUT:  Total OUT: 0 mL    Total NET: 50 mL          MEDICATIONS  (STANDING):  buDESOnide 160 MICROgram(s)/formoterol 4.5 MICROgram(s) Inhaler 2 Puff(s) Inhalation two times a day  dextrose 5% + sodium chloride 0.9%. 1000 milliLiter(s) (100 mL/Hr) IV Continuous <Continuous>  dextrose 5%. 1000 milliLiter(s) (50 mL/Hr) IV Continuous <Continuous>  dextrose 50% Injectable 12.5 Gram(s) IV Push once  dextrose 50% Injectable 25 Gram(s) IV Push once  dextrose 50% Injectable 25 Gram(s) IV Push once  insulin lispro (HumaLOG) corrective regimen sliding scale   SubCutaneous every 6 hours  pantoprazole    Tablet 40 milliGRAM(s) Oral before breakfast  piperacillin/tazobactam IVPB. 3.375 Gram(s) IV Intermittent every 8 hours  simvastatin 20 milliGRAM(s) Oral at bedtime    MEDICATIONS  (PRN):  dextrose Gel 1 Dose(s) Oral once PRN Blood Glucose LESS THAN 70 milliGRAM(s)/deciliter  glucagon  Injectable 1 milliGRAM(s) IntraMuscular once PRN Glucose LESS THAN 70 milligrams/deciliter  ondansetron Injectable 4 milliGRAM(s) IV Push every 6 hours PRN Nausea      LABS:                        11.9   5.80  )-----------( 211      ( 03 May 2018 08:01 )             36.9     05-03    141  |  104  |  <4<L>  ----------------------------<  136<H>  2.8<LL>   |  25  |  0.82    Ca    8.1<L>      03 May 2018 06:53    TPro  5.4<L>  /  Alb  2.6<L>  /  TBili  0.3  /  DBili  x   /  AST  17  /  ALT  13  /  AlkPhos  54  05-03    PT/INR - ( 03 May 2018 08:08 )   PT: 31.4 sec;   INR: 2.72 ratio               RADIOLOGY & ADDITIONAL STUDIES:    Procedure:           Upper GI endoscopy  Indications:         Epigastric abdominal pain, Abdominal pain in the right upper quadrant  Providers:           Braydon Clement MD (Fellow), Rom Yuen MD  Medicines:           Monitored Anesthesia Care  Complications:       No immediate complications.  ____________________________________________________________________________________________________  Procedure:           Pre-Anesthesia Assessment:                       - The anesthesia plan was to use monitored anesthesia care (MAC).                       After obtaining informed consent, the endoscope was passed under direct                        vision. Throughout the procedure, the patient's blood pressure, pulse, and                        oxygen saturations were monitored continuously. The Endoscope was introduced                        through the mouth, and advanced to the second part of duodenum. The upper GI                        endoscopy was accomplished without difficulty. The patient tolerated the                        procedure well.                                                                                                        Findings:       The upper third of the esophagus and middle third of the esophagus were normal.       The Z-line was regular and was found 36 cm from the incisors.       A 1 cm hiatus hernia was present.       Scattered mild inflammation characterized by erythema was found in the gastric body and in        the gastric antrum.       The duodenal bulb and 2nd part of the duodenum were normal.       The exam was otherwise without abnormality.                                                                                                        Impression:          - Normal esophagus, duodenal bulb and 2nd part of the duodenum.                       - Gastritis. No gastric ulcers                       - No etiology to explain the patient's abdominal pain identified on this                        examination.  Recommendation:      - [facility] care.                       - Advance diet as tolerated.                       - Protonix 40mg po daily.                                                                                                        Attending Participation:       I was present and participated during the entire procedure, including non-key portions.

## 2018-05-03 NOTE — PROGRESS NOTE ADULT - SUBJECTIVE AND OBJECTIVE BOX
INTERVAL HPI/OVERNIGHT EVENTS: Seen and examined. I want to eat.   Vital Signs Last 24 Hrs  T(C): 37.1 (03 May 2018 16:28), Max: 37.1 (03 May 2018 16:28)  T(F): 98.8 (03 May 2018 16:28), Max: 98.8 (03 May 2018 16:28)  HR: 80 (03 May 2018 16:28) (69 - 82)  BP: 124/58 (03 May 2018 16:28) (124/58 - 139/81)  BP(mean): --  RR: 18 (03 May 2018 16:28) (18 - 18)  SpO2: 95% (03 May 2018 16:28) (95% - 96%)  I&O's Summary    02 May 2018 07:01  -  03 May 2018 07:00  --------------------------------------------------------  IN: 700 mL / OUT: 0 mL / NET: 700 mL    03 May 2018 07:01  -  03 May 2018 21:53  --------------------------------------------------------  IN: 50 mL / OUT: 0 mL / NET: 50 mL      MEDICATIONS  (STANDING):  buDESOnide 160 MICROgram(s)/formoterol 4.5 MICROgram(s) Inhaler 2 Puff(s) Inhalation two times a day  dextrose 5% + sodium chloride 0.9%. 1000 milliLiter(s) (100 mL/Hr) IV Continuous <Continuous>  dextrose 5%. 1000 milliLiter(s) (50 mL/Hr) IV Continuous <Continuous>  dextrose 50% Injectable 12.5 Gram(s) IV Push once  dextrose 50% Injectable 25 Gram(s) IV Push once  dextrose 50% Injectable 25 Gram(s) IV Push once  insulin lispro (HumaLOG) corrective regimen sliding scale   SubCutaneous every 6 hours  pantoprazole    Tablet 40 milliGRAM(s) Oral before breakfast  piperacillin/tazobactam IVPB. 3.375 Gram(s) IV Intermittent every 8 hours  potassium chloride  10 mEq/100 mL IVPB 10 milliEquivalent(s) IV Intermittent once  simvastatin 20 milliGRAM(s) Oral at bedtime    MEDICATIONS  (PRN):  dextrose Gel 1 Dose(s) Oral once PRN Blood Glucose LESS THAN 70 milliGRAM(s)/deciliter  glucagon  Injectable 1 milliGRAM(s) IntraMuscular once PRN Glucose LESS THAN 70 milligrams/deciliter  ondansetron Injectable 4 milliGRAM(s) IV Push every 6 hours PRN Nausea    LABS:                        11.9   5.80  )-----------( 211      ( 03 May 2018 08:01 )             36.9     05-03    x   |  x   |  x   ----------------------------<  x   3.0<L>   |  x   |  x     Ca    8.1<L>      03 May 2018 06:53  Mg     1.4     05-03    TPro  5.4<L>  /  Alb  2.6<L>  /  TBili  0.3  /  DBili  x   /  AST  17  /  ALT  13  /  AlkPhos  54  05-03    PT/INR - ( 03 May 2018 08:08 )   PT: 31.4 sec;   INR: 2.72 ratio             CAPILLARY BLOOD GLUCOSE      POCT Blood Glucose.: 188 mg/dL (03 May 2018 21:30)  POCT Blood Glucose.: 165 mg/dL (03 May 2018 17:16)  POCT Blood Glucose.: 107 mg/dL (03 May 2018 12:03)  POCT Blood Glucose.: 164 mg/dL (03 May 2018 05:44)  POCT Blood Glucose.: 81 mg/dL (02 May 2018 23:57)          REVIEW OF SYSTEMS:  CONSTITUTIONAL: No fever, weight loss, or fatigue  EYES: No eye pain, visual disturbances, or discharge  ENMT:  No difficulty hearing, tinnitus, vertigo; No sinus or throat pain  NECK: No pain or stiffness  BREASTS: No pain, masses, or nipple discharge  RESPIRATORY: No cough, wheezing, chills or hemoptysis; No shortness of breath  CARDIOVASCULAR: No chest pain, palpitations, dizziness, or leg swelling  GASTROINTESTINAL: No abdominal or epigastric pain. No nausea, vomiting, or hematemesis; No diarrhea or constipation. No melena or hematochezia.  GENITOURINARY: No dysuria, frequency, hematuria, or incontinence  NEUROLOGICAL: No headaches, memory loss, loss of strength, numbness, or tremors  SKIN: No itching, burning, rashes, or lesions   LYMPH NODES: No enlarged glands  ENDOCRINE: No heat or cold intolerance; No hair loss  MUSCULOSKELETAL: No joint pain or swelling; No muscle, back, or extremity pain  PSYCHIATRIC: No depression, anxiety, mood swings, or difficulty sleeping  HEME/LYMPH: No easy bruising, or bleeding gums  ALLERY AND IMMUNOLOGIC: No hives or eczema    RADIOLOGY & ADDITIONAL TESTS:    Consultant(s) Notes Reviewed:  [x ] YES  [ ] NO    PHYSICAL EXAM:  GENERAL: NAD, not in any distress ,  HEAD:  Atraumatic, Normocephalic  EYES: EOMI, PERRLA, conjunctiva and sclera clear  ENMT: No tonsillar erythema, exudates, or enlargement; Moist mucous membranes, Good dentition, No lesions  NECK: Supple, No JVD, Normal thyroid  NERVOUS SYSTEM:  Alert & Oriented X3, No focal deficit   CHEST/LUNG: Good air entry bilateral with no  rales, rhonchi, wheezing, or rubs  HEART: Regular rate and rhythm; No murmurs, rubs, or gallops  ABDOMEN: Soft, Nontender, Nondistended; Bowel sounds present  EXTREMITIES:  2+ Peripheral Pulses, No clubbing, cyanosis, or edema  SKIN: No rashes or lesions    Care Discussed with Consultants/Other Providers [ x] YES  [ ] NO

## 2018-05-03 NOTE — PROGRESS NOTE ADULT - SUBJECTIVE AND OBJECTIVE BOX
Vascular Surgery Progress Note    Interval HPI: pt states no abd pains at this time, feels comfortable. Underwent EGD yesterday which was unremarkable     Vital Signs Last 24 Hrs  T(C): 36.5 (03 May 2018 00:18), Max: 36.5 (03 May 2018 00:18)  T(F): 97.7 (03 May 2018 00:18), Max: 97.7 (03 May 2018 00:18)  HR: 69 (03 May 2018 00:18) (69 - 71)  BP: 137/65 (03 May 2018 00:18) (128/57 - 137/65)  BP(mean): --  RR: 18 (03 May 2018 00:18) (18 - 18)  SpO2: 96% (03 May 2018 00:18) (96% - 96%)        Physical Exam:  Neuro  A&Ox3 VSS  Abd soft, ND, NTTP   Vascular:  stable     LABS:                                 11.3   7.82  )-----------( 191      ( 02 May 2018 07:35 )             35.1     US Abdomen:   Liver: Within normal limits.    Bile ducts: Normal caliber. Common bile duct measures 4 mm.     Gallbladder: Gallbladder distended likely due to the patient's n.p.o.   status. An 8 mm gallstone noted in the gallbladder fundus. Sludge noted   in the gallbladder neck/proximal body. No pericholecystic fluid or wall   thickening. The wall measuring 2 mm. No sonographic Clements's sign.    Pancreas: Visualized portions are within normal limits.    Spleen: 12 cm. Within normal limits.    Right kidney: 7.6 cm. Redemonstrated right renal upper pole cortical   scarring. No hydronephrosis.     Left kidney: 10.9 cm.  No hydronephrosis.     Ascites: None.    Aorta and IVC: Diffuse calcific atherosclerosis of the visualized aorta   are again noted. Visualized IVC within normal limits.    Celiac artery origin: Peak systolic velocity measuring 223 cm/s,   representing mild stenosis.    SMA: Peak systolic velocity at origin measuring 402 cm/s. Proximal SMA   peak systolic velocity measuring 604 cm/s with spectral broadening.   Findings consistent with severe stenosis.    GARBIELE: Peak systolic velocity and origin measuring 108 cm/s.    MISCELLANEOUS: Small bilateral pleural effusions.    IMPRESSION: Diffuse calcified atherosclerotic disease of the visualized   abdominal aorta and interrogated branches.    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 Findings suggest   mesenteric ischemia.    Cholelithiasis without cholecystitis.                73y year old Female who presents with abdominal pain, history of cholecystitis, refused surgery in the past. CT scan suggesting SMA and Celiac calcification , possible stenosis    - Medical management per primary team  - Awaiting full GI workup, will plan to perform mesenteric angio with possible BAS of SMA if GI workup is negative.   - will follow Vascular Surgery Progress Note    Interval HPI: pt states no abd pains at this time, feels comfortable. Underwent EGD yesterday which was unremarkable     Vital Signs Last 24 Hrs  T(C): 36.5 (03 May 2018 00:18), Max: 36.5 (03 May 2018 00:18)  T(F): 97.7 (03 May 2018 00:18), Max: 97.7 (03 May 2018 00:18)  HR: 69 (03 May 2018 00:18) (69 - 71)  BP: 137/65 (03 May 2018 00:18) (128/57 - 137/65)  BP(mean): --  RR: 18 (03 May 2018 00:18) (18 - 18)  SpO2: 96% (03 May 2018 00:18) (96% - 96%)        Physical Exam:  Neuro  A&Ox3 VSS  Abd soft, ND, NTTP   Vascular:  stable     LABS:                                 11.3   7.82  )-----------( 191      ( 02 May 2018 07:35 )             35.1     US Abdomen:   Liver: Within normal limits.    Bile ducts: Normal caliber. Common bile duct measures 4 mm.     Gallbladder: Gallbladder distended likely due to the patient's n.p.o.   status. An 8 mm gallstone noted in the gallbladder fundus. Sludge noted   in the gallbladder neck/proximal body. No pericholecystic fluid or wall   thickening. The wall measuring 2 mm. No sonographic Clements's sign.    Pancreas: Visualized portions are within normal limits.    Spleen: 12 cm. Within normal limits.    Right kidney: 7.6 cm. Redemonstrated right renal upper pole cortical   scarring. No hydronephrosis.     Left kidney: 10.9 cm.  No hydronephrosis.     Ascites: None.    Aorta and IVC: Diffuse calcific atherosclerosis of the visualized aorta   are again noted. Visualized IVC within normal limits.    Celiac artery origin: Peak systolic velocity measuring 223 cm/s,   representing mild stenosis.    SMA: Peak systolic velocity at origin measuring 402 cm/s. Proximal SMA   peak systolic velocity measuring 604 cm/s with spectral broadening.   Findings consistent with severe stenosis.    GABRIELE: Peak systolic velocity and origin measuring 108 cm/s.    MISCELLANEOUS: Small bilateral pleural effusions.    IMPRESSION: Diffuse calcified atherosclerotic disease of the visualized   abdominal aorta and interrogated branches.    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 Findings suggest   mesenteric ischemia.    Cholelithiasis without cholecystitis.                73y year old Female who presents with abdominal pain, history of cholecystitis, refused surgery in the past. CT scan suggesting SMA and Celiac calcification , possible stenosis    - Medical management per primary team  - Awaiting full GI workup, patient needs mesenteric angio by IR with possible BAS of SMA if GI workup is negative. Awaiting GI input   - will follow

## 2018-05-03 NOTE — PROGRESS NOTE ADULT - ASSESSMENT
74 yo female with PMH of Atrial fibrillation on coumadin, HTN, DM2, Asthma, HLD, presents here with nausea, vomiting and abdominal pain with concern for mesenteric ischemia     - f/u GI and vascular surgery   - no general surgery intervention at this time   - please contact for further questions     [id number] 74 yo female with PMH of Atrial fibrillation on coumadin, HTN, DM2, Asthma, HLD, presents here with nausea, vomiting and abdominal pain with concern for mesenteric ischemia     - f/u vascular surgery   - no general surgery intervention at this time   - please contact for further questions     [phone]

## 2018-05-03 NOTE — PROGRESS NOTE ADULT - ASSESSMENT
74 yo female with PMH of Atrial fibrillation on coumadin, HTN, DM2, Asthma, HLD, presents here with nausea, vomiting and abdominal pain.     {[id number]} Problem/Plan - 1:  ·  Problem: Generalized abdominal pain.  Plan: Likely secondary to  Mesenteric Ischemia .S/P Doppler US showing ..< from: US Doppler Mesenteric (05.02.18 @ 11:30) >  IMPRESSION: Diffuse calcifiedatherosclerotic disease of the visualized   abdominal aorta and interrogated branches.    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 Findings suggest   mesenteric ischemia.    Cholelithiasis without cholecystitis.    < end of copied text >  .  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 Family and pt wants to think about it.     {[id number]} Problem/Plan - 2:  ·  Problem: Nausea vomiting and diarrhea.  Plan: Resolved . Exact cause not known.     {[id number]} Problem/Plan - 3:  ·  Problem: Sepsis.  Plan: Resolving .   c/w IVF  HIDA scan is negative .   c/w IV zosyn.     {[id number]} Problem/Plan - 4:  ·  Problem: Type 2 diabetes mellitus.  Plan: c/w sliding scale  hold oral meds.     {[id number]} Problem/Plan - 5:  ·  Problem: Hypertension.  Plan: hold lisinopril, lasix for now.     {[id number]} Problem/Plan - 6:  Problem: Atrial fibrillation, chronic. Plan: on coumadin, therapeutic INR  monitor PT/INR  not on BB.    {[id number]} Problem/Plan - 7:  ·  Problem: Prophylactic measure.  Plan: on coumadin.

## 2018-05-03 NOTE — CONSULT NOTE ADULT - SUBJECTIVE AND OBJECTIVE BOX
HPI:  74 yo female with PMH of atrial fibrillation on coumadin, HTN, DM2, Asthma, HLD, presents here with nausea, vomiting and abdominal pain.  Patient was admitted for similar reason in March. Reason for symptoms were thought to be due to cholelithiasis.  Patient was seen by surgery and was recommended for cholecystectomy, however patient refused and she was discharged.  Patient now presents with 3 episodes of NBNB vomiting and one episode of loose stool. Per patient's daughter, patient has been having abdominal pain (side unclear) after eating and diarrhea. Patient does not eat much because she "is not hungry". Per daughter symptoms hard to elucidate definitively because patient has mild dementia. EGD performed yesterday which was unremarkable.    Past Medical/ Surgical History:   Dementia  Hypertension  Type 2 diabetes mellitus  Atrial fibrillation, chronic  Diabetes mellitus  Asthma  No significant past surgical history    Allergies: Allergies    Avelox (Pruritus)  Levaquin (Unknown)    Medications:   buDESOnide 160 MICROgram(s)/formoterol 4.5 MICROgram(s) Inhaler 2 Puff(s) Inhalation two times a day  dextrose 5% + sodium chloride 0.9%. 1000 milliLiter(s) (100 mL/Hr) IV Continuous <Continuous>  dextrose 5%. 1000 milliLiter(s) (50 mL/Hr) IV Continuous <Continuous>  dextrose 50% Injectable 12.5 Gram(s) IV Push once  dextrose 50% Injectable 25 Gram(s) IV Push once  dextrose 50% Injectable 25 Gram(s) IV Push once  insulin lispro (HumaLOG) corrective regimen sliding scale   SubCutaneous every 6 hours  pantoprazole    Tablet 40 milliGRAM(s) Oral before breakfast  piperacillin/tazobactam IVPB. 3.375 Gram(s) IV Intermittent every 8 hours  potassium chloride  10 mEq/100 mL IVPB 10 milliEquivalent(s) IV Intermittent every 1 hour  simvastatin 20 milliGRAM(s) Oral at bedtime    MEDICATIONS  (PRN):  dextrose Gel 1 Dose(s) Oral once PRN Blood Glucose LESS THAN 70 milliGRAM(s)/deciliter  glucagon  Injectable 1 milliGRAM(s) IntraMuscular once PRN Glucose LESS THAN 70 milligrams/deciliter  ondansetron Injectable 4 milliGRAM(s) IV Push every 6 hours PRN Nausea    SOCIAL: , 2 daughters    FAMILY HISTORY:  Family history of diabetes mellitus (Father)      Vital Signs:   T(C): 36.5 (03 May 2018 07:34), Max: 36.5 (03 May 2018 00:18)  T(F): 97.7 (03 May 2018 07:34), Max: 97.7 (03 May 2018 00:18)  HR: 82 (03 May 2018 07:34) (69 - 82)  BP: 139/81 (03 May 2018 07:34) (137/65 - 139/81)  RR: 18 (03 May 2018 07:34) (18 - 18)  SpO2: 96% (03 May 2018 07:34) (96% - 96%)    PHYSICAL EXAM:    General:   Well-groomed, well-nourished, in no distress  Abdomen:  Soft, non-tender, non-distended  Extremities:  No calf tenderness/swelling bilaterally    LABS:                        11.9   5.80  )-----------( 211      ( 03 May 2018 08:01 )             36.9     05-03    141  |  104  |  <4<L>  ----------------------------<  136<H>  2.8<LL>   |  25  |  0.82    Ca    8.1<L>      03 May 2018 06:53    TPro  5.4<L>  /  Alb  2.6<L>  /  TBili  0.3  /  DBili  x   /  AST  17  /  ALT  13  /  AlkPhos  54  05-03    PT/INR - ( 03 May 2018 08:08 )   PT: 31.4 sec;   INR: 2.72 ratio      RADIOLOGY & ADDITIONAL STUDIES:  < from: US Doppler Mesenteric (05.02.18 @ 11:30) >  IMPRESSION: Diffuse calcifiedatherosclerotic disease of the visualized   abdominal aorta and interrogated branches.    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 Findings suggest   mesenteric ischemia.    Cholelithiasis without cholecystitis.    < end of copied text >    CT A/P: Reviewed.       A/P: 73 year old lady with cholelithiasis, refused cholecystectomy in the past, presenting with N/V and diarrhea, with recent history of postprandial abdominal pain. CT and ultrasound suggestive of celiac and superior mesenteric artery stenoses. Clinical and imaging findings were discussed with patient, patient's , and patient's 2 daughters. Explained to family the utility of angiogram with measurement of pressure gradients to confirm the presence of hemodynamically significant stenoses. Explained the minimally invasive nature of the procedure. Discussed the risks and benefits of possible angioplasty with stent placement. Family reluctant to have any procedure at this time and will consider their options as well as try to more clearly elucidate patient's symptoms. Expressed to family and patient that IR is available if they need to reach us. Outpatient scheduling phone number was provided to the family.

## 2018-05-04 LAB
ANION GAP SERPL CALC-SCNC: 10 MMOL/L — SIGNIFICANT CHANGE UP (ref 5–17)
BUN SERPL-MCNC: <4 MG/DL — LOW (ref 7–23)
CALCIUM SERPL-MCNC: 8.5 MG/DL — SIGNIFICANT CHANGE UP (ref 8.4–10.5)
CHLORIDE SERPL-SCNC: 107 MMOL/L — SIGNIFICANT CHANGE UP (ref 96–108)
CO2 SERPL-SCNC: 25 MMOL/L — SIGNIFICANT CHANGE UP (ref 22–31)
CREAT SERPL-MCNC: 0.78 MG/DL — SIGNIFICANT CHANGE UP (ref 0.5–1.3)
GLUCOSE BLDC GLUCOMTR-MCNC: 124 MG/DL — HIGH (ref 70–99)
GLUCOSE BLDC GLUCOMTR-MCNC: 128 MG/DL — HIGH (ref 70–99)
GLUCOSE BLDC GLUCOMTR-MCNC: 142 MG/DL — HIGH (ref 70–99)
GLUCOSE BLDC GLUCOMTR-MCNC: 165 MG/DL — HIGH (ref 70–99)
GLUCOSE BLDC GLUCOMTR-MCNC: 169 MG/DL — HIGH (ref 70–99)
GLUCOSE BLDC GLUCOMTR-MCNC: 191 MG/DL — HIGH (ref 70–99)
GLUCOSE SERPL-MCNC: 126 MG/DL — HIGH (ref 70–99)
HCT VFR BLD CALC: 34.6 % — SIGNIFICANT CHANGE UP (ref 34.5–45)
HGB BLD-MCNC: 11.7 G/DL — SIGNIFICANT CHANGE UP (ref 11.5–15.5)
INR BLD: 2.19 RATIO — HIGH (ref 0.88–1.16)
MCHC RBC-ENTMCNC: 26.7 PG — LOW (ref 27–34)
MCHC RBC-ENTMCNC: 33.8 GM/DL — SIGNIFICANT CHANGE UP (ref 32–36)
MCV RBC AUTO: 78.8 FL — LOW (ref 80–100)
PLATELET # BLD AUTO: 232 K/UL — SIGNIFICANT CHANGE UP (ref 150–400)
POTASSIUM SERPL-MCNC: 3.1 MMOL/L — LOW (ref 3.5–5.3)
POTASSIUM SERPL-SCNC: 3.1 MMOL/L — LOW (ref 3.5–5.3)
PROTHROM AB SERPL-ACNC: 24.3 SEC — HIGH (ref 9.8–12.7)
RBC # BLD: 4.39 M/UL — SIGNIFICANT CHANGE UP (ref 3.8–5.2)
RBC # FLD: 13.2 % — SIGNIFICANT CHANGE UP (ref 10.3–14.5)
SODIUM SERPL-SCNC: 142 MMOL/L — SIGNIFICANT CHANGE UP (ref 135–145)
WBC # BLD: 5.62 K/UL — SIGNIFICANT CHANGE UP (ref 3.8–10.5)
WBC # FLD AUTO: 5.62 K/UL — SIGNIFICANT CHANGE UP (ref 3.8–10.5)

## 2018-05-04 PROCEDURE — 99232 SBSQ HOSP IP/OBS MODERATE 35: CPT

## 2018-05-04 RX ORDER — INSULIN LISPRO 100/ML
VIAL (ML) SUBCUTANEOUS
Qty: 0 | Refills: 0 | Status: DISCONTINUED | OUTPATIENT
Start: 2018-05-04 | End: 2018-05-10

## 2018-05-04 RX ORDER — SODIUM CHLORIDE 9 MG/ML
1000 INJECTION, SOLUTION INTRAVENOUS
Qty: 0 | Refills: 0 | Status: DISCONTINUED | OUTPATIENT
Start: 2018-05-04 | End: 2018-05-10

## 2018-05-04 RX ORDER — POTASSIUM CHLORIDE 20 MEQ
10 PACKET (EA) ORAL
Qty: 0 | Refills: 0 | Status: COMPLETED | OUTPATIENT
Start: 2018-05-04 | End: 2018-05-04

## 2018-05-04 RX ADMIN — PANTOPRAZOLE SODIUM 40 MILLIGRAM(S): 20 TABLET, DELAYED RELEASE ORAL at 05:46

## 2018-05-04 RX ADMIN — SODIUM CHLORIDE 50 MILLILITER(S): 9 INJECTION, SOLUTION INTRAVENOUS at 12:32

## 2018-05-04 RX ADMIN — Medication 100 MILLIEQUIVALENT(S): at 12:31

## 2018-05-04 RX ADMIN — Medication 100 MILLIEQUIVALENT(S): at 19:04

## 2018-05-04 RX ADMIN — Medication 1: at 00:47

## 2018-05-04 RX ADMIN — Medication 1: at 17:53

## 2018-05-04 RX ADMIN — Medication 100 MILLIEQUIVALENT(S): at 16:18

## 2018-05-04 RX ADMIN — PIPERACILLIN AND TAZOBACTAM 25 GRAM(S): 4; .5 INJECTION, POWDER, LYOPHILIZED, FOR SOLUTION INTRAVENOUS at 12:33

## 2018-05-04 RX ADMIN — BUDESONIDE AND FORMOTEROL FUMARATE DIHYDRATE 2 PUFF(S): 160; 4.5 AEROSOL RESPIRATORY (INHALATION) at 05:46

## 2018-05-04 RX ADMIN — SIMVASTATIN 20 MILLIGRAM(S): 20 TABLET, FILM COATED ORAL at 22:05

## 2018-05-04 RX ADMIN — BUDESONIDE AND FORMOTEROL FUMARATE DIHYDRATE 2 PUFF(S): 160; 4.5 AEROSOL RESPIRATORY (INHALATION) at 17:55

## 2018-05-04 RX ADMIN — PIPERACILLIN AND TAZOBACTAM 25 GRAM(S): 4; .5 INJECTION, POWDER, LYOPHILIZED, FOR SOLUTION INTRAVENOUS at 04:19

## 2018-05-04 NOTE — CONSULT NOTE ADULT - ASSESSMENT
pt with ischemic bowel due to sma stenosis awaiting angiogram.  hold coumadin  start heparin when inr less than 2  tsh  will adjust  meds

## 2018-05-04 NOTE — PROGRESS NOTE ADULT - SUBJECTIVE AND OBJECTIVE BOX
Vascular Surgery Progress Note    Interval HPI: pt states no abd pains at this time, feels comfortable. Underwent EGD two days ago which was unremarkable. Had food yesterday.     Vital Signs Last 24 Hrs  T(C): 36.8 (04 May 2018 09:09), Max: 37.1 (03 May 2018 16:28)  T(F): 98.2 (04 May 2018 09:09), Max: 98.8 (03 May 2018 16:28)  HR: 82 (04 May 2018 09:09) (75 - 82)  BP: 136/65 (04 May 2018 09:09) (124/58 - 136/65)  BP(mean): --  RR: 18 (04 May 2018 09:09) (18 - 18)  SpO2: 96% (04 May 2018 09:09) (95% - 96%)      Physical Exam:  Neuro  A&Ox3 VSS  Abd soft, ND, NTTP   Vascular:  stable     LABS:                                            11.7   5.62  )-----------( 232      ( 04 May 2018 08:40 )             34.6         US Abdomen:   Liver: Within normal limits.    Bile ducts: Normal caliber. Common bile duct measures 4 mm.     Gallbladder: Gallbladder distended likely due to the patient's n.p.o.   status. An 8 mm gallstone noted in the gallbladder fundus. Sludge noted   in the gallbladder neck/proximal body. No pericholecystic fluid or wall   thickening. The wall measuring 2 mm. No sonographic Clements's sign.    Pancreas: Visualized portions are within normal limits.    Spleen: 12 cm. Within normal limits.    Right kidney: 7.6 cm. Redemonstrated right renal upper pole cortical   scarring. No hydronephrosis.     Left kidney: 10.9 cm.  No hydronephrosis.     Ascites: None.    Aorta and IVC: Diffuse calcific atherosclerosis of the visualized aorta   are again noted. Visualized IVC within normal limits.    Celiac artery origin: Peak systolic velocity measuring 223 cm/s,   representing mild stenosis.    SMA: Peak systolic velocity at origin measuring 402 cm/s. Proximal SMA   peak systolic velocity measuring 604 cm/s with spectral broadening.   Findings consistent with severe stenosis.    GABRIELE: Peak systolic velocity and origin measuring 108 cm/s.    MISCELLANEOUS: Small bilateral pleural effusions.    IMPRESSION: Diffuse calcified atherosclerotic disease of the visualized   abdominal aorta and interrogated branches.    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 Findings suggest   mesenteric ischemia.    Cholelithiasis without cholecystitis.                73y year old Female who presents with abdominal pain, history of cholecystitis, refused surgery in the past. CT scan suggesting SMA and Celiac calcification , possible stenosis    - Medical management per primary team  - Awaiting full GI workup, patient needs mesenteric angio by IR with possible BAS of SMA if GI workup is negative. Awaiting GI input   - will follow      1037 Vascular Surgery Progress Note    Interval HPI: pt states no abd pains at this time, feels comfortable. Underwent EGD two days ago which was unremarkable. Had food yesterday.     Vital Signs Last 24 Hrs  T(C): 36.8 (04 May 2018 09:09), Max: 37.1 (03 May 2018 16:28)  T(F): 98.2 (04 May 2018 09:09), Max: 98.8 (03 May 2018 16:28)  HR: 82 (04 May 2018 09:09) (75 - 82)  BP: 136/65 (04 May 2018 09:09) (124/58 - 136/65)  BP(mean): --  RR: 18 (04 May 2018 09:09) (18 - 18)  SpO2: 96% (04 May 2018 09:09) (95% - 96%)      Physical Exam:  Neuro  A&Ox3 VSS  Abd soft, mild epigastric tenderness no rebound   Vascular:  stable     LABS:                                            11.7   5.62  )-----------( 232      ( 04 May 2018 08:40 )             34.6     05-04    142  |  107  |  <4<L>  ----------------------------<  126<H>  3.1<L>   |  25  |  0.78    Ca    8.5      04 May 2018 07:22  Mg     1.4     05-03    TPro  5.4<L>  /  Alb  2.6<L>  /  TBili  0.3  /  DBili  x   /  AST  17  /  ALT  13  /  AlkPhos  54  05-03    PT/INR - ( 04 May 2018 11:35 )   PT: 24.3 sec;   INR: 2.19 ratio         US Abdomen:   Liver: Within normal limits.    Bile ducts: Normal caliber. Common bile duct measures 4 mm.     Gallbladder: Gallbladder distended likely due to the patient's n.p.o.   status. An 8 mm gallstone noted in the gallbladder fundus. Sludge noted   in the gallbladder neck/proximal body. No pericholecystic fluid or wall   thickening. The wall measuring 2 mm. No sonographic Clements's sign.    Pancreas: Visualized portions are within normal limits.    Spleen: 12 cm. Within normal limits.    Right kidney: 7.6 cm. Redemonstrated right renal upper pole cortical   scarring. No hydronephrosis.     Left kidney: 10.9 cm.  No hydronephrosis.     Ascites: None.    Aorta and IVC: Diffuse calcific atherosclerosis of the visualized aorta   are again noted. Visualized IVC within normal limits.    Celiac artery origin: Peak systolic velocity measuring 223 cm/s,   representing mild stenosis.    SMA: Peak systolic velocity at origin measuring 402 cm/s. Proximal SMA   peak systolic velocity measuring 604 cm/s with spectral broadening.   Findings consistent with severe stenosis.    GABRIELE: Peak systolic velocity and origin measuring 108 cm/s.    MISCELLANEOUS: Small bilateral pleural effusions.    IMPRESSION: Diffuse calcified atherosclerotic disease of the visualized   abdominal aorta and interrogated branches.    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 Findings suggest   mesenteric ischemia.    Cholelithiasis without cholecystitis.

## 2018-05-04 NOTE — PROGRESS NOTE ADULT - SUBJECTIVE AND OBJECTIVE BOX
Discussed with patient's daughter who stated that the family would like to go ahead with the angiogram after speaking to her mother's long time primary care physician. Will plan for mesenteric angiogram, possible angioplasty/stent on Monday.

## 2018-05-04 NOTE — PROGRESS NOTE ADULT - ASSESSMENT
74 yo female with PMH of Atrial fibrillation on coumadin, HTN, DM2, Asthma, HLD, presents here with nausea, vomiting and abdominal pain.     {[id number]} Problem/Plan - 1:  ·  Problem: Generalized abdominal pain.  Plan: Likely secondary to  Mesenteric Ischemia .S/P Doppler US showing ..< from: US Doppler Mesenteric (05.02.18 @ 11:30) >  IMPRESSION: Diffuse calcifiedatherosclerotic disease of the visualized   abdominal aorta and interrogated branches.    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 Findings suggest   mesenteric ischemia.    Cholelithiasis without cholecystitis.    < end of copied text >  .  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 . Family and pt decided to go ahead with procedure.     {[id number]} Problem/Plan - 2:  ·  Problem: Nausea vomiting and diarrhea.  Plan: Resolved . Exact cause not known.     {[id number]} Problem/Plan - 3:  ·  Problem: Sepsis.  Plan: Resolving . Off Abxs.   c/w IVF  HIDA scan is negative .   c/w IV zosyn.     {[id number]} Problem/Plan - 4:  ·  Problem: Type 2 diabetes mellitus.  Plan: c/w sliding scale  hold oral meds.     {[id number]} Problem/Plan - 5:  ·  Problem: Hypertension.  Plan: BP under good control.  hold lisinopril, lasix for now.     {[id number]} Problem/Plan - 6:  Problem: Atrial fibrillation, chronic. Plan:Holding Coumadin for procedure as INR should be <1.5 . Cardiology Dr Marcus consulted.   monitor PT/INR  not on BB.    {[id number]} Problem/Plan - 7:  ·  Problem: Hypokalemia .  Plan: Replacing.      Problem/Plan - 8:  ·  Problem: Prophylactic measure.  Plan: on coumadin.

## 2018-05-04 NOTE — PROGRESS NOTE ADULT - SUBJECTIVE AND OBJECTIVE BOX
INTERVAL HPI/OVERNIGHT EVENTS: I don't feel like eating.   Vital Signs Last 24 Hrs  T(C): 36.8 (04 May 2018 09:09), Max: 37.1 (03 May 2018 16:28)  T(F): 98.2 (04 May 2018 09:09), Max: 98.8 (03 May 2018 16:28)  HR: 82 (04 May 2018 09:09) (75 - 82)  BP: 136/65 (04 May 2018 09:09) (124/58 - 136/65)  BP(mean): --  RR: 18 (04 May 2018 09:09) (18 - 18)  SpO2: 96% (04 May 2018 09:09) (95% - 96%)  I&O's Summary    03 May 2018 07:01  -  04 May 2018 07:00  --------------------------------------------------------  IN: 860 mL / OUT: 2 mL / NET: 858 mL    04 May 2018 07:01  -  04 May 2018 11:13  --------------------------------------------------------  IN: 200 mL / OUT: 0 mL / NET: 200 mL      MEDICATIONS  (STANDING):  buDESOnide 160 MICROgram(s)/formoterol 4.5 MICROgram(s) Inhaler 2 Puff(s) Inhalation two times a day  dextrose 5% + sodium chloride 0.9%. 1000 milliLiter(s) (100 mL/Hr) IV Continuous <Continuous>  dextrose 5%. 1000 milliLiter(s) (50 mL/Hr) IV Continuous <Continuous>  dextrose 50% Injectable 12.5 Gram(s) IV Push once  dextrose 50% Injectable 25 Gram(s) IV Push once  dextrose 50% Injectable 25 Gram(s) IV Push once  insulin lispro (HumaLOG) corrective regimen sliding scale   SubCutaneous three times a day before meals  pantoprazole    Tablet 40 milliGRAM(s) Oral before breakfast  piperacillin/tazobactam IVPB. 3.375 Gram(s) IV Intermittent every 8 hours  simvastatin 20 milliGRAM(s) Oral at bedtime    MEDICATIONS  (PRN):  dextrose Gel 1 Dose(s) Oral once PRN Blood Glucose LESS THAN 70 milliGRAM(s)/deciliter  glucagon  Injectable 1 milliGRAM(s) IntraMuscular once PRN Glucose LESS THAN 70 milligrams/deciliter  ondansetron Injectable 4 milliGRAM(s) IV Push every 6 hours PRN Nausea    LABS:                        11.7   5.62  )-----------( 232      ( 04 May 2018 08:40 )             34.6     05-04    142  |  107  |  <4<L>  ----------------------------<  126<H>  3.1<L>   |  25  |  0.78    Ca    8.5      04 May 2018 07:22  Mg     1.4     05-03    TPro  5.4<L>  /  Alb  2.6<L>  /  TBili  0.3  /  DBili  x   /  AST  17  /  ALT  13  /  AlkPhos  54  05-03    PT/INR - ( 03 May 2018 08:08 )   PT: 31.4 sec;   INR: 2.72 ratio             CAPILLARY BLOOD GLUCOSE      POCT Blood Glucose.: 128 mg/dL (04 May 2018 08:44)  POCT Blood Glucose.: 124 mg/dL (04 May 2018 06:31)  POCT Blood Glucose.: 165 mg/dL (04 May 2018 00:37)  POCT Blood Glucose.: 188 mg/dL (03 May 2018 21:30)  POCT Blood Glucose.: 165 mg/dL (03 May 2018 17:16)  POCT Blood Glucose.: 107 mg/dL (03 May 2018 12:03)          REVIEW OF SYSTEMS:  CONSTITUTIONAL: No fever, weight loss, or fatigue  EYES: No eye pain, visual disturbances, or discharge  ENMT:  No difficulty hearing, tinnitus, vertigo; No sinus or throat pain  NECK: No pain or stiffness  BREASTS: No pain, masses, or nipple discharge  RESPIRATORY: No cough, wheezing, chills or hemoptysis; No shortness of breath  CARDIOVASCULAR: No chest pain, palpitations, dizziness, or leg swelling  GASTROINTESTINAL: No abdominal or epigastric pain. No nausea, vomiting, or hematemesis; No diarrhea or constipation. No melena or hematochezia.  GENITOURINARY: No dysuria, frequency, hematuria, or incontinence  NEUROLOGICAL: No headaches, memory loss, loss of strength, numbness, or tremors  SKIN: No itching, burning, rashes, or lesions   LYMPH NODES: No enlarged glands  ENDOCRINE: No heat or cold intolerance; No hair loss  MUSCULOSKELETAL: No joint pain or swelling; No muscle, back, or extremity pain  PSYCHIATRIC: No depression, anxiety, mood swings, or difficulty sleeping  HEME/LYMPH: No easy bruising, or bleeding gums  ALLERY AND IMMUNOLOGIC: No hives or eczema    RADIOLOGY & ADDITIONAL TESTS:    Consultant(s) Notes Reviewed:  [x ] YES  [ ] NO    PHYSICAL EXAM:  GENERAL: NAD, well-groomed, well-developed ,not in any distress ,  HEAD:  Atraumatic, Normocephalic  EYES: EOMI, PERRLA, conjunctiva and sclera clear  ENMT: No tonsillar erythema, exudates, or enlargement; Moist mucous membranes, Good dentition, No lesions  NECK: Supple, No JVD, Normal thyroid  NERVOUS SYSTEM:  Alert & Oriented X3, No focal deficit   CHEST/LUNG: Good air entry bilateral with no  rales, rhonchi, wheezing, or rubs  HEART: Regular rate and rhythm; No murmurs, rubs, or gallops  ABDOMEN: Soft, Nontender, Nondistended; Bowel sounds present  EXTREMITIES:  2+ Peripheral Pulses, No clubbing, cyanosis, or edema  SKIN: No rashes or lesions    Care Discussed with Consultants/Other Providers [ x] YES  [ ] NO

## 2018-05-04 NOTE — PROGRESS NOTE ADULT - SUBJECTIVE AND OBJECTIVE BOX
General Surgery Progress Note    Interval HPI: pt states no abd pains at this time, feels comfortable. Underwent EGD two days ago which was unremarkable. Had food yesterday.     Vital Signs Last 24 Hrs  T(C): 36.8 (04 May 2018 09:09), Max: 37.1 (03 May 2018 16:28)  T(F): 98.2 (04 May 2018 09:09), Max: 98.8 (03 May 2018 16:28)  HR: 82 (04 May 2018 09:09) (75 - 82)  BP: 136/65 (04 May 2018 09:09) (124/58 - 136/65)  BP(mean): --  RR: 18 (04 May 2018 09:09) (18 - 18)  SpO2: 96% (04 May 2018 09:09) (95% - 96%)      Physical Exam:  Neuro  A&Ox3 VSS  Abd soft, ND, NTTP   Vascular:  stable     LABS:                                            11.7   5.62  )-----------( 232      ( 04 May 2018 08:40 )             34.6         US Abdomen:   Liver: Within normal limits.    Bile ducts: Normal caliber. Common bile duct measures 4 mm.     Gallbladder: Gallbladder distended likely due to the patient's n.p.o.   status. An 8 mm gallstone noted in the gallbladder fundus. Sludge noted   in the gallbladder neck/proximal body. No pericholecystic fluid or wall   thickening. The wall measuring 2 mm. No sonographic Clements's sign.    Pancreas: Visualized portions are within normal limits.    Spleen: 12 cm. Within normal limits.    Right kidney: 7.6 cm. Redemonstrated right renal upper pole cortical   scarring. No hydronephrosis.     Left kidney: 10.9 cm.  No hydronephrosis.     Ascites: None.    Aorta and IVC: Diffuse calcific atherosclerosis of the visualized aorta   are again noted. Visualized IVC within normal limits.    Celiac artery origin: Peak systolic velocity measuring 223 cm/s,   representing mild stenosis.    SMA: Peak systolic velocity at origin measuring 402 cm/s. Proximal SMA   peak systolic velocity measuring 604 cm/s with spectral broadening.   Findings consistent with severe stenosis.    GABRIELE: Peak systolic velocity and origin measuring 108 cm/s.    MISCELLANEOUS: Small bilateral pleural effusions.    IMPRESSION: Diffuse calcified atherosclerotic disease of the visualized   abdominal aorta and interrogated branches.    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 Findings suggest   mesenteric ischemia.    Cholelithiasis without cholecystitis.                73y year old Female who presents with abdominal pain, history of cholecystitis, refused surgery in the past. CT scan suggesting SMA and Celiac calcification , possible stenosis    - Medical management per primary team  - Awaiting full GI workup, patient needs mesenteric angio by IR with possible BAS of SMA if GI workup is negative. Awaiting GI input   - will follow with you      [id number]

## 2018-05-04 NOTE — CONSULT NOTE ADULT - SUBJECTIVE AND OBJECTIVE BOX
CHIEF COMPLAINT:Patient is a 73y old  Female who presents with a chief complaint of nausea, vomiting and abdominal pain (01 May 2018 02:46)      HPI:  72 yo female with PMH of Atrial fibrillation on coumadin, HTN, DM2, Asthma, HLD, presents here with nausea, vomiting and abdominal pain.  Patient was admitted for similar reason in March.  At that time she was found to have elevated leukocytosis and elevated liver enzymes.  Ultrasound of abdomen showed cholelithiasis without cholecystitis and without biliary ductal dilatation.  MR abdomen showed no hepatic lesion.  Reason for symptoms were thought to be due to cholelithiasis.  Patient was seen by surgery and was recommended for cholecystectomy, however patient refused and she was discharged.  Patient now states that yesterday morning she woke and started vomiting.  She had 3 episodes of NBNB vomiting.  She had one episode of loose stool.  She also had mild midepigastric pain. She denies any fever, sick contact, outside food.  She denies any dysuria. (01 May 2018 02:46)      PAST MEDICAL & SURGICAL HISTORY:  Dementia  Hypertension  Type 2 diabetes mellitus  Atrial fibrillation, chronic  Diabetes mellitus  Asthma  No significant past surgical history      MEDICATIONS  (STANDING):  buDESOnide 160 MICROgram(s)/formoterol 4.5 MICROgram(s) Inhaler 2 Puff(s) Inhalation two times a day  dextrose 5% + sodium chloride 0.9%. 1000 milliLiter(s) (100 mL/Hr) IV Continuous <Continuous>  dextrose 5%. 1000 milliLiter(s) (50 mL/Hr) IV Continuous <Continuous>  dextrose 50% Injectable 12.5 Gram(s) IV Push once  dextrose 50% Injectable 25 Gram(s) IV Push once  dextrose 50% Injectable 25 Gram(s) IV Push once  insulin lispro (HumaLOG) corrective regimen sliding scale   SubCutaneous three times a day before meals  pantoprazole    Tablet 40 milliGRAM(s) Oral before breakfast  piperacillin/tazobactam IVPB. 3.375 Gram(s) IV Intermittent every 8 hours  potassium chloride  10 mEq/100 mL IVPB 10 milliEquivalent(s) IV Intermittent every 1 hour  simvastatin 20 milliGRAM(s) Oral at bedtime    MEDICATIONS  (PRN):  dextrose Gel 1 Dose(s) Oral once PRN Blood Glucose LESS THAN 70 milliGRAM(s)/deciliter  glucagon  Injectable 1 milliGRAM(s) IntraMuscular once PRN Glucose LESS THAN 70 milligrams/deciliter  ondansetron Injectable 4 milliGRAM(s) IV Push every 6 hours PRN Nausea      FAMILY HISTORY:  Family history of diabetes mellitus (Father)      SOCIAL HISTORY:    [ ] Non-smoker  [ ] Smoker  [ ] Alcohol    Allergies    Avelox (Pruritus)  Levaquin (Unknown)    Intolerances    	    REVIEW OF SYSTEMS:  CONSTITUTIONAL: No fever, weight loss, or fatigue  EYES: No eye pain, visual disturbances, or discharge  ENT:  No difficulty hearing, tinnitus, vertigo; No sinus or throat pain  NECK: No pain or stiffness  RESPIRATORY: No cough, wheezing, chills or hemoptysis; mild Shortness of Breath  CARDIOVASCULAR: No chest pain, palpitations, passing out, dizziness, or leg swelling  GASTROINTESTINAL:+ abdominal or epigastric pain. No nausea, vomiting, or hematemesis; No diarrhea or constipation. No melena or hematochezia.  GENITOURINARY: No dysuria, frequency, hematuria, or incontinence  NEUROLOGICAL: No headaches, memory loss, loss of strength, numbness, or tremors  SKIN: No itching, burning, rashes, or lesions   LYMPH Nodes: No enlarged glands  ENDOCRINE: No heat or cold intolerance; No hair loss  MUSCULOSKELETAL: No joint pain or swelling; No muscle, back, or extremity pain  PSYCHIATRIC: No depression, anxiety, mood swings, or difficulty sleeping  HEME/LYMPH: No easy bruising, or bleeding gums  ALLERGY AND IMMUNOLOGIC: No hives or eczema	    [ ] All others negative	  [ ] Unable to obtain    PHYSICAL EXAM:  T(C): 36.8 (05-04-18 @ 09:09), Max: 37.1 (05-03-18 @ 16:28)  HR: 82 (05-04-18 @ 09:09) (75 - 82)  BP: 136/65 (05-04-18 @ 09:09) (124/58 - 136/65)  RR: 18 (05-04-18 @ 09:09) (18 - 18)  SpO2: 96% (05-04-18 @ 09:09) (95% - 96%)  Wt(kg): --  I&O's Summary    03 May 2018 07:01  -  04 May 2018 07:00  --------------------------------------------------------  IN: 860 mL / OUT: 2 mL / NET: 858 mL    04 May 2018 07:01  -  04 May 2018 12:05  --------------------------------------------------------  IN: 200 mL / OUT: 0 mL / NET: 200 mL        Appearance: Normal	  HEENT:   Normal oral mucosa, PERRL, EOMI	  Lymphatic: No lymphadenopathy  Cardiovascular: Normal S1 S2, No JVD, + murmurs, No edema  Respiratory: rhonchi  Psychiatry: A & O x 3, Mood & affect appropriate  Gastrointestinal:  Soft, Non-tender, + BS	  Skin: No rashes, No ecchymoses, No cyanosis	  Neurologic: Non-focal  Extremities: Normal range of motion, No clubbing, cyanosis or edema  Vascular: Peripheral pulses palpable 2+ bilaterally    TELEMETRY: 	    ECG:  	  RADIOLOGY:  OTHER: 	  	  LABS:	 	    CARDIAC MARKERS:                              11.7   5.62  )-----------( 232      ( 04 May 2018 08:40 )             34.6     05-04    142  |  107  |  <4<L>  ----------------------------<  126<H>  3.1<L>   |  25  |  0.78    Ca    8.5      04 May 2018 07:22  Mg     1.4     05-03    TPro  5.4<L>  /  Alb  2.6<L>  /  TBili  0.3  /  DBili  x   /  AST  17  /  ALT  13  /  AlkPhos  54  05-03    proBNP:   Lipid Profile:   HgA1c:   TSH:   PT/INR - ( 03 May 2018 08:08 )   PT: 31.4 sec;   INR: 2.72 ratio             PREVIOUS DIAGNOSTIC TESTING:    < from: 12 Lead ECG (05.01.18 @ 04:43) >  INUS RHYTHM WITH POSSIBLE PREMATURE ATRIAL COMPLEXES WITH ABERRANT CONDUCTION  SEPTAL INFARCT , AGE UNDETERMINED    < from: Transesophageal Echocardiogram (10.19.17 @ 20:38) >  Conclusions:  1. Aortic Root: 2.6 cm.  Moderate calcific atheroma noted in aortic arch/descending  aorta.  2. Normal left atrium.  LA volume index = 16 cc/m2.  Lipomatous hypertrophy of interatrial septum.   No left  atrial or left atrial appendage thrombus.   Normal left  atrial appendage function (velocity= 0.9m/s).  3. Increased relative wall thickness with normal left  ventricular mass index, consistent with concentric left  ventricular remodeling.  4. Hyperdynamic left ventricle.  5. Mild diastolicdysfunction (Stage I).    < from: CT Abdomen and Pelvis w/ Oral Cont and w/ IV Cont (04.30.18 @ 22:28) >  The etiology of abdominal pain is not elucidated.    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    < end of copied text >

## 2018-05-05 LAB
ANION GAP SERPL CALC-SCNC: 10 MMOL/L — SIGNIFICANT CHANGE UP (ref 5–17)
APTT BLD: 36.3 SEC — SIGNIFICANT CHANGE UP (ref 27.5–37.4)
APTT BLD: 71.4 SEC — HIGH (ref 27.5–37.4)
APTT BLD: 73.3 SEC — HIGH (ref 27.5–37.4)
BUN SERPL-MCNC: <4 MG/DL — LOW (ref 7–23)
CALCIUM SERPL-MCNC: 8.7 MG/DL — SIGNIFICANT CHANGE UP (ref 8.4–10.5)
CHLORIDE SERPL-SCNC: 107 MMOL/L — SIGNIFICANT CHANGE UP (ref 96–108)
CO2 SERPL-SCNC: 23 MMOL/L — SIGNIFICANT CHANGE UP (ref 22–31)
CREAT SERPL-MCNC: 0.65 MG/DL — SIGNIFICANT CHANGE UP (ref 0.5–1.3)
GLUCOSE BLDC GLUCOMTR-MCNC: 129 MG/DL — HIGH (ref 70–99)
GLUCOSE BLDC GLUCOMTR-MCNC: 130 MG/DL — HIGH (ref 70–99)
GLUCOSE BLDC GLUCOMTR-MCNC: 134 MG/DL — HIGH (ref 70–99)
GLUCOSE BLDC GLUCOMTR-MCNC: 222 MG/DL — HIGH (ref 70–99)
GLUCOSE SERPL-MCNC: 145 MG/DL — HIGH (ref 70–99)
HBA1C BLD-MCNC: 7 % — HIGH (ref 4–5.6)
HCT VFR BLD CALC: 36.6 % — SIGNIFICANT CHANGE UP (ref 34.5–45)
HGB BLD-MCNC: 12.4 G/DL — SIGNIFICANT CHANGE UP (ref 11.5–15.5)
INR BLD: 1.67 RATIO — HIGH (ref 0.88–1.16)
INR BLD: 1.99 RATIO — HIGH (ref 0.88–1.16)
MCHC RBC-ENTMCNC: 28.1 PG — SIGNIFICANT CHANGE UP (ref 27–34)
MCHC RBC-ENTMCNC: 33.8 GM/DL — SIGNIFICANT CHANGE UP (ref 32–36)
MCV RBC AUTO: 83.1 FL — SIGNIFICANT CHANGE UP (ref 80–100)
PLATELET # BLD AUTO: 231 K/UL — SIGNIFICANT CHANGE UP (ref 150–400)
POTASSIUM SERPL-MCNC: 3.5 MMOL/L — SIGNIFICANT CHANGE UP (ref 3.5–5.3)
POTASSIUM SERPL-SCNC: 3.5 MMOL/L — SIGNIFICANT CHANGE UP (ref 3.5–5.3)
PROTHROM AB SERPL-ACNC: 19.1 SEC — HIGH (ref 10–13.1)
PROTHROM AB SERPL-ACNC: 21.8 SEC — HIGH (ref 9.8–12.7)
RBC # BLD: 4.4 M/UL — SIGNIFICANT CHANGE UP (ref 3.8–5.2)
RBC # FLD: 11.7 % — SIGNIFICANT CHANGE UP (ref 10.3–14.5)
SODIUM SERPL-SCNC: 140 MMOL/L — SIGNIFICANT CHANGE UP (ref 135–145)
WBC # BLD: 8.7 K/UL — SIGNIFICANT CHANGE UP (ref 3.8–10.5)
WBC # FLD AUTO: 8.7 K/UL — SIGNIFICANT CHANGE UP (ref 3.8–10.5)

## 2018-05-05 PROCEDURE — 99232 SBSQ HOSP IP/OBS MODERATE 35: CPT

## 2018-05-05 RX ORDER — HEPARIN SODIUM 5000 [USP'U]/ML
INJECTION INTRAVENOUS; SUBCUTANEOUS
Qty: 25000 | Refills: 0 | Status: DISCONTINUED | OUTPATIENT
Start: 2018-05-05 | End: 2018-05-05

## 2018-05-05 RX ORDER — HEPARIN SODIUM 5000 [USP'U]/ML
2000 INJECTION INTRAVENOUS; SUBCUTANEOUS EVERY 6 HOURS
Qty: 0 | Refills: 0 | Status: DISCONTINUED | OUTPATIENT
Start: 2018-05-05 | End: 2018-05-10

## 2018-05-05 RX ORDER — HEPARIN SODIUM 5000 [USP'U]/ML
4000 INJECTION INTRAVENOUS; SUBCUTANEOUS EVERY 6 HOURS
Qty: 0 | Refills: 0 | Status: DISCONTINUED | OUTPATIENT
Start: 2018-05-05 | End: 2018-05-10

## 2018-05-05 RX ORDER — HEPARIN SODIUM 5000 [USP'U]/ML
INJECTION INTRAVENOUS; SUBCUTANEOUS
Qty: 25000 | Refills: 0 | Status: DISCONTINUED | OUTPATIENT
Start: 2018-05-05 | End: 2018-05-10

## 2018-05-05 RX ADMIN — HEPARIN SODIUM 900 UNIT(S)/HR: 5000 INJECTION INTRAVENOUS; SUBCUTANEOUS at 10:15

## 2018-05-05 RX ADMIN — SIMVASTATIN 20 MILLIGRAM(S): 20 TABLET, FILM COATED ORAL at 21:10

## 2018-05-05 RX ADMIN — PANTOPRAZOLE SODIUM 40 MILLIGRAM(S): 20 TABLET, DELAYED RELEASE ORAL at 05:35

## 2018-05-05 RX ADMIN — BUDESONIDE AND FORMOTEROL FUMARATE DIHYDRATE 2 PUFF(S): 160; 4.5 AEROSOL RESPIRATORY (INHALATION) at 05:35

## 2018-05-05 RX ADMIN — HEPARIN SODIUM 900 UNIT(S)/HR: 5000 INJECTION INTRAVENOUS; SUBCUTANEOUS at 23:39

## 2018-05-05 RX ADMIN — SODIUM CHLORIDE 50 MILLILITER(S): 9 INJECTION, SOLUTION INTRAVENOUS at 05:35

## 2018-05-05 RX ADMIN — BUDESONIDE AND FORMOTEROL FUMARATE DIHYDRATE 2 PUFF(S): 160; 4.5 AEROSOL RESPIRATORY (INHALATION) at 17:35

## 2018-05-05 NOTE — PROGRESS NOTE ADULT - ASSESSMENT
74 yo female with PMH of Atrial fibrillation on coumadin, HTN, DM2, Asthma, HLD, presents here with nausea, vomiting and abdominal pain.     {[id number]} Problem/Plan - 1:  ·  Problem: Generalized abdominal pain.  Plan: Likely secondary to  Mesenteric Ischemia .S/P Doppler US showing ..< from: US Doppler Mesenteric (05.02.18 @ 11:30) >  IMPRESSION: Diffuse calcifiedatherosclerotic disease of the visualized   abdominal aorta and interrogated branches.    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 Findings suggest   mesenteric ischemia.    Cholelithiasis without cholecystitis.    < end of copied text >  .  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 . Family and pt decided to go ahead with procedure.     {[id number]} Problem/Plan - 2:  ·  Problem: Nausea vomiting and diarrhea.  Plan: Resolved . Exact cause not known.     {[id number]} Problem/Plan - 3:  ·  Problem: Sepsis.  Plan: Resolving . Off Abxs.   c/w IVF  HIDA scan is negative .   c/w IV zosyn.     {[id number]} Problem/Plan - 4:  ·  Problem: Type 2 diabetes mellitus.  Plan: c/w sliding scale  hold oral meds.     {[id number]} Problem/Plan - 5:  ·  Problem: Hypertension.  Plan: BP under good control.  hold lisinopril, lasix for now.     {[id number]} Problem/Plan - 6:  Problem: Atrial fibrillation, chronic. Plan: Holding Coumadin for procedure as INR should be <1.5 .On Heparin now.  Cardiology helping .    monitor PT/INR    {[id number]} Problem/Plan - 7:  ·  Problem: Hypokalemia .  Plan: Replacing.      Problem/Plan - 8:  ·  Problem: Prophylactic measure.  Plan: on coumadin.

## 2018-05-05 NOTE — PROGRESS NOTE ADULT - SUBJECTIVE AND OBJECTIVE BOX
Team [facility] SURGERY PROGRESS NOTE      SUBJECTIVE: Pt seen at examined at bedside. AVSS. +Flatus/ +BM. Pain well controlled. Tolerating diet      Vital Signs Last 24 Hrs  T(C): 36.6 (05 May 2018 08:27), Max: 36.6 (04 May 2018 16:11)  T(F): 97.9 (05 May 2018 08:27), Max: 97.9 (04 May 2018 16:11)  HR: 88 (05 May 2018 08:27) (88 - 108)  BP: 170/80 (05 May 2018 08:27) (123/59 - 170/80)  BP(mean): --  RR: 16 (05 May 2018 08:27) (16 - 18)  SpO2: 99% (05 May 2018 08:27) (96% - 99%)    Physical Exam  General: awake, alert, NAD    Pulm: respirations unlabored, no increased WOB  Abdomen: soft, mildly distended, NT  Extremities: Grossly symmetric    I&O's Summary    04 May 2018 07:01  -  05 May 2018 07:00  --------------------------------------------------------  IN: 1840 mL / OUT: 0 mL / NET: 1840 mL      I&O's Detail    04 May 2018 07:01  -  05 May 2018 07:00  --------------------------------------------------------  IN:    dextrose 5% + sodium chloride 0.9%.: 1000 mL    Oral Fluid: 440 mL    Solution: 300 mL    Solution: 100 mL  Total IN: 1840 mL    OUT:  Total OUT: 0 mL    Total NET: 1840 mL          MEDICATIONS  (STANDING):  buDESOnide 160 MICROgram(s)/formoterol 4.5 MICROgram(s) Inhaler 2 Puff(s) Inhalation two times a day  dextrose 5% + sodium chloride 0.9%. 1000 milliLiter(s) (50 mL/Hr) IV Continuous <Continuous>  dextrose 5%. 1000 milliLiter(s) (50 mL/Hr) IV Continuous <Continuous>  dextrose 50% Injectable 12.5 Gram(s) IV Push once  dextrose 50% Injectable 25 Gram(s) IV Push once  dextrose 50% Injectable 25 Gram(s) IV Push once  heparin  Infusion.  Unit(s)/Hr (9 mL/Hr) IV Continuous <Continuous>  insulin lispro (HumaLOG) corrective regimen sliding scale   SubCutaneous three times a day before meals  pantoprazole    Tablet 40 milliGRAM(s) Oral before breakfast  simvastatin 20 milliGRAM(s) Oral at bedtime    MEDICATIONS  (PRN):  dextrose Gel 1 Dose(s) Oral once PRN Blood Glucose LESS THAN 70 milliGRAM(s)/deciliter  glucagon  Injectable 1 milliGRAM(s) IntraMuscular once PRN Glucose LESS THAN 70 milligrams/deciliter  heparin  Injectable 4000 Unit(s) IV Push every 6 hours PRN For aPTT less than 40  heparin  Injectable 2000 Unit(s) IV Push every 6 hours PRN For aPTT between 40 - 57  ondansetron Injectable 4 milliGRAM(s) IV Push every 6 hours PRN Nausea      LABS:                        11.7   5.62  )-----------( 232      ( 04 May 2018 08:40 )             34.6     05-05    140  |  107  |  <4<L>  ----------------------------<  145<H>  3.5   |  23  |  0.65    Ca    8.7      05 May 2018 06:55  Mg     1.4     05-03      PT/INR - ( 05 May 2018 08:16 )   PT: 19.1 sec;   INR: 1.67 ratio               RADIOLOGY & ADDITIONAL STUDIES:  No new studies

## 2018-05-05 NOTE — PROGRESS NOTE ADULT - SUBJECTIVE AND OBJECTIVE BOX
- Patient seen and examined.  - In summary, patient is a 73 year old woman who presented with abdominal pain (01 May 2018 02:46)  - Today, patient is without complaints.         *****MEDICATIONS:    MEDICATIONS  (STANDING):  buDESOnide 160 MICROgram(s)/formoterol 4.5 MICROgram(s) Inhaler 2 Puff(s) Inhalation two times a day  dextrose 5% + sodium chloride 0.9%. 1000 milliLiter(s) (50 mL/Hr) IV Continuous <Continuous>  dextrose 5%. 1000 milliLiter(s) (50 mL/Hr) IV Continuous <Continuous>  dextrose 50% Injectable 12.5 Gram(s) IV Push once  dextrose 50% Injectable 25 Gram(s) IV Push once  dextrose 50% Injectable 25 Gram(s) IV Push once  heparin  Infusion.  Unit(s)/Hr (9 mL/Hr) IV Continuous <Continuous>  insulin lispro (HumaLOG) corrective regimen sliding scale   SubCutaneous three times a day before meals  pantoprazole    Tablet 40 milliGRAM(s) Oral before breakfast  simvastatin 20 milliGRAM(s) Oral at bedtime    MEDICATIONS  (PRN):  dextrose Gel 1 Dose(s) Oral once PRN Blood Glucose LESS THAN 70 milliGRAM(s)/deciliter  glucagon  Injectable 1 milliGRAM(s) IntraMuscular once PRN Glucose LESS THAN 70 milligrams/deciliter  heparin  Injectable 4000 Unit(s) IV Push every 6 hours PRN For aPTT less than 40  heparin  Injectable 2000 Unit(s) IV Push every 6 hours PRN For aPTT between 40 - 57  ondansetron Injectable 4 milliGRAM(s) IV Push every 6 hours PRN Nausea           ***** REVIEW OF SYSTEM:  GEN: no fever, no chills, no pain  RESP: no SOB, no cough, no sputum  CVS: no chest pain, no palpitations, no edema  GI: no abdominal pain, no nausea, no vomiting, no constipation, no diarrhea  : no dysuria, no frequency  NEURO: no headache, no dizziness  PSYCH: no depression, not anxious  Derm : no itching, no rash         ***** VITAL SIGNS:  T(F): 97.9 (18 @ 08:27), Max: 97.9 (18 @ 16:11)  HR: 88 (18 @ 08:27) (88 - 108)  BP: 170/80 (18 @ 08:27) (123/59 - 170/80)  RR: 16 (18 @ 08:27) (16 - 18)  SpO2: 99% (18 @ 08:27) (96% - 99%)  Wt(kg): --  ,   I&O's Summary    04 May 2018 07:01  -  05 May 2018 07:00  --------------------------------------------------------  IN: 1840 mL / OUT: 0 mL / NET: 1840 mL             *****PHYSICAL EXAM:  GEN: A&O X 3 , NAD , comfortable  HEENT: NCAT, EOMI, MMM, no icterus  NECK: Supple, No JVD  CVS: S1S2 , regular , No M/R/G appreciated  PULM: CTA B/L,  no W/R/R appreciated  ABD.: soft. non tender, non distended,  bowel sounds present  Extrem: intact pulses , no edema noted  Derm: No rash or ecchymosis noted  PSYCH: normal mood, no depression, not anxious         *****LAB AND IMAGIN.7   5.62  )-----------( 232      ( 04 May 2018 08:40 )             34.6               05-    140  |  107  |  <4<L>  ----------------------------<  145<H>  3.5   |  23  |  0.65    Ca    8.7      05 May 2018 06:55  Mg     1.4     05-03      PT/INR - ( 05 May 2018 08:16 )   PT: 19.1 sec;   INR: 1.67 ratio                   [All pertinent recent Imaging/Reports reviewed]         *****A S S E S S M E N T   A N D   P L A N :  73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 Galileo, D.O.

## 2018-05-05 NOTE — PROGRESS NOTE ADULT - ASSESSMENT
72 yo female with PMH of Atrial fibrillation on coumadin, HTN, DM2, Asthma, HLD, presents here with nausea, vomiting and abdominal pain with concern for mesenteric ischemia     -pt is now consenting  to mesenteric angio poss BAS w IR  will follow

## 2018-05-05 NOTE — PROGRESS NOTE ADULT - SUBJECTIVE AND OBJECTIVE BOX
INTERVAL HPI/OVERNIGHT EVENTS: No new concerns.   Vital Signs Last 24 Hrs  T(C): 36.6 (05 May 2018 08:27), Max: 36.6 (04 May 2018 16:11)  T(F): 97.9 (05 May 2018 08:27), Max: 97.9 (04 May 2018 16:11)  HR: 88 (05 May 2018 08:27) (88 - 108)  BP: 170/80 (05 May 2018 08:27) (123/59 - 170/80)  BP(mean): --  RR: 16 (05 May 2018 08:27) (16 - 18)  SpO2: 99% (05 May 2018 08:27) (96% - 99%)  I&O's Summary    04 May 2018 07:01  -  05 May 2018 07:00  --------------------------------------------------------  IN: 1840 mL / OUT: 0 mL / NET: 1840 mL    05 May 2018 07:01  -  05 May 2018 15:10  --------------------------------------------------------  IN: 540 mL / OUT: 0 mL / NET: 540 mL      MEDICATIONS  (STANDING):  buDESOnide 160 MICROgram(s)/formoterol 4.5 MICROgram(s) Inhaler 2 Puff(s) Inhalation two times a day  dextrose 5% + sodium chloride 0.9%. 1000 milliLiter(s) (50 mL/Hr) IV Continuous <Continuous>  dextrose 5%. 1000 milliLiter(s) (50 mL/Hr) IV Continuous <Continuous>  dextrose 50% Injectable 12.5 Gram(s) IV Push once  dextrose 50% Injectable 25 Gram(s) IV Push once  dextrose 50% Injectable 25 Gram(s) IV Push once  heparin  Infusion.  Unit(s)/Hr (9 mL/Hr) IV Continuous <Continuous>  insulin lispro (HumaLOG) corrective regimen sliding scale   SubCutaneous three times a day before meals  pantoprazole    Tablet 40 milliGRAM(s) Oral before breakfast  simvastatin 20 milliGRAM(s) Oral at bedtime    MEDICATIONS  (PRN):  dextrose Gel 1 Dose(s) Oral once PRN Blood Glucose LESS THAN 70 milliGRAM(s)/deciliter  glucagon  Injectable 1 milliGRAM(s) IntraMuscular once PRN Glucose LESS THAN 70 milligrams/deciliter  heparin  Injectable 4000 Unit(s) IV Push every 6 hours PRN For aPTT less than 40  heparin  Injectable 2000 Unit(s) IV Push every 6 hours PRN For aPTT between 40 - 57  ondansetron Injectable 4 milliGRAM(s) IV Push every 6 hours PRN Nausea    LABS:                        11.7   5.62  )-----------( 232      ( 04 May 2018 08:40 )             34.6     05-05    140  |  107  |  <4<L>  ----------------------------<  145<H>  3.5   |  23  |  0.65    Ca    8.7      05 May 2018 06:55  Mg     1.4     05-03      PT/INR - ( 05 May 2018 08:16 )   PT: 19.1 sec;   INR: 1.67 ratio         PTT - ( 05 May 2018 11:29 )  PTT:36.3 sec    CAPILLARY BLOOD GLUCOSE      POCT Blood Glucose.: 134 mg/dL (05 May 2018 12:35)  POCT Blood Glucose.: 130 mg/dL (05 May 2018 08:05)  POCT Blood Glucose.: 169 mg/dL (04 May 2018 21:24)  POCT Blood Glucose.: 191 mg/dL (04 May 2018 17:04)          REVIEW OF SYSTEMS:  CONSTITUTIONAL: No fever, weight loss, or fatigue  EYES: No eye pain, visual disturbances, or discharge  ENMT:  No difficulty hearing, tinnitus, vertigo; No sinus or throat pain  NECK: No pain or stiffness  BREASTS: No pain, masses, or nipple discharge  RESPIRATORY: No cough, wheezing, chills or hemoptysis; No shortness of breath  CARDIOVASCULAR: No chest pain, palpitations, dizziness, or leg swelling  GASTROINTESTINAL: No abdominal or epigastric pain. No nausea, vomiting, or hematemesis; No diarrhea or constipation. No melena or hematochezia.  GENITOURINARY: No dysuria, frequency, hematuria, or incontinence  NEUROLOGICAL: No headaches, memory loss, loss of strength, numbness, or tremors  SKIN: No itching, burning, rashes, or lesions   LYMPH NODES: No enlarged glands  ENDOCRINE: No heat or cold intolerance; No hair loss  MUSCULOSKELETAL: No joint pain or swelling; No muscle, back, or extremity pain  PSYCHIATRIC: No depression, anxiety, mood swings, or difficulty sleeping  HEME/LYMPH: No easy bruising, or bleeding gums  ALLERY AND IMMUNOLOGIC: No hives or eczema    RADIOLOGY & ADDITIONAL TESTS:    Consultant(s) Notes Reviewed:  [x ] YES  [ ] NO    PHYSICAL EXAM:  GENERAL: NAD, well-groomed, well-developed ,not in any distress ,  HEAD:  Atraumatic, Normocephalic  EYES: EOMI, PERRLA, conjunctiva and sclera clear  ENMT: No tonsillar erythema, exudates, or enlargement; Moist mucous membranes, Good dentition, No lesions  NECK: Supple, No JVD, Normal thyroid  NERVOUS SYSTEM:  Alert & Oriented X3, No focal deficit   CHEST/LUNG: Good air entry bilateral with no  rales, rhonchi, wheezing, or rubs  HEART: Regular rate and rhythm; No murmurs, rubs, or gallops  ABDOMEN: Soft, Nontender, Nondistended; Bowel sounds present  EXTREMITIES:  2+ Peripheral Pulses, No clubbing, cyanosis, or edema  SKIN: No rashes or lesions    Care Discussed with Consultants/Other Providers [ x] YES  [ ] NO

## 2018-05-05 NOTE — PROGRESS NOTE ADULT - SUBJECTIVE AND OBJECTIVE BOX
Patient is a 73y old  Female who presents with a chief complaint of nausea, vomiting and abdominal pain (01 May 2018 02:46)      Vascular Surgery Attending Progress Note    Interval HPI: pt w/o new c/o     Medications:  buDESOnide 160 MICROgram(s)/formoterol 4.5 MICROgram(s) Inhaler 2 Puff(s) Inhalation two times a day  dextrose 5% + sodium chloride 0.9%. 1000 milliLiter(s) IV Continuous <Continuous>  dextrose 5%. 1000 milliLiter(s) IV Continuous <Continuous>  dextrose 50% Injectable 12.5 Gram(s) IV Push once  dextrose 50% Injectable 25 Gram(s) IV Push once  dextrose 50% Injectable 25 Gram(s) IV Push once  dextrose Gel 1 Dose(s) Oral once PRN  glucagon  Injectable 1 milliGRAM(s) IntraMuscular once PRN  heparin  Infusion.  Unit(s)/Hr IV Continuous <Continuous>  heparin  Injectable 4000 Unit(s) IV Push every 6 hours PRN  heparin  Injectable 2000 Unit(s) IV Push every 6 hours PRN  insulin lispro (HumaLOG) corrective regimen sliding scale   SubCutaneous three times a day before meals  ondansetron Injectable 4 milliGRAM(s) IV Push every 6 hours PRN  pantoprazole    Tablet 40 milliGRAM(s) Oral before breakfast  simvastatin 20 milliGRAM(s) Oral at bedtime      Vital Signs Last 24 Hrs  T(C): 36.6 (05 May 2018 16:33), Max: 36.6 (05 May 2018 08:27)  T(F): 97.8 (05 May 2018 16:33), Max: 97.9 (05 May 2018 08:27)  HR: 91 (05 May 2018 16:33) (88 - 108)  BP: 153/66 (05 May 2018 16:33) (153/66 - 170/80)  BP(mean): --  RR: 18 (05 May 2018 16:33) (16 - 18)  SpO2: 97% (05 May 2018 16:33) (97% - 99%)  I&O's Summary    04 May 2018 07:01  -  05 May 2018 07:00  --------------------------------------------------------  IN: 1840 mL / OUT: 0 mL / NET: 1840 mL    05 May 2018 07:01  -  05 May 2018 19:48  --------------------------------------------------------  IN: 540 mL / OUT: 0 mL / NET: 540 mL    Physical Exam:  Neuro  A&Ox3 VSS  Vascular: stable  Abd  soft nt nd     LABS:                        12.4   8.7   )-----------( 231      ( 05 May 2018 16:11 )             36.6     05-05    140  |  107  |  <4<L>  ----------------------------<  145<H>  3.5   |  23  |  0.65    Ca    8.7      05 May 2018 06:55      PT/INR - ( 05 May 2018 08:16 )   PT: 19.1 sec;   INR: 1.67 ratio         PTT - ( 05 May 2018 16:11 )  PTT:73.3 sec    KAREN SÁNCHEZ MD  [phone]

## 2018-05-05 NOTE — PROGRESS NOTE ADULT - ASSESSMENT
73y year old Female who presents with abdominal pain, history of cholecystitis, refused surgery in the past. CT scan suggesting SMA and Celiac calcification , possible stenosis    - Medical management per primary team  - Awaiting full GI workup, patient needs mesenteric angio by IR with possible BAS of SMA if GI workup is negative. Awaiting GI input   - plan for mesenteric angiogram, possible angioplasty/stent on Monday per IR  - will follow with you      [id number] 73y year old Female who presents with abdominal pain, history of cholecystitis, refused surgery in the past. CT scan suggesting SMA and Celiac calcification , possible stenosis    - Medical management per primary team  - plan for mesenteric angiogram, possible angioplasty/stent on Monday per IR  - supportive care per med/vasc sx   - will follow with you      [id number]

## 2018-05-06 LAB
APTT BLD: 88.7 SEC — HIGH (ref 27.5–37.4)
GLUCOSE BLDC GLUCOMTR-MCNC: 117 MG/DL — HIGH (ref 70–99)
GLUCOSE BLDC GLUCOMTR-MCNC: 127 MG/DL — HIGH (ref 70–99)
GLUCOSE BLDC GLUCOMTR-MCNC: 164 MG/DL — HIGH (ref 70–99)
GLUCOSE BLDC GLUCOMTR-MCNC: 206 MG/DL — HIGH (ref 70–99)
HCT VFR BLD CALC: 34.9 % — SIGNIFICANT CHANGE UP (ref 34.5–45)
HGB BLD-MCNC: 11.9 G/DL — SIGNIFICANT CHANGE UP (ref 11.5–15.5)
INR BLD: 1.59 RATIO — HIGH (ref 0.88–1.16)
MCHC RBC-ENTMCNC: 28.3 PG — SIGNIFICANT CHANGE UP (ref 27–34)
MCHC RBC-ENTMCNC: 34.1 GM/DL — SIGNIFICANT CHANGE UP (ref 32–36)
MCV RBC AUTO: 82.9 FL — SIGNIFICANT CHANGE UP (ref 80–100)
PLATELET # BLD AUTO: 211 K/UL — SIGNIFICANT CHANGE UP (ref 150–400)
PROTHROM AB SERPL-ACNC: 18.1 SEC — HIGH (ref 10–13.1)
RBC # BLD: 4.2 M/UL — SIGNIFICANT CHANGE UP (ref 3.8–5.2)
RBC # FLD: 11.7 % — SIGNIFICANT CHANGE UP (ref 10.3–14.5)
WBC # BLD: 7.3 K/UL — SIGNIFICANT CHANGE UP (ref 3.8–10.5)
WBC # FLD AUTO: 7.3 K/UL — SIGNIFICANT CHANGE UP (ref 3.8–10.5)

## 2018-05-06 PROCEDURE — 99232 SBSQ HOSP IP/OBS MODERATE 35: CPT

## 2018-05-06 RX ADMIN — BUDESONIDE AND FORMOTEROL FUMARATE DIHYDRATE 2 PUFF(S): 160; 4.5 AEROSOL RESPIRATORY (INHALATION) at 05:34

## 2018-05-06 RX ADMIN — SIMVASTATIN 20 MILLIGRAM(S): 20 TABLET, FILM COATED ORAL at 21:21

## 2018-05-06 RX ADMIN — PANTOPRAZOLE SODIUM 40 MILLIGRAM(S): 20 TABLET, DELAYED RELEASE ORAL at 05:34

## 2018-05-06 RX ADMIN — HEPARIN SODIUM 900 UNIT(S)/HR: 5000 INJECTION INTRAVENOUS; SUBCUTANEOUS at 06:08

## 2018-05-06 RX ADMIN — Medication 1: at 12:59

## 2018-05-06 RX ADMIN — SODIUM CHLORIDE 50 MILLILITER(S): 9 INJECTION, SOLUTION INTRAVENOUS at 00:59

## 2018-05-06 RX ADMIN — BUDESONIDE AND FORMOTEROL FUMARATE DIHYDRATE 2 PUFF(S): 160; 4.5 AEROSOL RESPIRATORY (INHALATION) at 19:32

## 2018-05-06 NOTE — PROGRESS NOTE ADULT - SUBJECTIVE AND OBJECTIVE BOX
- Patient seen and examined.  - In summary, patient is a 73 year old woman who presented with abdominal pain (01 May 2018 02:46)  - Today, patient is without complaints.         *****MEDICATIONS:    MEDICATIONS  (STANDING):  buDESOnide 160 MICROgram(s)/formoterol 4.5 MICROgram(s) Inhaler 2 Puff(s) Inhalation two times a day  dextrose 5% + sodium chloride 0.9%. 1000 milliLiter(s) (50 mL/Hr) IV Continuous <Continuous>  dextrose 5%. 1000 milliLiter(s) (50 mL/Hr) IV Continuous <Continuous>  dextrose 50% Injectable 12.5 Gram(s) IV Push once  dextrose 50% Injectable 25 Gram(s) IV Push once  dextrose 50% Injectable 25 Gram(s) IV Push once  heparin  Infusion.  Unit(s)/Hr (9 mL/Hr) IV Continuous <Continuous>  insulin lispro (HumaLOG) corrective regimen sliding scale   SubCutaneous three times a day before meals  pantoprazole    Tablet 40 milliGRAM(s) Oral before breakfast  simvastatin 20 milliGRAM(s) Oral at bedtime    MEDICATIONS  (PRN):  dextrose Gel 1 Dose(s) Oral once PRN Blood Glucose LESS THAN 70 milliGRAM(s)/deciliter  glucagon  Injectable 1 milliGRAM(s) IntraMuscular once PRN Glucose LESS THAN 70 milligrams/deciliter  heparin  Injectable 4000 Unit(s) IV Push every 6 hours PRN For aPTT less than 40  heparin  Injectable 2000 Unit(s) IV Push every 6 hours PRN For aPTT between 40 - 57  ondansetron Injectable 4 milliGRAM(s) IV Push every 6 hours PRN Nausea             ***** REVIEW OF SYSTEM:  GEN: no fever, no chills, no pain  RESP: no SOB, no cough, no sputum  CVS: no chest pain, no palpitations, no edema  GI: no abdominal pain, no nausea, no vomiting, no constipation, no diarrhea  : no dysuria, no frequency  NEURO: no headache, no dizziness  PSYCH: no depression, not anxious  Derm : no itching, no rash         ***** VITAL SIGNS:    T(F): 97.6 (18 @ 08:20), Max: 97.8 (18 @ 16:33)  HR: 93 (18 @ 08:20) (91 - 96)  BP: 139/63 (18 @ 08:20) (123/74 - 153/66)  RR: 18 (18 @ 08:20) (18 - 18)  SpO2: 96% (18 @ 08:20) (96% - 97%)  Wt(kg): --  ,   I&O's Summary    05 May 2018 07:  -  06 May 2018 07:00  --------------------------------------------------------  IN: 1059 mL / OUT: 0 mL / NET: 1059 mL    06 May 2018 07:01  -  06 May 2018 10:01  --------------------------------------------------------  IN: 250 mL / OUT: 0 mL / NET: 250 mL                 *****PHYSICAL EXAM:  GEN: A&O X 3 , NAD , comfortable  HEENT: NCAT, EOMI, MMM, no icterus  NECK: Supple, No JVD  CVS: S1S2 , regular , No M/R/G appreciated  PULM: CTA B/L,  no W/R/R appreciated  ABD.: soft. non tender, non distended,  bowel sounds present  Extrem: intact pulses , no edema noted  Derm: No rash or ecchymosis noted  PSYCH: normal mood, no depression, not anxious         *****LAB AND IMAGIN.9   7.3   )-----------( 211      ( 06 May 2018 05:34 )             34.9                   140  |  107  |  <4<L>  ----------------------------<  145<H>  3.5   |  23  |  0.65    Ca    8.7      05 May 2018 06:55      PT/INR - ( 06 May 2018 07:14 )   PT: 18.1 sec;   INR: 1.59 ratio      PTT - ( 06 May 2018 05:34 )  PTT:88.7 sec         [All pertinent recent Imaging/Reports reviewed]         *****A S S E S S M E N T   A N D   P L A N :  73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 ELYSE Gurrola

## 2018-05-06 NOTE — PROGRESS NOTE ADULT - SUBJECTIVE AND OBJECTIVE BOX
GENERAL SURGERY Progress NOTE - Red    Patient continues to tolerate diet. Passing flatus, BM. Did not ambulate. No food fear, weight loss, postprandial pain, hematochezia. No complaints      HPI:  72 yo female with PMH of Atrial fibrillation on coumadin, HTN, DM2, Asthma, HLD, presents here with nausea, vomiting and abdominal pain.  Patient was admitted for similar reason in March.  At that time she was found to have elevated leukocytosis and elevated liver enzymes.  Ultrasound of abdomen showed cholelithiasis without cholecystitis and without biliary ductal dilatation.  MR abdomen showed no hepatic lesion.  Reason for symptoms were thought to be due to cholelithiasis.  Patient was seen by surgery and was recommended for cholecystectomy, however patient refused and she was discharged.  Patient now states that yesterday morning she woke and started vomiting.  She had 3 episodes of NBNB vomiting.  She had one episode of loose stool.  She also had mild midepigastric pain. She denies any fever, sick contact, outside food.  She denies any dysuria. (01 May 2018 02:46)        PAST MEDICAL & SURGICAL HISTORY:  Dementia  Hypertension  Type 2 diabetes mellitus  Atrial fibrillation, chronic  Diabetes mellitus  Asthma  No significant past surgical history    MEDICATIONS  (STANDING):  buDESOnide 160 MICROgram(s)/formoterol 4.5 MICROgram(s) Inhaler 2 Puff(s) Inhalation two times a day  dextrose 5% + sodium chloride 0.9%. 1000 milliLiter(s) (50 mL/Hr) IV Continuous <Continuous>  dextrose 5%. 1000 milliLiter(s) (50 mL/Hr) IV Continuous <Continuous>  dextrose 50% Injectable 12.5 Gram(s) IV Push once  dextrose 50% Injectable 25 Gram(s) IV Push once  dextrose 50% Injectable 25 Gram(s) IV Push once  heparin  Infusion.  Unit(s)/Hr (9 mL/Hr) IV Continuous <Continuous>  insulin lispro (HumaLOG) corrective regimen sliding scale   SubCutaneous three times a day before meals  pantoprazole    Tablet 40 milliGRAM(s) Oral before breakfast  simvastatin 20 milliGRAM(s) Oral at bedtime    MEDICATIONS  (PRN):  dextrose Gel 1 Dose(s) Oral once PRN Blood Glucose LESS THAN 70 milliGRAM(s)/deciliter  glucagon  Injectable 1 milliGRAM(s) IntraMuscular once PRN Glucose LESS THAN 70 milligrams/deciliter  heparin  Injectable 4000 Unit(s) IV Push every 6 hours PRN For aPTT less than 40  heparin  Injectable 2000 Unit(s) IV Push every 6 hours PRN For aPTT between 40 - 57  ondansetron Injectable 4 milliGRAM(s) IV Push every 6 hours PRN Nausea    Allergies    Avelox (Pruritus)  Levaquin (Unknown)    Intolerances        Vital Signs Last 24 Hrs  T(C): 36.4 (06 May 2018 08:20), Max: 36.6 (05 May 2018 16:33)  T(F): 97.6 (06 May 2018 08:20), Max: 97.8 (05 May 2018 16:33)  HR: 93 (06 May 2018 08:20) (91 - 96)  BP: 139/63 (06 May 2018 08:20) (123/74 - 153/66)  BP(mean): --  RR: 18 (06 May 2018 08:20) (18 - 18)  SpO2: 96% (06 May 2018 08:20) (96% - 97%)  Daily Height in cm: 147.32 (05 May 2018 10:06)    Daily     PE  NAD  RRR  CTAB  soft NTND                          11.9   7.3   )-----------( 211      ( 06 May 2018 05:34 )             34.9     05-05    140  |  107  |  <4<L>  ----------------------------<  145<H>  3.5   |  23  |  0.65    Ca    8.7      05 May 2018 06:55      PT/INR - ( 06 May 2018 07:14 )   PT: 18.1 sec;   INR: 1.59 ratio         PTT - ( 06 May 2018 05:34 )  PTT:88.7 sec      IMAGING STUDIES:  mesenteric duplex notable for SMA, celiac stenosis

## 2018-05-06 NOTE — PROGRESS NOTE ADULT - ASSESSMENT
72 yo female with PMH of Atrial fibrillation on coumadin, HTN, DM2, Asthma, HLD, presents here with nausea, vomiting and abdominal pain.     {[id number]} Problem/Plan - 1:  ·  Problem: Generalized abdominal pain.  Plan: Likely secondary to  Mesenteric Ischemia .S/P Doppler US showing ..< from: US Doppler Mesenteric (05.02.18 @ 11:30) >  IMPRESSION: Diffuse calcifiedatherosclerotic disease of the visualized   abdominal aorta and interrogated branches.    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 Findings suggest   mesenteric ischemia.    Cholelithiasis without cholecystitis.    < end of copied text >  .  SMA stenosis on CT scan (unchanged from previous)  IR  planning Angio with possible Stent tomorrow. D/W  and pt.     {[id number]} Problem/Plan - 2:  ·  Problem: Nausea vomiting and diarrhea.  Plan: Resolved . Exact cause not known.     {[id number]} Problem/Plan - 3:  ·  Problem: Sepsis.  Plan: Resolving . Off Abxs.   c/w IVF  HIDA scan is negative .   c/w IV zosyn.     {[id number]} Problem/Plan - 4:  ·  Problem: Type 2 diabetes mellitus.  Plan: Sugars in good range. c/w sliding scale  hold oral meds.     {[id number]} Problem/Plan - 5:  ·  Problem: Hypertension.  Plan: BP under good control.  hold lisinopril, lasix for now.     {[id number]} Problem/Plan - 6:  Problem: Atrial fibrillation, chronic. Plan: Holding Coumadin for procedure as INR should be <1.5 .On Heparin now and will hold per IR.   Cardiology helping .    monitor PT/INR    {[id number]} Problem/Plan - 7:  ·  Problem: Hypokalemia .  Plan: Replacing.      Problem/Plan - 8:  ·  Problem: Prophylactic measure.  Plan: on AC.

## 2018-05-06 NOTE — PROGRESS NOTE ADULT - SUBJECTIVE AND OBJECTIVE BOX
Vascular Surgery Progress Note    Interval HPI: pt w/o new c/o, was eating breakfast (eggs and potato) this morning, denies pain but has no appetite.     Vital Signs Last 24 Hrs  T(C): 36.4 (06 May 2018 08:20), Max: 36.6 (05 May 2018 16:33)  T(F): 97.6 (06 May 2018 08:20), Max: 97.8 (05 May 2018 16:33)  HR: 93 (06 May 2018 08:20) (91 - 96)  BP: 139/63 (06 May 2018 08:20) (123/74 - 153/66)  BP(mean): --  RR: 18 (06 May 2018 08:20) (18 - 18)  SpO2: 96% (06 May 2018 08:20) (96% - 97%)      LABS:                                   11.9   7.3   )-----------( 211      ( 06 May 2018 05:34 )             34.9       05-05    140  |  107  |  <4<L>  ----------------------------<  145<H>  3.5   |  23  |  0.65    Ca    8.7      05 May 2018 06:55      PT/INR - ( 06 May 2018 07:14 )   PT: 18.1 sec;   INR: 1.59 ratio         PTT - ( 06 May 2018 05:34 )  PTT:88.7 sec          74 yo female with PMH of Atrial fibrillation on coumadin, HTN, DM2, Asthma, HLD, presents here with nausea, vomiting and abdominal pain with concern for mesenteric ischemia     - Patient consenting  to mesenteric angio poss BAS w IR  tomorrow   - Will continue to follow     0004 Vascular Surgery Progress Note    Interval HPI: pt w/o new c/o, was eating breakfast (eggs and potato) this morning, denies pain but has no appetite.     Vital Signs Last 24 Hrs  T(C): 36.4 (06 May 2018 08:20), Max: 36.6 (05 May 2018 16:33)  T(F): 97.6 (06 May 2018 08:20), Max: 97.8 (05 May 2018 16:33)  HR: 93 (06 May 2018 08:20) (91 - 96)  BP: 139/63 (06 May 2018 08:20) (123/74 - 153/66)  BP(mean): --  RR: 18 (06 May 2018 08:20) (18 - 18)  SpO2: 96% (06 May 2018 08:20) (96% - 97%)      LABS:                                   11.9   7.3   )-----------( 211      ( 06 May 2018 05:34 )             34.9       05-05    140  |  107  |  <4<L>  ----------------------------<  145<H>  3.5   |  23  |  0.65    Ca    8.7      05 May 2018 06:55      PT/INR - ( 06 May 2018 07:14 )   PT: 18.1 sec;   INR: 1.59 ratio         PTT - ( 06 May 2018 05:34 )  PTT:88.7 sec      neuro a and o x 3  vasc stable  Abd soft nt nd at this time

## 2018-05-06 NOTE — PROGRESS NOTE ADULT - SUBJECTIVE AND OBJECTIVE BOX
INTERVAL HPI/OVERNIGHT EVENTS: Seen and examined with  in room . I feel fine.   Vital Signs Last 24 Hrs  T(C): 36.4 (06 May 2018 08:20), Max: 36.6 (05 May 2018 16:33)  T(F): 97.6 (06 May 2018 08:20), Max: 97.8 (05 May 2018 16:33)  HR: 93 (06 May 2018 08:20) (91 - 96)  BP: 139/63 (06 May 2018 08:20) (123/74 - 153/66)  BP(mean): --  RR: 18 (06 May 2018 08:20) (18 - 18)  SpO2: 96% (06 May 2018 08:20) (96% - 97%)  I&O's Summary    05 May 2018 07:01  -  06 May 2018 07:00  --------------------------------------------------------  IN: 1059 mL / OUT: 0 mL / NET: 1059 mL    06 May 2018 07:01  -  06 May 2018 15:01  --------------------------------------------------------  IN: 450 mL / OUT: 0 mL / NET: 450 mL      MEDICATIONS  (STANDING):  buDESOnide 160 MICROgram(s)/formoterol 4.5 MICROgram(s) Inhaler 2 Puff(s) Inhalation two times a day  dextrose 5% + sodium chloride 0.9%. 1000 milliLiter(s) (50 mL/Hr) IV Continuous <Continuous>  dextrose 5%. 1000 milliLiter(s) (50 mL/Hr) IV Continuous <Continuous>  dextrose 50% Injectable 12.5 Gram(s) IV Push once  dextrose 50% Injectable 25 Gram(s) IV Push once  dextrose 50% Injectable 25 Gram(s) IV Push once  heparin  Infusion.  Unit(s)/Hr (9 mL/Hr) IV Continuous <Continuous>  insulin lispro (HumaLOG) corrective regimen sliding scale   SubCutaneous three times a day before meals  pantoprazole    Tablet 40 milliGRAM(s) Oral before breakfast  simvastatin 20 milliGRAM(s) Oral at bedtime    MEDICATIONS  (PRN):  dextrose Gel 1 Dose(s) Oral once PRN Blood Glucose LESS THAN 70 milliGRAM(s)/deciliter  glucagon  Injectable 1 milliGRAM(s) IntraMuscular once PRN Glucose LESS THAN 70 milligrams/deciliter  heparin  Injectable 4000 Unit(s) IV Push every 6 hours PRN For aPTT less than 40  heparin  Injectable 2000 Unit(s) IV Push every 6 hours PRN For aPTT between 40 - 57  ondansetron Injectable 4 milliGRAM(s) IV Push every 6 hours PRN Nausea    LABS:                        11.9   7.3   )-----------( 211      ( 06 May 2018 05:34 )             34.9     05-05    140  |  107  |  <4<L>  ----------------------------<  145<H>  3.5   |  23  |  0.65    Ca    8.7      05 May 2018 06:55      PT/INR - ( 06 May 2018 07:14 )   PT: 18.1 sec;   INR: 1.59 ratio         PTT - ( 06 May 2018 05:34 )  PTT:88.7 sec    CAPILLARY BLOOD GLUCOSE      POCT Blood Glucose.: 164 mg/dL (06 May 2018 12:16)  POCT Blood Glucose.: 127 mg/dL (06 May 2018 08:25)  POCT Blood Glucose.: 222 mg/dL (05 May 2018 21:20)  POCT Blood Glucose.: 129 mg/dL (05 May 2018 17:05)          REVIEW OF SYSTEMS:  CONSTITUTIONAL: No fever, weight loss, or fatigue  EYES: No eye pain, visual disturbances, or discharge  ENMT:  No difficulty hearing, tinnitus, vertigo; No sinus or throat pain  NECK: No pain or stiffness  BREASTS: No pain, masses, or nipple discharge  RESPIRATORY: No cough, wheezing, chills or hemoptysis; No shortness of breath  CARDIOVASCULAR: No chest pain, palpitations, dizziness, or leg swelling  GASTROINTESTINAL: No abdominal or epigastric pain. No nausea, vomiting, or hematemesis; No diarrhea or constipation. No melena or hematochezia.  GENITOURINARY: No dysuria, frequency, hematuria, or incontinence  NEUROLOGICAL: No headaches, memory loss, loss of strength, numbness, or tremors  SKIN: No itching, burning, rashes, or lesions   LYMPH NODES: No enlarged glands  ENDOCRINE: No heat or cold intolerance; No hair loss  MUSCULOSKELETAL: No joint pain or swelling; No muscle, back, or extremity pain  PSYCHIATRIC: No depression, anxiety, mood swings, or difficulty sleeping  HEME/LYMPH: No easy bruising, or bleeding gums  ALLERY AND IMMUNOLOGIC: No hives or eczema    RADIOLOGY & ADDITIONAL TESTS:    Consultant(s) Notes Reviewed:  [x ] YES  [ ] NO    PHYSICAL EXAM:  GENERAL: NAD, well-groomed, well-developed,not in any distress ,  HEAD:  Atraumatic, Normocephalic  EYES: EOMI, PERRLA, conjunctiva and sclera clear  ENMT: No tonsillar erythema, exudates, or enlargement; Moist mucous membranes, Good dentition, No lesions  NECK: Supple, No JVD, Normal thyroid  NERVOUS SYSTEM:  Alert & Oriented X3, No focal deficit   CHEST/LUNG: Good air entry bilateral with no  rales, rhonchi, wheezing, or rubs  HEART: Regular rate and rhythm; No murmurs, rubs, or gallops  ABDOMEN: Soft, Nontender, Nondistended; Bowel sounds present  EXTREMITIES:  2+ Peripheral Pulses, No clubbing, cyanosis, or edema  SKIN: No rashes or lesions    Care Discussed with Consultants/Other Providers [ x] YES  [ ] NO

## 2018-05-07 LAB
APTT BLD: 176.3 SEC — CRITICAL HIGH (ref 27.5–37.4)
APTT BLD: 30.4 SEC — SIGNIFICANT CHANGE UP (ref 27.5–37.4)
GLUCOSE BLDC GLUCOMTR-MCNC: 103 MG/DL — HIGH (ref 70–99)
GLUCOSE BLDC GLUCOMTR-MCNC: 118 MG/DL — HIGH (ref 70–99)
GLUCOSE BLDC GLUCOMTR-MCNC: 119 MG/DL — HIGH (ref 70–99)
GLUCOSE BLDC GLUCOMTR-MCNC: 239 MG/DL — HIGH (ref 70–99)
HCT VFR BLD CALC: 33.7 % — LOW (ref 34.5–45)
HGB BLD-MCNC: 10.9 G/DL — LOW (ref 11.5–15.5)
INR BLD: 1.16 RATIO — SIGNIFICANT CHANGE UP (ref 0.88–1.16)
MCHC RBC-ENTMCNC: 26.4 PG — LOW (ref 27–34)
MCHC RBC-ENTMCNC: 32.3 GM/DL — SIGNIFICANT CHANGE UP (ref 32–36)
MCV RBC AUTO: 81.6 FL — SIGNIFICANT CHANGE UP (ref 80–100)
PLATELET # BLD AUTO: 245 K/UL — SIGNIFICANT CHANGE UP (ref 150–400)
PROTHROM AB SERPL-ACNC: 13.1 SEC — SIGNIFICANT CHANGE UP (ref 10–13.1)
RBC # BLD: 4.13 M/UL — SIGNIFICANT CHANGE UP (ref 3.8–5.2)
RBC # FLD: 13.1 % — SIGNIFICANT CHANGE UP (ref 10.3–14.5)
WBC # BLD: 7.32 K/UL — SIGNIFICANT CHANGE UP (ref 3.8–10.5)
WBC # FLD AUTO: 7.32 K/UL — SIGNIFICANT CHANGE UP (ref 3.8–10.5)

## 2018-05-07 PROCEDURE — 76937 US GUIDE VASCULAR ACCESS: CPT | Mod: 26

## 2018-05-07 PROCEDURE — 75726 ARTERY X-RAYS ABDOMEN: CPT | Mod: 26

## 2018-05-07 PROCEDURE — 99232 SBSQ HOSP IP/OBS MODERATE 35: CPT

## 2018-05-07 PROCEDURE — 36245 INS CATH ABD/L-EXT ART 1ST: CPT

## 2018-05-07 RX ORDER — WARFARIN SODIUM 2.5 MG/1
5 TABLET ORAL ONCE
Qty: 0 | Refills: 0 | Status: COMPLETED | OUTPATIENT
Start: 2018-05-07 | End: 2018-05-07

## 2018-05-07 RX ADMIN — HEPARIN SODIUM 1100 UNIT(S)/HR: 5000 INJECTION INTRAVENOUS; SUBCUTANEOUS at 14:47

## 2018-05-07 RX ADMIN — HEPARIN SODIUM 4000 UNIT(S): 5000 INJECTION INTRAVENOUS; SUBCUTANEOUS at 14:48

## 2018-05-07 RX ADMIN — WARFARIN SODIUM 5 MILLIGRAM(S): 2.5 TABLET ORAL at 22:05

## 2018-05-07 RX ADMIN — BUDESONIDE AND FORMOTEROL FUMARATE DIHYDRATE 2 PUFF(S): 160; 4.5 AEROSOL RESPIRATORY (INHALATION) at 04:59

## 2018-05-07 RX ADMIN — BUDESONIDE AND FORMOTEROL FUMARATE DIHYDRATE 2 PUFF(S): 160; 4.5 AEROSOL RESPIRATORY (INHALATION) at 17:05

## 2018-05-07 RX ADMIN — HEPARIN SODIUM 1000 UNIT(S)/HR: 5000 INJECTION INTRAVENOUS; SUBCUTANEOUS at 22:55

## 2018-05-07 RX ADMIN — HEPARIN SODIUM 0 UNIT(S)/HR: 5000 INJECTION INTRAVENOUS; SUBCUTANEOUS at 21:51

## 2018-05-07 RX ADMIN — SODIUM CHLORIDE 50 MILLILITER(S): 9 INJECTION, SOLUTION INTRAVENOUS at 04:44

## 2018-05-07 RX ADMIN — SIMVASTATIN 20 MILLIGRAM(S): 20 TABLET, FILM COATED ORAL at 22:05

## 2018-05-07 RX ADMIN — SODIUM CHLORIDE 50 MILLILITER(S): 9 INJECTION, SOLUTION INTRAVENOUS at 22:56

## 2018-05-07 NOTE — PROGRESS NOTE ADULT - SUBJECTIVE AND OBJECTIVE BOX
- Patient seen and examined.  - In summary, patient is a 73 year old woman who presented with abdominal pain (01 May 2018 02:46)  - Today, patient is without complaints.         *****MEDICATIONS:    MEDICATIONS  (STANDING):  buDESOnide 160 MICROgram(s)/formoterol 4.5 MICROgram(s) Inhaler 2 Puff(s) Inhalation two times a day  dextrose 5% + sodium chloride 0.9%. 1000 milliLiter(s) (50 mL/Hr) IV Continuous <Continuous>  dextrose 5%. 1000 milliLiter(s) (50 mL/Hr) IV Continuous <Continuous>  dextrose 50% Injectable 12.5 Gram(s) IV Push once  dextrose 50% Injectable 25 Gram(s) IV Push once  dextrose 50% Injectable 25 Gram(s) IV Push once  heparin  Infusion.  Unit(s)/Hr (9 mL/Hr) IV Continuous <Continuous>  insulin lispro (HumaLOG) corrective regimen sliding scale   SubCutaneous three times a day before meals  pantoprazole    Tablet 40 milliGRAM(s) Oral before breakfast  simvastatin 20 milliGRAM(s) Oral at bedtime    MEDICATIONS  (PRN):  dextrose Gel 1 Dose(s) Oral once PRN Blood Glucose LESS THAN 70 milliGRAM(s)/deciliter  glucagon  Injectable 1 milliGRAM(s) IntraMuscular once PRN Glucose LESS THAN 70 milligrams/deciliter  heparin  Injectable 4000 Unit(s) IV Push every 6 hours PRN For aPTT less than 40  heparin  Injectable 2000 Unit(s) IV Push every 6 hours PRN For aPTT between 40 - 57  ondansetron Injectable 4 milliGRAM(s) IV Push every 6 hours PRN Nausea               ***** REVIEW OF SYSTEM:  GEN: no fever, no chills, no pain  RESP: no SOB, no cough, no sputum  CVS: no chest pain, no palpitations, no edema  GI: no abdominal pain, no nausea, no vomiting, no constipation, no diarrhea  : no dysuria, no frequency  NEURO: no headache, no dizziness  PSYCH: no depression, not anxious  Derm : no itching, no rash         ***** VITAL SIGNS:    T(F): 98 (05-07-18 @ 07:58), Max: 98.1 (05-06-18 @ 16:26)  HR: 84 (05-07-18 @ 07:58) (84 - 96)  BP: 132/70 (05-07-18 @ 07:58) (132/70 - 145/81)  RR: 18 (05-07-18 @ 07:58) (18 - 18)  SpO2: 95% (05-07-18 @ 07:58) (95% - 97%)  Wt(kg): --  ,   I&O's Summary    06 May 2018 07:01  -  07 May 2018 07:00  --------------------------------------------------------  IN: 943 mL / OUT: 0 mL / NET: 943 mL                     *****PHYSICAL EXAM:  GEN: A&O X 3 , NAD , comfortable  HEENT: NCAT, EOMI, MMM, no icterus  NECK: Supple, No JVD  CVS: S1S2 , regular , No M/R/G appreciated  PULM: CTA B/L,  no W/R/R appreciated  ABD.: soft. 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

## 2018-05-07 NOTE — PROGRESS NOTE ADULT - ASSESSMENT
74 yo female with PMH of Atrial fibrillation on coumadin, HTN, DM2, Asthma, HLD, presents here with nausea, vomiting and abdominal pain with concern for mesenteric ischemia     -for mesenteric angio poss BAS w IR today   will follow

## 2018-05-07 NOTE — PROGRESS NOTE ADULT - ASSESSMENT
74 yo female with PMH of Atrial fibrillation on coumadin, HTN, DM2, Asthma, HLD, presents here with nausea, vomiting and abdominal pain with concern for mesenteric ischemia     - IR angiogram today; will follow  - Discussed with Dr. ANTHONY Rosario MD PGY2  Red: [id number]

## 2018-05-07 NOTE — PROGRESS NOTE ADULT - SUBJECTIVE AND OBJECTIVE BOX
Interventional Radiology Brief- Operative Note    Procedure: Mesenteric angiogram    Operators: June Duron    Anesthesia (type): MAC    Contrast: 82 cc    EBL: Minimal    Findings/Follow up Plan of Care: Mild stenosis of proximal celiac artery without pressure gradient. Approximately 1.8 cm occlusion of the proximal superior mesenteric artery with retrograde filling via the IPDA. Mild stenosis at the origin of the inferior mesenteric artery with pressure gradient of 2 mm Hg. No intervention performed as only 1 out of the 3 vessels with hemodynamically significant stenosis.    Specimens Removed: None    Implants: Mynx closure    Complications: None    Condition/Disposition: Stable/Recovery    Please call Interventional Radiology x [phone] with any questions, concerns, or issues.

## 2018-05-07 NOTE — PROGRESS NOTE ADULT - SUBJECTIVE AND OBJECTIVE BOX
Patient is a 73y old  Female who presents with a chief complaint of nausea, vomiting and abdominal pain (01 May 2018 02:46)      Vascular Surgery Attending Progress Note    Interval HPI: pt c/o intermittent abd pain w po intake     Medications:  buDESOnide 160 MICROgram(s)/formoterol 4.5 MICROgram(s) Inhaler 2 Puff(s) Inhalation two times a day  dextrose 5% + sodium chloride 0.9%. 1000 milliLiter(s) IV Continuous <Continuous>  dextrose 5%. 1000 milliLiter(s) IV Continuous <Continuous>  dextrose 50% Injectable 12.5 Gram(s) IV Push once  dextrose 50% Injectable 25 Gram(s) IV Push once  dextrose 50% Injectable 25 Gram(s) IV Push once  dextrose Gel 1 Dose(s) Oral once PRN  glucagon  Injectable 1 milliGRAM(s) IntraMuscular once PRN  heparin  Infusion.  Unit(s)/Hr IV Continuous <Continuous>  heparin  Injectable 4000 Unit(s) IV Push every 6 hours PRN  heparin  Injectable 2000 Unit(s) IV Push every 6 hours PRN  insulin lispro (HumaLOG) corrective regimen sliding scale   SubCutaneous three times a day before meals  ondansetron Injectable 4 milliGRAM(s) IV Push every 6 hours PRN  pantoprazole    Tablet 40 milliGRAM(s) Oral before breakfast  simvastatin 20 milliGRAM(s) Oral at bedtime      Vital Signs Last 24 Hrs  T(C): 36.7 (07 May 2018 07:58), Max: 36.7 (06 May 2018 16:26)  T(F): 98 (07 May 2018 07:58), Max: 98.1 (06 May 2018 16:26)  HR: 84 (07 May 2018 07:58) (84 - 96)  BP: 132/70 (07 May 2018 07:58) (132/70 - 145/81)  BP(mean): --  RR: 18 (07 May 2018 07:58) (18 - 18)  SpO2: 95% (07 May 2018 07:58) (95% - 97%)  I&O's Summary    06 May 2018 07:01  -  07 May 2018 07:00  --------------------------------------------------------  IN: 943 mL / OUT: 0 mL / NET: 943 mL        Physical Exam:  Neuro  A&Ox3 VSS  Abd  mild diffuse tenderness no rebound   Vascular:  stable     LABS:                        10.9   7.32  )-----------( 245      ( 07 May 2018 07:27 )             33.7           PT/INR - ( 07 May 2018 07:34 )   PT: 13.1 sec;   INR: 1.16 ratio         PTT - ( 07 May 2018 07:34 )  PTT:30.4 sec    KAREN SÁNCHEZ MD  [phone]

## 2018-05-07 NOTE — PROGRESS NOTE ADULT - ASSESSMENT
74 yo female with PMH of Atrial fibrillation on coumadin, HTN, DM2, Asthma, HLD, presents here with nausea, vomiting and abdominal pain.     {[id number]} Problem/Plan - 1:  ·  Problem: Generalized abdominal pain.  Plan: Likely secondary to  Mesenteric Ischemia .S/P Doppler US showing ..< from: US Doppler Mesenteric (05.02.18 @ 11:30) >  IMPRESSION: Diffuse calcifiedatherosclerotic disease of the visualized   abdominal aorta and interrogated branches.    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 Findings suggest   mesenteric ischemia.    Cholelithiasis without cholecystitis.    < end of copied text >  .  SMA stenosis on CT scan (unchanged from previous)  S/P  Angio with good collaterals so no stent put in.    {[id number]} Problem/Plan - 2:  ·  Problem: Nausea vomiting and diarrhea.  Plan: Resolved . Exact cause not known.     {[id number]} Problem/Plan - 3:  ·  Problem: Sepsis.  Plan: Resolving . Off Abxs.   c/w IVF  HIDA scan is negative .   S/P  IV zosyn.     {[id number]} Problem/Plan - 4:  ·  Problem: Type 2 diabetes mellitus.  Plan: Sugars in good range. c/w sliding scale  hold oral meds.     {[id number]} Problem/Plan - 5:  ·  Problem: Hypertension.  Plan: BP under good control.  hold lisinopril, lasix for now.     {[id number]} Problem/Plan - 6:  Problem: Atrial fibrillation, chronic. Plan: Restarting Heparin till INR is Therapeutic with Coumadin. Cardiology helping .    monitor PT/INR    {[id number]} Problem/Plan - 7:  ·  Problem: Hypokalemia .  Plan: Replacing.      Problem/Plan - 8:  ·  Problem: Prophylactic measure.  Plan: on AC.

## 2018-05-07 NOTE — PROVIDER CONTACT NOTE (CRITICAL VALUE NOTIFICATION) - ACTION/TREATMENT ORDERED:
Supplemental IV Potassium ordered. Repeat labs after potassium given
ANTONIO Irvin notified. Follow heparin nomogram. Heparin drip stop for 1hr. Restart in 1hr and continue at 10ml/hr.

## 2018-05-07 NOTE — PROGRESS NOTE ADULT - SUBJECTIVE AND OBJECTIVE BOX
Interventional Radiology Pre-Procedure Note    Procedure: Mesenteric angiogram, possible angioplasty/stent placement    Diagnosis/Indication: Patient is a 73 year old lady with suspected chronic mesenteric ischemia.    PAST MEDICAL & SURGICAL HISTORY:  Dementia  Hypertension  Type 2 diabetes mellitus  Atrial fibrillation, chronic  Diabetes mellitus  Asthma  No significant past surgical history     Allergies: Avelox (Pruritus)  Levaquin (Unknown)    LABS:  CBC Full  -  ( 07 May 2018 07:27 )  WBC Count : 7.32 K/uL  Hemoglobin : 10.9 g/dL  Hematocrit : 33.7 %  Platelet Count - Automated : 245 K/uL  Mean Cell Volume : 81.6 fl  Mean Cell Hemoglobin : 26.4 pg  Mean Cell Hemoglobin Concentration : 32.3 gm/dL    PT/INR - ( 07 May 2018 07:34 )   PT: 13.1 sec;   INR: 1.16 ratio      PTT - ( 07 May 2018 07:34 )  PTT:30.4 sec    Procedure/ risks/ benefits/ alternatives were explained, informed consent obtained from patient, verbalizes understanding.

## 2018-05-07 NOTE — PROGRESS NOTE ADULT - SUBJECTIVE AND OBJECTIVE BOX
INTERVAL HPI/OVERNIGHT EVENTS: S/p angio . No new complaints .   Vital Signs Last 24 Hrs  T(C): 36.4 (07 May 2018 16:36), Max: 36.8 (07 May 2018 14:35)  T(F): 97.6 (07 May 2018 16:36), Max: 98.3 (07 May 2018 14:35)  HR: 99 (07 May 2018 16:36) (84 - 99)  BP: 139/73 (07 May 2018 16:36) (132/70 - 145/79)  BP(mean): --  RR: 18 (07 May 2018 16:36) (18 - 18)  SpO2: 96% (07 May 2018 16:36) (95% - 97%)  I&O's Summary    06 May 2018 07:01  -  07 May 2018 07:00  --------------------------------------------------------  IN: 943 mL / OUT: 0 mL / NET: 943 mL      MEDICATIONS  (STANDING):  buDESOnide 160 MICROgram(s)/formoterol 4.5 MICROgram(s) Inhaler 2 Puff(s) Inhalation two times a day  dextrose 5% + sodium chloride 0.9%. 1000 milliLiter(s) (50 mL/Hr) IV Continuous <Continuous>  dextrose 5%. 1000 milliLiter(s) (50 mL/Hr) IV Continuous <Continuous>  dextrose 50% Injectable 12.5 Gram(s) IV Push once  dextrose 50% Injectable 25 Gram(s) IV Push once  dextrose 50% Injectable 25 Gram(s) IV Push once  heparin  Infusion.  Unit(s)/Hr (9 mL/Hr) IV Continuous <Continuous>  insulin lispro (HumaLOG) corrective regimen sliding scale   SubCutaneous three times a day before meals  pantoprazole    Tablet 40 milliGRAM(s) Oral before breakfast  simvastatin 20 milliGRAM(s) Oral at bedtime  warfarin 5 milliGRAM(s) Oral once    MEDICATIONS  (PRN):  dextrose Gel 1 Dose(s) Oral once PRN Blood Glucose LESS THAN 70 milliGRAM(s)/deciliter  glucagon  Injectable 1 milliGRAM(s) IntraMuscular once PRN Glucose LESS THAN 70 milligrams/deciliter  heparin  Injectable 4000 Unit(s) IV Push every 6 hours PRN For aPTT less than 40  heparin  Injectable 2000 Unit(s) IV Push every 6 hours PRN For aPTT between 40 - 57  ondansetron Injectable 4 milliGRAM(s) IV Push every 6 hours PRN Nausea    LABS:                        10.9   7.32  )-----------( 245      ( 07 May 2018 07:27 )             33.7           PT/INR - ( 07 May 2018 07:34 )   PT: 13.1 sec;   INR: 1.16 ratio         PTT - ( 07 May 2018 07:34 )  PTT:30.4 sec    CAPILLARY BLOOD GLUCOSE      POCT Blood Glucose.: 118 mg/dL (07 May 2018 17:04)  POCT Blood Glucose.: 103 mg/dL (07 May 2018 13:48)  POCT Blood Glucose.: 119 mg/dL (07 May 2018 08:25)  POCT Blood Glucose.: 206 mg/dL (06 May 2018 21:10)          REVIEW OF SYSTEMS:  CONSTITUTIONAL: No fever, weight loss, or fatigue  EYES: No eye pain, visual disturbances, or discharge  ENMT:  No difficulty hearing, tinnitus, vertigo; No sinus or throat pain  NECK: No pain or stiffness  BREASTS: No pain, masses, or nipple discharge  RESPIRATORY: No cough, wheezing, chills or hemoptysis; No shortness of breath  CARDIOVASCULAR: No chest pain, palpitations, dizziness, or leg swelling  GASTROINTESTINAL: No abdominal or epigastric pain. No nausea, vomiting, or hematemesis; No diarrhea or constipation. No melena or hematochezia.  GENITOURINARY: No dysuria, frequency, hematuria, or incontinence  NEUROLOGICAL: No headaches, memory loss, loss of strength, numbness, or tremors  SKIN: No itching, burning, rashes, or lesions   LYMPH NODES: No enlarged glands  ENDOCRINE: No heat or cold intolerance; No hair loss  MUSCULOSKELETAL: No joint pain or swelling; No muscle, back, or extremity pain    Consultant(s) Notes Reviewed:  [x ] YES  [ ] NO    PHYSICAL EXAM:  GENERAL: NAD, well-groomed, well-developed, not in any distress ,  HEAD:  Atraumatic, Normocephalic  EYES: EOMI, PERRLA, conjunctiva and sclera clear  ENMT: No tonsillar erythema, exudates, or enlargement; Moist mucous membranes, Good dentition, No lesions  NECK: Supple, No JVD, Normal thyroid  NERVOUS SYSTEM:  Alert & Oriented X3, No focal deficit   CHEST/LUNG: Good air entry bilateral with no  rales, rhonchi, wheezing, or rubs  HEART: Regular rate and rhythm; No murmurs, rubs, or gallops  ABDOMEN: Soft, Nontender, Nondistended; Bowel sounds present  EXTREMITIES:  2+ Peripheral Pulses, No clubbing, cyanosis, or edema  SKIN: No rashes or lesions    Care Discussed with Consultants/Other Providers [ x] YES  [ ] NO

## 2018-05-07 NOTE — PROGRESS NOTE ADULT - SUBJECTIVE AND OBJECTIVE BOX
Vascular Surgery Progress Note    Interval HPI: pt w/o new c/o, sleeping comfortably     Vital Signs Last 24 Hrs  T(C): 36.7 (07 May 2018 00:19), Max: 36.7 (06 May 2018 16:26)  T(F): 98 (07 May 2018 00:19), Max: 98.1 (06 May 2018 16:26)  HR: 94 (07 May 2018 00:19) (93 - 96)  BP: 145/79 (07 May 2018 00:19) (139/63 - 145/81)  BP(mean): --  RR: 18 (07 May 2018 00:19) (18 - 18)  SpO2: 97% (07 May 2018 00:19) (96% - 97%)    neuro:  alert and oriented x 3  vasc : stable  Abd : soft, NT, ND      LABS:                             11.9   7.3   )-----------( 211      ( 06 May 2018 05:34 )             34.9       05-05    140  |  107  |  <4<L>  ----------------------------<  145<H>  3.5   |  23  |  0.65    Ca    8.7      05 May 2018 06:55      PT/INR - ( 06 May 2018 07:14 )   PT: 18.1 sec;   INR: 1.59 ratio         PTT - ( 06 May 2018 05:34 )  PTT:88.7 sec        Assessment and Plan:   72 yo female with PMH of Atrial fibrillation on coumadin, HTN, DM2, Asthma, HLD, presents here with nausea, vomiting and abdominal pain with concern for mesenteric ischemia     -pt is now consenting  to mesenteric angio poss BAS w IR today   - will follow       [id number]

## 2018-05-07 NOTE — CHART NOTE - NSCHARTNOTEFT_GEN_A_CORE
Interventional Radiology Post Procedure Note    S: No pain.  Morales removed.    O:  Vitals: T(F): 97.6 (05-07-18 @ 16:36), Max: 98.3 (05-07-18 @ 14:35)  HR: 99 (05-07-18 @ 16:36) (84 - 99)  BP: 139/73 (05-07-18 @ 16:36) (132/70 - 145/79)  RR: 18 (05-07-18 @ 16:36) (18 - 18)  SpO2: 96% (05-07-18 @ 16:36) (95% - 97%)    PHYSICAL EXAM:    General: Awake, alert, in NAD  Abdomen: Soft, NT, ND  Extremities: Well perfused, warm  Right groin: Soft, no signs or symptoms of hematoma  Pulses: R CFA palpable, R DP faintly palpable, L DP palpable    Impression: 73 year old lady s/p mesenteric angiogram, doing well post procedure.    Plan:  -May resume anticoagulation as needed  -Monitor symptoms     Please call IR at extension [phone] with any questions, concerns, or issues regarding above.

## 2018-05-07 NOTE — PROGRESS NOTE ADULT - SUBJECTIVE AND OBJECTIVE BOX
Colorectal SURGERY PROGRESS NOTE:   Pager: [phone]    Interim Events: Patient reports no nausea, vomiting. Tolerated her regular diet yesterday without abdominal pain.         Physical Exam  Vital Signs Last 24 Hrs  T(C): 36.7 (07 May 2018 00:19), Max: 36.7 (06 May 2018 16:26)  T(F): 98 (07 May 2018 00:19), Max: 98.1 (06 May 2018 16:26)  HR: 94 (07 May 2018 00:19) (93 - 96)  BP: 145/79 (07 May 2018 00:19) (139/63 - 145/81)  BP(mean): --  RR: 18 (07 May 2018 00:19) (18 - 18)  SpO2: 97% (07 May 2018 00:19) (96% - 97%)    Gen: NAD  HEENT: normocephalic, atraumatic, no scleral icterus  CV: S1, S2, RRR  Pulm: CTA B/L  Abd: Soft, ND, NTP, no rebound, no guarding, no palpable organomegaly/masses    I&O's Detail    06 May 2018 07:01  -  07 May 2018 07:00  --------------------------------------------------------  IN:    dextrose 5% + sodium chloride 0.9%.: 100 mL    heparin  Infusion.: 153 mL    Oral Fluid: 690 mL  Total IN: 943 mL    OUT:  Total OUT: 0 mL    Total NET: 943 mL          Labs:                        11.9   7.3   )-----------( 211      ( 06 May 2018 05:34 )             34.9           PT/INR - ( 06 May 2018 07:14 )   PT: 18.1 sec;   INR: 1.59 ratio         PTT - ( 06 May 2018 05:34 )  PTT:88.7 sec

## 2018-05-08 LAB
ANION GAP SERPL CALC-SCNC: 11 MMOL/L — SIGNIFICANT CHANGE UP (ref 5–17)
APTT BLD: 101.3 SEC — HIGH (ref 27.5–37.4)
APTT BLD: 78.5 SEC — HIGH (ref 27.5–37.4)
APTT BLD: 85.3 SEC — HIGH (ref 27.5–37.4)
BUN SERPL-MCNC: 4 MG/DL — LOW (ref 7–23)
CALCIUM SERPL-MCNC: 8.7 MG/DL — SIGNIFICANT CHANGE UP (ref 8.4–10.5)
CHLORIDE SERPL-SCNC: 104 MMOL/L — SIGNIFICANT CHANGE UP (ref 96–108)
CO2 SERPL-SCNC: 26 MMOL/L — SIGNIFICANT CHANGE UP (ref 22–31)
CREAT SERPL-MCNC: 0.77 MG/DL — SIGNIFICANT CHANGE UP (ref 0.5–1.3)
GLUCOSE BLDC GLUCOMTR-MCNC: 167 MG/DL — HIGH (ref 70–99)
GLUCOSE BLDC GLUCOMTR-MCNC: 167 MG/DL — HIGH (ref 70–99)
GLUCOSE BLDC GLUCOMTR-MCNC: 172 MG/DL — HIGH (ref 70–99)
GLUCOSE BLDC GLUCOMTR-MCNC: 210 MG/DL — HIGH (ref 70–99)
GLUCOSE SERPL-MCNC: 189 MG/DL — HIGH (ref 70–99)
HCT VFR BLD CALC: 34.7 % — SIGNIFICANT CHANGE UP (ref 34.5–45)
HGB BLD-MCNC: 11.1 G/DL — LOW (ref 11.5–15.5)
INR BLD: 1.55 RATIO — HIGH (ref 0.88–1.16)
MCHC RBC-ENTMCNC: 26.4 PG — LOW (ref 27–34)
MCHC RBC-ENTMCNC: 32 GM/DL — SIGNIFICANT CHANGE UP (ref 32–36)
MCV RBC AUTO: 82.4 FL — SIGNIFICANT CHANGE UP (ref 80–100)
PLATELET # BLD AUTO: 210 K/UL — SIGNIFICANT CHANGE UP (ref 150–400)
POTASSIUM SERPL-MCNC: 3.1 MMOL/L — LOW (ref 3.5–5.3)
POTASSIUM SERPL-SCNC: 3.1 MMOL/L — LOW (ref 3.5–5.3)
PROTHROM AB SERPL-ACNC: 17 SEC — HIGH (ref 9.8–12.7)
RBC # BLD: 4.21 M/UL — SIGNIFICANT CHANGE UP (ref 3.8–5.2)
RBC # FLD: 13.5 % — SIGNIFICANT CHANGE UP (ref 10.3–14.5)
SODIUM SERPL-SCNC: 141 MMOL/L — SIGNIFICANT CHANGE UP (ref 135–145)
WBC # BLD: 7.44 K/UL — SIGNIFICANT CHANGE UP (ref 3.8–10.5)
WBC # FLD AUTO: 7.44 K/UL — SIGNIFICANT CHANGE UP (ref 3.8–10.5)

## 2018-05-08 PROCEDURE — 99232 SBSQ HOSP IP/OBS MODERATE 35: CPT

## 2018-05-08 RX ORDER — POTASSIUM CHLORIDE 20 MEQ
40 PACKET (EA) ORAL ONCE
Qty: 0 | Refills: 0 | Status: COMPLETED | OUTPATIENT
Start: 2018-05-08 | End: 2018-05-08

## 2018-05-08 RX ORDER — WARFARIN SODIUM 2.5 MG/1
4 TABLET ORAL ONCE
Qty: 0 | Refills: 0 | Status: COMPLETED | OUTPATIENT
Start: 2018-05-08 | End: 2018-05-08

## 2018-05-08 RX ADMIN — HEPARIN SODIUM 1000 UNIT(S)/HR: 5000 INJECTION INTRAVENOUS; SUBCUTANEOUS at 05:40

## 2018-05-08 RX ADMIN — PANTOPRAZOLE SODIUM 40 MILLIGRAM(S): 20 TABLET, DELAYED RELEASE ORAL at 05:42

## 2018-05-08 RX ADMIN — HEPARIN SODIUM 900 UNIT(S)/HR: 5000 INJECTION INTRAVENOUS; SUBCUTANEOUS at 18:57

## 2018-05-08 RX ADMIN — WARFARIN SODIUM 4 MILLIGRAM(S): 2.5 TABLET ORAL at 21:48

## 2018-05-08 RX ADMIN — SODIUM CHLORIDE 50 MILLILITER(S): 9 INJECTION, SOLUTION INTRAVENOUS at 21:49

## 2018-05-08 RX ADMIN — Medication 1: at 17:31

## 2018-05-08 RX ADMIN — Medication 1: at 12:36

## 2018-05-08 RX ADMIN — Medication 40 MILLIEQUIVALENT(S): at 17:31

## 2018-05-08 RX ADMIN — BUDESONIDE AND FORMOTEROL FUMARATE DIHYDRATE 2 PUFF(S): 160; 4.5 AEROSOL RESPIRATORY (INHALATION) at 05:42

## 2018-05-08 RX ADMIN — HEPARIN SODIUM 900 UNIT(S)/HR: 5000 INJECTION INTRAVENOUS; SUBCUTANEOUS at 13:11

## 2018-05-08 RX ADMIN — Medication 1: at 09:23

## 2018-05-08 RX ADMIN — BUDESONIDE AND FORMOTEROL FUMARATE DIHYDRATE 2 PUFF(S): 160; 4.5 AEROSOL RESPIRATORY (INHALATION) at 17:31

## 2018-05-08 RX ADMIN — SIMVASTATIN 20 MILLIGRAM(S): 20 TABLET, FILM COATED ORAL at 21:48

## 2018-05-08 NOTE — PROGRESS NOTE ADULT - SUBJECTIVE AND OBJECTIVE BOX
Colorectal SURGERY PROGRESS NOTE:   Pager: [phone]    Interim Events: Patient underwent IR angiogram yesterday without stent placement. Patient tolerating a regular diet without abdominal pain, N/ V.         Physical Exam  Vital Signs Last 24 Hrs  T(C): 36.9 (08 May 2018 07:06), Max: 36.9 (08 May 2018 07:06)  T(F): 98.5 (08 May 2018 07:06), Max: 98.5 (08 May 2018 07:06)  HR: 91 (08 May 2018 07:06) (84 - 99)  BP: 149/71 (08 May 2018 07:06) (132/70 - 149/71)  BP(mean): --  RR: 18 (08 May 2018 07:06) (18 - 18)  SpO2: 98% (08 May 2018 07:06) (95% - 98%)    Gen: NAD  HEENT: normocephalic, atraumatic, no scleral icterus  CV: S1, S2, RRR  Pulm: CTA B/L  Abd: Soft, ND, NTP, no rebound, no guarding, no palpable organomegaly/masses      I&O's Detail    07 May 2018 07:01  -  08 May 2018 07:00  --------------------------------------------------------  IN:    dextrose 5% + sodium chloride 0.9%.: 600 mL    Oral Fluid: 360 mL  Total IN: 960 mL    OUT:  Total OUT: 0 mL    Total NET: 960 mL          Labs:                        10.9   7.32  )-----------( 245      ( 07 May 2018 07:27 )             33.7     05-08    141  |  104  |  4<L>  ----------------------------<  189<H>  3.1<L>   |  26  |  0.77    Ca    8.7      08 May 2018 05:08      PT/INR - ( 07 May 2018 07:34 )   PT: 13.1 sec;   INR: 1.16 ratio         PTT - ( 08 May 2018 05:08 )  PTT:85.3 sec

## 2018-05-08 NOTE — PROGRESS NOTE ADULT - SUBJECTIVE AND OBJECTIVE BOX
Patient ate spaghetti and meatballs without complaints. She doesnt like the food here.      PMH  Dementia  Hypertension  Type 2 diabetes mellitus  Atrial fibrillation, chronic  Diabetes mellitus  Asthma    PSH  No significant past surgical history  Diabetes Mellitus  HTN (Hypertension)  Asthma  Afib    MEDS  Home Medications:  Advair Diskus 250 mcg-50 mcg inhalation powder: 1 puff(s) inhaled 2 times a day (18 Oct 2017 19:38)  Centrum oral tablet: 1 tab(s) orally once a day (18 Oct 2017 19:38)  furosemide 20 mg oral tablet: 1 tab(s) orally once a day (03 Mar 2018 13:51)  metFORMIN 1000 mg oral tablet: 1 tab(s) orally 2 times a day (18 Oct 2017 19:38)  NexIUM OTC 20 mg oral delayed release capsule: 1 cap(s) orally once a day (in the morning) (18 Oct 2017 19:38)  predniSONE 5 mg oral tablet: 1 tab(s) orally once a day (24 Oct 2017 13:10)  simvastatin 20 mg oral tablet: 1 tab(s) orally once a day (at bedtime) (24 Oct 2017 13:10)  Vitamin D3 2000 intl units oral tablet: 1 tab(s) orally once a day (18 Oct 2017 19:38)  warfarin 3 mg oral tablet: 1 tab(s) orally once a day (07 Mar 2018 12:39)    Allergies    Avelox (Pruritus)  Levaquin (Unknown)        Physical Exam  Vital Signs Last 24 Hrs  T(C): 36.9 (08 May 2018 07:06), Max: 36.9 (08 May 2018 07:06)  T(F): 98.5 (08 May 2018 07:06), Max: 98.5 (08 May 2018 07:06)  HR: 91 (08 May 2018 07:06) (91 - 99)  BP: 149/71 (08 May 2018 07:06) (132/78 - 149/71)  RR: 18 (08 May 2018 07:06) (18 - 18)  SpO2: 98% (08 May 2018 07:06) (96% - 98%)      Gen: NAD, alert and interactive, oriented  HEENT: normocephalic, atraumatic, no scleral icterus  CV: S1, S2, RRR  Pulm: CTA B/L  Abd: Soft, ND, NTP, no rebound, no guarding, no palpable organomegaly/masses  Ext: warm, no edema, palp dp/pt  Skin: no rash, no ecchymoses  Neuro: no focal deficits, CN grossly normal.     Labs:                        14.6   23.2  )-----------( 301      ( 2018 20:10 )             43.0         135  |  96  |  12  ----------------------------<  205<H>  3.9   |  22  |  0.79    Ca    8.8      2018 20:10    TPro  7.1  /  Alb  3.4  /  TBili  0.6  /  DBili  x   /  AST  20  /  ALT  25  /  AlkPhos  83      PT/INR - ( 2018 20:10 )   PT: 36.4 sec;   INR: 3.26 ratio         PTT - ( 2018 20:10 )  PTT:36.8 sec  Urinalysis Basic - ( 2018 20:20 )    Color: Yellow / Appearance: Clear / S.019 / pH: x  Gluc: x / Ketone: Small  / Bili: Negative / Urobili: Negative   Blood: x / Protein: 30 mg/dL / Nitrite: Negative   Leuk Esterase: Small / RBC: 10-25 /HPF / WBC 11-25 /HPF   Sq Epi: x / Non Sq Epi: OCC /HPF / Bacteria: Few /HPF      Imaging    CT Abdomen     FINDINGS:    LOWER CHEST: No airspace consolidations. Coronary calcifications.    LIVER: Unchanged calcified hepatic granulomas.  BILE DUCTS: Normal caliber.  GALLBLADDER: Cholelithiasis.  SPLEEN: Within normal limits.  PANCREAS: Within normal limits.  ADRENALS: Within normal limits.  KIDNEYS/URETERS: Subcentimeter hypodense is in the right kidney is too   small for accurate characterization. Nonenhancing regions of cortex in   the right upper pole consistent with chronic renal infarct is unchanged.   No hydronephrosis.    BLADDER: Within normal limits.  REPRODUCTIVE ORGANS: Small foci of air within the vagina.    BOWEL: No bowel obstruction. 1.7 cm duodenal diverticulum of the 3rd   portion is unchanged. Sigmoid diverticulosis without diverticulitis.   Appendix is notvisualized and it was not seen on the prior CT of 3/3/18.  PERITONEUM: No ascites.  VESSELS:  Atherosclerotic disease of the aorta and its branches with   severe atherosclerotic calcifications of the origins of the celiac axis   and SMA. likely stenosis of the SMA origin.  RETROPERITONEUM/SUBPERITONEUM: No lymphadenopathy.    ABDOMINAL WALL: Within normal limits.  MUSCULOSKELETAL: Intramedullary nailing of the proximal femurs with   helical femoral neck screw in the right hip and gamma nail in the left   hip. Severe degenerative changes of visualized spine with severe lumbar   dextroscoliosis with apex at L3. Grade 2 anterolisthesis of L5 on S1 with   degenerative fusion of L5-S1.    IMPRESSION:     The etiology of abdominal pain is not elucidated.    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 Patient ate spaghetti and meatballs without complaints. She doesnt like the food here.      PMH  Dementia  Hypertension  Type 2 diabetes mellitus  Atrial fibrillation, chronic  Diabetes mellitus  Asthma    PSH  No significant past surgical history  Diabetes Mellitus  HTN (Hypertension)  Asthma  Afib    MEDS  Home Medications:  Advair Diskus 250 mcg-50 mcg inhalation powder: 1 puff(s) inhaled 2 times a day (18 Oct 2017 19:38)  Centrum oral tablet: 1 tab(s) orally once a day (18 Oct 2017 19:38)  furosemide 20 mg oral tablet: 1 tab(s) orally once a day (03 Mar 2018 13:51)  metFORMIN 1000 mg oral tablet: 1 tab(s) orally 2 times a day (18 Oct 2017 19:38)  NexIUM OTC 20 mg oral delayed release capsule: 1 cap(s) orally once a day (in the morning) (18 Oct 2017 19:38)  predniSONE 5 mg oral tablet: 1 tab(s) orally once a day (24 Oct 2017 13:10)  simvastatin 20 mg oral tablet: 1 tab(s) orally once a day (at bedtime) (24 Oct 2017 13:10)  Vitamin D3 2000 intl units oral tablet: 1 tab(s) orally once a day (18 Oct 2017 19:38)  warfarin 3 mg oral tablet: 1 tab(s) orally once a day (07 Mar 2018 12:39)    Allergies    Avelox (Pruritus)  Levaquin (Unknown)    Physical Exam  Vital Signs Last 24 Hrs  T(C): 36.9 (08 May 2018 07:06), Max: 36.9 (08 May 2018 07:06)  T(F): 98.5 (08 May 2018 07:06), Max: 98.5 (08 May 2018 07:06)  HR: 91 (08 May 2018 07:06) (91 - 99)  BP: 149/71 (08 May 2018 07:06) (132/78 - 149/71)  RR: 18 (08 May 2018 07:06) (18 - 18)  SpO2: 98% (08 May 2018 07:06) (96% - 98%)      Gen: NAD, alert and interactive, oriented  HEENT: normocephalic, atraumatic, no scleral icterus  CV: S1, S2, RRR  Pulm: CTA B/L  Abd: Soft, ND, NTP, no rebound, no guarding, no palpable organomegaly/masses  Ext: warm, no edema, palp dp/pt  Skin: no rash, no ecchymoses  Neuro: no focal deficits, CN grossly normal.     Labs:                        14.6   23.2  )-----------( 301      ( 2018 20:10 )             43.0         135  |  96  |  12  ----------------------------<  205<H>  3.9   |  22  |  0.79    Ca    8.8      2018 20:10    TPro  7.1  /  Alb  3.4  /  TBili  0.6  /  DBili  x   /  AST  20  /  ALT  25  /  AlkPhos  83      PT/INR - ( 2018 20:10 )   PT: 36.4 sec;   INR: 3.26 ratio         PTT - ( 2018 20:10 )  PTT:36.8 sec  Urinalysis Basic - ( 2018 20:20 )    Color: Yellow / Appearance: Clear / S.019 / pH: x  Gluc: x / Ketone: Small  / Bili: Negative / Urobili: Negative   Blood: x / Protein: 30 mg/dL / Nitrite: Negative   Leuk Esterase: Small / RBC: 10-25 /HPF / WBC 11-25 /HPF   Sq Epi: x / Non Sq Epi: OCC /HPF / Bacteria: Few /HPF      Imaging    CT Abdomen     FINDINGS:    LOWER CHEST: No airspace consolidations. Coronary calcifications.    LIVER: Unchanged calcified hepatic granulomas.  BILE DUCTS: Normal caliber.  GALLBLADDER: Cholelithiasis.  SPLEEN: Within normal limits.  PANCREAS: Within normal limits.  ADRENALS: Within normal limits.  KIDNEYS/URETERS: Subcentimeter hypodense is in the right kidney is too   small for accurate characterization. Nonenhancing regions of cortex in   the right upper pole consistent with chronic renal infarct is unchanged.   No hydronephrosis.    BLADDER: Within normal limits.  REPRODUCTIVE ORGANS: Small foci of air within the vagina.    BOWEL: No bowel obstruction. 1.7 cm duodenal diverticulum of the 3rd   portion is unchanged. Sigmoid diverticulosis without diverticulitis.   Appendix is notvisualized and it was not seen on the prior CT of 3/3/18.  PERITONEUM: No ascites.  VESSELS:  Atherosclerotic disease of the aorta and its branches with   severe atherosclerotic calcifications of the origins of the celiac axis   and SMA. likely stenosis of the SMA origin.  RETROPERITONEUM/SUBPERITONEUM: No lymphadenopathy.    ABDOMINAL WALL: Within normal limits.  MUSCULOSKELETAL: Intramedullary nailing of the proximal femurs with   helical femoral neck screw in the right hip and gamma nail in the left   hip. Severe degenerative changes of visualized spine with severe lumbar   dextroscoliosis with apex at L3. Grade 2 anterolisthesis of L5 on S1 with   degenerative fusion of L5-S1.    IMPRESSION:     The etiology of abdominal pain is not elucidated.    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

## 2018-05-08 NOTE — PROGRESS NOTE ADULT - ASSESSMENT
72 yo female with PMH of Atrial fibrillation on coumadin, HTN, DM2, Asthma, HLD, presents here with nausea, vomiting and abdominal pain with concern for mesenteric ischemia s/p IR angiogram indicating hemodynamically significant stenosis of the proximal SMA but only mild stenosis of the celiac and GABRIELE without intervention performed.     - No colorectal surgery intervention  - Discussed with Dr. ANTHONY Rosario MD PGY2  Red: [id number]

## 2018-05-08 NOTE — PROGRESS NOTE ADULT - ASSESSMENT
73F acute abdominal pain s/p IR angiogram    - Cont diet as tolerated  - Care per medicine  - There are no acute surgical issues, we will sign off. Please reconsult at any time.

## 2018-05-08 NOTE — PROGRESS NOTE ADULT - ASSESSMENT
74 yo female with PMH of Atrial fibrillation on coumadin, HTN, DM2, Asthma, HLD, presents here with nausea, vomiting and abdominal pain.     {[id number]} Problem/Plan - 1:  ·  Problem: Generalized abdominal pain.  Plan: Likely secondary to  Mesenteric Ischemia .S/P Doppler US showing ..< from: US Doppler Mesenteric (05.02.18 @ 11:30) >  IMPRESSION: Diffuse calcifiedatherosclerotic disease of the visualized   abdominal aorta and interrogated branches.    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 Findings suggest   mesenteric ischemia.    Cholelithiasis without cholecystitis.    < end of copied text >  .  SMA stenosis on CT scan (unchanged from previous)  S/P  Angio with good collaterals so no stent put in.    {[id number]} Problem/Plan - 2:  ·  Problem: Nausea vomiting and diarrhea.  Plan: Resolved . Exact cause not known.     {[id number]} Problem/Plan - 3:  ·  Problem: Sepsis.  Plan: Resolving . Off Abxs.   c/w IVF  HIDA scan is negative .   S/P  IV zosyn.     {[id number]} Problem/Plan - 4:  ·  Problem: Type 2 diabetes mellitus.  Plan: Sugars in good range. c/w sliding scale  hold oral meds.     {[id number]} Problem/Plan - 5:  ·  Problem: Hypertension.  Plan: BP under good control.  hold lisinopril, lasix for now.     {[id number]} Problem/Plan - 6:  Problem: Atrial fibrillation, chronic. Plan: Restarting Heparin till INR is Therapeutic with Coumadin. Cardiology helping .    monitor PT/INR    {[id number]} Problem/Plan - 7:  ·  Problem: Hypokalemia .  Plan: Replacing.      Problem/Plan - 8:  ·  Problem: Prophylactic measure.  Plan: on AC.     Disposition : DC planning pending therapeutic INR.

## 2018-05-08 NOTE — PROGRESS NOTE ADULT - SUBJECTIVE AND OBJECTIVE BOX
- Patient seen and examined.  - In summary, patient is a 73 year old woman who presented with abdominal pain (01 May 2018 02:46)  - Today, patient is without complaints.         *****MEDICATIONS:    MEDICATIONS  (STANDING):  buDESOnide 160 MICROgram(s)/formoterol 4.5 MICROgram(s) Inhaler 2 Puff(s) Inhalation two times a day  dextrose 5% + sodium chloride 0.9%. 1000 milliLiter(s) (50 mL/Hr) IV Continuous <Continuous>  dextrose 5%. 1000 milliLiter(s) (50 mL/Hr) IV Continuous <Continuous>  dextrose 50% Injectable 12.5 Gram(s) IV Push once  dextrose 50% Injectable 25 Gram(s) IV Push once  dextrose 50% Injectable 25 Gram(s) IV Push once  heparin  Infusion.  Unit(s)/Hr (9 mL/Hr) IV Continuous <Continuous>  insulin lispro (HumaLOG) corrective regimen sliding scale   SubCutaneous three times a day before meals  pantoprazole    Tablet 40 milliGRAM(s) Oral before breakfast  simvastatin 20 milliGRAM(s) Oral at bedtime    MEDICATIONS  (PRN):  dextrose Gel 1 Dose(s) Oral once PRN Blood Glucose LESS THAN 70 milliGRAM(s)/deciliter  glucagon  Injectable 1 milliGRAM(s) IntraMuscular once PRN Glucose LESS THAN 70 milligrams/deciliter  heparin  Injectable 4000 Unit(s) IV Push every 6 hours PRN For aPTT less than 40  heparin  Injectable 2000 Unit(s) IV Push every 6 hours PRN For aPTT between 40 - 57  ondansetron Injectable 4 milliGRAM(s) IV Push every 6 hours PRN Nausea                 ***** REVIEW OF SYSTEM:  GEN: no fever, no chills, no pain  RESP: no SOB, no cough, no sputum  CVS: no chest pain, no palpitations, no edema  GI: no abdominal pain, no nausea, no vomiting, no constipation, no diarrhea  : no dysuria, no frequency  NEURO: no headache, no dizziness  PSYCH: no depression, not anxious  Derm : no itching, no rash         ***** VITAL SIGNS:    T(F): 98.5 (18 @ 07:06), Max: 98.5 (18 @ 07:06)  HR: 91 (18 @ 07:06) (91 - 99)  BP: 149/71 (18 @ 07:06) (132/78 - 149/71)  RR: 18 (18 @ 07:06) (18 - 18)  SpO2: 98% (18 @ 07:06) (96% - 98%)  Wt(kg): --  ,   I&O's Summary    07 May 2018 07:  -  08 May 2018 07:00  --------------------------------------------------------  IN: 960 mL / OUT: 0 mL / NET: 960 mL    08 May 2018 07:  -  08 May 2018 10:57  --------------------------------------------------------  IN: 280 mL / OUT: 0 mL / NET: 280 mL             *****PHYSICAL EXAM:  GEN: A&O X 3 , NAD , comfortable  HEENT: NCAT, EOMI, MMM, no icterus  NECK: Supple, No JVD  CVS: S1S2 , regular , No M/R/G appreciated  PULM: CTA B/L,  no W/R/R appreciated  ABD.: soft. non tender, non distended,  bowel sounds present  Extrem: intact pulses , no edema noted  Derm: No rash or ecchymosis noted  PSYCH: normal mood, no depression, not anxious         *****LAB AND IMAGIN.1   7.44  )-----------( 210      ( 08 May 2018 08:02 )             34.7               05-08    141  |  104  |  4<L>  ----------------------------<  189<H>  3.1<L>   |  26  |  0.77    Ca    8.7      08 May 2018 05:08      PT/INR - ( 07 May 2018 07:34 )   PT: 13.1 sec;   INR: 1.16 ratio    PTT - ( 08 May 2018 05:08 )  PTT:85.3 sec           [All pertinent recent Imaging/Reports reviewed]         *****A S S E S S M E N T   A N D   P L A N :  73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 ELYSE Gurrola

## 2018-05-08 NOTE — PROGRESS NOTE ADULT - SUBJECTIVE AND OBJECTIVE BOX
INTERVAL HPI/OVERNIGHT EVENTS: Pt seen and examined. Abd pain better. Eating and tolerating diet well without post-prandial pain.    MEDICATIONS  (STANDING):  buDESOnide 160 MICROgram(s)/formoterol 4.5 MICROgram(s) Inhaler 2 Puff(s) Inhalation two times a day  dextrose 5% + sodium chloride 0.9%. 1000 milliLiter(s) (50 mL/Hr) IV Continuous <Continuous>  dextrose 5%. 1000 milliLiter(s) (50 mL/Hr) IV Continuous <Continuous>  dextrose 50% Injectable 12.5 Gram(s) IV Push once  dextrose 50% Injectable 25 Gram(s) IV Push once  dextrose 50% Injectable 25 Gram(s) IV Push once  heparin  Infusion.  Unit(s)/Hr (9 mL/Hr) IV Continuous <Continuous>  insulin lispro (HumaLOG) corrective regimen sliding scale   SubCutaneous three times a day before meals  pantoprazole    Tablet 40 milliGRAM(s) Oral before breakfast  simvastatin 20 milliGRAM(s) Oral at bedtime    MEDICATIONS  (PRN):  dextrose Gel 1 Dose(s) Oral once PRN Blood Glucose LESS THAN 70 milliGRAM(s)/deciliter  glucagon  Injectable 1 milliGRAM(s) IntraMuscular once PRN Glucose LESS THAN 70 milligrams/deciliter  heparin  Injectable 4000 Unit(s) IV Push every 6 hours PRN For aPTT less than 40  heparin  Injectable 2000 Unit(s) IV Push every 6 hours PRN For aPTT between 40 - 57  ondansetron Injectable 4 milliGRAM(s) IV Push every 6 hours PRN Nausea      Vital Signs Last 24 Hrs  T(C): 36.9 (08 May 2018 07:06), Max: 36.9 (08 May 2018 07:06)  T(F): 98.5 (08 May 2018 07:06), Max: 98.5 (08 May 2018 07:06)  HR: 91 (08 May 2018 07:06) (91 - 99)  BP: 149/71 (08 May 2018 07:06) (132/78 - 149/71)  BP(mean): --  RR: 18 (08 May 2018 07:06) (18 - 18)  SpO2: 98% (08 May 2018 07:06) (96% - 98%)    PHYSICAL EXAM:      Constitutional: NAD      Gastrointestinal: Soft, NT, ND        I&O's Detail    07 May 2018 07:01  -  08 May 2018 07:00  --------------------------------------------------------  IN:    dextrose 5% + sodium chloride 0.9%.: 600 mL    Oral Fluid: 360 mL  Total IN: 960 mL    OUT:  Total OUT: 0 mL    Total NET: 960 mL      08 May 2018 07:01  -  08 May 2018 10:18  --------------------------------------------------------  IN:    Oral Fluid: 280 mL  Total IN: 280 mL    OUT:  Total OUT: 0 mL    Total NET: 280 mL          LABS:                        11.1   7.44  )-----------( 210      ( 08 May 2018 08:02 )             34.7     05-08    141  |  104  |  4<L>  ----------------------------<  189<H>  3.1<L>   |  26  |  0.77    Ca    8.7      08 May 2018 05:08      PT/INR - ( 07 May 2018 07:34 )   PT: 13.1 sec;   INR: 1.16 ratio         PTT - ( 08 May 2018 05:08 )  PTT:85.3 sec      RADIOLOGY & ADDITIONAL STUDIES:

## 2018-05-08 NOTE — PROGRESS NOTE ADULT - ASSESSMENT
73y year old Female who presents with abdominal pain, history of cholecystitis, refused surgery in the past. CT scan suggesting SMA and Celiac calcification , possible stenosis, s/p dx angiography showing 1/3 vessel disease    -monitor patient food tolerance without pain  -continue anticoagulation    1983 73y year old Female who presents with abdominal pain, history of cholecystitis, refused surgery in the past. CT scan suggesting SMA and Celiac calcification , possible stenosis, s/p dx angiography showing 1/3 vessel disease    s/p mesenteric angio a mild celiac dz and sma occlusion w collaterals   will d/w pt's daughters options of med/conserv managemnt vs  mesenteric bypass   will follow

## 2018-05-08 NOTE — PROGRESS NOTE ADULT - SUBJECTIVE AND OBJECTIVE BOX
Interventional Radiology Follow- Up Note    S: Ate dinner last night (spaghetti and meatballs) without abdominal pain.  Complains of right heel pain.    O:  Vitals: T(F): 98.5 (05-08-18 @ 07:06), Max: 98.5 (05-08-18 @ 07:06)  HR: 91 (05-08-18 @ 07:06) (91 - 99)  BP: 149/71 (05-08-18 @ 07:06) (132/78 - 149/71)  RR: 18 (05-08-18 @ 07:06) (18 - 18)  SpO2: 98% (05-08-18 @ 07:06) (96% - 98%)    PHYSICAL EXAM:  General: Awake, alert, in NAD  Abdomen: Soft, NT, ND  Extremities: Well perfused, good capillary refill. Right heel skin intact.  Right groin: Soft, no signs or symptoms of hematoma.     Impression: 73 year old lady s/p mesenteric angiogram, doing well post procedure. Tolerated dinner last night without pain.    Plan:  -Monitor symptoms     Please call IR at extension [phone] with any questions, concerns, or issues regarding above.

## 2018-05-08 NOTE — PROGRESS NOTE ADULT - SUBJECTIVE AND OBJECTIVE BOX
INTERVAL HPI/OVERNIGHT EVENTS: I feel fine.  Vital Signs Last 24 Hrs  T(C): 36.8 (08 May 2018 15:48), Max: 36.9 (08 May 2018 07:06)  T(F): 98.2 (08 May 2018 15:48), Max: 98.5 (08 May 2018 07:06)  HR: 99 (08 May 2018 15:48) (91 - 99)  BP: 149/72 (08 May 2018 15:48) (132/78 - 149/72)  BP(mean): --  RR: 16 (08 May 2018 15:48) (16 - 18)  SpO2: 95% (08 May 2018 15:48) (95% - 98%)  I&O's Summary    07 May 2018 07:01  -  08 May 2018 07:00  --------------------------------------------------------  IN: 960 mL / OUT: 0 mL / NET: 960 mL    08 May 2018 07:01  -  08 May 2018 20:34  --------------------------------------------------------  IN: 740 mL / OUT: 0 mL / NET: 740 mL      MEDICATIONS  (STANDING):  buDESOnide 160 MICROgram(s)/formoterol 4.5 MICROgram(s) Inhaler 2 Puff(s) Inhalation two times a day  dextrose 5% + sodium chloride 0.9%. 1000 milliLiter(s) (50 mL/Hr) IV Continuous <Continuous>  dextrose 5%. 1000 milliLiter(s) (50 mL/Hr) IV Continuous <Continuous>  dextrose 50% Injectable 12.5 Gram(s) IV Push once  dextrose 50% Injectable 25 Gram(s) IV Push once  dextrose 50% Injectable 25 Gram(s) IV Push once  heparin  Infusion.  Unit(s)/Hr (9 mL/Hr) IV Continuous <Continuous>  insulin lispro (HumaLOG) corrective regimen sliding scale   SubCutaneous three times a day before meals  pantoprazole    Tablet 40 milliGRAM(s) Oral before breakfast  simvastatin 20 milliGRAM(s) Oral at bedtime  warfarin 4 milliGRAM(s) Oral once    MEDICATIONS  (PRN):  dextrose Gel 1 Dose(s) Oral once PRN Blood Glucose LESS THAN 70 milliGRAM(s)/deciliter  glucagon  Injectable 1 milliGRAM(s) IntraMuscular once PRN Glucose LESS THAN 70 milligrams/deciliter  heparin  Injectable 4000 Unit(s) IV Push every 6 hours PRN For aPTT less than 40  heparin  Injectable 2000 Unit(s) IV Push every 6 hours PRN For aPTT between 40 - 57  ondansetron Injectable 4 milliGRAM(s) IV Push every 6 hours PRN Nausea    LABS:                        11.1   7.44  )-----------( 210      ( 08 May 2018 08:02 )             34.7     05-08    141  |  104  |  4<L>  ----------------------------<  189<H>  3.1<L>   |  26  |  0.77    Ca    8.7      08 May 2018 05:08      PT/INR - ( 08 May 2018 18:37 )   PT: 17.0 sec;   INR: 1.55 ratio         PTT - ( 08 May 2018 18:37 )  PTT:78.5 sec    CAPILLARY BLOOD GLUCOSE      POCT Blood Glucose.: 167 mg/dL (08 May 2018 17:22)  POCT Blood Glucose.: 172 mg/dL (08 May 2018 12:07)  POCT Blood Glucose.: 167 mg/dL (08 May 2018 08:44)  POCT Blood Glucose.: 239 mg/dL (07 May 2018 21:39)          REVIEW OF SYSTEMS:  CONSTITUTIONAL: No fever, weight loss, or fatigue  EYES: No eye pain, visual disturbances, or discharge  ENMT:  No difficulty hearing, tinnitus, vertigo; No sinus or throat pain  NECK: No pain or stiffness  BREASTS: No pain, masses, or nipple discharge  RESPIRATORY: No cough, wheezing, chills or hemoptysis; No shortness of breath  CARDIOVASCULAR: No chest pain, palpitations, dizziness, or leg swelling  GASTROINTESTINAL: No abdominal or epigastric pain. No nausea, vomiting, or hematemesis; No diarrhea or constipation. No melena or hematochezia.  GENITOURINARY: No dysuria, frequency, hematuria, or incontinence  NEUROLOGICAL: No headaches, memory loss, loss of strength, numbness, or tremors  SKIN: No itching, burning, rashes, or lesions   LYMPH NODES: No enlarged glands  ENDOCRINE: No heat or cold intolerance; No hair loss  MUSCULOSKELETAL: No joint pain or swelling; No muscle, back, or extremity pain  PSYCHIATRIC: No depression, anxiety, mood swings, or difficulty sleeping  HEME/LYMPH: No easy bruising, or bleeding gums  ALLERY AND IMMUNOLOGIC: No hives or eczema    RADIOLOGY & ADDITIONAL TESTS:    Consultant(s) Notes Reviewed:  [x ] YES  [ ] NO    PHYSICAL EXAM:  GENERAL: NAD, well-groomed, well-developed, not in any distress ,  HEAD:  Atraumatic, Normocephalic  EYES: EOMI, PERRLA, conjunctiva and sclera clear  ENMT: No tonsillar erythema, exudates, or enlargement; Moist mucous membranes, Good dentition, No lesions  NECK: Supple, No JVD, Normal thyroid  NERVOUS SYSTEM:  Alert & Oriented X3, No focal deficit   CHEST/LUNG: Good air entry bilateral with no  rales, rhonchi, wheezing, or rubs  HEART: Regular rate and rhythm; No murmurs, rubs, or gallops  ABDOMEN: Soft, Nontender, Nondistended; Bowel sounds present  EXTREMITIES:  2+ Peripheral Pulses, No clubbing, cyanosis, or edema  SKIN: No rashes or lesions    Care Discussed with Consultants/Other Providers [ x] YES  [ ] NO

## 2018-05-09 LAB
ANION GAP SERPL CALC-SCNC: 12 MMOL/L — SIGNIFICANT CHANGE UP (ref 5–17)
APTT BLD: 74.3 SEC — HIGH (ref 27.5–37.4)
BUN SERPL-MCNC: 4 MG/DL — LOW (ref 7–23)
CALCIUM SERPL-MCNC: 8.5 MG/DL — SIGNIFICANT CHANGE UP (ref 8.4–10.5)
CHLORIDE SERPL-SCNC: 105 MMOL/L — SIGNIFICANT CHANGE UP (ref 96–108)
CO2 SERPL-SCNC: 25 MMOL/L — SIGNIFICANT CHANGE UP (ref 22–31)
CREAT SERPL-MCNC: 0.78 MG/DL — SIGNIFICANT CHANGE UP (ref 0.5–1.3)
GLUCOSE BLDC GLUCOMTR-MCNC: 136 MG/DL — HIGH (ref 70–99)
GLUCOSE BLDC GLUCOMTR-MCNC: 148 MG/DL — HIGH (ref 70–99)
GLUCOSE BLDC GLUCOMTR-MCNC: 178 MG/DL — HIGH (ref 70–99)
GLUCOSE BLDC GLUCOMTR-MCNC: 214 MG/DL — HIGH (ref 70–99)
GLUCOSE SERPL-MCNC: 158 MG/DL — HIGH (ref 70–99)
HCT VFR BLD CALC: 32.4 % — LOW (ref 34.5–45)
HGB BLD-MCNC: 10.3 G/DL — LOW (ref 11.5–15.5)
INR BLD: 1.7 RATIO — HIGH (ref 0.88–1.16)
MCHC RBC-ENTMCNC: 26.3 PG — LOW (ref 27–34)
MCHC RBC-ENTMCNC: 31.8 GM/DL — LOW (ref 32–36)
MCV RBC AUTO: 82.9 FL — SIGNIFICANT CHANGE UP (ref 80–100)
PLATELET # BLD AUTO: 205 K/UL — SIGNIFICANT CHANGE UP (ref 150–400)
POTASSIUM SERPL-MCNC: 3.6 MMOL/L — SIGNIFICANT CHANGE UP (ref 3.5–5.3)
POTASSIUM SERPL-SCNC: 3.6 MMOL/L — SIGNIFICANT CHANGE UP (ref 3.5–5.3)
PROTHROM AB SERPL-ACNC: 18.7 SEC — HIGH (ref 9.8–12.7)
RBC # BLD: 3.91 M/UL — SIGNIFICANT CHANGE UP (ref 3.8–5.2)
RBC # FLD: 13.5 % — SIGNIFICANT CHANGE UP (ref 10.3–14.5)
SODIUM SERPL-SCNC: 142 MMOL/L — SIGNIFICANT CHANGE UP (ref 135–145)
WBC # BLD: 6.91 K/UL — SIGNIFICANT CHANGE UP (ref 3.8–10.5)
WBC # FLD AUTO: 6.91 K/UL — SIGNIFICANT CHANGE UP (ref 3.8–10.5)

## 2018-05-09 PROCEDURE — 99232 SBSQ HOSP IP/OBS MODERATE 35: CPT

## 2018-05-09 RX ORDER — WARFARIN SODIUM 2.5 MG/1
5 TABLET ORAL ONCE
Qty: 0 | Refills: 0 | Status: DISCONTINUED | OUTPATIENT
Start: 2018-05-09 | End: 2018-05-09

## 2018-05-09 RX ORDER — LIDOCAINE 4 G/100G
1 CREAM TOPICAL ONCE
Qty: 0 | Refills: 0 | Status: COMPLETED | OUTPATIENT
Start: 2018-05-09 | End: 2018-05-09

## 2018-05-09 RX ORDER — WARFARIN SODIUM 2.5 MG/1
4 TABLET ORAL ONCE
Qty: 0 | Refills: 0 | Status: COMPLETED | OUTPATIENT
Start: 2018-05-09 | End: 2018-05-09

## 2018-05-09 RX ADMIN — BUDESONIDE AND FORMOTEROL FUMARATE DIHYDRATE 2 PUFF(S): 160; 4.5 AEROSOL RESPIRATORY (INHALATION) at 05:30

## 2018-05-09 RX ADMIN — SIMVASTATIN 20 MILLIGRAM(S): 20 TABLET, FILM COATED ORAL at 21:49

## 2018-05-09 RX ADMIN — PANTOPRAZOLE SODIUM 40 MILLIGRAM(S): 20 TABLET, DELAYED RELEASE ORAL at 05:30

## 2018-05-09 RX ADMIN — BUDESONIDE AND FORMOTEROL FUMARATE DIHYDRATE 2 PUFF(S): 160; 4.5 AEROSOL RESPIRATORY (INHALATION) at 17:29

## 2018-05-09 RX ADMIN — WARFARIN SODIUM 4 MILLIGRAM(S): 2.5 TABLET ORAL at 21:49

## 2018-05-09 RX ADMIN — Medication 1: at 12:34

## 2018-05-09 RX ADMIN — HEPARIN SODIUM 900 UNIT(S)/HR: 5000 INJECTION INTRAVENOUS; SUBCUTANEOUS at 01:55

## 2018-05-09 RX ADMIN — SODIUM CHLORIDE 50 MILLILITER(S): 9 INJECTION, SOLUTION INTRAVENOUS at 21:49

## 2018-05-09 RX ADMIN — LIDOCAINE 1 PATCH: 4 CREAM TOPICAL at 17:29

## 2018-05-09 NOTE — PROGRESS NOTE ADULT - PROBLEM SELECTOR PLAN 2
Likely due to gastritis vs. gastroenteritis ; r/o cholecystitis  c/w IVF  c/w IV zofran  IV zosyn
as above

## 2018-05-09 NOTE — PHYSICAL THERAPY INITIAL EVALUATION ADULT - PLANNED THERAPY INTERVENTIONS, PT EVAL
transfer training/bed mobility training/GOAL: Pt will negotiate 10 steps with 1 HR and step to pattern independently in 4 weeks./balance training/gait training

## 2018-05-09 NOTE — PHYSICAL THERAPY INITIAL EVALUATION ADULT - PRECAUTIONS/LIMITATIONS, REHAB EVAL
fall precautions/US Doppler Mesenteric/ Abd 5/2: Diffuse calcified atherosclerotic disease of the visualized abdominal aorta and interrogated branches. Increased peak systolic velocities of celiac artery and SMA represents mild celiac artery stenosis and severe SMA stenosis. Findings suggest mesenteric ischemia. Cholelithiasis without cholecystitis.

## 2018-05-09 NOTE — PROGRESS NOTE ADULT - PROBLEM SELECTOR PROBLEM 2
Generalized abdominal pain
Nausea vomiting and diarrhea
Generalized abdominal pain

## 2018-05-09 NOTE — PROGRESS NOTE ADULT - ASSESSMENT
73y year old Female who presents with abdominal pain, history of cholecystitis, refused surgery in the past. CT scan suggesting SMA and Celiac calcification , possible stenosis, s/p dx angiography showing 1/3 vessel disease    s/p mesenteric angio a mild celiac dz and sma occlusion w collaterals   may resume anticoag rx and d/c if pt is able to po po  will d/w pt and family if sx recurr then may need a aorto mesenteric bypass in the future  f/u as outpt

## 2018-05-09 NOTE — PROGRESS NOTE ADULT - SUBJECTIVE AND OBJECTIVE BOX
INTERVAL HPI/OVERNIGHT EVENTS: No new concerns.  Vital Signs Last 24 Hrs  T(C): 36.7 (09 May 2018 16:37), Max: 37.1 (08 May 2018 23:52)  T(F): 98 (09 May 2018 16:37), Max: 98.7 (08 May 2018 23:52)  HR: 107 (09 May 2018 16:37) (98 - 107)  BP: 141/65 (09 May 2018 16:37) (130/69 - 144/62)  BP(mean): --  RR: 18 (09 May 2018 16:37) (16 - 18)  SpO2: 96% (09 May 2018 16:37) (95% - 96%)  I&O's Summary    08 May 2018 07:01  -  09 May 2018 07:00  --------------------------------------------------------  IN: 1448 mL / OUT: 0 mL / NET: 1448 mL    09 May 2018 07:01  -  09 May 2018 21:02  --------------------------------------------------------  IN: 520 mL / OUT: 0 mL / NET: 520 mL      MEDICATIONS  (STANDING):  buDESOnide 160 MICROgram(s)/formoterol 4.5 MICROgram(s) Inhaler 2 Puff(s) Inhalation two times a day  dextrose 5% + sodium chloride 0.9%. 1000 milliLiter(s) (50 mL/Hr) IV Continuous <Continuous>  dextrose 5%. 1000 milliLiter(s) (50 mL/Hr) IV Continuous <Continuous>  dextrose 50% Injectable 12.5 Gram(s) IV Push once  dextrose 50% Injectable 25 Gram(s) IV Push once  dextrose 50% Injectable 25 Gram(s) IV Push once  heparin  Infusion.  Unit(s)/Hr (9 mL/Hr) IV Continuous <Continuous>  insulin lispro (HumaLOG) corrective regimen sliding scale   SubCutaneous three times a day before meals  pantoprazole    Tablet 40 milliGRAM(s) Oral before breakfast  simvastatin 20 milliGRAM(s) Oral at bedtime  warfarin 5 milliGRAM(s) Oral once    MEDICATIONS  (PRN):  dextrose Gel 1 Dose(s) Oral once PRN Blood Glucose LESS THAN 70 milliGRAM(s)/deciliter  glucagon  Injectable 1 milliGRAM(s) IntraMuscular once PRN Glucose LESS THAN 70 milligrams/deciliter  heparin  Injectable 4000 Unit(s) IV Push every 6 hours PRN For aPTT less than 40  heparin  Injectable 2000 Unit(s) IV Push every 6 hours PRN For aPTT between 40 - 57  ondansetron Injectable 4 milliGRAM(s) IV Push every 6 hours PRN Nausea    LABS:                        10.3   6.91  )-----------( 205      ( 09 May 2018 07:38 )             32.4     05-09    142  |  105  |  4<L>  ----------------------------<  158<H>  3.6   |  25  |  0.78    Ca    8.5      09 May 2018 06:59      PT/INR - ( 09 May 2018 01:00 )   PT: 18.7 sec;   INR: 1.70 ratio         PTT - ( 09 May 2018 01:00 )  PTT:74.3 sec    CAPILLARY BLOOD GLUCOSE      POCT Blood Glucose.: 136 mg/dL (09 May 2018 17:09)  POCT Blood Glucose.: 178 mg/dL (09 May 2018 12:11)  POCT Blood Glucose.: 148 mg/dL (09 May 2018 08:32)  POCT Blood Glucose.: 210 mg/dL (08 May 2018 21:26)          REVIEW OF SYSTEMS:  CONSTITUTIONAL: No fever, weight loss, or fatigue  EYES: No eye pain, visual disturbances, or discharge  ENMT:  No difficulty hearing, tinnitus, vertigo; No sinus or throat pain  NECK: No pain or stiffness  BREASTS: No pain, masses, or nipple discharge  RESPIRATORY: No cough, wheezing, chills or hemoptysis; No shortness of breath  CARDIOVASCULAR: No chest pain, palpitations, dizziness, or leg swelling  GASTROINTESTINAL: No abdominal or epigastric pain. No nausea, vomiting, or hematemesis; No diarrhea or constipation. No melena or hematochezia.  GENITOURINARY: No dysuria, frequency, hematuria, or incontinence  NEUROLOGICAL: No headaches, memory loss, loss of strength, numbness, or tremors  SKIN: No itching, burning, rashes, or lesions   LYMPH NODES: No enlarged glands  ENDOCRINE: No heat or cold intolerance; No hair loss  MUSCULOSKELETAL: No joint pain or swelling; No muscle, back, or extremity pain  PSYCHIATRIC: No depression, anxiety, mood swings, or difficulty sleeping  HEME/LYMPH: No easy bruising, or bleeding gums  ALLERY AND IMMUNOLOGIC: No hives or eczema    RADIOLOGY & ADDITIONAL TESTS:    Consultant(s) Notes Reviewed:  [x ] YES  [ ] NO    PHYSICAL EXAM:  GENERAL: NAD, well-groomed, well-developed, not in any distress ,  HEAD:  Atraumatic, Normocephalic  EYES: EOMI, PERRLA, conjunctiva and sclera clear  ENMT: No tonsillar erythema, exudates, or enlargement; Moist mucous membranes, Good dentition, No lesions  NECK: Supple, No JVD, Normal thyroid  NERVOUS SYSTEM:  Alert & Oriented X3, No focal deficit   CHEST/LUNG: Good air entry bilateral with no  rales, rhonchi, wheezing, or rubs  HEART: Regular rate and rhythm; No murmurs, rubs, or gallops  ABDOMEN: Soft, Nontender, Nondistended; Bowel sounds present  EXTREMITIES:  2+ Peripheral Pulses, No clubbing, cyanosis, or edema  SKIN: No rashes or lesions    Care Discussed with Consultants/Other Providers [ x] YES  [ ] NO

## 2018-05-09 NOTE — PROGRESS NOTE ADULT - SUBJECTIVE AND OBJECTIVE BOX
Patient is a 73y old  Female who presents with a chief complaint of nausea, vomiting and abdominal pain (01 May 2018 02:46)      Vascular Surgery Attending Progress Note    Interval HPI: pt states no abd c/o today     Medications:  buDESOnide 160 MICROgram(s)/formoterol 4.5 MICROgram(s) Inhaler 2 Puff(s) Inhalation two times a day  dextrose 5% + sodium chloride 0.9%. 1000 milliLiter(s) IV Continuous <Continuous>  dextrose 5%. 1000 milliLiter(s) IV Continuous <Continuous>  dextrose 50% Injectable 12.5 Gram(s) IV Push once  dextrose 50% Injectable 25 Gram(s) IV Push once  dextrose 50% Injectable 25 Gram(s) IV Push once  dextrose Gel 1 Dose(s) Oral once PRN  glucagon  Injectable 1 milliGRAM(s) IntraMuscular once PRN  heparin  Infusion.  Unit(s)/Hr IV Continuous <Continuous>  heparin  Injectable 4000 Unit(s) IV Push every 6 hours PRN  heparin  Injectable 2000 Unit(s) IV Push every 6 hours PRN  insulin lispro (HumaLOG) corrective regimen sliding scale   SubCutaneous three times a day before meals  ondansetron Injectable 4 milliGRAM(s) IV Push every 6 hours PRN  pantoprazole    Tablet 40 milliGRAM(s) Oral before breakfast  simvastatin 20 milliGRAM(s) Oral at bedtime  warfarin 5 milliGRAM(s) Oral once      Vital Signs Last 24 Hrs  T(C): 36.5 (09 May 2018 07:08), Max: 37.1 (08 May 2018 23:52)  T(F): 97.7 (09 May 2018 07:08), Max: 98.7 (08 May 2018 23:52)  HR: 98 (09 May 2018 07:08) (98 - 99)  BP: 130/73 (09 May 2018 07:08) (130/69 - 149/72)  BP(mean): --  RR: 18 (09 May 2018 07:08) (16 - 18)  SpO2: 96% (09 May 2018 07:08) (95% - 96%)  I&O's Summary    08 May 2018 07:01  -  09 May 2018 07:00  --------------------------------------------------------  IN: 1448 mL / OUT: 0 mL / NET: 1448 mL    09 May 2018 07:01  -  09 May 2018 11:49  --------------------------------------------------------  IN: 240 mL / OUT: 0 mL / NET: 240 mL        Physical Exam:  Neuro  A&Ox3 VSS  Abd soft nt nd   Vascular:  stable    LABS:                        10.3   6.91  )-----------( 205      ( 09 May 2018 07:38 )             32.4     05-09    142  |  105  |  4<L>  ----------------------------<  158<H>  3.6   |  25  |  0.78    Ca    8.5      09 May 2018 06:59      PT/INR - ( 09 May 2018 01:00 )   PT: 18.7 sec;   INR: 1.70 ratio         PTT - ( 09 May 2018 01:00 )  PTT:74.3 sec    KAREN SÁNCHEZ MD  [phone]

## 2018-05-09 NOTE — PROGRESS NOTE ADULT - ATTENDING COMMENTS
AGree with above plan. EGD if patient consents to procedure,to evaluate abodminal pain.
as above

## 2018-05-09 NOTE — PROGRESS NOTE ADULT - PROBLEM SELECTOR PROBLEM 1
Generalized abdominal pain
Mesenteric artery stenosis
Nausea vomiting and diarrhea
Generalized abdominal pain
Generalized abdominal pain
Nausea vomiting and diarrhea

## 2018-05-09 NOTE — PROGRESS NOTE ADULT - ASSESSMENT
74 yo female with PMH of Atrial fibrillation on coumadin, HTN, DM2, Asthma, HLD, presents here with nausea, vomiting and abdominal pain.     {[id number]} Problem/Plan - 1:  ·  Problem: Generalized abdominal pain.  Plan: Likely secondary to  Mesenteric Ischemia .S/P Doppler US showing ..< from: US Doppler Mesenteric (05.02.18 @ 11:30) >  IMPRESSION: Diffuse calcifiedatherosclerotic disease of the visualized   abdominal aorta and interrogated branches.    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 Findings suggest   mesenteric ischemia.    Cholelithiasis without cholecystitis.    < end of copied text >  .  SMA stenosis on CT scan (unchanged from previous)  S/P  Angio with good collaterals so no stent put in.    {[id number]} Problem/Plan - 2:  ·  Problem: Nausea vomiting and diarrhea.  Plan: Resolved . Exact cause not known.     {[id number]} Problem/Plan - 3:  ·  Problem: Sepsis.  Plan: Resolving . Off Abxs.   c/w IVF  HIDA scan is negative .   S/P  IV zosyn.     {[id number]} Problem/Plan - 4:  ·  Problem: Type 2 diabetes mellitus.  Plan: Sugars in good range. c/w sliding scale  hold oral meds.     {[id number]} Problem/Plan - 5:  ·  Problem: Hypertension.  Plan: BP under good control.  hold lisinopril, lasix for now.     {[id number]} Problem/Plan - 6:  Problem: Atrial fibrillation, chronic. Plan: Restarting Heparin till INR is Therapeutic with Coumadin. Cardiology helping .    monitor PT/INR    {[id number]} Problem/Plan - 7:  ·  Problem: Hypokalemia .  Plan: Replacing.      Problem/Plan - 8:  ·  Problem: Prophylactic measure.  Plan: on AC.     Disposition : DC planning pending therapeutic INR.

## 2018-05-09 NOTE — PROGRESS NOTE ADULT - SUBJECTIVE AND OBJECTIVE BOX
- Patient seen and examined.  - In summary, patient is a 73 year old woman who presented with abdominal pain (01 May 2018 02:46)  - Today, patient is without complaints.         *****MEDICATIONS:    MEDICATIONS  (STANDING):  buDESOnide 160 MICROgram(s)/formoterol 4.5 MICROgram(s) Inhaler 2 Puff(s) Inhalation two times a day  dextrose 5% + sodium chloride 0.9%. 1000 milliLiter(s) (50 mL/Hr) IV Continuous <Continuous>  dextrose 5%. 1000 milliLiter(s) (50 mL/Hr) IV Continuous <Continuous>  dextrose 50% Injectable 12.5 Gram(s) IV Push once  dextrose 50% Injectable 25 Gram(s) IV Push once  dextrose 50% Injectable 25 Gram(s) IV Push once  heparin  Infusion.  Unit(s)/Hr (9 mL/Hr) IV Continuous <Continuous>  insulin lispro (HumaLOG) corrective regimen sliding scale   SubCutaneous three times a day before meals  pantoprazole    Tablet 40 milliGRAM(s) Oral before breakfast  simvastatin 20 milliGRAM(s) Oral at bedtime  warfarin 5 milliGRAM(s) Oral once    MEDICATIONS  (PRN):  dextrose Gel 1 Dose(s) Oral once PRN Blood Glucose LESS THAN 70 milliGRAM(s)/deciliter  glucagon  Injectable 1 milliGRAM(s) IntraMuscular once PRN Glucose LESS THAN 70 milligrams/deciliter  heparin  Injectable 4000 Unit(s) IV Push every 6 hours PRN For aPTT less than 40  heparin  Injectable 2000 Unit(s) IV Push every 6 hours PRN For aPTT between 40 - 57  ondansetron Injectable 4 milliGRAM(s) IV Push every 6 hours PRN Nausea                   ***** REVIEW OF SYSTEM:  GEN: no fever, no chills, no pain  RESP: no SOB, no cough, no sputum  CVS: no chest pain, no palpitations, no edema  GI: no abdominal pain, no nausea, no vomiting, no constipation, no diarrhea  : no dysuria, no frequency  NEURO: no headache, no dizziness  PSYCH: no depression, not anxious  Derm : no itching, no rash         ***** VITAL SIGNS:    T(F): 97.7 (05-09-18 @ 07:08), Max: 98.7 (05-08-18 @ 23:52)  HR: 98 (05-09-18 @ 07:08) (98 - 99)  BP: 130/73 (05-09-18 @ 07:08) (130/69 - 149/72)  RR: 18 (05-09-18 @ 07:08) (16 - 18)  SpO2: 96% (05-09-18 @ 07:08) (95% - 96%)  Wt(kg): --  ,   I&O's Summary    08 May 2018 07:01  -  09 May 2018 07:00  --------------------------------------------------------  IN: 1448 mL / OUT: 0 mL / NET: 1448 mL                   *****PHYSICAL EXAM:  GEN: A&O X 3 , NAD , comfortable  HEENT: NCAT, EOMI, MMM, no icterus  NECK: Supple, No JVD  CVS: S1S2 , regular , No M/R/G appreciated  PULM: CTA B/L,  no W/R/R appreciated  ABD.: soft. non tender, non distended,  bowel sounds present  Extrem: intact pulses , no edema noted  Derm: No rash or ecchymosis noted  PSYCH: normal mood, no depression, not anxious         *****LAB AND IMAGING:                                                     10.3   6.91  )-----------( 205      ( 09 May 2018 07:38 )             32.4               05-09    142  |  105  |  4<L>  ----------------------------<  158<H>  3.6   |  25  |  0.78    Ca    8.5      09 May 2018 06:59  PT/INR - ( 09 May 2018 01:00 )   PT: 18.7 sec;   INR: 1.70 ratio    PTT - ( 09 May 2018 01:00 )  PTT:74.3 sec         [All pertinent recent Imaging/Reports reviewed]         *****A S S E S S M E N T   A N D   P L A N :  73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

## 2018-05-09 NOTE — PROGRESS NOTE ADULT - PROBLEM SELECTOR PROBLEM 4
Mesenteric artery stenosis
Celiac artery stenosis
Celiac artery stenosis
Mesenteric artery stenosis
Mesenteric artery stenosis
Type 2 diabetes mellitus
Celiac artery stenosis
Celiac artery stenosis
Mesenteric artery stenosis

## 2018-05-09 NOTE — PROGRESS NOTE ADULT - PROBLEM SELECTOR PLAN 4
as above
c/w sliding scale  hold oral meds
as above

## 2018-05-09 NOTE — PHYSICAL THERAPY INITIAL EVALUATION ADULT - ADDITIONAL COMMENTS
Pt lives in private home with , no steps to enter, ~5 steps to first floor. Pt stays on the 1st floor. Pt amb with either a straight cane or RW. I with functional mobility, assist is provided by  if needed.

## 2018-05-09 NOTE — PROGRESS NOTE ADULT - PROBLEM SELECTOR PROBLEM 3
Celiac artery stenosis
Mesenteric artery stenosis
Mesenteric artery stenosis
Sepsis
Mesenteric artery stenosis
Mesenteric artery stenosis
Celiac artery stenosis

## 2018-05-09 NOTE — PHYSICAL THERAPY INITIAL EVALUATION ADULT - PERTINENT HX OF CURRENT PROBLEM, REHAB EVAL
73 y.o. F p/w nausea, vomiting and abdominal pain. Similar symptoms in March, thought to be due to cholelithiasis. Pt refused cholecystectomy and was d/c to home. CT Abd 4/30: The etiology of abdominal pain is not elucidated. Severe atherosclerotic calcifications of the origins of the celiac axis and SMA with likely stenosis of the SMA origin. Hepatobiliary scan 5/1: Normal scan. Neg. for acute cholecystitis.

## 2018-05-09 NOTE — PROGRESS NOTE ADULT - PROBLEM SELECTOR PLAN 1
- NPO after MN for IR angiogram +/- stent  - IVF  - recommend SCD although patient is on hep gtt  - recommend PT to assist in ambulation to prevent debilitation  - will follow    0242  red team surgery  Dr. Kwan
as above
unclear etiology, possibly gastritis vs. gastroenteritis; r/o cholecystitis, r/o vascular anomalies  ? 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
as above

## 2018-05-10 ENCOUNTER — TRANSCRIPTION ENCOUNTER (OUTPATIENT)
Age: 73
End: 2018-05-10

## 2018-05-10 VITALS — WEIGHT: 108.25 LBS

## 2018-05-10 LAB
ANION GAP SERPL CALC-SCNC: 11 MMOL/L — SIGNIFICANT CHANGE UP (ref 5–17)
APTT BLD: 79.8 SEC — HIGH (ref 27.5–37.4)
BUN SERPL-MCNC: <4 MG/DL — LOW (ref 7–23)
CALCIUM SERPL-MCNC: 8.5 MG/DL — SIGNIFICANT CHANGE UP (ref 8.4–10.5)
CHLORIDE SERPL-SCNC: 105 MMOL/L — SIGNIFICANT CHANGE UP (ref 96–108)
CO2 SERPL-SCNC: 25 MMOL/L — SIGNIFICANT CHANGE UP (ref 22–31)
CREAT SERPL-MCNC: 0.78 MG/DL — SIGNIFICANT CHANGE UP (ref 0.5–1.3)
GLUCOSE BLDC GLUCOMTR-MCNC: 166 MG/DL — HIGH (ref 70–99)
GLUCOSE SERPL-MCNC: 150 MG/DL — HIGH (ref 70–99)
HCT VFR BLD CALC: 32.2 % — LOW (ref 34.5–45)
HGB BLD-MCNC: 10.7 G/DL — LOW (ref 11.5–15.5)
INR BLD: 2.01 RATIO — HIGH (ref 0.88–1.16)
MCHC RBC-ENTMCNC: 27.8 PG — SIGNIFICANT CHANGE UP (ref 27–34)
MCHC RBC-ENTMCNC: 33.3 GM/DL — SIGNIFICANT CHANGE UP (ref 32–36)
MCV RBC AUTO: 83.4 FL — SIGNIFICANT CHANGE UP (ref 80–100)
PLATELET # BLD AUTO: 198 K/UL — SIGNIFICANT CHANGE UP (ref 150–400)
POTASSIUM SERPL-MCNC: 3.5 MMOL/L — SIGNIFICANT CHANGE UP (ref 3.5–5.3)
POTASSIUM SERPL-SCNC: 3.5 MMOL/L — SIGNIFICANT CHANGE UP (ref 3.5–5.3)
PROTHROM AB SERPL-ACNC: 22.2 SEC — HIGH (ref 9.8–12.7)
RBC # BLD: 3.87 M/UL — SIGNIFICANT CHANGE UP (ref 3.8–5.2)
RBC # FLD: 12 % — SIGNIFICANT CHANGE UP (ref 10.3–14.5)
SODIUM SERPL-SCNC: 141 MMOL/L — SIGNIFICANT CHANGE UP (ref 135–145)
WBC # BLD: 7.2 K/UL — SIGNIFICANT CHANGE UP (ref 3.8–10.5)
WBC # FLD AUTO: 7.2 K/UL — SIGNIFICANT CHANGE UP (ref 3.8–10.5)

## 2018-05-10 PROCEDURE — C1769: CPT

## 2018-05-10 PROCEDURE — 71046 X-RAY EXAM CHEST 2 VIEWS: CPT

## 2018-05-10 PROCEDURE — 82962 GLUCOSE BLOOD TEST: CPT

## 2018-05-10 PROCEDURE — 84295 ASSAY OF SERUM SODIUM: CPT

## 2018-05-10 PROCEDURE — 82435 ASSAY OF BLOOD CHLORIDE: CPT

## 2018-05-10 PROCEDURE — 99285 EMERGENCY DEPT VISIT HI MDM: CPT | Mod: 25

## 2018-05-10 PROCEDURE — 74177 CT ABD & PELVIS W/CONTRAST: CPT

## 2018-05-10 PROCEDURE — 82330 ASSAY OF CALCIUM: CPT

## 2018-05-10 PROCEDURE — 84100 ASSAY OF PHOSPHORUS: CPT

## 2018-05-10 PROCEDURE — 80048 BASIC METABOLIC PNL TOTAL CA: CPT

## 2018-05-10 PROCEDURE — 85027 COMPLETE CBC AUTOMATED: CPT

## 2018-05-10 PROCEDURE — 96374 THER/PROPH/DIAG INJ IV PUSH: CPT | Mod: XU

## 2018-05-10 PROCEDURE — 81001 URINALYSIS AUTO W/SCOPE: CPT

## 2018-05-10 PROCEDURE — 85014 HEMATOCRIT: CPT

## 2018-05-10 PROCEDURE — 78226 HEPATOBILIARY SYSTEM IMAGING: CPT

## 2018-05-10 PROCEDURE — 83735 ASSAY OF MAGNESIUM: CPT

## 2018-05-10 PROCEDURE — 83605 ASSAY OF LACTIC ACID: CPT

## 2018-05-10 PROCEDURE — 83690 ASSAY OF LIPASE: CPT

## 2018-05-10 PROCEDURE — 82248 BILIRUBIN DIRECT: CPT

## 2018-05-10 PROCEDURE — 94640 AIRWAY INHALATION TREATMENT: CPT

## 2018-05-10 PROCEDURE — 87186 SC STD MICRODIL/AGAR DIL: CPT

## 2018-05-10 PROCEDURE — 76700 US EXAM ABDOM COMPLETE: CPT

## 2018-05-10 PROCEDURE — 97161 PT EVAL LOW COMPLEX 20 MIN: CPT

## 2018-05-10 PROCEDURE — 96375 TX/PRO/DX INJ NEW DRUG ADDON: CPT

## 2018-05-10 PROCEDURE — 87086 URINE CULTURE/COLONY COUNT: CPT

## 2018-05-10 PROCEDURE — 80053 COMPREHEN METABOLIC PANEL: CPT

## 2018-05-10 PROCEDURE — 85610 PROTHROMBIN TIME: CPT

## 2018-05-10 PROCEDURE — C1760: CPT

## 2018-05-10 PROCEDURE — 93975 VASCULAR STUDY: CPT

## 2018-05-10 PROCEDURE — 82803 BLOOD GASES ANY COMBINATION: CPT

## 2018-05-10 PROCEDURE — 82947 ASSAY GLUCOSE BLOOD QUANT: CPT

## 2018-05-10 PROCEDURE — C1894: CPT

## 2018-05-10 PROCEDURE — 85730 THROMBOPLASTIN TIME PARTIAL: CPT

## 2018-05-10 PROCEDURE — 84132 ASSAY OF SERUM POTASSIUM: CPT

## 2018-05-10 PROCEDURE — 83036 HEMOGLOBIN GLYCOSYLATED A1C: CPT

## 2018-05-10 PROCEDURE — C1887: CPT

## 2018-05-10 PROCEDURE — A9537: CPT

## 2018-05-10 RX ORDER — FUROSEMIDE 40 MG
1 TABLET ORAL
Qty: 0 | Refills: 0 | COMMUNITY

## 2018-05-10 RX ORDER — MULTIVIT-MIN/FERROUS GLUCONATE 9 MG/15 ML
1 LIQUID (ML) ORAL
Qty: 0 | Refills: 0 | COMMUNITY

## 2018-05-10 RX ORDER — CHOLECALCIFEROL (VITAMIN D3) 125 MCG
1 CAPSULE ORAL
Qty: 0 | Refills: 0 | COMMUNITY

## 2018-05-10 RX ORDER — WARFARIN SODIUM 2.5 MG/1
1 TABLET ORAL
Qty: 7 | Refills: 0 | OUTPATIENT
Start: 2018-05-10 | End: 2018-05-16

## 2018-05-10 RX ADMIN — HEPARIN SODIUM 900 UNIT(S)/HR: 5000 INJECTION INTRAVENOUS; SUBCUTANEOUS at 09:47

## 2018-05-10 RX ADMIN — SODIUM CHLORIDE 50 MILLILITER(S): 9 INJECTION, SOLUTION INTRAVENOUS at 09:14

## 2018-05-10 RX ADMIN — PANTOPRAZOLE SODIUM 40 MILLIGRAM(S): 20 TABLET, DELAYED RELEASE ORAL at 05:33

## 2018-05-10 RX ADMIN — LIDOCAINE 1 PATCH: 4 CREAM TOPICAL at 05:38

## 2018-05-10 RX ADMIN — Medication 1: at 09:13

## 2018-05-10 RX ADMIN — BUDESONIDE AND FORMOTEROL FUMARATE DIHYDRATE 2 PUFF(S): 160; 4.5 AEROSOL RESPIRATORY (INHALATION) at 05:33

## 2018-05-10 NOTE — DISCHARGE NOTE ADULT - ADDITIONAL INSTRUCTIONS
Follow up with Dr Farrar within 7 days of discharge  FOLLOW UP WITH CARDIOLOGY  FOLLOW UP WITH GASTROENTEROLOGY  FOLLOW UP WITH DR Adams on MONDAY to repeat INR and dose coumadin

## 2018-05-10 NOTE — DISCHARGE NOTE ADULT - PLAN OF CARE
resolved Abdominal pain is pain in the stomach or belly area. Everyone has this pain from time to time. In many cases it goes away on its own. But abdominal pain can sometimes be due to a serious problem, such as appendicitis. So it’s important to know when to seek help.  Your healthcare provider will do a physical exam help find the cause of your pain. If needed, tests will be ordered. Belly pain has many possible causes. So it can be hard to find the reason for your pain. Giving details about your pain can help. Tell your provider where and when you feel the pain, and what makes it better or worse. Also let your provider know if you have other symptoms such as:  Fever  Tiredness  Upset stomach (nausea)  Vomiting  Changes in bathroom habits FOLLOW UP WITH CARDIOLOGY INR 2.01 - CONTINUE COUMADIN 4MG HS AND REPEAT INR ON MONDAY CONTINUE COME MED

## 2018-05-10 NOTE — DISCHARGE NOTE ADULT - PATIENT PORTAL LINK FT
You can access the [facility] Patient Portal, offered by [facility], by registering with the following website: [email or web address]

## 2018-05-10 NOTE — PROGRESS NOTE ADULT - SUBJECTIVE AND OBJECTIVE BOX
- Patient seen and examined.  - In summary, patient is a 73 year old woman who presented with abdominal pain (01 May 2018 02:46)  - Today, patient is without complaints.         *****MEDICATIONS:      MEDICATIONS  (STANDING):  buDESOnide 160 MICROgram(s)/formoterol 4.5 MICROgram(s) Inhaler 2 Puff(s) Inhalation two times a day  dextrose 5% + sodium chloride 0.9%. 1000 milliLiter(s) (50 mL/Hr) IV Continuous <Continuous>  dextrose 5%. 1000 milliLiter(s) (50 mL/Hr) IV Continuous <Continuous>  dextrose 50% Injectable 12.5 Gram(s) IV Push once  dextrose 50% Injectable 25 Gram(s) IV Push once  dextrose 50% Injectable 25 Gram(s) IV Push once  heparin  Infusion.  Unit(s)/Hr (9 mL/Hr) IV Continuous <Continuous>  insulin lispro (HumaLOG) corrective regimen sliding scale   SubCutaneous three times a day before meals  pantoprazole    Tablet 40 milliGRAM(s) Oral before breakfast  simvastatin 20 milliGRAM(s) Oral at bedtime    MEDICATIONS  (PRN):  dextrose Gel 1 Dose(s) Oral once PRN Blood Glucose LESS THAN 70 milliGRAM(s)/deciliter  glucagon  Injectable 1 milliGRAM(s) IntraMuscular once PRN Glucose LESS THAN 70 milligrams/deciliter  heparin  Injectable 4000 Unit(s) IV Push every 6 hours PRN For aPTT less than 40  heparin  Injectable 2000 Unit(s) IV Push every 6 hours PRN For aPTT between 40 - 57  ondansetron Injectable 4 milliGRAM(s) IV Push every 6 hours PRN Nausea                     ***** REVIEW OF SYSTEM:  GEN: no fever, no chills, no pain  RESP: no SOB, no cough, no sputum  CVS: no chest pain, no palpitations, no edema  GI: no abdominal pain, no nausea, no vomiting, no constipation, no diarrhea  : no dysuria, no frequency  NEURO: no headache, no dizziness  PSYCH: no depression, not anxious  Derm : no itching, no rash         ***** VITAL SIGNS:      T(F): 97.7 (05-10-18 @ 07:34), Max: 98 (05-09-18 @ 16:37)  HR: 96 (05-10-18 @ 07:34) (68 - 107)  BP: 133/73 (05-10-18 @ 07:34) (120/70 - 144/62)  RR: 18 (05-10-18 @ 07:34) (18 - 18)  SpO2: 96% (05-10-18 @ 07:34) (95% - 96%)  Wt(kg): --  ,   I&O's Summary    09 May 2018 07:01  -  10 May 2018 07:00  --------------------------------------------------------  IN: 520 mL / OUT: 0 mL / NET: 520 mL           *****PHYSICAL EXAM:  GEN: A&O X 3 , NAD , comfortable  HEENT: NCAT, EOMI, MMM, no icterus  NECK: Supple, No JVD  CVS: S1S2 , regular , No M/R/G appreciated  PULM: CTA B/L,  no W/R/R appreciated  ABD.: soft. non tender, non distended,  bowel sounds present  Extrem: intact pulses , no edema noted  Derm: No rash or ecchymosis noted  PSYCH: normal mood, no depression, not anxious         *****LAB AND IMAGING:                                                                       10.7   7.2   )-----------( 198      ( 10 May 2018 09:06 )             32.2               05-10    141  |  105  |  <4<L>  ----------------------------<  150<H>  3.5   |  25  |  0.78    Ca    8.5      10 May 2018 09:06      PT/INR - ( 10 May 2018 09:07 )   PT: 22.2 sec;   INR: 2.01 ratio         PTT - ( 10 May 2018 09:07 )  PTT:79.8 sec         [All pertinent recent Imaging/Reports reviewed]         *****A S S E S S M E N T   A N D   P L A N :  73F with 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

## 2018-05-10 NOTE — DISCHARGE NOTE ADULT - HOME CARE AGENCY
[facility] care. Nurse and Physical therapist to arrange visit within 24 hours after discharge from the hospital. [phone]

## 2018-05-10 NOTE — PROGRESS NOTE ADULT - NSHPATTENDINGPLANDISCUSS_GEN_ALL_CORE
pt and  her PCP
pt and her 
pt and NP
surg ho

## 2018-05-10 NOTE — DISCHARGE NOTE ADULT - MEDICATION SUMMARY - MEDICATIONS TO TAKE
I will START or STAY ON the medications listed below when I get home from the hospital:    warfarin 4 mg oral tablet  -- 1 tab(s) by mouth once a day (at bedtime)   -- Do not take this drug if you are pregnant.  It is very important that you take or use this exactly as directed.  Do not skip doses or discontinue unless directed by your doctor.  Obtain medical advice before taking any non-prescription drugs as some may affect the action of this medication.    -- Indication: For Atrial fibrillation, chronic    metFORMIN 1000 mg oral tablet  -- 1 tab(s) by mouth 2 times a day  -- Indication: For Type 2 diabetes mellitus    simvastatin 20 mg oral tablet  -- 1 tab(s) by mouth once a day (at bedtime)  -- Indication: For HLD    Advair Diskus 250 mcg-50 mcg inhalation powder  -- 1 puff(s) inhaled 2 times a day  -- Indication: For COPD    NexIUM OTC 20 mg oral delayed release capsule  -- 1 cap(s) by mouth once a day (in the morning)  -- Indication: For GeRD

## 2018-05-10 NOTE — DISCHARGE NOTE ADULT - SECONDARY DIAGNOSIS.
Nausea vomiting and diarrhea Sepsis Celiac artery stenosis Atrial fibrillation, chronic Type 2 diabetes mellitus

## 2018-05-10 NOTE — DISCHARGE NOTE ADULT - MEDICATION SUMMARY - MEDICATIONS TO STOP TAKING
I will STOP taking the medications listed below when I get home from the hospital:    predniSONE 5 mg oral tablet  -- 1 tab(s) by mouth once a day    lisinopril 5 mg oral tablet  -- 1 tab(s) by mouth once a day    furosemide 20 mg oral tablet  -- 1 tab(s) by mouth once a day    K-Tab 20 mEq oral tablet, extended release  -- 1 tab(s) by mouth once a day   -- It is very important that you take or use this exactly as directed.  Do not skip doses or discontinue unless directed by your doctor.  Medication should be taken with plenty of water.  Take with food or milk.

## 2018-05-10 NOTE — DISCHARGE NOTE ADULT - HOSPITAL COURSE
72 yo female with PMH of Atrial fibrillation on coumadin, HTN, DM2, Asthma, HLD, presents here with nausea, vomiting and abdominal pain. Patient admitted with generalized abdominal pain,  possibly gastritis vs. gastroenteritis; r/o cholecystitis, r/o vascular anomalies. CT scan suggesting SMA and Celiac calcification , possible stenosis. Recommended  mesenteric ultrasound which confirm celiac and SMA dz s/p  mesenteric angiogram - indicating hemodynamically significant stenosis of the proximal SMA but only mild stenosis of the celiac and GABRIELE without intervention performed. Options for medical/conservative  management vs  mesenteric bypass discussed with family and patient. Family o decide outpatient. Patient bridged back to coumadin. Follow outpatient with cardiology, gastroenterology, vascular surgery and primary care. Repeat INR on monday.

## 2018-05-10 NOTE — PROGRESS NOTE ADULT - PROVIDER SPECIALTY LIST ADULT
Cardiology
Gastroenterology
Gastroenterology
Internal Medicine
Intervent Radiology
Surgery
Vascular Surgery
Cardiology
Internal Medicine
Internal Medicine
Surgery
Surgery
Internal Medicine

## 2018-05-10 NOTE — PROGRESS NOTE ADULT - SUBJECTIVE AND OBJECTIVE BOX
INTERVAL HPI/OVERNIGHT EVENTS: I am feeling fine . Eating well and walking around okay.   Vital Signs Last 24 Hrs  T(C): 36.5 (10 May 2018 07:34), Max: 36.7 (09 May 2018 16:37)  T(F): 97.7 (10 May 2018 07:34), Max: 98 (09 May 2018 16:37)  HR: 96 (10 May 2018 07:34) (68 - 107)  BP: 133/73 (10 May 2018 07:34) (120/70 - 144/62)  BP(mean): --  RR: 18 (10 May 2018 07:34) (18 - 18)  SpO2: 96% (10 May 2018 07:34) (95% - 96%)  I&O's Summary    09 May 2018 07:01  -  10 May 2018 07:00  --------------------------------------------------------  IN: 520 mL / OUT: 0 mL / NET: 520 mL    10 May 2018 07:01  -  10 May 2018 11:23  --------------------------------------------------------  IN: 320 mL / OUT: 0 mL / NET: 320 mL      MEDICATIONS  (STANDING):  buDESOnide 160 MICROgram(s)/formoterol 4.5 MICROgram(s) Inhaler 2 Puff(s) Inhalation two times a day  dextrose 5% + sodium chloride 0.9%. 1000 milliLiter(s) (50 mL/Hr) IV Continuous <Continuous>  dextrose 5%. 1000 milliLiter(s) (50 mL/Hr) IV Continuous <Continuous>  dextrose 50% Injectable 12.5 Gram(s) IV Push once  dextrose 50% Injectable 25 Gram(s) IV Push once  dextrose 50% Injectable 25 Gram(s) IV Push once  insulin lispro (HumaLOG) corrective regimen sliding scale   SubCutaneous three times a day before meals  pantoprazole    Tablet 40 milliGRAM(s) Oral before breakfast  simvastatin 20 milliGRAM(s) Oral at bedtime    MEDICATIONS  (PRN):  dextrose Gel 1 Dose(s) Oral once PRN Blood Glucose LESS THAN 70 milliGRAM(s)/deciliter  glucagon  Injectable 1 milliGRAM(s) IntraMuscular once PRN Glucose LESS THAN 70 milligrams/deciliter  ondansetron Injectable 4 milliGRAM(s) IV Push every 6 hours PRN Nausea    LABS:                        10.7   7.2   )-----------( 198      ( 10 May 2018 09:06 )             32.2     05-10    141  |  105  |  <4<L>  ----------------------------<  150<H>  3.5   |  25  |  0.78    Ca    8.5      10 May 2018 09:06      PT/INR - ( 10 May 2018 09:07 )   PT: 22.2 sec;   INR: 2.01 ratio         PTT - ( 10 May 2018 09:07 )  PTT:79.8 sec    CAPILLARY BLOOD GLUCOSE      POCT Blood Glucose.: 166 mg/dL (10 May 2018 08:37)  POCT Blood Glucose.: 214 mg/dL (09 May 2018 21:32)  POCT Blood Glucose.: 136 mg/dL (09 May 2018 17:09)  POCT Blood Glucose.: 178 mg/dL (09 May 2018 12:11)          REVIEW OF SYSTEMS:  CONSTITUTIONAL: No fever, weight loss, or fatigue  EYES: No eye pain, visual disturbances, or discharge  ENMT:  No difficulty hearing, tinnitus, vertigo; No sinus or throat pain  NECK: No pain or stiffness  BREASTS: No pain, masses, or nipple discharge  RESPIRATORY: No cough, wheezing, chills or hemoptysis; No shortness of breath  CARDIOVASCULAR: No chest pain, palpitations, dizziness, or leg swelling  GASTROINTESTINAL: No abdominal or epigastric pain. No nausea, vomiting, or hematemesis; No diarrhea or constipation. No melena or hematochezia.  GENITOURINARY: No dysuria, frequency, hematuria, or incontinence  NEUROLOGICAL: No headaches, memory loss, loss of strength, numbness, or tremors  SKIN: No itching, burning, rashes, or lesions   LYMPH NODES: No enlarged glands  ENDOCRINE: No heat or cold intolerance; No hair loss  MUSCULOSKELETAL: No joint pain or swelling; No muscle, back, or extremity pain  PSYCHIATRIC: No depression, anxiety, mood swings, or difficulty sleeping  HEME/LYMPH: No easy bruising, or bleeding gums  ALLERY AND IMMUNOLOGIC: No hives or eczema    RADIOLOGY & ADDITIONAL TESTS:    Consultant(s) Notes Reviewed:  [x ] YES  [ ] NO    PHYSICAL EXAM:  GENERAL: NAD, well-groomed, well-developed, not in any distress ,  HEAD:  Atraumatic, Normocephalic  EYES: EOMI, PERRLA, conjunctiva and sclera clear  ENMT: No tonsillar erythema, exudates, or enlargement; Moist mucous membranes, Good dentition, No lesions  NECK: Supple, No JVD, Normal thyroid  NERVOUS SYSTEM:  Alert & Oriented X3, No focal deficit   CHEST/LUNG: Good air entry bilateral with no  rales, rhonchi, wheezing, or rubs  HEART: Regular rate and rhythm; No murmurs, rubs, or gallops  ABDOMEN: Soft, Nontender, Nondistended; Bowel sounds present  EXTREMITIES:  2+ Peripheral Pulses, No clubbing, cyanosis, or edema  SKIN: No rashes or lesions    Care Discussed with Consultants/Other Providers [ x] YES  [ ] NO

## 2018-05-10 NOTE — PROGRESS NOTE ADULT - ASSESSMENT
74 yo female with PMH of Atrial fibrillation on coumadin, HTN, DM2, Asthma, HLD, presents here with nausea, vomiting and abdominal pain.     {[id number]} Problem/Plan - 1:  ·  Problem: Generalized abdominal pain.  Plan: Resolved . Likely secondary to  Mesenteric Ischemia .S/P Doppler US showing ..< from: US Doppler Mesenteric (05.02.18 @ 11:30) >  IMPRESSION: Diffuse calcifiedatherosclerotic disease of the visualized   abdominal aorta and interrogated branches.    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 Findings suggest   mesenteric ischemia.    Cholelithiasis without cholecystitis.    < end of copied text >  .  SMA stenosis on CT scan (unchanged from previous)  S/P  Angio with good collaterals so no stent put in.    {[id number]} Problem/Plan - 2:  ·  Problem: Nausea vomiting and diarrhea.  Plan: Resolved . Exact cause not known.     {[id number]} Problem/Plan - 3:  ·  Problem: Sepsis.  Plan: Resolving . Likely sec to Cholelithiasis with Cholecystitis. Off Abxs.   c/w IVF  HIDA scan is negative .   S/P  IV zosyn.     {[id number]} Problem/Plan - 4:  ·  Problem: Type 2 diabetes mellitus.  Plan: Sugars in good range. c/w sliding scale  hold oral meds.     {[id number]} Problem/Plan - 5:  ·  Problem: Hypertension.  Plan: BP under good control.  Hold lisinopril, lasix for now.     {[id number]} Problem/Plan - 6:  Problem: Atrial fibrillation, chronic. Plan: Restarting Heparin till INR is Therapeutic with Coumadin. Cardiology helping .    monitor PT/INR    {[id number]} Problem/Plan - 7:  ·  Problem: Hypokalemia .  Plan: Replacing.      Problem/Plan - 8:  ·  Problem: Prophylactic measure.  Plan: on AC.     Disposition : DC planning as  therapeutic INR. D/W with her PCP and will see him in next few days with rpt INR.

## 2018-05-10 NOTE — DISCHARGE NOTE ADULT - CARE PLAN
Principal Discharge DX:	Generalized abdominal pain  Goal:	resolved  Assessment and plan of treatment:	Abdominal pain is pain in the stomach or belly area. Everyone has this pain from time to time. In many cases it goes away on its own. But abdominal pain can sometimes be due to a serious problem, such as appendicitis. So it’s important to know when to seek help.  Your healthcare provider will do a physical exam help find the cause of your pain. If needed, tests will be ordered. Belly pain has many possible causes. So it can be hard to find the reason for your pain. Giving details about your pain can help. Tell your provider where and when you feel the pain, and what makes it better or worse. Also let your provider know if you have other symptoms such as:  Fever  Tiredness  Upset stomach (nausea)  Vomiting  Changes in bathroom habits  Secondary Diagnosis:	Nausea vomiting and diarrhea  Secondary Diagnosis:	Sepsis  Secondary Diagnosis:	Celiac artery stenosis  Secondary Diagnosis:	Atrial fibrillation, chronic  Secondary Diagnosis:	Type 2 diabetes mellitus Principal Discharge DX:	Generalized abdominal pain  Goal:	resolved  Assessment and plan of treatment:	Abdominal pain is pain in the stomach or belly area. Everyone has this pain from time to time. In many cases it goes away on its own. But abdominal pain can sometimes be due to a serious problem, such as appendicitis. So it’s important to know when to seek help.  Your healthcare provider will do a physical exam help find the cause of your pain. If needed, tests will be ordered. Belly pain has many possible causes. So it can be hard to find the reason for your pain. Giving details about your pain can help. Tell your provider where and when you feel the pain, and what makes it better or worse. Also let your provider know if you have other symptoms such as:  Fever  Tiredness  Upset stomach (nausea)  Vomiting  Changes in bathroom habits  Secondary Diagnosis:	Nausea vomiting and diarrhea  Secondary Diagnosis:	Sepsis  Secondary Diagnosis:	Celiac artery stenosis  Goal:	FOLLOW UP WITH CARDIOLOGY  Secondary Diagnosis:	Atrial fibrillation, chronic  Goal:	INR 2.01 - CONTINUE COUMADIN 4MG HS AND REPEAT INR ON MONDAY  Assessment and plan of treatment:	FOLLOW UP WITH CARDIOLOGY  Secondary Diagnosis:	Type 2 diabetes mellitus  Goal:	CONTINUE COME MED

## 2018-05-10 NOTE — DISCHARGE NOTE ADULT - CARE PROVIDERS DIRECT ADDRESSES
[email or web address]

## 2018-09-28 ENCOUNTER — EMERGENCY (EMERGENCY)
Facility: HOSPITAL | Age: 73
LOS: 1 days | Discharge: HOME | End: 2018-09-28
Attending: EMERGENCY MEDICINE
Payer: MEDICARE

## 2018-09-28 VITALS
HEART RATE: 85 BPM | TEMPERATURE: 98 F | RESPIRATION RATE: 16 BRPM | DIASTOLIC BLOOD PRESSURE: 55 MMHG | SYSTOLIC BLOOD PRESSURE: 122 MMHG | OXYGEN SATURATION: 98 %

## 2018-09-28 VITALS
SYSTOLIC BLOOD PRESSURE: 143 MMHG | HEART RATE: 115 BPM | WEIGHT: 100.97 LBS | DIASTOLIC BLOOD PRESSURE: 83 MMHG | RESPIRATION RATE: 16 BRPM | HEIGHT: 62 IN | OXYGEN SATURATION: 98 % | TEMPERATURE: 98 F

## 2018-09-28 LAB
ALBUMIN SERPL ELPH-MCNC: 3.1 G/DL — LOW (ref 3.3–5)
ALP SERPL-CCNC: 113 U/L — SIGNIFICANT CHANGE UP (ref 40–120)
ALT FLD-CCNC: 16 U/L — SIGNIFICANT CHANGE UP (ref 10–45)
ANION GAP SERPL CALC-SCNC: 12 MMOL/L — SIGNIFICANT CHANGE UP (ref 5–17)
AST SERPL-CCNC: 23 U/L — SIGNIFICANT CHANGE UP (ref 10–40)
BASOPHILS # BLD AUTO: 0 K/UL — SIGNIFICANT CHANGE UP (ref 0–0.2)
BASOPHILS NFR BLD AUTO: 0.5 % — SIGNIFICANT CHANGE UP (ref 0–2)
BILIRUB SERPL-MCNC: 0.2 MG/DL — SIGNIFICANT CHANGE UP (ref 0.2–1.2)
BUN SERPL-MCNC: 7 MG/DL — SIGNIFICANT CHANGE UP (ref 7–23)
CALCIUM SERPL-MCNC: 8.8 MG/DL — SIGNIFICANT CHANGE UP (ref 8.4–10.5)
CHLORIDE SERPL-SCNC: 98 MMOL/L — SIGNIFICANT CHANGE UP (ref 96–108)
CO2 SERPL-SCNC: 22 MMOL/L — SIGNIFICANT CHANGE UP (ref 22–31)
CREAT SERPL-MCNC: 0.67 MG/DL — SIGNIFICANT CHANGE UP (ref 0.5–1.3)
EOSINOPHIL # BLD AUTO: 0.1 K/UL — SIGNIFICANT CHANGE UP (ref 0–0.5)
EOSINOPHIL NFR BLD AUTO: 1.5 % — SIGNIFICANT CHANGE UP (ref 0–6)
GLUCOSE SERPL-MCNC: 148 MG/DL — HIGH (ref 70–99)
HCT VFR BLD CALC: 40 % — SIGNIFICANT CHANGE UP (ref 34.5–45)
HGB BLD-MCNC: 12.9 G/DL — SIGNIFICANT CHANGE UP (ref 11.5–15.5)
LYMPHOCYTES # BLD AUTO: 1.2 K/UL — SIGNIFICANT CHANGE UP (ref 1–3.3)
LYMPHOCYTES # BLD AUTO: 13.4 % — SIGNIFICANT CHANGE UP (ref 13–44)
MCHC RBC-ENTMCNC: 24.7 PG — LOW (ref 27–34)
MCHC RBC-ENTMCNC: 32.2 GM/DL — SIGNIFICANT CHANGE UP (ref 32–36)
MCV RBC AUTO: 76.8 FL — LOW (ref 80–100)
MONOCYTES # BLD AUTO: 0.5 K/UL — SIGNIFICANT CHANGE UP (ref 0–0.9)
MONOCYTES NFR BLD AUTO: 5.2 % — SIGNIFICANT CHANGE UP (ref 2–14)
NEUTROPHILS # BLD AUTO: 7 K/UL — SIGNIFICANT CHANGE UP (ref 1.8–7.4)
NEUTROPHILS NFR BLD AUTO: 79.3 % — HIGH (ref 43–77)
PLATELET # BLD AUTO: 280 K/UL — SIGNIFICANT CHANGE UP (ref 150–400)
POTASSIUM SERPL-MCNC: 4.3 MMOL/L — SIGNIFICANT CHANGE UP (ref 3.5–5.3)
POTASSIUM SERPL-SCNC: 4.3 MMOL/L — SIGNIFICANT CHANGE UP (ref 3.5–5.3)
PROT SERPL-MCNC: 6.9 G/DL — SIGNIFICANT CHANGE UP (ref 6–8.3)
RBC # BLD: 5.22 M/UL — HIGH (ref 3.8–5.2)
RBC # FLD: 14.8 % — HIGH (ref 10.3–14.5)
SODIUM SERPL-SCNC: 132 MMOL/L — LOW (ref 135–145)
WBC # BLD: 8.9 K/UL — SIGNIFICANT CHANGE UP (ref 3.8–10.5)
WBC # FLD AUTO: 8.9 K/UL — SIGNIFICANT CHANGE UP (ref 3.8–10.5)

## 2018-09-28 PROCEDURE — 99284 EMERGENCY DEPT VISIT MOD MDM: CPT

## 2018-09-28 PROCEDURE — 80053 COMPREHEN METABOLIC PANEL: CPT

## 2018-09-28 PROCEDURE — 73630 X-RAY EXAM OF FOOT: CPT | Mod: 26,LT

## 2018-09-28 PROCEDURE — 85027 COMPLETE CBC AUTOMATED: CPT

## 2018-09-28 PROCEDURE — 73630 X-RAY EXAM OF FOOT: CPT

## 2018-09-28 RX ORDER — CEPHALEXIN 500 MG
500 CAPSULE ORAL ONCE
Qty: 0 | Refills: 0 | Status: COMPLETED | OUTPATIENT
Start: 2018-09-28 | End: 2018-09-28

## 2018-09-28 RX ADMIN — Medication 500 MILLIGRAM(S): at 16:39

## 2018-09-28 NOTE — ED PROVIDER NOTE - MEDICAL DECISION MAKING DETAILS
73F diabetes with left great toe cellulitis, will tx, labs though does not endorse systemic signs of infcn, xray rule out osteo and assess ambulatory status, likely po abx with mrsa coverage and output podiatry f.u

## 2018-09-28 NOTE — ED PROVIDER NOTE - OBJECTIVE STATEMENT
73F hx DM (states well controlled, on metformin), HTN, HLD,  c/o of left greater toe erythema and pus drainage x 1 week, noted to have something similar one month ago that resolved after a trial of PO antibiotics but has now returned. Noted that it was similar, no history of foot trauma. Notes that she saw her podiatrist one week ago but at that time it was not as severe. Notes some pain with ambulation. No fevers/ chills / fatigue or lethargy. States she came in today because her  is also in the ER 73F hx DM (states well controlled, on metformin), HTN, HLD,  c/o of left greater toe erythema and scant drainage x 1 week, noted to have something similar one month ago that resolved after a trial of PO antibiotics but has now returned. Noted that it was similar, no history of foot trauma. Notes that she saw her podiatrist one week ago but at that time it was not as severe. Notes some pain with ambulation. No fevers/ chills / fatigue or lethargy. States she came in today because her  is also in the ER

## 2018-09-28 NOTE — ED PROVIDER NOTE - ATTENDING CONTRIBUTION TO CARE
ATTENDING MD:  I, Saran Fuentes, personally have seen and examined this patient.  I have discussed all aspects of care with the resident physician. Resident note reviewed and agree on plan of care and except where noted.  See HPI, PE, and MDM for details.    well appearing female, NAD, AOx4. isolated erythema of the dorsal tip of the R 1st toe on distal phalynx. no induration, no discharge, no erythema. no plantar involvement. minimal swelling on medial nailbed without jesse fluctuance or definite paronychia. no proximal erythema. no crepitus    MDM: toe cellulitis, no obvious paronychia, no systemic symptoms. will cover for skin speedy, no concern for pseudomonal involvement. will cover for skin speedy. discussed possibility of paronychia developing and need for drainage if it develops. pt declines exploration now. I have advised the patient on the usual course of this condition, home care, an appropriate schedule for follow-up, and concerning signs and symptoms that should prompt return to the emergency department. I answered all questions to the best of my ability. The patient is stable for discharge. The patient has been provided with a copy of all pertinent results.

## 2018-09-28 NOTE — ED ADULT NURSE NOTE - NSIMPLEMENTINTERV_GEN_ALL_ED
Implemented All Fall with Harm Risk Interventions:  [place] to call system. Call bell, personal items and telephone within reach. Instruct patient to call for assistance. Room bathroom lighting operational. Non-slip footwear when patient is off stretcher. Physically safe environment: no spills, clutter or unnecessary equipment. Stretcher in lowest position, wheels locked, appropriate side rails in place. Provide visual cue, wrist band, yellow gown, etc. Monitor gait and stability. Monitor for mental status changes and reorient to person, place, and time. Review medications for side effects contributing to fall risk. Reinforce activity limits and safety measures with patient and family. Provide visual clues: red socks.

## 2018-09-28 NOTE — ED PROVIDER NOTE - PLAN OF CARE
You were seen today for a left great toe infection, you were given one dose of keflex here (antibiotic) and you will be prescribed Cefadroxil 500mg every 12 hours x 7 days. You need to see your foot doctor within the next three days for re-evaluation, if the redness around your toe gets worse or the infection looks worse, or if you develop fevers/chills or worsened swelling or pus around the foot, please return to the Emergency room to be re-evaluated. You may also put antifungal cream (clotrimazole) over the toenail.    1) Please follow-up with your primary care doctor/ podiatrist within the next 3 days.  Please call today or tomorrow for an appointment.  If you cannot follow-up with your doctor(s), please return to the ED for any urgent issues.  2) If you have any worsening of symptoms or any other concerns please return to the ED immediately.  3) Please continue taking your home medications as directed.  4) You may have been given a copy of your labs and/or imaging.  Please go over these with your primary care doctor.

## 2018-09-28 NOTE — ED PROVIDER NOTE - CARE PLAN
Principal Discharge DX:	Toe infection  Assessment and plan of treatment:	You were seen today for a left great toe infection, you were given one dose of keflex here (antibiotic) and you will be prescribed Cefadroxil 500mg every 12 hours x 7 days. You need to see your foot doctor within the next three days for re-evaluation, if the redness around your toe gets worse or the infection looks worse, or if you develop fevers/chills or worsened swelling or pus around the foot, please return to the Emergency room to be re-evaluated. You may also put antifungal cream (clotrimazole) over the toenail.    1) Please follow-up with your primary care doctor/ podiatrist within the next 3 days.  Please call today or tomorrow for an appointment.  If you cannot follow-up with your doctor(s), please return to the ED for any urgent issues.  2) If you have any worsening of symptoms or any other concerns please return to the ED immediately.  3) Please continue taking your home medications as directed.  4) You may have been given a copy of your labs and/or imaging.  Please go over these with your primary care doctor.

## 2018-09-28 NOTE — ED ADULT NURSE NOTE - OBJECTIVE STATEMENT
TRANSFER - OUT REPORT:    Verbal report given to Isabela Musa RN (name) on Tiffanie Srinivasan  being transferred to remote tele(unit) for routine progression of care       Report consisted of patients Situation, Background, Assessment and   Recommendations(SBAR). Information from the following report(s) SBAR, ED Summary, Procedure Summary, MAR, Recent Results and Cardiac Rhythm NSR-Sinus tach was reviewed with the receiving nurse. Lines:   Peripheral IV 03/15/18 Right;Upper Forearm (Active)   Site Assessment Clean, dry, & intact 3/15/2018 11:23 AM   Phlebitis Assessment 0 3/15/2018 11:23 AM   Infiltration Assessment 0 3/15/2018 11:23 AM   Dressing Status Clean, dry, & intact 3/15/2018 11:23 AM   Dressing Type Transparent;Tape 3/15/2018 11:23 AM   Hub Color/Line Status Pink;Flushed;Patent 3/15/2018 11:23 AM        Opportunity for questions and clarification was provided.       Patient transported with:   Monitor  Registered Nurse 73 y.o. Female presents to the ED accompanied by son for L great toe pain. Hx diabetes type 2 - on metformin, dementia, HTN, afib, asthma. Pt reports approx 1 month ago, her podiatrist took L great toe's nail off d/t infection - was given antibiotics. A week ago today, pt started to noticed her L toe getting more red and draining pus. Redness noted of L great toe with yellow pus drainage. +ROM of b/l feet. +2 swelling of b/l lower extremities. Denies CP, SOB, N/V/D, urinary/bowel complications, fever/chills. Pt is in no current distress. Comfort and safety provided. Will continue to monitor. Awaiting ED MD consult. 73 y.o. Female presents to the ED accompanied by son for L great toe pain. Hx diabetes type 2 - on metformin, dementia, HTN, afib, asthma. Pt reports approx 1 month ago, her podiatrist removed nail on L great toe d/t infection - was given antibiotics. A week ago today, pt started to noticed her L toe getting more red and draining pus. Redness noted of L great toe with yellow pus drainage. +ROM of b/l feet. +2 swelling of b/l lower extremities. Denies CP, SOB, N/V/D, urinary/bowel complications, fever/chills. Pt is in no current distress. Comfort and safety provided. Will continue to monitor. Awaiting ED MD consult.

## 2018-09-28 NOTE — ED PROVIDER NOTE - PHYSICAL EXAMINATION
Gen: AAOx3, elderly female, no acute distress   Head: NCAT  ENT: Airway patent, moist mucous membranes  Cardiac: Normal rate, normal rhythm, no murmurs/rubs/gallops appreciated  Respiratory: Lungs CTA B/L  Gastrointestinal: +BS, Abdomen soft, nontender, nondistended  MSK: No gross abnormalities, FROM of all four extremities, no edema  HEME: Extremities warm, pulses intact and symmetrical in all four extremities  Skin: left greater toe erythema/ pus from nailbed. No fluctuance or crepitus, redness limited to the tip of the toe.  Neuro: No gross neurologic deficits,

## 2018-09-28 NOTE — ED PROVIDER NOTE - NS ED ROS FT
CONST: no fevers, no chills  EYES: no eye complaints  ENT: no sore throat, no cough  CV: no chest pain, no palpitations  RESP: no shortness of breath  ABD: no abdominal pain, no nausea, no vomiting  : no dysuria, increased frequency, or hematuria  MSK: no back pain  NEURO: no headache or additional neurologic complaints  HEME: no easy bleeding  SKIN: left greater toe erythema/ pus from nailbed. No fluctuance or crepitus, redness limited to the tip of the toe.

## 2019-01-04 NOTE — PATIENT PROFILE ADULT. - HEALTHCARE QUESTIONS, PROFILE
----- Message from Gemini Loo sent at 1/4/2019  2:31 PM CST -----  Contact: Patient   Patient called to request a call back regarding a prescription that was recently called in. The patient can be reached at [phone].   none

## 2019-01-31 NOTE — PROGRESS NOTE ADULT - SKIN/BREAST
Called patient and Oliver back to explain this information to them.  Asked if patient has been taking her medications as prescribed at discharge from the hospital and he states she has only took some.  The patient feels she has too much blood pressure medications and doesn't want to take them until she talks with Dr. Hoffman.  Explained that they had her on these medications in the hospital and they worked for her blood pressure and she should continue them.  She states she wants to talk with Dr. Hoffman first.  Discussed with patient and Oliver about the information in regards to [facility] and delivering the medications and the fees and she states she is not interested.  She doesn't feel she can pay for the one time fee for the bill boxes and feels her caregivers can help her with the medications.  Discussed the importance of taking her medications as prescribed as she was just in the hospital with taking too much of one of her medications.  She verbalized understanding, but refuses to have the pharmacy deliver her medications as she states she cannot afford that.  Discussed with Oliver and he states he can put her medications in the pill box for her as they are prescribed.  Also recommended patient take her blood pressure and Oliver stated they have a blood pressure machine that they can use at his house and she has one at home.  Explained will call her later to see if there is any changes in her medications and if she is going to take them and she agreed.  
negative
negative

## 2019-02-01 ENCOUNTER — INPATIENT (INPATIENT)
Facility: HOSPITAL | Age: 74
LOS: 10 days | Discharge: HOME HEALTH | DRG: 871 | End: 2019-02-12
Attending: GENERAL ACUTE CARE HOSPITAL | Admitting: GENERAL ACUTE CARE HOSPITAL
Payer: MEDICARE

## 2019-02-01 VITALS
DIASTOLIC BLOOD PRESSURE: 62 MMHG | SYSTOLIC BLOOD PRESSURE: 132 MMHG | RESPIRATION RATE: 16 BRPM | HEIGHT: 60 IN | HEART RATE: 106 BPM | WEIGHT: 85.1 LBS | OXYGEN SATURATION: 97 %

## 2019-02-01 DIAGNOSIS — N39.0 URINARY TRACT INFECTION, SITE NOT SPECIFIED: ICD-10-CM

## 2019-02-01 LAB
ALBUMIN SERPL ELPH-MCNC: 2.5 G/DL — LOW (ref 3.3–5)
ALBUMIN SERPL ELPH-MCNC: 3.2 G/DL — LOW (ref 3.3–5)
ALP SERPL-CCNC: 192 U/L — HIGH (ref 40–120)
ALP SERPL-CCNC: 200 U/L — HIGH (ref 40–120)
ALT FLD-CCNC: 142 U/L — HIGH (ref 10–45)
ALT FLD-CCNC: 446 U/L — HIGH (ref 10–45)
ANION GAP SERPL CALC-SCNC: 12 MMOL/L — SIGNIFICANT CHANGE UP (ref 5–17)
ANION GAP SERPL CALC-SCNC: 18 MMOL/L — HIGH (ref 5–17)
APPEARANCE UR: ABNORMAL
APTT BLD: 30.3 SEC — SIGNIFICANT CHANGE UP (ref 27.5–36.3)
APTT BLD: 80.3 SEC — HIGH (ref 27.5–36.3)
AST SERPL-CCNC: 334 U/L — HIGH (ref 10–40)
AST SERPL-CCNC: 623 U/L — HIGH (ref 10–40)
BACTERIA # UR AUTO: NEGATIVE — SIGNIFICANT CHANGE UP
BASE EXCESS BLDV CALC-SCNC: -0.4 MMOL/L — SIGNIFICANT CHANGE UP (ref -2–2)
BASOPHILS # BLD AUTO: 0.1 K/UL — SIGNIFICANT CHANGE UP (ref 0–0.2)
BASOPHILS NFR BLD AUTO: 0.2 % — SIGNIFICANT CHANGE UP (ref 0–2)
BILIRUB SERPL-MCNC: 1.2 MG/DL — SIGNIFICANT CHANGE UP (ref 0.2–1.2)
BILIRUB SERPL-MCNC: 1.6 MG/DL — HIGH (ref 0.2–1.2)
BILIRUB UR-MCNC: ABNORMAL
BUN SERPL-MCNC: 13 MG/DL — SIGNIFICANT CHANGE UP (ref 7–23)
BUN SERPL-MCNC: 15 MG/DL — SIGNIFICANT CHANGE UP (ref 7–23)
CALCIUM SERPL-MCNC: 8.1 MG/DL — LOW (ref 8.4–10.5)
CALCIUM SERPL-MCNC: 9.1 MG/DL — SIGNIFICANT CHANGE UP (ref 8.4–10.5)
CHLORIDE SERPL-SCNC: 92 MMOL/L — LOW (ref 96–108)
CHLORIDE SERPL-SCNC: 97 MMOL/L — SIGNIFICANT CHANGE UP (ref 96–108)
CK MB CFR SERPL CALC: 1.8 NG/ML — SIGNIFICANT CHANGE UP (ref 0–3.8)
CK MB CFR SERPL CALC: 2.8 NG/ML — SIGNIFICANT CHANGE UP (ref 0–3.8)
CK SERPL-CCNC: 69 U/L — SIGNIFICANT CHANGE UP (ref 25–170)
CK SERPL-CCNC: 73 U/L — SIGNIFICANT CHANGE UP (ref 25–170)
CO2 BLDV-SCNC: 30 MMOL/L — SIGNIFICANT CHANGE UP (ref 22–30)
CO2 SERPL-SCNC: 23 MMOL/L — SIGNIFICANT CHANGE UP (ref 22–31)
CO2 SERPL-SCNC: 26 MMOL/L — SIGNIFICANT CHANGE UP (ref 22–31)
COD CRY URNS QL: ABNORMAL
COLOR SPEC: YELLOW — SIGNIFICANT CHANGE UP
CREAT SERPL-MCNC: 0.92 MG/DL — SIGNIFICANT CHANGE UP (ref 0.5–1.3)
CREAT SERPL-MCNC: 0.99 MG/DL — SIGNIFICANT CHANGE UP (ref 0.5–1.3)
DACRYOCYTES BLD QL SMEAR: SLIGHT — SIGNIFICANT CHANGE UP
DIFF PNL FLD: NEGATIVE — SIGNIFICANT CHANGE UP
E COLI DNA BLD POS QL NAA+NON-PROBE: SIGNIFICANT CHANGE UP
ELLIPTOCYTES BLD QL SMEAR: SLIGHT — SIGNIFICANT CHANGE UP
EOSINOPHIL # BLD AUTO: 0.2 K/UL — SIGNIFICANT CHANGE UP (ref 0–0.5)
EOSINOPHIL NFR BLD AUTO: 0.6 % — SIGNIFICANT CHANGE UP (ref 0–6)
EPI CELLS # UR: 1 /HPF — SIGNIFICANT CHANGE UP
GAS PNL BLDA: SIGNIFICANT CHANGE UP
GAS PNL BLDV: SIGNIFICANT CHANGE UP
GLUCOSE BLDC GLUCOMTR-MCNC: 106 MG/DL — HIGH (ref 70–99)
GLUCOSE BLDC GLUCOMTR-MCNC: 129 MG/DL — HIGH (ref 70–99)
GLUCOSE BLDC GLUCOMTR-MCNC: 153 MG/DL — HIGH (ref 70–99)
GLUCOSE SERPL-MCNC: 165 MG/DL — HIGH (ref 70–99)
GLUCOSE SERPL-MCNC: 256 MG/DL — HIGH (ref 70–99)
GLUCOSE UR QL: ABNORMAL
GRAM STN FLD: SIGNIFICANT CHANGE UP
HCO3 BLDV-SCNC: 28 MMOL/L — SIGNIFICANT CHANGE UP (ref 21–29)
HCT VFR BLD CALC: 30.6 % — LOW (ref 34.5–45)
HCT VFR BLD CALC: 32.1 % — LOW (ref 34.5–45)
HCT VFR BLD CALC: 39.1 % — SIGNIFICANT CHANGE UP (ref 34.5–45)
HGB BLD-MCNC: 10.4 G/DL — LOW (ref 11.5–15.5)
HGB BLD-MCNC: 10.8 G/DL — LOW (ref 11.5–15.5)
HGB BLD-MCNC: 13.1 G/DL — SIGNIFICANT CHANGE UP (ref 11.5–15.5)
HYALINE CASTS # UR AUTO: 4 /LPF — HIGH (ref 0–2)
INR BLD: 1.83 RATIO — HIGH (ref 0.88–1.16)
KETONES UR-MCNC: SIGNIFICANT CHANGE UP
LACTATE BLDV-MCNC: 2.8 MMOL/L — HIGH (ref 0.7–2)
LEUKOCYTE ESTERASE UR-ACNC: ABNORMAL
LIDOCAIN IGE QN: 90 U/L — HIGH (ref 7–60)
LIDOCAIN IGE QN: >530 U/L — HIGH (ref 7–60)
LYMPHOCYTES # BLD AUTO: 2.3 K/UL — SIGNIFICANT CHANGE UP (ref 1–3.3)
LYMPHOCYTES # BLD AUTO: 7.5 % — LOW (ref 13–44)
MAGNESIUM SERPL-MCNC: 1.3 MG/DL — LOW (ref 1.6–2.6)
MCHC RBC-ENTMCNC: 25.4 PG — LOW (ref 27–34)
MCHC RBC-ENTMCNC: 25.5 PG — LOW (ref 27–34)
MCHC RBC-ENTMCNC: 25.9 PG — LOW (ref 27–34)
MCHC RBC-ENTMCNC: 33.5 GM/DL — SIGNIFICANT CHANGE UP (ref 32–36)
MCHC RBC-ENTMCNC: 33.7 GM/DL — SIGNIFICANT CHANGE UP (ref 32–36)
MCHC RBC-ENTMCNC: 33.9 GM/DL — SIGNIFICANT CHANGE UP (ref 32–36)
MCV RBC AUTO: 75.3 FL — LOW (ref 80–100)
MCV RBC AUTO: 76.1 FL — LOW (ref 80–100)
MCV RBC AUTO: 76.3 FL — LOW (ref 80–100)
METHOD TYPE: SIGNIFICANT CHANGE UP
MONOCYTES # BLD AUTO: 0.7 K/UL — SIGNIFICANT CHANGE UP (ref 0–0.9)
MONOCYTES NFR BLD AUTO: 2.3 % — SIGNIFICANT CHANGE UP (ref 2–14)
NEUTROPHILS # BLD AUTO: 27.1 K/UL — HIGH (ref 1.8–7.4)
NEUTROPHILS NFR BLD AUTO: 89.4 % — HIGH (ref 43–77)
NITRITE UR-MCNC: NEGATIVE — SIGNIFICANT CHANGE UP
PCO2 BLDV: 68 MMHG — HIGH (ref 35–50)
PH BLDV: 7.24 — LOW (ref 7.35–7.45)
PH UR: 6 — SIGNIFICANT CHANGE UP (ref 5–8)
PHOSPHATE SERPL-MCNC: 3.7 MG/DL — SIGNIFICANT CHANGE UP (ref 2.5–4.5)
PLAT MORPH BLD: NORMAL — SIGNIFICANT CHANGE UP
PLATELET # BLD AUTO: 201 K/UL — SIGNIFICANT CHANGE UP (ref 150–400)
PLATELET # BLD AUTO: 225 K/UL — SIGNIFICANT CHANGE UP (ref 150–400)
PLATELET # BLD AUTO: 372 K/UL — SIGNIFICANT CHANGE UP (ref 150–400)
PO2 BLDV: 30 MMHG — SIGNIFICANT CHANGE UP (ref 25–45)
POIKILOCYTOSIS BLD QL AUTO: SLIGHT — SIGNIFICANT CHANGE UP
POLYCHROMASIA BLD QL SMEAR: SLIGHT — SIGNIFICANT CHANGE UP
POTASSIUM SERPL-MCNC: 3.3 MMOL/L — LOW (ref 3.5–5.3)
POTASSIUM SERPL-MCNC: 3.4 MMOL/L — LOW (ref 3.5–5.3)
POTASSIUM SERPL-SCNC: 3.3 MMOL/L — LOW (ref 3.5–5.3)
POTASSIUM SERPL-SCNC: 3.4 MMOL/L — LOW (ref 3.5–5.3)
PROT SERPL-MCNC: 5.4 G/DL — LOW (ref 6–8.3)
PROT SERPL-MCNC: 7.1 G/DL — SIGNIFICANT CHANGE UP (ref 6–8.3)
PROT UR-MCNC: ABNORMAL
PROTHROM AB SERPL-ACNC: 21.3 SEC — HIGH (ref 10–12.9)
RAPID RVP RESULT: SIGNIFICANT CHANGE UP
RBC # BLD: 4.01 M/UL — SIGNIFICANT CHANGE UP (ref 3.8–5.2)
RBC # BLD: 4.26 M/UL — SIGNIFICANT CHANGE UP (ref 3.8–5.2)
RBC # BLD: 5.14 M/UL — SIGNIFICANT CHANGE UP (ref 3.8–5.2)
RBC # FLD: 13.6 % — SIGNIFICANT CHANGE UP (ref 10.3–14.5)
RBC # FLD: 13.9 % — SIGNIFICANT CHANGE UP (ref 10.3–14.5)
RBC # FLD: 13.9 % — SIGNIFICANT CHANGE UP (ref 10.3–14.5)
RBC BLD AUTO: ABNORMAL
RBC CASTS # UR COMP ASSIST: 3 /HPF — SIGNIFICANT CHANGE UP (ref 0–4)
SAO2 % BLDV: 34 % — LOW (ref 67–88)
SODIUM SERPL-SCNC: 133 MMOL/L — LOW (ref 135–145)
SODIUM SERPL-SCNC: 135 MMOL/L — SIGNIFICANT CHANGE UP (ref 135–145)
SP GR SPEC: 1.02 — SIGNIFICANT CHANGE UP (ref 1.01–1.02)
SPECIMEN SOURCE: SIGNIFICANT CHANGE UP
SPECIMEN SOURCE: SIGNIFICANT CHANGE UP
TROPONIN T, HIGH SENSITIVITY RESULT: 17 NG/L — SIGNIFICANT CHANGE UP (ref 0–51)
TROPONIN T, HIGH SENSITIVITY RESULT: 183 NG/L — HIGH (ref 0–51)
TROPONIN T, HIGH SENSITIVITY RESULT: 99 NG/L — HIGH (ref 0–51)
UROBILINOGEN FLD QL: ABNORMAL
WBC # BLD: 22.1 K/UL — HIGH (ref 3.8–10.5)
WBC # BLD: 23.4 K/UL — HIGH (ref 3.8–10.5)
WBC # BLD: 30.4 K/UL — HIGH (ref 3.8–10.5)
WBC # FLD AUTO: 22.1 K/UL — HIGH (ref 3.8–10.5)
WBC # FLD AUTO: 23.4 K/UL — HIGH (ref 3.8–10.5)
WBC # FLD AUTO: 30.4 K/UL — HIGH (ref 3.8–10.5)
WBC UR QL: 43 /HPF — HIGH (ref 0–5)

## 2019-02-01 PROCEDURE — 71045 X-RAY EXAM CHEST 1 VIEW: CPT | Mod: 26,76

## 2019-02-01 PROCEDURE — 93306 TTE W/DOPPLER COMPLETE: CPT | Mod: 26

## 2019-02-01 PROCEDURE — 74174 CTA ABD&PLVS W/CONTRAST: CPT | Mod: 26

## 2019-02-01 PROCEDURE — 99222 1ST HOSP IP/OBS MODERATE 55: CPT | Mod: GC

## 2019-02-01 PROCEDURE — 93975 VASCULAR STUDY: CPT | Mod: 26

## 2019-02-01 PROCEDURE — 76700 US EXAM ABDOM COMPLETE: CPT | Mod: 26,59

## 2019-02-01 PROCEDURE — 99223 1ST HOSP IP/OBS HIGH 75: CPT | Mod: GC

## 2019-02-01 PROCEDURE — 99285 EMERGENCY DEPT VISIT HI MDM: CPT | Mod: 25

## 2019-02-01 RX ORDER — DILTIAZEM HCL 120 MG
10 CAPSULE, EXT RELEASE 24 HR ORAL ONCE
Qty: 0 | Refills: 0 | Status: COMPLETED | OUTPATIENT
Start: 2019-02-01 | End: 2019-02-01

## 2019-02-01 RX ORDER — SODIUM CHLORIDE 9 MG/ML
1000 INJECTION INTRAMUSCULAR; INTRAVENOUS; SUBCUTANEOUS ONCE
Qty: 0 | Refills: 0 | Status: COMPLETED | OUTPATIENT
Start: 2019-02-01 | End: 2019-02-01

## 2019-02-01 RX ORDER — INSULIN LISPRO 100/ML
VIAL (ML) SUBCUTANEOUS AT BEDTIME
Qty: 0 | Refills: 0 | Status: DISCONTINUED | OUTPATIENT
Start: 2019-02-01 | End: 2019-02-08

## 2019-02-01 RX ORDER — WARFARIN SODIUM 2.5 MG/1
1 TABLET ORAL
Qty: 0 | Refills: 0 | COMMUNITY

## 2019-02-01 RX ORDER — DEXTROSE 50 % IN WATER 50 %
15 SYRINGE (ML) INTRAVENOUS ONCE
Qty: 0 | Refills: 0 | Status: DISCONTINUED | OUTPATIENT
Start: 2019-02-01 | End: 2019-02-08

## 2019-02-01 RX ORDER — HEPARIN SODIUM 5000 [USP'U]/ML
2700 INJECTION INTRAVENOUS; SUBCUTANEOUS ONCE
Qty: 0 | Refills: 0 | Status: COMPLETED | OUTPATIENT
Start: 2019-02-01 | End: 2019-02-01

## 2019-02-01 RX ORDER — MAGNESIUM SULFATE 500 MG/ML
2 VIAL (ML) INJECTION ONCE
Qty: 0 | Refills: 0 | Status: COMPLETED | OUTPATIENT
Start: 2019-02-01 | End: 2019-02-01

## 2019-02-01 RX ORDER — ESOMEPRAZOLE MAGNESIUM 40 MG/1
1 CAPSULE, DELAYED RELEASE ORAL
Qty: 0 | Refills: 0 | COMMUNITY

## 2019-02-01 RX ORDER — METFORMIN HYDROCHLORIDE 850 MG/1
1 TABLET ORAL
Qty: 0 | Refills: 0 | COMMUNITY

## 2019-02-01 RX ORDER — GLUCAGON INJECTION, SOLUTION 0.5 MG/.1ML
1 INJECTION, SOLUTION SUBCUTANEOUS ONCE
Qty: 0 | Refills: 0 | Status: DISCONTINUED | OUTPATIENT
Start: 2019-02-01 | End: 2019-02-08

## 2019-02-01 RX ORDER — ONDANSETRON 8 MG/1
4 TABLET, FILM COATED ORAL ONCE
Qty: 0 | Refills: 0 | Status: COMPLETED | OUTPATIENT
Start: 2019-02-01 | End: 2019-02-01

## 2019-02-01 RX ORDER — ACETAMINOPHEN 500 MG
550 TABLET ORAL ONCE
Qty: 0 | Refills: 0 | Status: COMPLETED | OUTPATIENT
Start: 2019-02-01 | End: 2019-02-01

## 2019-02-01 RX ORDER — POTASSIUM CHLORIDE 20 MEQ
40 PACKET (EA) ORAL ONCE
Qty: 0 | Refills: 0 | Status: COMPLETED | OUTPATIENT
Start: 2019-02-01 | End: 2019-02-01

## 2019-02-01 RX ORDER — DEXTROSE 50 % IN WATER 50 %
25 SYRINGE (ML) INTRAVENOUS ONCE
Qty: 0 | Refills: 0 | Status: DISCONTINUED | OUTPATIENT
Start: 2019-02-01 | End: 2019-02-08

## 2019-02-01 RX ORDER — POTASSIUM CHLORIDE 20 MEQ
10 PACKET (EA) ORAL
Qty: 0 | Refills: 0 | Status: COMPLETED | OUTPATIENT
Start: 2019-02-01 | End: 2019-02-01

## 2019-02-01 RX ORDER — HEPARIN SODIUM 5000 [USP'U]/ML
INJECTION INTRAVENOUS; SUBCUTANEOUS
Qty: 25000 | Refills: 0 | Status: DISCONTINUED | OUTPATIENT
Start: 2019-02-01 | End: 2019-02-05

## 2019-02-01 RX ORDER — HEPARIN SODIUM 5000 [USP'U]/ML
1500 INJECTION INTRAVENOUS; SUBCUTANEOUS EVERY 6 HOURS
Qty: 0 | Refills: 0 | Status: DISCONTINUED | OUTPATIENT
Start: 2019-02-01 | End: 2019-02-01

## 2019-02-01 RX ORDER — INSULIN LISPRO 100/ML
VIAL (ML) SUBCUTANEOUS
Qty: 0 | Refills: 0 | Status: DISCONTINUED | OUTPATIENT
Start: 2019-02-01 | End: 2019-02-08

## 2019-02-01 RX ORDER — MORPHINE SULFATE 50 MG/1
4 CAPSULE, EXTENDED RELEASE ORAL ONCE
Qty: 0 | Refills: 0 | Status: DISCONTINUED | OUTPATIENT
Start: 2019-02-01 | End: 2019-02-01

## 2019-02-01 RX ORDER — SODIUM CHLORIDE 9 MG/ML
1000 INJECTION, SOLUTION INTRAVENOUS
Qty: 0 | Refills: 0 | Status: DISCONTINUED | OUTPATIENT
Start: 2019-02-01 | End: 2019-02-01

## 2019-02-01 RX ORDER — DEXTROSE 50 % IN WATER 50 %
12.5 SYRINGE (ML) INTRAVENOUS ONCE
Qty: 0 | Refills: 0 | Status: DISCONTINUED | OUTPATIENT
Start: 2019-02-01 | End: 2019-02-08

## 2019-02-01 RX ORDER — HEPARIN SODIUM 5000 [USP'U]/ML
3000 INJECTION INTRAVENOUS; SUBCUTANEOUS ONCE
Qty: 0 | Refills: 0 | Status: DISCONTINUED | OUTPATIENT
Start: 2019-02-01 | End: 2019-02-01

## 2019-02-01 RX ORDER — PIPERACILLIN AND TAZOBACTAM 4; .5 G/20ML; G/20ML
3.38 INJECTION, POWDER, LYOPHILIZED, FOR SOLUTION INTRAVENOUS ONCE
Qty: 0 | Refills: 0 | Status: COMPLETED | OUTPATIENT
Start: 2019-02-01 | End: 2019-02-01

## 2019-02-01 RX ORDER — IBUPROFEN 200 MG
400 TABLET ORAL ONCE
Qty: 0 | Refills: 0 | Status: COMPLETED | OUTPATIENT
Start: 2019-02-01 | End: 2019-02-01

## 2019-02-01 RX ORDER — PIPERACILLIN AND TAZOBACTAM 4; .5 G/20ML; G/20ML
3.38 INJECTION, POWDER, LYOPHILIZED, FOR SOLUTION INTRAVENOUS EVERY 8 HOURS
Qty: 0 | Refills: 0 | Status: DISCONTINUED | OUTPATIENT
Start: 2019-02-01 | End: 2019-02-08

## 2019-02-01 RX ORDER — HEPARIN SODIUM 5000 [USP'U]/ML
2700 INJECTION INTRAVENOUS; SUBCUTANEOUS EVERY 6 HOURS
Qty: 0 | Refills: 0 | Status: DISCONTINUED | OUTPATIENT
Start: 2019-02-01 | End: 2019-02-05

## 2019-02-01 RX ORDER — HEPARIN SODIUM 5000 [USP'U]/ML
INJECTION INTRAVENOUS; SUBCUTANEOUS
Qty: 25000 | Refills: 0 | Status: DISCONTINUED | OUTPATIENT
Start: 2019-02-01 | End: 2019-02-01

## 2019-02-01 RX ORDER — MAGNESIUM SULFATE 500 MG/ML
2 VIAL (ML) INJECTION ONCE
Qty: 0 | Refills: 0 | Status: DISCONTINUED | OUTPATIENT
Start: 2019-02-01 | End: 2019-02-01

## 2019-02-01 RX ORDER — FLUTICASONE PROPIONATE AND SALMETEROL 50; 250 UG/1; UG/1
1 POWDER ORAL; RESPIRATORY (INHALATION)
Qty: 0 | Refills: 0 | COMMUNITY

## 2019-02-01 RX ORDER — SODIUM CHLORIDE 9 MG/ML
1000 INJECTION, SOLUTION INTRAVENOUS
Qty: 0 | Refills: 0 | Status: DISCONTINUED | OUTPATIENT
Start: 2019-02-01 | End: 2019-02-02

## 2019-02-01 RX ORDER — ACETAMINOPHEN 500 MG
650 TABLET ORAL ONCE
Qty: 0 | Refills: 0 | Status: DISCONTINUED | OUTPATIENT
Start: 2019-02-01 | End: 2019-02-01

## 2019-02-01 RX ORDER — SODIUM CHLORIDE 9 MG/ML
1000 INJECTION, SOLUTION INTRAVENOUS ONCE
Qty: 0 | Refills: 0 | Status: COMPLETED | OUTPATIENT
Start: 2019-02-01 | End: 2019-02-01

## 2019-02-01 RX ORDER — SODIUM CHLORIDE 9 MG/ML
1000 INJECTION, SOLUTION INTRAVENOUS
Qty: 0 | Refills: 0 | Status: DISCONTINUED | OUTPATIENT
Start: 2019-02-01 | End: 2019-02-08

## 2019-02-01 RX ORDER — HEPARIN SODIUM 5000 [USP'U]/ML
3000 INJECTION INTRAVENOUS; SUBCUTANEOUS EVERY 6 HOURS
Qty: 0 | Refills: 0 | Status: DISCONTINUED | OUTPATIENT
Start: 2019-02-01 | End: 2019-02-01

## 2019-02-01 RX ADMIN — SODIUM CHLORIDE 1000 MILLILITER(S): 9 INJECTION INTRAMUSCULAR; INTRAVENOUS; SUBCUTANEOUS at 02:25

## 2019-02-01 RX ADMIN — Medication 40 MILLIEQUIVALENT(S): at 12:05

## 2019-02-01 RX ADMIN — Medication 550 MILLIGRAM(S): at 08:25

## 2019-02-01 RX ADMIN — Medication 1: at 13:29

## 2019-02-01 RX ADMIN — Medication 100 MILLIEQUIVALENT(S): at 12:05

## 2019-02-01 RX ADMIN — ONDANSETRON 4 MILLIGRAM(S): 8 TABLET, FILM COATED ORAL at 02:25

## 2019-02-01 RX ADMIN — PIPERACILLIN AND TAZOBACTAM 200 GRAM(S): 4; .5 INJECTION, POWDER, LYOPHILIZED, FOR SOLUTION INTRAVENOUS at 05:51

## 2019-02-01 RX ADMIN — SODIUM CHLORIDE 50 MILLILITER(S): 9 INJECTION, SOLUTION INTRAVENOUS at 12:06

## 2019-02-01 RX ADMIN — Medication 100 MILLIEQUIVALENT(S): at 15:40

## 2019-02-01 RX ADMIN — MORPHINE SULFATE 4 MILLIGRAM(S): 50 CAPSULE, EXTENDED RELEASE ORAL at 02:25

## 2019-02-01 RX ADMIN — HEPARIN SODIUM 2700 UNIT(S): 5000 INJECTION INTRAVENOUS; SUBCUTANEOUS at 13:23

## 2019-02-01 RX ADMIN — Medication 312 MILLIGRAM(S): at 05:59

## 2019-02-01 RX ADMIN — Medication 312 MILLIGRAM(S): at 06:26

## 2019-02-01 RX ADMIN — SODIUM CHLORIDE 1000 MILLILITER(S): 9 INJECTION, SOLUTION INTRAVENOUS at 08:08

## 2019-02-01 RX ADMIN — Medication 50 GRAM(S): at 12:52

## 2019-02-01 RX ADMIN — Medication 220 MILLIGRAM(S): at 06:07

## 2019-02-01 RX ADMIN — HEPARIN SODIUM 500 UNIT(S)/HR: 5000 INJECTION INTRAVENOUS; SUBCUTANEOUS at 13:23

## 2019-02-01 RX ADMIN — PIPERACILLIN AND TAZOBACTAM 25 GRAM(S): 4; .5 INJECTION, POWDER, LYOPHILIZED, FOR SOLUTION INTRAVENOUS at 21:58

## 2019-02-01 RX ADMIN — Medication 100 MILLIEQUIVALENT(S): at 13:27

## 2019-02-01 RX ADMIN — PIPERACILLIN AND TAZOBACTAM 25 GRAM(S): 4; .5 INJECTION, POWDER, LYOPHILIZED, FOR SOLUTION INTRAVENOUS at 15:41

## 2019-02-01 RX ADMIN — HEPARIN SODIUM 450 UNIT(S)/HR: 5000 INJECTION INTRAVENOUS; SUBCUTANEOUS at 20:45

## 2019-02-01 RX ADMIN — SODIUM CHLORIDE 1000 MILLILITER(S): 9 INJECTION INTRAMUSCULAR; INTRAVENOUS; SUBCUTANEOUS at 05:51

## 2019-02-01 RX ADMIN — MORPHINE SULFATE 4 MILLIGRAM(S): 50 CAPSULE, EXTENDED RELEASE ORAL at 03:30

## 2019-02-01 RX ADMIN — SODIUM CHLORIDE 75 MILLILITER(S): 9 INJECTION, SOLUTION INTRAVENOUS at 09:36

## 2019-02-01 RX ADMIN — Medication 400 MILLIGRAM(S): at 08:07

## 2019-02-01 NOTE — ED PROVIDER NOTE - CARDIAC, MLM
Normal rate, regular rhythm.  Heart sounds S1, S2.  No murmurs, rubs or gallops. 2+ pulses equal in distal extremities

## 2019-02-01 NOTE — CONSULT NOTE ADULT - SUBJECTIVE AND OBJECTIVE BOX
HPI:    73F with PMHx of Afib on coumadin, HTN, asthma, dementia, DM Type II presents with abdominal pain. Patient reports it started all of a sudden at 11PM last night, and was associated with nausea and vomiting. Patient reports she has had gallbladder issues in the past, does not clarify what, but still has her gallbladder in place. She was placed on BiPAP in the ED for hypercapnia and MICU was consulted for new BiPAP.    Upon current evaluation, patient reports her pain has improved. She currently states she would like for the BiPAP to be taken off.    On triage, patient's /62, , RR 30, O2 sat 97%, Tmax 103.1. Patient given 2L NS, 30 of PO dilt and 20 of IV diltiazem for Afib with RVR. Patient given zosyn x1.      PAST MEDICAL & SURGICAL HISTORY:  Dementia  Hypertension  Type 2 diabetes mellitus  Atrial fibrillation, chronic  Diabetes mellitus  Asthma  No significant past surgical history      FAMILY HISTORY:  Family history of diabetes mellitus (Father)      SOCIAL HISTORY:  Smoking: [ ] Never Smoked [ ] Former Smoker (__ packs x ___ years) [ ] Current Smoker  (__ packs x ___ years)  Substance Use: [ ] Never Used [ ] Used ____  EtOH Use:  Marital Status: [ ] Single [ ]  [ ]  [ ]   Sexual History:   Occupation:  Recent Travel:  Country of Birth:  Advance Directives:    Allergies    Avelox (Pruritus)  Levaquin (Unknown)    Intolerances        HOME MEDICATIONS:    REVIEW OF SYSTEMS:  Constitutional: [ X] negative [ ] fevers [ ] chills [ ] weight loss [ ] weight gain  HEENT: [X ] negative [ ] dry eyes [ ] eye irritation [ ] postnasal drip [ ] nasal congestion  CV: [X ] negative  [ ] chest pain [ ] orthopnea [ ] palpitations [ ] murmur  Resp: [X ] negative [ ] cough [ ] shortness of breath [ ] dyspnea [ ] wheezing [ ] sputum [ ] hemoptysis  GI: [ ] negative [ ] nausea [ ] vomiting [ ] diarrhea [ ] constipation [ ] abd pain [ ] dysphagia   : [X ] negative [ ] dysuria [ ] nocturia [ ] hematuria [ ] increased urinary frequency  Musculoskeletal: [ ] negative [ ] back pain [ ] myalgias [ ] arthralgias [ ] fracture  Skin: [ ] negative [ ] rash [ ] itch  Neurological: [ ] negative [ ] headache [ ] dizziness [ ] syncope [ ] weakness [ ] numbness  Psychiatric: [ ] negative [ ] anxiety [ ] depression  Endocrine: [ ] negative [ ] diabetes [ ] thyroid problem  Hematologic/Lymphatic: [ ] negative [ ] anemia [ ] bleeding problem  Allergic/Immunologic: [ ] negative [ ] itchy eyes [ ] nasal discharge [ ] hives [ ] angioedema  [ ] All other systems negative  [ ] Unable to assess ROS because ________    OBJECTIVE:  ICU Vital Signs Last 24 Hrs  T(C): 39.5 (2019 07:51), Max: 39.6 (2019 05:47)  T(F): 103.1 (2019 07:51), Max: 103.2 (2019 05:47)  HR: 117 (2019 08:09) (103 - 155)  BP: 90/55 (2019 08:09) (78/58 - 152/58)  BP(mean): --  ABP: --  ABP(mean): --  RR: 27 (2019 08:09) (16 - 38)  SpO2: 100% (2019 08:09) (96% - 100%)        CAPILLARY BLOOD GLUCOSE          PHYSICAL EXAM:  General: well appearing, NAD, on BiPAP speaking in full sentences  HEENT: PERRLA,   Lymph Nodes: no lymphadenopathy  Neck: supple,no JVD   Respiratory: clear to auscultation   Cardiovascular: s1/s2; irregular rate and rhythm  Abdomen: soft, nontender, nondistended, +BS  Extremities: no edema  Skin:  dry skin, dry oral mucosa  Neurological:  no focal deficits    LINES:     HOSPITAL MEDICATIONS:                              LABS:                        13.1   30.4  )-----------( 372      ( 2019 02:24 )             39.1     Hgb Trend: 13.1<--  02-    133<L>  |  92<L>  |  15  ----------------------------<  256<H>  3.3<L>   |  23  |  0.92    Ca    9.1      2019 02:24    TPro  7.1  /  Alb  3.2<L>  /  TBili  1.2  /  DBili  x   /  AST  334<H>  /  ALT  142<H>  /  AlkPhos  200<H>      Creatinine Trend: 0.92<--  PT/INR - ( 2019 02:24 )   PT: 21.3 sec;   INR: 1.83 ratio         PTT - ( 2019 02:24 )  PTT:30.3 sec  Urinalysis Basic - ( 2019 04:00 )    Color: Yellow / Appearance: Slightly Turbid / S.022 / pH: x  Gluc: x / Ketone: Trace  / Bili: Small / Urobili: >8mg/dL   Blood: x / Protein: 30 mg/dL / Nitrite: Negative   Leuk Esterase: Large / RBC: 3 /hpf / WBC 43 /HPF   Sq Epi: x / Non Sq Epi: 1 /hpf / Bacteria: Negative        Venous Blood Gas:   @ 05:44  --/--/--/--/--  VBG Lactate: 2.8  Venous Blood Gas:   @ 02:24  7.24/68/30/28/34  VBG Lactate: 5.8          < from: CT Abdomen and Pelvis w/ Oral Cont and w/ IV Cont (18 @ 22:28) >    IMPRESSION:     The etiology of abdominal pain is not elucidated.    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

## 2019-02-01 NOTE — CONSULT NOTE ADULT - SUBJECTIVE AND OBJECTIVE BOX
Chief Complaint:  Patient is a 73y old  Female who presents with a chief complaint of sepsis    HPI:  73F with PMHx of Afib on coumadin, HTN, asthma, dementia, DM Type II presents with abdominal pain. Patient reports it started all of a sudden at 11PM last night, and was associated with nausea and vomiting. Patient reports she has had gallbladder issues in the past, does not clarify what, but still has her gallbladder in place. She was placed on BiPAP in the ED for hypercapnia and MICU was consulted.  She was given IVF for hypotension with moderate improvement in pressure.  Labs obtained showed elevated LFTs, bili, lipase, white count, concerning for biliary sepsis.     Allergies:  Avelox (Pruritus)  Levaquin (Unknown)    Home Medications:  Advair Diskus 250 mcg-50 mcg inhalation powder: 1 puff(s) inhaled 2 times a day (2019 11:23)  atorvastatin 20 mg oral tablet: 1 tab(s) orally once a day (2019 11:23)  dilTIAZem 240 mg/24 hours oral capsule, extended release: 1 cap(s) orally once a day (2019 11:23)  furosemide 20 mg oral tablet: 1 tab(s) orally once a day (2019 11:23)  lisinopril 5 mg oral tablet: 1 tab(s) orally once a day (2019 11:23)  metFORMIN 1000 mg oral tablet: 1 tab(s) orally once a day as needed for elevated blood sugar levels (2019 11:23)  NexIUM OTC 20 mg oral delayed release capsule: 1 cap(s) orally once a day (in the morning) (2019 11:23)  warfarin 3 mg oral tablet: 1 tab(s) orally once a day  Note: held per PCP due to elevated INR (2019 11:23)    Hospital Medications:  dextrose 40% Gel 15 Gram(s) Oral once PRN  dextrose 5%. 1000 milliLiter(s) IV Continuous <Continuous>  dextrose 50% Injectable 12.5 Gram(s) IV Push once  dextrose 50% Injectable 25 Gram(s) IV Push once  dextrose 50% Injectable 25 Gram(s) IV Push once  glucagon  Injectable 1 milliGRAM(s) IntraMuscular once PRN  heparin  Infusion.  Unit(s)/Hr IV Continuous <Continuous>  heparin  Injectable 2700 Unit(s) IV Push every 6 hours PRN  insulin lispro (HumaLOG) corrective regimen sliding scale   SubCutaneous three times a day before meals  insulin lispro (HumaLOG) corrective regimen sliding scale   SubCutaneous at bedtime  lactated ringers. 1000 milliLiter(s) IV Continuous <Continuous>  piperacillin/tazobactam IVPB. 3.375 Gram(s) IV Intermittent every 8 hours  potassium chloride  10 mEq/100 mL IVPB 10 milliEquivalent(s) IV Intermittent every 1 hour      PMHX/PSHX:  Dementia  Hypertension  Type 2 diabetes mellitus  Atrial fibrillation, chronic  Diabetes mellitus  Asthma  No significant past surgical history  Diabetes Mellitus  HTN (Hypertension)  Asthma  Afib      Family history:  Family history of diabetes mellitus  No pertinent family history in first degree relatives      Social History: no etoh/drugs/tob    ROS:     General:  No wt loss, fevers, chills, night sweats, fatigue,   Eyes:  Good vision, no reported pain  ENT:  No sore throat, pain, runny nose, dysphagia  CV:  No pain, palpitations, hypo/hypertension  Resp:  No dyspnea, cough, tachypnea, wheezing  GI:  See HPI  :  No pain, bleeding, incontinence, nocturia  Muscle:  No pain, weakness  Neuro:  No weakness, tingling, memory problems  Psych:  No fatigue, insomnia, mood problems, depression  Endocrine:  No polyuria, polydipsia, cold/heat intolerance  Heme:  No petechiae, ecchymosis, easy bruisability  Skin:  No rash, edema      PHYSICAL EXAM:     GENERAL:  Appears stated age, well-groomed, well-nourished, no distress  HEENT:  NC/AT,  conjunctivae clear and pink,  no JVD  CHEST:  Full & symmetric excursion, no increased effort, breath sounds clear  HEART:  Regular rhythm, S1, S2, no murmur/rub/S3/S4, no abdominal bruit, no edema  ABDOMEN:  Soft, non-tender, non-distended, normoactive bowel sounds,  no masses ,  EXTREMITIES:  no cyanosis,clubbing or edema  SKIN:  No rash/erythema/ecchymoses/petechiae/wounds/abscess/warm/dry  NEURO:  Alert, oriented    Vital Signs:  Vital Signs Last 24 Hrs  T(C): 36.4 (2019 10:55), Max: 39.6 (2019 05:47)  T(F): 97.6 (2019 10:55), Max: 103.2 (2019 05:47)  HR: 107 (2019 10:55) (102 - 155)  BP: 109/58 (2019 10:55) (78/58 - 152/58)  BP(mean): 62 (2019 10:26) (62 - 62)  RR: 20 (2019 10:55) (16 - 38)  SpO2: 100% (2019 10:55) (96% - 100%)  Daily Height in cm: 152.4 (2019 01:11)    Daily     LABS:                        10.8   22.1  )-----------( 225      ( 2019 11:33 )             32.1         135  |  97  |  13  ----------------------------<  165<H>  3.4<L>   |  26  |  0.99    Ca    8.1<L>      2019 11:33  Phos  3.7       Mg     1.3         TPro  5.4<L>  /  Alb  2.5<L>  /  TBili  1.6<H>  /  DBili  x   /  AST  623<H>  /  ALT  446<H>  /  AlkPhos  192<H>      LIVER FUNCTIONS - ( 2019 11:33 )  Alb: 2.5 g/dL / Pro: 5.4 g/dL / ALK PHOS: 192 U/L / ALT: 446 U/L / AST: 623 U/L / GGT: x           PT/INR - ( 2019 02:24 )   PT: 21.3 sec;   INR: 1.83 ratio         PTT - ( 2019 02:24 )  PTT:30.3 sec  Urinalysis Basic - ( 2019 04:00 )    Color: Yellow / Appearance: Slightly Turbid / S.022 / pH: x  Gluc: x / Ketone: Trace  / Bili: Small / Urobili: >8mg/dL   Blood: x / Protein: 30 mg/dL / Nitrite: Negative   Leuk Esterase: Large / RBC: 3 /hpf / WBC 43 /HPF   Sq Epi: x / Non Sq Epi: 1 /hpf / Bacteria: Negative      Amylase Serum--      Lipase serum>530       Ammonia--  Amylase Serum--      Lipase serum90       Ammonia--      Imaging: Chief Complaint:  Patient is a 73y old  Female who presents with a chief complaint of sepsis    HPI:  73F with PMHx of Afib on coumadin, HTN, asthma, dementia, DM Type II presents with abdominal pain. Patient reports it started all of a sudden at 11PM last night, and was associated with nausea and vomiting. Patient reports she has had gallbladder issues in the past, does not clarify what, but still has her gallbladder in place. She was placed on BiPAP in the ED for hypercapnia and MICU was consulted.  She was given IVF for hypotension with moderate improvement in pressure.  Labs obtained showed elevated LFTs, bili, lipase, white count, concerning for biliary sepsis.     Allergies:  Avelox (Pruritus)  Levaquin (Unknown)    Home Medications:  Advair Diskus 250 mcg-50 mcg inhalation powder: 1 puff(s) inhaled 2 times a day (2019 11:23)  atorvastatin 20 mg oral tablet: 1 tab(s) orally once a day (2019 11:23)  dilTIAZem 240 mg/24 hours oral capsule, extended release: 1 cap(s) orally once a day (2019 11:23)  furosemide 20 mg oral tablet: 1 tab(s) orally once a day (2019 11:23)  lisinopril 5 mg oral tablet: 1 tab(s) orally once a day (2019 11:23)  metFORMIN 1000 mg oral tablet: 1 tab(s) orally once a day as needed for elevated blood sugar levels (2019 11:23)  NexIUM OTC 20 mg oral delayed release capsule: 1 cap(s) orally once a day (in the morning) (2019 11:23)  warfarin 3 mg oral tablet: 1 tab(s) orally once a day  Note: held per PCP due to elevated INR (2019 11:23)    Hospital Medications:  dextrose 40% Gel 15 Gram(s) Oral once PRN  dextrose 5%. 1000 milliLiter(s) IV Continuous <Continuous>  dextrose 50% Injectable 12.5 Gram(s) IV Push once  dextrose 50% Injectable 25 Gram(s) IV Push once  dextrose 50% Injectable 25 Gram(s) IV Push once  glucagon  Injectable 1 milliGRAM(s) IntraMuscular once PRN  heparin  Infusion.  Unit(s)/Hr IV Continuous <Continuous>  heparin  Injectable 2700 Unit(s) IV Push every 6 hours PRN  insulin lispro (HumaLOG) corrective regimen sliding scale   SubCutaneous three times a day before meals  insulin lispro (HumaLOG) corrective regimen sliding scale   SubCutaneous at bedtime  lactated ringers. 1000 milliLiter(s) IV Continuous <Continuous>  piperacillin/tazobactam IVPB. 3.375 Gram(s) IV Intermittent every 8 hours  potassium chloride  10 mEq/100 mL IVPB 10 milliEquivalent(s) IV Intermittent every 1 hour      PMHX/PSHX:  Dementia  Hypertension  Type 2 diabetes mellitus  Atrial fibrillation, chronic  Diabetes mellitus  Asthma  No significant past surgical history  Diabetes Mellitus  HTN (Hypertension)  Asthma  Afib      Family history:  Family history of diabetes mellitus  No pertinent family history in first degree relatives      Social History: no etoh/drugs/tob    ROS:     General:  No wt loss, fevers, chills, night sweats, fatigue,   Eyes:  Good vision, no reported pain  ENT:  No sore throat, pain, runny nose, dysphagia  CV:  No pain, palpitations, hypo/hypertension  Resp:  No dyspnea, cough, tachypnea, wheezing  GI:  See HPI  :  No pain, bleeding, incontinence, nocturia  Muscle:  No pain, weakness  Neuro:  No weakness, tingling, memory problems  Psych:  No fatigue, insomnia, mood problems, depression  Endocrine:  No polyuria, polydipsia, cold/heat intolerance  Heme:  No petechiae, ecchymosis, easy bruisability  Skin:  No rash, edema      PHYSICAL EXAM:     GENERAL:  Elderly woman, mild distress  HEENT:  NC/AT,  conjunctivae clear and pink,  no JVD  CHEST:  Full & symmetric excursion, no increased effort, breath sounds clear  HEART:  Regular rhythm, S1, S2, no murmur/rub/S3/S4, no abdominal bruit, no edema  ABDOMEN:  Soft, non-tender, non-distended  EXTREMITIES:  no cyanosis,clubbing or edema  SKIN:  No rash  NEURO:  Alert, oriented    Vital Signs:  Vital Signs Last 24 Hrs  T(C): 36.4 (2019 10:55), Max: 39.6 (2019 05:47)  T(F): 97.6 (2019 10:55), Max: 103.2 (2019 05:47)  HR: 107 (2019 10:55) (102 - 155)  BP: 109/58 (2019 10:55) (78/58 - 152/58)  BP(mean): 62 (2019 10:26) (62 - 62)  RR: 20 (2019 10:55) (16 - 38)  SpO2: 100% (2019 10:55) (96% - 100%)  Daily Height in cm: 152.4 (2019 01:11)    Daily     LABS:                        10.8   22.1  )-----------( 225      ( 2019 11:33 )             32.1     02    135  |  97  |  13  ----------------------------<  165<H>  3.4<L>   |  26  |  0.99    Ca    8.1<L>      2019 11:33  Phos  3.7       Mg     1.3         TPro  5.4<L>  /  Alb  2.5<L>  /  TBili  1.6<H>  /  DBili  x   /  AST  623<H>  /  ALT  446<H>  /  AlkPhos  192<H>      LIVER FUNCTIONS - ( 2019 11:33 )  Alb: 2.5 g/dL / Pro: 5.4 g/dL / ALK PHOS: 192 U/L / ALT: 446 U/L / AST: 623 U/L / GGT: x           PT/INR - ( 2019 02:24 )   PT: 21.3 sec;   INR: 1.83 ratio         PTT - ( 2019 02:24 )  PTT:30.3 sec  Urinalysis Basic - ( 2019 04:00 )    Color: Yellow / Appearance: Slightly Turbid / S.022 / pH: x  Gluc: x / Ketone: Trace  / Bili: Small / Urobili: >8mg/dL   Blood: x / Protein: 30 mg/dL / Nitrite: Negative   Leuk Esterase: Large / RBC: 3 /hpf / WBC 43 /HPF   Sq Epi: x / Non Sq Epi: 1 /hpf / Bacteria: Negative      Amylase Serum--      Lipase serum>530       Ammonia--  Amylase Serum--      Lipase serum90       Ammonia--      Imaging:    < from: CT Angio Abdomen and Pelvis w/ IV Cont (19 @ 04:05) >    EXAM:  CT ANGIO ABD PELV (W)AW IC                            PROCEDURE DATE:  2019            INTERPRETATION:  CLINICAL INFORMATION: Abdominal pain.      COMPARISON: CT abdomen/pelvis on 2018.    PROCEDURE:   CT Angiography of the Abdomen and Pelvis with and without intravenous   contrast.  Noncontrast imaging was performed followed by arterial phase and venous   phases.  Intravenous contrast: 90 ml Omnipaque 350. 10 ml discarded.  Oral contrast: None.  Sagittal and coronal reformatswere performed as well as 3D (MIP)   reconstructions.    FINDINGS:    LOWER CHEST: Atherosclerotic changes of the proximal ascending thoracic   aorta and coronary arteries.    LIVER: Unchanged calcified hepatic granulomas.  BILE DUCTS: Normal caliber.  GALLBLADDER: Distended without radiodense gallstones.  SPLEEN: Within normal limits.  PANCREAS: Atrophic.  ADRENALS: Within normal limits.  KIDNEYS/URETERS: Within normal limits.    BLADDER: Within normal limits.  REPRODUCTIVE ORGANS: Uterus and adnexa are within normal limits.    BOWEL: No bowel obstruction or bowel wall thickening. Appendix not   visualized.  PERITONEUM: No ascites.  VESSELS:  Atherosclerotic changes of the abdominal aorta and its   branches. Mild atherosclerotic changes at the origin of the celiac axis   without significant luminal narrowing. The origin of the SMA is not well   visualized and is diminutive in caliber but patent as seen on prior   studies. Widely patent main portal vein, splenic vein and SMV.  RETROPERITONEUM: No lymphadenopathy.    ABDOMINAL WALL: Within normal limits.  BONES: Osteopenia. Mild scoliosis. Multilevel degenerative spinal   changes. Mild anterolisthesis of L5 on S1. ORIF of the bilateral proximal   femurs.    IMPRESSION: No acute intra-abdominal pathology.  No abnormal bowel wall   thickening.     Patent mesenteric vasculature, however the SMA is diminutive in caliber   and its origin is likely stenotic, as seen on prior studies.                JONN RUTH M.D., RADIOLOGY RESIDENT  This document has been electronically signed.  SHARA BARFIELD M.D, ATTENDING RADIOLOGIST  This document has been electronically signed. 2019  4:33AM                < end of copied text >    US pending

## 2019-02-01 NOTE — CONSULT NOTE ADULT - ATTENDING COMMENTS
73F PMH A-Fib on warfarin, DM2 presents with abdominal pain, in ED with hypotension and A-Fib with RVR, also with leukocytosis, lactic acidosis, elevated lipase due to acute pancreatitis, and transaminitis due to episode of hypotension. S/p IVF hydration and rate control of A-Fib with RVR. Now hemodynamically stable. Continue IVF hydration and abx. Does not require ICU care at this time, please call back if patient's condition worsens.

## 2019-02-01 NOTE — CONSULT NOTE ADULT - NSHPATTENDINGPLANDISCUSS_GEN_ALL_CORE
medical attending Dr Vigil, weekend on-call biliary endoscopist Dr. Dallas medical attending Dr. Vigil, weekend biliary endoscopist Dr. Dallas

## 2019-02-01 NOTE — ED PROVIDER NOTE - OBJECTIVE STATEMENT
72 y/o female PMH afib on coumadin, HTN, Asthma, dementia, diabetes type II p/w abd pain. Sudden onset, localized to R side started approx 11pm. Associated with 4-5 episodes of NBNB vomiting. passing gas. Had history of "gallbladder problems" but did not have GB taken out. No urinary/ symptoms. Denies cp, back pain, sob, weakness, no other complaints. Is compliant with coumadin.

## 2019-02-01 NOTE — ED PROVIDER NOTE - CONSTITUTIONAL, MLM
normal... appears older than stated age. Uncomfortable appearing. well nourished, awake, alert, oriented to person, place, time/situation

## 2019-02-01 NOTE — ED PROVIDER NOTE - GASTROINTESTINAL, MLM
Abdomen soft, RUQ ttp. negative murphys. negative mcburneys. +guarding. no rigidity. negative mcburneys

## 2019-02-01 NOTE — CONSULT NOTE ADULT - ASSESSMENT
Impression:  1) Sepsis- urinary vs biliary   2) Elevated AST/ALT/ALP, mild elevated TB, elevated lipase - c/w pancreatitis, likely gallstone with ?obstruction.  Ducts WNL on CT scan  3) Elevated trop - ?demand ischemia  4)  Afib on coumadin  5) Dementia    Recs:  - followup US results  - followup cardiology recs, can keep pt on heparin as will not perform sphincterotomy  - will plan for EUS +/- ERCP today  - keep NPO  - LR fluid resuscitation  - IV antibiotics  - followup culture results Impression:  1) Sepsis- urinary vs biliary   2) Elevated AST/ALT/ALP, mild elevated TB, elevated lipase - c/w pancreatitis, likely gallstone with ?obstruction.  Ducts WNL on CT scan  3) Elevated trop - ?demand ischemia  4)  Afib on coumadin  5) Dementia    Recs:  - followup US results  - followup cardiology recs, can keep pt on heparin as will not perform sphincterotomy  - will consider EUS or ERCP   - keep NPO  - LR fluid resuscitation  - IV antibiotics  - followup culture results

## 2019-02-01 NOTE — ED PROVIDER NOTE - ATTENDING CONTRIBUTION TO CARE
72 yo female with PMH of Atrial fibrillation on coumadin, HTN, DM2, Asthma, HLD, presents here with nausea, vomiting and abdominal pain. 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 72 yo female with PMH of Atrial fibrillation on coumadin, HTN, DM2, Asthma, HLD, presents here with nausea, vomiting and abdominal pain. with ruq tend noted, ct angio ordered with h/o sma syndrome, abd now with suprapubic tend, ct nl, repeat lactate sent, septic, iv abx.

## 2019-02-01 NOTE — ED ADULT NURSE NOTE - NSIMPLEMENTINTERV_GEN_ALL_ED
Implemented All Universal Safety Interventions:  [place] to call system. Call bell, personal items and telephone within reach. Instruct patient to call for assistance. Room bathroom lighting operational. Non-slip footwear when patient is off stretcher. Physically safe environment: no spills, clutter or unnecessary equipment. Stretcher in lowest position, wheels locked, appropriate side rails in place.

## 2019-02-01 NOTE — ED ADULT NURSE REASSESSMENT NOTE - NS ED NURSE REASSESS COMMENT FT1
Patient aware that urine specimen is needed, reports she cannot go right now. MD aware, will reassess patient after IV fluids.

## 2019-02-01 NOTE — ED PROVIDER NOTE - PROGRESS NOTE DETAILS
Tyesha: BP 70/50's -> will give 2nd bolus LR and reassess. If fails to respond to 2nd bolus, will require pressors. MICU came to evaluate patient and will follow.

## 2019-02-01 NOTE — ED ADULT NURSE NOTE - OBJECTIVE STATEMENT
74 y/o female PMH afib on coumadin, HTN, Asthma, dementia, diabetes type II arrives to ED with c/o abdomen pain starting suddenly at 11pm. Patient reports having pain with associated nausea, no vomiting, diarrhea or bloody stool. Patient reports having mild constipation this morning. Upon arrival to ED patient is grimacing in pain, non specific to where in abdomen. Abdomen is soft NT/ND. Reports having escarole two times today and soup for dinner at 6pm. Patient is a/ox3, able to make needs known. Denies changes to diet over last week, no decreased PO intake from baseline, no dysuria/hematuria. No shortness of breath, chest pain, palpitations, or diaphoresis. No fever/chills. LBM this morning normal formed stool.

## 2019-02-01 NOTE — CONSULT NOTE ADULT - SUBJECTIVE AND OBJECTIVE BOX
HPI:   Patient is a 73y female with dementia from home, [facility] , dm came to the hospital for sudden onset of abdomen pain most on the right starting late last night. She feels a little better now. She cannot clearly characterize the pain. She denies vomiting, melena, hematochezia, diarrhea, constipation, dysuria, hematuria, cough. She did require bipap transiently and was hypotensive at one point. She has no fever. She is now known to have high lipase and troponin    REVIEW OF SYSTEMS:  All other review of systems negative (Comprehensive ROS)    PAST MEDICAL & SURGICAL HISTORY:  Dementia  Hypertension  Type 2 diabetes mellitus  Atrial fibrillation, chronic  Diabetes mellitus  Asthma  No significant past surgical history      Allergies    Avelox (Pruritus)  Levaquin (Unknown)    Intolerances        Antimicrobials Day #  :1  piperacillin/tazobactam IVPB. 3.375 Gram(s) IV Intermittent every 8 hours    Other Medications:  dextrose 40% Gel 15 Gram(s) Oral once PRN  dextrose 5%. 1000 milliLiter(s) IV Continuous <Continuous>  dextrose 50% Injectable 12.5 Gram(s) IV Push once  dextrose 50% Injectable 25 Gram(s) IV Push once  dextrose 50% Injectable 25 Gram(s) IV Push once  glucagon  Injectable 1 milliGRAM(s) IntraMuscular once PRN  heparin  Infusion.  Unit(s)/Hr IV Continuous <Continuous>  heparin  Injectable 2700 Unit(s) IV Push every 6 hours PRN  insulin lispro (HumaLOG) corrective regimen sliding scale   SubCutaneous three times a day before meals  insulin lispro (HumaLOG) corrective regimen sliding scale   SubCutaneous at bedtime  lactated ringers. 1000 milliLiter(s) IV Continuous <Continuous>  potassium chloride  10 mEq/100 mL IVPB 10 milliEquivalent(s) IV Intermittent every 1 hour      FAMILY HISTORY:  Family history of diabetes mellitus      SOCIAL HISTORY:  Smoking: [ ]Yes [ x]No  ETOH: [ ]Yes x[ ]No  Drug Use: [ ]Yes [x ]No   x[ ] Single[ ]    T(F): 97.6 (19 @ 10:55), Max: 103.2 (19 @ 05:47)  HR: 107 (19 @ 10:55)  BP: 109/58 (19 @ 10:55)  RR: 20 (19 @ 10:55)  SpO2: 100% (19 @ 10:55)  Wt(kg): --    PHYSICAL EXAM:  General: alert, moans a bit acute distress  Eyes:  anicteric, no conjunctival injection, no discharge  Oropharynx: no lesions or injection 	  Neck: supple, without adenopathy  Lungs: clear to auscultation  Heart: s1s2 3/6 sys m  Abdomen: soft, nondistended, nontender, without mass or organomegaly  Skin: no lesions  Extremities: no clubbing, cyanosis, or edema  Neurologic: alert,  moves all extremities    LAB RESULTS:                        10.8   22.1  )-----------( 225      ( 2019 11:33 )             32.1         135  |  97  |  13  ----------------------------<  165<H>  3.4<L>   |  26  |  0.99    Ca    8.1<L>      2019 11:33  Phos  3.7       Mg     1.3         TPro  5.4<L>  /  Alb  2.5<L>  /  TBili  1.6<H>  /  DBili  x   /  AST  623<H>  /  ALT  446<H>  /  AlkPhos  192<H>      LIVER FUNCTIONS - ( 2019 11:33 )  Alb: 2.5 g/dL / Pro: 5.4 g/dL / ALK PHOS: 192 U/L / ALT: 446 U/L / AST: 623 U/L / GGT: x           Urinalysis Basic - ( 2019 04:00 )    Color: Yellow / Appearance: Slightly Turbid / S.022 / pH: x  Gluc: x / Ketone: Trace  / Bili: Small / Urobili: >8mg/dL   Blood: x / Protein: 30 mg/dL / Nitrite: Negative   Leuk Esterase: Large / RBC: 3 /hpf / WBC 43 /HPF   Sq Epi: x / Non Sq Epi: 1 /hpf / Bacteria: Negative        MICROBIOLOGY:  RECENT CULTURES:        RADIOLOGY REVIEWED:  < from: Xray Chest 1 View- PORTABLE-Urgent (19 @ 06:46) >    IMPRESSION:    < end of copied text >    < from: CT Angio Abdomen and Pelvis w/ IV Cont (19 @ 04:05) >  IMPRESSION: No acute intra-abdominal pathology.  No abnormal bowel wall   thickening.     Patent mesenteric vasculature, however the SMA is diminutive in caliber   and its origin is likely stenotic, as seen on prior studies.    < end of copied text >    Impression:  Elderly woman with afib, dm admitted with abrupt onset abdo pain, some hypotension, abdomen not terribly tender but says she is better now. Has elevated wbc, lactate, lipase and rise in liver enzymes with bili. CT abd and pelvis shows some sma disease and atrophic pancreas. There is some mild pyuria. I am concerned that she has possible gallston e pancreatitis, cholantitis, acalculous cholecystitis or ischemic bowel with possible nstemi  Recommendations:  cover with zosyn  follow up blood and urine culture  await planned US, May need mrcp or ercp   cardiology , surgery and gi evaluation  further w/u and tx to be determined

## 2019-02-01 NOTE — H&P ADULT - HISTORY OF PRESENT ILLNESS
72 yo female with PMH of Atrial fibrillation on coumadin, HTN, DM2, Asthma, HLD,celiac and SMA dz last admission on   mesenteric angiogram now presenting with acute onset of epigastric pain with N but no vomitting  no fever or chills  no diarreah and no uriary sx   CT was done in ED : < from: CT Angio Abdomen and Pelvis w/ IV Cont (02.01.19 @ 04:05) >   No acute intra-abdominal pathology.  No abnormal bowel wall thickening.  Patent mesenteric vasculature, however the SMA is diminutive in caliber   and its origin is likely stenotic, as seen on prior studies.  lactic acid was elevated   pt was thought to have UTI ..pt however developed acute respiratory failure and placed on bipap.. pt became hypotensive and afib with RVR and MICU called...   Pt was evaluated by MICU and thought not to be a candidate for MICU..    At bedside Pt is ill looking , pale but says feels better overall .. off bipap and improved HR and BP   denies cp  SOB palpiations   feels N but no vomitting... no diarreah   labs : significant for leukocytosis.. acute elevation of trop and acute elevation of LFT

## 2019-02-01 NOTE — CHART NOTE - NSCHARTNOTEFT_GEN_A_CORE
Patient is a 73y old  Female who presents with a chief complaint of abdominal pain since last night.      Notified by RN, pt with uptrending Trop 183. Pt seen & evaluated, with initial c/o epigastric pain, now reports pain is subsiding. Review of other labs reveals worsening AST/ALT, and elevated bilirubin (1.6) & lipase>530. Pt remains hemodynamically stable at this time with no complaints.      Vital Signs Last 24 Hrs  T(C): 36.4 (01 Feb 2019 10:55), Max: 39.6 (01 Feb 2019 05:47)  T(F): 97.6 (01 Feb 2019 10:55), Max: 103.2 (01 Feb 2019 05:47)  HR: 107 (01 Feb 2019 10:55) (102 - 155)  BP: 109/58 (01 Feb 2019 10:55) (78/58 - 152/58)  BP(mean): 62 (01 Feb 2019 10:26) (62 - 62)  RR: 20 (01 Feb 2019 10:55) (16 - 38)  SpO2: 100% (01 Feb 2019 10:55) (96% - 100%)                        10.8   22.1  )-----------( 225      ( 01 Feb 2019 11:33 )             32.1     02-01    135  |  97  |  13  ----------------------------<  165<H>  3.4<L>   |  26  |  0.99    Ca    8.1<L>      01 Feb 2019 11:33  Phos  3.7     02-01  Mg     1.3     02-01    TPro  5.4<L>  /  Alb  2.5<L>  /  TBili  1.6<H>  /  DBili  x   /  AST  623<H>  /  ALT  446<H>  /  AlkPhos  192<H>  02-01    PT/INR - ( 01 Feb 2019 02:24 )   PT: 21.3 sec;   INR: 1.83 ratio         PTT - ( 01 Feb 2019 02:24 )  PTT:30.3 sec        General: ill-appearing, non-toxic, pale, cool to touch  Neurology: A&Ox3, nonfocal, LEONARD x 4  Head:  Normocephalic, atraumatic  Respiratory: CTA B/L, no wheezing, rhonchi, or crackles  CV: RRR, S1S2, no murmur  Abdominal: Soft, NT, ND no palpable mass, no ttp overepigastric area  MSK: No edema, + peripheral pulses, FROM all 4 extremity    A/P  HPI: 73F with PMHx of Afib on coumadin, HTN, asthma, dementia, DM Type II presented with acute onset of abd pain since 11pm last night. Hospital course complicated by acute respiratory failure-hypercapnea (Pco2 68), hypotension (responsive to 3L [place] in the ED), initial elevated lactate 5.8 (now 2.7), AF RVR (now converted to NSR 90s), & leukocytosis, all concerning for septic shock, evaluated by MICU last night with no indication for MICU admission due to improvement.    #Now with worsening Transaminitis & elevated Troponin    Problem #1 NSTEMI  1.) Repeat EKG with no evidence of acute ischemia, Trop 17-->183  2.) Consulted pt's Cardiologist-Dr. Tamayo, requesting Urgent Cardiac Catheterization consult. Consulted Cards Fellow [id number], plan to evaluate patient. Pending recommendations  3.) Started on Heparin gtt for NSTEMI as agreed by Dr. Tamayo  4.) Continue monitoring on telemetry, routine vitals, trend trop x 2 more    Problem #2 Transaminitis/Elevated Lipase/Elevated Bilirubin r/o biliary disease  1.) CTA abd/pel scan reviewed from last night: Patent mesenteric vasculature, however the SMA is diminutive in caliber and its origin is likely stenotic, as seen on prior studies.  2.) NPO, US ABD & Mesenteric Doppler  3.) TMAX 103.1, leukocytosis, & elevated lactate-BC x 2 sent last night, c/w IV Zosyn for now  4.) House GI consulted, plan to evaluate patient for recommendations  5.) ID consulted, awaiting recommendations  6.) House GI consulted, plan to evaluate  7.) If pt's condition deteriorates, plan to reconsult MICU    Problem #3 Acute Respiratory Failure-Hypercarbia  1.) Repeat ABG on 2L NC improved: PCo2 34, ph 7.47, O2 sat 99%  2.) Continuous pulse ox monitoring  3.) If pt's condition deteriorates, plan to reconsult MICU       & son at bedside, obtained collatoral information from daughter-Gerald, over the phone: [phone]. Dr. Vigil currently at bedside, agrees with above plan & management.    Nathaniel Saintus [facility]  #[phone]

## 2019-02-01 NOTE — ED PROVIDER NOTE - MEDICAL DECISION MAKING DETAILS
73F hx DM, afib on coumadin, htn, dementia p/w abd pain. dka vs biliary vs obstruction vs pancreatitis vs enteritis/colitis. Unlikely perf

## 2019-02-01 NOTE — ED ADULT NURSE REASSESSMENT NOTE - NS ED NURSE REASSESS COMMENT FT1
Patient rectal temp 103, and patient having tachypnea with HR 140s MD made aware. Cardizem given as ordered. Patient mentating at baseline mental status, using accessory muscles to breath, MD Eric at bedside. Will reassess.

## 2019-02-01 NOTE — CONSULT NOTE ADULT - CONSULT REQUESTED DATE/TIME
Patient Education     Exercise for a Healthier Heart  You may wonder how you can improve the health of your heart. If you’re thinking about exercise, you’re on the right track. You don’t need to become an athlete, but you do need a certain amount of brisk exercise to help strengthen your heart. If you have been diagnosed with a heart condition, your doctor may recommend exercise to help stabilize your condition. To help make exercise a habit, choose safe, fun activities.     Exercise with a friend. When activity is fun, you're more likely to stick with it.     Be sure to check with your health care provider before starting an exercise program.   Why exercise?  Exercising regularly offers many healthy rewards. It can help you do all of the following:  · Improve your blood cholesterol levels to help prevent further heart trouble  · Lower your blood pressure to help prevent a stroke or heart attack  · Control diabetes, or reduce your risk of getting this disease  · Improve your heart and lung function  · Reach and maintain a healthy weight  · Make your muscles stronger and more limber so you can stay active  · Prevent falls and fractures by slowing the loss of bone mass (osteoporosis)  · Manage stress better  Exercise tips  Ease into your routine. Set small goals. Then build on them.  Exercise on most days. Aim for a total of 150 or more minutes of moderate to  vigorous intensity activity each week. Consider 40 minutes, 3 to 4 times a week. For best results, activity should last for 40 minutes on average. It is OK to work up to the 40 minute period over time. Examples of moderate-intensity activity is walking one mile in 15 minutes or 30 to 45 minutes of yard work.  Step up your daily activity level. Along with your exercise program, try being more active throughout the day. Walk instead of drive. Do more household tasks or yard work.  Choose one or more activities you enjoy. Walking is one of the easiest things you  can do. You can also try swimming, riding a bike, or taking an exercise class.  Stop exercising and call your doctor if you:  · Have chest pain or feel dizzy or lightheaded  · Feel burning, tightness, pressure, or heaviness in your chest, neck, shoulders, back, or arms  · Have unusual shortness of breath  · Have increased joint or muscle pain  · Have palpitations or an irregular heartbeat               01-Feb-2019 13:31

## 2019-02-01 NOTE — H&P ADULT - ASSESSMENT
72 yo female with PMH of Atrial fibrillation on coumadin, HTN, DM2, Asthma, HLD,celiac and SMA dz last admission on   mesenteric angiogram now presenting with acute onset of epigastric pain with N but no vomitting  no fever or chills  no diarreah and no uriary sx   CT was done in ED : < from: CT Angio Abdomen and Pelvis w/ IV Cont (02.01.19 @ 04:05) >   No acute intra-abdominal pathology.  No abnormal bowel wall thickening.  Patent mesenteric vasculature, however the SMA is diminutive in caliber   and its origin is likely stenotic, as seen on prior studies.  lactic acid was elevated   pt was thought to have UTI ..pt however developed acute respiratory failure and placed on bipap.. pt became hypotensive and afib with RVR and MICU called...   Pt was evaluated by MICU and thought not to be a candidate for MICU..    At bedside Pt is ill looking , pale but says feels better overall .. off bipap and improved HR and BP   denies cp  SOB palpiations   feels N but no vomitting... no diarreah   labs : significant for leukocytosis.. acute elevation of trop and acute elevation of LFT     1- acute respiratory failure : off bipap  ABG improved .. etiology ?     2- elevated trop : pt with epgistric pain  and significant risk factors ..  cath consult called   heparin started   f/u with cardio Dr. Tamayo     3- Acute elevation of LFT :  likely multicatorial .. sec to ? hypotension ? congestion   / sepsis   GI consult     4- septic Shock : pt was fever / hypotension overnght   will cont abx   ID input called   consider  abd ?   MARYBETH

## 2019-02-01 NOTE — CONSULT NOTE ADULT - ASSESSMENT
73F with PMHx of Afib on coumadin, HTN, asthma, dementia, DM Type II presents with abdominal pain, found to be in septic shock, likely secondary to UTI.    1. Septic shock  - WBC 30, febrile to 103.1 and in Afib with RVR with ++UA  - c/w fluid resuscitation patient s/p 2L, continue with fluid resuscitation  - c/w zosyn for abx coverage  - Afib with RVR in setting of fevers- treat underlying fever and infection    2. Hypercarbia  - patient place on BiPAP for hypercarbia- PCO2 of 60  - likely in the setting of pain- now pain has improved and patient saturating 100% on 40% FiO2 with adequate tidal volumes  - can trial off BiPAP and repeat blood gas      Patient is not a MICU candidate at this time. Please re-consult if needed.    *To be discussed with attending

## 2019-02-01 NOTE — CHART NOTE - NSCHARTNOTEFT_GEN_A_CORE
Cath Consult    Referring physician: patient's cardiologist, Dr. Tamayo  Reason for consult: elevated troponin    73F with PMH of AFib on warfarin, HTN, HLD, T2DM, celiac and mesenteric dz on angiogram who presented with acute onset RUQ abdominal pain associated with nausea. Found to be hypotensive with vLA 5.8 in ED. HS trop 17 -> 183. Other labs significant for lipase >530, AST//623, , Tbili 1.6. Being treated for  sepsis 2/2 UTI vs biliary source. At time of interview, patient has no complaints. She denies abdominal pain, chest pain, n/v, palpitations, SOB, LE edema.     Exam:  Gen: NAD  CV: RRR, nl S1, S2, no murmurs, LE edema  Pulm: CTA b/l  Abd: soft, +BS  Neuro: no focal deficits    EKG: NSR, no ST changes suggestive of ischemia or infarction    TTE  < from: TTE with Doppler (w/Cont) (02.01.19 @ 14:40) >  Conclusions:  1. Calcified trileaflet aortic valve with normal opening.  2. Endocardial visualization enhanced with intravenous  injection of Ultrasonic Enhancing Agent (Definity). Mild  segmental left ventricular systolic dysfunction. The septum  is hypokinetic.  3. Mild Right ventricular enlargement with mild  decreased  right ventricular systolic function. RV S' 0.8  *** Compared with echocardiogram of 10/19/2017, no  significant changes noted.  < end of copied text >        A/P  73F presenting with abdominal pain. Being treated for sepsis 2/2 UTI vs biliary source. She denies CP. HS trop 17 -> 183. TTE as above. HS trop could be elevated to type 2 MI in setting of sepsis.     - No indication for cath at this time  - On heparin gtt for AFib  - Would hold off on loading with DAPT at this time    Case discussed with Dr. Correa.     Dr. Tamayo to see patient tomorrow.     ANTHONY Gaston  Cardiology Fellow  [id number]

## 2019-02-01 NOTE — CONSULT NOTE ADULT - ATTENDING COMMENTS
As above    Impression:    #1.  Acute epigastric pain starting last night, currently resolved.    #2.  Initial high grade fever, respiratory failure requiring BIPAP, and hypotension have resolved with medical management    #3.  Abnormal LFTs (bilirubin increasing from 1->2, 's ->400), elevated lipase.  Severe leukocytosis improving from 30->22.    #4.  Cholelithiasis without cholecystitis or biliary dilation on CT angio abdomen or transabdominal ultrasound.  No evidence of pancreatitis on CT scan.    #5.  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    Differential diagnosis for GI problems include cholangitis, gallstone pancreatitis with passed choledocholithasis, less likely mesenteric ischemia or cholecystitis    Recommendation:    #1.  As patient is improving with medical management, and there is uncertainty about whether or not choledocholithiasis is present, there is no emergent need for ERCP.  As patient has risks for cardiopulmonary decompensation under anesthesia, would not perform endoscopic ultrasound today.    #2.  MRCP without contrast to look for evidence of choledocholithiasis     #3.  Await cardiology evaluation, including echocardiogram, for risk stratification/optimization for anesthesia for possible endoscopic procedures.    #4.  If there is evidence of severe cholangitis with septic shock over the weekend, would evaluate for urgent ERCP.    #5.  If there is evidence of choledocholithiasis and no severe cholangitis, then EUS and/or ERCP on 2/4/19 As above    Impression:    #1.  Acute epigastric pain starting last night, currently resolved.    #2.  Initial high grade fever, respiratory failure requiring BIPAP, and hypotension have resolved with medical management    #3.  Abnormal LFTs (bilirubin increasing from 1->2, 's ->400), elevated lipase.  Severe leukocytosis improving from 30->22.    #4.  Cholelithiasis without cholecystitis or biliary dilation on CT angio abdomen or transabdominal ultrasound.  No evidence of pancreatitis on CT scan.    #5.  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    Differential diagnosis for GI problems include cholangitis, gallstone pancreatitis with passed choledocholithasis, less likely mesenteric ischemia or cholecystitis    Recommendation:    #1.  As patient is improving with medical management, and there is uncertainty about whether or not choledocholithiasis is present, there is no emergent need for ERCP.  As patient has risks for cardiopulmonary decompensation under anesthesia, would not perform endoscopic ultrasound today.    #2.  MRCP without contrast to look for evidence of choledocholithiasis     #3.  Await cardiology evaluation, including echocardiogram, for risk stratification/optimization for anesthesia for possible endoscopic procedures.    #4.  If there is evidence of severe cholangitis with septic shock over the weekend, would evaluate for urgent ERCP.    #5.  If there is evidence of choledocholithiasis and no severe cholangitis, then EUS and/or ERCP on 2/4/19

## 2019-02-02 LAB
ALBUMIN SERPL ELPH-MCNC: 2.4 G/DL — LOW (ref 3.3–5)
ALP SERPL-CCNC: 213 U/L — HIGH (ref 40–120)
ALT FLD-CCNC: 325 U/L — HIGH (ref 10–45)
ANION GAP SERPL CALC-SCNC: 10 MMOL/L — SIGNIFICANT CHANGE UP (ref 5–17)
APTT BLD: 27.4 SEC — LOW (ref 27.5–36.3)
APTT BLD: 45.5 SEC — HIGH (ref 27.5–36.3)
APTT BLD: 48 SEC — HIGH (ref 27.5–36.3)
APTT BLD: 52 SEC — HIGH (ref 27.5–36.3)
AST SERPL-CCNC: 244 U/L — HIGH (ref 10–40)
BASOPHILS # BLD AUTO: 0.03 K/UL — SIGNIFICANT CHANGE UP (ref 0–0.2)
BASOPHILS NFR BLD AUTO: 0.2 % — SIGNIFICANT CHANGE UP (ref 0–2)
BILIRUB SERPL-MCNC: 2.3 MG/DL — HIGH (ref 0.2–1.2)
BUN SERPL-MCNC: 12 MG/DL — SIGNIFICANT CHANGE UP (ref 7–23)
CALCIUM SERPL-MCNC: 8.4 MG/DL — SIGNIFICANT CHANGE UP (ref 8.4–10.5)
CHLORIDE SERPL-SCNC: 97 MMOL/L — SIGNIFICANT CHANGE UP (ref 96–108)
CO2 SERPL-SCNC: 23 MMOL/L — SIGNIFICANT CHANGE UP (ref 22–31)
CREAT SERPL-MCNC: 0.87 MG/DL — SIGNIFICANT CHANGE UP (ref 0.5–1.3)
CULTURE RESULTS: SIGNIFICANT CHANGE UP
EOSINOPHIL # BLD AUTO: 0.38 K/UL — SIGNIFICANT CHANGE UP (ref 0–0.5)
EOSINOPHIL NFR BLD AUTO: 2.3 % — SIGNIFICANT CHANGE UP (ref 0–6)
GLUCOSE BLDC GLUCOMTR-MCNC: 101 MG/DL — HIGH (ref 70–99)
GLUCOSE BLDC GLUCOMTR-MCNC: 61 MG/DL — LOW (ref 70–99)
GLUCOSE BLDC GLUCOMTR-MCNC: 62 MG/DL — LOW (ref 70–99)
GLUCOSE BLDC GLUCOMTR-MCNC: 76 MG/DL — SIGNIFICANT CHANGE UP (ref 70–99)
GLUCOSE BLDC GLUCOMTR-MCNC: 78 MG/DL — SIGNIFICANT CHANGE UP (ref 70–99)
GLUCOSE BLDC GLUCOMTR-MCNC: 80 MG/DL — SIGNIFICANT CHANGE UP (ref 70–99)
GLUCOSE BLDC GLUCOMTR-MCNC: 81 MG/DL — SIGNIFICANT CHANGE UP (ref 70–99)
GLUCOSE SERPL-MCNC: 92 MG/DL — SIGNIFICANT CHANGE UP (ref 70–99)
HBA1C BLD-MCNC: 6.6 % — HIGH (ref 4–5.6)
HCT VFR BLD CALC: 34.8 % — SIGNIFICANT CHANGE UP (ref 34.5–45)
HGB BLD-MCNC: 11 G/DL — LOW (ref 11.5–15.5)
IMM GRANULOCYTES NFR BLD AUTO: 0.3 % — SIGNIFICANT CHANGE UP (ref 0–1.5)
LACTATE SERPL-SCNC: 3.1 MMOL/L — HIGH (ref 0.7–2)
LYMPHOCYTES # BLD AUTO: 0.58 K/UL — LOW (ref 1–3.3)
LYMPHOCYTES # BLD AUTO: 3.5 % — LOW (ref 13–44)
MAGNESIUM SERPL-MCNC: 2.3 MG/DL — SIGNIFICANT CHANGE UP (ref 1.6–2.6)
MCHC RBC-ENTMCNC: 24 PG — LOW (ref 27–34)
MCHC RBC-ENTMCNC: 31.6 GM/DL — LOW (ref 32–36)
MCV RBC AUTO: 76 FL — LOW (ref 80–100)
MONOCYTES # BLD AUTO: 0.52 K/UL — SIGNIFICANT CHANGE UP (ref 0–0.9)
MONOCYTES NFR BLD AUTO: 3.1 % — SIGNIFICANT CHANGE UP (ref 2–14)
NEUTROPHILS # BLD AUTO: 15.21 K/UL — HIGH (ref 1.8–7.4)
NEUTROPHILS NFR BLD AUTO: 90.6 % — HIGH (ref 43–77)
PLATELET # BLD AUTO: 201 K/UL — SIGNIFICANT CHANGE UP (ref 150–400)
POTASSIUM SERPL-MCNC: 4.6 MMOL/L — SIGNIFICANT CHANGE UP (ref 3.5–5.3)
POTASSIUM SERPL-SCNC: 4.6 MMOL/L — SIGNIFICANT CHANGE UP (ref 3.5–5.3)
PROT SERPL-MCNC: 6 G/DL — SIGNIFICANT CHANGE UP (ref 6–8.3)
RBC # BLD: 4.58 M/UL — SIGNIFICANT CHANGE UP (ref 3.8–5.2)
RBC # FLD: 15.7 % — HIGH (ref 10.3–14.5)
SODIUM SERPL-SCNC: 130 MMOL/L — LOW (ref 135–145)
SPECIMEN SOURCE: SIGNIFICANT CHANGE UP
WBC # BLD: 16.77 K/UL — HIGH (ref 3.8–10.5)
WBC # FLD AUTO: 16.77 K/UL — HIGH (ref 3.8–10.5)

## 2019-02-02 PROCEDURE — 74181 MRI ABDOMEN W/O CONTRAST: CPT | Mod: 26

## 2019-02-02 PROCEDURE — 99232 SBSQ HOSP IP/OBS MODERATE 35: CPT | Mod: GC

## 2019-02-02 RX ORDER — SODIUM CHLORIDE 9 MG/ML
1000 INJECTION, SOLUTION INTRAVENOUS
Qty: 0 | Refills: 0 | Status: DISCONTINUED | OUTPATIENT
Start: 2019-02-02 | End: 2019-02-08

## 2019-02-02 RX ORDER — ASPIRIN/CALCIUM CARB/MAGNESIUM 324 MG
81 TABLET ORAL DAILY
Qty: 0 | Refills: 0 | Status: DISCONTINUED | OUTPATIENT
Start: 2019-02-02 | End: 2019-02-08

## 2019-02-02 RX ADMIN — HEPARIN SODIUM 550 UNIT(S)/HR: 5000 INJECTION INTRAVENOUS; SUBCUTANEOUS at 05:15

## 2019-02-02 RX ADMIN — HEPARIN SODIUM 750 UNIT(S)/HR: 5000 INJECTION INTRAVENOUS; SUBCUTANEOUS at 20:22

## 2019-02-02 RX ADMIN — Medication 81 MILLIGRAM(S): at 10:08

## 2019-02-02 RX ADMIN — SODIUM CHLORIDE 50 MILLILITER(S): 9 INJECTION, SOLUTION INTRAVENOUS at 05:14

## 2019-02-02 RX ADMIN — PIPERACILLIN AND TAZOBACTAM 25 GRAM(S): 4; .5 INJECTION, POWDER, LYOPHILIZED, FOR SOLUTION INTRAVENOUS at 05:14

## 2019-02-02 RX ADMIN — SODIUM CHLORIDE 50 MILLILITER(S): 9 INJECTION, SOLUTION INTRAVENOUS at 15:22

## 2019-02-02 RX ADMIN — HEPARIN SODIUM 650 UNIT(S)/HR: 5000 INJECTION INTRAVENOUS; SUBCUTANEOUS at 13:31

## 2019-02-02 RX ADMIN — PIPERACILLIN AND TAZOBACTAM 25 GRAM(S): 4; .5 INJECTION, POWDER, LYOPHILIZED, FOR SOLUTION INTRAVENOUS at 22:49

## 2019-02-02 RX ADMIN — PIPERACILLIN AND TAZOBACTAM 25 GRAM(S): 4; .5 INJECTION, POWDER, LYOPHILIZED, FOR SOLUTION INTRAVENOUS at 15:00

## 2019-02-02 RX ADMIN — Medication 0.5 MILLIGRAM(S): at 21:07

## 2019-02-02 NOTE — PROGRESS NOTE ADULT - ASSESSMENT
74 yo F, Afib, DM  Here with abdom pain, elevated LFTs, severe sepsis   E coli bacteremia   CT Abdom no obvious source  Urine Cx negative  Most concerned abt biliary source; elevated lipase noted- gallstone pancreatitis  Bili higher, although Alk P, AST/ALT lower today  Afebrile, WBC lower, on empiric Zosyn    Plan:  Continue zosyn for now  Await sensi's of E coli  Await MRCP as per GI  Follow temps, CBC/diff, LFTs

## 2019-02-02 NOTE — CHART NOTE - NSCHARTNOTEFT_GEN_A_CORE
Blood culture results    Culture - Blood (02.01.19 @ 08:18)    -  Escherichia coli: Detec    Gram Stain:   Growth in anaerobic bottle: Gram Negative Rods  Growth in aerobic bottle: Gram Negative Rods    Specimen Source: .Blood Blood-Peripheral    Organism: Blood Culture PCR    Culture Results:   Growth in anaerobic bottle: Gram Negative Rods  Growth in aerobic bottle: Gram Negative Rods  "Due to technical problems, Proteus sp. will Not be reported as part of  the BCID panel until further notice"    73F PMH A-Fib on warfarin, DM2 presents with abdominal pain, hypotensive episodes in the ED,  and A-Fib with RVR, rate controlled now, admitted with sepsis. US abdomen with Cholelithiasis. on Zosyn for worsening transaminitis, elevated lipase & bili afebrile.  Patient hemodynamically stable now, afebrile  C/W Zosyn  C/W with maintenance IV fluids  C/W heparin gtt for Afib  Will follow closely  Will update with primary team    Shima Guevara [facility]  [id number]

## 2019-02-02 NOTE — CONSULT NOTE ADULT - SUBJECTIVE AND OBJECTIVE BOX
CARDIOLOGY CONSULT NOTE  PROVIDER: Polo Tamayo MD  DATE: 2/2/19  REASON: elevated troponin and PAF        HPI:  74 yo female with PMH of Atrial fibrillation on coumadin, HTN, DM2, Asthma, HLD, celiac and SMA atherosclerosis on last admission with mesenteric angiogram now presenting with acute onset of epigastric pain/RUQ with nausea but no vomiting. No fever or chills, no diarrhea, and no uriary sx. She acutely decompensated yesterday in the ER with hypotension, with Afib with RVR, and requiring BiPAP. She was stabilized, but was then noted to have an elevated troponin, elevated LFTS, elevated lipase and elevated lactate. Cath consult was called, and recommended no cath. GI was called and was going to do urgent ERCP, but she improved over the next few hours. Now states she feels much better. Denies CP, SOB, palpitations, lightheadedness, syncope, orthopnea, PND.      PAST MEDICAL & SURGICAL HISTORY:  Dementia  Hypertension  Type 2 diabetes mellitus  Atrial fibrillation, chronic  Diabetes mellitus  Asthma  No significant past surgical history        MEDICATIONS:  MEDICATIONS  (STANDING):  aspirin enteric coated 81 milliGRAM(s) Oral daily  dextrose 5%. 1000 milliLiter(s) (50 mL/Hr) IV Continuous <Continuous>  dextrose 50% Injectable 12.5 Gram(s) IV Push once  dextrose 50% Injectable 25 Gram(s) IV Push once  dextrose 50% Injectable 25 Gram(s) IV Push once  heparin  Infusion.  Unit(s)/Hr (5 mL/Hr) IV Continuous <Continuous>  insulin lispro (HumaLOG) corrective regimen sliding scale   SubCutaneous three times a day before meals  insulin lispro (HumaLOG) corrective regimen sliding scale   SubCutaneous at bedtime  lactated ringers. 1000 milliLiter(s) (50 mL/Hr) IV Continuous <Continuous>  piperacillin/tazobactam IVPB. 3.375 Gram(s) IV Intermittent every 8 hours    MEDICATIONS  (PRN):  dextrose 40% Gel 15 Gram(s) Oral once PRN Blood Glucose LESS THAN 70 milliGRAM(s)/deciliter  diltiazem Injectable 10 milliGRAM(s) IV Push every 6 hours PRN HR >120  glucagon  Injectable 1 milliGRAM(s) IntraMuscular once PRN Glucose LESS THAN 70 milligrams/deciliter  heparin  Injectable 2700 Unit(s) IV Push every 6 hours PRN For aPTT less than 40      FAMILY HISTORY:  Family history of diabetes mellitus (Mother)      SOCIAL HISTORY:  no tobacco, alcohol or drugs      Allergies    Avelox (Pruritus)  Levaquin (Unknown)  	      REVIEW OF SYSTEMS:  CONSTITUTIONAL: No fever, weight loss, or fatigue  EYES: No eye pain, visual disturbances, or discharge  ENMT:  No difficulty hearing, tinnitus, vertigo; No sinus or throat pain  NECK: No pain or stiffness  RESPIRATORY: No cough, wheezing, chills or hemoptysis; No Shortness of Breath  CARDIOVASCULAR: No chest pain, palpitations, loss of consciousness, dizziness, or leg swelling  NEUROLOGICAL: No headaches, memory loss, loss of strength, numbness, or tremors  SKIN: No itching, burning, rashes, or lesions   	    PHYSICAL EXAM:  Vital Signs Last 24 Hrs  T(C): 36.4 (02 Feb 2019 04:12), Max: 38.2 (01 Feb 2019 10:09)  T(F): 97.6 (02 Feb 2019 04:12), Max: 100.7 (01 Feb 2019 10:09)  HR: 79 (02 Feb 2019 04:12) (79 - 107)  BP: 112/69 (02 Feb 2019 04:12) (92/47 - 112/69)  BP(mean): 62 (01 Feb 2019 10:26) (62 - 62)  RR: 17 (02 Feb 2019 04:12) (17 - 25)  SpO2: 100% (02 Feb 2019 04:12) (97% - 100%)  I&O's Summary    01 Feb 2019 07:01  -  02 Feb 2019 07:00  --------------------------------------------------------  IN: 2030 mL / OUT: 900 mL / NET: 1130 mL        Telemetry:  sinus 70's-100's    General: NAD, A+Ox3	  HEENT:   No JVD	  Lymphatic: No lymphadenopathy  Cardiovascular: Regular and tachycardic, Normal S1 and S2, No murmurs/gallops/rubs  Respiratory: Lungs clear to auscultation	  Gastrointestinal:  Soft, Non-tender, + BS	  Skin: No rashes, No ecchymoses, No cyanosis	  Neurologic: Non-focal  Extremities: No clubbing, cyanosis or edema  Vascular: normal peripheral pulses palpable bilaterally    	  LABS:	 	                          10.4   23.4  )-----------( 201      ( 01 Feb 2019 19:28 )             30.6     02-01    135  |  97  |  13  ----------------------------<  165<H>  3.4<L>   |  26  |  0.99    Ca    8.1<L>      01 Feb 2019 11:33  Phos  3.7     02-01  Mg     1.3     02-01    TPro  5.4<L>  /  Alb  2.5<L>  /  TBili  1.6<H>  /  DBili  x   /  AST  623<H>  /  ALT  446<H>  /  AlkPhos  192<H>  02-01    PT/INR - ( 01 Feb 2019 02:24 )   PT: 21.3 sec;   INR: 1.83 ratio         PTT - ( 02 Feb 2019 04:01 )  PTT:45.5 sec            ASSESSMENT/PLAN: 	  74 yo female with PMH of Atrial fibrillation on coumadin, HTN, DM2, Asthma, HLD, celiac and SMA atherosclerosis on last admission with mesenteric angiogram now presenting with acute onset of epigastric pain/RUQ with nausea but no vomiting.   1. Elevated troponin - pt with known atherosclerosis. Likely demand ischemia. troponin already trending down. Will follow. Keep on heparin for now. Start aspirin. Will not load with plavix, since she will likely need a procedure for her acute GI issues. No statin in acute transaminitis. Will follow on current regimen. Appreciate cath consult.  2. PAF - on heparin drip. Was on diltiazem at home. Will resume if BP will allow. She was acutely hypotensive less than 24 hours ago.  3. HTN - will follow BP.  4. New LV wall motion abnormality - septal hypokinesis. Will treat with meds as above. Will follow, and consider repeating echo. Will consider B-blcoker and ACE/ARB.

## 2019-02-02 NOTE — PROGRESS NOTE ADULT - SUBJECTIVE AND OBJECTIVE BOX
Chief Complaint:  Patient is a 73y old  Female who presents with a chief complaint of sepsis (2019 13:38)      Interval Events: This morning, pt denies nausea, vomiting, abdominal pain. Pt remained afebrile and not tachycardic.  Pt is still pending labs from today. BLood culture grew GNR.     ROS: All 12 point system except listed above were otherwise negative.    Allergies:  Avelox (Pruritus)  Levaquin (Unknown)        Hospital Medications:  aspirin enteric coated 81 milliGRAM(s) Oral daily  dextrose 40% Gel 15 Gram(s) Oral once PRN  dextrose 5%. 1000 milliLiter(s) IV Continuous <Continuous>  dextrose 50% Injectable 12.5 Gram(s) IV Push once  dextrose 50% Injectable 25 Gram(s) IV Push once  dextrose 50% Injectable 25 Gram(s) IV Push once  diltiazem Injectable 10 milliGRAM(s) IV Push every 6 hours PRN  glucagon  Injectable 1 milliGRAM(s) IntraMuscular once PRN  heparin  Infusion.  Unit(s)/Hr IV Continuous <Continuous>  heparin  Injectable 2700 Unit(s) IV Push every 6 hours PRN  insulin lispro (HumaLOG) corrective regimen sliding scale   SubCutaneous three times a day before meals  insulin lispro (HumaLOG) corrective regimen sliding scale   SubCutaneous at bedtime  lactated ringers. 1000 milliLiter(s) IV Continuous <Continuous>  piperacillin/tazobactam IVPB. 3.375 Gram(s) IV Intermittent every 8 hours      PMHX/PSHX:  Dementia  Hypertension  Type 2 diabetes mellitus  Atrial fibrillation, chronic  Diabetes mellitus  Asthma  No significant past surgical history  Diabetes Mellitus  HTN (Hypertension)  Asthma  Afib      Family history:  Family history of diabetes mellitus (Mother)  No pertinent family history in first degree relatives    There is no family history of peptic ulcer disease, gastric cancer, colon polyps, colon cancer, celiac disease, biliary, hepatic, or pancreatic disease.  None of the female relatives have breast, uterine, or ovarian cancer.     PHYSICAL EXAM:   Vital Signs:  Vital Signs Last 24 Hrs  T(C): 36.4 (2019 04:12), Max: 38.2 (2019 10:09)  T(F): 97.6 (2019 04:12), Max: 100.7 (2019 10:09)  HR: 79 (2019 04:12) (79 - 107)  BP: 112/69 (2019 04:12) (92/47 - 112/69)  BP(mean): 62 (2019 10:26) (62 - 62)  RR: 17 (2019 04:12) (17 - 25)  SpO2: 100% (2019 04:12) (97% - 100%)  Daily     Daily       PHYSICAL EXAM:     GENERAL:  Elderly woman, mild distress  HEENT:  NC/AT,  conjunctivae clear and pink,  no JVD  CHEST:  Full & symmetric excursion, no increased effort, breath sounds clear  HEART:  Regular rhythm, S1, S2, no murmur/rub/S3/S4, no abdominal bruit, no edema  ABDOMEN:  Soft, non-tender, non-distended  EXTREMITIES:  no cyanosis,clubbing or edema  SKIN:  No rash  NEURO:  Alert, oriented    LABS:                        10.4   23.4  )-----------( 201      ( 2019 19:28 )             30.6     Mean Cell Volume: 76.3 fl (- @ 19:28)        135  |  97  |  13  ----------------------------<  165<H>  3.4<L>   |  26  |  0.99    Ca    8.1<L>      2019 11:33  Phos  3.7       Mg     1.3         TPro  5.4<L>  /  Alb  2.5<L>  /  TBili  1.6<H>  /  DBili  x   /  AST  623<H>  /  ALT  446<H>  /  AlkPhos  192<H>      LIVER FUNCTIONS - ( 2019 11:33 )  Alb: 2.5 g/dL / Pro: 5.4 g/dL / ALK PHOS: 192 U/L / ALT: 446 U/L / AST: 623 U/L / GGT: x           PT/INR - ( 2019 02:24 )   PT: 21.3 sec;   INR: 1.83 ratio         PTT - ( 2019 04:01 )  PTT:45.5 sec  Urinalysis Basic - ( 2019 04:00 )    Color: Yellow / Appearance: Slightly Turbid / S.022 / pH: x  Gluc: x / Ketone: Trace  / Bili: Small / Urobili: >8mg/dL   Blood: x / Protein: 30 mg/dL / Nitrite: Negative   Leuk Esterase: Large / RBC: 3 /hpf / WBC 43 /HPF   Sq Epi: x / Non Sq Epi: 1 /hpf / Bacteria: Negative      Amylase Serum--      Lipase serum>530       Ammonia--                          10.4   23.4  )-----------( 201      ( 2019 19:28 )             30.6                         10.8   22.1  )-----------( 225      ( 2019 11:33 )             32.1                         13.1   30.4  )-----------( 372      ( 2019 02:24 )             39.1     Imaging:

## 2019-02-02 NOTE — PROGRESS NOTE ADULT - ASSESSMENT
72 yo female with PMH of Atrial fibrillation on coumadin, HTN, DM2, Asthma, HLD,celiac and SMA dz last admission on   mesenteric angiogram now presenting with acute onset of epigastric pain with N but no vomitting  no fever or chills  no diarreah and no uriary sx   CT was done in ED : < from: CT Angio Abdomen and Pelvis w/ IV Cont (02.01.19 @ 04:05) >   No acute intra-abdominal pathology.  No abnormal bowel wall thickening.  Patent mesenteric vasculature, however the SMA is diminutive in caliber   and its origin is likely stenotic, as seen on prior studies.  lactic acid was elevated   pt was thought to have UTI ..pt however developed acute respiratory failure and placed on bipap.. pt became hypotensive and afib with RVR and MICU called...   Pt was evaluated by MICU and thought not to be a candidate for MICU..    At bedside Pt is ill looking , pale but says feels better overall .. off bipap and improved HR and BP   denies cp  SOB palpiations   feels N but no vomitting... no diarreah   labs : significant for leukocytosis.. acute elevation of trop and acute elevation of LFT     1- acute respiratory failure : off bipap  ABG improved .. etiology ?     2- elevated trop : pt with epgistric pain  and significant risk factors ..  cath consult called   heparin started   f/u with cardio Dr. Tamayo     3- Acute elevation of LFT :  likely multicatorial .. sec to ? hypotension ? congestion   / sepsis   GI consult     4- septic Shock : pt was fever / hypotension overnght   will cont abx   ID input called   consider  abd ?   MARYBETH 74 yo female with PMH of Atrial fibrillation on coumadin, HTN, DM2, Asthma, HLD,celiac and SMA dz last admission on   mesenteric angiogram now presenting with acute onset of epigastric pain with N but no vomitting  no fever or chills  no diarreah and no uriary sx   CT was done in ED : < from: CT Angio Abdomen and Pelvis w/ IV Cont (02.01.19 @ 04:05) >   No acute intra-abdominal pathology.  No abnormal bowel wall thickening.  Patent mesenteric vasculature, however the SMA is diminutive in caliber   and its origin is likely stenotic, as seen on prior studies.  lactic acid was elevated   pt was thought to have UTI ..pt however developed acute respiratory failure and placed on bipap.. pt became hypotensive and afib with RVR and MICU called...   Pt was evaluated by MICU and thought not to be a candidate for MICU..    At bedside Pt is ill looking , pale but says feels better overall .. off bipap and improved HR and BP   denies cp  SOB palpiations   feels N but no vomitting... no diarreah   labs : significant for leukocytosis.. acute elevation of trop and acute elevation of LFT     1- acute respiratory failure : off bipap  ABG improved .. etiology ?     2- elevated trop : pt with epgistric pain  and significant risk factors ..  cath consult appreciated    cont heparin and cardiac meds per DR. Tamayo   will need cath       3- Acute elevation of LFT :  likely multicatorial .. sec to  hypotension,  sepsis and cholangitis   GI consult appreckitaed  f/u MRCP   planned ERCP     4- septic Shock : sec to E coli bacteremia   improvung   ID input appreciated

## 2019-02-02 NOTE — PROGRESS NOTE ADULT - SUBJECTIVE AND OBJECTIVE BOX
Patient is a 73y old  Female who presents with a chief complaint of epigastric pain (02 Feb 2019 08:55)                                                               INTERVAL HPI/OVERNIGHT EVENTS:    REVIEW OF SYSTEMS:     CONSTITUTIONAL: No weakness, fevers or chills  EYES/ENT: No visual changes , no ear ache   NECK: No pain or stiffness  RESPIRATORY: No cough, wheezing,  No shortness of breath  CARDIOVASCULAR: No chest pain or palpitations  GASTROINTESTINAL: No abdominal pain  . No nausea, vomiting, or hematemesis; No diarrhea or constipation. No melena or hematochezia.  GENITOURINARY: No dysuria, frequency or hematuria  NEUROLOGICAL: No numbness or weakness  SKIN: No itching, burning, rashes, or lesions                                                                                                                                                                                                                                                                                 Medications:  MEDICATIONS  (STANDING):  aspirin enteric coated 81 milliGRAM(s) Oral daily  dextrose 5% + sodium chloride 0.9%. 1000 milliLiter(s) (50 mL/Hr) IV Continuous <Continuous>  dextrose 5%. 1000 milliLiter(s) (50 mL/Hr) IV Continuous <Continuous>  dextrose 50% Injectable 12.5 Gram(s) IV Push once  dextrose 50% Injectable 25 Gram(s) IV Push once  dextrose 50% Injectable 25 Gram(s) IV Push once  heparin  Infusion.  Unit(s)/Hr (5 mL/Hr) IV Continuous <Continuous>  insulin lispro (HumaLOG) corrective regimen sliding scale   SubCutaneous three times a day before meals  insulin lispro (HumaLOG) corrective regimen sliding scale   SubCutaneous at bedtime  piperacillin/tazobactam IVPB. 3.375 Gram(s) IV Intermittent every 8 hours    MEDICATIONS  (PRN):  dextrose 40% Gel 15 Gram(s) Oral once PRN Blood Glucose LESS THAN 70 milliGRAM(s)/deciliter  diltiazem Injectable 10 milliGRAM(s) IV Push every 6 hours PRN HR >120  glucagon  Injectable 1 milliGRAM(s) IntraMuscular once PRN Glucose LESS THAN 70 milligrams/deciliter  heparin  Injectable 2700 Unit(s) IV Push every 6 hours PRN For aPTT less than 40       Allergies    Avelox (Pruritus)  Levaquin (Unknown)    Intolerances      Vital Signs Last 24 Hrs  T(C): 36.8 (02 Feb 2019 17:40), Max: 36.8 (02 Feb 2019 17:40)  T(F): 98.3 (02 Feb 2019 17:40), Max: 98.3 (02 Feb 2019 17:40)  HR: 103 (02 Feb 2019 17:40) (79 - 103)  BP: 125/57 (02 Feb 2019 17:40) (112/69 - 125/57)  BP(mean): --  RR: 19 (02 Feb 2019 17:40) (17 - 19)  SpO2: 100% (02 Feb 2019 17:40) (98% - 100%)  CAPILLARY BLOOD GLUCOSE      POCT Blood Glucose.: 76 mg/dL (02 Feb 2019 22:53)  POCT Blood Glucose.: 80 mg/dL (02 Feb 2019 16:20)  POCT Blood Glucose.: 78 mg/dL (02 Feb 2019 12:03)  POCT Blood Glucose.: 101 mg/dL (02 Feb 2019 08:55)  POCT Blood Glucose.: 81 mg/dL (02 Feb 2019 08:15)  POCT Blood Glucose.: 62 mg/dL (02 Feb 2019 07:42)  POCT Blood Glucose.: 61 mg/dL (02 Feb 2019 07:41)      02-01 @ 07:01 - 02-02 @ 07:00  --------------------------------------------------------  IN: 2030 mL / OUT: 900 mL / NET: 1130 mL    02-02 @ 07:01  -  02-02 @ 23:51  --------------------------------------------------------  IN: 1373 mL / OUT: 900 mL / NET: 473 mL      Physical Exam:    Daily     Daily   General:  Well appearing, NAD, not cachetic  HEENT:  Nonicteric, PERRLA  CV:  RRR, S1S2   Lungs:  CTA B/L, no wheezes, rales, rhonchi  Abdomen:  Soft, non-tender, no distended, positive BS  Extremities:  2+ pulses, no c/c, no edema  Skin:  Warm and dry, no rashes  :  No andrade  Neuro:  AAOx3, non-focal, grossly intact                                                                                                                                                                                                                                                                                                LABS:                               11.0   16.77 )-----------( 201      ( 02 Feb 2019 10:59 )             34.8                      02-02    130<L>  |  97  |  12  ----------------------------<  92  4.6   |  23  |  0.87    Ca    8.4      02 Feb 2019 09:44  Phos  3.7     02-01  Mg     2.3     02-02    TPro  6.0  /  Alb  2.4<L>  /  TBili  2.3<H>  /  DBili  x   /  AST  244<H>  /  ALT  325<H>  /  AlkPhos  213<H>  02-02                       RADIOLOGY & ADDITIONAL TESTS         I personally reviewed: [  ]EKG   [  ]CXR    [  ] CT      A/P:         Discussed with :     Darryl consultants' Notes   Time spent : Patient is a 73y old  Female who presents with a chief complaint of epigastric pain (02 Feb 2019 08:55)                                                               INTERVAL HPI/OVERNIGHT EVENTS:    REVIEW OF SYSTEMS:     CONSTITUTIONAL: weakness   RESPIRATORY: No cough, wheezing,  No shortness of breath  CARDIOVASCULAR: No chest pain or palpitations  GASTROINTESTINAL: No abdominal pain  . No nausea, vomiting,  GENITOURINARY: No dysuria, frequency or hematuria  NEUROLOGICAL: No numbness or weakness                                                                                                                                                                                                                                                                                  Medications:  MEDICATIONS  (STANDING):  aspirin enteric coated 81 milliGRAM(s) Oral daily  dextrose 5% + sodium chloride 0.9%. 1000 milliLiter(s) (50 mL/Hr) IV Continuous <Continuous>  dextrose 5%. 1000 milliLiter(s) (50 mL/Hr) IV Continuous <Continuous>  dextrose 50% Injectable 12.5 Gram(s) IV Push once  dextrose 50% Injectable 25 Gram(s) IV Push once  dextrose 50% Injectable 25 Gram(s) IV Push once  heparin  Infusion.  Unit(s)/Hr (5 mL/Hr) IV Continuous <Continuous>  insulin lispro (HumaLOG) corrective regimen sliding scale   SubCutaneous three times a day before meals  insulin lispro (HumaLOG) corrective regimen sliding scale   SubCutaneous at bedtime  piperacillin/tazobactam IVPB. 3.375 Gram(s) IV Intermittent every 8 hours    MEDICATIONS  (PRN):  dextrose 40% Gel 15 Gram(s) Oral once PRN Blood Glucose LESS THAN 70 milliGRAM(s)/deciliter  diltiazem Injectable 10 milliGRAM(s) IV Push every 6 hours PRN HR >120  glucagon  Injectable 1 milliGRAM(s) IntraMuscular once PRN Glucose LESS THAN 70 milligrams/deciliter  heparin  Injectable 2700 Unit(s) IV Push every 6 hours PRN For aPTT less than 40       Allergies    Avelox (Pruritus)  Levaquin (Unknown)    Intolerances      Vital Signs Last 24 Hrs  T(C): 36.8 (02 Feb 2019 17:40), Max: 36.8 (02 Feb 2019 17:40)  T(F): 98.3 (02 Feb 2019 17:40), Max: 98.3 (02 Feb 2019 17:40)  HR: 103 (02 Feb 2019 17:40) (79 - 103)  BP: 125/57 (02 Feb 2019 17:40) (112/69 - 125/57)  BP(mean): --  RR: 19 (02 Feb 2019 17:40) (17 - 19)  SpO2: 100% (02 Feb 2019 17:40) (98% - 100%)  CAPILLARY BLOOD GLUCOSE      POCT Blood Glucose.: 76 mg/dL (02 Feb 2019 22:53)  POCT Blood Glucose.: 80 mg/dL (02 Feb 2019 16:20)  POCT Blood Glucose.: 78 mg/dL (02 Feb 2019 12:03)  POCT Blood Glucose.: 101 mg/dL (02 Feb 2019 08:55)  POCT Blood Glucose.: 81 mg/dL (02 Feb 2019 08:15)  POCT Blood Glucose.: 62 mg/dL (02 Feb 2019 07:42)  POCT Blood Glucose.: 61 mg/dL (02 Feb 2019 07:41)      02-01 @ 07:01  -  02-02 @ 07:00  --------------------------------------------------------  IN: 2030 mL / OUT: 900 mL / NET: 1130 mL    02-02 @ 07:01  -  02-02 @ 23:51  --------------------------------------------------------  IN: 1373 mL / OUT: 900 mL / NET: 473 mL      Physical Exam:      General:  cachectic   HEENT:  Nonicteric, PERRLA  CV:  irreg S1S2   Lungs:  decreased at bases   Abdomen:  Soft, non-tender, no distended, positive BS  Extremities:  ]trqce edema  Skin:  Warm and dry, no rashes  :  No lupe  Neuro:  AAOx3, non-focal, grossly intact                                                                                                                                                                                                                                                                                                LABS:                               11.0   16.77 )-----------( 201      ( 02 Feb 2019 10:59 )             34.8                      02-02    130<L>  |  97  |  12  ----------------------------<  92  4.6   |  23  |  0.87    Ca    8.4      02 Feb 2019 09:44  Phos  3.7     02-01  Mg     2.3     02-02    TPro  6.0  /  Alb  2.4<L>  /  TBili  2.3<H>  /  DBili  x   /  AST  244<H>  /  ALT  325<H>  /  AlkPhos  213<H>  02-02

## 2019-02-02 NOTE — PROGRESS NOTE ADULT - ASSESSMENT
Impression:  1) Sepsis- urinary vs biliary - Urine culture pending  2) Elevated AST/ALT/ALP, mild elevated TB, elevated lipase - c/w pancreatitis, likely gallstone with ?obstruction.  Ducts WNL on CT scan  3) Elevated trop - Reviewed by cards, no indication for cath at this juncture, likely type 2 demand ischemia  4)  Afib on coumadin- Now on hep gtt during this hospital stay  5) Dementia    Recs:  -Recommend  MRCP without contrast to look for evidence of choledocholithiasis   -Appreciate cardiac clearance for possible ERCP (sooner if pt decompensates)  -Can continue on heparin gtt (Stop at 3am Monday morning if MRCP shows evidence of choledocholithiasis)  -Continue IV zosyn  - LR fluid resuscitation  - IV antibiotics  - followup culture results

## 2019-02-02 NOTE — PROGRESS NOTE ADULT - SUBJECTIVE AND OBJECTIVE BOX
CC: f/u for abdom pain, hypotension, GNR bacteremia    Patient mostly moaning when aroused, no distress at rest    REVIEW OF SYSTEMS:  All other review of systems negative (Comprehensive ROS)- limited    Antimicrobials Day # 2  piperacillin/tazobactam IVPB. 3.375 Gram(s) IV Intermittent every 8 hours    Other Medications Reviewed    T(F): 97.6 (19 @ 04:12), Max: 97.8 (19 @ 14:59)  HR: 79 (19 @ 04:12)  BP: 112/69 (19 @ 04:12)  RR: 17 (19 @ 04:12)  SpO2: 100% (19 @ 04:12)  Wt(kg): --    PHYSICAL EXAM:  General: no acute distress, but debilitated, elderly  Eyes:  anicteric, no conjunctival injection, no discharge  Oropharynx: no lesions or injection 	  Neck: supple, without adenopathy  Lungs: clear to auscultation  Heart: irregular rhythm; no murmur, rubs or gallops  Abdomen: soft, nondistended, mild guarding, without mass or organomegaly  Skin: no lesions  Extremities: no edema  Neurologic: moves all extremities    LAB RESULTS:                        11.0   16.77 )-----------( 201      ( 2019 10:59 )             34.8     02-02    130<L>  |  97  |  12  ----------------------------<  92  4.6   |  23  |  0.87    Ca    8.4      2019 09:44  Phos  3.7     -  Mg     2.3     -    TPro  6.0  /  Alb  2.4<L>  /  TBili  2.3<H>  /  DBili  x   /  AST  244<H>  /  ALT  325<H>  /  AlkPhos  213<H>  02      Urinalysis Basic - ( 2019 04:00 )    Color: Yellow / Appearance: Slightly Turbid / S.022 / pH: x  Gluc: x / Ketone: Trace  / Bili: Small / Urobili: >8mg/dL   Blood: x / Protein: 30 mg/dL / Nitrite: Negative   Leuk Esterase: Large / RBC: 3 /hpf / WBC 43 /HPF   Sq Epi: x / Non Sq Epi: 1 /hpf / Bacteria: Negative      MICROBIOLOGY:  RECENT CULTURES:   @ 08:51 .Urine Clean Catch (Midstream)     <10,000 CFU/ml Normal Urogenital speedy present    Culture - Blood (19 @ 08:18)    -  Escherichia coli: Detec    Gram Stain:   Growth in anaerobic bottle: Gram Negative Rods  Growth in aerobic bottle: Gram Negative Rods      Culture - Blood (19 @ 08:18)    Gram Stain:   Growth in aerobic and anaerobic bottles: Gram Negative Rods    Specimen Source: .Blood Blood-Peripheral    Culture Results:   Growth in aerobic and anaerobic bottles: Gram Negative Rods    RADIOLOGY REVIEWED:  US Abdomen Complete (19 @ 14:54) >  Normal liver morphology with a coarse calcification/granuloma in the left   lobe.  Cholelithiasis.    CT Angio Abdomen and Pelvis w/ IV Cont (19 @ 04:05) >  No acute intra-abdominal pathology.  No abnormal bowel wall   thickening.   Patent mesenteric vasculature, however the SMA is diminutive in caliber   and its origin is likely stenotic, as seen on prior studies.

## 2019-02-02 NOTE — CHART NOTE - NSCHARTNOTEFT_GEN_A_CORE
CC:      HPI:  Called by RN  Patient denied headache, dizziness, CP, SOB, abdominal  pain, N/V/D, numbness/tingling, extremity weakness, dysuria.         ROS:  CONSTITUTIONAL:  No fever, chills, rigors  CARDIOVASCULAR:  No chest pain or palpitations  RESPIRATORY:   No SOB, cough, dyspnea on exertion.  No wheezing  GASTROINTESTINAL:  No abd pain, N/V, diarrhea/constipation  EXTREMITIES:  No swelling or joint pain  GENITOURINARY:  No burning on urination, increased frequency or urgency.  No flank pain  NEUROLOGIC:  No HA, visual disturbances  SKIN: No rashes        PAST MEDICAL & SURGICAL HISTORY:  Urinary tract infection  H/o or current diagnosis of HF- ACEI/ARB contraindication unknown  H/o or current diagnosis of HF- Contraindication to ACEI/ARBs  Family history of diabetes mellitus (Mother)  No pertinent family history in first degree relatives  Handoff  MEWS Score  Dementia  Hypertension  Type 2 diabetes mellitus  Atrial fibrillation, chronic  Diabetes mellitus  Asthma  UTI (urinary tract infection)  No significant past surgical history  Diabetes Mellitus  HTN (Hypertension)  Asthma  Afib  ABD PAIN  RAD CDS  90+  Septic shock        Vital Signs Last 24 Hrs  T(C): 36.8 (02 Feb 2019 17:40), Max: 36.8 (02 Feb 2019 17:40)  T(F): 98.3 (02 Feb 2019 17:40), Max: 98.3 (02 Feb 2019 17:40)  HR: 103 (02 Feb 2019 17:40) (79 - 103)  BP: 125/57 (02 Feb 2019 17:40) (112/69 - 125/57)  BP(mean): --  RR: 19 (02 Feb 2019 17:40) (17 - 19)  SpO2: 100% (02 Feb 2019 17:40) (98% - 100%)      Physical Exam:  General: WN/WD NAD, AOx3, nontoxic appearing  Head:  NC/AT  CV: RRR, S1S2   Respiratory: CTA B/L, nonlabored  Abdominal: (+) bowel sounds x4. Soft, NT, ND, no palpable mass, no guarding, or rebound tenderness  Genitourinary: ? Morales   MSK: No BLLE edema, + peripheral pulses, FROM all 4 extremity  Skin: (+) warm, dry   Psych: Appropriate affect       Labs:                        11.0   16.77 )-----------( 201      ( 02 Feb 2019 10:59 )             34.8     02-02    130<L>  |  97  |  12  ----------------------------<  92  4.6   |  23  |  0.87    Ca    8.4      02 Feb 2019 09:44  Phos  3.7     02-01  Mg     2.3     02-02    TPro  6.0  /  Alb  2.4<L>  /  TBili  2.3<H>  /  DBili  x   /  AST  244<H>  /  ALT  325<H>  /  AlkPhos  213<H>  02-02    ABG - ( 01 Feb 2019 11:33 )  pH, Arterial: 7.47  pH, Blood: x     /  pCO2: 34    /  pO2: 131   / HCO3: 25    / Base Excess: 1.6   /  SaO2: 99                Urinalysis Basic - ( 02-01 @ 04:00 )    Color: Negative / Appearance: Small / SG: -- / pH: 1000 mg/dL  Gluc: Negative / Ketone: Yellow  / Bili: Few / Urobili: 4   Blood: 1 / Protein: -- / Nitrite: Trace   Leuk Esterase: Large / RBC: 1.022 / WBC --   Sq Epi: 30 mg/dL / Non Sq Epi: 3 / Bacteria: 6.0            Radiology:          Assessment & Plan:  HPI:  72 yo female with PMH of Atrial fibrillation on coumadin, HTN, DM2, Asthma, HLD,celiac and SMA dz last admission on   mesenteric angiogram now presenting with acute onset of epigastric pain with N but no vomitting  no fever or chills  no diarreah and no uriary sx   CT was done in ED : < from: CT Angio Abdomen and Pelvis w/ IV Cont (02.01.19 @ 04:05) >   No acute intra-abdominal pathology.  No abnormal bowel wall thickening.  Patent mesenteric vasculature, however the SMA is diminutive in caliber   and its origin is likely stenotic, as seen on prior studies.  lactic acid was elevated   pt was thought to have UTI ..pt however developed acute respiratory failure and placed on bipap.. pt became hypotensive and afib with RVR and MICU called...   Pt was evaluated by MICU and thought not to be a candidate for MICU..    At bedside Pt is ill looking , pale but says feels better overall .. off bipap and improved HR and BP   denies cp  SOB palpiations   feels N but no vomitting... no diarreah   labs : significant for leukocytosis.. acute elevation of trop and acute elevation of LFT (01 Feb 2019 13:26)        -  -  -Will continue to closely monitor patient/vitals  -Primary Team to follow up in AM, attending to follow       Geovanni Jerome PA-C  Dept of Medicine [phone]  [facility]    Notified by RN patient with episode of PAT to 150 for 2.6 sec at 20:40; patient was resting in bed at the time of the episode and asymptomatic. Patient seen and assessed at bedside, NAD, A&O x3. She endorsed concern regarding MRCP tonight as she gets nervous in closed areas. Of note, patient had previous episode of PAT to 130s this admission. Physical exam deferred as patient was on her way to [facility].     02-02    130<L>  |  97  |  12  ----------------------------<  92  4.6   |  23  |  0.87    Ca    8.4      02 Feb 2019 09:44  Phos  3.7     02-01  Mg     2.3     02-02    TPro  6.0  /  Alb  2.4<L>  /  TBili  2.3<H>  /  DBili  x   /  AST  244<H>  /  ALT  325<H>  /  AlkPhos  213<H>  02-02      73F PMH A-Fib on warfarin, DM2 presents with abdominal pain, hypotensive episodes in the ED,  and A-Fib with RVR, rate controlled now, admitted with sepsis. US abdomen with Cholelithiasis.    PAT A/P:  -Telemetry currently sinus 90-100s  -Continue with Cardizem IVP PRN HR >120  -K/Mg 4.6/2.3  -Mg added to AM labs, monitor electrolytes closely (keep K >4.0, Mg >2.0)  -Continue with heparin gtt for pAF  -Continue to monitor on telemetry  -Cardiology is following patient  -Discussed the above with Dr. Vigil, will consider transitioning patient to PO Cardizem if BP remains stable (previous episodes of hypotension)  -Will continue to closely monitor patient/vitals and discuss with primary team in AM    Geovanni Jerome PA-C  Dept of Medicine  [phone]  [facility]    Notified by RN patient with episode of PAT to 150 for 2.6 sec at 20:40; patient was resting in bed at the time of the episode and asymptomatic. Patient seen and assessed at bedside, NAD, A&O x3. She endorsed concern regarding MRCP tonight as she gets nervous in closed areas. Patient denied chest pain, acute dyspnea, headache, dizziness, nausea or vomiting. Of note, patient had previous episode of PAT to 130s this admission. Physical exam deferred as patient was on her way to [facility].     02-02    130<L>  |  97  |  12  ----------------------------<  92  4.6   |  23  |  0.87    Ca    8.4      02 Feb 2019 09:44  Phos  3.7     02-01  Mg     2.3     02-02    TPro  6.0  /  Alb  2.4<L>  /  TBili  2.3<H>  /  DBili  x   /  AST  244<H>  /  ALT  325<H>  /  AlkPhos  213<H>  02-02      73F PMH A-Fib on warfarin, DM2 presents with abdominal pain, hypotensive episodes in the ED,  and A-Fib with RVR, rate controlled now, admitted with sepsis. US abdomen with Cholelithiasis.    PAT A/P:  -Telemetry currently sinus 90-100s  -Continue with Cardizem IVP PRN HR >120  -K/Mg 4.6/2.3  -Mg added to AM labs, monitor electrolytes closely (keep K >4.0, Mg >2.0)  -Continue with heparin gtt for pAF  -Continue to monitor on telemetry  -Cardiology is following patient  -Discussed the above with Dr. Vigil, will consider transitioning patient to PO Cardizem if BP remains stable (previous episodes of hypotension)  -Will continue to closely monitor patient/vitals and discuss with primary team in AM    Geovanni Jerome PA-C  Dept of Medicine  [phone]

## 2019-02-03 LAB
ALBUMIN SERPL ELPH-MCNC: 2.5 G/DL — LOW (ref 3.3–5)
ALP SERPL-CCNC: 235 U/L — HIGH (ref 40–120)
ALT FLD-CCNC: 197 U/L — HIGH (ref 10–45)
ANION GAP SERPL CALC-SCNC: 9 MMOL/L — SIGNIFICANT CHANGE UP (ref 5–17)
APTT BLD: 51.1 SEC — HIGH (ref 27.5–36.3)
APTT BLD: 53.1 SEC — HIGH (ref 27.5–36.3)
APTT BLD: 76.6 SEC — HIGH (ref 27.5–36.3)
AST SERPL-CCNC: 83 U/L — HIGH (ref 10–40)
BILIRUB SERPL-MCNC: 1.1 MG/DL — SIGNIFICANT CHANGE UP (ref 0.2–1.2)
BUN SERPL-MCNC: 5 MG/DL — LOW (ref 7–23)
CALCIUM SERPL-MCNC: 8.7 MG/DL — SIGNIFICANT CHANGE UP (ref 8.4–10.5)
CHLORIDE SERPL-SCNC: 101 MMOL/L — SIGNIFICANT CHANGE UP (ref 96–108)
CO2 SERPL-SCNC: 27 MMOL/L — SIGNIFICANT CHANGE UP (ref 22–31)
CREAT SERPL-MCNC: 0.74 MG/DL — SIGNIFICANT CHANGE UP (ref 0.5–1.3)
GLUCOSE BLDC GLUCOMTR-MCNC: 127 MG/DL — HIGH (ref 70–99)
GLUCOSE BLDC GLUCOMTR-MCNC: 169 MG/DL — HIGH (ref 70–99)
GLUCOSE BLDC GLUCOMTR-MCNC: 183 MG/DL — HIGH (ref 70–99)
GLUCOSE BLDC GLUCOMTR-MCNC: 261 MG/DL — HIGH (ref 70–99)
GLUCOSE BLDC GLUCOMTR-MCNC: 93 MG/DL — SIGNIFICANT CHANGE UP (ref 70–99)
GLUCOSE SERPL-MCNC: 135 MG/DL — HIGH (ref 70–99)
HCT VFR BLD CALC: 34 % — LOW (ref 34.5–45)
HGB BLD-MCNC: 10.6 G/DL — LOW (ref 11.5–15.5)
MAGNESIUM SERPL-MCNC: 1.9 MG/DL — SIGNIFICANT CHANGE UP (ref 1.6–2.6)
MCHC RBC-ENTMCNC: 24 PG — LOW (ref 27–34)
MCHC RBC-ENTMCNC: 31.2 GM/DL — LOW (ref 32–36)
MCV RBC AUTO: 76.9 FL — LOW (ref 80–100)
PLATELET # BLD AUTO: 209 K/UL — SIGNIFICANT CHANGE UP (ref 150–400)
POTASSIUM SERPL-MCNC: 4.1 MMOL/L — SIGNIFICANT CHANGE UP (ref 3.5–5.3)
POTASSIUM SERPL-SCNC: 4.1 MMOL/L — SIGNIFICANT CHANGE UP (ref 3.5–5.3)
PROT SERPL-MCNC: 5.9 G/DL — LOW (ref 6–8.3)
RBC # BLD: 4.42 M/UL — SIGNIFICANT CHANGE UP (ref 3.8–5.2)
RBC # FLD: 15.2 % — HIGH (ref 10.3–14.5)
SODIUM SERPL-SCNC: 137 MMOL/L — SIGNIFICANT CHANGE UP (ref 135–145)
WBC # BLD: 9.88 K/UL — SIGNIFICANT CHANGE UP (ref 3.8–10.5)
WBC # FLD AUTO: 9.88 K/UL — SIGNIFICANT CHANGE UP (ref 3.8–10.5)

## 2019-02-03 PROCEDURE — 99232 SBSQ HOSP IP/OBS MODERATE 35: CPT | Mod: GC

## 2019-02-03 RX ORDER — FUROSEMIDE 40 MG
40 TABLET ORAL ONCE
Qty: 0 | Refills: 0 | Status: DISCONTINUED | OUTPATIENT
Start: 2019-02-03 | End: 2019-02-03

## 2019-02-03 RX ORDER — PANTOPRAZOLE SODIUM 20 MG/1
40 TABLET, DELAYED RELEASE ORAL
Qty: 0 | Refills: 0 | Status: DISCONTINUED | OUTPATIENT
Start: 2019-02-03 | End: 2019-02-08

## 2019-02-03 RX ADMIN — HEPARIN SODIUM 750 UNIT(S)/HR: 5000 INJECTION INTRAVENOUS; SUBCUTANEOUS at 02:42

## 2019-02-03 RX ADMIN — PIPERACILLIN AND TAZOBACTAM 25 GRAM(S): 4; .5 INJECTION, POWDER, LYOPHILIZED, FOR SOLUTION INTRAVENOUS at 21:19

## 2019-02-03 RX ADMIN — HEPARIN SODIUM 800 UNIT(S)/HR: 5000 INJECTION INTRAVENOUS; SUBCUTANEOUS at 17:45

## 2019-02-03 RX ADMIN — HEPARIN SODIUM 850 UNIT(S)/HR: 5000 INJECTION INTRAVENOUS; SUBCUTANEOUS at 10:51

## 2019-02-03 RX ADMIN — Medication 81 MILLIGRAM(S): at 13:05

## 2019-02-03 RX ADMIN — PIPERACILLIN AND TAZOBACTAM 25 GRAM(S): 4; .5 INJECTION, POWDER, LYOPHILIZED, FOR SOLUTION INTRAVENOUS at 05:05

## 2019-02-03 RX ADMIN — PIPERACILLIN AND TAZOBACTAM 25 GRAM(S): 4; .5 INJECTION, POWDER, LYOPHILIZED, FOR SOLUTION INTRAVENOUS at 13:04

## 2019-02-03 RX ADMIN — Medication 1: at 13:05

## 2019-02-03 RX ADMIN — Medication 1: at 21:18

## 2019-02-03 RX ADMIN — SODIUM CHLORIDE 50 MILLILITER(S): 9 INJECTION, SOLUTION INTRAVENOUS at 17:47

## 2019-02-03 NOTE — PROGRESS NOTE ADULT - SUBJECTIVE AND OBJECTIVE BOX
Chief Complaint:  Patient is a 73y old  Female who presents with a chief complaint of epigastric pain (02 Feb 2019 08:55)      Interval Events: Pt states that she had increased urinary frequency last night. Pt otherwise denies nausea, vomiting, abdominal pain, SOB.     ROS: All 12 point system except listed above were otherwise negative.    Allergies:  Avelox (Pruritus)  Levaquin (Unknown)        Hospital Medications:  aspirin enteric coated 81 milliGRAM(s) Oral daily  dextrose 40% Gel 15 Gram(s) Oral once PRN  dextrose 5% + sodium chloride 0.9%. 1000 milliLiter(s) IV Continuous <Continuous>  dextrose 5%. 1000 milliLiter(s) IV Continuous <Continuous>  dextrose 50% Injectable 12.5 Gram(s) IV Push once  dextrose 50% Injectable 25 Gram(s) IV Push once  dextrose 50% Injectable 25 Gram(s) IV Push once  diltiazem Injectable 10 milliGRAM(s) IV Push every 6 hours PRN  glucagon  Injectable 1 milliGRAM(s) IntraMuscular once PRN  heparin  Infusion.  Unit(s)/Hr IV Continuous <Continuous>  heparin  Injectable 2700 Unit(s) IV Push every 6 hours PRN  insulin lispro (HumaLOG) corrective regimen sliding scale   SubCutaneous three times a day before meals  insulin lispro (HumaLOG) corrective regimen sliding scale   SubCutaneous at bedtime  piperacillin/tazobactam IVPB. 3.375 Gram(s) IV Intermittent every 8 hours      PMHX/PSHX:  Dementia  Hypertension  Type 2 diabetes mellitus  Atrial fibrillation, chronic  Diabetes mellitus  Asthma  No significant past surgical history  Diabetes Mellitus  HTN (Hypertension)  Asthma  Afib      Family history:  Family history of diabetes mellitus (Mother)  No pertinent family history in first degree relatives    There is no family history of peptic ulcer disease, gastric cancer, colon polyps, colon cancer, celiac disease, biliary, hepatic, or pancreatic disease.  None of the female relatives have breast, uterine, or ovarian cancer.     PHYSICAL EXAM:   Vital Signs:  Vital Signs Last 24 Hrs  T(C): 36.4 (03 Feb 2019 04:13), Max: 36.8 (02 Feb 2019 17:40)  T(F): 97.6 (03 Feb 2019 04:13), Max: 98.3 (02 Feb 2019 17:40)  HR: 103 (03 Feb 2019 04:13) (88 - 103)  BP: 129/73 (03 Feb 2019 04:13) (113/72 - 129/73)  BP(mean): --  RR: 18 (03 Feb 2019 04:13) (18 - 19)  SpO2: 100% (03 Feb 2019 04:13) (98% - 100%)  Daily     Daily     GENERAL:  Elderly woman, mild distress  HEENT:  NC/AT,  conjunctivae clear and pink,  no JVD  CHEST:  Full & symmetric excursion, no increased effort, breath sounds clear  HEART:  Regular rhythm, S1, S2, no murmur/rub/S3/S4, no abdominal bruit, no edema  ABDOMEN:  Soft, non-tender, non-distended  EXTREMITIES:  no cyanosis,clubbing or edema  SKIN:  No rash  NEURO:  Alert, oriented    LABS:                        11.0   16.77 )-----------( 201      ( 02 Feb 2019 10:59 )             34.8     Mean Cell Volume: 76.0 fl (02-02-19 @ 10:59)    02-02    130<L>  |  97  |  12  ----------------------------<  92  4.6   |  23  |  0.87    Ca    8.4      02 Feb 2019 09:44  Phos  3.7     02-01  Mg     2.3     02-02    TPro  6.0  /  Alb  2.4<L>  /  TBili  2.3<H>  /  DBili  x   /  AST  244<H>  /  ALT  325<H>  /  AlkPhos  213<H>  02-02    LIVER FUNCTIONS - ( 02 Feb 2019 09:44 )  Alb: 2.4 g/dL / Pro: 6.0 g/dL / ALK PHOS: 213 U/L / ALT: 325 U/L / AST: 244 U/L / GGT: x           PTT - ( 03 Feb 2019 02:17 )  PTT:53.1 sec                            11.0   16.77 )-----------( 201      ( 02 Feb 2019 10:59 )             34.8                         10.4   23.4  )-----------( 201      ( 01 Feb 2019 19:28 )             30.6                         10.8   22.1  )-----------( 225      ( 01 Feb 2019 11:33 )             32.1                         13.1   30.4  )-----------( 372      ( 01 Feb 2019 02:24 )             39.1     Imaging:

## 2019-02-03 NOTE — PROGRESS NOTE ADULT - SUBJECTIVE AND OBJECTIVE BOX
CC: f/u for abdom pain, hypotension, E coli bacteremia    Patient more alert, feeling better, no pain, tolerating breakfast; MRCP performed last night    REVIEW OF SYSTEMS:  All other review of systems negative (Comprehensive ROS)- limited    Antimicrobials Day # 3  piperacillin/tazobactam IVPB. 3.375 Gram(s) IV Intermittent every 8 hours    Other Medications Reviewed    Vital Signs Last 24 Hrs  T(F): 97.4 (2019 12:52), Max: 98.3 (2019 17:40)  HR: 101 (2019 12:52) (96 - 103)  BP: 126/52 (2019 12:52) (113/72 - 144/68)  BP(mean): --  RR: 19 (2019 12:52) (18 - 19)  SpO2: 100% (2019 12:52) (99% - 100%)    PHYSICAL EXAM:  General: no acute distress  Eyes:  anicteric, no conjunctival injection, no discharge  Oropharynx: no lesions or injection 	  Neck: supple, without adenopathy  Lungs: clear to auscultation  Heart: irregular rhythm; no murmur, rubs or gallops  Abdomen: soft, nondistended, no guarding, nontender, without mass or organomegaly  Skin: no lesions  Extremities: no edema  Neurologic: moves all extremities    LAB RESULTS:                        10.6   9.88  )-----------( 209      ( 2019 11:46 )             34.0   02-    137  |  101  |  5<L>  ----------------------------<  135<H>  4.1   |  27  |  0.74    Ca    8.7      2019 10:17  Mg     1.9     02-    TPro  5.9<L>  /  Alb  2.5<L>  /  TBili  1.1  /  DBili  x   /  AST  83<H>  /  ALT  197<H>  /  AlkPhos  235<H>  02-      Urinalysis Basic - ( 2019 04:00 )    Color: Yellow / Appearance: Slightly Turbid / S.022 / pH: x  Gluc: x / Ketone: Trace  / Bili: Small / Urobili: >8mg/dL   Blood: x / Protein: 30 mg/dL / Nitrite: Negative   Leuk Esterase: Large / RBC: 3 /hpf / WBC 43 /HPF   Sq Epi: x / Non Sq Epi: 1 /hpf / Bacteria: Negative      MICROBIOLOGY:  RECENT CULTURES:   @ 08:51 .Urine Clean Catch (Midstream)     <10,000 CFU/ml Normal Urogenital speedy present    Culture - Blood (19 @ 08:18)    -  Escherichia coli: Detec    Gram Stain:   Growth in anaerobic bottle: Gram Negative Rods  Growth in aerobic bottle: Gram Negative Rods      Culture - Blood (19 @ 08:18)    Gram Stain:   Growth in aerobic and anaerobic bottles: Gram Negative Rods    Specimen Source: .Blood Blood-Peripheral    Culture Results:   Growth in aerobic and anaerobic bottles: Gram Negative Rods    RADIOLOGY REVIEWED:  US Abdomen Complete (19 @ 14:54) >  Normal liver morphology with a coarse calcification/granuloma in the left   lobe.  Cholelithiasis.    CT Angio Abdomen and Pelvis w/ IV Cont (19 @ 04:05) >  No acute intra-abdominal pathology.  No abnormal bowel wall   thickening.   Patent mesenteric vasculature, however the SMA is diminutive in caliber   and its origin is likely stenotic, as seen on prior studies.

## 2019-02-03 NOTE — PROGRESS NOTE ADULT - SUBJECTIVE AND OBJECTIVE BOX
Patient is a 73y old  Female who presents with a chief complaint of epigastric pain (02 Feb 2019 08:55)                                                               INTERVAL HPI/OVERNIGHT EVENTS:    REVIEW OF SYSTEMS:     CONSTITUTIONAL: No weakness, fevers or chill  RESPIRATORY: No cough, wheezing,  No shortness of breath  CARDIOVASCULAR: No chest pain or palpitations  GASTROINTESTINAL: No abdominal pain  . No nausea, vomiting  GENITOURINARY: No dysuria, frequency or hematuria  NEUROLOGICAL: No numbness or weakness                                                                                                                                                                                                                                                                                Medications:  MEDICATIONS  (STANDING):  aspirin enteric coated 81 milliGRAM(s) Oral daily  dextrose 5% + sodium chloride 0.9%. 1000 milliLiter(s) (50 mL/Hr) IV Continuous <Continuous>  dextrose 5%. 1000 milliLiter(s) (50 mL/Hr) IV Continuous <Continuous>  dextrose 50% Injectable 12.5 Gram(s) IV Push once  dextrose 50% Injectable 25 Gram(s) IV Push once  dextrose 50% Injectable 25 Gram(s) IV Push once  heparin  Infusion.  Unit(s)/Hr (5 mL/Hr) IV Continuous <Continuous>  insulin lispro (HumaLOG) corrective regimen sliding scale   SubCutaneous three times a day before meals  insulin lispro (HumaLOG) corrective regimen sliding scale   SubCutaneous at bedtime  pantoprazole    Tablet 40 milliGRAM(s) Oral before breakfast  piperacillin/tazobactam IVPB. 3.375 Gram(s) IV Intermittent every 8 hours    MEDICATIONS  (PRN):  dextrose 40% Gel 15 Gram(s) Oral once PRN Blood Glucose LESS THAN 70 milliGRAM(s)/deciliter  diltiazem Injectable 10 milliGRAM(s) IV Push every 6 hours PRN HR >120  glucagon  Injectable 1 milliGRAM(s) IntraMuscular once PRN Glucose LESS THAN 70 milligrams/deciliter  heparin  Injectable 2700 Unit(s) IV Push every 6 hours PRN For aPTT less than 40       Allergies    Avelox (Pruritus)  Levaquin (Unknown)    Intolerances      Vital Signs Last 24 Hrs  T(C): 36.4 (04 Feb 2019 00:04), Max: 36.7 (03 Feb 2019 20:18)  T(F): 97.6 (04 Feb 2019 00:04), Max: 98 (03 Feb 2019 20:18)  HR: 98 (04 Feb 2019 00:04) (92 - 122)  BP: 117/73 (04 Feb 2019 00:04) (117/73 - 144/68)  BP(mean): --  RR: 18 (04 Feb 2019 00:04) (18 - 19)  SpO2: 99% (04 Feb 2019 00:04) (98% - 100%)  CAPILLARY BLOOD GLUCOSE      POCT Blood Glucose.: 261 mg/dL (03 Feb 2019 21:08)  POCT Blood Glucose.: 93 mg/dL (03 Feb 2019 16:19)  POCT Blood Glucose.: 169 mg/dL (03 Feb 2019 13:01)  POCT Blood Glucose.: 183 mg/dL (03 Feb 2019 11:25)  POCT Blood Glucose.: 127 mg/dL (03 Feb 2019 07:36)      02-02 @ 07:01  -  02-03 @ 07:00  --------------------------------------------------------  IN: 1373 mL / OUT: 900 mL / NET: 473 mL    02-03 @ 07:01  -  02-04 @ 01:08  --------------------------------------------------------  IN: 1289.5 mL / OUT: 1250 mL / NET: 39.5 mL      Physical Exam:    General:  Cachectic   HEENT:  Nonicteric, PERRLA  CV:  RRR, S1S2   Lungs:  CTA B  Abdomen:  Soft, nNT  Extremities:  no edema  Neuro:  AAOx3, non-focal, grossly intact                                                                                                                                                                                                                                                                                                LABS:                               10.6   9.88  )-----------( 209      ( 03 Feb 2019 11:46 )             34.0                      02-03    137  |  101  |  5<L>  ----------------------------<  135<H>  4.1   |  27  |  0.74    Ca    8.7      03 Feb 2019 10:17  Mg     1.9     02-03    TPro  5.9<L>  /  Alb  2.5<L>  /  TBili  1.1  /  DBili  x   /  AST  83<H>  /  ALT  197<H>  /  AlkPhos  235<H>  02-03

## 2019-02-03 NOTE — PROGRESS NOTE ADULT - ASSESSMENT
Impression:  1) Sepsis- urinary vs biliary - Urine culture with urogenital speedy  2) Elevated AST/ALT/ALP, mild elevated TB, elevated lipase - c/w pancreatitis, likely gallstone with possible obstruction.  Ducts WNL on CT scan  3) Elevated trop - Reviewed by cards, no indication for cath at this juncture, likely type 2 demand ischemia  4)  Afib on coumadin- Now on hep gtt during this hospital stay  5) Dementia    Recs:  -Follow up MRCP without contrast to look for evidence of choledocholithiasis   -NPO Midnight for possible EUS/ERCP Monday  -Stop heparin gtt at 3am  -Continue IV zosyn  - LR fluid resuscitation  - IV antibiotics

## 2019-02-03 NOTE — PROGRESS NOTE ADULT - ASSESSMENT
72 yo F, Afib, DM  Here with abdom pain, elevated LFTs, severe sepsis   E coli bacteremia   CT Abdom no obvious source  Urine Cx negative  Most concerned abt biliary source; elevated lipase noted- gallstone pancreatitis  Afebrile, pain resolved, leukocytosis resolved, LFTs lower, on empiric Zosyn- clinically improved    Plan:  Continue zosyn for now- awaiting sensi's  Await MRCP reading, as per GI prelim no stone in CBD, but to proceed with ERCP  Follow temps, CBC/diff, LFTs  D/w daughter at bedside- sepsis, biliary source, ?cholecystectomy, all reviewed

## 2019-02-03 NOTE — PROGRESS NOTE ADULT - ASSESSMENT
74 yo female with PMH of Atrial fibrillation on coumadin, HTN, DM2, Asthma, HLD,celiac and SMA dz last admission on   mesenteric angiogram now presenting with acute onset of epigastric pain with N but no vomitting  no fever or chills  no diarreah and no uriary sx   CT was done in ED : < from: CT Angio Abdomen and Pelvis w/ IV Cont (02.01.19 @ 04:05) >   No acute intra-abdominal pathology.  No abnormal bowel wall thickening.  Patent mesenteric vasculature, however the SMA is diminutive in caliber   and its origin is likely stenotic, as seen on prior studies.  lactic acid was elevated   pt was thought to have UTI ..pt however developed acute respiratory failure and placed on bipap.. pt became hypotensive and afib with RVR and MICU called...   Pt was evaluated by MICU and thought not to be a candidate for MICU..    At bedside Pt is ill looking , pale but says feels better overall .. off bipap and improved HR and BP   denies cp  SOB palpiations   feels N but no vomitting... no diarreah   labs : significant for leukocytosis.. acute elevation of trop and acute elevation of LFT     1- acute respiratory failure : off bipap  ABG improved ..  2- elevated trop : pt with epgistric pain  and significant risk factors ..  cath consult appreciated    cont heparin and cardiac meds per DR. Tamayo   will need cath       3- Acute elevation of LFT :  likely multicatorial .. sec to  hypotension,  sepsis and cholangitis   GI consult appreaitaed  MRCP negative for CBD stone      ? ERCP   cont abx     4- septic Shock : sec to E coli bacteremia   improvung   ID input appreciated     5- afib : cont rate control and AC

## 2019-02-04 LAB
-  AMIKACIN: SIGNIFICANT CHANGE UP
-  AMPICILLIN/SULBACTAM: SIGNIFICANT CHANGE UP
-  AMPICILLIN: SIGNIFICANT CHANGE UP
-  AZTREONAM: SIGNIFICANT CHANGE UP
-  CEFAZOLIN: SIGNIFICANT CHANGE UP
-  CEFEPIME: SIGNIFICANT CHANGE UP
-  CEFOXITIN: SIGNIFICANT CHANGE UP
-  CEFTRIAXONE: SIGNIFICANT CHANGE UP
-  CIPROFLOXACIN: SIGNIFICANT CHANGE UP
-  ERTAPENEM: SIGNIFICANT CHANGE UP
-  GENTAMICIN: SIGNIFICANT CHANGE UP
-  IMIPENEM: SIGNIFICANT CHANGE UP
-  LEVOFLOXACIN: SIGNIFICANT CHANGE UP
-  MEROPENEM: SIGNIFICANT CHANGE UP
-  PIPERACILLIN/TAZOBACTAM: SIGNIFICANT CHANGE UP
-  TOBRAMYCIN: SIGNIFICANT CHANGE UP
-  TRIMETHOPRIM/SULFAMETHOXAZOLE: SIGNIFICANT CHANGE UP
ANION GAP SERPL CALC-SCNC: 7 MMOL/L — SIGNIFICANT CHANGE UP (ref 5–17)
APTT BLD: 65.1 SEC — HIGH (ref 27.5–36.3)
APTT BLD: 69.3 SEC — HIGH (ref 27.5–36.3)
BUN SERPL-MCNC: <4 MG/DL — LOW (ref 7–23)
CALCIUM SERPL-MCNC: 8.5 MG/DL — SIGNIFICANT CHANGE UP (ref 8.4–10.5)
CHLORIDE SERPL-SCNC: 102 MMOL/L — SIGNIFICANT CHANGE UP (ref 96–108)
CO2 SERPL-SCNC: 27 MMOL/L — SIGNIFICANT CHANGE UP (ref 22–31)
CREAT SERPL-MCNC: 0.68 MG/DL — SIGNIFICANT CHANGE UP (ref 0.5–1.3)
CULTURE RESULTS: SIGNIFICANT CHANGE UP
CULTURE RESULTS: SIGNIFICANT CHANGE UP
GLUCOSE BLDC GLUCOMTR-MCNC: 137 MG/DL — HIGH (ref 70–99)
GLUCOSE BLDC GLUCOMTR-MCNC: 139 MG/DL — HIGH (ref 70–99)
GLUCOSE BLDC GLUCOMTR-MCNC: 141 MG/DL — HIGH (ref 70–99)
GLUCOSE BLDC GLUCOMTR-MCNC: 149 MG/DL — HIGH (ref 70–99)
GLUCOSE BLDC GLUCOMTR-MCNC: 165 MG/DL — HIGH (ref 70–99)
GLUCOSE BLDC GLUCOMTR-MCNC: 179 MG/DL — HIGH (ref 70–99)
GLUCOSE SERPL-MCNC: 124 MG/DL — HIGH (ref 70–99)
HCT VFR BLD CALC: 33 % — LOW (ref 34.5–45)
HGB BLD-MCNC: 10.6 G/DL — LOW (ref 11.5–15.5)
MCHC RBC-ENTMCNC: 24.3 PG — LOW (ref 27–34)
MCHC RBC-ENTMCNC: 32.1 GM/DL — SIGNIFICANT CHANGE UP (ref 32–36)
MCV RBC AUTO: 75.7 FL — LOW (ref 80–100)
METHOD TYPE: SIGNIFICANT CHANGE UP
ORGANISM # SPEC MICROSCOPIC CNT: SIGNIFICANT CHANGE UP
PLATELET # BLD AUTO: 189 K/UL — SIGNIFICANT CHANGE UP (ref 150–400)
POTASSIUM SERPL-MCNC: 3.3 MMOL/L — LOW (ref 3.5–5.3)
POTASSIUM SERPL-SCNC: 3.3 MMOL/L — LOW (ref 3.5–5.3)
RBC # BLD: 4.36 M/UL — SIGNIFICANT CHANGE UP (ref 3.8–5.2)
RBC # FLD: 15.5 % — HIGH (ref 10.3–14.5)
SODIUM SERPL-SCNC: 136 MMOL/L — SIGNIFICANT CHANGE UP (ref 135–145)
SPECIMEN SOURCE: SIGNIFICANT CHANGE UP
SPECIMEN SOURCE: SIGNIFICANT CHANGE UP
WBC # BLD: 6.9 K/UL — SIGNIFICANT CHANGE UP (ref 3.8–10.5)
WBC # FLD AUTO: 6.9 K/UL — SIGNIFICANT CHANGE UP (ref 3.8–10.5)

## 2019-02-04 PROCEDURE — 43264 ERCP REMOVE DUCT CALCULI: CPT | Mod: GC

## 2019-02-04 PROCEDURE — 43262 ENDO CHOLANGIOPANCREATOGRAPH: CPT | Mod: GC,59

## 2019-02-04 RX ORDER — IPRATROPIUM/ALBUTEROL SULFATE 18-103MCG
3 AEROSOL WITH ADAPTER (GRAM) INHALATION ONCE
Qty: 0 | Refills: 0 | Status: COMPLETED | OUTPATIENT
Start: 2019-02-04 | End: 2019-02-04

## 2019-02-04 RX ORDER — BUDESONIDE AND FORMOTEROL FUMARATE DIHYDRATE 160; 4.5 UG/1; UG/1
2 AEROSOL RESPIRATORY (INHALATION)
Qty: 0 | Refills: 0 | Status: DISCONTINUED | OUTPATIENT
Start: 2019-02-04 | End: 2019-02-08

## 2019-02-04 RX ADMIN — PIPERACILLIN AND TAZOBACTAM 25 GRAM(S): 4; .5 INJECTION, POWDER, LYOPHILIZED, FOR SOLUTION INTRAVENOUS at 05:19

## 2019-02-04 RX ADMIN — BUDESONIDE AND FORMOTEROL FUMARATE DIHYDRATE 2 PUFF(S): 160; 4.5 AEROSOL RESPIRATORY (INHALATION) at 22:51

## 2019-02-04 RX ADMIN — Medication 3 MILLILITER(S): at 10:16

## 2019-02-04 RX ADMIN — SODIUM CHLORIDE 50 MILLILITER(S): 9 INJECTION, SOLUTION INTRAVENOUS at 14:35

## 2019-02-04 RX ADMIN — PIPERACILLIN AND TAZOBACTAM 25 GRAM(S): 4; .5 INJECTION, POWDER, LYOPHILIZED, FOR SOLUTION INTRAVENOUS at 22:50

## 2019-02-04 RX ADMIN — PIPERACILLIN AND TAZOBACTAM 25 GRAM(S): 4; .5 INJECTION, POWDER, LYOPHILIZED, FOR SOLUTION INTRAVENOUS at 13:22

## 2019-02-04 RX ADMIN — HEPARIN SODIUM 800 UNIT(S)/HR: 5000 INJECTION INTRAVENOUS; SUBCUTANEOUS at 00:22

## 2019-02-04 RX ADMIN — PANTOPRAZOLE SODIUM 40 MILLIGRAM(S): 20 TABLET, DELAYED RELEASE ORAL at 05:19

## 2019-02-04 RX ADMIN — Medication 81 MILLIGRAM(S): at 13:22

## 2019-02-04 RX ADMIN — Medication 100 MILLIGRAM(S): at 07:03

## 2019-02-04 RX ADMIN — HEPARIN SODIUM 800 UNIT(S)/HR: 5000 INJECTION INTRAVENOUS; SUBCUTANEOUS at 09:20

## 2019-02-04 NOTE — PROGRESS NOTE ADULT - ASSESSMENT
74 yo F, Afib, DM  Here with abdom pain, elevated LFTs, severe sepsis   E coli bacteremia   CT Abdom no obvious source  Urine Cx negative  Most concerned abt biliary source; elevated lipase noted- gallstone pancreatitis  Afebrile, pain resolved, leukocytosis resolved, LFTs lower, on empiric Zosyn- clinically improved  MRCP results noted  Awaiting sensitivities on E Coli  Plan:  Continue zosyn for now- awaiting sensi's  Awaiting GI F/U, ? role for ERCP  Follow temps, CBC/diff, LFTs  Hemodynamically stable, resolving sespsis/bacteremia

## 2019-02-04 NOTE — PROGRESS NOTE ADULT - SUBJECTIVE AND OBJECTIVE BOX
Patient is a 73y old  Female who presents with a chief complaint of E coli bacteremia (04 Feb 2019 11:18)                                                               INTERVAL HPI/OVERNIGHT EVENTS:    REVIEW OF SYSTEMS:     CONSTITUTIONAL: No weakness, fevers or chills  RESPIRATORY: No cough, wheezing,  No shortness of breath  CARDIOVASCULAR: No chest pain or palpitations  GASTROINTESTINAL: intermient abdominal pain  . No nausea, vomiting,no diarreah   GENITOURINARY: No dysuria, frequency or hematuria  NEUROLOGICAL: No numbness or weakness                                                                                                                                                                                                                                                                                   Medications:  MEDICATIONS  (STANDING):  aspirin enteric coated 81 milliGRAM(s) Oral daily  buDESOnide 160 MICROgram(s)/formoterol 4.5 MICROgram(s) Inhaler 2 Puff(s) Inhalation two times a day  dextrose 5% + sodium chloride 0.9%. 1000 milliLiter(s) (50 mL/Hr) IV Continuous <Continuous>  dextrose 5%. 1000 milliLiter(s) (50 mL/Hr) IV Continuous <Continuous>  dextrose 50% Injectable 12.5 Gram(s) IV Push once  dextrose 50% Injectable 25 Gram(s) IV Push once  dextrose 50% Injectable 25 Gram(s) IV Push once  heparin  Infusion.  Unit(s)/Hr (5 mL/Hr) IV Continuous <Continuous>  insulin lispro (HumaLOG) corrective regimen sliding scale   SubCutaneous three times a day before meals  insulin lispro (HumaLOG) corrective regimen sliding scale   SubCutaneous at bedtime  pantoprazole    Tablet 40 milliGRAM(s) Oral before breakfast  piperacillin/tazobactam IVPB. 3.375 Gram(s) IV Intermittent every 8 hours    MEDICATIONS  (PRN):  dextrose 40% Gel 15 Gram(s) Oral once PRN Blood Glucose LESS THAN 70 milliGRAM(s)/deciliter  diltiazem Injectable 10 milliGRAM(s) IV Push every 6 hours PRN HR >120  glucagon  Injectable 1 milliGRAM(s) IntraMuscular once PRN Glucose LESS THAN 70 milligrams/deciliter  guaiFENesin   Syrup  (Sugar-Free) 100 milliGRAM(s) Oral every 6 hours PRN Cough  heparin  Injectable 2700 Unit(s) IV Push every 6 hours PRN For aPTT less than 40       Allergies    Avelox (Pruritus)  Levaquin (Unknown)    Intolerances      Vital Signs Last 24 Hrs  T(C): 36.6 (04 Feb 2019 11:45), Max: 36.9 (04 Feb 2019 04:15)  T(F): 97.8 (04 Feb 2019 11:45), Max: 98.4 (04 Feb 2019 04:15)  HR: 98 (04 Feb 2019 11:45) (90 - 122)  BP: 102/64 (04 Feb 2019 11:45) (102/64 - 136/62)  BP(mean): --  RR: 18 (04 Feb 2019 11:45) (16 - 19)  SpO2: 100% (04 Feb 2019 11:45) (98% - 100%)  CAPILLARY BLOOD GLUCOSE      POCT Blood Glucose.: 149 mg/dL (04 Feb 2019 14:15)  POCT Blood Glucose.: 165 mg/dL (04 Feb 2019 11:25)  POCT Blood Glucose.: 137 mg/dL (04 Feb 2019 08:51)  POCT Blood Glucose.: 179 mg/dL (04 Feb 2019 07:33)  POCT Blood Glucose.: 261 mg/dL (03 Feb 2019 21:08)  POCT Blood Glucose.: 93 mg/dL (03 Feb 2019 16:19)      02-03 @ 07:01 - 02-04 @ 07:00  --------------------------------------------------------  IN: 1889.5 mL / OUT: 1250 mL / NET: 639.5 mL    02-04 @ 07:01 - 02-04 @ 15:17  --------------------------------------------------------  IN: 0 mL / OUT: 450 mL / NET: -450 mL      Physical Exam:    General:  cachectic  NAD   HEENT:  Nonicteric, PERRLA  CV:  RRR, S1S2   Lungs:  CTA B  Abdomen:  Soft, non-tender, no distended, positive BS  Extremities:  2+ pulses, no c/c, no edema  Skin:  Warm and dry, no rashes  :  No andrade  Neuro:  AAOx3, non-focal, grossly intact                                                                                                                                                                                                                                                                                                LABS:                               10.6   6.90  )-----------( 189      ( 04 Feb 2019 08:56 )             33.0                      02-04    136  |  102  |  <4<L>  ----------------------------<  124<H>  3.3<L>   |  27  |  0.68    Ca    8.5      04 Feb 2019 07:25  Mg     1.9     02-03    TPro  5.9<L>  /  Alb  2.5<L>  /  TBili  1.1  /  DBili  x   /  AST  83<H>  /  ALT  197<H>  /  AlkPhos  235<H>  02-03

## 2019-02-04 NOTE — PROGRESS NOTE ADULT - SUBJECTIVE AND OBJECTIVE BOX
Pre-Endoscopy Evaluation      Referring Physician:  dr. lorenzana                                 Procedure: ercp    Indication for Procedure: gallstone pancreatitis    Pertinent History: 73y female with PMH of Atrial fibrillation on coumadin, HTN, DM2, Asthma, HLD, celiac and SMA atherosclerosis on last admission with mesenteric angiogram now presenting with acute onset of epigastric pain/RUQ with nausea, and elevated AST/ALT/ALP, mild elevated TB, elevated lipase - c/w pancreatitis      PAST MEDICAL & SURGICAL HISTORY:  Dementia  Hypertension  Type 2 diabetes mellitus  Atrial fibrillation, chronic  Diabetes mellitus  Asthma  No significant past surgical history      PMH of Gastroparesis [ ]  Gastric Surgery [ ]  Gastric Outlet Obstruction [ ]    Allergies;    Avelox (Pruritus)  Levaquin (Unknown)    Intolerances:    Latex allergy: [ ] yes [x] no    Medications:MEDICATIONS  (STANDING):  aspirin enteric coated 81 milliGRAM(s) Oral daily  buDESOnide 160 MICROgram(s)/formoterol 4.5 MICROgram(s) Inhaler 2 Puff(s) Inhalation two times a day  dextrose 5% + sodium chloride 0.9%. 1000 milliLiter(s) (50 mL/Hr) IV Continuous <Continuous>  dextrose 5%. 1000 milliLiter(s) (50 mL/Hr) IV Continuous <Continuous>  dextrose 50% Injectable 12.5 Gram(s) IV Push once  dextrose 50% Injectable 25 Gram(s) IV Push once  dextrose 50% Injectable 25 Gram(s) IV Push once  heparin  Infusion.  Unit(s)/Hr (5 mL/Hr) IV Continuous <Continuous>  insulin lispro (HumaLOG) corrective regimen sliding scale   SubCutaneous three times a day before meals  insulin lispro (HumaLOG) corrective regimen sliding scale   SubCutaneous at bedtime  pantoprazole    Tablet 40 milliGRAM(s) Oral before breakfast  piperacillin/tazobactam IVPB. 3.375 Gram(s) IV Intermittent every 8 hours    MEDICATIONS  (PRN):  dextrose 40% Gel 15 Gram(s) Oral once PRN Blood Glucose LESS THAN 70 milliGRAM(s)/deciliter  diltiazem Injectable 10 milliGRAM(s) IV Push every 6 hours PRN HR >120  glucagon  Injectable 1 milliGRAM(s) IntraMuscular once PRN Glucose LESS THAN 70 milligrams/deciliter  guaiFENesin   Syrup  (Sugar-Free) 100 milliGRAM(s) Oral every 6 hours PRN Cough  heparin  Injectable 2700 Unit(s) IV Push every 6 hours PRN For aPTT less than 40      Smoking: [ ] yes  [x] no    AICD/PPM: [ ] yes   [x] no    Pertinent lab data:                        10.6   6.90  )-----------( 189      ( 04 Feb 2019 08:56 )             33.0     02-04    136  |  102  |  <4<L>  ----------------------------<  124<H>  3.3<L>   |  27  |  0.68    Ca    8.5      04 Feb 2019 07:25  Mg     1.9     02-03    TPro  5.9<L>  /  Alb  2.5<L>  /  TBili  1.1  /  DBili  x   /  AST  83<H>  /  ALT  197<H>  /  AlkPhos  235<H>  02-03    PTT - ( 04 Feb 2019 07:25 )  PTT:65.1 sec    CAPILLARY BLOOD GLUCOSE  POCT Blood Glucose.: 149 mg/dL (04 Feb 2019 14:15)      < from: TTE with Doppler (w/Cont) (02.01.19 @ 14:40) >    Fractional short: 23 %  EF (Teicholtz): 47 %  ------------------------------------------------------------------------  Observations:  Mitral Valve: Normal mitral valve. Minimal mitral  regurgitation.  Aortic Valve/Aorta: Calcified trileaflet aortic valve with  normal opening.  Aortic Root: 2.9 cm.  Left Atrium: LA volume index = 15 cc/m2.  Left Ventricle: Endocardial visualization enhanced with  intravenous injection of Ultrasonic Enhancing Agent  (Definity). Mild segmental left ventricular systolic  dysfunction. The septum is hypokinetic.  Normal left  ventricular internal dimensions and wall thicknesses.  Right Heart: Normal right atrium. Mild Right ventricular  enlargement with mild  decreased right ventricular systolic  function. RV S' 0.8 Normal tricuspid valve. Mild tricuspid  regurgitation. Normal pulmonic valve.  Pericardium/Pleura: Normal pericardium with no pericardial  effusion.  Hemodynamic: Estimated right ventricular systolic pressure  equals 33 mm Hg, assuming right atrial pressure equals 15  mm Hg, consistent with normal pulmonary pressures.  ------------------------------------------------------------------------  Conclusions:  1. Calcified trileaflet aortic valve with normal opening.  2. Endocardial visualization enhanced with intravenous  injection of Ultrasonic Enhancing Agent (Definity). Mild  segmental left ventricular systolic dysfunction. The septum  is hypokinetic.  3. Mild Right ventricular enlargement with mild  decreased  right ventricular systolic function. RV S' 0.8  *** Compared with echocardiogram of 10/19/2017, no  significant changes noted.  ------------------------------------        Physical Examination:     Daily   Vital Signs Last 24 Hrs  T(C): 36.6 (04 Feb 2019 11:45), Max: 36.9 (04 Feb 2019 04:15)  T(F): 97.8 (04 Feb 2019 11:45), Max: 98.4 (04 Feb 2019 04:15)  HR: 98 (04 Feb 2019 11:45) (90 - 122)  BP: 102/64 (04 Feb 2019 11:45) (102/64 - 136/62)  BP(mean): --  RR: 18 (04 Feb 2019 11:45) (16 - 19)  SpO2: 100% (04 Feb 2019 11:45) (98% - 100%)    Drug Dosing Weight  Height (cm): 152.4 (01 Feb 2019 01:11)  Weight (kg): 38.6 (01 Feb 2019 01:11)  BMI (kg/m2): 16.6 (01 Feb 2019 01:11)  BSA (m2): 1.3 (01 Feb 2019 01:11)    Constitutional: NAD     Neck:  No JVD    Respiratory: CTAB/L    Cardiovascular: S1 and S2    Gastrointestinal: BS+, soft, NT/ND    Extremities: No peripheral edema    Neurological: A/O x 3  : No Morales    Skin: No rashes    Comments: heparin infusion held at 11:30am today    ASA Class: I [ ]  II [ ]  III [X]  IV [ ]    The patient is a suitable candidate for the planned procedure unless box checked [ ]  No, explain:

## 2019-02-04 NOTE — PROVIDER CONTACT NOTE (OTHER) - ASSESSMENT
Patient found sleeping supine in bed, no signs of distress. A&O x 4. VS stable (see flowsheet). Patient asymptomatic. In NSR.

## 2019-02-04 NOTE — PROGRESS NOTE ADULT - ASSESSMENT
Cholangitis with bacteremia, improved with medical treatement.  MRCP suboptimal images.  Will proceed with ERCP.

## 2019-02-04 NOTE — PROGRESS NOTE ADULT - ASSESSMENT
72 yo female with PMH of Atrial fibrillation on coumadin, HTN, DM2, Asthma, HLD,celiac and SMA dz last admission on   mesenteric angiogram now presenting with acute onset of epigastric pain with N but no vomitting  no fever or chills  no diarreah and no uriary sx   CT was done in ED : < from: CT Angio Abdomen and Pelvis w/ IV Cont (02.01.19 @ 04:05) >   No acute intra-abdominal pathology.  No abnormal bowel wall thickening.  Patent mesenteric vasculature, however the SMA is diminutive in caliber   and its origin is likely stenotic, as seen on prior studies.  lactic acid was elevated   pt was thought to have UTI ..pt however developed acute respiratory failure and placed on bipap.. pt became hypotensive and afib with RVR and MICU called...   Pt was evaluated by MICU and thought not to be a candidate for MICU..    At bedside Pt is ill looking , pale but says feels better overall .. off bipap and improved HR and BP   denies cp  SOB palpiations   feels N but no vomitting... no diarreah   labs : significant for leukocytosis.. acute elevation of trop and acute elevation of LFT     1- acute respiratory failure : off bipap  ABG improved ..  2- elevated trop : pt with epgistric pain  and significant risk factors ..  cath consult appreciated    cont heparin and cardiac meds per DR. Tamayo   will  need cath . trop elevation might be sec to demand ischemia however with new wall motion abn..       3- Acute elevation of LFT :  likely multicatorial .. sec to  hypotension,  sepsis and cholangitis   GI consult appreaitaed  MRCP negative for CBD stone       ERCP today   LFT improving   cont abx   f/u with GI and ID   surgery consult     4- septic Shock : sec to E coli bacteremia   improvung   ID input appreciated     5- afib : cont rate control and AC

## 2019-02-04 NOTE — PROVIDER CONTACT NOTE (OTHER) - SITUATION
Received patient on heparin gtt infusing at 8ml/hour. Patient NPO for ERCP today? Procedure cancelled? Stop heparin gtt?

## 2019-02-04 NOTE — PROGRESS NOTE ADULT - SUBJECTIVE AND OBJECTIVE BOX
CARDIOLOGY FOLLOW UP NOTE - DR. RAYO WORKMAN    Patient states no new complaints. Still has stomach discomfort.    MEDICATIONS  (STANDING):  ALBUTerol/ipratropium for Nebulization. 3 milliLiter(s) Nebulizer once  aspirin enteric coated 81 milliGRAM(s) Oral daily  buDESOnide 160 MICROgram(s)/formoterol 4.5 MICROgram(s) Inhaler 2 Puff(s) Inhalation two times a day  dextrose 5% + sodium chloride 0.9%. 1000 milliLiter(s) (50 mL/Hr) IV Continuous <Continuous>  dextrose 5%. 1000 milliLiter(s) (50 mL/Hr) IV Continuous <Continuous>  dextrose 50% Injectable 12.5 Gram(s) IV Push once  dextrose 50% Injectable 25 Gram(s) IV Push once  dextrose 50% Injectable 25 Gram(s) IV Push once  heparin  Infusion.  Unit(s)/Hr (5 mL/Hr) IV Continuous <Continuous>  insulin lispro (HumaLOG) corrective regimen sliding scale   SubCutaneous three times a day before meals  insulin lispro (HumaLOG) corrective regimen sliding scale   SubCutaneous at bedtime  pantoprazole    Tablet 40 milliGRAM(s) Oral before breakfast  piperacillin/tazobactam IVPB. 3.375 Gram(s) IV Intermittent every 8 hours        PHYSICAL EXAM:  Vital Signs Last 24 Hrs  T(C): 36.6 (04 Feb 2019 08:46), Max: 36.9 (04 Feb 2019 04:15)  T(F): 97.9 (04 Feb 2019 08:46), Max: 98.4 (04 Feb 2019 04:15)  HR: 98 (04 Feb 2019 08:46) (90 - 122)  BP: 118/63 (04 Feb 2019 08:46) (117/71 - 136/62)  BP(mean): --  RR: 18 (04 Feb 2019 08:46) (16 - 19)  SpO2: 98% (04 Feb 2019 08:46) (98% - 100%)  I&O's Summary    03 Feb 2019 07:01  -  04 Feb 2019 07:00  --------------------------------------------------------  IN: 1889.5 mL / OUT: 1250 mL / NET: 639.5 mL        Telemetry:  sinus 80's-120's, PAT episodes up to 8 seconds    General: NAD, A+Ox3	  HEENT:   No JVD	  Lymphatic: No lymphadenopathy  Cardiovascular: Regular and tachycardic, Normal S1 and S2, No murmurs/gallops/rubs  Respiratory: Lungs clear to auscultation	  Gastrointestinal:  mild epigastric and RUQ tenderness to palpation, + BS	  Skin: No rashes, No ecchymoses, No cyanosis	  Neurologic: Non-focal  Extremities: No clubbing, cyanosis or edema  Vascular: normal peripheral pulses palpable bilaterally    	    LABS:                          10.6   6.90  )-----------( 189      ( 04 Feb 2019 08:56 )             33.0     02-04    136  |  102  |  <4<L>  ----------------------------<  124<H>  3.3<L>   |  27  |  0.68    Ca    8.5      04 Feb 2019 07:25  Mg     1.9     02-03    TPro  5.9<L>  /  Alb  2.5<L>  /  TBili  1.1  /  DBili  x   /  AST  83<H>  /  ALT  197<H>  /  AlkPhos  235<H>  02-03  PTT - ( 04 Feb 2019 07:25 )  PTT:65.1 sec          Assessment and Plan:  72 yo female with PMH of Atrial fibrillation on coumadin, HTN, DM2, Asthma, HLD, celiac and SMA atherosclerosis on last admission with mesenteric angiogram now presenting with acute onset of epigastric pain/RUQ with nausea but no vomiting.   1. Elevated troponin - pt with known atherosclerosis. Likely demand ischemia. Troponin trending down. On heparin drip. On aspirin. Will not load with plavix, since she will likely need a procedure for her acute GI issues. No statin in setting of acute transaminitis. Will follow on current regimen.  2. PAF - on heparin drip. Was on diltiazem at home. Will resume if BP will allow.   3. HTN - will follow BP - now off meds. Will resume cardizem or start metoprolol soon.  4. New LV wall motion abnormality - septal hypokinesis. Will treat with meds as above. Will follow, and consider repeating echo prior to discharge. Will consider B-blocker and ACE/ARB.

## 2019-02-04 NOTE — PROGRESS NOTE ADULT - SUBJECTIVE AND OBJECTIVE BOX
CC: f/u for E Coli bacteremia    Patient reports: she is awake, appears comfortable, minimal speech    REVIEW OF SYSTEMS:  All other review of systems negative (Comprehensive ROS)    Antimicrobials Day #  :day 4  piperacillin/tazobactam IVPB. 3.375 Gram(s) IV Intermittent every 8 hours    Other Medications Reviewed    T(F): 97.9 (02-04-19 @ 08:46), Max: 98.4 (02-04-19 @ 04:15)  HR: 98 (02-04-19 @ 08:46)  BP: 118/63 (02-04-19 @ 08:46)  RR: 18 (02-04-19 @ 08:46)  SpO2: 98% (02-04-19 @ 08:46)  Wt(kg): --    PHYSICAL EXAM:  General: alert, no acute distress, chronically ill appearing  Eyes:  anicteric, no conjunctival injection, no discharge  Oropharynx: no lesions or injection 	  Neck: supple, without adenopathy  Lungs: clear to auscultation  Heart: irregular rate and rhythm; no murmur, rubs or gallops  Abdomen: soft, nondistended, nontender, without mass or organomegaly  Skin: no lesions  Extremities: no clubbing, cyanosis, or edema  Neurologic: alert, oriented, moves all extremities    LAB RESULTS:                        10.6   6.90  )-----------( 189      ( 04 Feb 2019 08:56 )             33.0     02-04    136  |  102  |  <4<L>  ----------------------------<  124<H>  3.3<L>   |  27  |  0.68    Ca    8.5      04 Feb 2019 07:25  Mg     1.9     02-03    TPro  5.9<L>  /  Alb  2.5<L>  /  TBili  1.1  /  DBili  x   /  AST  83<H>  /  ALT  197<H>  /  AlkPhos  235<H>  02-03    LIVER FUNCTIONS - ( 03 Feb 2019 10:17 )  Alb: 2.5 g/dL / Pro: 5.9 g/dL / ALK PHOS: 235 U/L / ALT: 197 U/L / AST: 83 U/L / GGT: x             MICROBIOLOGY:  RECENT CULTURES:  02-01 @ 08:51 .Urine Clean Catch (Midstream)     <10,000 CFU/ml Normal Urogenital speedy present      02-01 @ 08:18 .Blood Blood-Peripheral Blood Culture PCR    Growth in aerobic and anaerobic bottles: Escherichia coli  See previous culture [id number]    Growth in aerobic and anaerobic bottles: Gram Negative Rods        RADIOLOGY REVIEWED:    < from: MR MRCP No Cont (02.02.19 @ 22:11) >  IMPRESSION:   The images are markedly degraded by motion.  Cholelithiasis.  No choledocholithiasis.    < end of copied text >

## 2019-02-05 LAB
ALBUMIN SERPL ELPH-MCNC: 2.2 G/DL — LOW (ref 3.3–5)
ALP SERPL-CCNC: 175 U/L — HIGH (ref 40–120)
ALT FLD-CCNC: 85 U/L — HIGH (ref 10–45)
ANION GAP SERPL CALC-SCNC: 9 MMOL/L — SIGNIFICANT CHANGE UP (ref 5–17)
APTT BLD: 31.2 SEC — SIGNIFICANT CHANGE UP (ref 27.5–36.3)
AST SERPL-CCNC: 27 U/L — SIGNIFICANT CHANGE UP (ref 10–40)
BILIRUB SERPL-MCNC: 0.5 MG/DL — SIGNIFICANT CHANGE UP (ref 0.2–1.2)
BUN SERPL-MCNC: <4 MG/DL — LOW (ref 7–23)
CALCIUM SERPL-MCNC: 8.2 MG/DL — LOW (ref 8.4–10.5)
CHLORIDE SERPL-SCNC: 104 MMOL/L — SIGNIFICANT CHANGE UP (ref 96–108)
CO2 SERPL-SCNC: 25 MMOL/L — SIGNIFICANT CHANGE UP (ref 22–31)
CREAT SERPL-MCNC: 0.66 MG/DL — SIGNIFICANT CHANGE UP (ref 0.5–1.3)
GLUCOSE BLDC GLUCOMTR-MCNC: 151 MG/DL — HIGH (ref 70–99)
GLUCOSE BLDC GLUCOMTR-MCNC: 152 MG/DL — HIGH (ref 70–99)
GLUCOSE BLDC GLUCOMTR-MCNC: 187 MG/DL — HIGH (ref 70–99)
GLUCOSE BLDC GLUCOMTR-MCNC: 97 MG/DL — SIGNIFICANT CHANGE UP (ref 70–99)
GLUCOSE SERPL-MCNC: 133 MG/DL — HIGH (ref 70–99)
HCT VFR BLD CALC: 30.4 % — LOW (ref 34.5–45)
HGB BLD-MCNC: 9.5 G/DL — LOW (ref 11.5–15.5)
LIDOCAIN IGE QN: 19 U/L — SIGNIFICANT CHANGE UP (ref 7–60)
MCHC RBC-ENTMCNC: 23.5 PG — LOW (ref 27–34)
MCHC RBC-ENTMCNC: 31.3 GM/DL — LOW (ref 32–36)
MCV RBC AUTO: 75.2 FL — LOW (ref 80–100)
PLATELET # BLD AUTO: 163 K/UL — SIGNIFICANT CHANGE UP (ref 150–400)
POTASSIUM SERPL-MCNC: 3.3 MMOL/L — LOW (ref 3.5–5.3)
POTASSIUM SERPL-SCNC: 3.3 MMOL/L — LOW (ref 3.5–5.3)
PROT SERPL-MCNC: 5.4 G/DL — LOW (ref 6–8.3)
RBC # BLD: 4.04 M/UL — SIGNIFICANT CHANGE UP (ref 3.8–5.2)
RBC # FLD: 15.7 % — HIGH (ref 10.3–14.5)
SODIUM SERPL-SCNC: 138 MMOL/L — SIGNIFICANT CHANGE UP (ref 135–145)
WBC # BLD: 5.32 K/UL — SIGNIFICANT CHANGE UP (ref 3.8–10.5)
WBC # FLD AUTO: 5.32 K/UL — SIGNIFICANT CHANGE UP (ref 3.8–10.5)

## 2019-02-05 PROCEDURE — 99232 SBSQ HOSP IP/OBS MODERATE 35: CPT | Mod: GC

## 2019-02-05 RX ORDER — POTASSIUM CHLORIDE 20 MEQ
40 PACKET (EA) ORAL ONCE
Qty: 0 | Refills: 0 | Status: COMPLETED | OUTPATIENT
Start: 2019-02-05 | End: 2019-02-05

## 2019-02-05 RX ORDER — ATORVASTATIN CALCIUM 80 MG/1
40 TABLET, FILM COATED ORAL AT BEDTIME
Qty: 0 | Refills: 0 | Status: DISCONTINUED | OUTPATIENT
Start: 2019-02-05 | End: 2019-02-08

## 2019-02-05 RX ADMIN — PIPERACILLIN AND TAZOBACTAM 25 GRAM(S): 4; .5 INJECTION, POWDER, LYOPHILIZED, FOR SOLUTION INTRAVENOUS at 06:20

## 2019-02-05 RX ADMIN — BUDESONIDE AND FORMOTEROL FUMARATE DIHYDRATE 2 PUFF(S): 160; 4.5 AEROSOL RESPIRATORY (INHALATION) at 17:30

## 2019-02-05 RX ADMIN — Medication 1: at 12:23

## 2019-02-05 RX ADMIN — PANTOPRAZOLE SODIUM 40 MILLIGRAM(S): 20 TABLET, DELAYED RELEASE ORAL at 06:27

## 2019-02-05 RX ADMIN — PIPERACILLIN AND TAZOBACTAM 25 GRAM(S): 4; .5 INJECTION, POWDER, LYOPHILIZED, FOR SOLUTION INTRAVENOUS at 21:09

## 2019-02-05 RX ADMIN — Medication 40 MILLIEQUIVALENT(S): at 12:45

## 2019-02-05 RX ADMIN — Medication 1: at 08:00

## 2019-02-05 RX ADMIN — BUDESONIDE AND FORMOTEROL FUMARATE DIHYDRATE 2 PUFF(S): 160; 4.5 AEROSOL RESPIRATORY (INHALATION) at 06:26

## 2019-02-05 RX ADMIN — PIPERACILLIN AND TAZOBACTAM 25 GRAM(S): 4; .5 INJECTION, POWDER, LYOPHILIZED, FOR SOLUTION INTRAVENOUS at 13:30

## 2019-02-05 RX ADMIN — ATORVASTATIN CALCIUM 40 MILLIGRAM(S): 80 TABLET, FILM COATED ORAL at 21:09

## 2019-02-05 RX ADMIN — Medication 81 MILLIGRAM(S): at 11:40

## 2019-02-05 NOTE — PROVIDER CONTACT NOTE (OTHER) - ASSESSMENT
Pt A&OX3. Vitals are stable. Low Bp of 97/52 at 04:12. Patient asymptomatic no dizziness, n/a, lightheadedness.

## 2019-02-05 NOTE — PROGRESS NOTE ADULT - SUBJECTIVE AND OBJECTIVE BOX
CARDIOLOGY FOLLOW UP NOTE - DR. RAYO WORKMAN    Patient states no new complaints.    MEDICATIONS  (STANDING):  aspirin enteric coated 81 milliGRAM(s) Oral daily  buDESOnide 160 MICROgram(s)/formoterol 4.5 MICROgram(s) Inhaler 2 Puff(s) Inhalation two times a day  dextrose 5% + sodium chloride 0.9%. 1000 milliLiter(s) (50 mL/Hr) IV Continuous <Continuous>  dextrose 5%. 1000 milliLiter(s) (50 mL/Hr) IV Continuous <Continuous>  dextrose 50% Injectable 12.5 Gram(s) IV Push once  dextrose 50% Injectable 25 Gram(s) IV Push once  dextrose 50% Injectable 25 Gram(s) IV Push once  insulin lispro (HumaLOG) corrective regimen sliding scale   SubCutaneous three times a day before meals  insulin lispro (HumaLOG) corrective regimen sliding scale   SubCutaneous at bedtime  pantoprazole    Tablet 40 milliGRAM(s) Oral before breakfast  piperacillin/tazobactam IVPB. 3.375 Gram(s) IV Intermittent every 8 hours        PHYSICAL EXAM:  Vital Signs Last 24 Hrs  T(C): 36.3 (05 Feb 2019 04:12), Max: 36.6 (04 Feb 2019 11:45)  T(F): 97.4 (05 Feb 2019 04:12), Max: 97.9 (04 Feb 2019 20:29)  HR: 80 (05 Feb 2019 04:12) (80 - 98)  BP: 100/60 (05 Feb 2019 05:30) (94/57 - 102/64)  BP(mean): --  RR: 18 (05 Feb 2019 04:12) (18 - 18)  SpO2: 100% (05 Feb 2019 04:12) (97% - 100%)  I&O's Summary    04 Feb 2019 07:01  -  05 Feb 2019 07:00  --------------------------------------------------------  IN: 1250 mL / OUT: 800 mL / NET: 450 mL    05 Feb 2019 07:01  -  05 Feb 2019 10:23  --------------------------------------------------------  IN: 0 mL / OUT: 100 mL / NET: -100 mL        Telemetry:  sinus 90's-110's    General: NAD	  HEENT:   No JVD	  Cardiovascular: Regular and tachycardic, Normal S1 and S2, No murmurs/gallops/rubs  Respiratory: Lungs clear to auscultation	  Gastrointestinal:  mild epigastric and RUQ tenderness to palpation, + BS	  Extremities: No clubbing, cyanosis or edema  Vascular: normal peripheral pulses palpable bilaterally    	    LABS:                          9.5    5.32  )-----------( 163      ( 05 Feb 2019 08:46 )             30.4     02-05    138  |  104  |  <4<L>  ----------------------------<  133<H>  3.3<L>   |  25  |  0.66    Ca    8.2<L>      05 Feb 2019 07:07    TPro  5.4<L>  /  Alb  2.2<L>  /  TBili  0.5  /  DBili  x   /  AST  27  /  ALT  85<H>  /  AlkPhos  175<H>  02-05    PTT - ( 05 Feb 2019 08:48 )  PTT:31.2 sec        Assessment and Plan:  74 yo female with PMH of Atrial fibrillation on coumadin, HTN, DM2, Asthma, HLD, celiac and SMA atherosclerosis on last admission with mesenteric angiogram presented with acute cholecystitis and likely gallstone pancreatitis.   1. Elevated troponin - pt with known atherosclerosis. Likely demand ischemia. Troponin trending down. On heparin drip. On aspirin. Will not load with plavix, since she will likely need a procedure for her acute GI issues. No statin in setting of acute transaminitis. Will follow on current regimen.  2. PAF - maintaining sinus. Heparin drip off. GI recommend holding AC for 48-72 hours. Depending on surgical plan, will resume heparin in 2 days or Coumadin tomorrow. Was on diltiazem at home. Will resume if BP will allow.   3. HTN - BP is on the low side off meds. Will resume cardizem or start metoprolol as BP allows.  4. New LV wall motion abnormality - septal hypokinesis. Will treat with meds as above. Will follow, and consider repeating echo prior to discharge. Will consider B-blocker and ACE/ARB. CARDIOLOGY FOLLOW UP NOTE - DR. RAYO WORKMAN    Patient states no new complaints.    MEDICATIONS  (STANDING):  aspirin enteric coated 81 milliGRAM(s) Oral daily  buDESOnide 160 MICROgram(s)/formoterol 4.5 MICROgram(s) Inhaler 2 Puff(s) Inhalation two times a day  dextrose 5% + sodium chloride 0.9%. 1000 milliLiter(s) (50 mL/Hr) IV Continuous <Continuous>  dextrose 5%. 1000 milliLiter(s) (50 mL/Hr) IV Continuous <Continuous>  dextrose 50% Injectable 12.5 Gram(s) IV Push once  dextrose 50% Injectable 25 Gram(s) IV Push once  dextrose 50% Injectable 25 Gram(s) IV Push once  insulin lispro (HumaLOG) corrective regimen sliding scale   SubCutaneous three times a day before meals  insulin lispro (HumaLOG) corrective regimen sliding scale   SubCutaneous at bedtime  pantoprazole    Tablet 40 milliGRAM(s) Oral before breakfast  piperacillin/tazobactam IVPB. 3.375 Gram(s) IV Intermittent every 8 hours        PHYSICAL EXAM:  Vital Signs Last 24 Hrs  T(C): 36.3 (05 Feb 2019 04:12), Max: 36.6 (04 Feb 2019 11:45)  T(F): 97.4 (05 Feb 2019 04:12), Max: 97.9 (04 Feb 2019 20:29)  HR: 80 (05 Feb 2019 04:12) (80 - 98)  BP: 100/60 (05 Feb 2019 05:30) (94/57 - 102/64)  BP(mean): --  RR: 18 (05 Feb 2019 04:12) (18 - 18)  SpO2: 100% (05 Feb 2019 04:12) (97% - 100%)  I&O's Summary    04 Feb 2019 07:01  -  05 Feb 2019 07:00  --------------------------------------------------------  IN: 1250 mL / OUT: 800 mL / NET: 450 mL    05 Feb 2019 07:01  -  05 Feb 2019 10:23  --------------------------------------------------------  IN: 0 mL / OUT: 100 mL / NET: -100 mL        Telemetry:  sinus 90's-110's    General: NAD	  HEENT:   No JVD	  Cardiovascular: Regular and tachycardic, Normal S1 and S2, No murmurs/gallops/rubs  Respiratory: Lungs clear to auscultation	  Gastrointestinal:  mild epigastric and RUQ tenderness to palpation, + BS	  Extremities: No clubbing, cyanosis or edema  Vascular: normal peripheral pulses palpable bilaterally    	    LABS:                          9.5    5.32  )-----------( 163      ( 05 Feb 2019 08:46 )             30.4     02-05    138  |  104  |  <4<L>  ----------------------------<  133<H>  3.3<L>   |  25  |  0.66    Ca    8.2<L>      05 Feb 2019 07:07    TPro  5.4<L>  /  Alb  2.2<L>  /  TBili  0.5  /  DBili  x   /  AST  27  /  ALT  85<H>  /  AlkPhos  175<H>  02-05    PTT - ( 05 Feb 2019 08:48 )  PTT:31.2 sec        Assessment and Plan:  72 yo female with PMH of Atrial fibrillation on coumadin, HTN, DM2, Asthma, HLD, celiac and SMA atherosclerosis on last admission with mesenteric angiogram presented with acute cholecystitis and likely gallstone pancreatitis.   1. Elevated troponin - pt with known atherosclerosis. Likely demand ischemia. Troponin trended down. Heparin drip was on for about 48 hours. On aspirin. Will not load with plavix, since she will likely need a procedure for her acute GI issues. LFTS normalizing. Will resume statin. Will follow on current regimen.  2. PAF - maintaining sinus. Heparin drip off. GI recommend holding AC for 48-72 hours. Depending on surgical plan, will resume heparin in 2 days or Coumadin tomorrow. Was on diltiazem at home. Will resume if BP will allow.   3. HTN - BP is on the low side off meds. Will resume cardizem or start metoprolol as BP allows.  4. New LV wall motion abnormality - septal hypokinesis. Will treat with meds as above. Will follow, and consider repeating echo prior to discharge. Will consider B-blocker and ACE/ARB.  5. Pre-operative cardiac evaluation - the patient has had a few episodes of gallstone issues. I discussed the option of surgery with the patient and her daughter, Ariana, as recommended by Surgery. I explained that she is high risk from a cardiac perspective. She has known atherosclerosis, and had an NSTEMI in the setting of the acute infectious process. Ariana stated that she fears that the next time she has a gallstone issue, that she may become septic and die. She expressed her understanding of the cardiac risk with surgery, and the risk of waiting with surgery. She stated that she prefers to have surgery done to prevent risk for recurrence of infection and sepsis. To reduce cardiac risk, will continue aspirin. BP is too low for B-blocker. Statin started. I discussed this with the Surgery Resident.

## 2019-02-05 NOTE — PROGRESS NOTE ADULT - SUBJECTIVE AND OBJECTIVE BOX
Chief Complaint:  Patient is a 73y old  Female who presents with a chief complaint of E coli bacteremia (04 Feb 2019 11:18)      Interval Events:  Pt s/p ERCP yesterday with minimal sludge and normal CBD.    Allergies:  Avelox (Pruritus)  Levaquin (Unknown)      Hospital Medications:  aspirin enteric coated 81 milliGRAM(s) Oral daily  buDESOnide 160 MICROgram(s)/formoterol 4.5 MICROgram(s) Inhaler 2 Puff(s) Inhalation two times a day  dextrose 40% Gel 15 Gram(s) Oral once PRN  dextrose 5% + sodium chloride 0.9%. 1000 milliLiter(s) IV Continuous <Continuous>  dextrose 5%. 1000 milliLiter(s) IV Continuous <Continuous>  dextrose 50% Injectable 12.5 Gram(s) IV Push once  dextrose 50% Injectable 25 Gram(s) IV Push once  dextrose 50% Injectable 25 Gram(s) IV Push once  diltiazem Injectable 10 milliGRAM(s) IV Push every 6 hours PRN  glucagon  Injectable 1 milliGRAM(s) IntraMuscular once PRN  guaiFENesin   Syrup  (Sugar-Free) 100 milliGRAM(s) Oral every 6 hours PRN  heparin  Infusion.  Unit(s)/Hr IV Continuous <Continuous>  heparin  Injectable 2700 Unit(s) IV Push every 6 hours PRN  insulin lispro (HumaLOG) corrective regimen sliding scale   SubCutaneous three times a day before meals  insulin lispro (HumaLOG) corrective regimen sliding scale   SubCutaneous at bedtime  pantoprazole    Tablet 40 milliGRAM(s) Oral before breakfast  piperacillin/tazobactam IVPB. 3.375 Gram(s) IV Intermittent every 8 hours      PMHX/PSHX:  Dementia  Hypertension  Type 2 diabetes mellitus  Atrial fibrillation, chronic  Diabetes mellitus  Asthma  No significant past surgical history  Diabetes Mellitus  HTN (Hypertension)  Asthma  Afib      Family history:  Family history of diabetes mellitus (Mother)  No pertinent family history in first degree relatives      ROS:     General:  No wt loss, fevers, chills, night sweats, fatigue,   Eyes:  Good vision, no reported pain  ENT:  No sore throat, pain, runny nose, dysphagia  CV:  No pain, palpitations, hypo/hypertension  Resp:  No dyspnea, cough, tachypnea, wheezing  GI:  See HPI  :  No pain, bleeding, incontinence, nocturia  Muscle:  No pain, weakness  Neuro:  No weakness, tingling, memory problems  Psych:  No fatigue, insomnia, mood problems, depression  Endocrine:  No polyuria, polydipsia, cold/heat intolerance  Heme:  No petechiae, ecchymosis, easy bruisability  Skin:  No rash, edema      PHYSICAL EXAM:     GENERAL:  Appears stated age, well-groomed, well-nourished, no distress  HEENT:  NC/AT,  conjunctivae clear, sclera -anicteric  CHEST:  Full & symmetric excursion, no increased effort  ABDOMEN:  Soft, non-tender, non-distended  EXTREMITIES:  no cyanosis,clubbing or edema  SKIN:  No rash  NEURO:  Alert, oriented    Vital Signs:  Vital Signs Last 24 Hrs  T(C): 36.3 (05 Feb 2019 04:12), Max: 36.6 (04 Feb 2019 08:46)  T(F): 97.4 (05 Feb 2019 04:12), Max: 97.9 (04 Feb 2019 08:46)  HR: 80 (05 Feb 2019 04:12) (80 - 98)  BP: 100/60 (05 Feb 2019 05:30) (94/57 - 118/63)  BP(mean): --  RR: 18 (05 Feb 2019 04:12) (18 - 18)  SpO2: 100% (05 Feb 2019 04:12) (97% - 100%)  Daily Height in cm: 152.4 (04 Feb 2019 16:16)    Daily     LABS:                        10.6   6.90  )-----------( 189      ( 04 Feb 2019 08:56 )             33.0     02-04    136  |  102  |  <4<L>  ----------------------------<  124<H>  3.3<L>   |  27  |  0.68    Ca    8.5      04 Feb 2019 07:25  Mg     1.9     02-03    TPro  5.9<L>  /  Alb  2.5<L>  /  TBili  1.1  /  DBili  x   /  AST  83<H>  /  ALT  197<H>  /  AlkPhos  235<H>  02-03    LIVER FUNCTIONS - ( 03 Feb 2019 10:17 )  Alb: 2.5 g/dL / Pro: 5.9 g/dL / ALK PHOS: 235 U/L / ALT: 197 U/L / AST: 83 U/L / GGT: x           PTT - ( 04 Feb 2019 07:25 )  PTT:65.1 sec        Imaging:  < from: ERCP (02.04.19 @ 15:59) >    [facility]  ____________________________________________________________________________________________________  Patient Name: Luis Fernando Boyle                    MRN: [id number]  Account Number: [id number]                     YOB: 1945  Room: Endoscopy Room 4                           Gender: Female  Attending MD: Demar Noonan MD                    Procedure Date No Time: 2/4/2019  ____________________________________________________________________________________________________     Procedure:           ERCP  Indications:         Ascending cholangitis with bacteremia.  Providers:           Demar Noonan MD, Axel Dupree MD (Fellow), Marilynn Villarreal (Fellow)  Medicines:           General Anesthesia  Complications:       No immediate complications.  ____________________________________________________________________________________________________  Procedure:           Pre-Anesthesia Assessment:                       - Prior to the procedure, a History and Physical was performed, and patient                        medications and allergies were reviewed. The patient is competent. The risks                        and benefits of the procedure and the sedation options and risks were     discussed with the patient. All questions were answered and informed consent                        was obtained. Patient identification and proposed procedure were verified by                        the physician, the nurse and the anesthesiologist in the endoscopy suite.                        Prophylactic Antibiotics: The patient requires prophylactic antibiotics for                        the planned performance of ERCP in obstructed bile duct. Prior                        Anticoagulants: The patient has taken heparin, last dose was day of                        procedure. ASA Grade Assessment: III - A patient with severe systemic                        disease. After reviewing the risks and benefits, the patient was deemed in                satisfactory condition to undergo the procedure. The anesthesia plan was to                        use general anesthesia. Immediately prior to administration of medications,                        the patient was re-assessed for adequacy to receive sedatives. The heart                        rate, respiratory rate, oxygen saturations, blood pressure, adequacy of                        pulmonary ventilation, and response to care were monitored throughout the                        procedure. The physical status of the patient was re-assessed after the                        procedure.                       After obtaining informed consent, the scope was passed under direct vision.                        Throughout the procedure, the patient's blood pressure, pulse, and oxygen                        saturations were monitored continuously. The Duodenoscope was introduced                        through the mouth, and advanced to the duodenum and used to cannulate the               bile duct. The ERCP was accomplished without difficulty. The patient                        tolerated the procedure well.                                                                                                        Findings:    ERCP:       The  film was normal. The esophagus was successfully intubated under direct vision. The        scope was advanced to the major papilla in the descending duodenum without detailed        examination of the pharynx, larynx and associated structures, and upper GI tract. There was a        periampullary diverticulum distal to the ampulla. The ampulla otherwise appeared normal. The        ampullary orifice was cannulated with the Rx 44 sphincterotome. A small amount of contrast     was injected to determine the anatomy. A small amount of contrast entered the main pancreatic        duct in the pancreatic head and neck, which drained easily. The bile duct was deeply        cannulated with a 44x Hydratome over a 0.035 preloaded wire. Contrast was injected. I        personally interpreted the bile duct images. There was brisk flow of contrast through the        ducts, contrast extended to the intrahepatic ducts. Image quality was overall good. The        cystic duct was patent. The main bile duct was mildly dilated up to 9 mm. The distal portion        of the common bile duct was not well visualized by cholangiogram. A small biliary        sphincterotomy was made with a Hydratome in the usual fashion. There was no        post-sphincterotomy bleeding. The biliary tree was swept with an 8.5mm and then 11.5 mm        balloon starting at the upper third of the main bile duct. Sludge was swept from the duct. An        occlusion cholangiogram was performed and showed no filling defects. Final fluoroscopic        images did not show air in unusual places.                                                                                                        Impression:          - Sludge was found and removed from a mildlydilated common bile duct.                       - Cholelithiasis seen on prior imaging. Patent cystic duct on cholangiogram.  Recommendation:      - Return patient to [facility] for ongoing care.                       - Advance diet as tolerated.                       - Continue antibiotics.                       - Trend CBC, CMP.                       - Hold anticoagulation for 48-72 hours if possible.                       - Consider surgical consultation regarding cholelithiasis.                                                                                              Attending Participation:       I was present and participated during the entire procedure, including non-key portions.                                                                   ______________  Demar Noonan MD  2/4/2019 5:56:19 PM  Number of Addenda: 0    Note Initiated On: 2/4/2019 3:59 PM    < end of copied text >

## 2019-02-05 NOTE — PROGRESS NOTE ADULT - ASSESSMENT
74 yo F, Afib, DM  Here with abdom pain, elevated LFTs, severe sepsis   E coli bacteremia   CT Abdom and ultrasound cwith likely biliary source  Urine Cx negative  Most concerned abt biliary source; elevated lipase noted- gallstone pancreatitis  Afebrile, pain resolved, leukocytosis resolved, LFTs lower, on empiric Zosyn- clinically improved  MRCP results noted  E Coli is a pansensitive organism  S/P ERCP with sludge found  Plan:  Continue zosyn for now, E Coli bacteremia controlled, likely biliary origin  Follow temps, CBC/diff, LFTs  Hemodynamically stable, resolving sespsis/bacteremia  Surgery is involved, ? if it is time for cholecystectomy  ID issues reviewed with family at bedside.

## 2019-02-05 NOTE — PROGRESS NOTE ADULT - SUBJECTIVE AND OBJECTIVE BOX
Patient is a 73y old  Female who presents with a chief complaint of epigastric pain (05 Feb 2019 10:22)                                                               INTERVAL HPI/OVERNIGHT EVENTS:    REVIEW OF SYSTEMS:     CONSTITUTIONAL: No weakness, fevers or chills  EYES/ENT: No visual changes , no ear ache   NECK: No pain or stiffness  RESPIRATORY: No cough, wheezing,  No shortness of breath  CARDIOVASCULAR: No chest pain or palpitations  GASTROINTESTINAL: No abdominal pain  . No nausea, vomiting, or hematemesis; No diarrhea or constipation. No melena or hematochezia.  GENITOURINARY: No dysuria, frequency or hematuria  NEUROLOGICAL: No numbness or weakness  SKIN: No itching, burning, rashes, or lesions                                                                                                                                                                                                                                                                                 Medications:  MEDICATIONS  (STANDING):  aspirin enteric coated 81 milliGRAM(s) Oral daily  atorvastatin 40 milliGRAM(s) Oral at bedtime  buDESOnide 160 MICROgram(s)/formoterol 4.5 MICROgram(s) Inhaler 2 Puff(s) Inhalation two times a day  dextrose 5% + sodium chloride 0.9%. 1000 milliLiter(s) (50 mL/Hr) IV Continuous <Continuous>  dextrose 5%. 1000 milliLiter(s) (50 mL/Hr) IV Continuous <Continuous>  dextrose 50% Injectable 12.5 Gram(s) IV Push once  dextrose 50% Injectable 25 Gram(s) IV Push once  dextrose 50% Injectable 25 Gram(s) IV Push once  insulin lispro (HumaLOG) corrective regimen sliding scale   SubCutaneous three times a day before meals  insulin lispro (HumaLOG) corrective regimen sliding scale   SubCutaneous at bedtime  pantoprazole    Tablet 40 milliGRAM(s) Oral before breakfast  piperacillin/tazobactam IVPB. 3.375 Gram(s) IV Intermittent every 8 hours    MEDICATIONS  (PRN):  dextrose 40% Gel 15 Gram(s) Oral once PRN Blood Glucose LESS THAN 70 milliGRAM(s)/deciliter  diltiazem Injectable 10 milliGRAM(s) IV Push every 6 hours PRN HR >120  glucagon  Injectable 1 milliGRAM(s) IntraMuscular once PRN Glucose LESS THAN 70 milligrams/deciliter  guaiFENesin   Syrup  (Sugar-Free) 100 milliGRAM(s) Oral every 6 hours PRN Cough       Allergies    Avelox (Pruritus)  Levaquin (Unknown)    Intolerances      Vital Signs Last 24 Hrs  T(C): 36.6 (05 Feb 2019 20:02), Max: 36.6 (05 Feb 2019 11:34)  T(F): 97.9 (05 Feb 2019 20:02), Max: 97.9 (05 Feb 2019 20:02)  HR: 91 (05 Feb 2019 20:02) (80 - 95)  BP: 105/66 (05 Feb 2019 20:02) (94/57 - 109/56)  BP(mean): --  RR: 18 (05 Feb 2019 20:02) (18 - 18)  SpO2: 99% (05 Feb 2019 20:02) (97% - 100%)  CAPILLARY BLOOD GLUCOSE      POCT Blood Glucose.: 187 mg/dL (05 Feb 2019 21:06)  POCT Blood Glucose.: 97 mg/dL (05 Feb 2019 16:46)  POCT Blood Glucose.: 151 mg/dL (05 Feb 2019 11:58)  POCT Blood Glucose.: 152 mg/dL (05 Feb 2019 07:22)      02-04 @ 07:01  -  02-05 @ 07:00  --------------------------------------------------------  IN: 1250 mL / OUT: 800 mL / NET: 450 mL    02-05 @ 07:01  -  02-05 @ 22:21  --------------------------------------------------------  IN: 850 mL / OUT: 850 mL / NET: 0 mL      Physical Exam:    Daily     Daily   General:  Well appearing, NAD, not cachetic  HEENT:  Nonicteric, PERRLA  CV:  RRR, S1S2   Lungs:  CTA B/L, no wheezes, rales, rhonchi  Abdomen:  Soft, non-tender, no distended, positive BS  Extremities:  2+ pulses, no c/c, no edema  Skin:  Warm and dry, no rashes  :  No andrade  Neuro:  AAOx3, non-focal, grossly intact                                                                                                                                                                                                                                                                                                LABS:                               9.5    5.32  )-----------( 163      ( 05 Feb 2019 08:46 )             30.4                      02-05    138  |  104  |  <4<L>  ----------------------------<  133<H>  3.3<L>   |  25  |  0.66    Ca    8.2<L>      05 Feb 2019 07:07    TPro  5.4<L>  /  Alb  2.2<L>  /  TBili  0.5  /  DBili  x   /  AST  27  /  ALT  85<H>  /  AlkPhos  175<H>  02-05                       RADIOLOGY & ADDITIONAL TESTS         I personally reviewed: [  ]EKG   [  ]CXR    [  ] CT      A/P:         Discussed with :     Darryl consultants' Notes   Time spent : Patient is a 73y old  Female who presents with a chief complaint of epigastric pain (05 Feb 2019 10:22)                                                               INTERVAL HPI/OVERNIGHT EVENTS:    REVIEW OF SYSTEMS:     CONSTITUTIONAL: No weakness, fevers or chills  RESPIRATORY: No cough, wheezing,  No shortness of breath  CARDIOVASCULAR: No chest pain or palpitations  GASTROINTESTINAL: No abdominal pain  . No nausea, vomiting, or hematemesis; No diarrhea or constipation. No melena or hematochezia.  GENITOURINARY: No dysuria, frequency or hematuria  NEUROLOGICAL: No numbness or weakness                                                                                                                                                                                                                                                                                   Medications:  MEDICATIONS  (STANDING):  aspirin enteric coated 81 milliGRAM(s) Oral daily  atorvastatin 40 milliGRAM(s) Oral at bedtime  buDESOnide 160 MICROgram(s)/formoterol 4.5 MICROgram(s) Inhaler 2 Puff(s) Inhalation two times a day  dextrose 5% + sodium chloride 0.9%. 1000 milliLiter(s) (50 mL/Hr) IV Continuous <Continuous>  dextrose 5%. 1000 milliLiter(s) (50 mL/Hr) IV Continuous <Continuous>  dextrose 50% Injectable 12.5 Gram(s) IV Push once  dextrose 50% Injectable 25 Gram(s) IV Push once  dextrose 50% Injectable 25 Gram(s) IV Push once  insulin lispro (HumaLOG) corrective regimen sliding scale   SubCutaneous three times a day before meals  insulin lispro (HumaLOG) corrective regimen sliding scale   SubCutaneous at bedtime  pantoprazole    Tablet 40 milliGRAM(s) Oral before breakfast  piperacillin/tazobactam IVPB. 3.375 Gram(s) IV Intermittent every 8 hours    MEDICATIONS  (PRN):  dextrose 40% Gel 15 Gram(s) Oral once PRN Blood Glucose LESS THAN 70 milliGRAM(s)/deciliter  diltiazem Injectable 10 milliGRAM(s) IV Push every 6 hours PRN HR >120  glucagon  Injectable 1 milliGRAM(s) IntraMuscular once PRN Glucose LESS THAN 70 milligrams/deciliter  guaiFENesin   Syrup  (Sugar-Free) 100 milliGRAM(s) Oral every 6 hours PRN Cough       Allergies    Avelox (Pruritus)  Levaquin (Unknown)    Intolerances      Vital Signs Last 24 Hrs  T(C): 36.6 (05 Feb 2019 20:02), Max: 36.6 (05 Feb 2019 11:34)  T(F): 97.9 (05 Feb 2019 20:02), Max: 97.9 (05 Feb 2019 20:02)  HR: 91 (05 Feb 2019 20:02) (80 - 95)  BP: 105/66 (05 Feb 2019 20:02) (94/57 - 109/56)  BP(mean): --  RR: 18 (05 Feb 2019 20:02) (18 - 18)  SpO2: 99% (05 Feb 2019 20:02) (97% - 100%)  CAPILLARY BLOOD GLUCOSE      POCT Blood Glucose.: 187 mg/dL (05 Feb 2019 21:06)  POCT Blood Glucose.: 97 mg/dL (05 Feb 2019 16:46)  POCT Blood Glucose.: 151 mg/dL (05 Feb 2019 11:58)  POCT Blood Glucose.: 152 mg/dL (05 Feb 2019 07:22)      02-04 @ 07:01  -  02-05 @ 07:00  --------------------------------------------------------  IN: 1250 mL / OUT: 800 mL / NET: 450 mL    02-05 @ 07:01  -  02-05 @ 22:21  --------------------------------------------------------  IN: 850 mL / OUT: 850 mL / NET: 0 mL      Physical Exam:      General:  NAD  HEENT:  Nonicteric, PERRLA  CV:  RRR, S1S2   Lungs:  CTA  Abdomen:  Soft, non-tender, no distended, positive BS  Extremities:  trace edema  Neuro:  AAOx3, non-focal, grossly intact                                                                                                                                                                                                                                                                                                LABS:                               9.5    5.32  )-----------( 163      ( 05 Feb 2019 08:46 )             30.4                      02-05    138  |  104  |  <4<L>  ----------------------------<  133<H>  3.3<L>   |  25  |  0.66    Ca    8.2<L>      05 Feb 2019 07:07    TPro  5.4<L>  /  Alb  2.2<L>  /  TBili  0.5  /  DBili  x   /  AST  27  /  ALT  85<H>  /  AlkPhos  175<H>  02-05

## 2019-02-05 NOTE — PROGRESS NOTE ADULT - SUBJECTIVE AND OBJECTIVE BOX
CC: f/u for E Coli bacteremia    Patient reports: no abdominal pain, no fever    REVIEW OF SYSTEMS:  All other review of systems negative (Comprehensive ROS)    Antimicrobials Day #  :day 5  piperacillin/tazobactam IVPB. 3.375 Gram(s) IV Intermittent every 8 hours    Other Medications Reviewed    T(F): 97.8 (02-05-19 @ 11:34), Max: 97.9 (02-04-19 @ 20:29)  HR: 90 (02-05-19 @ 11:34)  BP: 109/56 (02-05-19 @ 11:34)  RR: 18 (02-05-19 @ 11:34)  SpO2: 100% (02-05-19 @ 11:34)  Wt(kg): --    PHYSICAL EXAM:  General: alert, no acute distress  Eyes:  anicteric, no conjunctival injection, no discharge  Oropharynx: no lesions or injection 	  Neck: supple, without adenopathy  Lungs: clear to auscultation  Heart: irregular rate and rhythm; no murmur, rubs or gallops  Abdomen: soft, nondistended, nontender, without mass or organomegaly  Skin: no lesions  Extremities: no clubbing, cyanosis, or edema  Neurologic: alert, oriented, moves all extremities    LAB RESULTS:                        9.5    5.32  )-----------( 163      ( 05 Feb 2019 08:46 )             30.4     02-05    138  |  104  |  <4<L>  ----------------------------<  133<H>  3.3<L>   |  25  |  0.66    Ca    8.2<L>      05 Feb 2019 07:07    TPro  5.4<L>  /  Alb  2.2<L>  /  TBili  0.5  /  DBili  x   /  AST  27  /  ALT  85<H>  /  AlkPhos  175<H>  02-05    LIVER FUNCTIONS - ( 05 Feb 2019 07:07 )  Alb: 2.2 g/dL / Pro: 5.4 g/dL / ALK PHOS: 175 U/L / ALT: 85 U/L / AST: 27 U/L / GGT: x             MICROBIOLOGY:  RECENT CULTURES:  02-03 @ 13:56 .Blood Blood-Peripheral     No growth to date.      02-03 @ 13:55 .Blood Blood-Peripheral     No growth to date.      02-01 @ 08:51 .Urine Clean Catch (Midstream)     <10,000 CFU/ml Normal Urogenital speedy present      02-01 @ 08:18 .Blood Blood-Peripheral Blood Culture PCR  Escherichia coli    Growth in aerobic and anaerobic bottles: Escherichia coli  See previous culture [id number]    Growth in aerobic and anaerobic bottles: Gram Negative Rods        RADIOLOGY REVIEWED:  < from: US Abdomen Complete (02.01.19 @ 14:54) >    IMPRESSION:     Normal liver morphology with a coarse calcification/granuloma in the left   lobe.    Cholelithiasis.    < end of copied text >

## 2019-02-05 NOTE — CHART NOTE - NSCHARTNOTEFT_GEN_A_CORE
3F PMH A-fib (on coumadin), HTN, DM II, Asthma, HLD, Celiac and SMA atherosclerosis, initially presented 2/1/19 with abdominal pain found to have elevated LFTs and cholelithiasis s/p ERCP     - LFTs appear to be downtrending, will f/u AM CMP as well as lipase  - cont iv abx  - Patient will need optimization from Cardiology and Medicine prior to any operative planning  - Care per primary team and GI  - Discussed with attending Dr. Danilo Ojeda PGY2  [facility]  [id number]      pt seen and examined.  agree with note above  pt likely will need cholecystectomy - question of timing in light of significant cardiac comorbidities  d/w cardiology and pt/family in detail at bedside.  recommend further cards evaluation to better risk stratify and possible lower perioperative cardiac risk if possible.  will follow with you.

## 2019-02-05 NOTE — CONSULT NOTE ADULT - ASSESSMENT
73F PMH A-fib (on coumadin), HTN, DM II, Asthma, HLD, Celiac and SMA atherosclerosis, initially presented 2/1/19 with abdominal pain found to have elevated LFTs and cholelithiasis    - LFTs appear to be downtrending, will f/u AM CMP as well as lipase  - Patient will need optimization from Cardiology and Medicine prior to any operative planning  - Care per primary team and GI  - Discussed with attending Dr. Danilo Ojeda PGY2  [facility]  [id number]

## 2019-02-05 NOTE — PROGRESS NOTE ADULT - ASSESSMENT
72 yo female with PMH of Atrial fibrillation on coumadin, HTN, DM2, Asthma, HLD,celiac and SMA dz last admission on   mesenteric angiogram now presenting with acute onset of epigastric pain with N but no vomitting  no fever or chills  no diarreah and no uriary sx   CT was done in ED : < from: CT Angio Abdomen and Pelvis w/ IV Cont (02.01.19 @ 04:05) >   No acute intra-abdominal pathology.  No abnormal bowel wall thickening.  Patent mesenteric vasculature, however the SMA is diminutive in caliber   and its origin is likely stenotic, as seen on prior studies.  lactic acid was elevated   pt was thought to have UTI ..pt however developed acute respiratory failure and placed on bipap.. pt became hypotensive and afib with RVR and MICU called...   Pt was evaluated by MICU and thought not to be a candidate for MICU..    At bedside Pt is ill looking , pale but says feels better overall .. off bipap and improved HR and BP   denies cp  SOB palpiations   feels N but no vomitting... no diarreah   labs : significant for leukocytosis.. acute elevation of trop and acute elevation of LFT     1- acute respiratory failure : off bipap  ABG improved ..  2- elevated trop : pt with epgistric pain  and significant risk factors ..  cath consult appreciated    cont heparin and cardiac meds per DR. Tamayo   will  need cath . trop elevation might be sec to demand ischemia however with new wall motion abn..       3- Acute elevation of LFT :  likely multicatorial .. sec to  hypotension,  sepsis and cholangitis   GI consult appreaitaed  MRCP negative for CBD stone       ERCP today   LFT improving   cont abx   f/u with GI and ID   surgery consult     4- septic Shock : sec to E coli bacteremia   improvung   ID input appreciated     5- afib : cont rate control and AC 72 yo female with PMH of Atrial fibrillation on coumadin, HTN, DM2, Asthma, HLD,celiac and SMA dz last admission on   mesenteric angiogram now presenting with acute onset of epigastric pain with N but no vomitting  no fever or chills  no diarreah and no uriary sx   CT was done in ED : < from: CT Angio Abdomen and Pelvis w/ IV Cont (02.01.19 @ 04:05) >   No acute intra-abdominal pathology.  No abnormal bowel wall thickening.  Patent mesenteric vasculature, however the SMA is diminutive in caliber   and its origin is likely stenotic, as seen on prior studies.  lactic acid was elevated   pt was thought to have UTI ..pt however developed acute respiratory failure and placed on bipap.. pt became hypotensive and afib with RVR and MICU called...   Pt was evaluated by MICU and thought not to be a candidate for MICU..    At bedside Pt is ill looking , pale but says feels better overall .. off bipap and improved HR and BP   denies cp  SOB palpiations   feels N but no vomitting... no diarreah   labs : significant for leukocytosis.. acute elevation of trop and acute elevation of LFT     1- acute respiratory failure : off bipap  ABG improved ..  2- elevated trop : pt with epgistric pain  and significant risk factors ..    off  heparin post ERCP    cardiac meds per DR. Tamayo   will  need cath . trop elevation might be sec to demand ischemia however with new wall motion abn..       3- Acute elevation of LFT/ acute pancreatitis  :  likely multifactorial .. sec to  hypotension,  sepsis and cholangitis   GI consult appreaitaed  MRCP negative for CBD stone       ERCP : sludge removed   , cholelithiasis   LFT improving   cont abx   surgery consult appreciated : await cardio clearance     4- septic Shock : sec to E coli bacteremia   improvung   ID input appreciated     5- afib : cont rate control   AC n hold

## 2019-02-05 NOTE — PROGRESS NOTE ADULT - ASSESSMENT
Impression:  1) Likely cholangitis, however clinically improved.  LFTs downtrending and ERCP yesterday with sludge only swept from mildly dilated duct.  S/p sphincterotomy  2) Afib on AC    Recs:  - would hold AC for 48-72 hours if possible given sphincterotomy performed  - continue abx for 7-10 day course total  - diet as tolerated  - appreciate surgery input re: elective cholecystectomy given cholelithiasis on imaging and admission for cholangitis

## 2019-02-05 NOTE — CONSULT NOTE ADULT - SUBJECTIVE AND OBJECTIVE BOX
General Surgery Consult  Consulting surgical team: [facility]  Consulting attending: Dr. Danilo Cuevas    HPI:  73F PMH A-fib (on coumadin), HTN, DM II, Asthma, HLD, Celiac and SMA atherosclerosis, initially presented 2/1/19 with abdominal pain. Patient had CT abd/pelvis in the ED which did not show any acute intra-abdominal pathology. Patient was noted, however, to have elevated LFTS (AST//142), Alk phos 200, as well as Lipase >530 concerning for pancreatitis and choledocholithiasis. Since admission patient has undergone MRCP which showed cholelithiasis without choledocholithiasis. Patient now also s/p ERCP which showed sludge in dilated CBD with redemonstrated on Cholelithiasis.       PAST MEDICAL HISTORY:  Dementia  Hypertension  Type 2 diabetes mellitus  Atrial fibrillation, chronic  Diabetes mellitus  Asthma      PAST SURGICAL HISTORY:  No significant past surgical history  Diabetes Mellitus  HTN (Hypertension)  Asthma  Afib      MEDICATIONS:  aspirin enteric coated 81 milliGRAM(s) Oral daily  buDESOnide 160 MICROgram(s)/formoterol 4.5 MICROgram(s) Inhaler 2 Puff(s) Inhalation two times a day  dextrose 40% Gel 15 Gram(s) Oral once PRN  dextrose 5% + sodium chloride 0.9%. 1000 milliLiter(s) IV Continuous <Continuous>  dextrose 5%. 1000 milliLiter(s) IV Continuous <Continuous>  dextrose 50% Injectable 12.5 Gram(s) IV Push once  dextrose 50% Injectable 25 Gram(s) IV Push once  dextrose 50% Injectable 25 Gram(s) IV Push once  diltiazem Injectable 10 milliGRAM(s) IV Push every 6 hours PRN  glucagon  Injectable 1 milliGRAM(s) IntraMuscular once PRN  guaiFENesin   Syrup  (Sugar-Free) 100 milliGRAM(s) Oral every 6 hours PRN  heparin  Infusion.  Unit(s)/Hr IV Continuous <Continuous>  heparin  Injectable 2700 Unit(s) IV Push every 6 hours PRN  insulin lispro (HumaLOG) corrective regimen sliding scale   SubCutaneous three times a day before meals  insulin lispro (HumaLOG) corrective regimen sliding scale   SubCutaneous at bedtime  pantoprazole    Tablet 40 milliGRAM(s) Oral before breakfast  piperacillin/tazobactam IVPB. 3.375 Gram(s) IV Intermittent every 8 hours      ALLERGIES:  Avelox (Pruritus)  Levaquin (Unknown)      VITALS & I/Os:  Vital Signs Last 24 Hrs  T(C): 36.6 (04 Feb 2019 20:29), Max: 36.9 (04 Feb 2019 04:15)  T(F): 97.9 (04 Feb 2019 20:29), Max: 98.4 (04 Feb 2019 04:15)  HR: 95 (04 Feb 2019 20:29) (90 - 98)  BP: 96/60 (04 Feb 2019 20:29) (96/60 - 118/63)  BP(mean): --  RR: 18 (04 Feb 2019 20:29) (16 - 18)  SpO2: 99% (04 Feb 2019 20:29) (98% - 100%)    I&O's Summary    03 Feb 2019 07:01  -  04 Feb 2019 07:00  --------------------------------------------------------  IN: 1889.5 mL / OUT: 1250 mL / NET: 639.5 mL    04 Feb 2019 07:01  -  05 Feb 2019 00:40  --------------------------------------------------------  IN: 550 mL / OUT: 800 mL / NET: -250 mL        PHYSICAL EXAM:  General: Laying in bed, in no acute distress  Respiratory: Nonlabored  Abdominal: Soft, nondistended, nontender. +TTP in RUQ. No rebound or gaurding  Extremities: Warm    LABS:                        10.6   6.90  )-----------( 189      ( 04 Feb 2019 08:56 )             33.0     02-04    136  |  102  |  <4<L>  ----------------------------<  124<H>  3.3<L>   |  27  |  0.68    Ca    8.5      04 Feb 2019 07:25  Mg     1.9     02-03    TPro  5.9<L>  /  Alb  2.5<L>  /  TBili  1.1  /  DBili  x   /  AST  83<H>  /  ALT  197<H>  /  AlkPhos  235<H>  02-03    Lactate:    PTT - ( 04 Feb 2019 07:25 )  PTT:65.1 sec              IMAGING:  MR MRCP No Cont (02.02.19 @ 22:11)  FINDINGS:    LOWER CHEST: Small bilateral pleural effusions, new from 2/1/2019.    LIVER: Within normal limits.  BILE DUCTS: No extrahepatic biliary ductal dilation. Thecommon bile duct   measures 6 mm.  GALLBLADDER: Cholelithiasis. Trace pericholecystic fluid.  SPLEEN: Within normal limits.  PANCREAS: Within normal limits.  ADRENALS: Within normal limits.  KIDNEYS/URETERS: No hydronephrosis.    VISUALIZED PORTIONS:    BOWEL: Normal caliber.  PERITONEUM: Small perihepatic ascites.  VESSELS: Within normal limits.  RETROPERITONEUM: No lymphadenopathy.    ABDOMINAL WALL: Within normal limits.  BONES: Levoscoliosis of the lumbar spine.    IMPRESSION:   The images are markedly degraded by motion.  Cholelithiasis.  No choledocholithiasis.      ERCP (02.04.19 @ 15:59)  Impression:          - Sludge was found and removed from a mildlydilated common bile duct.                       - Cholelithiasis seen on prior imaging. Patent cystic duct on cholangiogram.  Recommendation:      - [facility].                       - Advance diet as tolerated.                       - Continue antibiotics.                       - Trend CBC, CMP.                       - Hold anticoagulation for 48-72 hours if possible.                       - Consider surgical consultation regarding cholelithiasis.

## 2019-02-06 LAB
ALBUMIN SERPL ELPH-MCNC: 2.5 G/DL — LOW (ref 3.3–5)
ALP SERPL-CCNC: 176 U/L — HIGH (ref 40–120)
ALT FLD-CCNC: 75 U/L — HIGH (ref 10–45)
ANION GAP SERPL CALC-SCNC: 10 MMOL/L — SIGNIFICANT CHANGE UP (ref 5–17)
AST SERPL-CCNC: 25 U/L — SIGNIFICANT CHANGE UP (ref 10–40)
BILIRUB SERPL-MCNC: 0.5 MG/DL — SIGNIFICANT CHANGE UP (ref 0.2–1.2)
BUN SERPL-MCNC: <4 MG/DL — LOW (ref 7–23)
CALCIUM SERPL-MCNC: 8.4 MG/DL — SIGNIFICANT CHANGE UP (ref 8.4–10.5)
CHLORIDE SERPL-SCNC: 105 MMOL/L — SIGNIFICANT CHANGE UP (ref 96–108)
CO2 SERPL-SCNC: 22 MMOL/L — SIGNIFICANT CHANGE UP (ref 22–31)
CREAT SERPL-MCNC: 0.71 MG/DL — SIGNIFICANT CHANGE UP (ref 0.5–1.3)
GLUCOSE BLDC GLUCOMTR-MCNC: 131 MG/DL — HIGH (ref 70–99)
GLUCOSE BLDC GLUCOMTR-MCNC: 137 MG/DL — HIGH (ref 70–99)
GLUCOSE BLDC GLUCOMTR-MCNC: 143 MG/DL — HIGH (ref 70–99)
GLUCOSE BLDC GLUCOMTR-MCNC: 143 MG/DL — HIGH (ref 70–99)
GLUCOSE SERPL-MCNC: 148 MG/DL — HIGH (ref 70–99)
HCT VFR BLD CALC: 35.5 % — SIGNIFICANT CHANGE UP (ref 34.5–45)
HGB BLD-MCNC: 10.7 G/DL — LOW (ref 11.5–15.5)
LIDOCAIN IGE QN: 23 U/L — SIGNIFICANT CHANGE UP (ref 7–60)
MCHC RBC-ENTMCNC: 23.2 PG — LOW (ref 27–34)
MCHC RBC-ENTMCNC: 30.1 GM/DL — LOW (ref 32–36)
MCV RBC AUTO: 76.8 FL — LOW (ref 80–100)
PLATELET # BLD AUTO: 217 K/UL — SIGNIFICANT CHANGE UP (ref 150–400)
POTASSIUM SERPL-MCNC: 4.1 MMOL/L — SIGNIFICANT CHANGE UP (ref 3.5–5.3)
POTASSIUM SERPL-SCNC: 4.1 MMOL/L — SIGNIFICANT CHANGE UP (ref 3.5–5.3)
PROT SERPL-MCNC: 5.7 G/DL — LOW (ref 6–8.3)
RBC # BLD: 4.62 M/UL — SIGNIFICANT CHANGE UP (ref 3.8–5.2)
RBC # FLD: 16 % — HIGH (ref 10.3–14.5)
SODIUM SERPL-SCNC: 137 MMOL/L — SIGNIFICANT CHANGE UP (ref 135–145)
WBC # BLD: 7.07 K/UL — SIGNIFICANT CHANGE UP (ref 3.8–10.5)
WBC # FLD AUTO: 7.07 K/UL — SIGNIFICANT CHANGE UP (ref 3.8–10.5)

## 2019-02-06 RX ADMIN — PIPERACILLIN AND TAZOBACTAM 25 GRAM(S): 4; .5 INJECTION, POWDER, LYOPHILIZED, FOR SOLUTION INTRAVENOUS at 06:02

## 2019-02-06 RX ADMIN — BUDESONIDE AND FORMOTEROL FUMARATE DIHYDRATE 2 PUFF(S): 160; 4.5 AEROSOL RESPIRATORY (INHALATION) at 16:53

## 2019-02-06 RX ADMIN — Medication 81 MILLIGRAM(S): at 12:23

## 2019-02-06 RX ADMIN — PIPERACILLIN AND TAZOBACTAM 25 GRAM(S): 4; .5 INJECTION, POWDER, LYOPHILIZED, FOR SOLUTION INTRAVENOUS at 13:01

## 2019-02-06 RX ADMIN — PIPERACILLIN AND TAZOBACTAM 25 GRAM(S): 4; .5 INJECTION, POWDER, LYOPHILIZED, FOR SOLUTION INTRAVENOUS at 21:00

## 2019-02-06 RX ADMIN — ATORVASTATIN CALCIUM 40 MILLIGRAM(S): 80 TABLET, FILM COATED ORAL at 21:00

## 2019-02-06 RX ADMIN — SODIUM CHLORIDE 50 MILLILITER(S): 9 INJECTION, SOLUTION INTRAVENOUS at 21:01

## 2019-02-06 RX ADMIN — PANTOPRAZOLE SODIUM 40 MILLIGRAM(S): 20 TABLET, DELAYED RELEASE ORAL at 06:02

## 2019-02-06 RX ADMIN — BUDESONIDE AND FORMOTEROL FUMARATE DIHYDRATE 2 PUFF(S): 160; 4.5 AEROSOL RESPIRATORY (INHALATION) at 06:02

## 2019-02-06 NOTE — DIETITIAN INITIAL EVALUATION ADULT. - OTHER INFO
Pt seen for BMI <19 per chart, however in actuality BMI is > 19. Pt has been NPO/clears x 3 days s/p ERCP. Per GI, okay to advance diet to solids per note 2/5, however today pt c/o new swallowing difficulty stating tube placed down throat scratched her throat. Tolerating clear liquids, and pt willing to trial full liquids if medically feasible, however seems hesitant to try solids. Discussed with NP, plan for SLP eval. Pt previously with no chewing or swallowing difficulties. No c/o nausea, vomiting, diarrhea, or constipation. Pt notes her UBW is ~103 lbs and she has not had any recent wt changes. Dosing wt noted as 85 lbs, highly question accuracy. Per chart, pt wt history as follows: 108 lbs (5/2018), 103 lbs (3/2018), and 106 lbs (10/2017), pt wt appears largely stable. Pt reports SMBG at home daily with values at  pre meals, lower at times. Pt able to teach back hypoglycemia treatment protocol. She takes metformin at home, no insulin. Last HgbA1c 6.6%. Pt declines need for nutrition education at this time, trying to sleep.

## 2019-02-06 NOTE — PROGRESS NOTE ADULT - SUBJECTIVE AND OBJECTIVE BOX
Patient is a 73y old  Female who presents with a chief complaint of epigastric pain (2019 15:13)                                                               INTERVAL HPI/OVERNIGHT EVENTS:    REVIEW OF SYSTEMS:     CONSTITUTIONAL: No weakness, fevers or chills  RESPIRATORY: No cough, wheezing,  No shortness of breath  CARDIOVASCULAR: No chest pain or palpitations  GASTROINTESTINAL: No abdominal pain  . No nausea, vomiting, or hematemesis; No diarrhea or constipation. No melena or hematochezia.  GENITOURINARY: No dysuria, frequency or hematuria  NEUROLOGICAL: No numbness or weakness                                                                                                                                                                                                                                                                              Medications:  MEDICATIONS  (STANDING):  aspirin enteric coated 81 milliGRAM(s) Oral daily  atorvastatin 40 milliGRAM(s) Oral at bedtime  buDESOnide 160 MICROgram(s)/formoterol 4.5 MICROgram(s) Inhaler 2 Puff(s) Inhalation two times a day  dextrose 5% + sodium chloride 0.9%. 1000 milliLiter(s) (50 mL/Hr) IV Continuous <Continuous>  dextrose 5%. 1000 milliLiter(s) (50 mL/Hr) IV Continuous <Continuous>  dextrose 50% Injectable 12.5 Gram(s) IV Push once  dextrose 50% Injectable 25 Gram(s) IV Push once  dextrose 50% Injectable 25 Gram(s) IV Push once  insulin lispro (HumaLOG) corrective regimen sliding scale   SubCutaneous three times a day before meals  insulin lispro (HumaLOG) corrective regimen sliding scale   SubCutaneous at bedtime  pantoprazole    Tablet 40 milliGRAM(s) Oral before breakfast  piperacillin/tazobactam IVPB. 3.375 Gram(s) IV Intermittent every 8 hours    MEDICATIONS  (PRN):  dextrose 40% Gel 15 Gram(s) Oral once PRN Blood Glucose LESS THAN 70 milliGRAM(s)/deciliter  diltiazem Injectable 10 milliGRAM(s) IV Push every 6 hours PRN HR >120  glucagon  Injectable 1 milliGRAM(s) IntraMuscular once PRN Glucose LESS THAN 70 milligrams/deciliter  guaiFENesin   Syrup  (Sugar-Free) 100 milliGRAM(s) Oral every 6 hours PRN Cough       Allergies    Avelox (Pruritus)  Levaquin (Unknown)    Intolerances      Vital Signs Last 24 Hrs  T(C): 36.6 (2019 20:01), Max: 36.6 (2019 04:34)  T(F): 97.8 (2019 20:01), Max: 97.9 (2019 04:34)  HR: 75 (2019 20:01) (75 - 89)  BP: 106/59 (2019 20:01) (102/59 - 109/68)  BP(mean): --  RR: 18 (2019 20:01) (18 - 19)  SpO2: 97% (2019 20:01) (97% - 100%)  CAPILLARY BLOOD GLUCOSE      POCT Blood Glucose.: 143 mg/dL (2019 21:12)  POCT Blood Glucose.: 143 mg/dL (2019 16:41)  POCT Blood Glucose.: 137 mg/dL (2019 11:39)  POCT Blood Glucose.: 131 mg/dL (2019 07:26)       @ 07:01  -   @ 07:00  --------------------------------------------------------  IN: 850 mL / OUT: 850 mL / NET: 0 mL     @ 07:01  -  02 @ 23:31  --------------------------------------------------------  IN: 1080 mL / OUT: 1650 mL / NET: -570 mL      Physical Exam:    Daily     Daily Weight in k.7 (2019 12:09)  General:  NAD  HEENT:  Nonicteric, PERRLA  CV:  RRR, S1S2   Lungs:  mild crackles L base  Abdomen:  Soft, non-tender, no distended, positive BS  Extremities:  trace edema  Skin:  Warm and dry, no rashes  :  No andrade  Neuro:  AAOx3, non-focal, grossly intact                                                                                                                                                                                                                                                                                                LABS:                               10.7   7.07  )-----------( 217      ( 2019 15:22 )             35.5                      -    137  |  105  |  <4<L>  ----------------------------<  148<H>  4.1   |  22  |  0.71    Ca    8.4      2019 11:16    TPro  5.7<L>  /  Alb  2.5<L>  /  TBili  0.5  /  DBili  x   /  AST  25  /  ALT  75<H>  /  AlkPhos  176<H>  06

## 2019-02-06 NOTE — DIETITIAN INITIAL EVALUATION ADULT. - ORAL INTAKE PTA
good po intake reported PTA. Pt reports allergy to fish, no celiac disease reported. No vitamin/mineral/herbal supplementation PTA reported./good

## 2019-02-06 NOTE — DIETITIAN INITIAL EVALUATION ADULT. - ENERGY NEEDS
Height: 60 inches, Weight: 103 pounds (stated)  BMI: 20.1 kg/m2 IBW: 100 pounds (+/-10%), %IBW: 103%  Pertinent Info: 1+ edema to R/L feet noted, no pressure injuries noted at this time in nursing flow sheet.  Other pertinent info: Pt is 73yoF with PMH significant for T2DM, Afib on coumadin, HTN, HLD, SMA atherosclerosis, dementia, presenting c/o epigastric pain with nausea no vomiting. Pt s/p ERCP on 2/4 indicating no CBD stone, with sludge, removed. Likely cholangitis, s/p sphincterotomy and pending possible cholecystectomy. Pt also with elevated troponin on admit, will need cardiac cath per chart. Acutely elevated LFTs, improving. Septic shock and fever on admit secondary to E. coli bacteremia, ID following. Low potassium noted on 2/5.

## 2019-02-06 NOTE — DIETITIAN INITIAL EVALUATION ADULT. - ADHERENCE
pt reports no specific therapeutic diet followed, general healthful diet. Pt seems to tired for additional questions regarding usual dietary intake./good

## 2019-02-06 NOTE — DIETITIAN INITIAL EVALUATION ADULT. - NS AS NUTRI INTERV MEALS SNACK
Recommend advance diet to Consistent Carbohydrate full liquids and Consistent Carbohydrate (with evening snacks) as medically feasible. Consider SLP eval for possible texture modified diet, defer to SLP. Pt declines oral nutrition supplements at this time./General/healthful diet

## 2019-02-06 NOTE — PROGRESS NOTE ADULT - SUBJECTIVE AND OBJECTIVE BOX
CARDIOLOGY FOLLOW UP NOTE - DR. RAYO WORKMAN    Patient states no new complaints.    MEDICATIONS  (STANDING):  aspirin enteric coated 81 milliGRAM(s) Oral daily  atorvastatin 40 milliGRAM(s) Oral at bedtime  buDESOnide 160 MICROgram(s)/formoterol 4.5 MICROgram(s) Inhaler 2 Puff(s) Inhalation two times a day  dextrose 5% + sodium chloride 0.9%. 1000 milliLiter(s) (50 mL/Hr) IV Continuous <Continuous>  dextrose 5%. 1000 milliLiter(s) (50 mL/Hr) IV Continuous <Continuous>  dextrose 50% Injectable 12.5 Gram(s) IV Push once  dextrose 50% Injectable 25 Gram(s) IV Push once  dextrose 50% Injectable 25 Gram(s) IV Push once  insulin lispro (HumaLOG) corrective regimen sliding scale   SubCutaneous three times a day before meals  insulin lispro (HumaLOG) corrective regimen sliding scale   SubCutaneous at bedtime  pantoprazole    Tablet 40 milliGRAM(s) Oral before breakfast  piperacillin/tazobactam IVPB. 3.375 Gram(s) IV Intermittent every 8 hours        PHYSICAL EXAM:  Vital Signs Last 24 Hrs  T(C): 36.4 (06 Feb 2019 12:05), Max: 36.6 (05 Feb 2019 20:02)  T(F): 97.6 (06 Feb 2019 12:05), Max: 97.9 (05 Feb 2019 20:02)  HR: 75 (06 Feb 2019 12:05) (75 - 91)  BP: 109/68 (06 Feb 2019 12:05) (102/59 - 109/68)  BP(mean): --  RR: 18 (06 Feb 2019 12:05) (18 - 19)  SpO2: 100% (06 Feb 2019 12:05) (99% - 100%)  I&O's Summary    05 Feb 2019 07:01  -  06 Feb 2019 07:00  --------------------------------------------------------  IN: 850 mL / OUT: 850 mL / NET: 0 mL    06 Feb 2019 07:01  -  06 Feb 2019 15:13  --------------------------------------------------------  IN: 720 mL / OUT: 200 mL / NET: 520 mL        Telemetry:  sinus 80's-110's, brief PAT 2-3 seconds    General: NAD	  HEENT:   No JVD	  Cardiovascular: Regular and tachycardic, Normal S1 and S2, No murmurs/gallops/rubs  Respiratory: Lungs clear to auscultation	  Gastrointestinal:  mild epigastric and RUQ tenderness to palpation, + BS	  Extremities: No clubbing, cyanosis or edema  Vascular: normal peripheral pulses palpable bilaterally    	    LABS:                          9.5    5.32  )-----------( 163      ( 05 Feb 2019 08:46 )             30.4     02-06    137  |  105  |  <4<L>  ----------------------------<  148<H>  4.1   |  22  |  0.71    Ca    8.4      06 Feb 2019 11:16    TPro  5.7<L>  /  Alb  2.5<L>  /  TBili  0.5  /  DBili  x   /  AST  25  /  ALT  75<H>  /  AlkPhos  176<H>  02-06      PTT - ( 05 Feb 2019 08:48 )  PTT:31.2 sec        Assessment and Plan:  72 yo female with PMH of Atrial fibrillation on coumadin, HTN, DM2, Asthma, HLD, celiac and SMA atherosclerosis on last admission with mesenteric angiogram presented with acute cholecystitis and likely gallstone pancreatitis.   1. Elevated troponin - pt with known atherosclerosis. Likely demand ischemia. Troponin trended down. Heparin drip was on for about 48 hours. On aspirin and statin. Not loaded on plavix, since she will likely need a procedure for her acute GI issues. LFTS normalizing. Will plan for cardiac cath tomorrow.  2. PAF - maintaining sinus. Heparin drip off. GI recommend holding AC for 48-72 hours. Was on diltiazem at home. Will resume if BP will allow. Will resume AC prior to discharge.  3. HTN - BP is on the low side off meds. Will resume cardizem or start metoprolol as BP allows.  4. New LV wall motion abnormality - septal hypokinesis. Will treat with meds as above. Will follow, and consider repeating echo prior to discharge. Will consider B-blocker and ACE/ARB. Cardiac cath tomorrow.  5. Pre-operative cardiac evaluation - the patient has had a few episodes of gallstone issues. I discussed the option of surgery with Dr. Cuevas and with the patient and her daughter, Ariana. Her cardiac risk is high. Surgery is elective and can be postpones 3-6 months, per Dr. Cuevas. Will therefore, reduce risk of surgery by addressing coronaries first. Pt and family agree to cath. Timing of surgery dependent on cardiac cath results.

## 2019-02-06 NOTE — PROGRESS NOTE ADULT - ASSESSMENT
74 yo female with PMH of Atrial fibrillation on coumadin, HTN, DM2, Asthma, HLD,celiac and SMA dz last admission on   mesenteric angiogram now presenting with acute onset of epigastric pain with N but no vomitting  no fever or chills  no diarreah and no uriary sx   CT was done in ED : < from: CT Angio Abdomen and Pelvis w/ IV Cont (02.01.19 @ 04:05) >   No acute intra-abdominal pathology.  No abnormal bowel wall thickening.  Patent mesenteric vasculature, however the SMA is diminutive in caliber   and its origin is likely stenotic, as seen on prior studies.  lactic acid was elevated   pt was thought to have UTI ..pt however developed acute respiratory failure and placed on bipap.. pt became hypotensive and afib with RVR and MICU called...   Pt was evaluated by MICU and thought not to be a candidate for MICU..    At bedside Pt is ill looking , pale but says feels better overall .. off bipap and improved HR and BP   denies cp  SOB palpiations   feels N but no vomitting... no diarreah   labs : significant for leukocytosis.. acute elevation of trop and acute elevation of LFT     1- acute respiratory failure : off bipap  ABG improved ..  2- elevated trop : pt with epgistric pain  and significant risk factors ..    off  heparin post ERCP    cardiac meds per DR. Tamayo    . trop elevation might be sec to demand ischemia however with new wall motion abn:  discussed with Dr. Tamayo : will cath tommrw.       3- Acute elevation of LFT/ acute pancreatitis  :  likely multifactorial .. sec to  hypotension,  sepsis and cholangitis   GI consult appreaitaed  MRCP negative for CBD stone       ERCP : sludge removed   , cholelithiasis   LFT improving   cont abx   surgery consult appreciated : await cardio clearance     4- septic Shock : sec to E coli bacteremia   improvung   ID input appreciated     5- afib : cont rate control   AC n hold

## 2019-02-06 NOTE — DIETITIAN INITIAL EVALUATION ADULT. - PHYSICAL APPEARANCE
other (specify)/BMI 20.1 using stated wt of 103 lbs. Nutrition focused physical exam deferred at this time.

## 2019-02-06 NOTE — PROGRESS NOTE ADULT - SUBJECTIVE AND OBJECTIVE BOX
Surgery Progress Note     Subjective/24hour Events:   Patient seen and examined.   No acute events overnight.   Pain well controlled.   No nausea or vomiting.     Vital Signs:  Vital Signs Last 24 Hrs  T(C): 36.4 (06 Feb 2019 12:05), Max: 36.6 (05 Feb 2019 20:02)  T(F): 97.6 (06 Feb 2019 12:05), Max: 97.9 (05 Feb 2019 20:02)  HR: 75 (06 Feb 2019 12:05) (75 - 91)  BP: 109/68 (06 Feb 2019 12:05) (102/59 - 109/68)  BP(mean): --  RR: 18 (06 Feb 2019 12:05) (18 - 19)  SpO2: 100% (06 Feb 2019 12:05) (99% - 100%)    CAPILLARY BLOOD GLUCOSE      POCT Blood Glucose.: 137 mg/dL (06 Feb 2019 11:39)  POCT Blood Glucose.: 131 mg/dL (06 Feb 2019 07:26)  POCT Blood Glucose.: 187 mg/dL (05 Feb 2019 21:06)  POCT Blood Glucose.: 97 mg/dL (05 Feb 2019 16:46)      I&O's Detail    05 Feb 2019 07:01  -  06 Feb 2019 07:00  --------------------------------------------------------  IN:    dextrose 5% + sodium chloride 0.9%.: 600 mL    Oral Fluid: 250 mL  Total IN: 850 mL    OUT:    Voided: 850 mL  Total OUT: 850 mL    Total NET: 0 mL      06 Feb 2019 07:01  -  06 Feb 2019 13:12  --------------------------------------------------------  IN:    Oral Fluid: 720 mL  Total IN: 720 mL    OUT:    Voided: 200 mL  Total OUT: 200 mL    Total NET: 520 mL          MEDICATIONS  (STANDING):  aspirin enteric coated 81 milliGRAM(s) Oral daily  atorvastatin 40 milliGRAM(s) Oral at bedtime  buDESOnide 160 MICROgram(s)/formoterol 4.5 MICROgram(s) Inhaler 2 Puff(s) Inhalation two times a day  dextrose 5% + sodium chloride 0.9%. 1000 milliLiter(s) (50 mL/Hr) IV Continuous <Continuous>  dextrose 5%. 1000 milliLiter(s) (50 mL/Hr) IV Continuous <Continuous>  dextrose 50% Injectable 12.5 Gram(s) IV Push once  dextrose 50% Injectable 25 Gram(s) IV Push once  dextrose 50% Injectable 25 Gram(s) IV Push once  insulin lispro (HumaLOG) corrective regimen sliding scale   SubCutaneous three times a day before meals  insulin lispro (HumaLOG) corrective regimen sliding scale   SubCutaneous at bedtime  pantoprazole    Tablet 40 milliGRAM(s) Oral before breakfast  piperacillin/tazobactam IVPB. 3.375 Gram(s) IV Intermittent every 8 hours    MEDICATIONS  (PRN):  dextrose 40% Gel 15 Gram(s) Oral once PRN Blood Glucose LESS THAN 70 milliGRAM(s)/deciliter  diltiazem Injectable 10 milliGRAM(s) IV Push every 6 hours PRN HR >120  glucagon  Injectable 1 milliGRAM(s) IntraMuscular once PRN Glucose LESS THAN 70 milligrams/deciliter  guaiFENesin   Syrup  (Sugar-Free) 100 milliGRAM(s) Oral every 6 hours PRN Cough      Physical Exam:  Gen: NAD.  Lungs: Non labored breathing.   Ab: Soft, minimally tender, nondistended.   Ext: Moves all 4 spontaneously.     Labs:    02-06    137  |  105  |  <4<L>  ----------------------------<  148<H>  4.1   |  22  |  0.71    Ca    8.4      06 Feb 2019 11:16    TPro  5.7<L>  /  Alb  2.5<L>  /  TBili  0.5  /  DBili  x   /  AST  25  /  ALT  75<H>  /  AlkPhos  176<H>  02-06    LIVER FUNCTIONS - ( 06 Feb 2019 11:16 )  Alb: 2.5 g/dL / Pro: 5.7 g/dL / ALK PHOS: 176 U/L / ALT: 75 U/L / AST: 25 U/L / GGT: x                                 9.5    5.32  )-----------( 163      ( 05 Feb 2019 08:46 )	             30.4     PTT - ( 05 Feb 2019 08:48 )  PTT:31.2 sec

## 2019-02-06 NOTE — PROGRESS NOTE ADULT - ASSESSMENT
73F PMH A-fib (on coumadin), HTN, DM II, Asthma, HLD, Celiac and SMA atherosclerosis, initially presented 2/1/19 with abdominal pain found to have elevated LFTs and cholelithiasis    - F/u Cardiology for possible cath prior to any operative planning  - Continue care per primary team and GI  - Discussed with attending Dr. Danilo Cuevas    Red Team Surgery Pager #[phone]

## 2019-02-07 ENCOUNTER — TRANSCRIPTION ENCOUNTER (OUTPATIENT)
Age: 74
End: 2019-02-07

## 2019-02-07 LAB
ANION GAP SERPL CALC-SCNC: 11 MMOL/L — SIGNIFICANT CHANGE UP (ref 5–17)
APTT BLD: 32.5 SEC — SIGNIFICANT CHANGE UP (ref 27.5–36.3)
BUN SERPL-MCNC: <4 MG/DL — LOW (ref 7–23)
CALCIUM SERPL-MCNC: 8.5 MG/DL — SIGNIFICANT CHANGE UP (ref 8.4–10.5)
CHLORIDE SERPL-SCNC: 104 MMOL/L — SIGNIFICANT CHANGE UP (ref 96–108)
CO2 SERPL-SCNC: 24 MMOL/L — SIGNIFICANT CHANGE UP (ref 22–31)
CREAT SERPL-MCNC: 0.68 MG/DL — SIGNIFICANT CHANGE UP (ref 0.5–1.3)
GLUCOSE BLDC GLUCOMTR-MCNC: 110 MG/DL — HIGH (ref 70–99)
GLUCOSE BLDC GLUCOMTR-MCNC: 122 MG/DL — HIGH (ref 70–99)
GLUCOSE BLDC GLUCOMTR-MCNC: 135 MG/DL — HIGH (ref 70–99)
GLUCOSE BLDC GLUCOMTR-MCNC: 149 MG/DL — HIGH (ref 70–99)
GLUCOSE SERPL-MCNC: 120 MG/DL — HIGH (ref 70–99)
HCT VFR BLD CALC: 33 % — LOW (ref 34.5–45)
HGB BLD-MCNC: 10.4 G/DL — LOW (ref 11.5–15.5)
INR BLD: 1.67 RATIO — HIGH (ref 0.88–1.16)
MCHC RBC-ENTMCNC: 24.3 PG — LOW (ref 27–34)
MCHC RBC-ENTMCNC: 31.5 GM/DL — LOW (ref 32–36)
MCV RBC AUTO: 77.1 FL — LOW (ref 80–100)
PLATELET # BLD AUTO: 215 K/UL — SIGNIFICANT CHANGE UP (ref 150–400)
POTASSIUM SERPL-MCNC: 4.1 MMOL/L — SIGNIFICANT CHANGE UP (ref 3.5–5.3)
POTASSIUM SERPL-SCNC: 4.1 MMOL/L — SIGNIFICANT CHANGE UP (ref 3.5–5.3)
PROTHROM AB SERPL-ACNC: 19.3 SEC — HIGH (ref 10–13.1)
RBC # BLD: 4.28 M/UL — SIGNIFICANT CHANGE UP (ref 3.8–5.2)
RBC # FLD: 15.9 % — HIGH (ref 10.3–14.5)
SODIUM SERPL-SCNC: 139 MMOL/L — SIGNIFICANT CHANGE UP (ref 135–145)
WBC # BLD: 8.32 K/UL — SIGNIFICANT CHANGE UP (ref 3.8–10.5)
WBC # FLD AUTO: 8.32 K/UL — SIGNIFICANT CHANGE UP (ref 3.8–10.5)

## 2019-02-07 PROCEDURE — 99152 MOD SED SAME PHYS/QHP 5/>YRS: CPT | Mod: GC

## 2019-02-07 PROCEDURE — 93454 CORONARY ARTERY ANGIO S&I: CPT | Mod: 26,GC

## 2019-02-07 RX ADMIN — BUDESONIDE AND FORMOTEROL FUMARATE DIHYDRATE 2 PUFF(S): 160; 4.5 AEROSOL RESPIRATORY (INHALATION) at 06:07

## 2019-02-07 RX ADMIN — ATORVASTATIN CALCIUM 40 MILLIGRAM(S): 80 TABLET, FILM COATED ORAL at 21:29

## 2019-02-07 RX ADMIN — Medication 81 MILLIGRAM(S): at 11:32

## 2019-02-07 RX ADMIN — PANTOPRAZOLE SODIUM 40 MILLIGRAM(S): 20 TABLET, DELAYED RELEASE ORAL at 06:07

## 2019-02-07 RX ADMIN — PIPERACILLIN AND TAZOBACTAM 25 GRAM(S): 4; .5 INJECTION, POWDER, LYOPHILIZED, FOR SOLUTION INTRAVENOUS at 06:07

## 2019-02-07 RX ADMIN — BUDESONIDE AND FORMOTEROL FUMARATE DIHYDRATE 2 PUFF(S): 160; 4.5 AEROSOL RESPIRATORY (INHALATION) at 17:50

## 2019-02-07 RX ADMIN — SODIUM CHLORIDE 50 MILLILITER(S): 9 INJECTION, SOLUTION INTRAVENOUS at 17:43

## 2019-02-07 RX ADMIN — PIPERACILLIN AND TAZOBACTAM 25 GRAM(S): 4; .5 INJECTION, POWDER, LYOPHILIZED, FOR SOLUTION INTRAVENOUS at 17:31

## 2019-02-07 NOTE — PROGRESS NOTE ADULT - ASSESSMENT
74 yo F, Afib, DM  Here with abdom pain, elevated LFTs, severe sepsis   E coli bacteremia   CT Abdom and ultrasound cwith likely biliary source  Urine Cx negative  Most concerned abt biliary source; elevated lipase noted- gallstone pancreatitis  Afebrile, pain resolved, leukocytosis resolved, LFTs lower, on  Zosyn- clinically improved  MRCP results noted  E Coli is a pansensitive organism  S/P ERCP with sludge found  S/P cardiac cath today, awaiting final surgical plans  Plan:  Continue zosyn for now, E Coli bacteremia controlled, likely biliary origin  Follow temps, CBC/diff, LFTs  Hemodynamically stable, resolving sespsis/bacteremia  Favor total of 10 days of antibiotics, day 7/10  If   discharge is an option before antibiotics complete she could receive an oral cephalosporin for last few days such as ceftin

## 2019-02-07 NOTE — PHYSICAL THERAPY INITIAL EVALUATION ADULT - ADDITIONAL COMMENTS
pt is a household ambulator mostly , a few steps to enter. pt's family assists her as needed with mobility and ADLs'

## 2019-02-07 NOTE — CHART NOTE - NSCHARTNOTEFT_GEN_A_CORE
PRE-OP NOTE, Surgery    Pre-op Diagnosis: cholelithiasis  Procedure: lap femi, poss open  Surgeon: Dr. Cuevas                          10.4   8.32  )-----------( 215      ( 07 Feb 2019 07:51 )             33.0     02-07    139  |  104  |  <4<L>  ----------------------------<  120<H>  4.1   |  24  |  0.68    Ca    8.5      07 Feb 2019 06:41    TPro  5.7<L>  /  Alb  2.5<L>  /  TBili  0.5  /  DBili  x   /  AST  25  /  ALT  75<H>  /  AlkPhos  176<H>  02-06    PT/INR - ( 07 Feb 2019 07:56 )   PT: 19.3 sec;   INR: 1.67 ratio         PTT - ( 07 Feb 2019 07:56 )  PTT:32.5 sec    CAPILLARY BLOOD GLUCOSE      POCT Blood Glucose.: 110 mg/dL (07 Feb 2019 16:32)  POCT Blood Glucose.: 135 mg/dL (07 Feb 2019 11:38)  POCT Blood Glucose.: 122 mg/dL (07 Feb 2019 07:46)  POCT Blood Glucose.: 143 mg/dL (06 Feb 2019 21:12)    LIVER FUNCTIONS - ( 06 Feb 2019 11:16 )  Alb: 2.5 g/dL / Pro: 5.7 g/dL / ALK PHOS: 176 U/L / ALT: 75 U/L / AST: 25 U/L / GGT: x             Type & Screen: pending  CXR: < from: Xray Chest 1 View AP/PA (02.01.19 @ 04:43) >    IMPRESSION:   Clear lungs.  < end of copied text >    EKG:   < from: 12 Lead ECG (02.01.19 @ 11:55) >    Diagnosis Line NORMAL SINUS RHYTHM  SEPTAL INFARCT , AGE UNDETERMINED  ABNORMAL ECG    < end of copied text >      Cardiac cath performed today WNL        A/P: 73y Female pre-op for above procedure  -Add on for OR tomorrow  -NPO past midnight  - IVF past midnight  - consent: pending  - am labs  - optimized per [employer]    (p) [phone]

## 2019-02-07 NOTE — DISCHARGE NOTE ADULT - MEDICATION SUMMARY - MEDICATIONS TO CHANGE
I will SWITCH the dose or number of times a day I take the medications listed below when I get home from the hospital:    dilTIAZem 240 mg/24 hours oral capsule, extended release  -- 1 cap(s) by mouth once a day

## 2019-02-07 NOTE — DISCHARGE NOTE ADULT - HOSPITAL COURSE
74 yo female with PMH of Atrial fibrillation on coumadin, HTN, DM2, Asthma, HLD,celiac and SMA dz last admission on   mesenteric angiogram now presenting with acute onset of epigastric pain pt was thought to have UTI ..pt however developed acute respiratory failure and placed on bipap.. pt became hypotensive and afib with RVR and Pt was evaluated by MICU and thought not to be a candidate, status improved symptoms releated to septic Shock : sec to E coli bacteremia seen by ID, tx with IV abx..repeat culture negative . Also had elevated trop, s/p cath : NL coronaries  trop elevation might be sec to demand ischemia pt with epgistric pain  and significant risk factors Acute elevation of LFT/ acute pancreatitis  :  likely multifactorial .. sec to  hypotension,  sepsis and cholangitis GI consult MRCP negative for CBD stone  ERCP : sludge removed   , cholelithiasis s/p lap femi LFT improving. Now stable for D/C to rehab with out pt follow up

## 2019-02-07 NOTE — DISCHARGE NOTE ADULT - PLAN OF CARE
Symptom resolution Follow up with your primary care provider Make sure you get your HgA1c checked every three months.  If you take oral diabetes medications, check your blood glucose two times a day.  If you take insulin, check your blood glucose before meals and at bedtime.  It's important not to skip any meals.  Keep a log of your blood glucose results and always take it with you to your doctor appointments.  Keep a list of your current medications including injectables and over the counter medications and bring this medication list with you to all your doctor appointments.  If you have not seen your opthalmologist this year call for appointment.  Check your feet daily for redness, sores, or openings. Do not self treat. If no improvement in two days call your primary care physician for an appointment.  Low blood sugar (hypoglycemia) is a blood sugar below 70mg/dl. Check your blood sugar if you feel signs/symptoms of hypoglycemia. If your blood sugar is below 70 take 15 grams of carbohydrates (ex 4 oz of apple juice, 3-4 glucosr tablets, or 4-6 oz of regular soda) wait 15 minutes and repeat blood sugar to make sure it comes up above 70.  If your blood sugar is above 70 and you are due for a meal, have a meal.  If you are not due for a meal have a snack.  This snack helps keeps your blood sugar at a safe range. Atrial fibrillation is the most common heart rhythm problem & has the risk of stroke & heart attack  It helps if you control your blood pressure, not drink more than 1-2 alcohol drinks per day, cut down on caffeine, getting treatment for over active thyroid gland, & getting exercise  Call your doctor if you feel your heart racing or beating unusually, chest tightness or pain, lightheaded, faint, shortness of breath especially with exercise  It is important to take your heart medication as prescribed  You may be on anticoagulation which is very important to take as directed - you may need blood work to monitor drug levels

## 2019-02-07 NOTE — PROGRESS NOTE ADULT - SUBJECTIVE AND OBJECTIVE BOX
CC: f/u for cholecystitis and bacteremia    Patient reports: no abdominal pain, she is s/p cardiac cath    REVIEW OF SYSTEMS:  All other review of systems negative (Comprehensive ROS)    Antimicrobials Day #  :day 7  piperacillin/tazobactam IVPB. 3.375 Gram(s) IV Intermittent every 8 hours    Other Medications Reviewed    T(F): 98.1 (02-07-19 @ 11:34), Max: 98.1 (02-07-19 @ 00:38)  HR: 121 (02-07-19 @ 11:34)  BP: 103/57 (02-07-19 @ 11:34)  RR: 18 (02-07-19 @ 11:34)  SpO2: 100% (02-07-19 @ 11:34)  Wt(kg): --    PHYSICAL EXAM:  General: alert, no acute distress  Eyes:  anicteric, no conjunctival injection, no discharge  Oropharynx: no lesions or injection 	  Neck: supple, without adenopathy  Lungs: clear to auscultation  Heart: irregular rate and rhythm; no murmur, rubs or gallops  Abdomen: soft, nondistended, nontender, without mass or organomegaly  Skin: no lesions  Extremities: no clubbing, cyanosis, or edema  Neurologic: alert, oriented, moves all extremities    LAB RESULTS:                        10.4   8.32  )-----------( 215      ( 07 Feb 2019 07:51 )             33.0     02-07    139  |  104  |  <4<L>  ----------------------------<  120<H>  4.1   |  24  |  0.68    Ca    8.5      07 Feb 2019 06:41    TPro  5.7<L>  /  Alb  2.5<L>  /  TBili  0.5  /  DBili  x   /  AST  25  /  ALT  75<H>  /  AlkPhos  176<H>  02-06    LIVER FUNCTIONS - ( 06 Feb 2019 11:16 )  Alb: 2.5 g/dL / Pro: 5.7 g/dL / ALK PHOS: 176 U/L / ALT: 75 U/L / AST: 25 U/L / GGT: x             MICROBIOLOGY:  RECENT CULTURES:  02-03 @ 13:56 .Blood Blood-Peripheral     No growth to date.      02-03 @ 13:55 .Blood Blood-Peripheral     No growth to date.          RADIOLOGY REVIEWED:

## 2019-02-07 NOTE — PHYSICAL THERAPY INITIAL EVALUATION ADULT - PERTINENT HX OF CURRENT PROBLEM, REHAB EVAL
Pt is a 74yo F with hx of dementia presents to [facility] with abd pain nausea and vomiting.  In the ED pt place on BIpap for hypercarbic resp failure and also afib.  pt admitted with septic shock, ERCP showed cholelithiases.

## 2019-02-07 NOTE — DISCHARGE NOTE ADULT - MEDICATION SUMMARY - MEDICATIONS TO TAKE
I will START or STAY ON the medications listed below when I get home from the hospital:    acetaminophen 325 mg oral tablet  -- 2 tab(s) by mouth every 6 hours, As needed, Mild Pain (1 - 3)  -- Indication: For pain    aspirin 81 mg oral delayed release tablet  -- 1 tab(s) by mouth once a day  -- Indication: For CAD    oxyCODONE 5 mg oral tablet  -- 1 tab(s) by mouth every 4 hours, As needed, Severe Pain (7 - 10) MDD:6 tab  -- Indication: For pain    Cardizem  mg/24 hours oral capsule, extended release  -- 1 cap(s) by mouth once a day   -- It is very important that you take or use this exactly as directed.  Do not skip doses or discontinue unless directed by your doctor.  Some non-prescription drugs may aggravate your condition.  Read all labels carefully.  If a warning appears, check with your doctor before taking.  Swallow whole.  Do not crush.    -- Indication: For HTN    warfarin 3 mg oral tablet  -- 1 tab(s) by mouth once a day  Note: held per PCP due to elevated INR  -- Indication: For a fib     metFORMIN 1000 mg oral tablet  -- 1 tab(s) by mouth once a day as needed for elevated blood sugar levels  -- Indication: For diabetes    atorvastatin 40 mg oral tablet  -- 1 tab(s) by mouth once a day (at bedtime)  -- Indication: For HLD    Advair Diskus 250 mcg-50 mcg inhalation powder  -- 1 puff(s) inhaled 2 times a day  -- Indication: For SOB    guaiFENesin 100 mg/5 mL oral liquid  -- 5 milliliter(s) by mouth every 6 hours, As needed, Cough  -- Indication: For cough    NexIUM OTC 20 mg oral delayed release capsule  -- 1 cap(s) by mouth once a day (in the morning)  -- Indication: For dyspepsia

## 2019-02-07 NOTE — PHYSICAL THERAPY INITIAL EVALUATION ADULT - PLANNED THERAPY INTERVENTIONS, PT EVAL
bed mobility training/GOAL: Pt will perform 3 stairs with U HR as needed within 3-4weeks./gait training/transfer training

## 2019-02-07 NOTE — PROGRESS NOTE ADULT - ASSESSMENT
73F PMH A-fib (on coumadin), HTN, DM II, Asthma, HLD, Celiac and SMA atherosclerosis, initially presented 2/1/19 with abdominal pain found to have elevated LFTs and cholelithiasis    - f/u possible cardiac cath prior to any operative planning  - Continue care per primary team and GI

## 2019-02-07 NOTE — PROGRESS NOTE ADULT - SUBJECTIVE AND OBJECTIVE BOX
Patient is a 73y old  Female who presents with a chief complaint of cholecystitis (07 Feb 2019 16:52)                                                               INTERVAL HPI/OVERNIGHT EVENTS:    REVIEW OF SYSTEMS:     CONSTITUTIONAL: No weakness, fevers or chills  RESPIRATORY: No cough, wheezing,  No shortness of breath  CARDIOVASCULAR: No chest pain or palpitations  GASTROINTESTINAL: No abdominal pain  . No nausea, vomiting, or hematemesis; No diarrhea or constipation. No melena or hematochezia.  GENITOURINARY: No dysuria, frequency or hematuria  NEUROLOGICAL: No numbness or weakness                                                                                                                                                                                                                                                                           Medications:  MEDICATIONS  (STANDING):  aspirin enteric coated 81 milliGRAM(s) Oral daily  atorvastatin 40 milliGRAM(s) Oral at bedtime  buDESOnide 160 MICROgram(s)/formoterol 4.5 MICROgram(s) Inhaler 2 Puff(s) Inhalation two times a day  dextrose 5% + sodium chloride 0.9%. 1000 milliLiter(s) (50 mL/Hr) IV Continuous <Continuous>  dextrose 5%. 1000 milliLiter(s) (50 mL/Hr) IV Continuous <Continuous>  dextrose 50% Injectable 12.5 Gram(s) IV Push once  dextrose 50% Injectable 25 Gram(s) IV Push once  dextrose 50% Injectable 25 Gram(s) IV Push once  diltiazem    Tablet 30 milliGRAM(s) Oral every 6 hours  insulin lispro (HumaLOG) corrective regimen sliding scale   SubCutaneous three times a day before meals  insulin lispro (HumaLOG) corrective regimen sliding scale   SubCutaneous at bedtime  pantoprazole    Tablet 40 milliGRAM(s) Oral before breakfast  piperacillin/tazobactam IVPB. 3.375 Gram(s) IV Intermittent every 8 hours    MEDICATIONS  (PRN):  dextrose 40% Gel 15 Gram(s) Oral once PRN Blood Glucose LESS THAN 70 milliGRAM(s)/deciliter  glucagon  Injectable 1 milliGRAM(s) IntraMuscular once PRN Glucose LESS THAN 70 milligrams/deciliter  guaiFENesin   Syrup  (Sugar-Free) 100 milliGRAM(s) Oral every 6 hours PRN Cough       Allergies    Avelox (Pruritus)  Levaquin (Unknown)    Intolerances      Vital Signs Last 24 Hrs  T(C): 36.8 (07 Feb 2019 20:16), Max: 36.8 (07 Feb 2019 20:16)  T(F): 98.2 (07 Feb 2019 20:16), Max: 98.2 (07 Feb 2019 20:16)  HR: 120 (07 Feb 2019 20:16) (88 - 121)  BP: 112/55 (07 Feb 2019 20:16) (100/56 - 136/74)  BP(mean): --  RR: 19 (07 Feb 2019 20:16) (18 - 19)  SpO2: 100% (07 Feb 2019 20:16) (99% - 100%)  CAPILLARY BLOOD GLUCOSE      POCT Blood Glucose.: 149 mg/dL (07 Feb 2019 21:27)  POCT Blood Glucose.: 110 mg/dL (07 Feb 2019 16:32)  POCT Blood Glucose.: 135 mg/dL (07 Feb 2019 11:38)  POCT Blood Glucose.: 122 mg/dL (07 Feb 2019 07:46)      02-06 @ 07:01  -  02-07 @ 07:00  --------------------------------------------------------  IN: 1780 mL / OUT: 2250 mL / NET: -470 mL    02-07 @ 07:01  -  02-07 @ 22:11  --------------------------------------------------------  IN: 1505 mL / OUT: 700 mL / NET: 805 mL      Physical Exam:    General:  cachectic   HEENT:  Nonicteric, PERRLA  CV:  RRR, S1S2   Lungs:  CTA B/L  Abdomen:  Soft, non-tender, no distended, positive BS  Extremities:   no edema    Neuro:  AAOx3, non-focal, grossly intact                                                                                                                                                                                                                                                                                                LABS:                               10.4   8.32  )-----------( 215      ( 07 Feb 2019 07:51 )             33.0                      02-07    139  |  104  |  <4<L>  ----------------------------<  120<H>  4.1   |  24  |  0.68    Ca    8.5      07 Feb 2019 06:41    TPro  5.7<L>  /  Alb  2.5<L>  /  TBili  0.5  /  DBili  x   /  AST  25  /  ALT  75<H>  /  AlkPhos  176<H>  02-06                       R

## 2019-02-07 NOTE — PROGRESS NOTE ADULT - SUBJECTIVE AND OBJECTIVE BOX
Surgery Progress Note      Subjective: Patient seen and examined. No acute events overnight. Denies RUQ pain this AM    T(C): 36.7 (02-07-19 @ 11:34), Max: 36.7 (02-07-19 @ 00:38)  HR: 121 (02-07-19 @ 11:34) (75 - 121)  BP: 103/57 (02-07-19 @ 11:34) (100/56 - 136/74)  RR: 18 (02-07-19 @ 11:34) (18 - 18)  SpO2: 100% (02-07-19 @ 11:34) (97% - 100%)      02-06-19 @ 07:01  -  02-07-19 @ 07:00  --------------------------------------------------------  IN: 1780 mL / OUT: 2250 mL / NET: -470 mL    02-07-19 @ 07:01  -  02-07-19 @ 15:31  --------------------------------------------------------  IN: 285 mL / OUT: 0 mL / NET: 285 mL        Physical Exam:   General: NAD  Abdomen: soft, not tender, not distended    Labs:                            10.4   8.32  )-----------( 215      ( 07 Feb 2019 07:51 )             33.0     02-07    139  |  104  |  <4<L>  ----------------------------<  120<H>  4.1   |  24  |  0.68    Ca    8.5      07 Feb 2019 06:41    TPro  5.7<L>  /  Alb  2.5<L>  /  TBili  0.5  /  DBili  x   /  AST  25  /  ALT  75<H>  /  AlkPhos  176<H>  02-06    Medications:     aspirin enteric coated 81 milliGRAM(s) Oral daily  atorvastatin 40 milliGRAM(s) Oral at bedtime  buDESOnide 160 MICROgram(s)/formoterol 4.5 MICROgram(s) Inhaler 2 Puff(s) Inhalation two times a day  dextrose 40% Gel 15 Gram(s) Oral once PRN  dextrose 5% + sodium chloride 0.9%. 1000 milliLiter(s) IV Continuous <Continuous>  dextrose 5%. 1000 milliLiter(s) IV Continuous <Continuous>  dextrose 50% Injectable 12.5 Gram(s) IV Push once  dextrose 50% Injectable 25 Gram(s) IV Push once  dextrose 50% Injectable 25 Gram(s) IV Push once  diltiazem Injectable 10 milliGRAM(s) IV Push every 6 hours PRN  glucagon  Injectable 1 milliGRAM(s) IntraMuscular once PRN  guaiFENesin   Syrup  (Sugar-Free) 100 milliGRAM(s) Oral every 6 hours PRN  insulin lispro (HumaLOG) corrective regimen sliding scale   SubCutaneous three times a day before meals  insulin lispro (HumaLOG) corrective regimen sliding scale   SubCutaneous at bedtime  pantoprazole    Tablet 40 milliGRAM(s) Oral before breakfast  piperacillin/tazobactam IVPB. 3.375 Gram(s) IV Intermittent every 8 hours      Radiographs: No new imaging

## 2019-02-07 NOTE — DISCHARGE NOTE ADULT - MEDICATION SUMMARY - MEDICATIONS TO STOP TAKING
I will STOP taking the medications listed below when I get home from the hospital:    lisinopril 5 mg oral tablet  -- 1 tab(s) by mouth once a day    furosemide 20 mg oral tablet  -- 1 tab(s) by mouth once a day

## 2019-02-07 NOTE — DISCHARGE NOTE ADULT - HOME CARE AGENCY
A nurse will visit you in your home from [facility].  They will contact you to confirm their visit.  Call [phone] or [phone] with any questions.

## 2019-02-07 NOTE — DISCHARGE NOTE ADULT - CARE PLAN
Principal Discharge DX:	Septic shock  Goal:	Symptom resolution  Assessment and plan of treatment:	Follow up with your primary care provider  Secondary Diagnosis:	Type 2 diabetes mellitus  Assessment and plan of treatment:	Make sure you get your HgA1c checked every three months.  If you take oral diabetes medications, check your blood glucose two times a day.  If you take insulin, check your blood glucose before meals and at bedtime.  It's important not to skip any meals.  Keep a log of your blood glucose results and always take it with you to your doctor appointments.  Keep a list of your current medications including injectables and over the counter medications and bring this medication list with you to all your doctor appointments.  If you have not seen your opthalmologist this year call for appointment.  Check your feet daily for redness, sores, or openings. Do not self treat. If no improvement in two days call your primary care physician for an appointment.  Low blood sugar (hypoglycemia) is a blood sugar below 70mg/dl. Check your blood sugar if you feel signs/symptoms of hypoglycemia. If your blood sugar is below 70 take 15 grams of carbohydrates (ex 4 oz of apple juice, 3-4 glucosr tablets, or 4-6 oz of regular soda) wait 15 minutes and repeat blood sugar to make sure it comes up above 70.  If your blood sugar is above 70 and you are due for a meal, have a meal.  If you are not due for a meal have a snack.  This snack helps keeps your blood sugar at a safe range. Principal Discharge DX:	Septic shock  Goal:	Symptom resolution  Assessment and plan of treatment:	Follow up with your primary care provider  Secondary Diagnosis:	Type 2 diabetes mellitus  Assessment and plan of treatment:	Make sure you get your HgA1c checked every three months.  If you take oral diabetes medications, check your blood glucose two times a day.  If you take insulin, check your blood glucose before meals and at bedtime.  It's important not to skip any meals.  Keep a log of your blood glucose results and always take it with you to your doctor appointments.  Keep a list of your current medications including injectables and over the counter medications and bring this medication list with you to all your doctor appointments.  If you have not seen your opthalmologist this year call for appointment.  Check your feet daily for redness, sores, or openings. Do not self treat. If no improvement in two days call your primary care physician for an appointment.  Low blood sugar (hypoglycemia) is a blood sugar below 70mg/dl. Check your blood sugar if you feel signs/symptoms of hypoglycemia. If your blood sugar is below 70 take 15 grams of carbohydrates (ex 4 oz of apple juice, 3-4 glucosr tablets, or 4-6 oz of regular soda) wait 15 minutes and repeat blood sugar to make sure it comes up above 70.  If your blood sugar is above 70 and you are due for a meal, have a meal.  If you are not due for a meal have a snack.  This snack helps keeps your blood sugar at a safe range.  Secondary Diagnosis:	Atrial fibrillation, chronic  Assessment and plan of treatment:	Atrial fibrillation is the most common heart rhythm problem & has the risk of stroke & heart attack  It helps if you control your blood pressure, not drink more than 1-2 alcohol drinks per day, cut down on caffeine, getting treatment for over active thyroid gland, & getting exercise  Call your doctor if you feel your heart racing or beating unusually, chest tightness or pain, lightheaded, faint, shortness of breath especially with exercise  It is important to take your heart medication as prescribed  You may be on anticoagulation which is very important to take as directed - you may need blood work to monitor drug levels

## 2019-02-07 NOTE — PROGRESS NOTE ADULT - ASSESSMENT
72 yo female with PMH of Atrial fibrillation on coumadin, HTN, DM2, Asthma, HLD,celiac and SMA dz last admission on   mesenteric angiogram now presenting with acute onset of epigastric pain with N but no vomitting  no fever or chills  no diarreah and no uriary sx   CT was done in ED : < from: CT Angio Abdomen and Pelvis w/ IV Cont (02.01.19 @ 04:05) >   No acute intra-abdominal pathology.  No abnormal bowel wall thickening.  Patent mesenteric vasculature, however the SMA is diminutive in caliber   and its origin is likely stenotic, as seen on prior studies.  lactic acid was elevated   pt was thought to have UTI ..pt however developed acute respiratory failure and placed on bipap.. pt became hypotensive and afib with RVR and MICU called...   Pt was evaluated by MICU and thought not to be a candidate for MICU..    At bedside Pt is ill looking , pale but says feels better overall .. off bipap and improved HR and BP   denies cp  SOB palpiations   feels N but no vomitting... no diarreah   labs : significant for leukocytosis.. acute elevation of trop and acute elevation of LFT     1- acute respiratory failure : off bipap  ABG improved ..  2- elevated trop : pt with epgistric pain  and significant risk factors ..  off  heparin post ERCP    cardiac meds per DR. Tamayo    . trop elevation might be sec to demand ischemia   cath : NL coronaries     3- Acute elevation of LFT/ acute pancreatitis  :  likely multifactorial .. sec to  hypotension,  sepsis and cholangitis   GI consult appreaitaed  MRCP negative for CBD stone       ERCP : sludge removed   , cholelithiasis   LFT improving   cont abx   surgery consult appreciated ... pt is medically optimized for surgery     4- septic Shock : sec to E coli bacteremia   improvung   ID input appreciated     5- afib : cont rate control: restart PO cardizem for better control  hold AC for surgery

## 2019-02-07 NOTE — DISCHARGE NOTE ADULT - CARE PROVIDER_API CALL
Omid Adams (MD)  Family Medicine  [address], [address] [place] [address]  Phone: [phone]  Fax: [phone]  Follow Up Time:

## 2019-02-07 NOTE — CHART NOTE - NSCHARTNOTEFT_GEN_A_CORE
Preop Dx: cholelithiasis  Surgeon: Dr. Danilo Cuevas  Procedure: Laparoscopic cholecystectomy    Vital Signs Last 24 Hrs  T(C): 36.7 (07 Feb 2019 17:25), Max: 36.7 (07 Feb 2019 00:38)  T(F): 98.1 (07 Feb 2019 17:25), Max: 98.1 (07 Feb 2019 00:38)  HR: 117 (07 Feb 2019 17:25) (75 - 121)  BP: 106/58 (07 Feb 2019 17:41) (100/56 - 136/74)  BP(mean): --  RR: 18 (07 Feb 2019 17:41) (18 - 18)  SpO2: 100% (07 Feb 2019 17:41) (97% - 100%)                        10.4   8.32  )-----------( 215      ( 07 Feb 2019 07:51 )             33.0     02-07    139  |  104  |  <4<L>  ----------------------------<  120<H>  4.1   |  24  |  0.68    Ca    8.5      07 Feb 2019 06:41    TPro  5.7<L>  /  Alb  2.5<L>  /  TBili  0.5  /  DBili  x   /  AST  25  /  ALT  75<H>  /  AlkPhos  176<H>  02-06    PT/INR - ( 07 Feb 2019 07:56 )   PT: 19.3 sec;   INR: 1.67 ratio         PTT - ( 07 Feb 2019 07:56 )  PTT:32.5 sec  Daily     Daily     EKG:  CXR:   Type and Screen:         A/P: 73y Female     - OR 2/8/2019 for laparoscopic cholecystectomy   - NPO past midnight, except medications  - IVF while NPO  - Consent signed and in chart  - Medical clearance for OR

## 2019-02-08 ENCOUNTER — RESULT REVIEW (OUTPATIENT)
Age: 74
End: 2019-02-08

## 2019-02-08 LAB
ALBUMIN SERPL ELPH-MCNC: 2.3 G/DL — LOW (ref 3.3–5)
ALP SERPL-CCNC: 146 U/L — HIGH (ref 40–120)
ALT FLD-CCNC: 35 U/L — SIGNIFICANT CHANGE UP (ref 10–45)
ANION GAP SERPL CALC-SCNC: 10 MMOL/L — SIGNIFICANT CHANGE UP (ref 5–17)
ANION GAP SERPL CALC-SCNC: 10 MMOL/L — SIGNIFICANT CHANGE UP (ref 5–17)
APTT BLD: 29.8 SEC — SIGNIFICANT CHANGE UP (ref 27.5–36.3)
AST SERPL-CCNC: 12 U/L — SIGNIFICANT CHANGE UP (ref 10–40)
BILIRUB SERPL-MCNC: 0.5 MG/DL — SIGNIFICANT CHANGE UP (ref 0.2–1.2)
BLD GP AB SCN SERPL QL: NEGATIVE — SIGNIFICANT CHANGE UP
BUN SERPL-MCNC: <4 MG/DL — LOW (ref 7–23)
BUN SERPL-MCNC: <4 MG/DL — LOW (ref 7–23)
CALCIUM SERPL-MCNC: 7.1 MG/DL — LOW (ref 8.4–10.5)
CALCIUM SERPL-MCNC: 8.2 MG/DL — LOW (ref 8.4–10.5)
CHLORIDE SERPL-SCNC: 100 MMOL/L — SIGNIFICANT CHANGE UP (ref 96–108)
CHLORIDE SERPL-SCNC: 104 MMOL/L — SIGNIFICANT CHANGE UP (ref 96–108)
CO2 SERPL-SCNC: 22 MMOL/L — SIGNIFICANT CHANGE UP (ref 22–31)
CO2 SERPL-SCNC: 25 MMOL/L — SIGNIFICANT CHANGE UP (ref 22–31)
CREAT SERPL-MCNC: 0.62 MG/DL — SIGNIFICANT CHANGE UP (ref 0.5–1.3)
CREAT SERPL-MCNC: 0.68 MG/DL — SIGNIFICANT CHANGE UP (ref 0.5–1.3)
CULTURE RESULTS: SIGNIFICANT CHANGE UP
CULTURE RESULTS: SIGNIFICANT CHANGE UP
GLUCOSE BLDC GLUCOMTR-MCNC: 148 MG/DL — HIGH (ref 70–99)
GLUCOSE BLDC GLUCOMTR-MCNC: 153 MG/DL — HIGH (ref 70–99)
GLUCOSE BLDC GLUCOMTR-MCNC: 169 MG/DL — HIGH (ref 70–99)
GLUCOSE SERPL-MCNC: 151 MG/DL — HIGH (ref 70–99)
GLUCOSE SERPL-MCNC: 592 MG/DL — CRITICAL HIGH (ref 70–99)
HCT VFR BLD CALC: 30.3 % — LOW (ref 34.5–45)
HGB BLD-MCNC: 10.4 G/DL — LOW (ref 11.5–15.5)
INR BLD: 1.54 RATIO — HIGH (ref 0.88–1.16)
MAGNESIUM SERPL-MCNC: 1.5 MG/DL — LOW (ref 1.6–2.6)
MAGNESIUM SERPL-MCNC: 1.6 MG/DL — SIGNIFICANT CHANGE UP (ref 1.6–2.6)
MCHC RBC-ENTMCNC: 25.7 PG — LOW (ref 27–34)
MCHC RBC-ENTMCNC: 34.2 GM/DL — SIGNIFICANT CHANGE UP (ref 32–36)
MCV RBC AUTO: 75.3 FL — LOW (ref 80–100)
PHOSPHATE SERPL-MCNC: 3.4 MG/DL — SIGNIFICANT CHANGE UP (ref 2.5–4.5)
PLATELET # BLD AUTO: 194 K/UL — SIGNIFICANT CHANGE UP (ref 150–400)
POTASSIUM SERPL-MCNC: 3.7 MMOL/L — SIGNIFICANT CHANGE UP (ref 3.5–5.3)
POTASSIUM SERPL-MCNC: 3.8 MMOL/L — SIGNIFICANT CHANGE UP (ref 3.5–5.3)
POTASSIUM SERPL-SCNC: 3.7 MMOL/L — SIGNIFICANT CHANGE UP (ref 3.5–5.3)
POTASSIUM SERPL-SCNC: 3.8 MMOL/L — SIGNIFICANT CHANGE UP (ref 3.5–5.3)
PROT SERPL-MCNC: 5.4 G/DL — LOW (ref 6–8.3)
PROTHROM AB SERPL-ACNC: 17.8 SEC — HIGH (ref 10–12.9)
RBC # BLD: 4.03 M/UL — SIGNIFICANT CHANGE UP (ref 3.8–5.2)
RBC # FLD: 14 % — SIGNIFICANT CHANGE UP (ref 10.3–14.5)
RH IG SCN BLD-IMP: POSITIVE — SIGNIFICANT CHANGE UP
SODIUM SERPL-SCNC: 135 MMOL/L — SIGNIFICANT CHANGE UP (ref 135–145)
SODIUM SERPL-SCNC: 136 MMOL/L — SIGNIFICANT CHANGE UP (ref 135–145)
SPECIMEN SOURCE: SIGNIFICANT CHANGE UP
SPECIMEN SOURCE: SIGNIFICANT CHANGE UP
WBC # BLD: 8 K/UL — SIGNIFICANT CHANGE UP (ref 3.8–10.5)
WBC # FLD AUTO: 8 K/UL — SIGNIFICANT CHANGE UP (ref 3.8–10.5)

## 2019-02-08 PROCEDURE — 88304 TISSUE EXAM BY PATHOLOGIST: CPT | Mod: 26

## 2019-02-08 RX ORDER — DEXTROSE 50 % IN WATER 50 %
25 SYRINGE (ML) INTRAVENOUS ONCE
Qty: 0 | Refills: 0 | Status: DISCONTINUED | OUTPATIENT
Start: 2019-02-08 | End: 2019-02-12

## 2019-02-08 RX ORDER — ACETAMINOPHEN 500 MG
650 TABLET ORAL EVERY 6 HOURS
Qty: 0 | Refills: 0 | Status: DISCONTINUED | OUTPATIENT
Start: 2019-02-08 | End: 2019-02-12

## 2019-02-08 RX ORDER — DEXTROSE 50 % IN WATER 50 %
15 SYRINGE (ML) INTRAVENOUS ONCE
Qty: 0 | Refills: 0 | Status: DISCONTINUED | OUTPATIENT
Start: 2019-02-08 | End: 2019-02-12

## 2019-02-08 RX ORDER — ATORVASTATIN CALCIUM 80 MG/1
40 TABLET, FILM COATED ORAL AT BEDTIME
Qty: 0 | Refills: 0 | Status: DISCONTINUED | OUTPATIENT
Start: 2019-02-08 | End: 2019-02-12

## 2019-02-08 RX ORDER — POTASSIUM CHLORIDE 20 MEQ
10 PACKET (EA) ORAL
Qty: 0 | Refills: 0 | Status: COMPLETED | OUTPATIENT
Start: 2019-02-08 | End: 2019-02-08

## 2019-02-08 RX ORDER — MAGNESIUM SULFATE 500 MG/ML
2 VIAL (ML) INJECTION ONCE
Qty: 0 | Refills: 0 | Status: COMPLETED | OUTPATIENT
Start: 2019-02-08 | End: 2019-02-08

## 2019-02-08 RX ORDER — OXYCODONE HYDROCHLORIDE 5 MG/1
5 TABLET ORAL EVERY 4 HOURS
Qty: 0 | Refills: 0 | Status: DISCONTINUED | OUTPATIENT
Start: 2019-02-08 | End: 2019-02-12

## 2019-02-08 RX ORDER — DEXTROSE 50 % IN WATER 50 %
12.5 SYRINGE (ML) INTRAVENOUS ONCE
Qty: 0 | Refills: 0 | Status: DISCONTINUED | OUTPATIENT
Start: 2019-02-08 | End: 2019-02-12

## 2019-02-08 RX ORDER — CALCIUM GLUCONATE 100 MG/ML
2 VIAL (ML) INTRAVENOUS ONCE
Qty: 0 | Refills: 0 | Status: COMPLETED | OUTPATIENT
Start: 2019-02-08 | End: 2019-02-08

## 2019-02-08 RX ORDER — INSULIN LISPRO 100/ML
VIAL (ML) SUBCUTANEOUS AT BEDTIME
Qty: 0 | Refills: 0 | Status: DISCONTINUED | OUTPATIENT
Start: 2019-02-08 | End: 2019-02-12

## 2019-02-08 RX ORDER — ONDANSETRON 8 MG/1
4 TABLET, FILM COATED ORAL ONCE
Qty: 0 | Refills: 0 | Status: COMPLETED | OUTPATIENT
Start: 2019-02-08 | End: 2019-02-08

## 2019-02-08 RX ORDER — SODIUM CHLORIDE 9 MG/ML
1000 INJECTION, SOLUTION INTRAVENOUS
Qty: 0 | Refills: 0 | Status: DISCONTINUED | OUTPATIENT
Start: 2019-02-08 | End: 2019-02-12

## 2019-02-08 RX ORDER — PANTOPRAZOLE SODIUM 20 MG/1
40 TABLET, DELAYED RELEASE ORAL ONCE
Qty: 0 | Refills: 0 | Status: COMPLETED | OUTPATIENT
Start: 2019-02-08 | End: 2019-02-08

## 2019-02-08 RX ORDER — INSULIN LISPRO 100/ML
VIAL (ML) SUBCUTANEOUS
Qty: 0 | Refills: 0 | Status: DISCONTINUED | OUTPATIENT
Start: 2019-02-08 | End: 2019-02-12

## 2019-02-08 RX ORDER — PANTOPRAZOLE SODIUM 20 MG/1
40 TABLET, DELAYED RELEASE ORAL
Qty: 0 | Refills: 0 | Status: DISCONTINUED | OUTPATIENT
Start: 2019-02-08 | End: 2019-02-12

## 2019-02-08 RX ORDER — SODIUM CHLORIDE 9 MG/ML
1000 INJECTION, SOLUTION INTRAVENOUS
Qty: 0 | Refills: 0 | Status: DISCONTINUED | OUTPATIENT
Start: 2019-02-08 | End: 2019-02-09

## 2019-02-08 RX ORDER — BUDESONIDE AND FORMOTEROL FUMARATE DIHYDRATE 160; 4.5 UG/1; UG/1
2 AEROSOL RESPIRATORY (INHALATION)
Qty: 0 | Refills: 0 | Status: DISCONTINUED | OUTPATIENT
Start: 2019-02-08 | End: 2019-02-12

## 2019-02-08 RX ORDER — HYDROMORPHONE HYDROCHLORIDE 2 MG/ML
0.2 INJECTION INTRAMUSCULAR; INTRAVENOUS; SUBCUTANEOUS
Qty: 0 | Refills: 0 | Status: DISCONTINUED | OUTPATIENT
Start: 2019-02-08 | End: 2019-02-08

## 2019-02-08 RX ORDER — PIPERACILLIN AND TAZOBACTAM 4; .5 G/20ML; G/20ML
3.38 INJECTION, POWDER, LYOPHILIZED, FOR SOLUTION INTRAVENOUS EVERY 8 HOURS
Qty: 0 | Refills: 0 | Status: DISCONTINUED | OUTPATIENT
Start: 2019-02-08 | End: 2019-02-11

## 2019-02-08 RX ORDER — ASPIRIN/CALCIUM CARB/MAGNESIUM 324 MG
81 TABLET ORAL DAILY
Qty: 0 | Refills: 0 | Status: DISCONTINUED | OUTPATIENT
Start: 2019-02-08 | End: 2019-02-12

## 2019-02-08 RX ORDER — GLUCAGON INJECTION, SOLUTION 0.5 MG/.1ML
1 INJECTION, SOLUTION SUBCUTANEOUS ONCE
Qty: 0 | Refills: 0 | Status: DISCONTINUED | OUTPATIENT
Start: 2019-02-08 | End: 2019-02-12

## 2019-02-08 RX ORDER — MAGNESIUM SULFATE 500 MG/ML
1 VIAL (ML) INJECTION ONCE
Qty: 0 | Refills: 0 | Status: COMPLETED | OUTPATIENT
Start: 2019-02-08 | End: 2019-02-08

## 2019-02-08 RX ADMIN — HYDROMORPHONE HYDROCHLORIDE 0.2 MILLIGRAM(S): 2 INJECTION INTRAMUSCULAR; INTRAVENOUS; SUBCUTANEOUS at 20:31

## 2019-02-08 RX ADMIN — PIPERACILLIN AND TAZOBACTAM 25 GRAM(S): 4; .5 INJECTION, POWDER, LYOPHILIZED, FOR SOLUTION INTRAVENOUS at 08:19

## 2019-02-08 RX ADMIN — Medication 100 MILLIEQUIVALENT(S): at 08:18

## 2019-02-08 RX ADMIN — PANTOPRAZOLE SODIUM 40 MILLIGRAM(S): 20 TABLET, DELAYED RELEASE ORAL at 06:08

## 2019-02-08 RX ADMIN — Medication 200 GRAM(S): at 14:09

## 2019-02-08 RX ADMIN — Medication 100 MILLIEQUIVALENT(S): at 12:08

## 2019-02-08 RX ADMIN — PIPERACILLIN AND TAZOBACTAM 25 GRAM(S): 4; .5 INJECTION, POWDER, LYOPHILIZED, FOR SOLUTION INTRAVENOUS at 23:12

## 2019-02-08 RX ADMIN — BUDESONIDE AND FORMOTEROL FUMARATE DIHYDRATE 2 PUFF(S): 160; 4.5 AEROSOL RESPIRATORY (INHALATION) at 06:08

## 2019-02-08 RX ADMIN — ONDANSETRON 4 MILLIGRAM(S): 8 TABLET, FILM COATED ORAL at 20:31

## 2019-02-08 RX ADMIN — HYDROMORPHONE HYDROCHLORIDE 0.2 MILLIGRAM(S): 2 INJECTION INTRAMUSCULAR; INTRAVENOUS; SUBCUTANEOUS at 20:57

## 2019-02-08 RX ADMIN — Medication 100 MILLIEQUIVALENT(S): at 10:04

## 2019-02-08 RX ADMIN — Medication 100 MILLIEQUIVALENT(S): at 14:08

## 2019-02-08 RX ADMIN — Medication 50 GRAM(S): at 13:15

## 2019-02-08 RX ADMIN — ATORVASTATIN CALCIUM 40 MILLIGRAM(S): 80 TABLET, FILM COATED ORAL at 23:12

## 2019-02-08 RX ADMIN — Medication 100 GRAM(S): at 05:35

## 2019-02-08 RX ADMIN — SODIUM CHLORIDE 50 MILLILITER(S): 9 INJECTION, SOLUTION INTRAVENOUS at 23:12

## 2019-02-08 RX ADMIN — Medication 100 MILLIEQUIVALENT(S): at 06:08

## 2019-02-08 RX ADMIN — PIPERACILLIN AND TAZOBACTAM 25 GRAM(S): 4; .5 INJECTION, POWDER, LYOPHILIZED, FOR SOLUTION INTRAVENOUS at 02:00

## 2019-02-08 RX ADMIN — SODIUM CHLORIDE 50 MILLILITER(S): 9 INJECTION, SOLUTION INTRAVENOUS at 14:53

## 2019-02-08 RX ADMIN — Medication 1: at 06:11

## 2019-02-08 NOTE — SWALLOW BEDSIDE ASSESSMENT ADULT - COMMENTS
lactic acid was elevated   pt was thought to have UTI ..pt however developed acute respiratory failure and placed on bipap.. pt became hypotensive and afib with RVR and MICU called...   Pt was evaluated by MICU and thought not to be a candidate for MICU..    At bedside Pt is ill looking , pale but says feels better overall .. off bipap and improved HR and BP   denies cp  SOB palpiations   feels N but no vomitting... no diarreah   labs : significant for leukocytosis.. acute elevation of trop and acute elevation of LFT   2/7: Colorectal surgery: On OR schedule today for lap feim

## 2019-02-08 NOTE — CHART NOTE - NSCHARTNOTEFT_GEN_A_CORE
SURGERY POST-OP NOTE:    S: Patient underwent laparoscopic  and tolerated procedure without any complications, sent to PACU, and then to the floors.  Patient denies chest pain, shortness of breath, nausea, vomiting, lightheadedness, or dizziness.  Pain was well controlled.      O:  T(C): 36.3 (02-08-19 @ 21:56), Max: 36.8 (02-08-19 @ 15:42)  HR: 92 (02-08-19 @ 23:05) (75 - 112)  BP: 112/57 (02-08-19 @ 23:05) (106/54 - 138/83)  RR: 18 (02-08-19 @ 21:56) (14 - 18)  SpO2: 100% (02-08-19 @ 21:56) (97% - 100%)  Wt(kg): --                        10.4   8.0   )-----------( 194      ( 08 Feb 2019 04:10 )             30.3        02-08    136  |  104  |  <4<L>  ----------------------------<  592<HH>  3.8   |  22  |  0.62    Ca    7.1<L>      08 Feb 2019 10:33  Phos  3.4     02-08  Mg     1.6     02-08      Gen: NAD, laying in bed, looks weak  HEENT: NC/AT, 3L nasal canula  RESP: nonlabored  Abd: Soft, nontender, nondistended, 5 lap port site dressings noted c/d/i, No palpable masses.     Assessment/Plan:  73y Female now s/p Laparoscopic cholecystectomy, recovering appropriately on the floor    - Pain control PRN  - soft diet as tolerated  - obtain stat labs if signs of hymodynamic instability  - DVT PPX  - Out of bed and encourage early ambulation  - Incentive spirometry  - care per primary team      [facility]

## 2019-02-08 NOTE — PROVIDER CONTACT NOTE (CRITICAL VALUE NOTIFICATION) - ACTION/TREATMENT ORDERED:
NP Nate Saintus notified, repeat . will continue to monitor.
Provider made aware. Awaiting further instruction/orders. will continue to monitor.
Provider made aware, no further interventions at this time. Pt is dosed on Zosyn IVPB 3.375mg q8.
Provider made aware. No further interventions at this time. Pt is dosed on Zosyn 3.375mg q8
Pt received 3 runs of KCl 10 mEq IV and 1 dose of po KCl 40 mEq. Monitoring continued. Safety maintained.

## 2019-02-08 NOTE — BRIEF OPERATIVE NOTE - OPERATION/FINDINGS
laparoscopic cholecystectomy, additional port placed to retract L lobe of liver, cystic duct and artery identified and clipped with 5mm hemolok, posterior cystic artery identified and clipped with 5mm clip applier

## 2019-02-08 NOTE — PROGRESS NOTE ADULT - SUBJECTIVE AND OBJECTIVE BOX
Patient is a 73y old  Female who presents with a chief complaint of epigastric and RUQ pain (08 Feb 2019 08:30)                                                               INTERVAL HPI/OVERNIGHT EVENTS:    REVIEW OF SYSTEMS:     CONSTITUTIONAL: No weakness, fevers or chills  RESPIRATORY: No cough, wheezing,  No shortness of breath  CARDIOVASCULAR: No chest pain or palpitations  GASTROINTESTINAL: No abdominal pain  . No nausea, vomiting, or hematemesis; No diarrhea or constipation. No melena or hematochezia.  GENITOURINARY: No dysuria, frequency or hematuria  NEUROLOGICAL: No numbness or weakness                                                                                                                                                                                                                                                                        Medications:  MEDICATIONS  (STANDING):  aspirin enteric coated 81 milliGRAM(s) Oral daily  atorvastatin 40 milliGRAM(s) Oral at bedtime  buDESOnide 160 MICROgram(s)/formoterol 4.5 MICROgram(s) Inhaler 2 Puff(s) Inhalation two times a day  dextrose 5% + sodium chloride 0.9%. 1000 milliLiter(s) (50 mL/Hr) IV Continuous <Continuous>  dextrose 5%. 1000 milliLiter(s) (50 mL/Hr) IV Continuous <Continuous>  dextrose 50% Injectable 12.5 Gram(s) IV Push once  dextrose 50% Injectable 25 Gram(s) IV Push once  dextrose 50% Injectable 25 Gram(s) IV Push once  diltiazem    Tablet 30 milliGRAM(s) Oral every 6 hours  insulin lispro (HumaLOG) corrective regimen sliding scale   SubCutaneous three times a day before meals  insulin lispro (HumaLOG) corrective regimen sliding scale   SubCutaneous at bedtime  pantoprazole    Tablet 40 milliGRAM(s) Oral before breakfast  piperacillin/tazobactam IVPB. 3.375 Gram(s) IV Intermittent every 8 hours    MEDICATIONS  (PRN):  dextrose 40% Gel 15 Gram(s) Oral once PRN Blood Glucose LESS THAN 70 milliGRAM(s)/deciliter  glucagon  Injectable 1 milliGRAM(s) IntraMuscular once PRN Glucose LESS THAN 70 milligrams/deciliter  guaiFENesin   Syrup  (Sugar-Free) 100 milliGRAM(s) Oral every 6 hours PRN Cough       Allergies    Avelox (Pruritus)  Levaquin (Unknown)    Intolerances      Vital Signs Last 24 Hrs  T(C): 36.9 (08 Feb 2019 11:53), Max: 36.9 (08 Feb 2019 11:53)  T(F): 98.4 (08 Feb 2019 11:53), Max: 98.4 (08 Feb 2019 11:53)  HR: 96 (08 Feb 2019 11:53) (90 - 120)  BP: 107/69 (08 Feb 2019 11:53) (100/58 - 131/62)  BP(mean): --  RR: 18 (08 Feb 2019 11:53) (18 - 19)  SpO2: 100% (08 Feb 2019 11:53) (99% - 100%)  CAPILLARY BLOOD GLUCOSE      POCT Blood Glucose.: 148 mg/dL (08 Feb 2019 11:46)  POCT Blood Glucose.: 169 mg/dL (08 Feb 2019 06:07)  POCT Blood Glucose.: 149 mg/dL (07 Feb 2019 21:27)  POCT Blood Glucose.: 110 mg/dL (07 Feb 2019 16:32)      02-07 @ 07:01  -  02-08 @ 07:00  --------------------------------------------------------  IN: 2255 mL / OUT: 700 mL / NET: 1555 mL    02-08 @ 07:01  -  02-08 @ 14:25  --------------------------------------------------------  IN: 1350 mL / OUT: 0 mL / NET: 1350 mL      Physical Exam:    General:  NAD   cachectic   HEENT:  Nonicteric, PERRLA  CV:  RRR, S1S2   Lungs:  mild crepitations at L base  Abdomen:  Soft, non-tender, no distended, positive BS  Extremities:  no edema    Neuro:  AAOx3, non-focal, grossly intact                                                                                                                                                                                                                                                                                                LABS:                               10.4   8.0   )-----------( 194      ( 08 Feb 2019 04:10 )             30.3                      02-08    136  |  104  |  <4<L>  ----------------------------<  592<HH>  3.8   |  22  |  0.62    Ca    7.1<L>      08 Feb 2019 10:33  Phos  3.4     02-08  Mg     1.6     02-08    TPro  5.4<L>  /  Alb  2.3<L>  /  TBili  0.5  /  DBili  x   /  AST  12  /  ALT  35  /  AlkPhos  146<H>  02-08

## 2019-02-08 NOTE — PROGRESS NOTE ADULT - SUBJECTIVE AND OBJECTIVE BOX
CARDIOLOGY FOLLOW UP NOTE - DR. RAYO WORKMAN    Patient states no new complaints.    MEDICATIONS  (STANDING):  aspirin enteric coated 81 milliGRAM(s) Oral daily  atorvastatin 40 milliGRAM(s) Oral at bedtime  buDESOnide 160 MICROgram(s)/formoterol 4.5 MICROgram(s) Inhaler 2 Puff(s) Inhalation two times a day  dextrose 5% + sodium chloride 0.9%. 1000 milliLiter(s) (50 mL/Hr) IV Continuous <Continuous>  dextrose 5%. 1000 milliLiter(s) (50 mL/Hr) IV Continuous <Continuous>  dextrose 50% Injectable 12.5 Gram(s) IV Push once  dextrose 50% Injectable 25 Gram(s) IV Push once  dextrose 50% Injectable 25 Gram(s) IV Push once  diltiazem    Tablet 30 milliGRAM(s) Oral every 6 hours  insulin lispro (HumaLOG) corrective regimen sliding scale   SubCutaneous three times a day before meals  insulin lispro (HumaLOG) corrective regimen sliding scale   SubCutaneous at bedtime  pantoprazole    Tablet 40 milliGRAM(s) Oral before breakfast  piperacillin/tazobactam IVPB. 3.375 Gram(s) IV Intermittent every 8 hours  potassium chloride  10 mEq/100 mL IVPB 10 milliEquivalent(s) IV Intermittent every 1 hour        PHYSICAL EXAM:  Vital Signs Last 24 Hrs  T(C): 36.6 (08 Feb 2019 07:00), Max: 36.8 (07 Feb 2019 20:16)  T(F): 97.8 (08 Feb 2019 07:00), Max: 98.2 (07 Feb 2019 20:16)  HR: 92 (08 Feb 2019 07:00) (90 - 121)  BP: 131/62 (08 Feb 2019 07:00) (100/58 - 136/74)  BP(mean): --  RR: 18 (08 Feb 2019 07:00) (18 - 19)  SpO2: 100% (08 Feb 2019 07:00) (99% - 100%)  I&O's Summary    07 Feb 2019 07:01  -  08 Feb 2019 07:00  --------------------------------------------------------  IN: 2255 mL / OUT: 700 mL / NET: 1555 mL        Telemetry:  sinus 90's-110's, brief PAT up to 5.7 seconds    General: NAD	  HEENT:   No JVD	  Cardiovascular: Regular and tachycardic, Normal S1 and S2, No murmurs/gallops/rubs  Respiratory: Lungs clear to auscultation	  Gastrointestinal:  mild epigastric and RUQ tenderness to palpation, + BS	  Extremities: trace bilateral foot edema      Cardiac cath:    < from: Cardiac Cath Lab - Adult (02.07.19 @ 14:08) >  INDICATIONS: Unstable angina - CCS3.  HISTORY: There was no prior cardiac history. The patient has hypertension.  PROCEDURE:  --  Right coronary angiography.  --  Left coronary angiography.  TECHNIQUE: The risks and alternatives of the procedures and conscious  sedation were explained to the patient and informed consent was obtained.  Cardiac catheterization performed electively.  Local anesthetic given. Right radial artery access. Right coronary artery  angiography. The vessel was injected utilizing a catheter. Left coronary  artery angiography. The vessel was injected utilizing a catheter.  RADIATION EXPOSURE: 2.8 min.  CONTRAST GIVEN: Omnipaque 25 ml.  MEDICATIONS GIVEN: Fentanyl, 25 mcg, IV. Verapamil (Isoptin, Calan,  Covera), 2.5 mg, IA. Heparin, 3000 units, IA.  CORONARY VESSELS: The coronary circulation is left dominant.  LM:   --  LM: Normal.  LAD:   --  LAD: Normal.  CX:   --  Circumflex: Normal.  RCA:   --  RCA: Normal.  COMPLICATIONS: There were no complications.  DIAGNOSTIC RECOMMENDATIONS: The patient should continue with the present  medications.  Prepared and signed by  Ten Frankel M.D.  Signed 02/07/2019 15:49:35    < end of copied text >    	    LABS:                          10.4   8.0   )-----------( 194      ( 08 Feb 2019 04:10 )             30.3     02-08    135  |  100  |  <4<L>  ----------------------------<  151<H>  3.7   |  25  |  0.68    Ca    8.2<L>      08 Feb 2019 04:10  Phos  3.4     02-08  Mg     1.5     02-08    TPro  5.4<L>  /  Alb  2.3<L>  /  TBili  0.5  /  DBili  x   /  AST  12  /  ALT  35  /  AlkPhos  146<H>  02-08    PT/INR - ( 08 Feb 2019 04:10 )   PT: 17.8 sec;   INR: 1.54 ratio         PTT - ( 07 Feb 2019 07:56 )  PTT:32.5 sec        Assessment and Plan:  72 yo female with PMH of Atrial fibrillation on coumadin, HTN, DM2, Asthma, HLD, celiac and SMA atherosclerosis on last admission with mesenteric angiogram presented with acute cholecystitis and likely gallstone pancreatitis.   1. Elevated troponin - pt underwent cardiac cath yesterday, which showed normal coronary arteries. On aspirin and statin.  2. PAF - maintaining sinus. Heparin drip off. Will resume coumadin post op, when OK with Surgery.  3. HTN - back on low dose cardizem. Will switch cardizem to metoprolol, especially since she is pre-op.  4. New LV wall motion abnormality - septal hypokinesis. Cardiac cath was normal. Will treat with meds as above. Will follow, and consider repeating echo prior to discharge. B-blocker to be started today.  5. Pre-operative cardiac evaluation - the patient underwent a cardiac cath, which was normal. She will be started on metoprolol now. Electrolytes are being repleted and need to be rechecked. She may proceed from a cardiac standpoint once electrolytes are repleted. Would follow fluid status very closely. Would follow her rhythm as well. Although there has been no significant Afib during the hospitalization, there is an increased risk for Afib with anesthesia. Follow rhythm closely.

## 2019-02-08 NOTE — BRIEF OPERATIVE NOTE - PROCEDURE
<<-----Click on this checkbox to enter Procedure Laparoscopic cholecystectomy  02/08/2019    Active  SHANNON

## 2019-02-08 NOTE — PROVIDER CONTACT NOTE (OTHER) - ACTION/TREATMENT ORDERED:
NP notified. Continue to monitor patient
Continue medications as ordered. STAT BMP ordered. Will f/u with results. Will continue to monitor on telemetry.
NP aware, no interventions at this time, continue to monitor patient.
NP contacted and aware. Ativan 0.5mg PO ordered. Will continue to monitor.
NP contacted and aware. No new orders. Will continue to monitor.
NP contacted and aware. Stat PTT ordered. Will continue to monitor.

## 2019-02-08 NOTE — CHART NOTE - NSCHARTNOTEFT_GEN_A_CORE
CC: PAT     Notified by RN for PATs x 5.7 secs with HR upto the 150s @ 3:14. Pt seen at the bedside; asymptomatic. Pt asleep at the time of the event. Pt denies CP, SOB, palpitations, dyspnea, headache, numbness, tingling. Pt has had PATs during this admission.       ICU Vital Signs Last 24 Hrs  T(C): 36.4 (2019 05:03), Max: 36.8 (2019 20:16)  T(F): 97.5 (2019 05:03), Max: 98.2 (2019 20:16)  HR: 95 (2019 05:03) (90 - 121)  BP: 100/58 (2019 05:03) (100/58 - 136/74)  BP(mean): --  ABP: --  ABP(mean): --  RR: 18 (2019 05:03) (18 - 19)  SpO2: 100% (2019 05:03) (99% - 100%)    Basic Metabolic Panel in AM (19 @ 06:41)    Sodium, Serum: 139 mmol/L    Potassium, Serum: 4.1 mmol/L    Chloride, Serum: 104 mmol/L    Carbon Dioxide, Serum: 24 mmol/L    Anion Gap, Serum: 11 mmol/L    Blood Urea Nitrogen, Serum: <4: TEST REPEATED. mg/dL    Creatinine, Serum: 0.68 mg/dL    Glucose, Serum: 120 mg/dL    Calcium, Total Serum: 8.5 mg/dL    eGFR if Non : 86    eGFR if : 99 mL/min/1.73M2    Magnesium, Serum (19 @ 10:17)    Magnesium, Serum: 1.9 mg/dL  Phosphorus Level, Serum in AM (19 @ 04:10)    Phosphorus Level, Serum: 3.4 mg/dL    HPI:  72 yo female with PMH of Atrial fibrillation on coumadin, HTN, DM2, Asthma, HLD,celiac and SMA dz last admission on   mesenteric angiogram now presenting with acute onset of epigastric pain with N but no vomitting  no fever or chills  no diarreah and no uriary sx   CT was done in ED : < from: CT Angio Abdomen and Pelvis w/ IV Cont (19 @ 04:05) >   No acute intra-abdominal pathology.  No abnormal bowel wall thickening.  Patent mesenteric vasculature, however the SMA is diminutive in caliber   and its origin is likely stenotic, as seen on prior studies.  lactic acid was elevated   pt was thought to have UTI ..pt however developed acute respiratory failure and placed on bipap.. pt became hypotensive and afib with RVR and MICU called...   Pt was evaluated by MICU and thought not to be a candidate for MICU..    At bedside Pt is ill looking , pale but says feels better overall .. off bipap and improved HR and BP   denies cp  SOB palpiations   feels N but no vomitting... no diarreah   labs : significant for leukocytosis.. acute elevation of trop and acute elevation of LFT (2019 13:26)    PATs   - STAT labs; will replete riley PRN  - c/w tele monitor   - c/w current management   - f/u cards in AM   will continue to monitor    Lori FLOWERS  #[id number]      ADDENDUM @ 5  BMP significant for K: 3.7 M.5. 1g MgSO4 and KCL 10 x 3 ordered. Will continue to monitor     Lori FLOWERS  #[id number] CC: PAT     Notified by RN for PATs x 5.7 secs with HR upto the 150s @ 3:14. Pt seen at the bedside; asymptomatic. Pt asleep at the time of the event. Pt denies CP, SOB, palpitations, dyspnea, headache, numbness, tingling. Pt has had PATs during this admission. On tele pt is sinus rhythm 90s.     ICU Vital Signs Last 24 Hrs  T(C): 36.4 (2019 05:03), Max: 36.8 (2019 20:16)  T(F): 97.5 (2019 05:03), Max: 98.2 (2019 20:16)  HR: 95 (2019 05:03) (90 - 121)  BP: 100/58 (2019 05:03) (100/58 - 136/74)  BP(mean): --  ABP: --  ABP(mean): --  RR: 18 (2019 05:03) (18 - 19)  SpO2: 100% (2019 05:03) (99% - 100%)    Basic Metabolic Panel in AM (19 @ 06:41)    Sodium, Serum: 139 mmol/L    Potassium, Serum: 4.1 mmol/L    Chloride, Serum: 104 mmol/L    Carbon Dioxide, Serum: 24 mmol/L    Anion Gap, Serum: 11 mmol/L    Blood Urea Nitrogen, Serum: <4: TEST REPEATED. mg/dL    Creatinine, Serum: 0.68 mg/dL    Glucose, Serum: 120 mg/dL    Calcium, Total Serum: 8.5 mg/dL    eGFR if Non : 86    eGFR if : 99 mL/min/1.73M2    Magnesium, Serum (19 @ 10:17)    Magnesium, Serum: 1.9 mg/dL  Phosphorus Level, Serum in AM (19 @ 04:10)    Phosphorus Level, Serum: 3.4 mg/dL    HPI:  74 yo female with PMH of Atrial fibrillation on coumadin, HTN, DM2, Asthma, HLD,celiac and SMA dz last admission on   mesenteric angiogram now presenting with acute onset of epigastric pain with N but no vomitting  no fever or chills  no diarreah and no uriary sx   CT was done in ED : < from: CT Angio Abdomen and Pelvis w/ IV Cont (19 @ 04:05) >   No acute intra-abdominal pathology.  No abnormal bowel wall thickening.  Patent mesenteric vasculature, however the SMA is diminutive in caliber   and its origin is likely stenotic, as seen on prior studies.  lactic acid was elevated   pt was thought to have UTI ..pt however developed acute respiratory failure and placed on bipap.. pt became hypotensive and afib with RVR and MICU called...   Pt was evaluated by MICU and thought not to be a candidate for MICU..    At bedside Pt is ill looking , pale but says feels better overall .. off bipap and improved HR and BP   denies cp  SOB palpiations   feels N but no vomitting... no diarreah   labs : significant for leukocytosis.. acute elevation of trop and acute elevation of LFT (2019 13:26)    PATs   - STAT labs; will replete riley PRN  - c/w tele monitor   - c/w current management   - f/u cards in AM   will continue to monitor    Lori FLOWERS  #[id number]      ADDENDUM @ 5  BMP significant for K: 3.7 M.5. 1g MgSO4 and KCL 10 x 3 ordered. Will continue to monitor     Lori FLOWERS  #[id number]

## 2019-02-08 NOTE — PRE-ANESTHESIA EVALUATION ADULT - LAST ECHOCARDIOGRAM
TTE 2/1/19: EF 47%, min MR, mild LV systolic dysfxn, mild Rv systolic dysfxn and enlargement, septal hypokinesia, mild TR

## 2019-02-08 NOTE — PROGRESS NOTE ADULT - ASSESSMENT
73F PMH A-fib (on coumadin), HTN, DM II, Asthma, HLD, Celiac and SMA atherosclerosis, initially presented 2/1/19 with abdominal pain found to have elevated LFTs and cholelithiasis    - OR today for lap femi pending cardiac clearance, anesthesia clearance, electrolyte repletions  - repeat labs around 8:30/9am  - continue care per primary team

## 2019-02-08 NOTE — PROVIDER CONTACT NOTE (CRITICAL VALUE NOTIFICATION) - SITUATION
Blood glucose from 
Gram negative rods in aerobic bottles.
Gram negative rods in anaerobic bottles.
K 2.9
Troponin of 183

## 2019-02-08 NOTE — SWALLOW BEDSIDE ASSESSMENT ADULT - SWALLOW EVAL: DIAGNOSIS
Chart review initiated, attempted to see pt for bedside swallow evaluation however pt currently NPO pending OR therefore exam to be deferred. Service will f/u as clinically appropriate.

## 2019-02-08 NOTE — PRE-ANESTHESIA EVALUATION ADULT - NSANTHOSAYNRD_GEN_A_CORE
No. ERIC screening performed.  STOP BANG Legend: 0-2 = LOW Risk; 3-4 = INTERMEDIATE Risk; 5-8 = HIGH Risk
No. ERIC screening performed.  STOP BANG Legend: 0-2 = LOW Risk; 3-4 = INTERMEDIATE Risk; 5-8 = HIGH Risk

## 2019-02-08 NOTE — PROGRESS NOTE ADULT - SUBJECTIVE AND OBJECTIVE BOX
Surgery Progress Note      Subjective: Patient seen and examined. Afib to 120s and PAT to 150s overnight. Repeat electrolytes requiring potassium repletion.   On OR schedule today for sherine femi    T(C): 36.6 (02-08-19 @ 07:00), Max: 36.8 (02-07-19 @ 20:16)  HR: 92 (02-08-19 @ 07:00) (90 - 121)  BP: 131/62 (02-08-19 @ 07:00) (100/58 - 136/74)  RR: 18 (02-08-19 @ 07:00) (18 - 19)  SpO2: 100% (02-08-19 @ 07:00) (99% - 100%)      02-07-19 @ 07:01  -  02-08-19 @ 07:00  --------------------------------------------------------  IN: 2255 mL / OUT: 700 mL / NET: 1555 mL        Physical Exam:   General: Tired  Abdomen: soft, not tender, not distended    Labs:                          10.4   8.0   )-----------( 194      ( 08 Feb 2019 04:10 )             30.3     02-08    135  |  100  |  <4<L>  ----------------------------<  151<H>  3.7   |  25  |  0.68    Ca    8.2<L>      08 Feb 2019 04:10  Phos  3.4     02-08  Mg     1.5     02-08    TPro  5.4<L>  /  Alb  2.3<L>  /  TBili  0.5  /  DBili  x   /  AST  12  /  ALT  35  /  AlkPhos  146<H>  02-08      Medications:     aspirin enteric coated 81 milliGRAM(s) Oral daily  atorvastatin 40 milliGRAM(s) Oral at bedtime  buDESOnide 160 MICROgram(s)/formoterol 4.5 MICROgram(s) Inhaler 2 Puff(s) Inhalation two times a day  dextrose 40% Gel 15 Gram(s) Oral once PRN  dextrose 5% + sodium chloride 0.9%. 1000 milliLiter(s) IV Continuous <Continuous>  dextrose 5%. 1000 milliLiter(s) IV Continuous <Continuous>  dextrose 50% Injectable 12.5 Gram(s) IV Push once  dextrose 50% Injectable 25 Gram(s) IV Push once  dextrose 50% Injectable 25 Gram(s) IV Push once  diltiazem    Tablet 30 milliGRAM(s) Oral every 6 hours  glucagon  Injectable 1 milliGRAM(s) IntraMuscular once PRN  guaiFENesin   Syrup  (Sugar-Free) 100 milliGRAM(s) Oral every 6 hours PRN  insulin lispro (HumaLOG) corrective regimen sliding scale   SubCutaneous three times a day before meals  insulin lispro (HumaLOG) corrective regimen sliding scale   SubCutaneous at bedtime  pantoprazole    Tablet 40 milliGRAM(s) Oral before breakfast  piperacillin/tazobactam IVPB. 3.375 Gram(s) IV Intermittent every 8 hours  potassium chloride  10 mEq/100 mL IVPB 10 milliEquivalent(s) IV Intermittent every 1 hour      Radiographs: No new imaging

## 2019-02-08 NOTE — PROVIDER CONTACT NOTE (CRITICAL VALUE NOTIFICATION) - PERSON GIVING RESULT:
Irene Hicks, Core lab
Irene Hicks, Core lab
Pat London
[facility] - Eleni Cuevas
Margaret AYALA

## 2019-02-08 NOTE — PROVIDER CONTACT NOTE (CRITICAL VALUE NOTIFICATION) - BACKGROUND
72 YO female admitted for respiratory failure, abdominal pain. N/V
Dx: UTI  2nd dx: septic shock
Pt admitted for UTI, septic shock, elevated bili/LFTs
Pt admitted for septic shock, UTI, elevated bili and LFTs.
74 y/o female PMH of Afib on coumadin, HTN, asthma, dementia, DM Type II presents with abdominal pain.

## 2019-02-08 NOTE — PRE-ANESTHESIA EVALUATION ADULT - NSANTHPMHFT_GEN_ALL_CORE
admitted with abd pain and N/V. prior admission for SMA and celiac disease, CT angio with patent mesenteric vasculature; elevated LFT's troponin and leukocytosis; ascending cholagnitis, s/p ERCP 2/4/19; acute resp failure with this admission on bipap and hypotension afin RVR treated w/o admission to MICU; cath 2/7/19: nl jossue; TTE 2/1/19 with new septal hypokinesia

## 2019-02-08 NOTE — PROGRESS NOTE ADULT - ASSESSMENT
72 yo female with PMH of Atrial fibrillation on coumadin, HTN, DM2, Asthma, HLD,celiac and SMA dz last admission on   mesenteric angiogram now presenting with acute onset of epigastric pain with N but no vomitting  no fever or chills  no diarreah and no uriary sx   CT was done in ED : < from: CT Angio Abdomen and Pelvis w/ IV Cont (02.01.19 @ 04:05) >   No acute intra-abdominal pathology.  No abnormal bowel wall thickening.  Patent mesenteric vasculature, however the SMA is diminutive in caliber   and its origin is likely stenotic, as seen on prior studies.  lactic acid was elevated   pt was thought to have UTI ..pt however developed acute respiratory failure and placed on bipap.. pt became hypotensive and afib with RVR and MICU called...   Pt was evaluated by MICU and thought not to be a candidate for MICU..    At bedside Pt is ill looking , pale but says feels better overall .. off bipap and improved HR and BP   denies cp  SOB palpiations   feels N but no vomitting... no diarreah   labs : significant for leukocytosis.. acute elevation of trop and acute elevation of LFT     1- acute respiratory failure : off bipap  ABG improved ..  2- elevated trop : pt with epgistric pain  and significant risk factors ..  off  heparin post ERCP    cardiac meds per DR. Tamayo    . trop elevation might be sec to demand ischemia   cath : NL coronaries     3- Acute elevation of LFT/ acute pancreatitis  :  likely multifactorial .. sec to  hypotension,  sepsis and cholangitis   GI consult appreaitaed  MRCP negative for CBD stone       ERCP : sludge removed   , cholelithiasis   LFT improving   cont abx   surgery consult appreciated ...planned surgry today if K and Mag normalized     4- septic Shock : sec to E coli bacteremia   imprvoed   ID input appreciated     5- afib : cont rate control: restart PO cardizem for better control  hold AC for surgery ..restart when cleared by surgery

## 2019-02-08 NOTE — PROVIDER CONTACT NOTE (OTHER) - SITUATION
PAT x 5.7 seconds on telemetry, HR up to 150bpm. Patient with known hx of PAT on telemetry. Returned to NSR 90's without intervention.

## 2019-02-09 LAB
ALBUMIN SERPL ELPH-MCNC: 2.5 G/DL — LOW (ref 3.3–5)
ALP SERPL-CCNC: 139 U/L — HIGH (ref 40–120)
ALT FLD-CCNC: 46 U/L — HIGH (ref 10–45)
ANION GAP SERPL CALC-SCNC: 11 MMOL/L — SIGNIFICANT CHANGE UP (ref 5–17)
AST SERPL-CCNC: 42 U/L — HIGH (ref 10–40)
BILIRUB SERPL-MCNC: 0.4 MG/DL — SIGNIFICANT CHANGE UP (ref 0.2–1.2)
BUN SERPL-MCNC: 6 MG/DL — LOW (ref 7–23)
CALCIUM SERPL-MCNC: 8.1 MG/DL — LOW (ref 8.4–10.5)
CHLORIDE SERPL-SCNC: 101 MMOL/L — SIGNIFICANT CHANGE UP (ref 96–108)
CO2 SERPL-SCNC: 22 MMOL/L — SIGNIFICANT CHANGE UP (ref 22–31)
CREAT SERPL-MCNC: 0.73 MG/DL — SIGNIFICANT CHANGE UP (ref 0.5–1.3)
GLUCOSE BLDC GLUCOMTR-MCNC: 171 MG/DL — HIGH (ref 70–99)
GLUCOSE BLDC GLUCOMTR-MCNC: 197 MG/DL — HIGH (ref 70–99)
GLUCOSE BLDC GLUCOMTR-MCNC: 216 MG/DL — HIGH (ref 70–99)
GLUCOSE BLDC GLUCOMTR-MCNC: 219 MG/DL — HIGH (ref 70–99)
GLUCOSE SERPL-MCNC: 231 MG/DL — HIGH (ref 70–99)
HCT VFR BLD CALC: 32.9 % — LOW (ref 34.5–45)
HGB BLD-MCNC: 10.2 G/DL — LOW (ref 11.5–15.5)
MCHC RBC-ENTMCNC: 24.2 PG — LOW (ref 27–34)
MCHC RBC-ENTMCNC: 31 GM/DL — LOW (ref 32–36)
MCV RBC AUTO: 78 FL — LOW (ref 80–100)
PLATELET # BLD AUTO: 257 K/UL — SIGNIFICANT CHANGE UP (ref 150–400)
POTASSIUM SERPL-MCNC: 4.9 MMOL/L — SIGNIFICANT CHANGE UP (ref 3.5–5.3)
POTASSIUM SERPL-SCNC: 4.9 MMOL/L — SIGNIFICANT CHANGE UP (ref 3.5–5.3)
PROT SERPL-MCNC: 5.8 G/DL — LOW (ref 6–8.3)
RBC # BLD: 4.22 M/UL — SIGNIFICANT CHANGE UP (ref 3.8–5.2)
RBC # FLD: 15.8 % — HIGH (ref 10.3–14.5)
SODIUM SERPL-SCNC: 134 MMOL/L — LOW (ref 135–145)
WBC # BLD: 14.98 K/UL — HIGH (ref 3.8–10.5)
WBC # FLD AUTO: 14.98 K/UL — HIGH (ref 3.8–10.5)

## 2019-02-09 RX ORDER — ACETAMINOPHEN 500 MG
750 TABLET ORAL ONCE
Qty: 0 | Refills: 0 | Status: COMPLETED | OUTPATIENT
Start: 2019-02-09 | End: 2019-02-09

## 2019-02-09 RX ADMIN — PANTOPRAZOLE SODIUM 40 MILLIGRAM(S): 20 TABLET, DELAYED RELEASE ORAL at 05:05

## 2019-02-09 RX ADMIN — ATORVASTATIN CALCIUM 40 MILLIGRAM(S): 80 TABLET, FILM COATED ORAL at 21:32

## 2019-02-09 RX ADMIN — Medication 300 MILLIGRAM(S): at 21:32

## 2019-02-09 RX ADMIN — Medication 650 MILLIGRAM(S): at 17:54

## 2019-02-09 RX ADMIN — Medication 650 MILLIGRAM(S): at 07:56

## 2019-02-09 RX ADMIN — BUDESONIDE AND FORMOTEROL FUMARATE DIHYDRATE 2 PUFF(S): 160; 4.5 AEROSOL RESPIRATORY (INHALATION) at 05:05

## 2019-02-09 RX ADMIN — Medication 750 MILLIGRAM(S): at 22:02

## 2019-02-09 RX ADMIN — PIPERACILLIN AND TAZOBACTAM 25 GRAM(S): 4; .5 INJECTION, POWDER, LYOPHILIZED, FOR SOLUTION INTRAVENOUS at 21:32

## 2019-02-09 RX ADMIN — Medication 650 MILLIGRAM(S): at 16:54

## 2019-02-09 RX ADMIN — Medication 81 MILLIGRAM(S): at 12:16

## 2019-02-09 RX ADMIN — Medication 2: at 16:31

## 2019-02-09 RX ADMIN — PIPERACILLIN AND TAZOBACTAM 25 GRAM(S): 4; .5 INJECTION, POWDER, LYOPHILIZED, FOR SOLUTION INTRAVENOUS at 06:47

## 2019-02-09 RX ADMIN — Medication 650 MILLIGRAM(S): at 06:56

## 2019-02-09 RX ADMIN — PIPERACILLIN AND TAZOBACTAM 25 GRAM(S): 4; .5 INJECTION, POWDER, LYOPHILIZED, FOR SOLUTION INTRAVENOUS at 14:02

## 2019-02-09 RX ADMIN — BUDESONIDE AND FORMOTEROL FUMARATE DIHYDRATE 2 PUFF(S): 160; 4.5 AEROSOL RESPIRATORY (INHALATION) at 17:14

## 2019-02-09 RX ADMIN — Medication 2: at 11:51

## 2019-02-09 RX ADMIN — Medication 1: at 08:10

## 2019-02-09 NOTE — PROGRESS NOTE ADULT - SUBJECTIVE AND OBJECTIVE BOX
Surgery Progress Note      Subjective: Patient seen and examined. laparoscopic cholecystectomy last night. Tolerated the procedure well. Complaining of abdominal discomfort daryn-inicisional     T(C): 37.1 (02-09-19 @ 04:21), Max: 37.1 (02-09-19 @ 04:21)  HR: 99 (02-09-19 @ 04:21) (75 - 112)  BP: 102/61 (02-09-19 @ 04:21) (102/61 - 138/83)  RR: 19 (02-09-19 @ 04:21) (14 - 19)  SpO2: 99% (02-09-19 @ 04:21) (97% - 100%)      02-08-19 @ 07:01  -  02-09-19 @ 07:00  --------------------------------------------------------  IN: 2340 mL / OUT: 0 mL / NET: 2340 mL        Physical Exam:   General: NAD  Abdomen: soft, appropriately tender, dressings c/d/i    Labs:                          10.2   14.98 )-----------( 257      ( 09 Feb 2019 08:23 )             32.9     02-09    134<L>  |  101  |  6<L>  ----------------------------<  231<H>  4.9   |  22  |  0.73    Ca    8.1<L>      09 Feb 2019 06:17  Phos  3.4     02-08  Mg     1.6     02-08    TPro  5.8<L>  /  Alb  2.5<L>  /  TBili  0.4  /  DBili  x   /  AST  42<H>  /  ALT  46<H>  /  AlkPhos  139<H>  02-09      Medications:     acetaminophen   Tablet .. 650 milliGRAM(s) Oral every 6 hours PRN  aspirin enteric coated 81 milliGRAM(s) Oral daily  atorvastatin 40 milliGRAM(s) Oral at bedtime  buDESOnide 160 MICROgram(s)/formoterol 4.5 MICROgram(s) Inhaler 2 Puff(s) Inhalation two times a day  dextrose 40% Gel 15 Gram(s) Oral once PRN  dextrose 5% + sodium chloride 0.9%. 1000 milliLiter(s) IV Continuous <Continuous>  dextrose 5%. 1000 milliLiter(s) IV Continuous <Continuous>  dextrose 50% Injectable 12.5 Gram(s) IV Push once  dextrose 50% Injectable 25 Gram(s) IV Push once  dextrose 50% Injectable 25 Gram(s) IV Push once  diltiazem    Tablet 30 milliGRAM(s) Oral every 6 hours  glucagon  Injectable 1 milliGRAM(s) IntraMuscular once PRN  guaiFENesin   Syrup  (Sugar-Free) 100 milliGRAM(s) Oral every 6 hours PRN  insulin lispro (HumaLOG) corrective regimen sliding scale   SubCutaneous three times a day before meals  insulin lispro (HumaLOG) corrective regimen sliding scale   SubCutaneous at bedtime  oxyCODONE    IR 5 milliGRAM(s) Oral every 4 hours PRN  pantoprazole    Tablet 40 milliGRAM(s) Oral before breakfast  piperacillin/tazobactam IVPB. 3.375 Gram(s) IV Intermittent every 8 hours      Radiographs: No new imaging

## 2019-02-09 NOTE — PROGRESS NOTE ADULT - SUBJECTIVE AND OBJECTIVE BOX
CC: f/u for  ecoli bacteremia  Patient reports  abdomen hurts after lap femi  REVIEW OF SYSTEMS:  All other review of systems negative (Comprehensive ROS)    Antimicrobials Day #  :  piperacillin/tazobactam IVPB. 3.375 Gram(s) IV Intermittent every 8 hours    Other Medications Reviewed    T(F): 97.3 (02-09-19 @ 20:54), Max: 98.8 (02-09-19 @ 04:21)  HR: 82 (02-09-19 @ 20:54)  BP: 104/61 (02-09-19 @ 20:54)  RR: 18 (02-09-19 @ 20:54)  SpO2: 100% (02-09-19 @ 20:54)  Wt(kg): --    PHYSICAL EXAM:  General: alert,  acute distress  Eyes:  anicteric, no conjunctival injection, no discharge  Oropharynx: no lesions or injection 	  Neck: supple, without adenopathy  Lungs: clear to auscultation  Heart: regular rate and rhythm; no murmur, rubs or gallops  Abdomen: soft, nondistended, richard, without mass or organomegaly  Skin: no lesions  Extremities: no clubbing, cyanosis, or edema  Neurologic: alert,  moves all extremities    LAB RESULTS:                        10.2   14.98 )-----------( 257      ( 09 Feb 2019 08:23 )             32.9     02-09    134<L>  |  101  |  6<L>  ----------------------------<  231<H>  4.9   |  22  |  0.73    Ca    8.1<L>      09 Feb 2019 06:17  Phos  3.4     02-08  Mg     1.6     02-08    TPro  5.8<L>  /  Alb  2.5<L>  /  TBili  0.4  /  DBili  x   /  AST  42<H>  /  ALT  46<H>  /  AlkPhos  139<H>  02-09    LIVER FUNCTIONS - ( 09 Feb 2019 06:17 )  Alb: 2.5 g/dL / Pro: 5.8 g/dL / ALK PHOS: 139 U/L / ALT: 46 U/L / AST: 42 U/L / GGT: x             MICROBIOLOGY:  RECENT CULTURES:    Culture - Blood (02.01.19 @ 08:18)    -  Trimethoprim/Sulfamethoxazole: S <=0.5/9.5    -  Escherichia coli: Detec    Gram Stain:   Growth in anaerobic bottle: Gram Negative Rods  Growth in aerobic bottle: Gram Negative Rods    -  Amikacin: S <=8    -  Ampicillin: S <=2 These ampicillin results predict results for amoxicillin    -  Ampicillin/Sulbactam: S <=4/2    -  Cefoxitin: S <=4    -  Cefepime: S <=2    -  Cefazolin: S <=2    -  Aztreonam: S <=4    -  Tobramycin: S <=2    -  Piperacillin/Tazobactam: S <=8    -  Meropenem: S <=1    -  Ertapenem: S <=0.5    -  Gentamicin: S <=1    -  Imipenem: S <=1    -  Levofloxacin: S <=1    -  Ciprofloxacin: S <=0.5    -  Ceftriaxone: S <=1 Enterobacter, Citrobacter, and Serratia may develop resistance during prolonged therapy    Specimen Source: .Blood Blood-Peripheral    Organism: Blood Culture PCR    Organism: Escherichia coli    Culture Results:   Growth in aerobic and anaerobic bottles: Escherichia coli  "Due to technical problems, Proteus sp. will Not be reported as part of  the BCID panel until further notice"  ***Blood Panel PCR results on this specimen are available  approximately 3 hours after the Gram stain result.***  Gram stain, PCR, and/or culture results may not always  correspond due to difference in methodologies.  ************************************************************  This PCR assay was performed using [employer].  The following targets are tested for: Enterococcus,  vancomycin resistant enterococci, Listeria monocytogenes,  coagulase negative staphylococci, S. aureus,  methicillin resistant S. aureus, Streptococcus agalactiae  (Group B), S. pneumoniae, S.pyogenes (Group A),  Acinetobacter baumannii, Enterobacter cloacae, E. coli,  Klebsiella oxytoca, K. pneumoniae, Proteus sp.,  Serratia marcescens, Haemophilus influenzae,  Neisseria meningitidis, Pseudomonas aeruginosa, Candida  albicans, C. glabrata, C krusei, C parapsilosis,  C. tropicalis and the KPC resistance gene.    Organism Identification: Blood Culture PCR  Escherichia coli    Method Type: PCR    Method Type: JOE      RADIOLOGY REVIEWED:    < from: MR MRCP No Cont (02.02.19 @ 22:11) >  IMPRESSION:   The images are markedly degraded by motion.  Cholelithiasis.  No choledocholithiasis.      < end of copied text >  Assessment:  patient with ecoli bacteremia from cholangitis with gallstone pancreatitis now pod lab femi  Plan:  1 more day of zosyn

## 2019-02-09 NOTE — PROGRESS NOTE ADULT - ASSESSMENT
73F PMH A-fib (on coumadin), HTN, DM II, Asthma, HLD, Celiac and SMA atherosclerosis, initially presented 2/1/19 with abdominal pain found to have elevated LFTs and cholelithiasis, now s/p lap femi 2/8    - pain control  - diet as tolerated  - continue care per primary team

## 2019-02-09 NOTE — PROGRESS NOTE ADULT - ASSESSMENT
74 yo female with PMH of Atrial fibrillation on coumadin, HTN, DM2, Asthma, HLD,celiac and SMA dz last admission on   mesenteric angiogram now presenting with acute onset of epigastric pain with N but no vomitting  no fever or chills  no diarreah and no uriary sx   CT was done in ED : < from: CT Angio Abdomen and Pelvis w/ IV Cont (02.01.19 @ 04:05) >   No acute intra-abdominal pathology.  No abnormal bowel wall thickening.  Patent mesenteric vasculature, however the SMA is diminutive in caliber   and its origin is likely stenotic, as seen on prior studies.  lactic acid was elevated   pt was thought to have UTI ..pt however developed acute respiratory failure and placed on bipap.. pt became hypotensive and afib with RVR and MICU called...   Pt was evaluated by MICU and thought not to be a candidate for MICU..    At bedside Pt is ill looking , pale but says feels better overall .. off bipap and improved HR and BP   denies cp  SOB palpiations   feels N but no vomitting... no diarreah   labs : significant for leukocytosis.. acute elevation of trop and acute elevation of LFT     1- acute respiratory failure : off bipap  ABG improved ..  2- elevated trop : pt with epgistric pain  and significant risk factors ..  off  heparin post ERCP    cardiac meds per DR. Tamayo    . trop elevation might be sec to demand ischemia   cath : NL coronaries     3- Acute elevation of LFT/ acute pancreatitis  :  likely multifactorial .. sec to  hypotension,  sepsis and cholangitis   GI consult appreaitaed  MRCP negative for CBD stone       ERCP : sludge removed   , cholelithiasis   LFT improving   cont abx   s/p lap feim   diet per surgery   IS     OOB       4- septic Shock : sec to E coli bacteremia ..repeat culture negative   imprvoed   ID input appreciated ..     5- afib : cont rate control: restart PO cardizem for better control  hold AC for surgery ..restart when cleared by surgery

## 2019-02-09 NOTE — PROGRESS NOTE ADULT - SUBJECTIVE AND OBJECTIVE BOX
Patient is a 73y old  Female who presents with a chief complaint of gnr bacteremia (09 Feb 2019 21:44)                                                               INTERVAL HPI/OVERNIGHT EVENTS:    REVIEW OF SYSTEMS:     CONSTITUTIONAL: No weakness, fevers or chills  RESPIRATORY: No cough, wheezing,  No shortness of breath  CARDIOVASCULAR: No chest pain or palpitations  GASTROINTESTINAL:  abdominal pain  . No nausea, vomiting, had flutuance and small BM today   GENITOURINARY: No dysuria, frequency or hematuria  NEUROLOGICAL: No numbness or weakness                                                                                                                                                                                                                                                                    Medications:  MEDICATIONS  (STANDING):  aspirin enteric coated 81 milliGRAM(s) Oral daily  atorvastatin 40 milliGRAM(s) Oral at bedtime  buDESOnide 160 MICROgram(s)/formoterol 4.5 MICROgram(s) Inhaler 2 Puff(s) Inhalation two times a day  dextrose 5%. 1000 milliLiter(s) (50 mL/Hr) IV Continuous <Continuous>  dextrose 50% Injectable 12.5 Gram(s) IV Push once  dextrose 50% Injectable 25 Gram(s) IV Push once  dextrose 50% Injectable 25 Gram(s) IV Push once  diltiazem    Tablet 30 milliGRAM(s) Oral every 6 hours  insulin lispro (HumaLOG) corrective regimen sliding scale   SubCutaneous three times a day before meals  insulin lispro (HumaLOG) corrective regimen sliding scale   SubCutaneous at bedtime  pantoprazole    Tablet 40 milliGRAM(s) Oral before breakfast  piperacillin/tazobactam IVPB. 3.375 Gram(s) IV Intermittent every 8 hours    MEDICATIONS  (PRN):  acetaminophen   Tablet .. 650 milliGRAM(s) Oral every 6 hours PRN Mild Pain (1 - 3)  dextrose 40% Gel 15 Gram(s) Oral once PRN Blood Glucose LESS THAN 70 milliGRAM(s)/deciliter  glucagon  Injectable 1 milliGRAM(s) IntraMuscular once PRN Glucose LESS THAN 70 milligrams/deciliter  guaiFENesin   Syrup  (Sugar-Free) 100 milliGRAM(s) Oral every 6 hours PRN Cough  oxyCODONE    IR 5 milliGRAM(s) Oral every 4 hours PRN Severe Pain (7 - 10)       Allergies    Avelox (Pruritus)  Levaquin (Unknown)    Intolerances      Vital Signs Last 24 Hrs  T(C): 36.3 (09 Feb 2019 20:54), Max: 37.1 (09 Feb 2019 04:21)  T(F): 97.3 (09 Feb 2019 20:54), Max: 98.8 (09 Feb 2019 04:21)  HR: 82 (09 Feb 2019 20:54) (79 - 99)  BP: 104/61 (09 Feb 2019 20:54) (100/61 - 112/57)  BP(mean): --  RR: 18 (09 Feb 2019 20:54) (18 - 19)  SpO2: 100% (09 Feb 2019 20:54) (99% - 100%)  CAPILLARY BLOOD GLUCOSE      POCT Blood Glucose.: 171 mg/dL (09 Feb 2019 21:09)  POCT Blood Glucose.: 219 mg/dL (09 Feb 2019 16:17)  POCT Blood Glucose.: 216 mg/dL (09 Feb 2019 11:32)  POCT Blood Glucose.: 197 mg/dL (09 Feb 2019 07:42)  POCT Blood Glucose.: 153 mg/dL (08 Feb 2019 23:14)      02-08 @ 07:01  -  02-09 @ 07:00  --------------------------------------------------------  IN: 2340 mL / OUT: 0 mL / NET: 2340 mL    02-09 @ 07:01  -  02-09 @ 22:34  --------------------------------------------------------  IN: 920 mL / OUT: 0 mL / NET: 920 mL      Physical Exam:    General:  Well appearing, NAD, not cachetic  HEENT:  Nonicteric, PERRLA  CV:  RRR, S1S2   Lungs: mild crepitation at L base  Abdomen:  Soft, s/p laproscopic femi  Extremities: , no edema  Skin:  Warm and dry, no rashes  :  No andrade  Neuro:  AAOx3, non-focal, grossly intact                                                                                                                                                                                                                                                                                                LABS:                               10.2   14.98 )-----------( 257      ( 09 Feb 2019 08:23 )             32.9                      02-09    134<L>  |  101  |  6<L>  ----------------------------<  231<H>  4.9   |  22  |  0.73    Ca    8.1<L>      09 Feb 2019 06:17  Phos  3.4     02-08  Mg     1.6     02-08    TPro  5.8<L>  /  Alb  2.5<L>  /  TBili  0.4  /  DBili  x   /  AST  42<H>  /  ALT  46<H>  /  AlkPhos  139<H>  02-09                       RADIOLOGY & ADDITIONAL TESTS         I personally reviewed: [  ]EKG   [  ]CXR    [  ] CT      A/P:         Discussed with :     Darryl consultants' Notes   Time spent :

## 2019-02-09 NOTE — SWALLOW BEDSIDE ASSESSMENT ADULT - SWALLOW EVAL: DIAGNOSIS
Chart reviewed.  Pt has been receiving soft diet since yesterday evening.  Pt states difficulty swallowing has resolved at this time.  Pt denies s/s of dysphagia.  Nsg also reports Pt is tolerating the current diet with no s/s of aspiration reported.  Pt, , and son were made aware of SLP d/w ANTONIO Pat as swallowing evaluation is no longer indicated at this time and Pt/family were in agreement. Chart reviewed.  Pt has been receiving soft diet since yesterday evening.  Pt states sore throat has resolved and Pt denies s/s of dysphagia.  Nsg also reports Pt is tolerating the current diet with no s/s of aspiration reported.  Pt, , and son were made aware of SLP d/w ANTONIO Pat as swallowing evaluation is no longer indicated at this time and Pt/family were in agreement.  Request MD to place new order via [place] should a change in status occur.

## 2019-02-09 NOTE — SWALLOW BEDSIDE ASSESSMENT ADULT - COMMENTS
2/6/19 Dietitian consult: Per GI, okay to advance diet to solids per note 2/5, however today pt c/o new swallowing difficulty stating tube placed down throat scratched her throat. Tolerating clear liquids, and pt willing to trial full liquids if medically feasible, however seems hesitant to try solids. Discussed with NP, plan for SLP eval. Pt previously with no chewing or swallowing difficulties.; 2/8/19SLP attempted swallowing evaluation however Pt was off floor.  2/8/18 in evening; soft diet initiated.  2/8/19 Per attending MD:  .At bedside Pt is ill looking , pale but says feels better overall .. off bipap and improved HR and BP; denies cp, SOB; palpitations; feels N but no vomiting... no diarrhea; labs : significant for leukocytosis.. acute elevation of trop and acute elevation of LFT.  Acute elevation of LFT/ acute pancreatitis  :  likely multifactorial .. sec to hypotension,  sepsis and cholangitis; GI consult appreciated; MRCP negative for CBD stone;  ERCP : sludge removed   , cholelithiasis; LFT improving; cont abx; Pt s/p laparoscopic cholecystectomy; Per MD: additional port placed to retract L lobe of liver, cystic duct and artery identified and clipped with 5mm hemolok, posterior cystic artery identified and clipped with 5mm clip applier.    2/9/19 Per [facility] Sx: Afib (on coumadin), HTN, DM II, Asthma, HLD, Celiac and SMA atherosclerosis, initially presented 2/1/19 with abdominal pain found to have elevated LFTs and cholelithiasis, now s/p lap femi 2/8; Recd:- pain control  - diet as tolerated  - continue care per primary team 2/6/19 Dietitian consult: Per GI, okay to advance diet to solids per note 2/5, however today pt c/o new swallowing difficulty stating tube placed down throat scratched her throat. Tolerating clear liquids, and pt willing to trial full liquids if medically feasible, however seems hesitant to try solids. Discussed with NP, plan for SLP eval. Pt previously with no chewing or swallowing difficulties.; 2/8/19SLP attempted swallowing evaluation however Pt was off floor. 2/8/19 Per attending MD:  .At bedside Pt is ill looking , pale but says feels better overall .. off bipap and improved HR and BP; denies cp, SOB; palpitations; feels N but no vomiting... no diarrhea; labs : significant for leukocytosis.. acute elevation of trop and acute elevation of LFT.  Acute elevation of LFT/ acute pancreatitis  :  likely multifactorial .. sec to hypotension,  sepsis and cholangitis; GI consult appreciated; MRCP negative for CBD stone;  ERCP : sludge removed   , cholelithiasis; LFT improving; cont abx; Pt s/p laparoscopic cholecystectomy; Per MD: additional port placed to retract L lobe of liver, cystic duct and artery identified and clipped with 5mm hemolok, posterior cystic artery identified and clipped with 5mm clip applier.    2/8/18 in evening; soft diet initiated.  2/9/19 Per Colorectal Sx: Afib (on coumadin), HTN, DM II, Asthma, HLD, Celiac and SMA atherosclerosis, initially presented 2/1/19 with abdominal pain found to have elevated LFTs and cholelithiasis, now s/p lap femi: Recd:- pain control  - diet as tolerated  - continue care per primary team 2/1/19 CXR: clear lungs.  2/6/19 Dietitian consult: Per GI, okay to advance diet to solids per note 2/5, however today pt c/o new swallowing difficulty stating tube placed down throat scratched her throat. Tolerating clear liquids, and pt willing to trial full liquids if medically feasible, however seems hesitant to try solids. Discussed with NP, plan for SLP eval. Pt previously with no chewing or swallowing difficulties.; 2/8/19SLP attempted swallowing evaluation however Pt was off floor. 2/8/19 Per attending MD:  .At bedside Pt is ill looking , pale but says feels better overall .. off bipap and improved HR and BP; denies cp, SOB; palpitations; feels N but no vomiting... no diarrhea; labs : significant for leukocytosis.. acute elevation of trop and acute elevation of LFT.  Acute elevation of LFT/ acute pancreatitis  :  likely multifactorial .. sec to hypotension,  sepsis and cholangitis; GI consult appreciated; MRCP negative for CBD stone;  ERCP : sludge removed   , cholelithiasis; LFT improving; cont abx; Pt s/p laparoscopic cholecystectomy; Per MD: additional port placed to retract L lobe of liver, cystic duct and artery identified and clipped with 5mm hemolok, posterior cystic artery identified and clipped with 5mm clip applier.    2/8/18 in evening; soft diet initiated.  2/9/19 Per Colorectal Sx: Afib (on coumadin), HTN, DM II, Asthma, HLD, Celiac and SMA atherosclerosis, initially presented 2/1/19 with abdominal pain found to have elevated LFTs and cholelithiasis, now s/p lap femi: Recd:- pain control  - diet as tolerated  - continue care per primary team

## 2019-02-09 NOTE — SWALLOW BEDSIDE ASSESSMENT ADULT - SLP PERTINENT HISTORY OF CURRENT PROBLEM
74 yo female with PMH of Atrial fibrillation on coumadin, HTN, DM2, Asthma, HLD,celiac and SMA dz last admission on   mesenteric angiogram now presenting with acute onset of epigastric pain with N but no vomitting. no fever or chills  no diarreah and no uriary sx  CT was done in ED : < from: CT Angio Abdomen and Pelvis w/ IV Cont (02.01.19 @ 04:05) >No acute intra-abdominal pathology.  No abnormal bowel wall thickening.  Patent mesenteric vasculature, however the SMA is diminutive in caliber and its origin is likely stenotic, as seen on prior studies.
72 yo female with PMH of Atrial fibrillation on coumadin, HTN, DM2, Asthma, HLD,celiac and SMA dz last admission on   mesenteric angiogram now presenting with acute onset of epigastric pain with N but no vomitting. no fever or chills  no diarreah and no uriary sx  CT was done in ED : < from: CT Angio Abdomen and Pelvis w/ IV Cont (02.01.19 @ 04:05) >No acute intra-abdominal pathology.  No abnormal bowel wall thickening.  Patent mesenteric vasculature, however the SMA is diminutive in caliber and its origin is likely stenotic, as seen on prior studies.  Per attending MD: thought to have UTI ..pt however developed acute respiratory failure and placed on bipap.. pt became hypotensive and afib with RVR and MICU called... Pt was evaluated by MICU and thought not to be a candidate for MICU..

## 2019-02-10 LAB
ANION GAP SERPL CALC-SCNC: 10 MMOL/L — SIGNIFICANT CHANGE UP (ref 5–17)
BUN SERPL-MCNC: 8 MG/DL — SIGNIFICANT CHANGE UP (ref 7–23)
CALCIUM SERPL-MCNC: 8 MG/DL — LOW (ref 8.4–10.5)
CHLORIDE SERPL-SCNC: 101 MMOL/L — SIGNIFICANT CHANGE UP (ref 96–108)
CO2 SERPL-SCNC: 22 MMOL/L — SIGNIFICANT CHANGE UP (ref 22–31)
CREAT SERPL-MCNC: 0.78 MG/DL — SIGNIFICANT CHANGE UP (ref 0.5–1.3)
GLUCOSE BLDC GLUCOMTR-MCNC: 134 MG/DL — HIGH (ref 70–99)
GLUCOSE BLDC GLUCOMTR-MCNC: 140 MG/DL — HIGH (ref 70–99)
GLUCOSE BLDC GLUCOMTR-MCNC: 141 MG/DL — HIGH (ref 70–99)
GLUCOSE BLDC GLUCOMTR-MCNC: 146 MG/DL — HIGH (ref 70–99)
GLUCOSE BLDC GLUCOMTR-MCNC: 155 MG/DL — HIGH (ref 70–99)
GLUCOSE SERPL-MCNC: 106 MG/DL — HIGH (ref 70–99)
HCT VFR BLD CALC: 32.7 % — LOW (ref 34.5–45)
HGB BLD-MCNC: 10.2 G/DL — LOW (ref 11.5–15.5)
INR BLD: 1.41 RATIO — HIGH (ref 0.88–1.16)
MCHC RBC-ENTMCNC: 23.7 PG — LOW (ref 27–34)
MCHC RBC-ENTMCNC: 31.2 GM/DL — LOW (ref 32–36)
MCV RBC AUTO: 76 FL — LOW (ref 80–100)
PLATELET # BLD AUTO: 266 K/UL — SIGNIFICANT CHANGE UP (ref 150–400)
POTASSIUM SERPL-MCNC: 4.4 MMOL/L — SIGNIFICANT CHANGE UP (ref 3.5–5.3)
POTASSIUM SERPL-SCNC: 4.4 MMOL/L — SIGNIFICANT CHANGE UP (ref 3.5–5.3)
PROTHROM AB SERPL-ACNC: 16 SEC — HIGH (ref 10–13.1)
RBC # BLD: 4.3 M/UL — SIGNIFICANT CHANGE UP (ref 3.8–5.2)
RBC # FLD: 16.6 % — HIGH (ref 10.3–14.5)
SODIUM SERPL-SCNC: 133 MMOL/L — LOW (ref 135–145)
WBC # BLD: 11.02 K/UL — HIGH (ref 3.8–10.5)
WBC # FLD AUTO: 11.02 K/UL — HIGH (ref 3.8–10.5)

## 2019-02-10 RX ORDER — WARFARIN SODIUM 2.5 MG/1
2 TABLET ORAL ONCE
Qty: 0 | Refills: 0 | Status: COMPLETED | OUTPATIENT
Start: 2019-02-10 | End: 2019-02-10

## 2019-02-10 RX ADMIN — ATORVASTATIN CALCIUM 40 MILLIGRAM(S): 80 TABLET, FILM COATED ORAL at 21:09

## 2019-02-10 RX ADMIN — PIPERACILLIN AND TAZOBACTAM 25 GRAM(S): 4; .5 INJECTION, POWDER, LYOPHILIZED, FOR SOLUTION INTRAVENOUS at 13:57

## 2019-02-10 RX ADMIN — PIPERACILLIN AND TAZOBACTAM 25 GRAM(S): 4; .5 INJECTION, POWDER, LYOPHILIZED, FOR SOLUTION INTRAVENOUS at 05:22

## 2019-02-10 RX ADMIN — Medication 81 MILLIGRAM(S): at 11:50

## 2019-02-10 RX ADMIN — BUDESONIDE AND FORMOTEROL FUMARATE DIHYDRATE 2 PUFF(S): 160; 4.5 AEROSOL RESPIRATORY (INHALATION) at 18:43

## 2019-02-10 RX ADMIN — WARFARIN SODIUM 2 MILLIGRAM(S): 2.5 TABLET ORAL at 21:09

## 2019-02-10 RX ADMIN — BUDESONIDE AND FORMOTEROL FUMARATE DIHYDRATE 2 PUFF(S): 160; 4.5 AEROSOL RESPIRATORY (INHALATION) at 05:22

## 2019-02-10 RX ADMIN — PANTOPRAZOLE SODIUM 40 MILLIGRAM(S): 20 TABLET, DELAYED RELEASE ORAL at 05:21

## 2019-02-10 RX ADMIN — PIPERACILLIN AND TAZOBACTAM 25 GRAM(S): 4; .5 INJECTION, POWDER, LYOPHILIZED, FOR SOLUTION INTRAVENOUS at 21:09

## 2019-02-10 RX ADMIN — OXYCODONE HYDROCHLORIDE 5 MILLIGRAM(S): 5 TABLET ORAL at 11:50

## 2019-02-10 NOTE — CHART NOTE - NSCHARTNOTEFT_GEN_A_CORE
Case discussed with [facility]-rectal surgery, Dr. Roach, ok to restart coumadin 48 hours post surgery 2/8/19. Dr. Roach states that patient ok to receive coumadin tonight. Continue to monitor for bleeding. Daily INR

## 2019-02-10 NOTE — PROGRESS NOTE ADULT - ATTENDING COMMENTS
Bacteremia/sepsis  Abnormal liver tests  Preliminary reading MRCP negative for CBD stone  Continue PO   Trend LFT's   Will follow up.
Patient seen and examined. Agree with the above note.
Suspected biliary source of infection  Pending MRCP to rule out CBD stone  Trend liver enzymes/bilirubin
pt seen and examined earlier with team  agree with note above  sherine knutson pending card cath results.
As above.    Impression:  Resolving cholangitis  Biliary sludge removed by ERCP yesterday.  Epigastric tenderness improved, now minimal  Abnormal LFTs, improved.  Atrial fibrillation / anticoagulation held periprocedurally.    Recommendations:  Follow CBC/LFTs  Advance diet to solid food  Continue antibiotics.  Hold therapeutic anticoagulation for 48-72 hrs after ERCP/sphincterotomy (prefer to restart on 2/7 if possible)
Patient seen and examined. Agree with the above note.

## 2019-02-10 NOTE — PROGRESS NOTE ADULT - ASSESSMENT
72 yo female with PMH of Atrial fibrillation on coumadin, HTN, DM2, Asthma, HLD,celiac and SMA dz last admission on   mesenteric angiogram now presenting with acute onset of epigastric pain with N but no vomitting  no fever or chills  no diarreah and no uriary sx   CT was done in ED : < from: CT Angio Abdomen and Pelvis w/ IV Cont (02.01.19 @ 04:05) >   No acute intra-abdominal pathology.  No abnormal bowel wall thickening.  Patent mesenteric vasculature, however the SMA is diminutive in caliber   and its origin is likely stenotic, as seen on prior studies.  lactic acid was elevated   pt was thought to have UTI ..pt however developed acute respiratory failure and placed on bipap.. pt became hypotensive and afib with RVR and MICU called...   Pt was evaluated by MICU and thought not to be a candidate for MICU..    At bedside Pt is ill looking , pale but says feels better overall .. off bipap and improved HR and BP   denies cp  SOB palpiations   feels N but no vomitting... no diarreah   labs : significant for leukocytosis.. acute elevation of trop and acute elevation of LFT     1- acute respiratory failure : off bipap  ABG improved ..  2- elevated trop : pt with epgistric pain  and significant risk factors ..  off  heparin post ERCP    cardiac meds per DR. Tamayo    . trop elevation might be sec to demand ischemia   cath : NL coronaries     3- Acute elevation of LFT/ acute pancreatitis  :  likely multifactorial .. sec to  hypotension,  sepsis and cholangitis   GI consult appreaitaed  MRCP negative for CBD stone       ERCP : sludge removed   , cholelithiasis   LFT improving   cont abx   s/p lap femi   diet per surgery   IS     OOB       4- septic Shock : sec to E coli bacteremia ..repeat culture negative   imprvoed   ID input appreciated ..     5- afib : cont rate control: restart PO cardizem for better control  can start AC with coumadin

## 2019-02-10 NOTE — PROGRESS NOTE ADULT - SUBJECTIVE AND OBJECTIVE BOX
Patient is a 73y old  Female who presents with a chief complaint of gnr bacteremia (09 Feb 2019 21:44)                                                               INTERVAL HPI/OVERNIGHT EVENTS:    REVIEW OF SYSTEMS:     CONSTITUTIONAL: No weakness, fevers or chills  RESPIRATORY: No cough, wheezing,  No shortness of breath  CARDIOVASCULAR: No chest pain or palpitations  GASTROINTESTINAL:  abdominal pain at site of surgery otherwise  . No nausea, vomiting, or hematemesis;   GENITOURINARY: No dysuria, frequency or hematuria  NEUROLOGICAL: No numbness or weakness                                                                                                                                                                                                                                                                                  Medications:  MEDICATIONS  (STANDING):  aspirin enteric coated 81 milliGRAM(s) Oral daily  atorvastatin 40 milliGRAM(s) Oral at bedtime  buDESOnide 160 MICROgram(s)/formoterol 4.5 MICROgram(s) Inhaler 2 Puff(s) Inhalation two times a day  dextrose 5%. 1000 milliLiter(s) (50 mL/Hr) IV Continuous <Continuous>  dextrose 50% Injectable 12.5 Gram(s) IV Push once  dextrose 50% Injectable 25 Gram(s) IV Push once  dextrose 50% Injectable 25 Gram(s) IV Push once  diltiazem    Tablet 30 milliGRAM(s) Oral every 6 hours  insulin lispro (HumaLOG) corrective regimen sliding scale   SubCutaneous three times a day before meals  insulin lispro (HumaLOG) corrective regimen sliding scale   SubCutaneous at bedtime  pantoprazole    Tablet 40 milliGRAM(s) Oral before breakfast  piperacillin/tazobactam IVPB. 3.375 Gram(s) IV Intermittent every 8 hours    MEDICATIONS  (PRN):  acetaminophen   Tablet .. 650 milliGRAM(s) Oral every 6 hours PRN Mild Pain (1 - 3)  dextrose 40% Gel 15 Gram(s) Oral once PRN Blood Glucose LESS THAN 70 milliGRAM(s)/deciliter  glucagon  Injectable 1 milliGRAM(s) IntraMuscular once PRN Glucose LESS THAN 70 milligrams/deciliter  guaiFENesin   Syrup  (Sugar-Free) 100 milliGRAM(s) Oral every 6 hours PRN Cough  oxyCODONE    IR 5 milliGRAM(s) Oral every 4 hours PRN Severe Pain (7 - 10)       Allergies    Avelox (Pruritus)  Levaquin (Unknown)    Intolerances      Vital Signs Last 24 Hrs  T(C): 36.6 (10 Feb 2019 19:37), Max: 36.8 (10 Feb 2019 11:43)  T(F): 97.9 (10 Feb 2019 19:37), Max: 98.2 (10 Feb 2019 11:43)  HR: 99 (10 Feb 2019 19:37) (78 - 99)  BP: 102/58 (10 Feb 2019 19:37) (102/58 - 115/69)  BP(mean): --  RR: 18 (10 Feb 2019 19:37) (18 - 18)  SpO2: 100% (10 Feb 2019 19:37) (100% - 100%)  CAPILLARY BLOOD GLUCOSE      POCT Blood Glucose.: 140 mg/dL (10 Feb 2019 21:16)  POCT Blood Glucose.: 141 mg/dL (10 Feb 2019 18:23)  POCT Blood Glucose.: 155 mg/dL (10 Feb 2019 16:25)  POCT Blood Glucose.: 146 mg/dL (10 Feb 2019 11:34)  POCT Blood Glucose.: 134 mg/dL (10 Feb 2019 07:38)      02-09 @ 07:01  -  02-10 @ 07:00  --------------------------------------------------------  IN: 920 mL / OUT: 0 mL / NET: 920 mL    02-10 @ 07:01  -  02-10 @ 21:42  --------------------------------------------------------  IN: 280 mL / OUT: 0 mL / NET: 280 mL      Physical Exam:    General:  NAD  HEENT:  Nonicteric, PERRLA  CV:  RRR, S1S2   Lungs:  CTA B  Abdomen:  Soft, non-tender, no distended, positive BS  Extremities:  2+ pulses, no c/c, no edema  Skin:  Warm and dry, no rashes  :  No andrade  Neuro:  AAOx3, non-focal, grossly intact                                                                                                                                                                                                                                                                                                LABS:                               10.2   11.02 )-----------( 266      ( 10 Feb 2019 09:19 )             32.7                      02-10    133<L>  |  101  |  8   ----------------------------<  106<H>  4.4   |  22  |  0.78    Ca    8.0<L>      10 Feb 2019 06:50    TPro  5.8<L>  /  Alb  2.5<L>  /  TBili  0.4  /  DBili  x   /  AST  42<H>  /  ALT  46<H>  /  AlkPhos  139<H>  02-09

## 2019-02-10 NOTE — PROGRESS NOTE ADULT - SUBJECTIVE AND OBJECTIVE BOX
Surgery Progress Note      Subjective: Patient seen and examined. No acute events overnight. Complaining of abdominal discomfort daryn-inicisional     T(C): 36.3 (02-10-19 @ 04:37), Max: 36.3 (02-09-19 @ 12:34)  HR: 87 (02-10-19 @ 04:37) (78 - 91)  BP: 112/55 (02-10-19 @ 04:37) (100/61 - 115/69)  RR: 18 (02-10-19 @ 04:37) (18 - 18)  SpO2: 100% (02-10-19 @ 04:37) (100% - 100%)      02-08-19 @ 07:01  -  02-09-19 @ 07:00  --------------------------------------------------------  IN: 2340 mL / OUT: 0 mL / NET: 2340 mL    02-09-19 @ 07:01  -  02-10-19 @ 06:49  --------------------------------------------------------  IN: 920 mL / OUT: 0 mL / NET: 920 mL        Physical Exam:   General: NAD, more awake than yesterday  Abdomen: soft, appropriately tender, dressings c/d/i - removed -- incisions c/d/i     Labs:                          10.2   14.98 )-----------( 257      ( 09 Feb 2019 08:23 )             32.9     02-09    134<L>  |  101  |  6<L>  ----------------------------<  231<H>  4.9   |  22  |  0.73    Ca    8.1<L>      09 Feb 2019 06:17  Mg     1.6     02-08    TPro  5.8<L>  /  Alb  2.5<L>  /  TBili  0.4  /  DBili  x   /  AST  42<H>  /  ALT  46<H>  /  AlkPhos  139<H>  02-09    	    Medications:     acetaminophen   Tablet .. 650 milliGRAM(s) Oral every 6 hours PRN  aspirin enteric coated 81 milliGRAM(s) Oral daily  atorvastatin 40 milliGRAM(s) Oral at bedtime  buDESOnide 160 MICROgram(s)/formoterol 4.5 MICROgram(s) Inhaler 2 Puff(s) Inhalation two times a day  dextrose 40% Gel 15 Gram(s) Oral once PRN  dextrose 5%. 1000 milliLiter(s) IV Continuous <Continuous>  dextrose 50% Injectable 12.5 Gram(s) IV Push once  dextrose 50% Injectable 25 Gram(s) IV Push once  dextrose 50% Injectable 25 Gram(s) IV Push once  diltiazem    Tablet 30 milliGRAM(s) Oral every 6 hours  glucagon  Injectable 1 milliGRAM(s) IntraMuscular once PRN  guaiFENesin   Syrup  (Sugar-Free) 100 milliGRAM(s) Oral every 6 hours PRN  insulin lispro (HumaLOG) corrective regimen sliding scale   SubCutaneous three times a day before meals  insulin lispro (HumaLOG) corrective regimen sliding scale   SubCutaneous at bedtime  oxyCODONE    IR 5 milliGRAM(s) Oral every 4 hours PRN  pantoprazole    Tablet 40 milliGRAM(s) Oral before breakfast  piperacillin/tazobactam IVPB. 3.375 Gram(s) IV Intermittent every 8 hours      Radiographs: No new imaging

## 2019-02-10 NOTE — PROGRESS NOTE ADULT - ASSESSMENT
73F PMH A-fib (on coumadin), HTN, DM II, Asthma, HLD, Celiac and SMA atherosclerosis, initially presented 2/1/19 with abdominal pain found to have elevated LFTs and cholelithiasis, now s/p lap femi 2/8    - pain control  - diet as tolerated  - continue care per primary team 73F PMH A-fib (on coumadin), HTN, DM II, Asthma, HLD, Celiac and SMA atherosclerosis, initially presented 2/1/19 with abdominal pain found to have elevated LFTs and cholelithiasis, now s/p lap femi 2/8    - pain control  - diet as tolerated  - continue care per primary team  - please reconsult with further questions/concerns    [facility] [id number] 73F PMH A-fib (on coumadin), HTN, DM II, Asthma, HLD, Celiac and SMA atherosclerosis, initially presented 2/1/19 with abdominal pain found to have elevated LFTs and cholelithiasis, now s/p lap femi 2/8    - pain control  - diet as tolerated  - continue care per primary team    [facility] [id number]

## 2019-02-11 LAB
ANION GAP SERPL CALC-SCNC: 11 MMOL/L — SIGNIFICANT CHANGE UP (ref 5–17)
APTT BLD: 27.2 SEC — LOW (ref 27.5–36.3)
BUN SERPL-MCNC: 4 MG/DL — LOW (ref 7–23)
CALCIUM SERPL-MCNC: 7.8 MG/DL — LOW (ref 8.4–10.5)
CHLORIDE SERPL-SCNC: 103 MMOL/L — SIGNIFICANT CHANGE UP (ref 96–108)
CO2 SERPL-SCNC: 23 MMOL/L — SIGNIFICANT CHANGE UP (ref 22–31)
CREAT SERPL-MCNC: 0.69 MG/DL — SIGNIFICANT CHANGE UP (ref 0.5–1.3)
GLUCOSE BLDC GLUCOMTR-MCNC: 106 MG/DL — HIGH (ref 70–99)
GLUCOSE BLDC GLUCOMTR-MCNC: 119 MG/DL — HIGH (ref 70–99)
GLUCOSE BLDC GLUCOMTR-MCNC: 130 MG/DL — HIGH (ref 70–99)
GLUCOSE SERPL-MCNC: 87 MG/DL — SIGNIFICANT CHANGE UP (ref 70–99)
HCT VFR BLD CALC: 31 % — LOW (ref 34.5–45)
HGB BLD-MCNC: 9.5 G/DL — LOW (ref 11.5–15.5)
INR BLD: 1.23 RATIO — HIGH (ref 0.88–1.16)
MCHC RBC-ENTMCNC: 24 PG — LOW (ref 27–34)
MCHC RBC-ENTMCNC: 30.6 GM/DL — LOW (ref 32–36)
MCV RBC AUTO: 78.3 FL — LOW (ref 80–100)
PLATELET # BLD AUTO: 306 K/UL — SIGNIFICANT CHANGE UP (ref 150–400)
POTASSIUM SERPL-MCNC: 3.9 MMOL/L — SIGNIFICANT CHANGE UP (ref 3.5–5.3)
POTASSIUM SERPL-SCNC: 3.9 MMOL/L — SIGNIFICANT CHANGE UP (ref 3.5–5.3)
PROTHROM AB SERPL-ACNC: 13.9 SEC — HIGH (ref 10–13.1)
RBC # BLD: 3.96 M/UL — SIGNIFICANT CHANGE UP (ref 3.8–5.2)
RBC # FLD: 16.5 % — HIGH (ref 10.3–14.5)
SODIUM SERPL-SCNC: 137 MMOL/L — SIGNIFICANT CHANGE UP (ref 135–145)
WBC # BLD: 9.52 K/UL — SIGNIFICANT CHANGE UP (ref 3.8–10.5)
WBC # FLD AUTO: 9.52 K/UL — SIGNIFICANT CHANGE UP (ref 3.8–10.5)

## 2019-02-11 RX ORDER — DILTIAZEM HCL 120 MG
1 CAPSULE, EXT RELEASE 24 HR ORAL
Qty: 0 | Refills: 0 | COMMUNITY

## 2019-02-11 RX ORDER — DILTIAZEM HCL 120 MG
1 CAPSULE, EXT RELEASE 24 HR ORAL
Qty: 30 | Refills: 0 | OUTPATIENT
Start: 2019-02-11 | End: 2019-03-12

## 2019-02-11 RX ORDER — ACETAMINOPHEN 500 MG
2 TABLET ORAL
Qty: 0 | Refills: 0 | COMMUNITY
Start: 2019-02-11

## 2019-02-11 RX ORDER — ATORVASTATIN CALCIUM 80 MG/1
1 TABLET, FILM COATED ORAL
Qty: 30 | Refills: 0 | OUTPATIENT
Start: 2019-02-11 | End: 2019-03-12

## 2019-02-11 RX ORDER — OXYCODONE HYDROCHLORIDE 5 MG/1
1 TABLET ORAL
Qty: 30 | Refills: 0 | OUTPATIENT
Start: 2019-02-11 | End: 2019-02-15

## 2019-02-11 RX ORDER — LISINOPRIL 2.5 MG/1
1 TABLET ORAL
Qty: 0 | Refills: 0 | COMMUNITY

## 2019-02-11 RX ORDER — ASPIRIN/CALCIUM CARB/MAGNESIUM 324 MG
1 TABLET ORAL
Qty: 0 | Refills: 0 | COMMUNITY
Start: 2019-02-11

## 2019-02-11 RX ORDER — FUROSEMIDE 40 MG
1 TABLET ORAL
Qty: 0 | Refills: 0 | COMMUNITY

## 2019-02-11 RX ORDER — WARFARIN SODIUM 2.5 MG/1
3 TABLET ORAL ONCE
Qty: 0 | Refills: 0 | Status: COMPLETED | OUTPATIENT
Start: 2019-02-11 | End: 2019-02-11

## 2019-02-11 RX ORDER — ATORVASTATIN CALCIUM 80 MG/1
1 TABLET, FILM COATED ORAL
Qty: 0 | Refills: 0 | COMMUNITY

## 2019-02-11 RX ADMIN — Medication 650 MILLIGRAM(S): at 21:31

## 2019-02-11 RX ADMIN — OXYCODONE HYDROCHLORIDE 5 MILLIGRAM(S): 5 TABLET ORAL at 12:49

## 2019-02-11 RX ADMIN — BUDESONIDE AND FORMOTEROL FUMARATE DIHYDRATE 2 PUFF(S): 160; 4.5 AEROSOL RESPIRATORY (INHALATION) at 05:24

## 2019-02-11 RX ADMIN — PANTOPRAZOLE SODIUM 40 MILLIGRAM(S): 20 TABLET, DELAYED RELEASE ORAL at 05:24

## 2019-02-11 RX ADMIN — Medication 650 MILLIGRAM(S): at 20:50

## 2019-02-11 RX ADMIN — ATORVASTATIN CALCIUM 40 MILLIGRAM(S): 80 TABLET, FILM COATED ORAL at 21:26

## 2019-02-11 RX ADMIN — OXYCODONE HYDROCHLORIDE 5 MILLIGRAM(S): 5 TABLET ORAL at 13:45

## 2019-02-11 RX ADMIN — PIPERACILLIN AND TAZOBACTAM 25 GRAM(S): 4; .5 INJECTION, POWDER, LYOPHILIZED, FOR SOLUTION INTRAVENOUS at 05:24

## 2019-02-11 RX ADMIN — OXYCODONE HYDROCHLORIDE 5 MILLIGRAM(S): 5 TABLET ORAL at 05:23

## 2019-02-11 RX ADMIN — Medication 81 MILLIGRAM(S): at 14:19

## 2019-02-11 RX ADMIN — OXYCODONE HYDROCHLORIDE 5 MILLIGRAM(S): 5 TABLET ORAL at 05:53

## 2019-02-11 RX ADMIN — WARFARIN SODIUM 3 MILLIGRAM(S): 2.5 TABLET ORAL at 21:26

## 2019-02-11 NOTE — PROGRESS NOTE ADULT - SUBJECTIVE AND OBJECTIVE BOX
Patient is a 73y old  Female who presents with a chief complaint of gallstone pancreatitis (11 Feb 2019 12:25)                                                               INTERVAL HPI/OVERNIGHT EVENTS:    REVIEW OF SYSTEMS:     CONSTITUTIONAL: No weakness, fevers or chills  EYES/ENT: No visual changes , no ear ache   NECK: No pain or stiffness  RESPIRATORY: No cough, wheezing,  No shortness of breath  CARDIOVASCULAR: No chest pain or palpitations  GASTROINTESTINAL: No abdominal pain  . No nausea, vomiting, or hematemesis; No diarrhea or constipation. No melena or hematochezia.  GENITOURINARY: No dysuria, frequency or hematuria  NEUROLOGICAL: No numbness or weakness  SKIN: No itching, burning, rashes, or lesions                                                                                                                                                                                                                                                                                 Medications:  MEDICATIONS  (STANDING):  aspirin enteric coated 81 milliGRAM(s) Oral daily  atorvastatin 40 milliGRAM(s) Oral at bedtime  buDESOnide 160 MICROgram(s)/formoterol 4.5 MICROgram(s) Inhaler 2 Puff(s) Inhalation two times a day  dextrose 5%. 1000 milliLiter(s) (50 mL/Hr) IV Continuous <Continuous>  dextrose 50% Injectable 12.5 Gram(s) IV Push once  dextrose 50% Injectable 25 Gram(s) IV Push once  dextrose 50% Injectable 25 Gram(s) IV Push once  diltiazem    Tablet 30 milliGRAM(s) Oral every 6 hours  insulin lispro (HumaLOG) corrective regimen sliding scale   SubCutaneous three times a day before meals  insulin lispro (HumaLOG) corrective regimen sliding scale   SubCutaneous at bedtime  pantoprazole    Tablet 40 milliGRAM(s) Oral before breakfast    MEDICATIONS  (PRN):  acetaminophen   Tablet .. 650 milliGRAM(s) Oral every 6 hours PRN Mild Pain (1 - 3)  dextrose 40% Gel 15 Gram(s) Oral once PRN Blood Glucose LESS THAN 70 milliGRAM(s)/deciliter  glucagon  Injectable 1 milliGRAM(s) IntraMuscular once PRN Glucose LESS THAN 70 milligrams/deciliter  guaiFENesin   Syrup  (Sugar-Free) 100 milliGRAM(s) Oral every 6 hours PRN Cough  oxyCODONE    IR 5 milliGRAM(s) Oral every 4 hours PRN Severe Pain (7 - 10)       Allergies    Avelox (Pruritus)  Levaquin (Unknown)    Intolerances      Vital Signs Last 24 Hrs  T(C): 36.6 (11 Feb 2019 20:11), Max: 36.6 (11 Feb 2019 20:11)  T(F): 97.9 (11 Feb 2019 20:11), Max: 97.9 (11 Feb 2019 20:11)  HR: 95 (11 Feb 2019 20:11) (90 - 105)  BP: 128/65 (11 Feb 2019 20:11) (96/57 - 128/65)  BP(mean): --  RR: 18 (11 Feb 2019 20:11) (18 - 18)  SpO2: 100% (11 Feb 2019 20:11) (95% - 100%)  CAPILLARY BLOOD GLUCOSE      POCT Blood Glucose.: 130 mg/dL (11 Feb 2019 21:01)  POCT Blood Glucose.: 133 mg/dL (11 Feb 2019 17:41)  POCT Blood Glucose.: 119 mg/dL (11 Feb 2019 11:31)  POCT Blood Glucose.: 106 mg/dL (11 Feb 2019 07:30)      02-10 @ 07:01 - 02-11 @ 07:00  --------------------------------------------------------  IN: 280 mL / OUT: 0 mL / NET: 280 mL    02-11 @ 07:01 - 02-11 @ 23:31  --------------------------------------------------------  IN: 580 mL / OUT: 1000 mL / NET: -420 mL      Physical Exam:    Daily     Daily   General:  Well appearing, NAD, not cachetic  HEENT:  Nonicteric, PERRLA  CV:  RRR, S1S2   Lungs:  CTA B/L, no wheezes, rales, rhonchi  Abdomen:  Soft, non-tender, no distended, positive BS  Extremities:  2+ pulses, no c/c, no edema  Skin:  Warm and dry, no rashes  :  No andrade  Neuro:  AAOx3, non-focal, grossly intact                                                                                                                                                                                                                                                                                                LABS:                               9.5    9.52  )-----------( 306      ( 11 Feb 2019 07:53 )             31.0                      02-11    137  |  103  |  4<L>  ----------------------------<  87  3.9   |  23  |  0.69    Ca    7.8<L>      11 Feb 2019 05:44                         RADIOLOGY & ADDITIONAL TESTS         I personally reviewed: [  ]EKG   [  ]CXR    [  ] CT      A/P:         Discussed with :     Darryl consultants' Notes   Time spent : Patient is a 73y old  Female who presents with a chief complaint of gallstone pancreatitis (11 Feb 2019 12:25)                                                               INTERVAL HPI/OVERNIGHT EVENTS:    REVIEW OF SYSTEMS:     CONSTITUTIONAL: No weakness, fevers or chills  RESPIRATORY: No cough, wheezing,  No shortness of breath  CARDIOVASCULAR: No chest pain or palpitations  GASTROINTESTINAL: No abdominal pain  . No nausea, vomiting, or hematemesis; No diarrhea or constipation. No melena or hematochezia.  GENITOURINARY: No dysuria, frequency or hematuria  NEUROLOGICAL: No numbness or weakness                                                                                                                                                                                                                                                                                 Medications:  MEDICATIONS  (STANDING):  aspirin enteric coated 81 milliGRAM(s) Oral daily  atorvastatin 40 milliGRAM(s) Oral at bedtime  buDESOnide 160 MICROgram(s)/formoterol 4.5 MICROgram(s) Inhaler 2 Puff(s) Inhalation two times a day  dextrose 5%. 1000 milliLiter(s) (50 mL/Hr) IV Continuous <Continuous>  dextrose 50% Injectable 12.5 Gram(s) IV Push once  dextrose 50% Injectable 25 Gram(s) IV Push once  dextrose 50% Injectable 25 Gram(s) IV Push once  diltiazem    Tablet 30 milliGRAM(s) Oral every 6 hours  insulin lispro (HumaLOG) corrective regimen sliding scale   SubCutaneous three times a day before meals  insulin lispro (HumaLOG) corrective regimen sliding scale   SubCutaneous at bedtime  pantoprazole    Tablet 40 milliGRAM(s) Oral before breakfast    MEDICATIONS  (PRN):  acetaminophen   Tablet .. 650 milliGRAM(s) Oral every 6 hours PRN Mild Pain (1 - 3)  dextrose 40% Gel 15 Gram(s) Oral once PRN Blood Glucose LESS THAN 70 milliGRAM(s)/deciliter  glucagon  Injectable 1 milliGRAM(s) IntraMuscular once PRN Glucose LESS THAN 70 milligrams/deciliter  guaiFENesin   Syrup  (Sugar-Free) 100 milliGRAM(s) Oral every 6 hours PRN Cough  oxyCODONE    IR 5 milliGRAM(s) Oral every 4 hours PRN Severe Pain (7 - 10)       Allergies    Avelox (Pruritus)  Levaquin (Unknown)    Intolerances      Vital Signs Last 24 Hrs  T(C): 36.6 (11 Feb 2019 20:11), Max: 36.6 (11 Feb 2019 20:11)  T(F): 97.9 (11 Feb 2019 20:11), Max: 97.9 (11 Feb 2019 20:11)  HR: 95 (11 Feb 2019 20:11) (90 - 105)  BP: 128/65 (11 Feb 2019 20:11) (96/57 - 128/65)  BP(mean): --  RR: 18 (11 Feb 2019 20:11) (18 - 18)  SpO2: 100% (11 Feb 2019 20:11) (95% - 100%)  CAPILLARY BLOOD GLUCOSE      POCT Blood Glucose.: 130 mg/dL (11 Feb 2019 21:01)  POCT Blood Glucose.: 133 mg/dL (11 Feb 2019 17:41)  POCT Blood Glucose.: 119 mg/dL (11 Feb 2019 11:31)  POCT Blood Glucose.: 106 mg/dL (11 Feb 2019 07:30)      02-10 @ 07:01 - 02-11 @ 07:00  --------------------------------------------------------  IN: 280 mL / OUT: 0 mL / NET: 280 mL    02-11 @ 07:01 - 02-11 @ 23:31  --------------------------------------------------------  IN: 580 mL / OUT: 1000 mL / NET: -420 mL      Physical Exam:     General: cachetic  HEENT:  Nonicteric, PERRLA  CV:  RRR, S1S2   Lungs:  CTA B  Abdomen:  Soft, non-tender, no distended, positive BS  Extremities:  2+ pulses, no c/c, no edema  Skin:  Warm and dry, no rashes  :  No andrade  Neuro:  AAOx3, non-focal, grossly intact                                                                                                                                                                                                                                                                                                LABS:                               9.5    9.52  )-----------( 306      ( 11 Feb 2019 07:53 )             31.0                      02-11    137  |  103  |  4<L>  ----------------------------<  87  3.9   |  23  |  0.69    Ca    7.8<L>      11 Feb 2019 05:44

## 2019-02-11 NOTE — PROGRESS NOTE ADULT - SUBJECTIVE AND OBJECTIVE BOX
CC: f/u for GNR bacteremia    Patient reports: she has mild abdominal discomfort, is s/p lap femi    REVIEW OF SYSTEMS:  All other review of systems negative (Comprehensive ROS)    Antimicrobials Day #  :off    Other Medications Reviewed    T(F): 97.7 (02-11-19 @ 04:50), Max: 97.9 (02-10-19 @ 19:37)  HR: 90 (02-11-19 @ 04:50)  BP: 108/53 (02-11-19 @ 04:50)  RR: 18 (02-11-19 @ 04:50)  SpO2: 100% (02-11-19 @ 04:50)  Wt(kg): --    PHYSICAL EXAM:  General: alert, no acute distress  Eyes:  anicteric, no conjunctival injection, no discharge  Oropharynx: no lesions or injection 	  Neck: supple, without adenopathy  Lungs: clear to auscultation  Heart: regular rate and rhythm; no murmur, rubs or gallops  Abdomen: soft, nondistended, nontender,   Skin: no lesions  Extremities: no clubbing, cyanosis, or edema  Neurologic: alert, oriented, moves all extremities    LAB RESULTS:                        9.5    9.52  )-----------( 306      ( 11 Feb 2019 07:53 )             31.0     02-11    137  |  103  |  4<L>  ----------------------------<  87  3.9   |  23  |  0.69    Ca    7.8<L>      11 Feb 2019 05:44          MICROBIOLOGY:  RECENT CULTURES:      RADIOLOGY REVIEWED:

## 2019-02-11 NOTE — PROGRESS NOTE ADULT - ASSESSMENT
74 yo F, Afib, DM  Here with abdom pain, elevated LFTs, severe sepsis   E coli bacteremia   CT Abdom and ultrasound cwith likely biliary source  Urine Cx negative  Most concerned abt biliary source; elevated lipase noted- gallstone pancreatitis  Afebrile, pain resolved, leukocytosis resolved, LFTs lower, on  Zosyn- clinically improved  MRCP results noted  E Coli is a pansensitive organism  S/P ERCP with sludge found  S/P cardiac cath and elective lap femi  S/P 10 days of zosyn  Plan:  1.monitor off antibiotics  2,no signs of ongoing infection  3.disposition per medicine and surgery

## 2019-02-11 NOTE — PROGRESS NOTE ADULT - ASSESSMENT
74 yo female with PMH of Atrial fibrillation on coumadin, HTN, DM2, Asthma, HLD,celiac and SMA dz last admission on   mesenteric angiogram now presenting with acute onset of epigastric pain with N but no vomitting  no fever or chills  no diarreah and no uriary sx   CT was done in ED : < from: CT Angio Abdomen and Pelvis w/ IV Cont (02.01.19 @ 04:05) >   No acute intra-abdominal pathology.  No abnormal bowel wall thickening.  Patent mesenteric vasculature, however the SMA is diminutive in caliber   and its origin is likely stenotic, as seen on prior studies.  lactic acid was elevated   pt was thought to have UTI ..pt however developed acute respiratory failure and placed on bipap.. pt became hypotensive and afib with RVR and MICU called...   Pt was evaluated by MICU and thought not to be a candidate for MICU..    At bedside Pt is ill looking , pale but says feels better overall .. off bipap and improved HR and BP   denies cp  SOB palpiations   feels N but no vomitting... no diarreah   labs : significant for leukocytosis.. acute elevation of trop and acute elevation of LFT     1- acute respiratory failure : off bipap  ABG improved ..  2- elevated trop : pt with epgistric pain  and significant risk factors ..  off  heparin post ERCP    cardiac meds per DR. Tamayo    . trop elevation might be sec to demand ischemia   cath : NL coronaries     3- Acute elevation of LFT/ acute pancreatitis  :  likely multifactorial .. sec to  hypotension,  sepsis and cholangitis   GI consult appreaitaed  MRCP negative for CBD stone       ERCP : sludge removed   , cholelithiasis   LFT improving   cont abx   s/p lap femi   diet per surgery   IS     OOB       4- septic Shock : sec to E coli bacteremia ..repeat culture negative   imprvoed   ID input appreciated ..     5- afib : cont rate control: restart PO cardizem for better control  can start AC with coumadin 72 yo female with PMH of Atrial fibrillation on coumadin, HTN, DM2, Asthma, HLD,celiac and SMA dz last admission on   mesenteric angiogram now presenting with acute onset of epigastric pain with N but no vomitting  no fever or chills  no diarreah and no uriary sx   CT was done in ED : < from: CT Angio Abdomen and Pelvis w/ IV Cont (02.01.19 @ 04:05) >   No acute intra-abdominal pathology.  No abnormal bowel wall thickening.  Patent mesenteric vasculature, however the SMA is diminutive in caliber   and its origin is likely stenotic, as seen on prior studies.  lactic acid was elevated   pt was thought to have UTI ..pt however developed acute respiratory failure and placed on bipap.. pt became hypotensive and afib with RVR and MICU called...   Pt was evaluated by MICU and thought not to be a candidate for MICU..    At bedside Pt is ill looking , pale but says feels better overall .. off bipap and improved HR and BP   denies cp  SOB palpiations   feels N but no vomitting... no diarreah   labs : significant for leukocytosis.. acute elevation of trop and acute elevation of LFT     1- acute respiratory failure : off bipap  ABG improved ..  2- elevated trop : pt with epgistric pain  and significant risk factors ..  post ERCP    cardiac meds per DR. Tamayo    . trop elevation might be sec to demand ischemia   cath : NL coronaries     3- Acute elevation of LFT/ acute pancreatitis  :  likely multifactorial .. sec to  hypotension,  sepsis and cholangitis   GI consult appreaitaed  MRCP negative for CBD stone       ERCP : sludge removed   , cholelithiasis   LFT improving   cont abx   s/p lap femi   diet per surgery   IS     OOB       4- septic Shock : sec to E coli bacteremia ..repeat culture negative   imprvoed   ID input appreciated ..     5- afib : cont rate control: restart PO cardizem for better control  can start AC with coumadin

## 2019-02-11 NOTE — PROGRESS NOTE ADULT - SUBJECTIVE AND OBJECTIVE BOX
CARDIOLOGY FOLLOW UP NOTE - DR. RAYO WORKMAN    Patient states no new complaints.    MEDICATIONS  (STANDING):  aspirin enteric coated 81 milliGRAM(s) Oral daily  atorvastatin 40 milliGRAM(s) Oral at bedtime  buDESOnide 160 MICROgram(s)/formoterol 4.5 MICROgram(s) Inhaler 2 Puff(s) Inhalation two times a day  dextrose 5%. 1000 milliLiter(s) (50 mL/Hr) IV Continuous <Continuous>  dextrose 50% Injectable 12.5 Gram(s) IV Push once  dextrose 50% Injectable 25 Gram(s) IV Push once  dextrose 50% Injectable 25 Gram(s) IV Push once  diltiazem    Tablet 30 milliGRAM(s) Oral every 6 hours  insulin lispro (HumaLOG) corrective regimen sliding scale   SubCutaneous three times a day before meals  insulin lispro (HumaLOG) corrective regimen sliding scale   SubCutaneous at bedtime  pantoprazole    Tablet 40 milliGRAM(s) Oral before breakfast        PHYSICAL EXAM:  Vital Signs Last 24 Hrs  T(C): 36.5 (11 Feb 2019 04:50), Max: 36.8 (10 Feb 2019 11:43)  T(F): 97.7 (11 Feb 2019 04:50), Max: 98.2 (10 Feb 2019 11:43)  HR: 90 (11 Feb 2019 04:50) (90 - 99)  BP: 108/53 (11 Feb 2019 04:50) (96/57 - 110/72)  BP(mean): --  RR: 18 (11 Feb 2019 04:50) (18 - 18)  SpO2: 100% (11 Feb 2019 04:50) (100% - 100%)  I&O's Summary    10 Feb 2019 07:01  -  11 Feb 2019 07:00  --------------------------------------------------------  IN: 280 mL / OUT: 0 mL / NET: 280 mL        Telemetry:  sinus 80's-100's, PAT burst x 2 seconds    General: NAD	  HEENT:   No JVD	  Cardiovascular: Regular and tachycardic, Normal S1 and S2, No murmurs/gallops/rubs  Respiratory: Lungs clear to auscultation	  Gastrointestinal:  mild epigastric and RUQ tenderness to palpation, + BS	  Extremities: trace bilateral foot edema    	    LABS:                          9.5    9.52  )-----------( 306      ( 11 Feb 2019 07:53 )             31.0     02-11    137  |  103  |  4<L>  ----------------------------<  87  3.9   |  23  |  0.69    Ca    7.8<L>      11 Feb 2019 05:44      PT/INR - ( 10 Feb 2019 08:23 )   PT: 16.0 sec;   INR: 1.41 ratio          Assessment and Plan:  74 yo female with PMH of Atrial fibrillation on coumadin, HTN, DM2, Asthma, HLD, celiac and SMA atherosclerosis on last admission with mesenteric angiogram presented with acute cholecystitis and likely gallstone pancreatitis.   1. Elevated troponin - pt underwent cardiac cath, which showed normal coronary arteries. On aspirin and statin.  2. PAF - maintaining sinus. Resumed coumadin yesterday. Dose coumadin daily and check INR daily. She does not need to remain hospitalized until INR is therapeutic. She will need INR check within 2-3 days of discharge (INR is followed by her PMD).  3. HTN - on low dose cardizem.  4. LV wall motion abnormality - septal hypokinesis. Cardiac cath was normal. Will treat with meds as above.

## 2019-02-12 VITALS
TEMPERATURE: 97 F | DIASTOLIC BLOOD PRESSURE: 70 MMHG | RESPIRATION RATE: 18 BRPM | HEART RATE: 93 BPM | SYSTOLIC BLOOD PRESSURE: 113 MMHG | OXYGEN SATURATION: 97 %

## 2019-02-12 LAB
GLUCOSE BLDC GLUCOMTR-MCNC: 78 MG/DL — SIGNIFICANT CHANGE UP (ref 70–99)
GLUCOSE BLDC GLUCOMTR-MCNC: 94 MG/DL — SIGNIFICANT CHANGE UP (ref 70–99)
HCT VFR BLD CALC: 31.1 % — LOW (ref 34.5–45)
HGB BLD-MCNC: 9.6 G/DL — LOW (ref 11.5–15.5)
MCHC RBC-ENTMCNC: 23.8 PG — LOW (ref 27–34)
MCHC RBC-ENTMCNC: 30.9 GM/DL — LOW (ref 32–36)
MCV RBC AUTO: 77 FL — LOW (ref 80–100)
PLATELET # BLD AUTO: 268 K/UL — SIGNIFICANT CHANGE UP (ref 150–400)
RBC # BLD: 4.04 M/UL — SIGNIFICANT CHANGE UP (ref 3.8–5.2)
RBC # FLD: 16.7 % — HIGH (ref 10.3–14.5)
WBC # BLD: 9.43 K/UL — SIGNIFICANT CHANGE UP (ref 3.8–10.5)
WBC # FLD AUTO: 9.43 K/UL — SIGNIFICANT CHANGE UP (ref 3.8–10.5)

## 2019-02-12 PROCEDURE — 74181 MRI ABDOMEN W/O CONTRAST: CPT

## 2019-02-12 PROCEDURE — 87150 DNA/RNA AMPLIFIED PROBE: CPT

## 2019-02-12 PROCEDURE — 94640 AIRWAY INHALATION TREATMENT: CPT

## 2019-02-12 PROCEDURE — 87633 RESP VIRUS 12-25 TARGETS: CPT

## 2019-02-12 PROCEDURE — 84484 ASSAY OF TROPONIN QUANT: CPT

## 2019-02-12 PROCEDURE — 99285 EMERGENCY DEPT VISIT HI MDM: CPT | Mod: 25

## 2019-02-12 PROCEDURE — C1769: CPT

## 2019-02-12 PROCEDURE — 93975 VASCULAR STUDY: CPT

## 2019-02-12 PROCEDURE — 87040 BLOOD CULTURE FOR BACTERIA: CPT

## 2019-02-12 PROCEDURE — 82435 ASSAY OF BLOOD CHLORIDE: CPT

## 2019-02-12 PROCEDURE — 96376 TX/PRO/DX INJ SAME DRUG ADON: CPT

## 2019-02-12 PROCEDURE — 82962 GLUCOSE BLOOD TEST: CPT

## 2019-02-12 PROCEDURE — 86901 BLOOD TYPING SEROLOGIC RH(D): CPT

## 2019-02-12 PROCEDURE — 88304 TISSUE EXAM BY PATHOLOGIST: CPT

## 2019-02-12 PROCEDURE — 84295 ASSAY OF SERUM SODIUM: CPT

## 2019-02-12 PROCEDURE — 83690 ASSAY OF LIPASE: CPT

## 2019-02-12 PROCEDURE — 82550 ASSAY OF CK (CPK): CPT

## 2019-02-12 PROCEDURE — 86900 BLOOD TYPING SEROLOGIC ABO: CPT

## 2019-02-12 PROCEDURE — 80053 COMPREHEN METABOLIC PANEL: CPT

## 2019-02-12 PROCEDURE — 87798 DETECT AGENT NOS DNA AMP: CPT

## 2019-02-12 PROCEDURE — 82803 BLOOD GASES ANY COMBINATION: CPT

## 2019-02-12 PROCEDURE — 99152 MOD SED SAME PHYS/QHP 5/>YRS: CPT

## 2019-02-12 PROCEDURE — C1887: CPT

## 2019-02-12 PROCEDURE — 83605 ASSAY OF LACTIC ACID: CPT

## 2019-02-12 PROCEDURE — 87581 M.PNEUMON DNA AMP PROBE: CPT

## 2019-02-12 PROCEDURE — 87086 URINE CULTURE/COLONY COUNT: CPT

## 2019-02-12 PROCEDURE — 93454 CORONARY ARTERY ANGIO S&I: CPT

## 2019-02-12 PROCEDURE — 86850 RBC ANTIBODY SCREEN: CPT

## 2019-02-12 PROCEDURE — 83036 HEMOGLOBIN GLYCOSYLATED A1C: CPT

## 2019-02-12 PROCEDURE — 71045 X-RAY EXAM CHEST 1 VIEW: CPT

## 2019-02-12 PROCEDURE — 84132 ASSAY OF SERUM POTASSIUM: CPT

## 2019-02-12 PROCEDURE — 82553 CREATINE MB FRACTION: CPT

## 2019-02-12 PROCEDURE — 80048 BASIC METABOLIC PNL TOTAL CA: CPT

## 2019-02-12 PROCEDURE — 85610 PROTHROMBIN TIME: CPT

## 2019-02-12 PROCEDURE — 82947 ASSAY GLUCOSE BLOOD QUANT: CPT

## 2019-02-12 PROCEDURE — 94660 CPAP INITIATION&MGMT: CPT

## 2019-02-12 PROCEDURE — 87186 SC STD MICRODIL/AGAR DIL: CPT

## 2019-02-12 PROCEDURE — 97530 THERAPEUTIC ACTIVITIES: CPT

## 2019-02-12 PROCEDURE — 85014 HEMATOCRIT: CPT

## 2019-02-12 PROCEDURE — 96375 TX/PRO/DX INJ NEW DRUG ADDON: CPT

## 2019-02-12 PROCEDURE — 97162 PT EVAL MOD COMPLEX 30 MIN: CPT

## 2019-02-12 PROCEDURE — C1894: CPT

## 2019-02-12 PROCEDURE — 81001 URINALYSIS AUTO W/SCOPE: CPT

## 2019-02-12 PROCEDURE — 96374 THER/PROPH/DIAG INJ IV PUSH: CPT | Mod: XU

## 2019-02-12 PROCEDURE — 87486 CHLMYD PNEUM DNA AMP PROBE: CPT

## 2019-02-12 PROCEDURE — 74174 CTA ABD&PLVS W/CONTRAST: CPT

## 2019-02-12 PROCEDURE — 93005 ELECTROCARDIOGRAM TRACING: CPT

## 2019-02-12 PROCEDURE — 83735 ASSAY OF MAGNESIUM: CPT

## 2019-02-12 PROCEDURE — 85730 THROMBOPLASTIN TIME PARTIAL: CPT

## 2019-02-12 PROCEDURE — C1889: CPT

## 2019-02-12 PROCEDURE — 84100 ASSAY OF PHOSPHORUS: CPT

## 2019-02-12 PROCEDURE — 76700 US EXAM ABDOM COMPLETE: CPT

## 2019-02-12 PROCEDURE — 85027 COMPLETE CBC AUTOMATED: CPT

## 2019-02-12 PROCEDURE — 97116 GAIT TRAINING THERAPY: CPT

## 2019-02-12 PROCEDURE — 82330 ASSAY OF CALCIUM: CPT

## 2019-02-12 PROCEDURE — C8929: CPT

## 2019-02-12 RX ADMIN — Medication 81 MILLIGRAM(S): at 12:03

## 2019-02-12 RX ADMIN — BUDESONIDE AND FORMOTEROL FUMARATE DIHYDRATE 2 PUFF(S): 160; 4.5 AEROSOL RESPIRATORY (INHALATION) at 05:18

## 2019-02-12 RX ADMIN — PANTOPRAZOLE SODIUM 40 MILLIGRAM(S): 20 TABLET, DELAYED RELEASE ORAL at 05:18

## 2019-02-12 RX ADMIN — Medication 650 MILLIGRAM(S): at 09:36

## 2019-02-12 RX ADMIN — Medication 650 MILLIGRAM(S): at 10:20

## 2019-02-12 NOTE — PROGRESS NOTE ADULT - NSHPATTENDINGPLANDISCUSS_GEN_ALL_CORE
pt , family at bedside NT, Dr. Redmond
pt, family , nurse at bedside
pt, family at bedside , CM, Np and PMD
kuldip siegel and bette

## 2019-02-12 NOTE — PROGRESS NOTE ADULT - SUBJECTIVE AND OBJECTIVE BOX
CC: f/u for gnr bacteremia and gallstone pancreatitis    Patient reports: no specific complaints,tolerating her diet    REVIEW OF SYSTEMS:  All other review of systems negative (Comprehensive ROS)    Antimicrobials Day #  :off    Other Medications Reviewed    T(F): 97.9 (02-12-19 @ 04:12), Max: 97.9 (02-11-19 @ 20:11)  HR: 81 (02-12-19 @ 04:12)  BP: 122/68 (02-12-19 @ 04:12)  RR: 18 (02-12-19 @ 04:12)  SpO2: 100% (02-12-19 @ 04:12)  Wt(kg): --    PHYSICAL EXAM:  General: alert, no acute distress  Eyes:  anicteric, no conjunctival injection, no discharge  Oropharynx: no lesions or injection 	  Neck: supple, without adenopathy  Lungs: clear to auscultation  Heart: regular rate and rhythm; no murmur, rubs or gallops  Abdomen: soft, nondistended, nontender, without mass or organomegaly  Skin: no lesions  Extremities: no clubbing, cyanosis, or edema  Neurologic: alert, oriented, moves all extremities    LAB RESULTS:                        9.6    9.43  )-----------( 268      ( 12 Feb 2019 07:30 )             31.1     02-11    137  |  103  |  4<L>  ----------------------------<  87  3.9   |  23  |  0.69    Ca    7.8<L>      11 Feb 2019 05:44          MICROBIOLOGY:  RECENT CULTURES:      RADIOLOGY REVIEWED:

## 2019-02-12 NOTE — PROGRESS NOTE ADULT - SUBJECTIVE AND OBJECTIVE BOX
Patient is a 73y old  Female who presents with a chief complaint of abdominal pain (12 Feb 2019 10:27)                                                               INTERVAL HPI/OVERNIGHT EVENTS:    REVIEW OF SYSTEMS:     CONSTITUTIONAL: No weakness, fevers or chills  RESPIRATORY: No cough, wheezing,  No shortness of breath  CARDIOVASCULAR: No chest pain or palpitations  GASTROINTESTINAL: No abdominal pain  . No nausea, vomiting, or hematemesis; No diarrhea or constipation. No melena or hematochezia.  GENITOURINARY: No dysuria, frequency or hematuria  NEUROLOGICAL: No numbness or weakness                                                                                                                                                                                                                                                                                  Medications:  MEDICATIONS  (STANDING):  aspirin enteric coated 81 milliGRAM(s) Oral daily  atorvastatin 40 milliGRAM(s) Oral at bedtime  buDESOnide 160 MICROgram(s)/formoterol 4.5 MICROgram(s) Inhaler 2 Puff(s) Inhalation two times a day  dextrose 5%. 1000 milliLiter(s) (50 mL/Hr) IV Continuous <Continuous>  dextrose 50% Injectable 12.5 Gram(s) IV Push once  dextrose 50% Injectable 25 Gram(s) IV Push once  dextrose 50% Injectable 25 Gram(s) IV Push once  diltiazem    Tablet 30 milliGRAM(s) Oral every 6 hours  insulin lispro (HumaLOG) corrective regimen sliding scale   SubCutaneous three times a day before meals  insulin lispro (HumaLOG) corrective regimen sliding scale   SubCutaneous at bedtime  pantoprazole    Tablet 40 milliGRAM(s) Oral before breakfast    MEDICATIONS  (PRN):  acetaminophen   Tablet .. 650 milliGRAM(s) Oral every 6 hours PRN Mild Pain (1 - 3)  dextrose 40% Gel 15 Gram(s) Oral once PRN Blood Glucose LESS THAN 70 milliGRAM(s)/deciliter  glucagon  Injectable 1 milliGRAM(s) IntraMuscular once PRN Glucose LESS THAN 70 milligrams/deciliter  guaiFENesin   Syrup  (Sugar-Free) 100 milliGRAM(s) Oral every 6 hours PRN Cough  oxyCODONE    IR 5 milliGRAM(s) Oral every 4 hours PRN Severe Pain (7 - 10)       Allergies    Avelox (Pruritus)  Levaquin (Unknown)    Intolerances      Vital Signs Last 24 Hrs  T(C): 36.3 (12 Feb 2019 11:17), Max: 36.6 (11 Feb 2019 20:11)  T(F): 97.4 (12 Feb 2019 11:17), Max: 97.9 (11 Feb 2019 20:11)  HR: 93 (12 Feb 2019 11:17) (81 - 105)  BP: 113/70 (12 Feb 2019 11:17) (111/70 - 128/65)  BP(mean): --  RR: 18 (12 Feb 2019 11:17) (17 - 18)  SpO2: 97% (12 Feb 2019 11:17) (97% - 100%)  CAPILLARY BLOOD GLUCOSE      POCT Blood Glucose.: 94 mg/dL (12 Feb 2019 11:52)  POCT Blood Glucose.: 78 mg/dL (12 Feb 2019 07:39)  POCT Blood Glucose.: 130 mg/dL (11 Feb 2019 21:01)  POCT Blood Glucose.: 133 mg/dL (11 Feb 2019 17:41)      02-11 @ 07:01 - 02-12 @ 07:00  --------------------------------------------------------  IN: 580 mL / OUT: 1300 mL / NET: -720 mL    02-12 @ 07:01 - 02-12 @ 15:27  --------------------------------------------------------  IN: 480 mL / OUT: 0 mL / NET: 480 mL      Physical Exam:    General:  cachectic   HEENT:  Nonicteric, PERRLA  CV:  RRR, S1S2   Lungs:  CTA  Abdomen:  Soft, non-tender, no distended, positive BS  Extremities:  2+ pulses, no c/c, no edema  Skin:  Warm and dry, no rashes  :  No andrade  Neuro:  AAOx3, non-focal, grossly intact                                                                                                                                                                                                                                                                                                LABS:                               9.6    9.43  )-----------( 268      ( 12 Feb 2019 07:30 )             31.1                      02-11    137  |  103  |  4<L>  ----------------------------<  87  3.9   |  23  |  0.69    Ca    7.8<L>      11 Feb 2019 05:44

## 2019-02-12 NOTE — PROGRESS NOTE ADULT - REASON FOR ADMISSION
epigastric pain
epigastric pain
epigastric and RUQ pain
epigastric pain
epigastric pain
epigastric/RUQ pain
gnr bacteremia
E coli bacteremia
abdominal pain
cholecystitis
gallstone pancreatitis
sepsis

## 2019-02-12 NOTE — PROGRESS NOTE ADULT - PROVIDER SPECIALTY LIST ADULT
Cardiology
Colorectal Surgery
Colorectal Surgery
Gastroenterology
Infectious Disease
Internal Medicine
Surgery
Cardiology
Cardiology
Colorectal Surgery
Colorectal Surgery

## 2019-02-12 NOTE — PROGRESS NOTE ADULT - ASSESSMENT
72 yo female with PMH of Atrial fibrillation on coumadin, HTN, DM2, Asthma, HLD,celiac and SMA dz last admission on   mesenteric angiogram now presenting with acute onset of epigastric pain with N but no vomitting  no fever or chills  no diarreah and no uriary sx   CT was done in ED : < from: CT Angio Abdomen and Pelvis w/ IV Cont (02.01.19 @ 04:05) >   No acute intra-abdominal pathology.  No abnormal bowel wall thickening.  Patent mesenteric vasculature, however the SMA is diminutive in caliber   and its origin is likely stenotic, as seen on prior studies.  lactic acid was elevated   pt was thought to have UTI ..pt however developed acute respiratory failure and placed on bipap.. pt became hypotensive and afib with RVR and MICU called...   Pt was evaluated by MICU and thought not to be a candidate for MICU..    At bedside Pt is ill looking , pale but says feels better overall .. off bipap and improved HR and BP   denies cp  SOB palpiations   feels N but no vomitting... no diarreah   labs : significant for leukocytosis.. acute elevation of trop and acute elevation of LFT     1- acute respiratory failure : off bipap  ABG improved ..  2- elevated trop : pt with epgistric pain  and significant risk factors ..  post ERCP    cardiac meds per DR. Tamayo    . trop elevation might be sec to demand ischemia   cath : NL coronaries     3- Acute elevation of LFT/ acute pancreatitis  :  likely multifactorial .. sec to  hypotension,  sepsis and cholangitis   GI consult appreaitaed  MRCP negative for CBD stone       ERCP : sludge removed   , cholelithiasis   LFT improving   completed  abx   s/p lap femi   toelrating diet   IS     OOB       4- septic Shock : sec to E coli bacteremia ..repeat culture negative   imprvoed   ID input appreciated ..     5- afib : cont rate control: restart PO cardizem for better control  restart AC with coumadin

## 2019-02-12 NOTE — PROGRESS NOTE ADULT - ASSESSMENT
74 yo F, Afib, DM  Here with abdom pain, elevated LFTs, severe sepsis   E coli bacteremia   CT Abdom and ultrasound cwith likely biliary source  Urine Cx negative  Most concerned abt biliary source; elevated lipase noted- gallstone pancreatitis  Afebrile, pain resolved, leukocytosis resolved, LFTs lower, on  Zosyn- clinically improved  MRCP results noted  E Coli is a pansensitive organism  S/P ERCP with sludge found  S/P cardiac cath and elective lap femi  S/P 10 days of zosyn  Plan:  1.monitor off antibiotics  2,no signs of ongoing infection  3.disposition per medicine and surgery, no additional ID w/u planned, we will stop actively following, please call if ID issues arise

## 2019-02-15 LAB — SURGICAL PATHOLOGY STUDY: SIGNIFICANT CHANGE UP

## 2019-07-30 NOTE — ED ADULT NURSE NOTE - NS ED NURSE LEVEL OF CONSCIOUSNESS ORIENTATION
Assessment/Plan:    No problem-specific Assessment & Plan notes found for this encounter  Asthma stable  Diverticulitis better, gi f/u  Bmi/morbid obesity aware     Diagnoses and all orders for this visit:    Screening breast examination  -     Mammo screening bilateral w cad; Future    Mild persistent asthma, unspecified whether complicated    Acute diverticulitis    Diverticulosis of colon    Screening for cardiovascular, respiratory, and genitourinary diseases  -     Lipid Panel with Direct LDL reflex; Future    Screening for diabetes mellitus (DM)  -     Comprehensive metabolic panel; Future    Morbid obesity (HCC)  -     TSH, 3rd generation; Future              Return if symptoms worsen or fail to improve  Subjective:      Patient ID: Omari Carlson is a 46 y o  female  Chief Complaint   Patient presents with    Transition of Care Management     ac/cma        HPI  3rd bout in 1 year  Dc on 7/23/19  3d in hosp  GI Dr Deo Murphy cefuroxime and flagyl  Short term protonix  Eating ok  No fever/nausea/vaginitis  Plans gi f/u in future  Gyn is Dr Rosie Yost  Back to work already  No blood in stool  Some constipation    TCM Call (since 6/29/2019)     Date and time call was made  7/24/2019 10:41 AM    Hospital care reviewed  Records reviewed    Patient was hospitialized at  [facility]    Date of Admission  07/21/19    Date of discharge  07/23/19    Diagnosis  Acute Diverticulitis    Disposition  Home    Current Symptoms  Fatigue <img src='C:FILES (X86)    Fatigue severity  Mild      TCM Call (since 6/29/2019)     Post hospital issues  None    Should patient be enrolled in anticoag monitoring? No    Scheduled for follow up?   Yes    Patients specialists  Other (comment)    Other specialists names  Dr Mohit Hunt    Did you obtain your prescribed medications  Yes    Do you need help managing your prescriptions or medications  No    Is transportation to your appointment needed  No    I have advised the patient to call PCP with any new or worsening symptoms  She knows to go to the ER if any abdominal pain- Celeste GUERRERO    Living Arrangements  Spouse or Significiant other    Support System  Family    The type of support provided  Physical; Emotional    Do you have social support  Yes, as much as I need    Are you recieving any outpatient services  No    Are you recieving home care services  No    Have you fallen in the last 12 months  No    Interperter language line needed  No    Counseling  Patient    Counseling topics  Activities of daily living; Importance of RX compliance; patient and family education; Risk factor reduction; instructions for management    Comments  I spoke with Aashish Dupont who states that she is feeling better  She denies abd pain or fever  Feels tired but states that its mild fatigue  She has had 2 bouts of diarrhea which started this am but feels fine otherwise and is tolerating her diet  I told her to let us or Dr Nils Manjarrez know if the diarrhea worsens and she should increase fluids and eat yogurt -explained antibiotics can cause the GI upset  She knows to call if this worsens and is aware to go to ER if any chest pain, dyspnea, weakness, dizziness, etc           The following portions of the patient's history were reviewed and updated as appropriate: allergies, current medications, past family history, past medical history, past social history, past surgical history and problem list     Review of Systems   Constitutional: Negative for fever  Gastrointestinal: Negative for anal bleeding and blood in stool           Current Outpatient Medications   Medication Sig Dispense Refill    albuterol (2 5 mg/3 mL) 0 083 % nebulizer solution Take 1 vial (2 5 mg total) by nebulization every 6 (six) hours as needed for wheezing or shortness of breath 100 mL 0    albuterol (VENTOLIN HFA) 90 mcg/act inhaler Inhale 2 puffs every 4 (four) hours as needed        cefuroxime (CEFTIN) 500 mg tablet Take 1 tablet (500 mg total) by mouth every 12 (twelve) hours for 8 days 16 tablet 0    fluticasone-salmeterol (ADVAIR DISKUS) 500-50 mcg/dose Inhale 1 puff 2 (two) times a day      metroNIDAZOLE (FLAGYL) 500 mg tablet Take 1 tablet (500 mg total) by mouth every 8 (eight) hours for 23 doses 23 tablet 0    multivitamin (THERAGRAN) TABS Take 1 tablet by mouth daily      pantoprazole (PROTONIX) 40 mg tablet Take 1 tablet (40 mg total) by mouth daily in the early morning 30 tablet 0    Respiratory Therapy Supplies (NEBULIZER/TUBING/MOUTHPIECE) KIT by Does not apply route 4 (four) times a day 1 each 0    acyclovir (ZOVIRAX) 200 mg capsule Take 1 capsule (200 mg total) by mouth daily as needed (flare ups) for up to 30 days Only as needed for outbreak 30 capsule 0     No current facility-administered medications for this visit  Objective:    /88   Pulse 76   Temp 98 °F (36 7 °C)   Resp 16   Ht 5' 3" (1 6 m)   Wt 103 kg (227 lb)   BMI 40 21 kg/m²        Physical Exam   Constitutional: She appears well-developed  No distress  HENT:   Head: Normocephalic  Right Ear: External ear normal    Left Ear: External ear normal    Nose: Nose normal    Mouth/Throat: Oropharynx is clear and moist  No oropharyngeal exudate  Eyes: Conjunctivae are normal  No scleral icterus  Neck: Neck supple  Cardiovascular: Normal rate, normal heart sounds and intact distal pulses  No murmur heard  Pulmonary/Chest: Effort normal  No respiratory distress  She has no wheezes  She has no rales  Abdominal: Soft  Bowel sounds are normal  She exhibits no distension  There is no tenderness  There is no guarding  Musculoskeletal: She exhibits no edema or deformity  Neurological: She is alert  Skin: Skin is warm and dry  No rash noted  No pallor  Psychiatric: Her behavior is normal  Thought content normal    Nursing note and vitals reviewed               Migdalia Astorga DO Oriented - self; Oriented - place; Oriented - time

## 2019-11-11 NOTE — ED PROVIDER NOTE - OBJECTIVE STATEMENT
No - the patient is unable to be screened due to medical condition
73yoF hx of dm, htn, asthma, afib pw 1 day of nausea, vomiting, abd cramping, subjective chills, general malaisea. abd pain is right lower quadrant, cramping in nature, radiating up, not relieved by vomiting. now is currently 6/10pain. vomiting 3 times, non bloody, non bilous. still endorsing nausea. denies fevers, recent travel, chest pain, sob, numbness, tingling dysuria, runny nose, congestion, body aches, or other issues.

## 2020-06-12 ENCOUNTER — APPOINTMENT (OUTPATIENT)
Dept: OTOLARYNGOLOGY | Facility: CLINIC | Age: 75
End: 2020-06-12
Payer: MEDICARE

## 2020-06-12 VITALS
HEIGHT: 60 IN | BODY MASS INDEX: 19.83 KG/M2 | DIASTOLIC BLOOD PRESSURE: 70 MMHG | SYSTOLIC BLOOD PRESSURE: 134 MMHG | HEART RATE: 116 BPM | WEIGHT: 101 LBS | TEMPERATURE: 98.2 F

## 2020-06-12 PROCEDURE — 99214 OFFICE O/P EST MOD 30 MIN: CPT | Mod: 25

## 2020-06-12 PROCEDURE — 69210 REMOVE IMPACTED EAR WAX UNI: CPT

## 2020-06-12 RX ORDER — OFLOXACIN OTIC 3 MG/ML
0.3 SOLUTION AURICULAR (OTIC) TWICE DAILY
Qty: 1 | Refills: 5 | Status: ACTIVE | COMMUNITY
Start: 2020-06-12 | End: 1900-01-01

## 2020-06-12 NOTE — HISTORY OF PRESENT ILLNESS
[Hearing Loss] : hearing loss [de-identified] : Patient complains of clogged left ear x 3 months. Has hx of severe cerumen impaction. Also states she cant hear well from left ear. Pt has no ear pain, ear drainage, tinnitus, vertigo, nasal congestion, nasal discharge, epistaxis, sinus infections, facial pain, facial pressure, throat pain, dysphagia or fevers\par \par \par  [Anxiety] : no anxiety [Dizziness] : no dizziness [Headache] : no headache [Recurrent Otitis Media] : no recurrent otitis media [Otitis Media with Effusion] : no otitis media with effusion [Presbycusis] : no presbycusis [Congenital Ear Malformation] : no congenital ear malformation [Meniere Disease] : no Meniere disease [Otosclerosis] : no otosclerosis [Perilymphatic Fistula] : no perilymphatic fistula [Hypertension] : no hypertension [Loud Noise Exposure] : no history of loud noise exposure [Smoking] : no smoking [Early Onset Hearing Loss] : no early onset hearing loss [Stroke] : no stroke [Facial Pressure] : no facial pressure [Facial Pain] : no facial pain [Nasal Congestion] : no nasal congestion [Diplopia] : no diplopia [Ear Fullness] : no ear fullness [Allergic Rhinitis] : no allergic rhinitis [Maxillary Tooth Infection] : no maxillary tooth infection [Septal Deviation] : no septal deviation [Environmental Allergies] : no environmental allergies [Seasonal Allergies] : no seasonal allergies [Environmental Allergens] : no environmental allergens [Adenoidectomy] : no adenoidectomy [Nasal FB Removal] : no nasal foreign body removal [Allergies] : no allergies [Asthma] : no asthma [Neck Mass] : no neck mass [Neck Pain] : no neck pain [Chills] : no chills [Cold Intolerance] : no cold intolerance [Cough] : no cough [Fatigue] : no fatigue [Heat Intolerance] : no heat intolerance [Hyperthyroidism] : no hyperthyroidism [Sialadenitis] : no sialadenitis [Hodgkin Disease] : no hodgkin disease [Tobacco Use] : no tobacco use [Non-Hodgkin Lymphoma] : no non-hodgkin lymphoma [Alcohol Use] : no alcohol use [Graves Disease] : no graves disease [Thyroid Cancer] : no thyroid cancer

## 2020-06-12 NOTE — PHYSICAL EXAM
[Midline] : trachea located in midline position [Normal] : orientation to person, place, and time: normal [de-identified] : b/l cerumen;   left OE [de-identified] : right tm perf

## 2020-06-12 NOTE — ASSESSMENT
[FreeTextEntry1] : Left Ear OE w/ severe wax-\par After informed verbal consent is obtained, cerumen is removed from the right and left  ear canal with a curette and suction.\par Rx: routine debridement.\par rx-floxin left ear x 1 bottle then f/u here for re-eval and audio\par \par  f/u 3 weeks\par

## 2020-07-10 ENCOUNTER — APPOINTMENT (OUTPATIENT)
Dept: OTOLARYNGOLOGY | Facility: CLINIC | Age: 75
End: 2020-07-10
Payer: MEDICARE

## 2020-07-10 VITALS
HEART RATE: 61 BPM | BODY MASS INDEX: 19.83 KG/M2 | HEIGHT: 60 IN | TEMPERATURE: 97.7 F | SYSTOLIC BLOOD PRESSURE: 142 MMHG | DIASTOLIC BLOOD PRESSURE: 77 MMHG | WEIGHT: 101 LBS

## 2020-07-10 PROCEDURE — 99213 OFFICE O/P EST LOW 20 MIN: CPT | Mod: 25

## 2020-07-10 PROCEDURE — 69210 REMOVE IMPACTED EAR WAX UNI: CPT

## 2020-07-10 NOTE — ASSESSMENT
[FreeTextEntry1] : Left Ear OE: now resolved \par -After informed verbal consent is obtained, cerumen is removed from the left  ear canal with a curette and suction.\par -Rx: routine debridement.\par -rx: Debrox to left ear\par \par \par f/u 2 weeks  or prn \par

## 2020-07-10 NOTE — HISTORY OF PRESENT ILLNESS
[Hearing Loss] : hearing loss [de-identified] : Patient presents for follow up s/p Left OE. Finished using ear drops. feels well. Pt has no ear pain, ear drainage, tinnitus, vertigo, nasal congestion, nasal discharge, epistaxis, sinus infections, facial pain, facial pressure, throat pain, dysphagia or fevers\par \par \par  [Anxiety] : no anxiety [Dizziness] : no dizziness [Headache] : no headache [Recurrent Otitis Media] : no recurrent otitis media [Presbycusis] : no presbycusis [Otitis Media with Effusion] : no otitis media with effusion [Congenital Ear Malformation] : no congenital ear malformation [Meniere Disease] : no Meniere disease [Otosclerosis] : no otosclerosis [Hypertension] : no hypertension [Perilymphatic Fistula] : no perilymphatic fistula [Loud Noise Exposure] : no history of loud noise exposure [Smoking] : no smoking [Early Onset Hearing Loss] : no early onset hearing loss [Stroke] : no stroke [Facial Pain] : no facial pain [Facial Pressure] : no facial pressure [Nasal Congestion] : no nasal congestion [Diplopia] : no diplopia [Ear Fullness] : no ear fullness [Maxillary Tooth Infection] : no maxillary tooth infection [Allergic Rhinitis] : no allergic rhinitis [Septal Deviation] : no septal deviation [Environmental Allergies] : no environmental allergies [Seasonal Allergies] : no seasonal allergies [Nasal FB Removal] : no nasal foreign body removal [Adenoidectomy] : no adenoidectomy [Environmental Allergens] : no environmental allergens [Allergies] : no allergies [Asthma] : no asthma [Neck Mass] : no neck mass [Neck Pain] : no neck pain [Cold Intolerance] : no cold intolerance [Chills] : no chills [Cough] : no cough [Fatigue] : no fatigue [Heat Intolerance] : no heat intolerance [Hyperthyroidism] : no hyperthyroidism [Sialadenitis] : no sialadenitis [Non-Hodgkin Lymphoma] : no non-hodgkin lymphoma [Tobacco Use] : no tobacco use [Hodgkin Disease] : no hodgkin disease [Graves Disease] : no graves disease [Alcohol Use] : no alcohol use [Thyroid Cancer] : no thyroid cancer

## 2020-07-10 NOTE — PHYSICAL EXAM
[Midline] : trachea located in midline position [de-identified] : left ear wax  [de-identified] : right tm perf [Normal] : no masses and lesions seen, face is symmetric

## 2020-07-16 NOTE — ED ADULT TRIAGE NOTE - AS O2 DELIVERY
Anesthesia Post Evaluation    Patient: Dirk Saldana    Procedure(s) Performed: Procedure(s) (LRB):  MYRINGOTOMY, WITH TYMPANOSTOMY TUBE INSERTION (Bilateral)    Final Anesthesia Type: general    Patient location during evaluation: PACU  Patient participation: Yes- Able to Participate  Level of consciousness: awake and alert  Post-procedure vital signs: reviewed and stable  Pain management: adequate  Airway patency: patent    PONV status at discharge: No PONV  Anesthetic complications: no      Cardiovascular status: blood pressure returned to baseline  Respiratory status: unassisted, spontaneous ventilation and room air  Hydration status: euvolemic  Follow-up not needed.          Vitals Value Taken Time   BP n/a 07/16/20 1431   Temp 36.6 °C (97.9 °F) 07/16/20 0743   Pulse 180 07/16/20 0743   Resp 24 07/16/20 0743   SpO2 98 % 07/16/20 0743         No case tracking events are documented in the log.      Pain/Rowan Score: Presence of Pain: non-verbal indicators absent (7/16/2020  6:58 AM)        
room air

## 2020-07-29 ENCOUNTER — APPOINTMENT (OUTPATIENT)
Dept: OTOLARYNGOLOGY | Facility: CLINIC | Age: 75
End: 2020-07-29
Payer: MEDICARE

## 2020-07-29 VITALS
WEIGHT: 98 LBS | HEIGHT: 60 IN | DIASTOLIC BLOOD PRESSURE: 65 MMHG | TEMPERATURE: 97.9 F | SYSTOLIC BLOOD PRESSURE: 139 MMHG | BODY MASS INDEX: 19.24 KG/M2 | HEART RATE: 91 BPM

## 2020-07-29 DIAGNOSIS — H61.22 IMPACTED CERUMEN, LEFT EAR: ICD-10-CM

## 2020-07-29 DIAGNOSIS — H72.01 CENTRAL PERFORATION OF TYMPANIC MEMBRANE, RIGHT EAR: ICD-10-CM

## 2020-07-29 DIAGNOSIS — H90.3 SENSORINEURAL HEARING LOSS, BILATERAL: ICD-10-CM

## 2020-07-29 PROCEDURE — 99213 OFFICE O/P EST LOW 20 MIN: CPT | Mod: 25

## 2020-07-29 PROCEDURE — 92567 TYMPANOMETRY: CPT

## 2020-07-29 PROCEDURE — 92557 COMPREHENSIVE HEARING TEST: CPT

## 2020-07-29 PROCEDURE — G0268 REMOVAL OF IMPACTED WAX MD: CPT

## 2020-07-29 NOTE — ASSESSMENT
[FreeTextEntry1] : Left Ear OE: now resolved \par \par Wax:\par -Cerumen is removed from the right and left  ear canal with a curette and suction.\par -Rx:\par -Routine debridement suggested to keep the ears free of wax.\par -Hearing Test performed to evaluate the extent of hearing loss and to explain pt's symptoms\par bilateral sensorineural hearing loss-cleared for hearing aids\par vented aid in right ear due to TM perf\par f/u 3/2021 and prn\par \par f/u

## 2020-07-29 NOTE — DATA REVIEWED
[de-identified] : bilat Hearing loss\par Hearing Test performed to evaluate the extent of hearing loss and  to explain pt's symptoms\par

## 2020-07-29 NOTE — PHYSICAL EXAM
[Midline] : trachea located in midline position [Normal] : salivary glands are normal [de-identified] : right tm perf [de-identified] : wax b/l

## 2020-07-29 NOTE — HISTORY OF PRESENT ILLNESS
[Hearing Loss] : hearing loss [No] : patient does not have a  history of radiation therapy [de-identified] : 74 yo female\par Patient presents for follow up s/p Left OE now resolved. Feeling well.  Pt has no ear pain, ear drainage, tinnitus, vertigo, nasal congestion, nasal discharge, epistaxis, sinus infections, facial pain, facial pressure, throat pain, dysphagia or fevers\par \par \par  [Anxiety] : no anxiety [Dizziness] : no dizziness [Headache] : no headache [Recurrent Otitis Media] : no recurrent otitis media [Otitis Media with Effusion] : no otitis media with effusion [Presbycusis] : no presbycusis [Congenital Ear Malformation] : no congenital ear malformation [Meniere Disease] : no Meniere disease [Otosclerosis] : no otosclerosis [Perilymphatic Fistula] : no perilymphatic fistula [Loud Noise Exposure] : no history of loud noise exposure [Hypertension] : no hypertension [Early Onset Hearing Loss] : no early onset hearing loss [Stroke] : no stroke [Smoking] : no smoking [Facial Pressure] : no facial pressure [Facial Pain] : no facial pain [Nasal Congestion] : no nasal congestion [Diplopia] : no diplopia [Ear Fullness] : no ear fullness [Allergic Rhinitis] : no allergic rhinitis [Maxillary Tooth Infection] : no maxillary tooth infection [Septal Deviation] : no septal deviation [Environmental Allergies] : no environmental allergies [Environmental Allergens] : no environmental allergens [Seasonal Allergies] : no seasonal allergies [Nasal FB Removal] : no nasal foreign body removal [Adenoidectomy] : no adenoidectomy [Asthma] : no asthma [Allergies] : no allergies [Neck Pain] : no neck pain [Neck Mass] : no neck mass [Chills] : no chills [Cold Intolerance] : no cold intolerance [Cough] : no cough [Fatigue] : no fatigue [Heat Intolerance] : no heat intolerance [Hyperthyroidism] : no hyperthyroidism [Sialadenitis] : no sialadenitis [Non-Hodgkin Lymphoma] : no non-hodgkin lymphoma [Hodgkin Disease] : no hodgkin disease [Tobacco Use] : no tobacco use [Alcohol Use] : no alcohol use [Graves Disease] : no graves disease [Thyroid Cancer] : no thyroid cancer

## 2020-10-20 NOTE — ED ADULT NURSE NOTE - NS ED NURSE RECORD ANOTHER VITAL SIGN
Yes
You can access the FollowMyHealth Patient Portal offered by [facility] by registering at the following website: [email or web address]. By joining [employer]’s FollowMyHealth portal, you will also be able to view your health information using other applications (apps) compatible with our system.

## 2020-10-21 NOTE — ED ADULT NURSE NOTE - CHIEF COMPLAINT QUOTE
Podiatry - Clinic Note  Rose Marie Juarez : 1953 Sex: female MRN: [id number] 10/21/2020 3:35 PM    ASSESSMENT:  Plantar Fasciitis right side     PLAN:  Discussed diagnoses and treatment options with the patient.    Discussed icing, stretching, use of NSAIDS, and avoidance of walking barefoot.  Discussed efficacy and use of orthotics for the treatment of heel pain. Patient given a prescription for Voltaren 75 mg. Patient will use this medication along with home physical therapy for one month returning to the office at that time. If this is not causing resolution of symptoms we may want to consider a cortisone injection, physical therapy or immobilization.  Return in about 1 month (around 2020).         CHIEF COMPLAINT:  Chief Complaint   Patient presents with   • Establish Care     Pain in the heel of right foot, DLS [place]        SUBJECTIVE:  Rose Marie Juarez is a 66 year old female this patient presents to the office with complaint of heel pain. This pain has been present 2 weeks. It has been getting worse over time.  The patient has tried occasional ibuprofen with little or no benefit.     PAST MEDICAL HISTORY:   Past Medical History:   Diagnosis Date   • Chronic diastolic congestive heart failure (CMS/HCC) 2019   • GERD (gastroesophageal reflux disease)    • Mitral valve regurgitation    • Nonrheumatic aortic valve insufficiency 2019   • Personal history of colonic polyps 2006    None noted on repeat in 2008       MEDICATIONS:   Current Outpatient Medications   Medication Sig Dispense Refill   • amoxicillin (AMOXIL) 500 MG capsule Take 4 capsules 1 hour prior to dental appointment. 4 capsule 1   • aspirin 81 MG tablet Take 81 mg by mouth daily.     • ascorbic acid (VITAMIN C) 500 MG tablet Take 1 tablet by mouth daily. 90 tablet 0   • acetaminophen (TYLENOL) 325 MG tablet Take 2 tablets by mouth every 4 hours as needed for Pain. Not to exceed 1000mg every 6 hours or 4000mg  brought  in by daughter for chills, nausea and vomiting that started around 4pm tonight. patient vomitted two times prior to coming to the ED. every 24hours from all sources 120 tablet 0   • Cyanocobalamin (VITAMIN B 12 PO) Take 1 tablet by mouth daily.      • Valacyclovir HCl 1000 MG Tab TAKE 2 TABLETS TWICE A DAY AS NEEDED FOR OUTBREAK 8 tablet 2   • Multiple Vitamins-Minerals (DAILY MULTI PO) Take 1 tablet by mouth daily.     • Calcium Carbonate-Vitamin D (CALCIUM + D PO) Take 1 tablet by mouth daily.      • Cholecalciferol (VITAMIN D-3) 1000 units Cap Take 1,000 Units by mouth daily.      • diclofenac (VOLTAREN) 75 MG EC tablet Take 1 tablet by mouth 2 times daily. 60 tablet 0     No current facility-administered medications for this visit.        SOCIAL HISTORY:  Social History     Socioeconomic History   • Marital status:      Spouse name: Not on file   • Number of children: Not on file   • Years of education: Not on file   • Highest education level: Not on file   Occupational History   • Occupation: retired     Employer: [employer]   Social Needs   • Financial resource strain: Not on file   • Food insecurity     Worry: Not on file     Inability: Not on file   • Transportation needs     Medical: Not on file     Non-medical: Not on file   Tobacco Use   • Smoking status: Never Smoker   • Smokeless tobacco: Never Used   Substance and Sexual Activity   • Alcohol use: Yes     Alcohol/week: 2.0 standard drinks     Types: 2 Glasses of wine per week   • Drug use: No   • Sexual activity: Yes     Partners: Male   Lifestyle   • Physical activity     Days per week: Not on file     Minutes per session: Not on file   • Stress: Not on file   Relationships   • Social connections     Talks on phone: Not on file     Gets together: Not on file     Attends Judaism service: Not on file     Active member of club or organization: Not on file     Attends meetings of clubs or organizations: Not on file     Relationship status: Not on file   • Intimate partner violence     Fear of current or ex partner: Not on file     Emotionally abused: Not on file      Physically abused: Not on file     Forced sexual activity: Not on file   Other Topics Concern   • Not on file   Social History Narrative   • Not on file       FAMILY HISTORY:  Family History   Problem Relation Age of Onset   • Cancer, Breast Sister    • Cancer Brother         leukemia   • Cancer Sister         melanoma   • Diabetes Brother    • Heart disease Brother    • Diabetes Brother    • Heart disease Brother    • Heart disease Father    • COPD Sister    • NEGATIVE FAMILY HX OF Other         Colon Cancer   • Hypertension Brother    • Hypertension Brother        ALLERGY:  ALLERGIES:  No Known Allergies    SURGICAL HISTORY:  Past Surgical History:   Procedure Laterality Date   • [facility]   • Colonoscopy  12/31/2008    [facility]- no polyps noted   • Colonoscopy  11/12/2019    DR Munoz recommends follow up 7 years   • Colonoscopy diagnostic  04/04/2014    due for repeat colonoscopy 4/2019   • Mitral valve repair N/A 04/12/2019    Dr George Valdez [facility]   • Tubal ligation     • Upper gastrointestinal endoscopy  04/04/2014            PHYSICAL EXAMINATIONS:  Vital Signs:  Visit Vitals  /76 (BP Location: LUE - Left upper extremity, Patient Position: Sitting, Cuff Size: Regular)   Temp 96.3 °F (35.7 °C)   Ht 5' 3\" (1.6 m)   BMI 25.08 kg/m²     Body mass index is 25.08 kg/m².    General: No apparent distress, Alert and oriented    Vascular: Pulses: PT 3/4 and DP 3/4 bilateral.  Hair growth present at the level of the digits  Capillary filling time less than three seconds bilateral digits 1-5.  Lower extremity/foot edema Absent bilaterally.    Neurological: Protective sensation intact to light touch bilaterally.  Strength and tone normal lower extremity muscles rated at 5/5 in all quadrants in lower leg and foot.  Negative Tinel's test with percussion tarsal tunnel.    Dermatologic: Skin warm, dry and supple, without open lesions bilateral  feet    Musculoskeletal:  ROM  patient has large bunion deformity, hammertoe deformity 2nd toe on the right foot with crossover 2nd toe  Muscle strength  normal    Patient has pain with palpation of the medial bands of the plantar fascia as they approach and attached to the calcaneus on the right foot. No pain with side to side compression posterior heel.    Pain with palpation along course of plantar fascia.  No pain associated with the tibialis posterior tendon or the tarsal tunnel.        REVIEW OF LABORATORY, PATHOLOGY, AND RADIOLOGY DATA:  X-ray results:       Primary care physician:   MD Brody Harrington DPM

## 2021-05-24 NOTE — PATIENT PROFILE ADULT. - FALLEN IN THE PAST
1. Have you been to the ER, urgent care clinic since your last visit? Hospitalized since your last visit? no    2. Have you seen or consulted any other health care providers outside of the [address] [place] [address] since your last visit? Include any pap smears or colon screening.  no
Approval form mri please and thank you  
Evette ÁLVAREZ Dominguez  You 58 minutes ago (9:44 AM)   CS  Approved.        
I called the pt and lvm. I told her that the MRI was approved. She can call and set it up at [facility].   
Mu submitted prior authorization  
no

## 2021-12-02 NOTE — H&P ADULT - PROBLEM/PLAN-6
DISPLAY PLAN FREE TEXT Griseofulvin Pregnancy And Lactation Text: This medication is Pregnancy Category X and is known to cause serious birth defects. It is unknown if this medication is excreted in breast milk but breast feeding should be avoided.

## 2021-12-09 NOTE — PROGRESS NOTE ADULT - SUBJECTIVE AND OBJECTIVE BOX
SUBJECTIVE:  - Pt denies current abdominal pain, nausea or vomiting  - She had not any oral intake for 2-3 days  - No fever or chills    OBJECTIVE:    Vital Signs Last 24 Hrs  T(C): 36.3 (02 May 2018 08:02), Max: 36.7 (01 May 2018 13:28)  T(F): 97.3 (02 May 2018 08:02), Max: 98.1 (01 May 2018 13:28)  HR: 71 (02 May 2018 08:02) (58 - 92)  BP: 128/57 (02 May 2018 08:02) (113/49 - 128/57)  BP(mean): --  RR: 18 (02 May 2018 08:02) (18 - 18)  SpO2: 96% (02 May 2018 08:02) (96% - 97%)    PHYSICAL EXAM:  Constitutional: no acute distress  Eyes: no icterus  Neck: no masses, no LAD  Respiratory: normal inspiratory effort; no wheezing or crackles  Cardiovascular: RRR, normal S1/S2, no murmurs/rubs/gallops  Gastrointestinal: soft, nondistended, nontender, +BS  Extremities: no LE edema  Neurological: AAOx3, no asterixis  Skin: no rashes, bruises, petechiae    LABS:                        11.3   7.82  )-----------( 191      ( 02 May 2018 07:35 )             35.1     143  |  107  |  4<L>  ----------------------------<  61<L>  3.4<L>   |  23  |  0.76  Ca    7.7<L>      02 May 2018 06:46  Phos  3.4     05-01  Mg     1.4     05-01  TPro  5.3<L>  /  Alb  2.6<L>  /  TBili  0.3  /  DBili  <0.1  /  AST  20  /  ALT  15  /  AlkPhos  55  05-02  PT/INR - ( 01 May 2018 22:54 )   PT: 29.6 sec;   INR: 2.69 ratio    PTT - ( 30 Apr 2018 20:10 )  PTT:36.8 sec  LIVER FUNCTIONS - ( 02 May 2018 06:46 )  Alb: 2.6 g/dL / Pro: 5.3 g/dL / ALK PHOS: 55 U/L / ALT: 15 U/L / AST: 20 U/L / GGT: Statement Selected

## 2022-01-01 ENCOUNTER — EMERGENCY (EMERGENCY)
Facility: HOSPITAL | Age: 77
LOS: 1 days | Discharge: HOME | End: 2022-01-01
Attending: EMERGENCY MEDICINE
Payer: MEDICARE

## 2022-01-01 VITALS
TEMPERATURE: 98 F | HEART RATE: 98 BPM | SYSTOLIC BLOOD PRESSURE: 130 MMHG | DIASTOLIC BLOOD PRESSURE: 53 MMHG | RESPIRATION RATE: 18 BRPM | OXYGEN SATURATION: 98 %

## 2022-01-01 VITALS
SYSTOLIC BLOOD PRESSURE: 130 MMHG | TEMPERATURE: 98 F | DIASTOLIC BLOOD PRESSURE: 76 MMHG | HEART RATE: 104 BPM | OXYGEN SATURATION: 99 % | HEIGHT: 60 IN | RESPIRATION RATE: 20 BRPM | WEIGHT: 95.02 LBS

## 2022-01-01 LAB
ALBUMIN SERPL ELPH-MCNC: 3.4 G/DL — SIGNIFICANT CHANGE UP (ref 3.3–5)
ALP SERPL-CCNC: 97 U/L — SIGNIFICANT CHANGE UP (ref 40–120)
ALT FLD-CCNC: 15 U/L — SIGNIFICANT CHANGE UP (ref 10–45)
ANION GAP SERPL CALC-SCNC: 11 MMOL/L — SIGNIFICANT CHANGE UP (ref 5–17)
AST SERPL-CCNC: 22 U/L — SIGNIFICANT CHANGE UP (ref 10–40)
BASOPHILS # BLD AUTO: 0.01 K/UL — SIGNIFICANT CHANGE UP (ref 0–0.2)
BASOPHILS NFR BLD AUTO: 0.1 % — SIGNIFICANT CHANGE UP (ref 0–2)
BILIRUB SERPL-MCNC: 0.5 MG/DL — SIGNIFICANT CHANGE UP (ref 0.2–1.2)
BUN SERPL-MCNC: 9 MG/DL — SIGNIFICANT CHANGE UP (ref 7–23)
CALCIUM SERPL-MCNC: 8.9 MG/DL — SIGNIFICANT CHANGE UP (ref 8.4–10.5)
CHLORIDE SERPL-SCNC: 91 MMOL/L — LOW (ref 96–108)
CO2 SERPL-SCNC: 30 MMOL/L — SIGNIFICANT CHANGE UP (ref 22–31)
CREAT SERPL-MCNC: 0.63 MG/DL — SIGNIFICANT CHANGE UP (ref 0.5–1.3)
EGFR: 90 ML/MIN/1.73M2 — SIGNIFICANT CHANGE UP
EOSINOPHIL # BLD AUTO: 0.01 K/UL — SIGNIFICANT CHANGE UP (ref 0–0.5)
EOSINOPHIL NFR BLD AUTO: 0.1 % — SIGNIFICANT CHANGE UP (ref 0–6)
GLUCOSE SERPL-MCNC: 270 MG/DL — HIGH (ref 70–99)
HCT VFR BLD CALC: 42.2 % — SIGNIFICANT CHANGE UP (ref 34.5–45)
HGB BLD-MCNC: 13.5 G/DL — SIGNIFICANT CHANGE UP (ref 11.5–15.5)
IMM GRANULOCYTES NFR BLD AUTO: 0.4 % — SIGNIFICANT CHANGE UP (ref 0–1.5)
LYMPHOCYTES # BLD AUTO: 0.59 K/UL — LOW (ref 1–3.3)
LYMPHOCYTES # BLD AUTO: 5.2 % — LOW (ref 13–44)
MCHC RBC-ENTMCNC: 26 PG — LOW (ref 27–34)
MCHC RBC-ENTMCNC: 32 GM/DL — SIGNIFICANT CHANGE UP (ref 32–36)
MCV RBC AUTO: 81.2 FL — SIGNIFICANT CHANGE UP (ref 80–100)
MONOCYTES # BLD AUTO: 0.32 K/UL — SIGNIFICANT CHANGE UP (ref 0–0.9)
MONOCYTES NFR BLD AUTO: 2.8 % — SIGNIFICANT CHANGE UP (ref 2–14)
NEUTROPHILS # BLD AUTO: 10.34 K/UL — HIGH (ref 1.8–7.4)
NEUTROPHILS NFR BLD AUTO: 91.4 % — HIGH (ref 43–77)
NRBC # BLD: 0 /100 WBCS — SIGNIFICANT CHANGE UP (ref 0–0)
NT-PROBNP SERPL-SCNC: 185 PG/ML — SIGNIFICANT CHANGE UP (ref 0–300)
PLATELET # BLD AUTO: 218 K/UL — SIGNIFICANT CHANGE UP (ref 150–400)
POTASSIUM SERPL-MCNC: 4.3 MMOL/L — SIGNIFICANT CHANGE UP (ref 3.5–5.3)
POTASSIUM SERPL-SCNC: 4.3 MMOL/L — SIGNIFICANT CHANGE UP (ref 3.5–5.3)
PROT SERPL-MCNC: 6.8 G/DL — SIGNIFICANT CHANGE UP (ref 6–8.3)
RAPID RVP RESULT: SIGNIFICANT CHANGE UP
RBC # BLD: 5.2 M/UL — SIGNIFICANT CHANGE UP (ref 3.8–5.2)
RBC # FLD: 13 % — SIGNIFICANT CHANGE UP (ref 10.3–14.5)
SARS-COV-2 RNA SPEC QL NAA+PROBE: SIGNIFICANT CHANGE UP
SODIUM SERPL-SCNC: 132 MMOL/L — LOW (ref 135–145)
TROPONIN T, HIGH SENSITIVITY RESULT: 17 NG/L — SIGNIFICANT CHANGE UP (ref 0–51)
WBC # BLD: 11.32 K/UL — HIGH (ref 3.8–10.5)
WBC # FLD AUTO: 11.32 K/UL — HIGH (ref 3.8–10.5)

## 2022-01-01 PROCEDURE — 99285 EMERGENCY DEPT VISIT HI MDM: CPT | Mod: 25

## 2022-01-01 PROCEDURE — 71275 CT ANGIOGRAPHY CHEST: CPT | Mod: 26,MA

## 2022-01-01 PROCEDURE — 83880 ASSAY OF NATRIURETIC PEPTIDE: CPT

## 2022-01-01 PROCEDURE — 71045 X-RAY EXAM CHEST 1 VIEW: CPT

## 2022-01-01 PROCEDURE — 85025 COMPLETE CBC W/AUTO DIFF WBC: CPT

## 2022-01-01 PROCEDURE — 71275 CT ANGIOGRAPHY CHEST: CPT | Mod: MA

## 2022-01-01 PROCEDURE — 80053 COMPREHEN METABOLIC PANEL: CPT

## 2022-01-01 PROCEDURE — 71045 X-RAY EXAM CHEST 1 VIEW: CPT | Mod: 26

## 2022-01-01 PROCEDURE — 93005 ELECTROCARDIOGRAM TRACING: CPT

## 2022-01-01 PROCEDURE — 0225U NFCT DS DNA&RNA 21 SARSCOV2: CPT

## 2022-01-01 PROCEDURE — 93010 ELECTROCARDIOGRAM REPORT: CPT

## 2022-01-01 PROCEDURE — 36415 COLL VENOUS BLD VENIPUNCTURE: CPT

## 2022-01-01 PROCEDURE — 84484 ASSAY OF TROPONIN QUANT: CPT

## 2022-01-01 RX ADMIN — Medication 40 MILLIGRAM(S): at 21:16

## 2022-05-12 NOTE — CONSULT NOTE ADULT - CONSULT REQUESTED DATE/TIME
5/12  D+31  PT came in for a f/u. He is doing good. We are switching his acyclovir to valacyclovir. Today he will get Evusheld.  We will continue to see him weekly 18-Oct-2017 19:40

## 2022-06-03 NOTE — ED PROVIDER NOTE - PROGRESS NOTE DETAILS
Jason, PGY2: Patient reassessed and continues to appear well. No increased wob. Labs and chest xray unactionable. Discussed w/ patient results of CT scan and will provide copy of results. Patient continues to be on home 3L NC. Will d/c with outpatient pulm follow up. Strict return precautions provided and patient understands and agrees w/ plan

## 2022-06-03 NOTE — ED PROVIDER NOTE - OBJECTIVE STATEMENT
77 y old f with history of COPD ,CAD    chronic afib  on coumadin ,DM ,seen  yesterday  by dr Coley pulmonary, who start her on prednisone hier dose and Zithromax,  had neg Covid in office,  acc to the daughter ,patient is SOB and require more oxygen when usually ,no fever chills ,chest pain

## 2022-06-03 NOTE — ED ADULT NURSE NOTE - NSICDXPASTMEDICALHX_GEN_ALL_CORE_FT
PAST MEDICAL HISTORY:  Asthma     Atrial fibrillation, chronic     Dementia     Diabetes mellitus     Hypertension     Type 2 diabetes mellitus

## 2022-06-03 NOTE — ED ADULT TRIAGE NOTE - CHIEF COMPLAINT QUOTE
HX COPD uses 3.5l n/c at home 24/7 SOB feeling weak Something stuck in throat HX afib On Zpak since last night Increased prednisone

## 2022-06-03 NOTE — ED ADULT NURSE NOTE - OBJECTIVE STATEMENT
77 y.o female c/o worsening SOB x2 months especially on exertion. Pt family at beside. Endorses chronic cough. Denies fevers, NVD, CP, syncope, dizziness, HA, lightheadedness. Pt on 2.5-3L NC at home, recently has been needing 3-4L NC. PMH COPD, CAD, Afib on Coumadin, DM, Dementia. Pt currently on 3L NC, respirations even and unlabored

## 2022-06-03 NOTE — ED PROVIDER NOTE - CLINICAL SUMMARY MEDICAL DECISION MAKING FREE TEXT BOX
77 y old f with hs of multiple medical issues presented with  increase dyspnea on exertion and pedal edema ,no vefer or chills ,concern for COPD exacerbation ,CHF ,Pneumonia ,pe ,will obtain blood work ,chest xray reassess ZR

## 2022-06-03 NOTE — ED ADULT TRIAGE NOTE - INTERNATIONAL TRAVEL
"              After Visit Summary   11/30/2017    Ranjit Ortega    MRN: [id number]           Patient Information     Date Of Birth          2006        Visit Information        Provider Department      11/30/2017 6:15 PM Amado Frazier MD [facility]        Today's Diagnoses     Encounter for routine child health examination with abnormal findings    -  1    Hypospadias in male           Follow-ups after your visit        Who to contact     If you have questions or need follow up information about today's clinic visit or your schedule please contact [facility] directly at [phone].  Normal or non-critical lab and imaging results will be communicated to you by InSupplyhart, letter or phone within 4 business days after the clinic has received the results. If you do not hear from us within 7 days, please contact the clinic through InSupplyhart or phone. If you have a critical or abnormal lab result, we will notify you by phone as soon as possible.  Submit refill requests through Peerby or call your pharmacy and they will forward the refill request to us. Please allow 3 business days for your refill to be completed.          Additional Information About Your Visit        MyChart Information     Peerby lets you send messages to your doctor, view your test results, renew your prescriptions, schedule appointments and more. To sign up, go to [email or web address], contact your [facility] clinic or call [phone] during business hours.            Care EveryWhere ID     This is your Care EveryWhere ID. This could be used by other organizations to access your [facility] medical records  Opted out of Care Everywhere exchange        Your Vitals Were     Pulse Temperature Respirations Height Pulse Oximetry BMI (Body Mass Index)    78 98.5  F (36.9  C) (Oral) 16 4' 10\" (1.473 m) 100% 17.66 kg/m2       Blood Pressure from Last 3 Encounters:   11/30/17 110/75   09/08/16 108/72    Weight from " Last 3 Encounters:   11/30/17 84 lb 8 oz (38.3 kg) (15 %)*   09/08/16 78 lb (35.4 kg) (26 %)*     * Growth percentiles are based on [facility] 2-20 Years data.              Today, you had the following     No orders found for display       Primary Care Provider Office Phone # Fax #    [facility] [phone] [address]  [facility] [id number]        Equal Access to Services     DIONNA LAMAS : Hadii aad ku hadasho Soomaali, waaxda luqadaha, qaybta kaalmada adeegyada, waxay idiin hayaan adeeg maryarasharon lagregory banks. So [facility] [phone].    ATENCIÓN: Si habla español, tiene a zuñiga disposición servicios gratuitos de asistencia lingüística. [facility] [phone].    We comply with applicable federal civil rights laws and [place] laws. We do not discriminate on the basis of race, color, national origin, age, disability, sex, sexual orientation, or gender identity.            Thank you!     Thank you for choosing [facility]  for your care. Our goal is always to provide you with excellent care. Hearing back from our patients is one way we can continue to improve our services. Please take a few minutes to complete the written survey that you may receive in the mail after your visit with us. Thank you!             Your Updated Medication List - Protect others around you: Learn how to safely use, store and throw away your medicines at www.disposemymeds.org.      Notice  As of 11/30/2017  6:47 PM    You have not been prescribed any medications.       No

## 2022-06-03 NOTE — ED PROVIDER NOTE - PATIENT PORTAL LINK FT
You can access the FollowMyHealth Patient Portal offered by [facility] by registering at the following website: [email or web address]. By joining [employer] [facility]’s FollowMyHealth portal, you will also be able to view your health information using other applications (apps) compatible with our system.

## 2022-06-03 NOTE — ED PROVIDER NOTE - NSFOLLOWUPINSTRUCTIONS_ED_ALL_ED_FT
Discharge instructions:    - Please follow up with your Pulmonologist within the next 24-72 hours.    - Continue to take any prescribed medications as instructed:       SEEK IMMEDIATE MEDICAL CARE IF YOU HAVE ANY OF THE FOLLOWING SYMPTOMS: worsening shortness of breath, chest pain, back pain, abdominal pain, fever, coughing up blood, lightheadedness/dizziness.

## 2022-08-03 NOTE — DISCHARGE NOTE ADULT - PRINCIPAL DIAGNOSIS
Frequent home monitor of blood pressure  Diet high in fruit and vegetables and low in salt and cholesterol  Regular cardiovascular exercise  Take all medications regularly as prescribed  Loose weight   Potential consequences of obesity explained to patient   Quit smoking  Potential consequences of smoking discussed with patient
Generalized abdominal pain

## 2023-01-01 ENCOUNTER — INPATIENT (INPATIENT)
Facility: HOSPITAL | Age: 78
LOS: 15 days | DRG: 870 | End: 2023-01-24
Attending: INTERNAL MEDICINE | Admitting: INTERNAL MEDICINE
Payer: MEDICARE

## 2023-01-01 VITALS
OXYGEN SATURATION: 83 % | SYSTOLIC BLOOD PRESSURE: 129 MMHG | DIASTOLIC BLOOD PRESSURE: 78 MMHG | TEMPERATURE: 98 F | HEART RATE: 159 BPM | RESPIRATION RATE: 22 BRPM | WEIGHT: 100.09 LBS

## 2023-01-01 DIAGNOSIS — J96.01 ACUTE RESPIRATORY FAILURE WITH HYPOXIA: ICD-10-CM

## 2023-01-01 DIAGNOSIS — J96.91 RESPIRATORY FAILURE, UNSPECIFIED WITH HYPOXIA: ICD-10-CM

## 2023-01-01 DIAGNOSIS — R06.00 DYSPNEA, UNSPECIFIED: ICD-10-CM

## 2023-01-01 DIAGNOSIS — Z51.5 ENCOUNTER FOR PALLIATIVE CARE: ICD-10-CM

## 2023-01-01 DIAGNOSIS — N17.9 ACUTE KIDNEY FAILURE, UNSPECIFIED: ICD-10-CM

## 2023-01-01 DIAGNOSIS — R53.2 FUNCTIONAL QUADRIPLEGIA: ICD-10-CM

## 2023-01-01 DIAGNOSIS — R45.1 RESTLESSNESS AND AGITATION: ICD-10-CM

## 2023-01-01 DIAGNOSIS — J47.9 BRONCHIECTASIS, UNCOMPLICATED: ICD-10-CM

## 2023-01-01 DIAGNOSIS — R52 PAIN, UNSPECIFIED: ICD-10-CM

## 2023-01-01 DIAGNOSIS — Z71.89 OTHER SPECIFIED COUNSELING: ICD-10-CM

## 2023-01-01 LAB
-  AMIKACIN: SIGNIFICANT CHANGE UP
-  AZTREONAM: SIGNIFICANT CHANGE UP
-  CEFEPIME: SIGNIFICANT CHANGE UP
-  CEFTAZIDIME: SIGNIFICANT CHANGE UP
-  CIPROFLOXACIN: SIGNIFICANT CHANGE UP
-  GENTAMICIN: SIGNIFICANT CHANGE UP
-  IMIPENEM: SIGNIFICANT CHANGE UP
-  LEVOFLOXACIN: SIGNIFICANT CHANGE UP
-  MEROPENEM: SIGNIFICANT CHANGE UP
-  PIPERACILLIN/TAZOBACTAM: SIGNIFICANT CHANGE UP
-  TOBRAMYCIN: SIGNIFICANT CHANGE UP
A1C WITH ESTIMATED AVERAGE GLUCOSE RESULT: 8.1 % — HIGH (ref 4–5.6)
ALBUMIN SERPL ELPH-MCNC: 1.3 G/DL — LOW (ref 3.3–5)
ALBUMIN SERPL ELPH-MCNC: 1.4 G/DL — LOW (ref 3.3–5)
ALBUMIN SERPL ELPH-MCNC: 1.5 G/DL — LOW (ref 3.3–5)
ALBUMIN SERPL ELPH-MCNC: 1.5 G/DL — LOW (ref 3.3–5)
ALBUMIN SERPL ELPH-MCNC: 1.6 G/DL — LOW (ref 3.3–5)
ALBUMIN SERPL ELPH-MCNC: 1.7 G/DL — LOW (ref 3.3–5)
ALBUMIN SERPL ELPH-MCNC: 1.7 G/DL — LOW (ref 3.3–5)
ALBUMIN SERPL ELPH-MCNC: 1.8 G/DL — LOW (ref 3.3–5)
ALBUMIN SERPL ELPH-MCNC: 1.8 G/DL — LOW (ref 3.3–5)
ALBUMIN SERPL ELPH-MCNC: 1.9 G/DL — LOW (ref 3.3–5)
ALBUMIN SERPL ELPH-MCNC: 2 G/DL — LOW (ref 3.3–5)
ALBUMIN SERPL ELPH-MCNC: 2.1 G/DL — LOW (ref 3.3–5)
ALBUMIN SERPL ELPH-MCNC: 2.1 G/DL — LOW (ref 3.3–5)
ALBUMIN SERPL ELPH-MCNC: 2.2 G/DL — LOW (ref 3.3–5)
ALBUMIN SERPL ELPH-MCNC: 2.2 G/DL — LOW (ref 3.3–5)
ALBUMIN SERPL ELPH-MCNC: 2.4 G/DL — LOW (ref 3.3–5)
ALBUMIN SERPL ELPH-MCNC: 2.4 G/DL — LOW (ref 3.3–5)
ALBUMIN SERPL ELPH-MCNC: 2.5 G/DL — LOW (ref 3.3–5)
ALBUMIN SERPL ELPH-MCNC: 3.1 G/DL — LOW (ref 3.3–5)
ALP SERPL-CCNC: 101 U/L — SIGNIFICANT CHANGE UP (ref 40–120)
ALP SERPL-CCNC: 106 U/L — SIGNIFICANT CHANGE UP (ref 40–120)
ALP SERPL-CCNC: 107 U/L — SIGNIFICANT CHANGE UP (ref 40–120)
ALP SERPL-CCNC: 107 U/L — SIGNIFICANT CHANGE UP (ref 40–120)
ALP SERPL-CCNC: 108 U/L — SIGNIFICANT CHANGE UP (ref 40–120)
ALP SERPL-CCNC: 110 U/L — SIGNIFICANT CHANGE UP (ref 40–120)
ALP SERPL-CCNC: 112 U/L — SIGNIFICANT CHANGE UP (ref 40–120)
ALP SERPL-CCNC: 118 U/L — SIGNIFICANT CHANGE UP (ref 40–120)
ALP SERPL-CCNC: 119 U/L — SIGNIFICANT CHANGE UP (ref 40–120)
ALP SERPL-CCNC: 121 U/L — HIGH (ref 40–120)
ALP SERPL-CCNC: 124 U/L — HIGH (ref 40–120)
ALP SERPL-CCNC: 126 U/L — HIGH (ref 40–120)
ALP SERPL-CCNC: 129 U/L — HIGH (ref 40–120)
ALP SERPL-CCNC: 139 U/L — HIGH (ref 40–120)
ALP SERPL-CCNC: 145 U/L — HIGH (ref 40–120)
ALP SERPL-CCNC: 154 U/L — HIGH (ref 40–120)
ALP SERPL-CCNC: 165 U/L — HIGH (ref 40–120)
ALP SERPL-CCNC: 172 U/L — HIGH (ref 40–120)
ALP SERPL-CCNC: 173 U/L — HIGH (ref 40–120)
ALP SERPL-CCNC: 177 U/L — HIGH (ref 40–120)
ALP SERPL-CCNC: 178 U/L — HIGH (ref 40–120)
ALP SERPL-CCNC: 192 U/L — HIGH (ref 40–120)
ALP SERPL-CCNC: 207 U/L — HIGH (ref 40–120)
ALP SERPL-CCNC: 81 U/L — SIGNIFICANT CHANGE UP (ref 40–120)
ALP SERPL-CCNC: 99 U/L — SIGNIFICANT CHANGE UP (ref 40–120)
ALT FLD-CCNC: 10 U/L — SIGNIFICANT CHANGE UP (ref 10–45)
ALT FLD-CCNC: 11 U/L — SIGNIFICANT CHANGE UP (ref 10–45)
ALT FLD-CCNC: 11 U/L — SIGNIFICANT CHANGE UP (ref 10–45)
ALT FLD-CCNC: 124 U/L — HIGH (ref 10–45)
ALT FLD-CCNC: 141 U/L — HIGH (ref 10–45)
ALT FLD-CCNC: 174 U/L — HIGH (ref 10–45)
ALT FLD-CCNC: 21 U/L — SIGNIFICANT CHANGE UP (ref 10–45)
ALT FLD-CCNC: 22 U/L — SIGNIFICANT CHANGE UP (ref 10–45)
ALT FLD-CCNC: 24 U/L — SIGNIFICANT CHANGE UP (ref 10–45)
ALT FLD-CCNC: 24 U/L — SIGNIFICANT CHANGE UP (ref 10–45)
ALT FLD-CCNC: 351 U/L — HIGH (ref 10–45)
ALT FLD-CCNC: 419 U/L — HIGH (ref 10–45)
ALT FLD-CCNC: 438 U/L — HIGH (ref 10–45)
ALT FLD-CCNC: 460 U/L — HIGH (ref 10–45)
ALT FLD-CCNC: 49 U/L — HIGH (ref 10–45)
ALT FLD-CCNC: 497 U/L — HIGH (ref 10–45)
ALT FLD-CCNC: 51 U/L — HIGH (ref 10–45)
ALT FLD-CCNC: 539 U/L — HIGH (ref 10–45)
ALT FLD-CCNC: 54 U/L — HIGH (ref 10–45)
ALT FLD-CCNC: 63 U/L — HIGH (ref 10–45)
ALT FLD-CCNC: 7 U/L — LOW (ref 10–45)
ALT FLD-CCNC: 728 U/L — HIGH (ref 10–45)
ALT FLD-CCNC: 74 U/L — HIGH (ref 10–45)
ALT FLD-CCNC: 8 U/L — LOW (ref 10–45)
ALT FLD-CCNC: <5 U/L — LOW (ref 10–45)
ANION GAP SERPL CALC-SCNC: 10 MMOL/L — SIGNIFICANT CHANGE UP (ref 5–17)
ANION GAP SERPL CALC-SCNC: 11 MMOL/L — SIGNIFICANT CHANGE UP (ref 5–17)
ANION GAP SERPL CALC-SCNC: 12 MMOL/L — SIGNIFICANT CHANGE UP (ref 5–17)
ANION GAP SERPL CALC-SCNC: 13 MMOL/L — SIGNIFICANT CHANGE UP (ref 5–17)
ANION GAP SERPL CALC-SCNC: 13 MMOL/L — SIGNIFICANT CHANGE UP (ref 5–17)
ANION GAP SERPL CALC-SCNC: 14 MMOL/L — SIGNIFICANT CHANGE UP (ref 5–17)
ANION GAP SERPL CALC-SCNC: 14 MMOL/L — SIGNIFICANT CHANGE UP (ref 5–17)
ANION GAP SERPL CALC-SCNC: 15 MMOL/L — SIGNIFICANT CHANGE UP (ref 5–17)
ANION GAP SERPL CALC-SCNC: 16 MMOL/L — SIGNIFICANT CHANGE UP (ref 5–17)
ANION GAP SERPL CALC-SCNC: 17 MMOL/L — SIGNIFICANT CHANGE UP (ref 5–17)
ANION GAP SERPL CALC-SCNC: 18 MMOL/L — HIGH (ref 5–17)
ANION GAP SERPL CALC-SCNC: 18 MMOL/L — HIGH (ref 5–17)
ANION GAP SERPL CALC-SCNC: 19 MMOL/L — HIGH (ref 5–17)
ANION GAP SERPL CALC-SCNC: 19 MMOL/L — HIGH (ref 5–17)
ANION GAP SERPL CALC-SCNC: 20 MMOL/L — HIGH (ref 5–17)
ANION GAP SERPL CALC-SCNC: 21 MMOL/L — HIGH (ref 5–17)
ANION GAP SERPL CALC-SCNC: 21 MMOL/L — HIGH (ref 5–17)
ANION GAP SERPL CALC-SCNC: 24 MMOL/L — HIGH (ref 5–17)
APPEARANCE UR: ABNORMAL
APPEARANCE UR: ABNORMAL
APTT BLD: 104.6 SEC — HIGH (ref 27.5–35.5)
APTT BLD: 21.2 SEC — LOW (ref 27.5–35.5)
APTT BLD: 26.4 SEC — LOW (ref 27.5–35.5)
APTT BLD: 27 SEC — LOW (ref 27.5–35.5)
APTT BLD: 27.6 SEC — SIGNIFICANT CHANGE UP (ref 27.5–35.5)
APTT BLD: 29 SEC — SIGNIFICANT CHANGE UP (ref 27.5–35.5)
APTT BLD: 29.2 SEC — SIGNIFICANT CHANGE UP (ref 27.5–35.5)
APTT BLD: 30.5 SEC — SIGNIFICANT CHANGE UP (ref 27.5–35.5)
APTT BLD: 31.2 SEC — SIGNIFICANT CHANGE UP (ref 27.5–35.5)
APTT BLD: 31.3 SEC — SIGNIFICANT CHANGE UP (ref 27.5–35.5)
APTT BLD: 34.5 SEC — SIGNIFICANT CHANGE UP (ref 27.5–35.5)
APTT BLD: 34.9 SEC — SIGNIFICANT CHANGE UP (ref 27.5–35.5)
APTT BLD: 36 SEC — HIGH (ref 27.5–35.5)
APTT BLD: 38.2 SEC — HIGH (ref 27.5–35.5)
APTT BLD: 43.1 SEC — HIGH (ref 27.5–35.5)
APTT BLD: 47.2 SEC — HIGH (ref 27.5–35.5)
APTT BLD: 52.1 SEC — HIGH (ref 27.5–35.5)
APTT BLD: 56.7 SEC — HIGH (ref 27.5–35.5)
APTT BLD: 84.3 SEC — HIGH (ref 27.5–35.5)
AST SERPL-CCNC: 123 U/L — HIGH (ref 10–40)
AST SERPL-CCNC: 13 U/L — SIGNIFICANT CHANGE UP (ref 10–40)
AST SERPL-CCNC: 143 U/L — HIGH (ref 10–40)
AST SERPL-CCNC: 16 U/L — SIGNIFICANT CHANGE UP (ref 10–40)
AST SERPL-CCNC: 163 U/L — HIGH (ref 10–40)
AST SERPL-CCNC: 18 U/L — SIGNIFICANT CHANGE UP (ref 10–40)
AST SERPL-CCNC: 180 U/L — HIGH (ref 10–40)
AST SERPL-CCNC: 19 U/L — SIGNIFICANT CHANGE UP (ref 10–40)
AST SERPL-CCNC: 23 U/L — SIGNIFICANT CHANGE UP (ref 10–40)
AST SERPL-CCNC: 248 U/L — HIGH (ref 10–40)
AST SERPL-CCNC: 29 U/L — SIGNIFICANT CHANGE UP (ref 10–40)
AST SERPL-CCNC: 30 U/L — SIGNIFICANT CHANGE UP (ref 10–40)
AST SERPL-CCNC: 32 U/L — SIGNIFICANT CHANGE UP (ref 10–40)
AST SERPL-CCNC: 34 U/L — SIGNIFICANT CHANGE UP (ref 10–40)
AST SERPL-CCNC: 36 U/L — SIGNIFICANT CHANGE UP (ref 10–40)
AST SERPL-CCNC: 47 U/L — HIGH (ref 10–40)
AST SERPL-CCNC: 482 U/L — HIGH (ref 10–40)
AST SERPL-CCNC: 49 U/L — HIGH (ref 10–40)
AST SERPL-CCNC: 52 U/L — HIGH (ref 10–40)
AST SERPL-CCNC: 54 U/L — HIGH (ref 10–40)
AST SERPL-CCNC: 554 U/L — HIGH (ref 10–40)
AST SERPL-CCNC: 741 U/L — HIGH (ref 10–40)
AST SERPL-CCNC: <5 U/L — LOW (ref 10–40)
B PERT IGG+IGM PNL SER: ABNORMAL
BACTERIA # UR AUTO: NEGATIVE — SIGNIFICANT CHANGE UP
BACTERIA # UR AUTO: NEGATIVE — SIGNIFICANT CHANGE UP
BASE EXCESS BLDA CALC-SCNC: -13.8 MMOL/L — LOW (ref -2–3)
BASE EXCESS BLDV CALC-SCNC: -11.2 MMOL/L — LOW (ref -2–3)
BASE EXCESS BLDV CALC-SCNC: -11.7 MMOL/L — LOW (ref -2–3)
BASE EXCESS BLDV CALC-SCNC: -20.8 MMOL/L — LOW (ref -2–3)
BASE EXCESS BLDV CALC-SCNC: -3.2 MMOL/L — LOW (ref -2–3)
BASE EXCESS BLDV CALC-SCNC: -6.9 MMOL/L — LOW (ref -2–3)
BASE EXCESS BLDV CALC-SCNC: -8 MMOL/L — LOW (ref -2–3)
BASE EXCESS BLDV CALC-SCNC: 0.5 MMOL/L — SIGNIFICANT CHANGE UP (ref -2–3)
BASE EXCESS BLDV CALC-SCNC: 1.5 MMOL/L — SIGNIFICANT CHANGE UP (ref -2–3)
BASE EXCESS BLDV CALC-SCNC: 9.2 MMOL/L — HIGH (ref -2–3)
BASOPHILS # BLD AUTO: 0 K/UL — SIGNIFICANT CHANGE UP (ref 0–0.2)
BASOPHILS # BLD AUTO: 0.04 K/UL — SIGNIFICANT CHANGE UP (ref 0–0.2)
BASOPHILS # BLD AUTO: 0.05 K/UL — SIGNIFICANT CHANGE UP (ref 0–0.2)
BASOPHILS # BLD AUTO: 0.1 K/UL — SIGNIFICANT CHANGE UP (ref 0–0.2)
BASOPHILS NFR BLD AUTO: 0.2 % — SIGNIFICANT CHANGE UP (ref 0–2)
BILIRUB SERPL-MCNC: 0.2 MG/DL — SIGNIFICANT CHANGE UP (ref 0.2–1.2)
BILIRUB SERPL-MCNC: 0.3 MG/DL — SIGNIFICANT CHANGE UP (ref 0.2–1.2)
BILIRUB SERPL-MCNC: 0.4 MG/DL — SIGNIFICANT CHANGE UP (ref 0.2–1.2)
BILIRUB SERPL-MCNC: 0.5 MG/DL — SIGNIFICANT CHANGE UP (ref 0.2–1.2)
BILIRUB SERPL-MCNC: 0.6 MG/DL — SIGNIFICANT CHANGE UP (ref 0.2–1.2)
BILIRUB SERPL-MCNC: 0.6 MG/DL — SIGNIFICANT CHANGE UP (ref 0.2–1.2)
BILIRUB SERPL-MCNC: 0.8 MG/DL — SIGNIFICANT CHANGE UP (ref 0.2–1.2)
BILIRUB SERPL-MCNC: 0.9 MG/DL — SIGNIFICANT CHANGE UP (ref 0.2–1.2)
BILIRUB SERPL-MCNC: 0.9 MG/DL — SIGNIFICANT CHANGE UP (ref 0.2–1.2)
BILIRUB SERPL-MCNC: 1 MG/DL — SIGNIFICANT CHANGE UP (ref 0.2–1.2)
BILIRUB SERPL-MCNC: 1 MG/DL — SIGNIFICANT CHANGE UP (ref 0.2–1.2)
BILIRUB SERPL-MCNC: 1.1 MG/DL — SIGNIFICANT CHANGE UP (ref 0.2–1.2)
BILIRUB SERPL-MCNC: 2.4 MG/DL — HIGH (ref 0.2–1.2)
BILIRUB SERPL-MCNC: 2.6 MG/DL — HIGH (ref 0.2–1.2)
BILIRUB UR-MCNC: NEGATIVE — SIGNIFICANT CHANGE UP
BILIRUB UR-MCNC: NEGATIVE — SIGNIFICANT CHANGE UP
BLD GP AB SCN SERPL QL: NEGATIVE — SIGNIFICANT CHANGE UP
BLOOD GAS VENOUS - CREATININE: SIGNIFICANT CHANGE UP MG/DL (ref 0.5–1.3)
BLOOD GAS VENOUS - CREATININE: SIGNIFICANT CHANGE UP MG/DL (ref 0.5–1.3)
BUN SERPL-MCNC: 104 MG/DL — HIGH (ref 7–23)
BUN SERPL-MCNC: 108 MG/DL — HIGH (ref 7–23)
BUN SERPL-MCNC: 11 MG/DL — SIGNIFICANT CHANGE UP (ref 7–23)
BUN SERPL-MCNC: 111 MG/DL — HIGH (ref 7–23)
BUN SERPL-MCNC: 117 MG/DL — HIGH (ref 7–23)
BUN SERPL-MCNC: 120 MG/DL — HIGH (ref 7–23)
BUN SERPL-MCNC: 138 MG/DL — HIGH (ref 7–23)
BUN SERPL-MCNC: 15 MG/DL — SIGNIFICANT CHANGE UP (ref 7–23)
BUN SERPL-MCNC: 24 MG/DL — HIGH (ref 7–23)
BUN SERPL-MCNC: 43 MG/DL — HIGH (ref 7–23)
BUN SERPL-MCNC: 46 MG/DL — HIGH (ref 7–23)
BUN SERPL-MCNC: 48 MG/DL — HIGH (ref 7–23)
BUN SERPL-MCNC: 52 MG/DL — HIGH (ref 7–23)
BUN SERPL-MCNC: 52 MG/DL — HIGH (ref 7–23)
BUN SERPL-MCNC: 58 MG/DL — HIGH (ref 7–23)
BUN SERPL-MCNC: 63 MG/DL — HIGH (ref 7–23)
BUN SERPL-MCNC: 66 MG/DL — HIGH (ref 7–23)
BUN SERPL-MCNC: 67 MG/DL — HIGH (ref 7–23)
BUN SERPL-MCNC: 68 MG/DL — HIGH (ref 7–23)
BUN SERPL-MCNC: 68 MG/DL — HIGH (ref 7–23)
BUN SERPL-MCNC: 69 MG/DL — HIGH (ref 7–23)
BUN SERPL-MCNC: 70 MG/DL — HIGH (ref 7–23)
BUN SERPL-MCNC: 72 MG/DL — HIGH (ref 7–23)
BUN SERPL-MCNC: 72 MG/DL — HIGH (ref 7–23)
BUN SERPL-MCNC: 75 MG/DL — HIGH (ref 7–23)
BUN SERPL-MCNC: 81 MG/DL — HIGH (ref 7–23)
BUN SERPL-MCNC: 81 MG/DL — HIGH (ref 7–23)
BURR CELLS BLD QL SMEAR: PRESENT — SIGNIFICANT CHANGE UP
BURR CELLS BLD QL SMEAR: SLIGHT — SIGNIFICANT CHANGE UP
CA-I SERPL-SCNC: 0.55 MMOL/L — CRITICAL LOW (ref 1.15–1.33)
CA-I SERPL-SCNC: 0.97 MMOL/L — LOW (ref 1.15–1.33)
CA-I SERPL-SCNC: 0.97 MMOL/L — LOW (ref 1.15–1.33)
CA-I SERPL-SCNC: 1 MMOL/L — LOW (ref 1.15–1.33)
CA-I SERPL-SCNC: 1.02 MMOL/L — LOW (ref 1.15–1.33)
CA-I SERPL-SCNC: 1.06 MMOL/L — LOW (ref 1.15–1.33)
CA-I SERPL-SCNC: 1.07 MMOL/L — LOW (ref 1.15–1.33)
CA-I SERPL-SCNC: 1.09 MMOL/L — LOW (ref 1.15–1.33)
CA-I SERPL-SCNC: 1.09 MMOL/L — LOW (ref 1.15–1.33)
CALCIUM SERPL-MCNC: 6 MG/DL — CRITICAL LOW (ref 8.4–10.5)
CALCIUM SERPL-MCNC: 6.4 MG/DL — CRITICAL LOW (ref 8.4–10.5)
CALCIUM SERPL-MCNC: 6.5 MG/DL — CRITICAL LOW (ref 8.4–10.5)
CALCIUM SERPL-MCNC: 6.5 MG/DL — CRITICAL LOW (ref 8.4–10.5)
CALCIUM SERPL-MCNC: 6.9 MG/DL — LOW (ref 8.4–10.5)
CALCIUM SERPL-MCNC: 7 MG/DL — LOW (ref 8.4–10.5)
CALCIUM SERPL-MCNC: 7.1 MG/DL — LOW (ref 8.4–10.5)
CALCIUM SERPL-MCNC: 7.1 MG/DL — LOW (ref 8.4–10.5)
CALCIUM SERPL-MCNC: 7.2 MG/DL — LOW (ref 8.4–10.5)
CALCIUM SERPL-MCNC: 7.2 MG/DL — LOW (ref 8.4–10.5)
CALCIUM SERPL-MCNC: 7.3 MG/DL — LOW (ref 8.4–10.5)
CALCIUM SERPL-MCNC: 7.3 MG/DL — LOW (ref 8.4–10.5)
CALCIUM SERPL-MCNC: 7.4 MG/DL — LOW (ref 8.4–10.5)
CALCIUM SERPL-MCNC: 7.5 MG/DL — LOW (ref 8.4–10.5)
CALCIUM SERPL-MCNC: 7.5 MG/DL — LOW (ref 8.4–10.5)
CALCIUM SERPL-MCNC: 7.6 MG/DL — LOW (ref 8.4–10.5)
CALCIUM SERPL-MCNC: 7.7 MG/DL — LOW (ref 8.4–10.5)
CALCIUM SERPL-MCNC: 8.9 MG/DL — SIGNIFICANT CHANGE UP (ref 8.4–10.5)
CALCIUM SERPL-MCNC: 9 MG/DL — SIGNIFICANT CHANGE UP (ref 8.4–10.5)
CALCIUM SERPL-MCNC: 9.1 MG/DL — SIGNIFICANT CHANGE UP (ref 8.4–10.5)
CALCIUM SERPL-MCNC: 9.2 MG/DL — SIGNIFICANT CHANGE UP (ref 8.4–10.5)
CALCIUM SERPL-MCNC: 9.2 MG/DL — SIGNIFICANT CHANGE UP (ref 8.4–10.5)
CALCIUM SERPL-MCNC: 9.3 MG/DL — SIGNIFICANT CHANGE UP (ref 8.4–10.5)
CHLORIDE BLDV-SCNC: 100 MMOL/L — SIGNIFICANT CHANGE UP (ref 96–108)
CHLORIDE BLDV-SCNC: 101 MMOL/L — SIGNIFICANT CHANGE UP (ref 96–108)
CHLORIDE BLDV-SCNC: 103 MMOL/L — SIGNIFICANT CHANGE UP (ref 96–108)
CHLORIDE BLDV-SCNC: 108 MMOL/L — SIGNIFICANT CHANGE UP (ref 96–108)
CHLORIDE BLDV-SCNC: 123 MMOL/L — HIGH (ref 96–108)
CHLORIDE BLDV-SCNC: 87 MMOL/L — LOW (ref 96–108)
CHLORIDE BLDV-SCNC: 90 MMOL/L — LOW (ref 96–108)
CHLORIDE BLDV-SCNC: 99 MMOL/L — SIGNIFICANT CHANGE UP (ref 96–108)
CHLORIDE BLDV-SCNC: 99 MMOL/L — SIGNIFICANT CHANGE UP (ref 96–108)
CHLORIDE SERPL-SCNC: 100 MMOL/L — SIGNIFICANT CHANGE UP (ref 96–108)
CHLORIDE SERPL-SCNC: 101 MMOL/L — SIGNIFICANT CHANGE UP (ref 96–108)
CHLORIDE SERPL-SCNC: 102 MMOL/L — SIGNIFICANT CHANGE UP (ref 96–108)
CHLORIDE SERPL-SCNC: 103 MMOL/L — SIGNIFICANT CHANGE UP (ref 96–108)
CHLORIDE SERPL-SCNC: 105 MMOL/L — SIGNIFICANT CHANGE UP (ref 96–108)
CHLORIDE SERPL-SCNC: 83 MMOL/L — LOW (ref 96–108)
CHLORIDE SERPL-SCNC: 83 MMOL/L — LOW (ref 96–108)
CHLORIDE SERPL-SCNC: 85 MMOL/L — LOW (ref 96–108)
CHLORIDE SERPL-SCNC: 87 MMOL/L — LOW (ref 96–108)
CHLORIDE SERPL-SCNC: 88 MMOL/L — LOW (ref 96–108)
CHLORIDE SERPL-SCNC: 89 MMOL/L — LOW (ref 96–108)
CHLORIDE SERPL-SCNC: 90 MMOL/L — LOW (ref 96–108)
CHLORIDE SERPL-SCNC: 93 MMOL/L — LOW (ref 96–108)
CHLORIDE SERPL-SCNC: 94 MMOL/L — LOW (ref 96–108)
CHLORIDE SERPL-SCNC: 96 MMOL/L — SIGNIFICANT CHANGE UP (ref 96–108)
CHLORIDE SERPL-SCNC: 97 MMOL/L — SIGNIFICANT CHANGE UP (ref 96–108)
CHLORIDE SERPL-SCNC: 99 MMOL/L — SIGNIFICANT CHANGE UP (ref 96–108)
CHLORIDE SERPL-SCNC: 99 MMOL/L — SIGNIFICANT CHANGE UP (ref 96–108)
CK MB CFR SERPL CALC: 4.9 NG/ML — HIGH (ref 0–3.8)
CK SERPL-CCNC: 32 U/L — SIGNIFICANT CHANGE UP (ref 25–170)
CO2 BLDA-SCNC: 14 MMOL/L — LOW (ref 19–24)
CO2 BLDV-SCNC: 17 MMOL/L — LOW (ref 22–26)
CO2 BLDV-SCNC: 20 MMOL/L — LOW (ref 22–26)
CO2 BLDV-SCNC: 20 MMOL/L — LOW (ref 22–26)
CO2 BLDV-SCNC: 21 MMOL/L — LOW (ref 22–26)
CO2 BLDV-SCNC: 24 MMOL/L — SIGNIFICANT CHANGE UP (ref 22–26)
CO2 BLDV-SCNC: 27 MMOL/L — HIGH (ref 22–26)
CO2 BLDV-SCNC: 28 MMOL/L — HIGH (ref 22–26)
CO2 BLDV-SCNC: 39 MMOL/L — HIGH (ref 22–26)
CO2 BLDV-SCNC: 8 MMOL/L — LOW (ref 22–26)
CO2 SERPL-SCNC: 13 MMOL/L — LOW (ref 22–31)
CO2 SERPL-SCNC: 15 MMOL/L — LOW (ref 22–31)
CO2 SERPL-SCNC: 17 MMOL/L — LOW (ref 22–31)
CO2 SERPL-SCNC: 17 MMOL/L — LOW (ref 22–31)
CO2 SERPL-SCNC: 18 MMOL/L — LOW (ref 22–31)
CO2 SERPL-SCNC: 19 MMOL/L — LOW (ref 22–31)
CO2 SERPL-SCNC: 20 MMOL/L — LOW (ref 22–31)
CO2 SERPL-SCNC: 21 MMOL/L — LOW (ref 22–31)
CO2 SERPL-SCNC: 22 MMOL/L — SIGNIFICANT CHANGE UP (ref 22–31)
CO2 SERPL-SCNC: 22 MMOL/L — SIGNIFICANT CHANGE UP (ref 22–31)
CO2 SERPL-SCNC: 25 MMOL/L — SIGNIFICANT CHANGE UP (ref 22–31)
CO2 SERPL-SCNC: 26 MMOL/L — SIGNIFICANT CHANGE UP (ref 22–31)
CO2 SERPL-SCNC: 27 MMOL/L — SIGNIFICANT CHANGE UP (ref 22–31)
COLOR FLD: SIGNIFICANT CHANGE UP
COLOR SPEC: ABNORMAL
COLOR SPEC: SIGNIFICANT CHANGE UP
COMMENT - URINE: SIGNIFICANT CHANGE UP
CREAT ?TM UR-MCNC: 29 MG/DL — SIGNIFICANT CHANGE UP
CREAT SERPL-MCNC: 0.64 MG/DL — SIGNIFICANT CHANGE UP (ref 0.5–1.3)
CREAT SERPL-MCNC: 0.69 MG/DL — SIGNIFICANT CHANGE UP (ref 0.5–1.3)
CREAT SERPL-MCNC: 0.88 MG/DL — SIGNIFICANT CHANGE UP (ref 0.5–1.3)
CREAT SERPL-MCNC: 1.77 MG/DL — HIGH (ref 0.5–1.3)
CREAT SERPL-MCNC: 1.77 MG/DL — HIGH (ref 0.5–1.3)
CREAT SERPL-MCNC: 1.78 MG/DL — HIGH (ref 0.5–1.3)
CREAT SERPL-MCNC: 1.86 MG/DL — HIGH (ref 0.5–1.3)
CREAT SERPL-MCNC: 1.87 MG/DL — HIGH (ref 0.5–1.3)
CREAT SERPL-MCNC: 1.91 MG/DL — HIGH (ref 0.5–1.3)
CREAT SERPL-MCNC: 1.95 MG/DL — HIGH (ref 0.5–1.3)
CREAT SERPL-MCNC: 1.97 MG/DL — HIGH (ref 0.5–1.3)
CREAT SERPL-MCNC: 2.01 MG/DL — HIGH (ref 0.5–1.3)
CREAT SERPL-MCNC: 2.03 MG/DL — HIGH (ref 0.5–1.3)
CREAT SERPL-MCNC: 2.11 MG/DL — HIGH (ref 0.5–1.3)
CREAT SERPL-MCNC: 2.16 MG/DL — HIGH (ref 0.5–1.3)
CREAT SERPL-MCNC: 2.24 MG/DL — HIGH (ref 0.5–1.3)
CREAT SERPL-MCNC: 2.36 MG/DL — HIGH (ref 0.5–1.3)
CREAT SERPL-MCNC: 2.37 MG/DL — HIGH (ref 0.5–1.3)
CREAT SERPL-MCNC: 2.39 MG/DL — HIGH (ref 0.5–1.3)
CREAT SERPL-MCNC: 2.44 MG/DL — HIGH (ref 0.5–1.3)
CREAT SERPL-MCNC: 2.5 MG/DL — HIGH (ref 0.5–1.3)
CREAT SERPL-MCNC: 2.52 MG/DL — HIGH (ref 0.5–1.3)
CREAT SERPL-MCNC: 2.52 MG/DL — HIGH (ref 0.5–1.3)
CREAT SERPL-MCNC: 2.53 MG/DL — HIGH (ref 0.5–1.3)
CREAT SERPL-MCNC: 2.58 MG/DL — HIGH (ref 0.5–1.3)
CULTURE RESULTS: SIGNIFICANT CHANGE UP
D DIMER BLD IA.RAPID-MCNC: 368 NG/ML DDU — HIGH
DIFF PNL FLD: ABNORMAL
DIFF PNL FLD: ABNORMAL
DIGOXIN SERPL-MCNC: 0.6 NG/ML — LOW (ref 0.8–2)
EGFR: 18 ML/MIN/1.73M2 — LOW
EGFR: 19 ML/MIN/1.73M2 — LOW
EGFR: 20 ML/MIN/1.73M2 — LOW
EGFR: 22 ML/MIN/1.73M2 — LOW
EGFR: 23 ML/MIN/1.73M2 — LOW
EGFR: 23 ML/MIN/1.73M2 — LOW
EGFR: 24 ML/MIN/1.73M2 — LOW
EGFR: 25 ML/MIN/1.73M2 — LOW
EGFR: 25 ML/MIN/1.73M2 — LOW
EGFR: 26 ML/MIN/1.73M2 — LOW
EGFR: 26 ML/MIN/1.73M2 — LOW
EGFR: 27 ML/MIN/1.73M2 — LOW
EGFR: 29 ML/MIN/1.73M2 — LOW
EGFR: 67 ML/MIN/1.73M2 — SIGNIFICANT CHANGE UP
EGFR: 88 ML/MIN/1.73M2 — SIGNIFICANT CHANGE UP
EGFR: 90 ML/MIN/1.73M2 — SIGNIFICANT CHANGE UP
ELLIPTOCYTES BLD QL SMEAR: SIGNIFICANT CHANGE UP
EOSINOPHIL # BLD AUTO: 0 K/UL — SIGNIFICANT CHANGE UP (ref 0–0.5)
EOSINOPHIL # BLD AUTO: 0 K/UL — SIGNIFICANT CHANGE UP (ref 0–0.5)
EOSINOPHIL # BLD AUTO: 0.02 K/UL — SIGNIFICANT CHANGE UP (ref 0–0.5)
EOSINOPHIL # BLD AUTO: 0.09 K/UL — SIGNIFICANT CHANGE UP (ref 0–0.5)
EOSINOPHIL NFR BLD AUTO: 0 % — SIGNIFICANT CHANGE UP (ref 0–6)
EOSINOPHIL NFR BLD AUTO: 0.1 % — SIGNIFICANT CHANGE UP (ref 0–6)
EOSINOPHIL NFR BLD AUTO: 0.4 % — SIGNIFICANT CHANGE UP (ref 0–6)
EPI CELLS # UR: 1 /HPF — SIGNIFICANT CHANGE UP
EPI CELLS # UR: 3 /HPF — SIGNIFICANT CHANGE UP
ESTIMATED AVERAGE GLUCOSE: 186 MG/DL — HIGH (ref 68–114)
FIBRINOGEN PPP-MCNC: 479 MG/DL — HIGH (ref 200–445)
FLUID INTAKE SUBSTANCE CLASS: SIGNIFICANT CHANGE UP
GAS PNL BLDA: SIGNIFICANT CHANGE UP
GAS PNL BLDV: 125 MMOL/L — LOW (ref 136–145)
GAS PNL BLDV: 126 MMOL/L — LOW (ref 136–145)
GAS PNL BLDV: 127 MMOL/L — LOW (ref 136–145)
GAS PNL BLDV: 131 MMOL/L — LOW (ref 136–145)
GAS PNL BLDV: 131 MMOL/L — LOW (ref 136–145)
GAS PNL BLDV: 132 MMOL/L — LOW (ref 136–145)
GAS PNL BLDV: 133 MMOL/L — LOW (ref 136–145)
GAS PNL BLDV: 134 MMOL/L — LOW (ref 136–145)
GAS PNL BLDV: 137 MMOL/L — SIGNIFICANT CHANGE UP (ref 136–145)
GAS PNL BLDV: SIGNIFICANT CHANGE UP
GLUCOSE BLDC GLUCOMTR-MCNC: 101 MG/DL — HIGH (ref 70–99)
GLUCOSE BLDC GLUCOMTR-MCNC: 103 MG/DL — HIGH (ref 70–99)
GLUCOSE BLDC GLUCOMTR-MCNC: 109 MG/DL — HIGH (ref 70–99)
GLUCOSE BLDC GLUCOMTR-MCNC: 112 MG/DL — HIGH (ref 70–99)
GLUCOSE BLDC GLUCOMTR-MCNC: 116 MG/DL — HIGH (ref 70–99)
GLUCOSE BLDC GLUCOMTR-MCNC: 123 MG/DL — HIGH (ref 70–99)
GLUCOSE BLDC GLUCOMTR-MCNC: 130 MG/DL — HIGH (ref 70–99)
GLUCOSE BLDC GLUCOMTR-MCNC: 131 MG/DL — HIGH (ref 70–99)
GLUCOSE BLDC GLUCOMTR-MCNC: 132 MG/DL — HIGH (ref 70–99)
GLUCOSE BLDC GLUCOMTR-MCNC: 136 MG/DL — HIGH (ref 70–99)
GLUCOSE BLDC GLUCOMTR-MCNC: 137 MG/DL — HIGH (ref 70–99)
GLUCOSE BLDC GLUCOMTR-MCNC: 141 MG/DL — HIGH (ref 70–99)
GLUCOSE BLDC GLUCOMTR-MCNC: 142 MG/DL — HIGH (ref 70–99)
GLUCOSE BLDC GLUCOMTR-MCNC: 143 MG/DL — HIGH (ref 70–99)
GLUCOSE BLDC GLUCOMTR-MCNC: 151 MG/DL — HIGH (ref 70–99)
GLUCOSE BLDC GLUCOMTR-MCNC: 152 MG/DL — HIGH (ref 70–99)
GLUCOSE BLDC GLUCOMTR-MCNC: 154 MG/DL — HIGH (ref 70–99)
GLUCOSE BLDC GLUCOMTR-MCNC: 154 MG/DL — HIGH (ref 70–99)
GLUCOSE BLDC GLUCOMTR-MCNC: 159 MG/DL — HIGH (ref 70–99)
GLUCOSE BLDC GLUCOMTR-MCNC: 161 MG/DL — HIGH (ref 70–99)
GLUCOSE BLDC GLUCOMTR-MCNC: 161 MG/DL — HIGH (ref 70–99)
GLUCOSE BLDC GLUCOMTR-MCNC: 162 MG/DL — HIGH (ref 70–99)
GLUCOSE BLDC GLUCOMTR-MCNC: 164 MG/DL — HIGH (ref 70–99)
GLUCOSE BLDC GLUCOMTR-MCNC: 164 MG/DL — HIGH (ref 70–99)
GLUCOSE BLDC GLUCOMTR-MCNC: 165 MG/DL — HIGH (ref 70–99)
GLUCOSE BLDC GLUCOMTR-MCNC: 173 MG/DL — HIGH (ref 70–99)
GLUCOSE BLDC GLUCOMTR-MCNC: 173 MG/DL — HIGH (ref 70–99)
GLUCOSE BLDC GLUCOMTR-MCNC: 174 MG/DL — HIGH (ref 70–99)
GLUCOSE BLDC GLUCOMTR-MCNC: 175 MG/DL — HIGH (ref 70–99)
GLUCOSE BLDC GLUCOMTR-MCNC: 175 MG/DL — HIGH (ref 70–99)
GLUCOSE BLDC GLUCOMTR-MCNC: 177 MG/DL — HIGH (ref 70–99)
GLUCOSE BLDC GLUCOMTR-MCNC: 181 MG/DL — HIGH (ref 70–99)
GLUCOSE BLDC GLUCOMTR-MCNC: 183 MG/DL — HIGH (ref 70–99)
GLUCOSE BLDC GLUCOMTR-MCNC: 184 MG/DL — HIGH (ref 70–99)
GLUCOSE BLDC GLUCOMTR-MCNC: 187 MG/DL — HIGH (ref 70–99)
GLUCOSE BLDC GLUCOMTR-MCNC: 191 MG/DL — HIGH (ref 70–99)
GLUCOSE BLDC GLUCOMTR-MCNC: 198 MG/DL — HIGH (ref 70–99)
GLUCOSE BLDC GLUCOMTR-MCNC: 200 MG/DL — HIGH (ref 70–99)
GLUCOSE BLDC GLUCOMTR-MCNC: 202 MG/DL — HIGH (ref 70–99)
GLUCOSE BLDC GLUCOMTR-MCNC: 202 MG/DL — HIGH (ref 70–99)
GLUCOSE BLDC GLUCOMTR-MCNC: 209 MG/DL — HIGH (ref 70–99)
GLUCOSE BLDC GLUCOMTR-MCNC: 212 MG/DL — HIGH (ref 70–99)
GLUCOSE BLDC GLUCOMTR-MCNC: 218 MG/DL — HIGH (ref 70–99)
GLUCOSE BLDC GLUCOMTR-MCNC: 219 MG/DL — HIGH (ref 70–99)
GLUCOSE BLDC GLUCOMTR-MCNC: 224 MG/DL — HIGH (ref 70–99)
GLUCOSE BLDC GLUCOMTR-MCNC: 225 MG/DL — HIGH (ref 70–99)
GLUCOSE BLDC GLUCOMTR-MCNC: 226 MG/DL — HIGH (ref 70–99)
GLUCOSE BLDC GLUCOMTR-MCNC: 231 MG/DL — HIGH (ref 70–99)
GLUCOSE BLDC GLUCOMTR-MCNC: 233 MG/DL — HIGH (ref 70–99)
GLUCOSE BLDC GLUCOMTR-MCNC: 239 MG/DL — HIGH (ref 70–99)
GLUCOSE BLDC GLUCOMTR-MCNC: 240 MG/DL — HIGH (ref 70–99)
GLUCOSE BLDC GLUCOMTR-MCNC: 241 MG/DL — HIGH (ref 70–99)
GLUCOSE BLDC GLUCOMTR-MCNC: 249 MG/DL — HIGH (ref 70–99)
GLUCOSE BLDC GLUCOMTR-MCNC: 255 MG/DL — HIGH (ref 70–99)
GLUCOSE BLDC GLUCOMTR-MCNC: 258 MG/DL — HIGH (ref 70–99)
GLUCOSE BLDC GLUCOMTR-MCNC: 262 MG/DL — HIGH (ref 70–99)
GLUCOSE BLDC GLUCOMTR-MCNC: 283 MG/DL — HIGH (ref 70–99)
GLUCOSE BLDC GLUCOMTR-MCNC: 293 MG/DL — HIGH (ref 70–99)
GLUCOSE BLDC GLUCOMTR-MCNC: 295 MG/DL — HIGH (ref 70–99)
GLUCOSE BLDC GLUCOMTR-MCNC: 301 MG/DL — HIGH (ref 70–99)
GLUCOSE BLDC GLUCOMTR-MCNC: 310 MG/DL — HIGH (ref 70–99)
GLUCOSE BLDC GLUCOMTR-MCNC: 320 MG/DL — HIGH (ref 70–99)
GLUCOSE BLDC GLUCOMTR-MCNC: 326 MG/DL — HIGH (ref 70–99)
GLUCOSE BLDC GLUCOMTR-MCNC: 330 MG/DL — HIGH (ref 70–99)
GLUCOSE BLDC GLUCOMTR-MCNC: 336 MG/DL — HIGH (ref 70–99)
GLUCOSE BLDC GLUCOMTR-MCNC: 346 MG/DL — HIGH (ref 70–99)
GLUCOSE BLDC GLUCOMTR-MCNC: 365 MG/DL — HIGH (ref 70–99)
GLUCOSE BLDC GLUCOMTR-MCNC: 384 MG/DL — HIGH (ref 70–99)
GLUCOSE BLDC GLUCOMTR-MCNC: 385 MG/DL — HIGH (ref 70–99)
GLUCOSE BLDC GLUCOMTR-MCNC: 421 MG/DL — HIGH (ref 70–99)
GLUCOSE BLDC GLUCOMTR-MCNC: 438 MG/DL — HIGH (ref 70–99)
GLUCOSE BLDC GLUCOMTR-MCNC: 445 MG/DL — HIGH (ref 70–99)
GLUCOSE BLDC GLUCOMTR-MCNC: 451 MG/DL — CRITICAL HIGH (ref 70–99)
GLUCOSE BLDC GLUCOMTR-MCNC: 472 MG/DL — CRITICAL HIGH (ref 70–99)
GLUCOSE BLDC GLUCOMTR-MCNC: 473 MG/DL — CRITICAL HIGH (ref 70–99)
GLUCOSE BLDC GLUCOMTR-MCNC: 475 MG/DL — CRITICAL HIGH (ref 70–99)
GLUCOSE BLDC GLUCOMTR-MCNC: 486 MG/DL — CRITICAL HIGH (ref 70–99)
GLUCOSE BLDC GLUCOMTR-MCNC: 491 MG/DL — CRITICAL HIGH (ref 70–99)
GLUCOSE BLDC GLUCOMTR-MCNC: 57 MG/DL — LOW (ref 70–99)
GLUCOSE BLDC GLUCOMTR-MCNC: 63 MG/DL — LOW (ref 70–99)
GLUCOSE BLDC GLUCOMTR-MCNC: 68 MG/DL — LOW (ref 70–99)
GLUCOSE BLDC GLUCOMTR-MCNC: 76 MG/DL — SIGNIFICANT CHANGE UP (ref 70–99)
GLUCOSE BLDC GLUCOMTR-MCNC: 82 MG/DL — SIGNIFICANT CHANGE UP (ref 70–99)
GLUCOSE BLDC GLUCOMTR-MCNC: 89 MG/DL — SIGNIFICANT CHANGE UP (ref 70–99)
GLUCOSE BLDC GLUCOMTR-MCNC: 90 MG/DL — SIGNIFICANT CHANGE UP (ref 70–99)
GLUCOSE BLDC GLUCOMTR-MCNC: 93 MG/DL — SIGNIFICANT CHANGE UP (ref 70–99)
GLUCOSE BLDC GLUCOMTR-MCNC: 96 MG/DL — SIGNIFICANT CHANGE UP (ref 70–99)
GLUCOSE BLDC GLUCOMTR-MCNC: 98 MG/DL — SIGNIFICANT CHANGE UP (ref 70–99)
GLUCOSE BLDC GLUCOMTR-MCNC: >600 MG/DL — CRITICAL HIGH (ref 70–99)
GLUCOSE BLDV-MCNC: 105 MG/DL — HIGH (ref 70–99)
GLUCOSE BLDV-MCNC: 176 MG/DL — HIGH (ref 70–99)
GLUCOSE BLDV-MCNC: 177 MG/DL — HIGH (ref 70–99)
GLUCOSE BLDV-MCNC: 181 MG/DL — HIGH (ref 70–99)
GLUCOSE BLDV-MCNC: 191 MG/DL — HIGH (ref 70–99)
GLUCOSE BLDV-MCNC: 214 MG/DL — HIGH (ref 70–99)
GLUCOSE BLDV-MCNC: 271 MG/DL — HIGH (ref 70–99)
GLUCOSE BLDV-MCNC: 505 MG/DL — CRITICAL HIGH (ref 70–99)
GLUCOSE BLDV-MCNC: SIGNIFICANT CHANGE UP MG/DL (ref 70–99)
GLUCOSE SERPL-MCNC: 100 MG/DL — HIGH (ref 70–99)
GLUCOSE SERPL-MCNC: 108 MG/DL — HIGH (ref 70–99)
GLUCOSE SERPL-MCNC: 118 MG/DL — HIGH (ref 70–99)
GLUCOSE SERPL-MCNC: 157 MG/DL — HIGH (ref 70–99)
GLUCOSE SERPL-MCNC: 159 MG/DL — HIGH (ref 70–99)
GLUCOSE SERPL-MCNC: 169 MG/DL — HIGH (ref 70–99)
GLUCOSE SERPL-MCNC: 177 MG/DL — HIGH (ref 70–99)
GLUCOSE SERPL-MCNC: 180 MG/DL — HIGH (ref 70–99)
GLUCOSE SERPL-MCNC: 183 MG/DL — HIGH (ref 70–99)
GLUCOSE SERPL-MCNC: 186 MG/DL — HIGH (ref 70–99)
GLUCOSE SERPL-MCNC: 197 MG/DL — HIGH (ref 70–99)
GLUCOSE SERPL-MCNC: 209 MG/DL — HIGH (ref 70–99)
GLUCOSE SERPL-MCNC: 216 MG/DL — HIGH (ref 70–99)
GLUCOSE SERPL-MCNC: 217 MG/DL — HIGH (ref 70–99)
GLUCOSE SERPL-MCNC: 220 MG/DL — HIGH (ref 70–99)
GLUCOSE SERPL-MCNC: 229 MG/DL — HIGH (ref 70–99)
GLUCOSE SERPL-MCNC: 244 MG/DL — HIGH (ref 70–99)
GLUCOSE SERPL-MCNC: 291 MG/DL — HIGH (ref 70–99)
GLUCOSE SERPL-MCNC: 297 MG/DL — HIGH (ref 70–99)
GLUCOSE SERPL-MCNC: 369 MG/DL — HIGH (ref 70–99)
GLUCOSE SERPL-MCNC: 417 MG/DL — HIGH (ref 70–99)
GLUCOSE SERPL-MCNC: 426 MG/DL — HIGH (ref 70–99)
GLUCOSE SERPL-MCNC: 446 MG/DL — HIGH (ref 70–99)
GLUCOSE SERPL-MCNC: 475 MG/DL — CRITICAL HIGH (ref 70–99)
GLUCOSE SERPL-MCNC: 501 MG/DL — CRITICAL HIGH (ref 70–99)
GLUCOSE SERPL-MCNC: 78 MG/DL — SIGNIFICANT CHANGE UP (ref 70–99)
GLUCOSE SERPL-MCNC: 80 MG/DL — SIGNIFICANT CHANGE UP (ref 70–99)
GLUCOSE UR QL: ABNORMAL
GLUCOSE UR QL: NEGATIVE — SIGNIFICANT CHANGE UP
GRAM STN FLD: SIGNIFICANT CHANGE UP
GRAM STN FLD: SIGNIFICANT CHANGE UP
HAPTOGLOB SERPL-MCNC: 127 MG/DL — SIGNIFICANT CHANGE UP (ref 34–200)
HAPTOGLOB SERPL-MCNC: 88 MG/DL — SIGNIFICANT CHANGE UP (ref 34–200)
HCO3 BLDA-SCNC: 13 MMOL/L — LOW (ref 21–28)
HCO3 BLDV-SCNC: 16 MMOL/L — LOW (ref 22–29)
HCO3 BLDV-SCNC: 18 MMOL/L — LOW (ref 22–29)
HCO3 BLDV-SCNC: 18 MMOL/L — LOW (ref 22–29)
HCO3 BLDV-SCNC: 19 MMOL/L — LOW (ref 22–29)
HCO3 BLDV-SCNC: 23 MMOL/L — SIGNIFICANT CHANGE UP (ref 22–29)
HCO3 BLDV-SCNC: 26 MMOL/L — SIGNIFICANT CHANGE UP (ref 22–29)
HCO3 BLDV-SCNC: 27 MMOL/L — SIGNIFICANT CHANGE UP (ref 22–29)
HCO3 BLDV-SCNC: 37 MMOL/L — HIGH (ref 22–29)
HCO3 BLDV-SCNC: 7 MMOL/L — CRITICAL LOW (ref 22–29)
HCT VFR BLD CALC: 19 % — CRITICAL LOW (ref 34.5–45)
HCT VFR BLD CALC: 20 % — CRITICAL LOW (ref 34.5–45)
HCT VFR BLD CALC: 22.5 % — LOW (ref 34.5–45)
HCT VFR BLD CALC: 23 % — LOW (ref 34.5–45)
HCT VFR BLD CALC: 24.3 % — LOW (ref 34.5–45)
HCT VFR BLD CALC: 24.5 % — LOW (ref 34.5–45)
HCT VFR BLD CALC: 24.6 % — LOW (ref 34.5–45)
HCT VFR BLD CALC: 24.6 % — LOW (ref 34.5–45)
HCT VFR BLD CALC: 24.8 % — LOW (ref 34.5–45)
HCT VFR BLD CALC: 25.3 % — LOW (ref 34.5–45)
HCT VFR BLD CALC: 25.4 % — LOW (ref 34.5–45)
HCT VFR BLD CALC: 25.6 % — LOW (ref 34.5–45)
HCT VFR BLD CALC: 27.3 % — LOW (ref 34.5–45)
HCT VFR BLD CALC: 27.6 % — LOW (ref 34.5–45)
HCT VFR BLD CALC: 27.8 % — LOW (ref 34.5–45)
HCT VFR BLD CALC: 28 % — LOW (ref 34.5–45)
HCT VFR BLD CALC: 28.2 % — LOW (ref 34.5–45)
HCT VFR BLD CALC: 28.3 % — LOW (ref 34.5–45)
HCT VFR BLD CALC: 29.3 % — LOW (ref 34.5–45)
HCT VFR BLD CALC: 29.5 % — LOW (ref 34.5–45)
HCT VFR BLD CALC: 29.9 % — LOW (ref 34.5–45)
HCT VFR BLD CALC: 30.3 % — LOW (ref 34.5–45)
HCT VFR BLD CALC: 30.4 % — LOW (ref 34.5–45)
HCT VFR BLD CALC: 33.2 % — LOW (ref 34.5–45)
HCT VFR BLD CALC: 37.2 % — SIGNIFICANT CHANGE UP (ref 34.5–45)
HCT VFR BLD CALC: 37.4 % — SIGNIFICANT CHANGE UP (ref 34.5–45)
HCT VFR BLD CALC: 38.4 % — SIGNIFICANT CHANGE UP (ref 34.5–45)
HCT VFR BLD CALC: 40.7 % — SIGNIFICANT CHANGE UP (ref 34.5–45)
HCT VFR BLD CALC: 41.5 % — SIGNIFICANT CHANGE UP (ref 34.5–45)
HCT VFR BLD CALC: 44.4 % — SIGNIFICANT CHANGE UP (ref 34.5–45)
HCT VFR BLDA CALC: 14 % — CRITICAL LOW (ref 34.5–46.5)
HCT VFR BLDA CALC: 20 % — CRITICAL LOW (ref 34.5–46.5)
HCT VFR BLDA CALC: 21 % — CRITICAL LOW (ref 34.5–46.5)
HCT VFR BLDA CALC: 23 % — LOW (ref 34.5–46.5)
HCT VFR BLDA CALC: 29 % — LOW (ref 34.5–46.5)
HCT VFR BLDA CALC: 30 % — LOW (ref 34.5–46.5)
HCT VFR BLDA CALC: 31 % — LOW (ref 34.5–46.5)
HCT VFR BLDA CALC: 41 % — SIGNIFICANT CHANGE UP (ref 34.5–46.5)
HCT VFR BLDA CALC: 44 % — SIGNIFICANT CHANGE UP (ref 34.5–46.5)
HGB BLD CALC-MCNC: 10.2 G/DL — LOW (ref 11.7–16.1)
HGB BLD CALC-MCNC: 13.7 G/DL — SIGNIFICANT CHANGE UP (ref 11.7–16.1)
HGB BLD CALC-MCNC: 14.8 G/DL — SIGNIFICANT CHANGE UP (ref 11.7–16.1)
HGB BLD CALC-MCNC: 4.7 G/DL — CRITICAL LOW (ref 11.7–16.1)
HGB BLD CALC-MCNC: 6.6 G/DL — CRITICAL LOW (ref 11.7–16.1)
HGB BLD CALC-MCNC: 7.1 G/DL — LOW (ref 11.7–16.1)
HGB BLD CALC-MCNC: 7.7 G/DL — LOW (ref 11.7–16.1)
HGB BLD CALC-MCNC: 9.8 G/DL — LOW (ref 11.7–16.1)
HGB BLD CALC-MCNC: 9.9 G/DL — LOW (ref 11.7–16.1)
HGB BLD-MCNC: 10.6 G/DL — LOW (ref 11.5–15.5)
HGB BLD-MCNC: 10.7 G/DL — LOW (ref 11.5–15.5)
HGB BLD-MCNC: 11.4 G/DL — LOW (ref 11.5–15.5)
HGB BLD-MCNC: 12.3 G/DL — SIGNIFICANT CHANGE UP (ref 11.5–15.5)
HGB BLD-MCNC: 12.5 G/DL — SIGNIFICANT CHANGE UP (ref 11.5–15.5)
HGB BLD-MCNC: 12.8 G/DL — SIGNIFICANT CHANGE UP (ref 11.5–15.5)
HGB BLD-MCNC: 13.1 G/DL — SIGNIFICANT CHANGE UP (ref 11.5–15.5)
HGB BLD-MCNC: 13.2 G/DL — SIGNIFICANT CHANGE UP (ref 11.5–15.5)
HGB BLD-MCNC: 13.9 G/DL — SIGNIFICANT CHANGE UP (ref 11.5–15.5)
HGB BLD-MCNC: 6.3 G/DL — CRITICAL LOW (ref 11.5–15.5)
HGB BLD-MCNC: 6.9 G/DL — CRITICAL LOW (ref 11.5–15.5)
HGB BLD-MCNC: 7.6 G/DL — LOW (ref 11.5–15.5)
HGB BLD-MCNC: 7.9 G/DL — LOW (ref 11.5–15.5)
HGB BLD-MCNC: 8.1 G/DL — LOW (ref 11.5–15.5)
HGB BLD-MCNC: 8.1 G/DL — LOW (ref 11.5–15.5)
HGB BLD-MCNC: 8.2 G/DL — LOW (ref 11.5–15.5)
HGB BLD-MCNC: 8.2 G/DL — LOW (ref 11.5–15.5)
HGB BLD-MCNC: 8.3 G/DL — LOW (ref 11.5–15.5)
HGB BLD-MCNC: 8.4 G/DL — LOW (ref 11.5–15.5)
HGB BLD-MCNC: 8.6 G/DL — LOW (ref 11.5–15.5)
HGB BLD-MCNC: 8.6 G/DL — LOW (ref 11.5–15.5)
HGB BLD-MCNC: 9 G/DL — LOW (ref 11.5–15.5)
HGB BLD-MCNC: 9.2 G/DL — LOW (ref 11.5–15.5)
HGB BLD-MCNC: 9.3 G/DL — LOW (ref 11.5–15.5)
HGB BLD-MCNC: 9.5 G/DL — LOW (ref 11.5–15.5)
HGB BLD-MCNC: 9.7 G/DL — LOW (ref 11.5–15.5)
HGB BLD-MCNC: 9.8 G/DL — LOW (ref 11.5–15.5)
HGB BLD-MCNC: 9.8 G/DL — LOW (ref 11.5–15.5)
HGB BLD-MCNC: 9.9 G/DL — LOW (ref 11.5–15.5)
HGB BLD-MCNC: 9.9 G/DL — LOW (ref 11.5–15.5)
HOROWITZ INDEX BLDV+IHG-RTO: 30 — SIGNIFICANT CHANGE UP
HOROWITZ INDEX BLDV+IHG-RTO: 40 — SIGNIFICANT CHANGE UP
HOROWITZ INDEX BLDV+IHG-RTO: 60 — SIGNIFICANT CHANGE UP
HYALINE CASTS # UR AUTO: 0 /LPF — SIGNIFICANT CHANGE UP (ref 0–7)
HYALINE CASTS # UR AUTO: 2 /LPF — SIGNIFICANT CHANGE UP (ref 0–2)
IMM GRANULOCYTES NFR BLD AUTO: 0.7 % — SIGNIFICANT CHANGE UP (ref 0–0.9)
IMM GRANULOCYTES NFR BLD AUTO: 0.8 % — SIGNIFICANT CHANGE UP (ref 0–0.9)
IMM GRANULOCYTES NFR BLD AUTO: 2.5 % — HIGH (ref 0–0.9)
INR BLD: 1.27 RATIO — HIGH (ref 0.88–1.16)
INR BLD: 1.31 RATIO — HIGH (ref 0.88–1.16)
INR BLD: 1.32 RATIO — HIGH (ref 0.88–1.16)
INR BLD: 1.33 RATIO — HIGH (ref 0.88–1.16)
INR BLD: 1.36 RATIO — HIGH (ref 0.88–1.16)
INR BLD: 1.42 RATIO — HIGH (ref 0.88–1.16)
INR BLD: 1.43 RATIO — HIGH (ref 0.88–1.16)
INR BLD: 1.46 RATIO — HIGH (ref 0.88–1.16)
INR BLD: 1.67 RATIO — HIGH (ref 0.88–1.16)
INR BLD: 1.91 RATIO — HIGH (ref 0.88–1.16)
INR BLD: 11.52 RATIO — CRITICAL HIGH (ref 0.88–1.16)
INR BLD: 2.07 RATIO — HIGH (ref 0.88–1.16)
INR BLD: 2.12 RATIO — HIGH (ref 0.88–1.16)
INR BLD: 2.27 RATIO — HIGH (ref 0.88–1.16)
INR BLD: 2.29 RATIO — HIGH (ref 0.88–1.16)
INR BLD: 3.21 RATIO — HIGH (ref 0.88–1.16)
INR BLD: 4.6 RATIO — HIGH (ref 0.88–1.16)
INR BLD: 8.35 RATIO — CRITICAL HIGH (ref 0.88–1.16)
INTUBATED: YES — SIGNIFICANT CHANGE UP
KETONES UR-MCNC: ABNORMAL
KETONES UR-MCNC: NEGATIVE — SIGNIFICANT CHANGE UP
LACTATE BLDV-MCNC: 1.3 MMOL/L — SIGNIFICANT CHANGE UP (ref 0.5–2)
LACTATE BLDV-MCNC: 1.4 MMOL/L — SIGNIFICANT CHANGE UP (ref 0.5–2)
LACTATE BLDV-MCNC: 1.5 MMOL/L — SIGNIFICANT CHANGE UP (ref 0.5–2)
LACTATE BLDV-MCNC: 2.2 MMOL/L — HIGH (ref 0.5–2)
LACTATE BLDV-MCNC: 2.2 MMOL/L — HIGH (ref 0.5–2)
LACTATE BLDV-MCNC: 2.7 MMOL/L — HIGH (ref 0.5–2)
LACTATE BLDV-MCNC: 2.8 MMOL/L — HIGH (ref 0.5–2)
LACTATE BLDV-MCNC: 3.1 MMOL/L — HIGH (ref 0.5–2)
LACTATE BLDV-MCNC: 3.6 MMOL/L — HIGH (ref 0.5–2)
LACTATE SERPL-SCNC: 1.7 MMOL/L — SIGNIFICANT CHANGE UP (ref 0.5–2)
LACTATE SERPL-SCNC: 3.7 MMOL/L — HIGH (ref 0.5–2)
LDH SERPL L TO P-CCNC: 309 U/L — HIGH (ref 50–242)
LDH SERPL L TO P-CCNC: 409 U/L — HIGH (ref 50–242)
LEGIONELLA AG UR QL: NEGATIVE — SIGNIFICANT CHANGE UP
LEUKOCYTE ESTERASE UR-ACNC: ABNORMAL
LEUKOCYTE ESTERASE UR-ACNC: ABNORMAL
LYMPHOCYTES # BLD AUTO: 0 K/UL — LOW (ref 1–3.3)
LYMPHOCYTES # BLD AUTO: 0.21 K/UL — LOW (ref 1–3.3)
LYMPHOCYTES # BLD AUTO: 0.46 K/UL — LOW (ref 1–3.3)
LYMPHOCYTES # BLD AUTO: 0.9 % — LOW (ref 13–44)
LYMPHOCYTES # BLD AUTO: 1.1 % — LOW (ref 13–44)
LYMPHOCYTES # BLD AUTO: 1.13 K/UL — SIGNIFICANT CHANGE UP (ref 1–3.3)
LYMPHOCYTES # BLD AUTO: 4.7 % — LOW (ref 13–44)
LYMPHOCYTES # FLD: 1 % — SIGNIFICANT CHANGE UP
MAGNESIUM SERPL-MCNC: 1.6 MG/DL — SIGNIFICANT CHANGE UP (ref 1.6–2.6)
MAGNESIUM SERPL-MCNC: 1.7 MG/DL — SIGNIFICANT CHANGE UP (ref 1.6–2.6)
MAGNESIUM SERPL-MCNC: 1.7 MG/DL — SIGNIFICANT CHANGE UP (ref 1.6–2.6)
MAGNESIUM SERPL-MCNC: 1.8 MG/DL — SIGNIFICANT CHANGE UP (ref 1.6–2.6)
MAGNESIUM SERPL-MCNC: 1.9 MG/DL — SIGNIFICANT CHANGE UP (ref 1.6–2.6)
MAGNESIUM SERPL-MCNC: 2 MG/DL — SIGNIFICANT CHANGE UP (ref 1.6–2.6)
MAGNESIUM SERPL-MCNC: 2.1 MG/DL — SIGNIFICANT CHANGE UP (ref 1.6–2.6)
MAGNESIUM SERPL-MCNC: 2.2 MG/DL — SIGNIFICANT CHANGE UP (ref 1.6–2.6)
MAGNESIUM SERPL-MCNC: 3.1 MG/DL — HIGH (ref 1.6–2.6)
MCHC RBC-ENTMCNC: 25.9 PG — LOW (ref 27–34)
MCHC RBC-ENTMCNC: 26 PG — LOW (ref 27–34)
MCHC RBC-ENTMCNC: 26 PG — LOW (ref 27–34)
MCHC RBC-ENTMCNC: 26.1 PG — LOW (ref 27–34)
MCHC RBC-ENTMCNC: 26.1 PG — LOW (ref 27–34)
MCHC RBC-ENTMCNC: 26.2 PG — LOW (ref 27–34)
MCHC RBC-ENTMCNC: 26.3 PG — LOW (ref 27–34)
MCHC RBC-ENTMCNC: 26.4 PG — LOW (ref 27–34)
MCHC RBC-ENTMCNC: 26.5 PG — LOW (ref 27–34)
MCHC RBC-ENTMCNC: 26.5 PG — LOW (ref 27–34)
MCHC RBC-ENTMCNC: 26.6 PG — LOW (ref 27–34)
MCHC RBC-ENTMCNC: 26.8 PG — LOW (ref 27–34)
MCHC RBC-ENTMCNC: 26.8 PG — LOW (ref 27–34)
MCHC RBC-ENTMCNC: 27 PG — SIGNIFICANT CHANGE UP (ref 27–34)
MCHC RBC-ENTMCNC: 27.5 PG — SIGNIFICANT CHANGE UP (ref 27–34)
MCHC RBC-ENTMCNC: 28 PG — SIGNIFICANT CHANGE UP (ref 27–34)
MCHC RBC-ENTMCNC: 28.1 PG — SIGNIFICANT CHANGE UP (ref 27–34)
MCHC RBC-ENTMCNC: 28.2 PG — SIGNIFICANT CHANGE UP (ref 27–34)
MCHC RBC-ENTMCNC: 28.4 PG — SIGNIFICANT CHANGE UP (ref 27–34)
MCHC RBC-ENTMCNC: 29.2 PG — SIGNIFICANT CHANGE UP (ref 27–34)
MCHC RBC-ENTMCNC: 31.3 GM/DL — LOW (ref 32–36)
MCHC RBC-ENTMCNC: 31.8 GM/DL — LOW (ref 32–36)
MCHC RBC-ENTMCNC: 31.9 GM/DL — LOW (ref 32–36)
MCHC RBC-ENTMCNC: 32.2 GM/DL — SIGNIFICANT CHANGE UP (ref 32–36)
MCHC RBC-ENTMCNC: 32.9 GM/DL — SIGNIFICANT CHANGE UP (ref 32–36)
MCHC RBC-ENTMCNC: 33.1 GM/DL — SIGNIFICANT CHANGE UP (ref 32–36)
MCHC RBC-ENTMCNC: 33.2 GM/DL — SIGNIFICANT CHANGE UP (ref 32–36)
MCHC RBC-ENTMCNC: 33.3 GM/DL — SIGNIFICANT CHANGE UP (ref 32–36)
MCHC RBC-ENTMCNC: 33.3 GM/DL — SIGNIFICANT CHANGE UP (ref 32–36)
MCHC RBC-ENTMCNC: 33.4 GM/DL — SIGNIFICANT CHANGE UP (ref 32–36)
MCHC RBC-ENTMCNC: 33.4 GM/DL — SIGNIFICANT CHANGE UP (ref 32–36)
MCHC RBC-ENTMCNC: 33.5 GM/DL — SIGNIFICANT CHANGE UP (ref 32–36)
MCHC RBC-ENTMCNC: 33.6 GM/DL — SIGNIFICANT CHANGE UP (ref 32–36)
MCHC RBC-ENTMCNC: 33.6 GM/DL — SIGNIFICANT CHANGE UP (ref 32–36)
MCHC RBC-ENTMCNC: 33.7 GM/DL — SIGNIFICANT CHANGE UP (ref 32–36)
MCHC RBC-ENTMCNC: 33.8 GM/DL — SIGNIFICANT CHANGE UP (ref 32–36)
MCHC RBC-ENTMCNC: 33.9 GM/DL — SIGNIFICANT CHANGE UP (ref 32–36)
MCHC RBC-ENTMCNC: 34.1 GM/DL — SIGNIFICANT CHANGE UP (ref 32–36)
MCHC RBC-ENTMCNC: 34.3 GM/DL — SIGNIFICANT CHANGE UP (ref 32–36)
MCHC RBC-ENTMCNC: 34.3 GM/DL — SIGNIFICANT CHANGE UP (ref 32–36)
MCHC RBC-ENTMCNC: 34.5 GM/DL — SIGNIFICANT CHANGE UP (ref 32–36)
MCHC RBC-ENTMCNC: 35 GM/DL — SIGNIFICANT CHANGE UP (ref 32–36)
MCHC RBC-ENTMCNC: 35.1 GM/DL — SIGNIFICANT CHANGE UP (ref 32–36)
MCHC RBC-ENTMCNC: 35.2 GM/DL — SIGNIFICANT CHANGE UP (ref 32–36)
MCHC RBC-ENTMCNC: 35.4 GM/DL — SIGNIFICANT CHANGE UP (ref 32–36)
MCV RBC AUTO: 77.2 FL — LOW (ref 80–100)
MCV RBC AUTO: 78 FL — LOW (ref 80–100)
MCV RBC AUTO: 78.1 FL — LOW (ref 80–100)
MCV RBC AUTO: 78.5 FL — LOW (ref 80–100)
MCV RBC AUTO: 78.5 FL — LOW (ref 80–100)
MCV RBC AUTO: 78.9 FL — LOW (ref 80–100)
MCV RBC AUTO: 79.2 FL — LOW (ref 80–100)
MCV RBC AUTO: 79.2 FL — LOW (ref 80–100)
MCV RBC AUTO: 79.3 FL — LOW (ref 80–100)
MCV RBC AUTO: 79.8 FL — LOW (ref 80–100)
MCV RBC AUTO: 79.9 FL — LOW (ref 80–100)
MCV RBC AUTO: 80 FL — SIGNIFICANT CHANGE UP (ref 80–100)
MCV RBC AUTO: 80.2 FL — SIGNIFICANT CHANGE UP (ref 80–100)
MCV RBC AUTO: 80.2 FL — SIGNIFICANT CHANGE UP (ref 80–100)
MCV RBC AUTO: 80.3 FL — SIGNIFICANT CHANGE UP (ref 80–100)
MCV RBC AUTO: 80.3 FL — SIGNIFICANT CHANGE UP (ref 80–100)
MCV RBC AUTO: 81.9 FL — SIGNIFICANT CHANGE UP (ref 80–100)
MCV RBC AUTO: 81.9 FL — SIGNIFICANT CHANGE UP (ref 80–100)
MCV RBC AUTO: 82.5 FL — SIGNIFICANT CHANGE UP (ref 80–100)
MCV RBC AUTO: 82.6 FL — SIGNIFICANT CHANGE UP (ref 80–100)
MCV RBC AUTO: 83 FL — SIGNIFICANT CHANGE UP (ref 80–100)
MCV RBC AUTO: 83.2 FL — SIGNIFICANT CHANGE UP (ref 80–100)
MCV RBC AUTO: 83.5 FL — SIGNIFICANT CHANGE UP (ref 80–100)
MCV RBC AUTO: 83.5 FL — SIGNIFICANT CHANGE UP (ref 80–100)
MCV RBC AUTO: 83.8 FL — SIGNIFICANT CHANGE UP (ref 80–100)
MCV RBC AUTO: 84.5 FL — SIGNIFICANT CHANGE UP (ref 80–100)
MCV RBC AUTO: 84.5 FL — SIGNIFICANT CHANGE UP (ref 80–100)
MCV RBC AUTO: 84.9 FL — SIGNIFICANT CHANGE UP (ref 80–100)
MCV RBC AUTO: 84.9 FL — SIGNIFICANT CHANGE UP (ref 80–100)
MCV RBC AUTO: 86.3 FL — SIGNIFICANT CHANGE UP (ref 80–100)
METHOD TYPE: SIGNIFICANT CHANGE UP
MONOCYTES # BLD AUTO: 0.83 K/UL — SIGNIFICANT CHANGE UP (ref 0–0.9)
MONOCYTES # BLD AUTO: 0.94 K/UL — HIGH (ref 0–0.9)
MONOCYTES # BLD AUTO: 1.15 K/UL — HIGH (ref 0–0.9)
MONOCYTES # BLD AUTO: 2.01 K/UL — HIGH (ref 0–0.9)
MONOCYTES NFR BLD AUTO: 3 % — SIGNIFICANT CHANGE UP (ref 2–14)
MONOCYTES NFR BLD AUTO: 3.4 % — SIGNIFICANT CHANGE UP (ref 2–14)
MONOCYTES NFR BLD AUTO: 4.8 % — SIGNIFICANT CHANGE UP (ref 2–14)
MONOCYTES NFR BLD AUTO: 4.8 % — SIGNIFICANT CHANGE UP (ref 2–14)
MONOS+MACROS # FLD: 3 % — SIGNIFICANT CHANGE UP
MRSA PCR RESULT.: SIGNIFICANT CHANGE UP
MRSA PCR RESULT.: SIGNIFICANT CHANGE UP
NEUTROPHILS # BLD AUTO: 21.29 K/UL — HIGH (ref 1.8–7.4)
NEUTROPHILS # BLD AUTO: 23.3 K/UL — HIGH (ref 1.8–7.4)
NEUTROPHILS # BLD AUTO: 30.44 K/UL — HIGH (ref 1.8–7.4)
NEUTROPHILS # BLD AUTO: 38.09 K/UL — HIGH (ref 1.8–7.4)
NEUTROPHILS NFR BLD AUTO: 89.5 % — HIGH (ref 43–77)
NEUTROPHILS NFR BLD AUTO: 91 % — HIGH (ref 43–77)
NEUTROPHILS NFR BLD AUTO: 91.4 % — HIGH (ref 43–77)
NEUTROPHILS NFR BLD AUTO: 94.3 % — HIGH (ref 43–77)
NEUTROPHILS-BODY FLUID: 96 % — SIGNIFICANT CHANGE UP
NEUTS BAND # BLD: 6 % — SIGNIFICANT CHANGE UP (ref 0–8)
NITRITE UR-MCNC: NEGATIVE — SIGNIFICANT CHANGE UP
NITRITE UR-MCNC: NEGATIVE — SIGNIFICANT CHANGE UP
NRBC # BLD: 0 /100 WBCS — SIGNIFICANT CHANGE UP (ref 0–0)
NRBC # BLD: 1 /100 WBCS — HIGH (ref 0–0)
NRBC # BLD: 1 /100 WBCS — HIGH (ref 0–0)
NRBC # BLD: 2 /100 WBCS — HIGH (ref 0–0)
NRBC # BLD: 2 /100 WBCS — HIGH (ref 0–0)
ORGANISM # SPEC MICROSCOPIC CNT: SIGNIFICANT CHANGE UP
ORGANISM # SPEC MICROSCOPIC CNT: SIGNIFICANT CHANGE UP
OSMOLALITY SERPL: 292 MOSMOL/KG — SIGNIFICANT CHANGE UP (ref 280–301)
OSMOLALITY UR: 380 MOS/KG — SIGNIFICANT CHANGE UP (ref 300–900)
OSMOLALITY UR: 393 MOS/KG — SIGNIFICANT CHANGE UP (ref 300–900)
PCO2 BLDA: 35 MMHG — SIGNIFICANT CHANGE UP (ref 32–45)
PCO2 BLDV: 23 MMHG — LOW (ref 39–42)
PCO2 BLDV: 39 MMHG — SIGNIFICANT CHANGE UP (ref 39–42)
PCO2 BLDV: 41 MMHG — SIGNIFICANT CHANGE UP (ref 39–42)
PCO2 BLDV: 41 MMHG — SIGNIFICANT CHANGE UP (ref 39–42)
PCO2 BLDV: 43 MMHG — HIGH (ref 39–42)
PCO2 BLDV: 44 MMHG — HIGH (ref 39–42)
PCO2 BLDV: 47 MMHG — HIGH (ref 39–42)
PCO2 BLDV: 55 MMHG — HIGH (ref 39–42)
PCO2 BLDV: 64 MMHG — HIGH (ref 39–42)
PH BLDA: 7.19 — CRITICAL LOW (ref 7.35–7.45)
PH BLDV: 7.09 — CRITICAL LOW (ref 7.32–7.43)
PH BLDV: 7.12 — CRITICAL LOW (ref 7.32–7.43)
PH BLDV: 7.18 — CRITICAL LOW (ref 7.32–7.43)
PH BLDV: 7.26 — LOW (ref 7.32–7.43)
PH BLDV: 7.28 — LOW (ref 7.32–7.43)
PH BLDV: 7.32 — SIGNIFICANT CHANGE UP (ref 7.32–7.43)
PH BLDV: 7.36 — SIGNIFICANT CHANGE UP (ref 7.32–7.43)
PH BLDV: 7.37 — SIGNIFICANT CHANGE UP (ref 7.32–7.43)
PH BLDV: 7.43 — SIGNIFICANT CHANGE UP (ref 7.32–7.43)
PH UR: 6 — SIGNIFICANT CHANGE UP (ref 5–8)
PH UR: 7 — SIGNIFICANT CHANGE UP (ref 5–8)
PHOSPHATE SERPL-MCNC: 2.3 MG/DL — LOW (ref 2.5–4.5)
PHOSPHATE SERPL-MCNC: 2.6 MG/DL — SIGNIFICANT CHANGE UP (ref 2.5–4.5)
PHOSPHATE SERPL-MCNC: 3.6 MG/DL — SIGNIFICANT CHANGE UP (ref 2.5–4.5)
PHOSPHATE SERPL-MCNC: 3.6 MG/DL — SIGNIFICANT CHANGE UP (ref 2.5–4.5)
PHOSPHATE SERPL-MCNC: 3.8 MG/DL — SIGNIFICANT CHANGE UP (ref 2.5–4.5)
PHOSPHATE SERPL-MCNC: 3.8 MG/DL — SIGNIFICANT CHANGE UP (ref 2.5–4.5)
PHOSPHATE SERPL-MCNC: 3.9 MG/DL — SIGNIFICANT CHANGE UP (ref 2.5–4.5)
PHOSPHATE SERPL-MCNC: 4.2 MG/DL — SIGNIFICANT CHANGE UP (ref 2.5–4.5)
PHOSPHATE SERPL-MCNC: 4.3 MG/DL — SIGNIFICANT CHANGE UP (ref 2.5–4.5)
PHOSPHATE SERPL-MCNC: 4.7 MG/DL — HIGH (ref 2.5–4.5)
PHOSPHATE SERPL-MCNC: 4.8 MG/DL — HIGH (ref 2.5–4.5)
PHOSPHATE SERPL-MCNC: 4.8 MG/DL — HIGH (ref 2.5–4.5)
PHOSPHATE SERPL-MCNC: 4.9 MG/DL — HIGH (ref 2.5–4.5)
PHOSPHATE SERPL-MCNC: 4.9 MG/DL — HIGH (ref 2.5–4.5)
PHOSPHATE SERPL-MCNC: 5.3 MG/DL — HIGH (ref 2.5–4.5)
PHOSPHATE SERPL-MCNC: 5.5 MG/DL — HIGH (ref 2.5–4.5)
PHOSPHATE SERPL-MCNC: 6.1 MG/DL — HIGH (ref 2.5–4.5)
PHOSPHATE SERPL-MCNC: 6.1 MG/DL — HIGH (ref 2.5–4.5)
PHOSPHATE SERPL-MCNC: 6.2 MG/DL — HIGH (ref 2.5–4.5)
PHOSPHATE SERPL-MCNC: 6.3 MG/DL — HIGH (ref 2.5–4.5)
PHOSPHATE SERPL-MCNC: 6.7 MG/DL — HIGH (ref 2.5–4.5)
PHOSPHATE SERPL-MCNC: 7.7 MG/DL — HIGH (ref 2.5–4.5)
PHOSPHATE SERPL-MCNC: 8.2 MG/DL — HIGH (ref 2.5–4.5)
PLAT MORPH BLD: NORMAL — SIGNIFICANT CHANGE UP
PLATELET # BLD AUTO: 101 K/UL — LOW (ref 150–400)
PLATELET # BLD AUTO: 106 K/UL — LOW (ref 150–400)
PLATELET # BLD AUTO: 108 K/UL — LOW (ref 150–400)
PLATELET # BLD AUTO: 114 K/UL — LOW (ref 150–400)
PLATELET # BLD AUTO: 118 K/UL — LOW (ref 150–400)
PLATELET # BLD AUTO: 119 K/UL — LOW (ref 150–400)
PLATELET # BLD AUTO: 131 K/UL — LOW (ref 150–400)
PLATELET # BLD AUTO: 133 K/UL — LOW (ref 150–400)
PLATELET # BLD AUTO: 167 K/UL — SIGNIFICANT CHANGE UP (ref 150–400)
PLATELET # BLD AUTO: 172 K/UL — SIGNIFICANT CHANGE UP (ref 150–400)
PLATELET # BLD AUTO: 178 K/UL — SIGNIFICANT CHANGE UP (ref 150–400)
PLATELET # BLD AUTO: 199 K/UL — SIGNIFICANT CHANGE UP (ref 150–400)
PLATELET # BLD AUTO: 200 K/UL — SIGNIFICANT CHANGE UP (ref 150–400)
PLATELET # BLD AUTO: 265 K/UL — SIGNIFICANT CHANGE UP (ref 150–400)
PLATELET # BLD AUTO: 284 K/UL — SIGNIFICANT CHANGE UP (ref 150–400)
PLATELET # BLD AUTO: 302 K/UL — SIGNIFICANT CHANGE UP (ref 150–400)
PLATELET # BLD AUTO: 303 K/UL — SIGNIFICANT CHANGE UP (ref 150–400)
PLATELET # BLD AUTO: 348 K/UL — SIGNIFICANT CHANGE UP (ref 150–400)
PLATELET # BLD AUTO: 352 K/UL — SIGNIFICANT CHANGE UP (ref 150–400)
PLATELET # BLD AUTO: 359 K/UL — SIGNIFICANT CHANGE UP (ref 150–400)
PLATELET # BLD AUTO: 60 K/UL — LOW (ref 150–400)
PLATELET # BLD AUTO: 63 K/UL — LOW (ref 150–400)
PLATELET # BLD AUTO: 67 K/UL — LOW (ref 150–400)
PLATELET # BLD AUTO: 71 K/UL — LOW (ref 150–400)
PLATELET # BLD AUTO: 72 K/UL — LOW (ref 150–400)
PLATELET # BLD AUTO: 89 K/UL — LOW (ref 150–400)
PLATELET # BLD AUTO: 90 K/UL — LOW (ref 150–400)
PLATELET # BLD AUTO: 99 K/UL — LOW (ref 150–400)
PO2 BLDA: 117 MMHG — HIGH (ref 83–108)
PO2 BLDV: 27 MMHG — SIGNIFICANT CHANGE UP (ref 25–45)
PO2 BLDV: 31 MMHG — SIGNIFICANT CHANGE UP (ref 25–45)
PO2 BLDV: 36 MMHG — SIGNIFICANT CHANGE UP (ref 25–45)
PO2 BLDV: 37 MMHG — SIGNIFICANT CHANGE UP (ref 25–45)
PO2 BLDV: 39 MMHG — SIGNIFICANT CHANGE UP (ref 25–45)
PO2 BLDV: 41 MMHG — SIGNIFICANT CHANGE UP (ref 25–45)
PO2 BLDV: 44 MMHG — SIGNIFICANT CHANGE UP (ref 25–45)
PO2 BLDV: 45 MMHG — SIGNIFICANT CHANGE UP (ref 25–45)
PO2 BLDV: <10 MMHG — LOW (ref 25–45)
POIKILOCYTOSIS BLD QL AUTO: SIGNIFICANT CHANGE UP
POTASSIUM BLDV-SCNC: 1.8 MMOL/L — CRITICAL LOW (ref 3.5–5.1)
POTASSIUM BLDV-SCNC: 3 MMOL/L — LOW (ref 3.5–5.1)
POTASSIUM BLDV-SCNC: 3.3 MMOL/L — LOW (ref 3.5–5.1)
POTASSIUM BLDV-SCNC: 3.5 MMOL/L — SIGNIFICANT CHANGE UP (ref 3.5–5.1)
POTASSIUM BLDV-SCNC: 3.7 MMOL/L — SIGNIFICANT CHANGE UP (ref 3.5–5.1)
POTASSIUM BLDV-SCNC: 3.8 MMOL/L — SIGNIFICANT CHANGE UP (ref 3.5–5.1)
POTASSIUM BLDV-SCNC: 4.3 MMOL/L — SIGNIFICANT CHANGE UP (ref 3.5–5.1)
POTASSIUM BLDV-SCNC: 4.8 MMOL/L — SIGNIFICANT CHANGE UP (ref 3.5–5.1)
POTASSIUM BLDV-SCNC: 4.8 MMOL/L — SIGNIFICANT CHANGE UP (ref 3.5–5.1)
POTASSIUM SERPL-MCNC: 3.4 MMOL/L — LOW (ref 3.5–5.3)
POTASSIUM SERPL-MCNC: 3.4 MMOL/L — LOW (ref 3.5–5.3)
POTASSIUM SERPL-MCNC: 3.5 MMOL/L — SIGNIFICANT CHANGE UP (ref 3.5–5.3)
POTASSIUM SERPL-MCNC: 3.5 MMOL/L — SIGNIFICANT CHANGE UP (ref 3.5–5.3)
POTASSIUM SERPL-MCNC: 3.6 MMOL/L — SIGNIFICANT CHANGE UP (ref 3.5–5.3)
POTASSIUM SERPL-MCNC: 3.6 MMOL/L — SIGNIFICANT CHANGE UP (ref 3.5–5.3)
POTASSIUM SERPL-MCNC: 3.7 MMOL/L — SIGNIFICANT CHANGE UP (ref 3.5–5.3)
POTASSIUM SERPL-MCNC: 3.8 MMOL/L — SIGNIFICANT CHANGE UP (ref 3.5–5.3)
POTASSIUM SERPL-MCNC: 3.9 MMOL/L — SIGNIFICANT CHANGE UP (ref 3.5–5.3)
POTASSIUM SERPL-MCNC: 3.9 MMOL/L — SIGNIFICANT CHANGE UP (ref 3.5–5.3)
POTASSIUM SERPL-MCNC: 4.1 MMOL/L — SIGNIFICANT CHANGE UP (ref 3.5–5.3)
POTASSIUM SERPL-MCNC: 4.2 MMOL/L — SIGNIFICANT CHANGE UP (ref 3.5–5.3)
POTASSIUM SERPL-MCNC: 4.2 MMOL/L — SIGNIFICANT CHANGE UP (ref 3.5–5.3)
POTASSIUM SERPL-MCNC: 4.3 MMOL/L — SIGNIFICANT CHANGE UP (ref 3.5–5.3)
POTASSIUM SERPL-MCNC: 4.4 MMOL/L — SIGNIFICANT CHANGE UP (ref 3.5–5.3)
POTASSIUM SERPL-MCNC: 4.4 MMOL/L — SIGNIFICANT CHANGE UP (ref 3.5–5.3)
POTASSIUM SERPL-MCNC: 4.5 MMOL/L — SIGNIFICANT CHANGE UP (ref 3.5–5.3)
POTASSIUM SERPL-MCNC: 4.6 MMOL/L — SIGNIFICANT CHANGE UP (ref 3.5–5.3)
POTASSIUM SERPL-MCNC: 4.7 MMOL/L — SIGNIFICANT CHANGE UP (ref 3.5–5.3)
POTASSIUM SERPL-MCNC: 4.7 MMOL/L — SIGNIFICANT CHANGE UP (ref 3.5–5.3)
POTASSIUM SERPL-MCNC: 4.9 MMOL/L — SIGNIFICANT CHANGE UP (ref 3.5–5.3)
POTASSIUM SERPL-MCNC: 5.1 MMOL/L — SIGNIFICANT CHANGE UP (ref 3.5–5.3)
POTASSIUM SERPL-SCNC: 3.4 MMOL/L — LOW (ref 3.5–5.3)
POTASSIUM SERPL-SCNC: 3.4 MMOL/L — LOW (ref 3.5–5.3)
POTASSIUM SERPL-SCNC: 3.5 MMOL/L — SIGNIFICANT CHANGE UP (ref 3.5–5.3)
POTASSIUM SERPL-SCNC: 3.5 MMOL/L — SIGNIFICANT CHANGE UP (ref 3.5–5.3)
POTASSIUM SERPL-SCNC: 3.6 MMOL/L — SIGNIFICANT CHANGE UP (ref 3.5–5.3)
POTASSIUM SERPL-SCNC: 3.6 MMOL/L — SIGNIFICANT CHANGE UP (ref 3.5–5.3)
POTASSIUM SERPL-SCNC: 3.7 MMOL/L — SIGNIFICANT CHANGE UP (ref 3.5–5.3)
POTASSIUM SERPL-SCNC: 3.8 MMOL/L — SIGNIFICANT CHANGE UP (ref 3.5–5.3)
POTASSIUM SERPL-SCNC: 3.9 MMOL/L — SIGNIFICANT CHANGE UP (ref 3.5–5.3)
POTASSIUM SERPL-SCNC: 3.9 MMOL/L — SIGNIFICANT CHANGE UP (ref 3.5–5.3)
POTASSIUM SERPL-SCNC: 4.1 MMOL/L — SIGNIFICANT CHANGE UP (ref 3.5–5.3)
POTASSIUM SERPL-SCNC: 4.2 MMOL/L — SIGNIFICANT CHANGE UP (ref 3.5–5.3)
POTASSIUM SERPL-SCNC: 4.2 MMOL/L — SIGNIFICANT CHANGE UP (ref 3.5–5.3)
POTASSIUM SERPL-SCNC: 4.3 MMOL/L — SIGNIFICANT CHANGE UP (ref 3.5–5.3)
POTASSIUM SERPL-SCNC: 4.4 MMOL/L — SIGNIFICANT CHANGE UP (ref 3.5–5.3)
POTASSIUM SERPL-SCNC: 4.4 MMOL/L — SIGNIFICANT CHANGE UP (ref 3.5–5.3)
POTASSIUM SERPL-SCNC: 4.5 MMOL/L — SIGNIFICANT CHANGE UP (ref 3.5–5.3)
POTASSIUM SERPL-SCNC: 4.6 MMOL/L — SIGNIFICANT CHANGE UP (ref 3.5–5.3)
POTASSIUM SERPL-SCNC: 4.7 MMOL/L — SIGNIFICANT CHANGE UP (ref 3.5–5.3)
POTASSIUM SERPL-SCNC: 4.7 MMOL/L — SIGNIFICANT CHANGE UP (ref 3.5–5.3)
POTASSIUM SERPL-SCNC: 4.9 MMOL/L — SIGNIFICANT CHANGE UP (ref 3.5–5.3)
POTASSIUM SERPL-SCNC: 5.1 MMOL/L — SIGNIFICANT CHANGE UP (ref 3.5–5.3)
POTASSIUM UR-SCNC: 39 MMOL/L — SIGNIFICANT CHANGE UP
PROCALCITONIN SERPL-MCNC: 0.39 NG/ML — HIGH (ref 0.02–0.1)
PROCALCITONIN SERPL-MCNC: 1.05 NG/ML — HIGH (ref 0.02–0.1)
PROT ?TM UR-MCNC: 46 MG/DL — HIGH (ref 0–12)
PROT SERPL-MCNC: 3.5 G/DL — LOW (ref 6–8.3)
PROT SERPL-MCNC: 3.7 G/DL — LOW (ref 6–8.3)
PROT SERPL-MCNC: 3.8 G/DL — LOW (ref 6–8.3)
PROT SERPL-MCNC: 4.2 G/DL — LOW (ref 6–8.3)
PROT SERPL-MCNC: 4.3 G/DL — LOW (ref 6–8.3)
PROT SERPL-MCNC: 4.4 G/DL — LOW (ref 6–8.3)
PROT SERPL-MCNC: 4.4 G/DL — LOW (ref 6–8.3)
PROT SERPL-MCNC: 4.5 G/DL — LOW (ref 6–8.3)
PROT SERPL-MCNC: 4.5 G/DL — LOW (ref 6–8.3)
PROT SERPL-MCNC: 4.6 G/DL — LOW (ref 6–8.3)
PROT SERPL-MCNC: 4.7 G/DL — LOW (ref 6–8.3)
PROT SERPL-MCNC: 5.4 G/DL — LOW (ref 6–8.3)
PROT SERPL-MCNC: 5.5 G/DL — LOW (ref 6–8.3)
PROT SERPL-MCNC: 5.7 G/DL — LOW (ref 6–8.3)
PROT SERPL-MCNC: 6.2 G/DL — SIGNIFICANT CHANGE UP (ref 6–8.3)
PROT SERPL-MCNC: 6.2 G/DL — SIGNIFICANT CHANGE UP (ref 6–8.3)
PROT SERPL-MCNC: 6.3 G/DL — SIGNIFICANT CHANGE UP (ref 6–8.3)
PROT UR-MCNC: ABNORMAL
PROT UR-MCNC: ABNORMAL
PROT/CREAT UR-RTO: 1.6 RATIO — HIGH (ref 0–0.2)
PROTHROM AB SERPL-ACNC: 135.8 SEC — HIGH (ref 10.5–13.4)
PROTHROM AB SERPL-ACNC: 14.8 SEC — HIGH (ref 10.5–13.4)
PROTHROM AB SERPL-ACNC: 15.2 SEC — HIGH (ref 10.5–13.4)
PROTHROM AB SERPL-ACNC: 15.2 SEC — HIGH (ref 10.5–13.4)
PROTHROM AB SERPL-ACNC: 15.5 SEC — HIGH (ref 10.5–13.4)
PROTHROM AB SERPL-ACNC: 15.7 SEC — HIGH (ref 10.5–13.4)
PROTHROM AB SERPL-ACNC: 16.5 SEC — HIGH (ref 10.5–13.4)
PROTHROM AB SERPL-ACNC: 16.6 SEC — HIGH (ref 10.5–13.4)
PROTHROM AB SERPL-ACNC: 16.8 SEC — HIGH (ref 10.5–13.4)
PROTHROM AB SERPL-ACNC: 19.3 SEC — HIGH (ref 10.5–13.4)
PROTHROM AB SERPL-ACNC: 22.1 SEC — HIGH (ref 10.5–13.4)
PROTHROM AB SERPL-ACNC: 24.2 SEC — HIGH (ref 10.5–13.4)
PROTHROM AB SERPL-ACNC: 24.8 SEC — HIGH (ref 10.5–13.4)
PROTHROM AB SERPL-ACNC: 26.5 SEC — HIGH (ref 10.5–13.4)
PROTHROM AB SERPL-ACNC: 26.6 SEC — HIGH (ref 10.5–13.4)
PROTHROM AB SERPL-ACNC: 37.4 SEC — HIGH (ref 10.5–13.4)
PROTHROM AB SERPL-ACNC: 53.7 SEC — HIGH (ref 10.5–13.4)
PROTHROM AB SERPL-ACNC: 98.1 SEC — HIGH (ref 10.5–13.4)
RAPID RVP RESULT: SIGNIFICANT CHANGE UP
RBC # BLD: 2.37 M/UL — LOW (ref 3.8–5.2)
RBC # BLD: 2.56 M/UL — LOW (ref 3.8–5.2)
RBC # BLD: 2.71 M/UL — LOW (ref 3.8–5.2)
RBC # BLD: 2.84 M/UL — LOW (ref 3.8–5.2)
RBC # BLD: 2.85 M/UL — LOW (ref 3.8–5.2)
RBC # BLD: 2.91 M/UL — LOW (ref 3.8–5.2)
RBC # BLD: 2.91 M/UL — LOW (ref 3.8–5.2)
RBC # BLD: 2.96 M/UL — LOW (ref 3.8–5.2)
RBC # BLD: 2.98 M/UL — LOW (ref 3.8–5.2)
RBC # BLD: 3.03 M/UL — LOW (ref 3.8–5.2)
RBC # BLD: 3.05 M/UL — LOW (ref 3.8–5.2)
RBC # BLD: 3.06 M/UL — LOW (ref 3.8–5.2)
RBC # BLD: 3.28 M/UL — LOW (ref 3.8–5.2)
RBC # BLD: 3.42 M/UL — LOW (ref 3.8–5.2)
RBC # BLD: 3.44 M/UL — LOW (ref 3.8–5.2)
RBC # BLD: 3.53 M/UL — LOW (ref 3.8–5.2)
RBC # BLD: 3.54 M/UL — LOW (ref 3.8–5.2)
RBC # BLD: 3.57 M/UL — LOW (ref 3.8–5.2)
RBC # BLD: 3.63 M/UL — LOW (ref 3.8–5.2)
RBC # BLD: 3.7 M/UL — LOW (ref 3.8–5.2)
RBC # BLD: 3.79 M/UL — LOW (ref 3.8–5.2)
RBC # BLD: 3.79 M/UL — LOW (ref 3.8–5.2)
RBC # BLD: 3.8 M/UL — SIGNIFICANT CHANGE UP (ref 3.8–5.2)
RBC # BLD: 4.3 M/UL — SIGNIFICANT CHANGE UP (ref 3.8–5.2)
RBC # BLD: 4.66 M/UL — SIGNIFICANT CHANGE UP (ref 3.8–5.2)
RBC # BLD: 4.66 M/UL — SIGNIFICANT CHANGE UP (ref 3.8–5.2)
RBC # BLD: 4.74 M/UL — SIGNIFICANT CHANGE UP (ref 3.8–5.2)
RBC # BLD: 4.81 M/UL — SIGNIFICANT CHANGE UP (ref 3.8–5.2)
RBC # BLD: 4.97 M/UL — SIGNIFICANT CHANGE UP (ref 3.8–5.2)
RBC # BLD: 5.07 M/UL — SIGNIFICANT CHANGE UP (ref 3.8–5.2)
RBC # BLD: 5.32 M/UL — HIGH (ref 3.8–5.2)
RBC # FLD: 12.8 % — SIGNIFICANT CHANGE UP (ref 10.3–14.5)
RBC # FLD: 12.9 % — SIGNIFICANT CHANGE UP (ref 10.3–14.5)
RBC # FLD: 13 % — SIGNIFICANT CHANGE UP (ref 10.3–14.5)
RBC # FLD: 13.1 % — SIGNIFICANT CHANGE UP (ref 10.3–14.5)
RBC # FLD: 13.1 % — SIGNIFICANT CHANGE UP (ref 10.3–14.5)
RBC # FLD: 13.2 % — SIGNIFICANT CHANGE UP (ref 10.3–14.5)
RBC # FLD: 13.3 % — SIGNIFICANT CHANGE UP (ref 10.3–14.5)
RBC # FLD: 13.3 % — SIGNIFICANT CHANGE UP (ref 10.3–14.5)
RBC # FLD: 13.5 % — SIGNIFICANT CHANGE UP (ref 10.3–14.5)
RBC # FLD: 14.2 % — SIGNIFICANT CHANGE UP (ref 10.3–14.5)
RBC # FLD: 14.4 % — SIGNIFICANT CHANGE UP (ref 10.3–14.5)
RBC # FLD: 14.6 % — HIGH (ref 10.3–14.5)
RBC # FLD: 14.8 % — HIGH (ref 10.3–14.5)
RBC # FLD: 15.5 % — HIGH (ref 10.3–14.5)
RBC # FLD: 15.6 % — HIGH (ref 10.3–14.5)
RBC # FLD: 15.6 % — HIGH (ref 10.3–14.5)
RBC # FLD: 15.7 % — HIGH (ref 10.3–14.5)
RBC # FLD: 15.8 % — HIGH (ref 10.3–14.5)
RBC # FLD: 15.9 % — HIGH (ref 10.3–14.5)
RBC BLD AUTO: ABNORMAL
RBC CASTS # UR COMP ASSIST: 251 /HPF — HIGH (ref 0–4)
RBC CASTS # UR COMP ASSIST: 6 /HPF — HIGH (ref 0–4)
RCV VOL RI: 8000 /UL — HIGH (ref 0–0)
RETICS #: 83.2 K/UL — SIGNIFICANT CHANGE UP (ref 25–125)
RETICS/RBC NFR: 1.8 % — SIGNIFICANT CHANGE UP (ref 0.5–2.5)
RH IG SCN BLD-IMP: POSITIVE — SIGNIFICANT CHANGE UP
S AUREUS DNA NOSE QL NAA+PROBE: SIGNIFICANT CHANGE UP
S AUREUS DNA NOSE QL NAA+PROBE: SIGNIFICANT CHANGE UP
SAO2 % BLDA: 98.9 % — HIGH (ref 94–98)
SAO2 % BLDV: 33.4 % — LOW (ref 67–88)
SAO2 % BLDV: 35.7 % — LOW (ref 67–88)
SAO2 % BLDV: 39.8 % — LOW (ref 67–88)
SAO2 % BLDV: 45.9 % — LOW (ref 67–88)
SAO2 % BLDV: 60.6 % — LOW (ref 67–88)
SAO2 % BLDV: 63.9 % — LOW (ref 67–88)
SAO2 % BLDV: 67.6 % — SIGNIFICANT CHANGE UP (ref 67–88)
SAO2 % BLDV: 74.2 % — SIGNIFICANT CHANGE UP (ref 67–88)
SAO2 % BLDV: 78.9 % — SIGNIFICANT CHANGE UP (ref 67–88)
SARS-COV-2 RNA SPEC QL NAA+PROBE: SIGNIFICANT CHANGE UP
SCHISTOCYTES BLD QL AUTO: SLIGHT — SIGNIFICANT CHANGE UP
SODIUM SERPL-SCNC: 122 MMOL/L — LOW (ref 135–145)
SODIUM SERPL-SCNC: 125 MMOL/L — LOW (ref 135–145)
SODIUM SERPL-SCNC: 126 MMOL/L — LOW (ref 135–145)
SODIUM SERPL-SCNC: 126 MMOL/L — LOW (ref 135–145)
SODIUM SERPL-SCNC: 127 MMOL/L — LOW (ref 135–145)
SODIUM SERPL-SCNC: 128 MMOL/L — LOW (ref 135–145)
SODIUM SERPL-SCNC: 129 MMOL/L — LOW (ref 135–145)
SODIUM SERPL-SCNC: 131 MMOL/L — LOW (ref 135–145)
SODIUM SERPL-SCNC: 132 MMOL/L — LOW (ref 135–145)
SODIUM SERPL-SCNC: 132 MMOL/L — LOW (ref 135–145)
SODIUM SERPL-SCNC: 133 MMOL/L — LOW (ref 135–145)
SODIUM SERPL-SCNC: 134 MMOL/L — LOW (ref 135–145)
SODIUM SERPL-SCNC: 135 MMOL/L — SIGNIFICANT CHANGE UP (ref 135–145)
SODIUM SERPL-SCNC: 136 MMOL/L — SIGNIFICANT CHANGE UP (ref 135–145)
SODIUM SERPL-SCNC: 137 MMOL/L — SIGNIFICANT CHANGE UP (ref 135–145)
SODIUM SERPL-SCNC: 140 MMOL/L — SIGNIFICANT CHANGE UP (ref 135–145)
SODIUM UR-SCNC: 69 MMOL/L — SIGNIFICANT CHANGE UP
SODIUM UR-SCNC: 74 MMOL/L — SIGNIFICANT CHANGE UP
SP GR SPEC: 1.01 — SIGNIFICANT CHANGE UP (ref 1.01–1.02)
SP GR SPEC: 1.01 — SIGNIFICANT CHANGE UP (ref 1.01–1.02)
SPECIMEN SOURCE: SIGNIFICANT CHANGE UP
TOTAL NUCLEATED CELL COUNT, BODY FLUID: SIGNIFICANT CHANGE UP /UL
TROPONIN T, HIGH SENSITIVITY RESULT: 30 NG/L — SIGNIFICANT CHANGE UP (ref 0–51)
TROPONIN T, HIGH SENSITIVITY RESULT: 44 NG/L — SIGNIFICANT CHANGE UP (ref 0–51)
TSH SERPL-MCNC: 1.17 UIU/ML — SIGNIFICANT CHANGE UP (ref 0.27–4.2)
TUBE TYPE: SIGNIFICANT CHANGE UP
URATE UR-MCNC: 13.5 MG/DL — SIGNIFICANT CHANGE UP
UROBILINOGEN FLD QL: NEGATIVE — SIGNIFICANT CHANGE UP
UROBILINOGEN FLD QL: NEGATIVE — SIGNIFICANT CHANGE UP
UUN UR-MCNC: 269 MG/DL — SIGNIFICANT CHANGE UP
UUN UR-MCNC: 455 MG/DL — SIGNIFICANT CHANGE UP
WBC # BLD: 16.39 K/UL — HIGH (ref 3.8–10.5)
WBC # BLD: 20.56 K/UL — HIGH (ref 3.8–10.5)
WBC # BLD: 21.3 K/UL — HIGH (ref 3.8–10.5)
WBC # BLD: 23.8 K/UL — HIGH (ref 3.8–10.5)
WBC # BLD: 24.09 K/UL — HIGH (ref 3.8–10.5)
WBC # BLD: 24.14 K/UL — HIGH (ref 3.8–10.5)
WBC # BLD: 24.24 K/UL — HIGH (ref 3.8–10.5)
WBC # BLD: 24.58 K/UL — HIGH (ref 3.8–10.5)
WBC # BLD: 24.68 K/UL — HIGH (ref 3.8–10.5)
WBC # BLD: 25.36 K/UL — HIGH (ref 3.8–10.5)
WBC # BLD: 25.78 K/UL — HIGH (ref 3.8–10.5)
WBC # BLD: 27.38 K/UL — HIGH (ref 3.8–10.5)
WBC # BLD: 28.63 K/UL — HIGH (ref 3.8–10.5)
WBC # BLD: 29.93 K/UL — HIGH (ref 3.8–10.5)
WBC # BLD: 30.39 K/UL — HIGH (ref 3.8–10.5)
WBC # BLD: 30.63 K/UL — HIGH (ref 3.8–10.5)
WBC # BLD: 30.66 K/UL — HIGH (ref 3.8–10.5)
WBC # BLD: 31.38 K/UL — HIGH (ref 3.8–10.5)
WBC # BLD: 32.06 K/UL — HIGH (ref 3.8–10.5)
WBC # BLD: 33.32 K/UL — HIGH (ref 3.8–10.5)
WBC # BLD: 33.4 K/UL — HIGH (ref 3.8–10.5)
WBC # BLD: 33.62 K/UL — HIGH (ref 3.8–10.5)
WBC # BLD: 33.98 K/UL — HIGH (ref 3.8–10.5)
WBC # BLD: 35.49 K/UL — HIGH (ref 3.8–10.5)
WBC # BLD: 36.18 K/UL — HIGH (ref 3.8–10.5)
WBC # BLD: 36.68 K/UL — HIGH (ref 3.8–10.5)
WBC # BLD: 40.48 K/UL — CRITICAL HIGH (ref 3.8–10.5)
WBC # BLD: 40.54 K/UL — CRITICAL HIGH (ref 3.8–10.5)
WBC # BLD: 41.29 K/UL — CRITICAL HIGH (ref 3.8–10.5)
WBC # BLD: 41.7 K/UL — CRITICAL HIGH (ref 3.8–10.5)
WBC # FLD AUTO: 16.39 K/UL — HIGH (ref 3.8–10.5)
WBC # FLD AUTO: 20.56 K/UL — HIGH (ref 3.8–10.5)
WBC # FLD AUTO: 21.3 K/UL — HIGH (ref 3.8–10.5)
WBC # FLD AUTO: 23.8 K/UL — HIGH (ref 3.8–10.5)
WBC # FLD AUTO: 24.09 K/UL — HIGH (ref 3.8–10.5)
WBC # FLD AUTO: 24.14 K/UL — HIGH (ref 3.8–10.5)
WBC # FLD AUTO: 24.24 K/UL — HIGH (ref 3.8–10.5)
WBC # FLD AUTO: 24.58 K/UL — HIGH (ref 3.8–10.5)
WBC # FLD AUTO: 24.68 K/UL — HIGH (ref 3.8–10.5)
WBC # FLD AUTO: 25.36 K/UL — HIGH (ref 3.8–10.5)
WBC # FLD AUTO: 25.78 K/UL — HIGH (ref 3.8–10.5)
WBC # FLD AUTO: 27.38 K/UL — HIGH (ref 3.8–10.5)
WBC # FLD AUTO: 28.63 K/UL — HIGH (ref 3.8–10.5)
WBC # FLD AUTO: 29.93 K/UL — HIGH (ref 3.8–10.5)
WBC # FLD AUTO: 30.39 K/UL — HIGH (ref 3.8–10.5)
WBC # FLD AUTO: 30.63 K/UL — HIGH (ref 3.8–10.5)
WBC # FLD AUTO: 30.66 K/UL — HIGH (ref 3.8–10.5)
WBC # FLD AUTO: 31.38 K/UL — HIGH (ref 3.8–10.5)
WBC # FLD AUTO: 32.06 K/UL — HIGH (ref 3.8–10.5)
WBC # FLD AUTO: 33.32 K/UL — HIGH (ref 3.8–10.5)
WBC # FLD AUTO: 33.4 K/UL — HIGH (ref 3.8–10.5)
WBC # FLD AUTO: 33.62 K/UL — HIGH (ref 3.8–10.5)
WBC # FLD AUTO: 33.98 K/UL — HIGH (ref 3.8–10.5)
WBC # FLD AUTO: 35.49 K/UL — HIGH (ref 3.8–10.5)
WBC # FLD AUTO: 36.18 K/UL — HIGH (ref 3.8–10.5)
WBC # FLD AUTO: 36.68 K/UL — HIGH (ref 3.8–10.5)
WBC # FLD AUTO: 40.48 K/UL — CRITICAL HIGH (ref 3.8–10.5)
WBC # FLD AUTO: 40.54 K/UL — CRITICAL HIGH (ref 3.8–10.5)
WBC # FLD AUTO: 41.29 K/UL — CRITICAL HIGH (ref 3.8–10.5)
WBC # FLD AUTO: 41.7 K/UL — CRITICAL HIGH (ref 3.8–10.5)
WBC UR QL: 118 /HPF — HIGH (ref 0–5)
WBC UR QL: 53 /HPF — HIGH (ref 0–5)

## 2023-01-01 PROCEDURE — 71045 X-RAY EXAM CHEST 1 VIEW: CPT | Mod: 26

## 2023-01-01 PROCEDURE — 36600 WITHDRAWAL OF ARTERIAL BLOOD: CPT

## 2023-01-01 PROCEDURE — 81001 URINALYSIS AUTO W/SCOPE: CPT

## 2023-01-01 PROCEDURE — 43753 TX GASTRO INTUB W/ASP: CPT

## 2023-01-01 PROCEDURE — 82947 ASSAY GLUCOSE BLOOD QUANT: CPT

## 2023-01-01 PROCEDURE — 87086 URINE CULTURE/COLONY COUNT: CPT

## 2023-01-01 PROCEDURE — 83935 ASSAY OF URINE OSMOLALITY: CPT

## 2023-01-01 PROCEDURE — 99291 CRITICAL CARE FIRST HOUR: CPT

## 2023-01-01 PROCEDURE — 80053 COMPREHEN METABOLIC PANEL: CPT

## 2023-01-01 PROCEDURE — 70450 CT HEAD/BRAIN W/O DYE: CPT

## 2023-01-01 PROCEDURE — 83930 ASSAY OF BLOOD OSMOLALITY: CPT

## 2023-01-01 PROCEDURE — 87070 CULTURE OTHR SPECIMN AEROBIC: CPT

## 2023-01-01 PROCEDURE — 0225U NFCT DS DNA&RNA 21 SARSCOV2: CPT

## 2023-01-01 PROCEDURE — 94660 CPAP INITIATION&MGMT: CPT

## 2023-01-01 PROCEDURE — 85045 AUTOMATED RETICULOCYTE COUNT: CPT

## 2023-01-01 PROCEDURE — C8929: CPT

## 2023-01-01 PROCEDURE — 87449 NOS EACH ORGANISM AG IA: CPT

## 2023-01-01 PROCEDURE — 99232 SBSQ HOSP IP/OBS MODERATE 35: CPT | Mod: GC

## 2023-01-01 PROCEDURE — 85027 COMPLETE CBC AUTOMATED: CPT

## 2023-01-01 PROCEDURE — 85379 FIBRIN DEGRADATION QUANT: CPT

## 2023-01-01 PROCEDURE — P9011: CPT

## 2023-01-01 PROCEDURE — 99233 SBSQ HOSP IP/OBS HIGH 50: CPT | Mod: GC

## 2023-01-01 PROCEDURE — 83010 ASSAY OF HAPTOGLOBIN QUANT: CPT

## 2023-01-01 PROCEDURE — 99223 1ST HOSP IP/OBS HIGH 75: CPT

## 2023-01-01 PROCEDURE — 74018 RADEX ABDOMEN 1 VIEW: CPT

## 2023-01-01 PROCEDURE — 99231 SBSQ HOSP IP/OBS SF/LOW 25: CPT

## 2023-01-01 PROCEDURE — 86985 SPLIT BLOOD OR PRODUCTS: CPT

## 2023-01-01 PROCEDURE — 71250 CT THORAX DX C-: CPT

## 2023-01-01 PROCEDURE — 99498 ADVNCD CARE PLAN ADDL 30 MIN: CPT | Mod: 25

## 2023-01-01 PROCEDURE — 83735 ASSAY OF MAGNESIUM: CPT

## 2023-01-01 PROCEDURE — P9059: CPT

## 2023-01-01 PROCEDURE — 84133 ASSAY OF URINE POTASSIUM: CPT

## 2023-01-01 PROCEDURE — 99232 SBSQ HOSP IP/OBS MODERATE 35: CPT

## 2023-01-01 PROCEDURE — 99222 1ST HOSP IP/OBS MODERATE 55: CPT

## 2023-01-01 PROCEDURE — 83605 ASSAY OF LACTIC ACID: CPT

## 2023-01-01 PROCEDURE — 99291 CRITICAL CARE FIRST HOUR: CPT | Mod: GC

## 2023-01-01 PROCEDURE — 84560 ASSAY OF URINE/URIC ACID: CPT

## 2023-01-01 PROCEDURE — 76604 US EXAM CHEST: CPT | Mod: 26

## 2023-01-01 PROCEDURE — 85730 THROMBOPLASTIN TIME PARTIAL: CPT

## 2023-01-01 PROCEDURE — 83036 HEMOGLOBIN GLYCOSYLATED A1C: CPT

## 2023-01-01 PROCEDURE — 76705 ECHO EXAM OF ABDOMEN: CPT | Mod: 26,GC

## 2023-01-01 PROCEDURE — 99285 EMERGENCY DEPT VISIT HI MDM: CPT

## 2023-01-01 PROCEDURE — 74018 RADEX ABDOMEN 1 VIEW: CPT | Mod: 26

## 2023-01-01 PROCEDURE — 99233 SBSQ HOSP IP/OBS HIGH 50: CPT

## 2023-01-01 PROCEDURE — 94002 VENT MGMT INPAT INIT DAY: CPT

## 2023-01-01 PROCEDURE — 99285 EMERGENCY DEPT VISIT HI MDM: CPT | Mod: 25

## 2023-01-01 PROCEDURE — 85025 COMPLETE CBC W/AUTO DIFF WBC: CPT

## 2023-01-01 PROCEDURE — 87102 FUNGUS ISOLATION CULTURE: CPT

## 2023-01-01 PROCEDURE — 99497 ADVNCD CARE PLAN 30 MIN: CPT | Mod: 25

## 2023-01-01 PROCEDURE — 36415 COLL VENOUS BLD VENIPUNCTURE: CPT

## 2023-01-01 PROCEDURE — P9047: CPT

## 2023-01-01 PROCEDURE — 76604 US EXAM CHEST: CPT | Mod: 26,GC

## 2023-01-01 PROCEDURE — 94003 VENT MGMT INPAT SUBQ DAY: CPT

## 2023-01-01 PROCEDURE — 82803 BLOOD GASES ANY COMBINATION: CPT

## 2023-01-01 PROCEDURE — 36430 TRANSFUSION BLD/BLD COMPNT: CPT

## 2023-01-01 PROCEDURE — 82570 ASSAY OF URINE CREATININE: CPT

## 2023-01-01 PROCEDURE — 82962 GLUCOSE BLOOD TEST: CPT

## 2023-01-01 PROCEDURE — 93306 TTE W/DOPPLER COMPLETE: CPT | Mod: 26

## 2023-01-01 PROCEDURE — 83615 LACTATE (LD) (LDH) ENZYME: CPT

## 2023-01-01 PROCEDURE — 84100 ASSAY OF PHOSPHORUS: CPT

## 2023-01-01 PROCEDURE — 84484 ASSAY OF TROPONIN QUANT: CPT

## 2023-01-01 PROCEDURE — 86923 COMPATIBILITY TEST ELECTRIC: CPT

## 2023-01-01 PROCEDURE — 82435 ASSAY OF BLOOD CHLORIDE: CPT

## 2023-01-01 PROCEDURE — 85610 PROTHROMBIN TIME: CPT

## 2023-01-01 PROCEDURE — 82565 ASSAY OF CREATININE: CPT

## 2023-01-01 PROCEDURE — 31624 DX BRONCHOSCOPE/LAVAGE: CPT

## 2023-01-01 PROCEDURE — 71045 X-RAY EXAM CHEST 1 VIEW: CPT

## 2023-01-01 PROCEDURE — 94640 AIRWAY INHALATION TREATMENT: CPT

## 2023-01-01 PROCEDURE — 82330 ASSAY OF CALCIUM: CPT

## 2023-01-01 PROCEDURE — 85018 HEMOGLOBIN: CPT

## 2023-01-01 PROCEDURE — 84540 ASSAY OF URINE/UREA-N: CPT

## 2023-01-01 PROCEDURE — 89051 BODY FLUID CELL COUNT: CPT

## 2023-01-01 PROCEDURE — 99223 1ST HOSP IP/OBS HIGH 75: CPT | Mod: GC

## 2023-01-01 PROCEDURE — 87077 CULTURE AEROBIC IDENTIFY: CPT

## 2023-01-01 PROCEDURE — P9045: CPT

## 2023-01-01 PROCEDURE — 85014 HEMATOCRIT: CPT

## 2023-01-01 PROCEDURE — 86850 RBC ANTIBODY SCREEN: CPT

## 2023-01-01 PROCEDURE — 99291 CRITICAL CARE FIRST HOUR: CPT | Mod: GC,25

## 2023-01-01 PROCEDURE — 99222 1ST HOSP IP/OBS MODERATE 55: CPT | Mod: GC

## 2023-01-01 PROCEDURE — 84295 ASSAY OF SERUM SODIUM: CPT

## 2023-01-01 PROCEDURE — 87186 SC STD MICRODIL/AGAR DIL: CPT

## 2023-01-01 PROCEDURE — 80048 BASIC METABOLIC PNL TOTAL CA: CPT

## 2023-01-01 PROCEDURE — 93308 TTE F-UP OR LMTD: CPT | Mod: 26

## 2023-01-01 PROCEDURE — 84443 ASSAY THYROID STIM HORMONE: CPT

## 2023-01-01 PROCEDURE — 93308 TTE F-UP OR LMTD: CPT | Mod: 26,GC

## 2023-01-01 PROCEDURE — 82553 CREATINE MB FRACTION: CPT

## 2023-01-01 PROCEDURE — 80162 ASSAY OF DIGOXIN TOTAL: CPT

## 2023-01-01 PROCEDURE — 84300 ASSAY OF URINE SODIUM: CPT

## 2023-01-01 PROCEDURE — P9040: CPT

## 2023-01-01 PROCEDURE — 70450 CT HEAD/BRAIN W/O DYE: CPT | Mod: 26

## 2023-01-01 PROCEDURE — 85384 FIBRINOGEN ACTIVITY: CPT

## 2023-01-01 PROCEDURE — 84145 PROCALCITONIN (PCT): CPT

## 2023-01-01 PROCEDURE — 87640 STAPH A DNA AMP PROBE: CPT

## 2023-01-01 PROCEDURE — 86900 BLOOD TYPING SEROLOGIC ABO: CPT

## 2023-01-01 PROCEDURE — 71250 CT THORAX DX C-: CPT | Mod: 26

## 2023-01-01 PROCEDURE — 84132 ASSAY OF SERUM POTASSIUM: CPT

## 2023-01-01 PROCEDURE — 87641 MR-STAPH DNA AMP PROBE: CPT

## 2023-01-01 PROCEDURE — 86901 BLOOD TYPING SEROLOGIC RH(D): CPT

## 2023-01-01 PROCEDURE — 31500 INSERT EMERGENCY AIRWAY: CPT

## 2023-01-01 PROCEDURE — 82550 ASSAY OF CK (CPK): CPT

## 2023-01-01 PROCEDURE — 84156 ASSAY OF PROTEIN URINE: CPT

## 2023-01-01 PROCEDURE — 87040 BLOOD CULTURE FOR BACTERIA: CPT

## 2023-01-01 RX ORDER — ACETAMINOPHEN 500 MG
650 TABLET ORAL ONCE
Refills: 0 | Status: DISCONTINUED | OUTPATIENT
Start: 2023-01-01 | End: 2023-01-01

## 2023-01-01 RX ORDER — MIDODRINE HYDROCHLORIDE 2.5 MG/1
30 TABLET ORAL EVERY 8 HOURS
Refills: 0 | Status: DISCONTINUED | OUTPATIENT
Start: 2023-01-01 | End: 2023-01-01

## 2023-01-01 RX ORDER — DILTIAZEM HCL 120 MG
5 CAPSULE, EXT RELEASE 24 HR ORAL
Qty: 125 | Refills: 0 | Status: DISCONTINUED | OUTPATIENT
Start: 2023-01-01 | End: 2023-01-01

## 2023-01-01 RX ORDER — PHENYLEPHRINE HYDROCHLORIDE 10 MG/ML
1.2 INJECTION INTRAVENOUS
Qty: 40 | Refills: 0 | Status: DISCONTINUED | OUTPATIENT
Start: 2023-01-01 | End: 2023-01-01

## 2023-01-01 RX ORDER — PANTOPRAZOLE SODIUM 20 MG/1
8 TABLET, DELAYED RELEASE ORAL
Qty: 80 | Refills: 0 | Status: DISCONTINUED | OUTPATIENT
Start: 2023-01-01 | End: 2023-01-01

## 2023-01-01 RX ORDER — FENTANYL CITRATE 50 UG/ML
50 INJECTION INTRAVENOUS
Refills: 0 | Status: DISCONTINUED | OUTPATIENT
Start: 2023-01-01 | End: 2023-01-01

## 2023-01-01 RX ORDER — DEXTROSE 50 % IN WATER 50 %
15 SYRINGE (ML) INTRAVENOUS ONCE
Refills: 0 | Status: DISCONTINUED | OUTPATIENT
Start: 2023-01-01 | End: 2023-01-01

## 2023-01-01 RX ORDER — DEXTROSE 50 % IN WATER 50 %
25 SYRINGE (ML) INTRAVENOUS
Refills: 0 | Status: DISCONTINUED | OUTPATIENT
Start: 2023-01-01 | End: 2023-01-01

## 2023-01-01 RX ORDER — MAGNESIUM SULFATE 500 MG/ML
1 VIAL (ML) INJECTION ONCE
Refills: 0 | Status: COMPLETED | OUTPATIENT
Start: 2023-01-01 | End: 2023-01-01

## 2023-01-01 RX ORDER — SENNA PLUS 8.6 MG/1
10 TABLET ORAL AT BEDTIME
Refills: 0 | Status: DISCONTINUED | OUTPATIENT
Start: 2023-01-01 | End: 2023-01-01

## 2023-01-01 RX ORDER — CALCIUM GLUCONATE 100 MG/ML
1 VIAL (ML) INTRAVENOUS ONCE
Refills: 0 | Status: COMPLETED | OUTPATIENT
Start: 2023-01-01 | End: 2023-01-01

## 2023-01-01 RX ORDER — HEPARIN SODIUM 5000 [USP'U]/ML
5000 INJECTION INTRAVENOUS; SUBCUTANEOUS EVERY 12 HOURS
Refills: 0 | Status: DISCONTINUED | OUTPATIENT
Start: 2023-01-01 | End: 2023-01-01

## 2023-01-01 RX ORDER — PHENYLEPHRINE HYDROCHLORIDE 10 MG/ML
0.5 INJECTION INTRAVENOUS
Qty: 40 | Refills: 0 | Status: DISCONTINUED | OUTPATIENT
Start: 2023-01-01 | End: 2023-01-01

## 2023-01-01 RX ORDER — MAGNESIUM SULFATE 500 MG/ML
2 VIAL (ML) INJECTION ONCE
Refills: 0 | Status: COMPLETED | OUTPATIENT
Start: 2023-01-01 | End: 2023-01-01

## 2023-01-01 RX ORDER — CEFEPIME 1 G/1
1000 INJECTION, POWDER, FOR SOLUTION INTRAMUSCULAR; INTRAVENOUS EVERY 8 HOURS
Refills: 0 | Status: DISCONTINUED | OUTPATIENT
Start: 2023-01-01 | End: 2023-01-01

## 2023-01-01 RX ORDER — IPRATROPIUM/ALBUTEROL SULFATE 18-103MCG
3 AEROSOL WITH ADAPTER (GRAM) INHALATION ONCE
Refills: 0 | Status: COMPLETED | OUTPATIENT
Start: 2023-01-01 | End: 2023-01-01

## 2023-01-01 RX ORDER — SODIUM CHLORIDE 9 MG/ML
500 INJECTION, SOLUTION INTRAVENOUS ONCE
Refills: 0 | Status: COMPLETED | OUTPATIENT
Start: 2023-01-01 | End: 2023-01-01

## 2023-01-01 RX ORDER — INSULIN HUMAN 100 [IU]/ML
3 INJECTION, SOLUTION SUBCUTANEOUS ONCE
Refills: 0 | Status: COMPLETED | OUTPATIENT
Start: 2023-01-01 | End: 2023-01-01

## 2023-01-01 RX ORDER — CALCIUM GLUCONATE 100 MG/ML
2 VIAL (ML) INTRAVENOUS ONCE
Refills: 0 | Status: COMPLETED | OUTPATIENT
Start: 2023-01-01 | End: 2023-01-01

## 2023-01-01 RX ORDER — AZITHROMYCIN 500 MG/1
500 TABLET, FILM COATED ORAL
Refills: 0 | Status: DISCONTINUED | OUTPATIENT
Start: 2023-01-01 | End: 2023-01-01

## 2023-01-01 RX ORDER — DILTIAZEM HCL 120 MG
60 CAPSULE, EXT RELEASE 24 HR ORAL EVERY 6 HOURS
Refills: 0 | Status: DISCONTINUED | OUTPATIENT
Start: 2023-01-01 | End: 2023-01-01

## 2023-01-01 RX ORDER — PHYTONADIONE (VIT K1) 5 MG
10 TABLET ORAL ONCE
Refills: 0 | Status: DISCONTINUED | OUTPATIENT
Start: 2023-01-01 | End: 2023-01-01

## 2023-01-01 RX ORDER — FENTANYL CITRATE 50 UG/ML
100 INJECTION INTRAVENOUS ONCE
Refills: 0 | Status: DISCONTINUED | OUTPATIENT
Start: 2023-01-01 | End: 2023-01-01

## 2023-01-01 RX ORDER — INSULIN HUMAN 100 [IU]/ML
4 INJECTION, SOLUTION SUBCUTANEOUS
Qty: 100 | Refills: 0 | Status: DISCONTINUED | OUTPATIENT
Start: 2023-01-01 | End: 2023-01-01

## 2023-01-01 RX ORDER — DESMOPRESSIN ACETATE 0.1 MG/1
20 TABLET ORAL ONCE
Refills: 0 | Status: DISCONTINUED | OUTPATIENT
Start: 2023-01-01 | End: 2023-01-01

## 2023-01-01 RX ORDER — DESMOPRESSIN ACETATE 0.1 MG/1
20 TABLET ORAL ONCE
Refills: 0 | Status: COMPLETED | OUTPATIENT
Start: 2023-01-01 | End: 2023-01-01

## 2023-01-01 RX ORDER — WARFARIN SODIUM 2.5 MG/1
3 TABLET ORAL ONCE
Refills: 0 | Status: COMPLETED | OUTPATIENT
Start: 2023-01-01 | End: 2023-01-01

## 2023-01-01 RX ORDER — CHLORHEXIDINE GLUCONATE 213 G/1000ML
1 SOLUTION TOPICAL
Refills: 0 | Status: DISCONTINUED | OUTPATIENT
Start: 2023-01-01 | End: 2023-01-01

## 2023-01-01 RX ORDER — PANTOPRAZOLE SODIUM 20 MG/1
40 TABLET, DELAYED RELEASE ORAL
Refills: 0 | Status: DISCONTINUED | OUTPATIENT
Start: 2023-01-01 | End: 2023-01-01

## 2023-01-01 RX ORDER — SODIUM CHLORIDE 9 MG/ML
1000 INJECTION, SOLUTION INTRAVENOUS
Refills: 0 | Status: DISCONTINUED | OUTPATIENT
Start: 2023-01-01 | End: 2023-01-01

## 2023-01-01 RX ORDER — IPRATROPIUM BROMIDE 0.2 MG/ML
1 SOLUTION, NON-ORAL INHALATION EVERY 6 HOURS
Refills: 0 | Status: DISCONTINUED | OUTPATIENT
Start: 2023-01-01 | End: 2023-01-01

## 2023-01-01 RX ORDER — INSULIN HUMAN 100 [IU]/ML
6 INJECTION, SOLUTION SUBCUTANEOUS
Qty: 100 | Refills: 0 | Status: DISCONTINUED | OUTPATIENT
Start: 2023-01-01 | End: 2023-01-01

## 2023-01-01 RX ORDER — AZITHROMYCIN 500 MG/1
250 TABLET, FILM COATED ORAL DAILY
Refills: 0 | Status: DISCONTINUED | OUTPATIENT
Start: 2023-01-01 | End: 2023-01-01

## 2023-01-01 RX ORDER — AZITHROMYCIN 500 MG/1
250 TABLET, FILM COATED ORAL
Refills: 0 | Status: DISCONTINUED | OUTPATIENT
Start: 2023-01-01 | End: 2023-01-01

## 2023-01-01 RX ORDER — FENTANYL CITRATE 50 UG/ML
50 INJECTION INTRAVENOUS EVERY 4 HOURS
Refills: 0 | Status: DISCONTINUED | OUTPATIENT
Start: 2023-01-01 | End: 2023-01-01

## 2023-01-01 RX ORDER — FENTANYL CITRATE 50 UG/ML
25 INJECTION INTRAVENOUS
Refills: 0 | Status: DISCONTINUED | OUTPATIENT
Start: 2023-01-01 | End: 2023-01-01

## 2023-01-01 RX ORDER — DILTIAZEM HCL 120 MG
5 CAPSULE, EXT RELEASE 24 HR ORAL ONCE
Refills: 0 | Status: COMPLETED | OUTPATIENT
Start: 2023-01-01 | End: 2023-01-01

## 2023-01-01 RX ORDER — SODIUM CHLORIDE 9 MG/ML
2400 INJECTION INTRAMUSCULAR; INTRAVENOUS; SUBCUTANEOUS
Refills: 0 | Status: DISCONTINUED | OUTPATIENT
Start: 2023-01-01 | End: 2023-01-01

## 2023-01-01 RX ORDER — POTASSIUM CHLORIDE 20 MEQ
10 PACKET (EA) ORAL ONCE
Refills: 0 | Status: COMPLETED | OUTPATIENT
Start: 2023-01-01 | End: 2023-01-01

## 2023-01-01 RX ORDER — PROPOFOL 10 MG/ML
10 INJECTION, EMULSION INTRAVENOUS
Qty: 1000 | Refills: 0 | Status: DISCONTINUED | OUTPATIENT
Start: 2023-01-01 | End: 2023-01-01

## 2023-01-01 RX ORDER — DILTIAZEM HCL 120 MG
60 CAPSULE, EXT RELEASE 24 HR ORAL EVERY 8 HOURS
Refills: 0 | Status: DISCONTINUED | OUTPATIENT
Start: 2023-01-01 | End: 2023-01-01

## 2023-01-01 RX ORDER — PHYTONADIONE (VIT K1) 5 MG
5 TABLET ORAL DAILY
Refills: 0 | Status: COMPLETED | OUTPATIENT
Start: 2023-01-01 | End: 2023-01-01

## 2023-01-01 RX ORDER — BACITRACIN ZINC 500 UNIT/G
1 OINTMENT IN PACKET (EA) TOPICAL DAILY
Refills: 0 | Status: DISCONTINUED | OUTPATIENT
Start: 2023-01-01 | End: 2023-01-01

## 2023-01-01 RX ORDER — PROPOFOL 10 MG/ML
10 INJECTION, EMULSION INTRAVENOUS
Qty: 500 | Refills: 0 | Status: DISCONTINUED | OUTPATIENT
Start: 2023-01-01 | End: 2023-01-01

## 2023-01-01 RX ORDER — DILTIAZEM HCL 120 MG
15 CAPSULE, EXT RELEASE 24 HR ORAL
Qty: 125 | Refills: 0 | Status: DISCONTINUED | OUTPATIENT
Start: 2023-01-01 | End: 2023-01-01

## 2023-01-01 RX ORDER — HUMAN INSULIN 100 [IU]/ML
10 INJECTION, SUSPENSION SUBCUTANEOUS ONCE
Refills: 0 | Status: COMPLETED | OUTPATIENT
Start: 2023-01-01 | End: 2023-01-01

## 2023-01-01 RX ORDER — BUMETANIDE 0.25 MG/ML
2 INJECTION INTRAMUSCULAR; INTRAVENOUS ONCE
Refills: 0 | Status: COMPLETED | OUTPATIENT
Start: 2023-01-01 | End: 2023-01-01

## 2023-01-01 RX ORDER — ALBUMIN HUMAN 25 %
100 VIAL (ML) INTRAVENOUS ONCE
Refills: 0 | Status: COMPLETED | OUTPATIENT
Start: 2023-01-01 | End: 2023-01-01

## 2023-01-01 RX ORDER — DIGOXIN 250 MCG
125 TABLET ORAL ONCE
Refills: 0 | Status: COMPLETED | OUTPATIENT
Start: 2023-01-01 | End: 2023-01-01

## 2023-01-01 RX ORDER — INSULIN HUMAN 100 [IU]/ML
4 INJECTION, SOLUTION SUBCUTANEOUS ONCE
Refills: 0 | Status: COMPLETED | OUTPATIENT
Start: 2023-01-01 | End: 2023-01-01

## 2023-01-01 RX ORDER — BUMETANIDE 0.25 MG/ML
2 INJECTION INTRAMUSCULAR; INTRAVENOUS
Qty: 20 | Refills: 0 | Status: DISCONTINUED | OUTPATIENT
Start: 2023-01-01 | End: 2023-01-01

## 2023-01-01 RX ORDER — ATORVASTATIN CALCIUM 80 MG/1
40 TABLET, FILM COATED ORAL AT BEDTIME
Refills: 0 | Status: DISCONTINUED | OUTPATIENT
Start: 2023-01-01 | End: 2023-01-01

## 2023-01-01 RX ORDER — PANTOPRAZOLE SODIUM 20 MG/1
40 TABLET, DELAYED RELEASE ORAL DAILY
Refills: 0 | Status: DISCONTINUED | OUTPATIENT
Start: 2023-01-01 | End: 2023-01-01

## 2023-01-01 RX ORDER — VANCOMYCIN HCL 1 G
1000 VIAL (EA) INTRAVENOUS ONCE
Refills: 0 | Status: COMPLETED | OUTPATIENT
Start: 2023-01-01 | End: 2023-01-01

## 2023-01-01 RX ORDER — SODIUM BICARBONATE 1 MEQ/ML
0.4 SYRINGE (ML) INTRAVENOUS
Qty: 150 | Refills: 0 | Status: DISCONTINUED | OUTPATIENT
Start: 2023-01-01 | End: 2023-01-01

## 2023-01-01 RX ORDER — DEXTROSE 50 % IN WATER 50 %
50 SYRINGE (ML) INTRAVENOUS ONCE
Refills: 0 | Status: COMPLETED | OUTPATIENT
Start: 2023-01-01 | End: 2023-01-01

## 2023-01-01 RX ORDER — DILTIAZEM HCL 120 MG
0.3 CAPSULE, EXT RELEASE 24 HR ORAL
Qty: 125 | Refills: 0 | Status: DISCONTINUED | OUTPATIENT
Start: 2023-01-01 | End: 2023-01-01

## 2023-01-01 RX ORDER — SODIUM BICARBONATE 1 MEQ/ML
50 SYRINGE (ML) INTRAVENOUS ONCE
Refills: 0 | Status: COMPLETED | OUTPATIENT
Start: 2023-01-01 | End: 2023-01-01

## 2023-01-01 RX ORDER — ENOXAPARIN SODIUM 100 MG/ML
45 INJECTION SUBCUTANEOUS EVERY 12 HOURS
Refills: 0 | Status: DISCONTINUED | OUTPATIENT
Start: 2023-01-01 | End: 2023-01-01

## 2023-01-01 RX ORDER — POTASSIUM CHLORIDE 20 MEQ
40 PACKET (EA) ORAL EVERY 4 HOURS
Refills: 0 | Status: DISCONTINUED | OUTPATIENT
Start: 2023-01-01 | End: 2023-01-01

## 2023-01-01 RX ORDER — ENOXAPARIN SODIUM 100 MG/ML
50 INJECTION SUBCUTANEOUS EVERY 12 HOURS
Refills: 0 | Status: DISCONTINUED | OUTPATIENT
Start: 2023-01-01 | End: 2023-01-01

## 2023-01-01 RX ORDER — IPRATROPIUM/ALBUTEROL SULFATE 18-103MCG
3 AEROSOL WITH ADAPTER (GRAM) INHALATION EVERY 6 HOURS
Refills: 0 | Status: DISCONTINUED | OUTPATIENT
Start: 2023-01-01 | End: 2023-01-01

## 2023-01-01 RX ORDER — ACETAMINOPHEN 500 MG
650 TABLET ORAL ONCE
Refills: 0 | Status: COMPLETED | OUTPATIENT
Start: 2023-01-01 | End: 2023-01-01

## 2023-01-01 RX ORDER — SODIUM CHLORIDE 9 MG/ML
500 INJECTION INTRAMUSCULAR; INTRAVENOUS; SUBCUTANEOUS ONCE
Refills: 0 | Status: COMPLETED | OUTPATIENT
Start: 2023-01-01 | End: 2023-01-01

## 2023-01-01 RX ORDER — BUMETANIDE 0.25 MG/ML
2 INJECTION INTRAMUSCULAR; INTRAVENOUS EVERY 8 HOURS
Refills: 0 | Status: DISCONTINUED | OUTPATIENT
Start: 2023-01-01 | End: 2023-01-01

## 2023-01-01 RX ORDER — DEXMEDETOMIDINE HYDROCHLORIDE IN 0.9% SODIUM CHLORIDE 4 UG/ML
0.2 INJECTION INTRAVENOUS
Qty: 200 | Refills: 0 | Status: DISCONTINUED | OUTPATIENT
Start: 2023-01-01 | End: 2023-01-01

## 2023-01-01 RX ORDER — ASPIRIN/CALCIUM CARB/MAGNESIUM 324 MG
81 TABLET ORAL DAILY
Refills: 0 | Status: DISCONTINUED | OUTPATIENT
Start: 2023-01-01 | End: 2023-01-01

## 2023-01-01 RX ORDER — DEXTROSE 50 % IN WATER 50 %
25 SYRINGE (ML) INTRAVENOUS ONCE
Refills: 0 | Status: DISCONTINUED | OUTPATIENT
Start: 2023-01-01 | End: 2023-01-01

## 2023-01-01 RX ORDER — DILTIAZEM HCL 120 MG
10 CAPSULE, EXT RELEASE 24 HR ORAL ONCE
Refills: 0 | Status: COMPLETED | OUTPATIENT
Start: 2023-01-01 | End: 2023-01-01

## 2023-01-01 RX ORDER — FENTANYL CITRATE 50 UG/ML
100 INJECTION INTRAVENOUS
Refills: 0 | Status: DISCONTINUED | OUTPATIENT
Start: 2023-01-01 | End: 2023-01-01

## 2023-01-01 RX ORDER — INSULIN LISPRO 100/ML
VIAL (ML) SUBCUTANEOUS EVERY 6 HOURS
Refills: 0 | Status: DISCONTINUED | OUTPATIENT
Start: 2023-01-01 | End: 2023-01-01

## 2023-01-01 RX ORDER — ALBUMIN HUMAN 25 %
50 VIAL (ML) INTRAVENOUS ONCE
Refills: 0 | Status: COMPLETED | OUTPATIENT
Start: 2023-01-01 | End: 2023-01-01

## 2023-01-01 RX ORDER — HEPARIN SODIUM 5000 [USP'U]/ML
5000 INJECTION INTRAVENOUS; SUBCUTANEOUS EVERY 8 HOURS
Refills: 0 | Status: DISCONTINUED | OUTPATIENT
Start: 2023-01-01 | End: 2023-01-01

## 2023-01-01 RX ORDER — INSULIN HUMAN 100 [IU]/ML
3 INJECTION, SOLUTION SUBCUTANEOUS
Qty: 100 | Refills: 0 | Status: DISCONTINUED | OUTPATIENT
Start: 2023-01-01 | End: 2023-01-01

## 2023-01-01 RX ORDER — DILTIAZEM HCL 120 MG
2.5 CAPSULE, EXT RELEASE 24 HR ORAL ONCE
Refills: 0 | Status: COMPLETED | OUTPATIENT
Start: 2023-01-01 | End: 2023-01-01

## 2023-01-01 RX ORDER — FENTANYL CITRATE 50 UG/ML
50 INJECTION INTRAVENOUS ONCE
Refills: 0 | Status: DISCONTINUED | OUTPATIENT
Start: 2023-01-01 | End: 2023-01-01

## 2023-01-01 RX ORDER — POTASSIUM PHOSPHATE, MONOBASIC POTASSIUM PHOSPHATE, DIBASIC 236; 224 MG/ML; MG/ML
15 INJECTION, SOLUTION INTRAVENOUS ONCE
Refills: 0 | Status: COMPLETED | OUTPATIENT
Start: 2023-01-01 | End: 2023-01-01

## 2023-01-01 RX ORDER — GLUCAGON INJECTION, SOLUTION 0.5 MG/.1ML
1 INJECTION, SOLUTION SUBCUTANEOUS ONCE
Refills: 0 | Status: DISCONTINUED | OUTPATIENT
Start: 2023-01-01 | End: 2023-01-01

## 2023-01-01 RX ORDER — DILTIAZEM HCL 120 MG
13 CAPSULE, EXT RELEASE 24 HR ORAL
Qty: 125 | Refills: 0 | Status: DISCONTINUED | OUTPATIENT
Start: 2023-01-01 | End: 2023-01-01

## 2023-01-01 RX ORDER — DESMOPRESSIN ACETATE 0.1 MG/1
15 TABLET ORAL ONCE
Refills: 0 | Status: COMPLETED | OUTPATIENT
Start: 2023-01-01 | End: 2023-01-01

## 2023-01-01 RX ORDER — PHYTONADIONE (VIT K1) 5 MG
10 TABLET ORAL ONCE
Refills: 0 | Status: COMPLETED | OUTPATIENT
Start: 2023-01-01 | End: 2023-01-01

## 2023-01-01 RX ORDER — DEXTROSE 50 % IN WATER 50 %
50 SYRINGE (ML) INTRAVENOUS
Refills: 0 | Status: DISCONTINUED | OUTPATIENT
Start: 2023-01-01 | End: 2023-01-01

## 2023-01-01 RX ORDER — DESMOPRESSIN ACETATE 0.1 MG/1
15 TABLET ORAL ONCE
Refills: 0 | Status: DISCONTINUED | OUTPATIENT
Start: 2023-01-01 | End: 2023-01-01

## 2023-01-01 RX ORDER — SODIUM CHLORIDE 9 MG/ML
1000 INJECTION INTRAMUSCULAR; INTRAVENOUS; SUBCUTANEOUS
Refills: 0 | Status: DISCONTINUED | OUTPATIENT
Start: 2023-01-01 | End: 2023-01-01

## 2023-01-01 RX ORDER — IPRATROPIUM BROMIDE 0.2 MG/ML
500 SOLUTION, NON-ORAL INHALATION EVERY 6 HOURS
Refills: 0 | Status: DISCONTINUED | OUTPATIENT
Start: 2023-01-01 | End: 2023-01-01

## 2023-01-01 RX ORDER — POLYETHYLENE GLYCOL 3350 17 G/17G
17 POWDER, FOR SOLUTION ORAL
Refills: 0 | Status: DISCONTINUED | OUTPATIENT
Start: 2023-01-01 | End: 2023-01-01

## 2023-01-01 RX ORDER — HUMAN INSULIN 100 [IU]/ML
5 INJECTION, SUSPENSION SUBCUTANEOUS EVERY 6 HOURS
Refills: 0 | Status: DISCONTINUED | OUTPATIENT
Start: 2023-01-01 | End: 2023-01-01

## 2023-01-01 RX ORDER — KETAMINE HYDROCHLORIDE 100 MG/ML
0.25 INJECTION INTRAMUSCULAR; INTRAVENOUS
Qty: 1000 | Refills: 0 | Status: DISCONTINUED | OUTPATIENT
Start: 2023-01-01 | End: 2023-01-01

## 2023-01-01 RX ORDER — ROBINUL 0.2 MG/ML
0.4 INJECTION INTRAMUSCULAR; INTRAVENOUS ONCE
Refills: 0 | Status: COMPLETED | OUTPATIENT
Start: 2023-01-01 | End: 2023-01-01

## 2023-01-01 RX ORDER — POTASSIUM CHLORIDE 20 MEQ
20 PACKET (EA) ORAL ONCE
Refills: 0 | Status: COMPLETED | OUTPATIENT
Start: 2023-01-01 | End: 2023-01-01

## 2023-01-01 RX ORDER — POLYETHYLENE GLYCOL 3350 17 G/17G
17 POWDER, FOR SOLUTION ORAL EVERY 12 HOURS
Refills: 0 | Status: DISCONTINUED | OUTPATIENT
Start: 2023-01-01 | End: 2023-01-01

## 2023-01-01 RX ORDER — ROBINUL 0.2 MG/ML
0.4 INJECTION INTRAMUSCULAR; INTRAVENOUS EVERY 6 HOURS
Refills: 0 | Status: DISCONTINUED | OUTPATIENT
Start: 2023-01-01 | End: 2023-01-01

## 2023-01-01 RX ORDER — ACETAMINOPHEN 500 MG
350 TABLET ORAL EVERY 6 HOURS
Refills: 0 | Status: DISCONTINUED | OUTPATIENT
Start: 2023-01-01 | End: 2023-01-01

## 2023-01-01 RX ORDER — LEVALBUTEROL 1.25 MG/.5ML
0.63 SOLUTION, CONCENTRATE RESPIRATORY (INHALATION) EVERY 8 HOURS
Refills: 0 | Status: DISCONTINUED | OUTPATIENT
Start: 2023-01-01 | End: 2023-01-01

## 2023-01-01 RX ORDER — POTASSIUM CHLORIDE 20 MEQ
40 PACKET (EA) ORAL EVERY 4 HOURS
Refills: 0 | Status: COMPLETED | OUTPATIENT
Start: 2023-01-01 | End: 2023-01-01

## 2023-01-01 RX ORDER — CEFEPIME 1 G/1
500 INJECTION, POWDER, FOR SOLUTION INTRAMUSCULAR; INTRAVENOUS EVERY 8 HOURS
Refills: 0 | Status: DISCONTINUED | OUTPATIENT
Start: 2023-01-01 | End: 2023-01-01

## 2023-01-01 RX ORDER — SODIUM BICARBONATE 1 MEQ/ML
0.6 SYRINGE (ML) INTRAVENOUS
Qty: 150 | Refills: 0 | Status: DISCONTINUED | OUTPATIENT
Start: 2023-01-01 | End: 2023-01-01

## 2023-01-01 RX ORDER — DEXTROSE 50 % IN WATER 50 %
12.5 SYRINGE (ML) INTRAVENOUS ONCE
Refills: 0 | Status: DISCONTINUED | OUTPATIENT
Start: 2023-01-01 | End: 2023-01-01

## 2023-01-01 RX ORDER — ALBUMIN HUMAN 25 %
50 VIAL (ML) INTRAVENOUS EVERY 4 HOURS
Refills: 0 | Status: COMPLETED | OUTPATIENT
Start: 2023-01-01 | End: 2023-01-01

## 2023-01-01 RX ORDER — ALBUMIN HUMAN 25 %
50 VIAL (ML) INTRAVENOUS EVERY 4 HOURS
Refills: 0 | Status: DISCONTINUED | OUTPATIENT
Start: 2023-01-01 | End: 2023-01-01

## 2023-01-01 RX ORDER — MIDAZOLAM HYDROCHLORIDE 1 MG/ML
2 INJECTION, SOLUTION INTRAMUSCULAR; INTRAVENOUS
Refills: 0 | Status: DISCONTINUED | OUTPATIENT
Start: 2023-01-01 | End: 2023-01-01

## 2023-01-01 RX ORDER — CEFEPIME 1 G/1
1000 INJECTION, POWDER, FOR SOLUTION INTRAMUSCULAR; INTRAVENOUS EVERY 12 HOURS
Refills: 0 | Status: DISCONTINUED | OUTPATIENT
Start: 2023-01-01 | End: 2023-01-01

## 2023-01-01 RX ORDER — SUCRALFATE 1 G
1 TABLET ORAL EVERY 6 HOURS
Refills: 0 | Status: DISCONTINUED | OUTPATIENT
Start: 2023-01-01 | End: 2023-01-01

## 2023-01-01 RX ORDER — SODIUM CHLORIDE 9 MG/ML
1000 INJECTION, SOLUTION INTRAVENOUS ONCE
Refills: 0 | Status: COMPLETED | OUTPATIENT
Start: 2023-01-01 | End: 2023-01-01

## 2023-01-01 RX ORDER — INSULIN HUMAN 100 [IU]/ML
8 INJECTION, SOLUTION SUBCUTANEOUS
Qty: 100 | Refills: 0 | Status: DISCONTINUED | OUTPATIENT
Start: 2023-01-01 | End: 2023-01-01

## 2023-01-01 RX ORDER — MIDODRINE HYDROCHLORIDE 2.5 MG/1
10 TABLET ORAL EVERY 8 HOURS
Refills: 0 | Status: DISCONTINUED | OUTPATIENT
Start: 2023-01-01 | End: 2023-01-01

## 2023-01-01 RX ORDER — SUCRALFATE 1 G
30 TABLET ORAL EVERY 6 HOURS
Refills: 0 | Status: DISCONTINUED | OUTPATIENT
Start: 2023-01-01 | End: 2023-01-01

## 2023-01-01 RX ORDER — MIDODRINE HYDROCHLORIDE 2.5 MG/1
20 TABLET ORAL ONCE
Refills: 0 | Status: COMPLETED | OUTPATIENT
Start: 2023-01-01 | End: 2023-01-01

## 2023-01-01 RX ORDER — SODIUM CHLORIDE 9 MG/ML
500 INJECTION, SOLUTION INTRAVENOUS
Refills: 0 | Status: COMPLETED | OUTPATIENT
Start: 2023-01-01 | End: 2023-01-01

## 2023-01-01 RX ORDER — DILTIAZEM HCL 120 MG
90 CAPSULE, EXT RELEASE 24 HR ORAL EVERY 6 HOURS
Refills: 0 | Status: DISCONTINUED | OUTPATIENT
Start: 2023-01-01 | End: 2023-01-01

## 2023-01-01 RX ORDER — HUMAN INSULIN 100 [IU]/ML
10 INJECTION, SUSPENSION SUBCUTANEOUS EVERY 6 HOURS
Refills: 0 | Status: DISCONTINUED | OUTPATIENT
Start: 2023-01-01 | End: 2023-01-01

## 2023-01-01 RX ORDER — MIDAZOLAM HYDROCHLORIDE 1 MG/ML
2 INJECTION, SOLUTION INTRAMUSCULAR; INTRAVENOUS ONCE
Refills: 0 | Status: DISCONTINUED | OUTPATIENT
Start: 2023-01-01 | End: 2023-01-01

## 2023-01-01 RX ORDER — HUMAN INSULIN 100 [IU]/ML
15 INJECTION, SUSPENSION SUBCUTANEOUS EVERY 6 HOURS
Refills: 0 | Status: DISCONTINUED | OUTPATIENT
Start: 2023-01-01 | End: 2023-01-01

## 2023-01-01 RX ORDER — ALBUMIN HUMAN 25 %
50 VIAL (ML) INTRAVENOUS EVERY 12 HOURS
Refills: 0 | Status: DISCONTINUED | OUTPATIENT
Start: 2023-01-01 | End: 2023-01-01

## 2023-01-01 RX ORDER — DILTIAZEM HCL 120 MG
7.5 CAPSULE, EXT RELEASE 24 HR ORAL
Qty: 125 | Refills: 0 | Status: DISCONTINUED | OUTPATIENT
Start: 2023-01-01 | End: 2023-01-01

## 2023-01-01 RX ORDER — INSULIN LISPRO 100/ML
VIAL (ML) SUBCUTANEOUS
Refills: 0 | Status: DISCONTINUED | OUTPATIENT
Start: 2023-01-01 | End: 2023-01-01

## 2023-01-01 RX ORDER — NALOXEGOL OXALATE 12.5 MG/1
25 TABLET, FILM COATED ORAL DAILY
Refills: 0 | Status: DISCONTINUED | OUTPATIENT
Start: 2023-01-01 | End: 2023-01-01

## 2023-01-01 RX ORDER — THIAMINE MONONITRATE (VIT B1) 100 MG
100 TABLET ORAL DAILY
Refills: 0 | Status: DISCONTINUED | OUTPATIENT
Start: 2023-01-01 | End: 2023-01-01

## 2023-01-01 RX ORDER — BUMETANIDE 0.25 MG/ML
3 INJECTION INTRAMUSCULAR; INTRAVENOUS
Qty: 20 | Refills: 0 | Status: DISCONTINUED | OUTPATIENT
Start: 2023-01-01 | End: 2023-01-01

## 2023-01-01 RX ORDER — ACETAMINOPHEN 500 MG
650 TABLET ORAL EVERY 6 HOURS
Refills: 0 | Status: DISCONTINUED | OUTPATIENT
Start: 2023-01-01 | End: 2023-01-01

## 2023-01-01 RX ORDER — ASCORBIC ACID 60 MG
500 TABLET,CHEWABLE ORAL DAILY
Refills: 0 | Status: DISCONTINUED | OUTPATIENT
Start: 2023-01-01 | End: 2023-01-01

## 2023-01-01 RX ORDER — FENTANYL CITRATE 50 UG/ML
25 INJECTION INTRAVENOUS ONCE
Refills: 0 | Status: DISCONTINUED | OUTPATIENT
Start: 2023-01-01 | End: 2023-01-01

## 2023-01-01 RX ORDER — PHYTONADIONE (VIT K1) 5 MG
10 TABLET ORAL EVERY 24 HOURS
Refills: 0 | Status: DISCONTINUED | OUTPATIENT
Start: 2023-01-01 | End: 2023-01-01

## 2023-01-01 RX ORDER — OXYCODONE HYDROCHLORIDE 5 MG/1
5 TABLET ORAL EVERY 8 HOURS
Refills: 0 | Status: DISCONTINUED | OUTPATIENT
Start: 2023-01-01 | End: 2023-01-01

## 2023-01-01 RX ORDER — CHLORHEXIDINE GLUCONATE 213 G/1000ML
15 SOLUTION TOPICAL EVERY 12 HOURS
Refills: 0 | Status: DISCONTINUED | OUTPATIENT
Start: 2023-01-01 | End: 2023-01-01

## 2023-01-01 RX ORDER — ACETAMINOPHEN WITH CODEINE 300MG-30MG
650 TABLET ORAL ONCE
Refills: 0 | Status: DISCONTINUED | OUTPATIENT
Start: 2023-01-01 | End: 2023-01-01

## 2023-01-01 RX ORDER — ACETAMINOPHEN 500 MG
1000 TABLET ORAL ONCE
Refills: 0 | Status: COMPLETED | OUTPATIENT
Start: 2023-01-01 | End: 2023-01-01

## 2023-01-01 RX ORDER — SODIUM CHLORIDE 9 MG/ML
4 INJECTION INTRAMUSCULAR; INTRAVENOUS; SUBCUTANEOUS EVERY 12 HOURS
Refills: 0 | Status: DISCONTINUED | OUTPATIENT
Start: 2023-01-01 | End: 2023-01-01

## 2023-01-01 RX ORDER — MIDODRINE HYDROCHLORIDE 2.5 MG/1
20 TABLET ORAL EVERY 8 HOURS
Refills: 0 | Status: DISCONTINUED | OUTPATIENT
Start: 2023-01-01 | End: 2023-01-01

## 2023-01-01 RX ORDER — ALBUMIN HUMAN 25 %
500 VIAL (ML) INTRAVENOUS ONCE
Refills: 0 | Status: COMPLETED | OUTPATIENT
Start: 2023-01-01 | End: 2023-01-01

## 2023-01-01 RX ORDER — METOCLOPRAMIDE HCL 10 MG
5 TABLET ORAL ONCE
Refills: 0 | Status: COMPLETED | OUTPATIENT
Start: 2023-01-01 | End: 2023-01-01

## 2023-01-01 RX ORDER — CEFEPIME 1 G/1
1000 INJECTION, POWDER, FOR SOLUTION INTRAMUSCULAR; INTRAVENOUS ONCE
Refills: 0 | Status: COMPLETED | OUTPATIENT
Start: 2023-01-01 | End: 2023-01-01

## 2023-01-01 RX ORDER — METOPROLOL TARTRATE 50 MG
5 TABLET ORAL ONCE
Refills: 0 | Status: COMPLETED | OUTPATIENT
Start: 2023-01-01 | End: 2023-01-01

## 2023-01-01 RX ORDER — ACETAMINOPHEN 500 MG
580 TABLET ORAL ONCE
Refills: 0 | Status: COMPLETED | OUTPATIENT
Start: 2023-01-01 | End: 2023-01-01

## 2023-01-01 RX ORDER — SODIUM CHLORIDE 9 MG/ML
100 INJECTION, SOLUTION INTRAVENOUS ONCE
Refills: 0 | Status: DISCONTINUED | OUTPATIENT
Start: 2023-01-01 | End: 2023-01-01

## 2023-01-01 RX ORDER — HUMAN INSULIN 100 [IU]/ML
5 INJECTION, SUSPENSION SUBCUTANEOUS ONCE
Refills: 0 | Status: COMPLETED | OUTPATIENT
Start: 2023-01-01 | End: 2023-01-01

## 2023-01-01 RX ORDER — POTASSIUM CHLORIDE 20 MEQ
40 PACKET (EA) ORAL ONCE
Refills: 0 | Status: COMPLETED | OUTPATIENT
Start: 2023-01-01 | End: 2023-01-01

## 2023-01-01 RX ORDER — ROBINUL 0.2 MG/ML
2 INJECTION INTRAMUSCULAR; INTRAVENOUS EVERY 6 HOURS
Refills: 0 | Status: DISCONTINUED | OUTPATIENT
Start: 2023-01-01 | End: 2023-01-01

## 2023-01-01 RX ORDER — HEPARIN SODIUM 5000 [USP'U]/ML
INJECTION INTRAVENOUS; SUBCUTANEOUS
Qty: 25000 | Refills: 0 | Status: DISCONTINUED | OUTPATIENT
Start: 2023-01-01 | End: 2023-01-01

## 2023-01-01 RX ORDER — BUMETANIDE 0.25 MG/ML
2 INJECTION INTRAMUSCULAR; INTRAVENOUS EVERY 12 HOURS
Refills: 0 | Status: DISCONTINUED | OUTPATIENT
Start: 2023-01-01 | End: 2023-01-01

## 2023-01-01 RX ORDER — SODIUM CHLORIDE 9 MG/ML
2400 INJECTION, SOLUTION INTRAVENOUS
Refills: 0 | Status: DISCONTINUED | OUTPATIENT
Start: 2023-01-01 | End: 2023-01-01

## 2023-01-01 RX ADMIN — SODIUM CHLORIDE 4 MILLILITER(S): 9 INJECTION INTRAMUSCULAR; INTRAVENOUS; SUBCUTANEOUS at 05:42

## 2023-01-01 RX ADMIN — Medication 650 MILLIGRAM(S): at 13:00

## 2023-01-01 RX ADMIN — Medication 100 MILLIGRAM(S): at 11:34

## 2023-01-01 RX ADMIN — DEXMEDETOMIDINE HYDROCHLORIDE IN 0.9% SODIUM CHLORIDE 1.89 MICROGRAM(S)/KG/HR: 4 INJECTION INTRAVENOUS at 05:00

## 2023-01-01 RX ADMIN — Medication 20 MILLIGRAM(S): at 15:22

## 2023-01-01 RX ADMIN — CHLORHEXIDINE GLUCONATE 1 APPLICATION(S): 213 SOLUTION TOPICAL at 05:51

## 2023-01-01 RX ADMIN — SODIUM CHLORIDE 500 MILLILITER(S): 9 INJECTION, SOLUTION INTRAVENOUS at 16:30

## 2023-01-01 RX ADMIN — PROPOFOL 2.26 MICROGRAM(S)/KG/MIN: 10 INJECTION, EMULSION INTRAVENOUS at 23:00

## 2023-01-01 RX ADMIN — CEFEPIME 100 MILLIGRAM(S): 1 INJECTION, POWDER, FOR SOLUTION INTRAMUSCULAR; INTRAVENOUS at 13:21

## 2023-01-01 RX ADMIN — Medication 4: at 17:31

## 2023-01-01 RX ADMIN — Medication 60 MILLIGRAM(S): at 12:43

## 2023-01-01 RX ADMIN — CHLORHEXIDINE GLUCONATE 15 MILLILITER(S): 213 SOLUTION TOPICAL at 17:28

## 2023-01-01 RX ADMIN — PHENYLEPHRINE HYDROCHLORIDE 7.07 MICROGRAM(S)/KG/MIN: 10 INJECTION INTRAVENOUS at 22:30

## 2023-01-01 RX ADMIN — CEFEPIME 100 MILLIGRAM(S): 1 INJECTION, POWDER, FOR SOLUTION INTRAMUSCULAR; INTRAVENOUS at 06:00

## 2023-01-01 RX ADMIN — MIDAZOLAM HYDROCHLORIDE 2 MILLIGRAM(S): 1 INJECTION, SOLUTION INTRAMUSCULAR; INTRAVENOUS at 12:02

## 2023-01-01 RX ADMIN — Medication 60 MILLIGRAM(S): at 00:19

## 2023-01-01 RX ADMIN — PHENYLEPHRINE HYDROCHLORIDE 7.07 MICROGRAM(S)/KG/MIN: 10 INJECTION INTRAVENOUS at 05:13

## 2023-01-01 RX ADMIN — Medication 500 MILLIGRAM(S): at 12:08

## 2023-01-01 RX ADMIN — FENTANYL CITRATE 50 MICROGRAM(S): 50 INJECTION INTRAVENOUS at 08:04

## 2023-01-01 RX ADMIN — SODIUM CHLORIDE 4 MILLILITER(S): 9 INJECTION INTRAMUSCULAR; INTRAVENOUS; SUBCUTANEOUS at 17:00

## 2023-01-01 RX ADMIN — Medication 1 GRAM(S): at 05:12

## 2023-01-01 RX ADMIN — SODIUM CHLORIDE 4 MILLILITER(S): 9 INJECTION INTRAMUSCULAR; INTRAVENOUS; SUBCUTANEOUS at 17:02

## 2023-01-01 RX ADMIN — HUMAN INSULIN 5 UNIT(S): 100 INJECTION, SUSPENSION SUBCUTANEOUS at 13:18

## 2023-01-01 RX ADMIN — PHENYLEPHRINE HYDROCHLORIDE 17 MICROGRAM(S)/KG/MIN: 10 INJECTION INTRAVENOUS at 08:42

## 2023-01-01 RX ADMIN — LEVALBUTEROL 0.63 MILLIGRAM(S): 1.25 SOLUTION, CONCENTRATE RESPIRATORY (INHALATION) at 11:02

## 2023-01-01 RX ADMIN — Medication 1 TABLET(S): at 12:52

## 2023-01-01 RX ADMIN — Medication 8: at 11:58

## 2023-01-01 RX ADMIN — Medication 60 MILLIGRAM(S): at 00:00

## 2023-01-01 RX ADMIN — HUMAN INSULIN 5 UNIT(S): 100 INJECTION, SUSPENSION SUBCUTANEOUS at 11:29

## 2023-01-01 RX ADMIN — Medication 1000 MILLIGRAM(S): at 05:53

## 2023-01-01 RX ADMIN — BUMETANIDE 10 MG/HR: 0.25 INJECTION INTRAMUSCULAR; INTRAVENOUS at 08:40

## 2023-01-01 RX ADMIN — CEFEPIME 100 MILLIGRAM(S): 1 INJECTION, POWDER, FOR SOLUTION INTRAMUSCULAR; INTRAVENOUS at 05:58

## 2023-01-01 RX ADMIN — Medication 2: at 06:06

## 2023-01-01 RX ADMIN — Medication 500 MICROGRAM(S): at 05:32

## 2023-01-01 RX ADMIN — CEFEPIME 100 MILLIGRAM(S): 1 INJECTION, POWDER, FOR SOLUTION INTRAMUSCULAR; INTRAVENOUS at 13:33

## 2023-01-01 RX ADMIN — Medication 1 GRAM(S): at 05:03

## 2023-01-01 RX ADMIN — CHLORHEXIDINE GLUCONATE 1 APPLICATION(S): 213 SOLUTION TOPICAL at 05:00

## 2023-01-01 RX ADMIN — Medication 3 MILLILITER(S): at 11:02

## 2023-01-01 RX ADMIN — SODIUM CHLORIDE 50 MILLILITER(S): 9 INJECTION INTRAMUSCULAR; INTRAVENOUS; SUBCUTANEOUS at 05:12

## 2023-01-01 RX ADMIN — CEFEPIME 100 MILLIGRAM(S): 1 INJECTION, POWDER, FOR SOLUTION INTRAMUSCULAR; INTRAVENOUS at 10:29

## 2023-01-01 RX ADMIN — MIDODRINE HYDROCHLORIDE 30 MILLIGRAM(S): 2.5 TABLET ORAL at 05:12

## 2023-01-01 RX ADMIN — SODIUM CHLORIDE 4 MILLILITER(S): 9 INJECTION INTRAMUSCULAR; INTRAVENOUS; SUBCUTANEOUS at 17:16

## 2023-01-01 RX ADMIN — DEXMEDETOMIDINE HYDROCHLORIDE IN 0.9% SODIUM CHLORIDE 1.89 MICROGRAM(S)/KG/HR: 4 INJECTION INTRAVENOUS at 01:54

## 2023-01-01 RX ADMIN — DEXMEDETOMIDINE HYDROCHLORIDE IN 0.9% SODIUM CHLORIDE 1.89 MICROGRAM(S)/KG/HR: 4 INJECTION INTRAVENOUS at 22:46

## 2023-01-01 RX ADMIN — Medication 5 MILLIGRAM(S): at 14:31

## 2023-01-01 RX ADMIN — BUMETANIDE 10 MG/HR: 0.25 INJECTION INTRAMUSCULAR; INTRAVENOUS at 08:24

## 2023-01-01 RX ADMIN — HEPARIN SODIUM 5000 UNIT(S): 5000 INJECTION INTRAVENOUS; SUBCUTANEOUS at 17:08

## 2023-01-01 RX ADMIN — Medication 3 MILLILITER(S): at 23:03

## 2023-01-01 RX ADMIN — Medication 650 MILLIGRAM(S): at 12:25

## 2023-01-01 RX ADMIN — Medication 150 MEQ/KG/HR: at 20:13

## 2023-01-01 RX ADMIN — Medication 3: at 09:29

## 2023-01-01 RX ADMIN — BUMETANIDE 15 MG/HR: 0.25 INJECTION INTRAMUSCULAR; INTRAVENOUS at 06:30

## 2023-01-01 RX ADMIN — Medication 5 MG/HR: at 11:34

## 2023-01-01 RX ADMIN — Medication 20 MILLIGRAM(S): at 06:00

## 2023-01-01 RX ADMIN — HEPARIN SODIUM 5000 UNIT(S): 5000 INJECTION INTRAVENOUS; SUBCUTANEOUS at 06:00

## 2023-01-01 RX ADMIN — Medication 1 GRAM(S): at 12:09

## 2023-01-01 RX ADMIN — CHLORHEXIDINE GLUCONATE 1 APPLICATION(S): 213 SOLUTION TOPICAL at 06:15

## 2023-01-01 RX ADMIN — HUMAN INSULIN 5 UNIT(S): 100 INJECTION, SUSPENSION SUBCUTANEOUS at 05:36

## 2023-01-01 RX ADMIN — SODIUM CHLORIDE 100 MILLILITER(S): 9 INJECTION, SOLUTION INTRAVENOUS at 15:10

## 2023-01-01 RX ADMIN — Medication 50 MILLIEQUIVALENT(S): at 09:01

## 2023-01-01 RX ADMIN — Medication 10 MILLIGRAM(S): at 06:00

## 2023-01-01 RX ADMIN — SODIUM CHLORIDE 4 MILLILITER(S): 9 INJECTION INTRAMUSCULAR; INTRAVENOUS; SUBCUTANEOUS at 17:09

## 2023-01-01 RX ADMIN — CHLORHEXIDINE GLUCONATE 15 MILLILITER(S): 213 SOLUTION TOPICAL at 05:13

## 2023-01-01 RX ADMIN — Medication 200 GRAM(S): at 18:43

## 2023-01-01 RX ADMIN — ATORVASTATIN CALCIUM 40 MILLIGRAM(S): 80 TABLET, FILM COATED ORAL at 21:25

## 2023-01-01 RX ADMIN — BUMETANIDE 2 MILLIGRAM(S): 0.25 INJECTION INTRAMUSCULAR; INTRAVENOUS at 09:38

## 2023-01-01 RX ADMIN — PHENYLEPHRINE HYDROCHLORIDE 7.07 MICROGRAM(S)/KG/MIN: 10 INJECTION INTRAVENOUS at 21:30

## 2023-01-01 RX ADMIN — Medication 1 TABLET(S): at 11:28

## 2023-01-01 RX ADMIN — Medication 20 MILLIGRAM(S): at 22:09

## 2023-01-01 RX ADMIN — PROPOFOL 2.26 MICROGRAM(S)/KG/MIN: 10 INJECTION, EMULSION INTRAVENOUS at 19:30

## 2023-01-01 RX ADMIN — LEVALBUTEROL 0.63 MILLIGRAM(S): 1.25 SOLUTION, CONCENTRATE RESPIRATORY (INHALATION) at 05:32

## 2023-01-01 RX ADMIN — Medication 1: at 17:17

## 2023-01-01 RX ADMIN — POLYETHYLENE GLYCOL 3350 17 GRAM(S): 17 POWDER, FOR SOLUTION ORAL at 17:06

## 2023-01-01 RX ADMIN — PROPOFOL 2.26 MICROGRAM(S)/KG/MIN: 10 INJECTION, EMULSION INTRAVENOUS at 21:31

## 2023-01-01 RX ADMIN — HUMAN INSULIN 5 UNIT(S): 100 INJECTION, SUSPENSION SUBCUTANEOUS at 17:34

## 2023-01-01 RX ADMIN — Medication 1: at 14:04

## 2023-01-01 RX ADMIN — PHENYLEPHRINE HYDROCHLORIDE 7.07 MICROGRAM(S)/KG/MIN: 10 INJECTION INTRAVENOUS at 12:13

## 2023-01-01 RX ADMIN — BUMETANIDE 2 MILLIGRAM(S): 0.25 INJECTION INTRAMUSCULAR; INTRAVENOUS at 17:16

## 2023-01-01 RX ADMIN — Medication 1 GRAM(S): at 06:00

## 2023-01-01 RX ADMIN — CHLORHEXIDINE GLUCONATE 15 MILLILITER(S): 213 SOLUTION TOPICAL at 06:15

## 2023-01-01 RX ADMIN — POLYETHYLENE GLYCOL 3350 17 GRAM(S): 17 POWDER, FOR SOLUTION ORAL at 05:14

## 2023-01-01 RX ADMIN — HUMAN INSULIN 5 UNIT(S): 100 INJECTION, SUSPENSION SUBCUTANEOUS at 17:28

## 2023-01-01 RX ADMIN — MIDODRINE HYDROCHLORIDE 10 MILLIGRAM(S): 2.5 TABLET ORAL at 13:33

## 2023-01-01 RX ADMIN — CEFEPIME 100 MILLIGRAM(S): 1 INJECTION, POWDER, FOR SOLUTION INTRAMUSCULAR; INTRAVENOUS at 22:00

## 2023-01-01 RX ADMIN — CEFEPIME 100 MILLIGRAM(S): 1 INJECTION, POWDER, FOR SOLUTION INTRAMUSCULAR; INTRAVENOUS at 15:37

## 2023-01-01 RX ADMIN — DEXMEDETOMIDINE HYDROCHLORIDE IN 0.9% SODIUM CHLORIDE 1.89 MICROGRAM(S)/KG/HR: 4 INJECTION INTRAVENOUS at 19:31

## 2023-01-01 RX ADMIN — PHENYLEPHRINE HYDROCHLORIDE 17 MICROGRAM(S)/KG/MIN: 10 INJECTION INTRAVENOUS at 21:37

## 2023-01-01 RX ADMIN — Medication 100 MILLIGRAM(S): at 11:29

## 2023-01-01 RX ADMIN — Medication 10 MILLIEQUIVALENT(S): at 13:43

## 2023-01-01 RX ADMIN — DEXMEDETOMIDINE HYDROCHLORIDE IN 0.9% SODIUM CHLORIDE 1.89 MICROGRAM(S)/KG/HR: 4 INJECTION INTRAVENOUS at 22:00

## 2023-01-01 RX ADMIN — ATORVASTATIN CALCIUM 40 MILLIGRAM(S): 80 TABLET, FILM COATED ORAL at 21:31

## 2023-01-01 RX ADMIN — SODIUM CHLORIDE 4 MILLILITER(S): 9 INJECTION INTRAMUSCULAR; INTRAVENOUS; SUBCUTANEOUS at 17:19

## 2023-01-01 RX ADMIN — Medication 500 MILLIGRAM(S): at 11:33

## 2023-01-01 RX ADMIN — FENTANYL CITRATE 50 MICROGRAM(S): 50 INJECTION INTRAVENOUS at 12:38

## 2023-01-01 RX ADMIN — Medication 3 MILLILITER(S): at 17:01

## 2023-01-01 RX ADMIN — CHLORHEXIDINE GLUCONATE 1 APPLICATION(S): 213 SOLUTION TOPICAL at 03:30

## 2023-01-01 RX ADMIN — HUMAN INSULIN 10 UNIT(S): 100 INJECTION, SUSPENSION SUBCUTANEOUS at 17:14

## 2023-01-01 RX ADMIN — FENTANYL CITRATE 50 MICROGRAM(S): 50 INJECTION INTRAVENOUS at 08:00

## 2023-01-01 RX ADMIN — SODIUM CHLORIDE 250 MILLILITER(S): 9 INJECTION, SOLUTION INTRAVENOUS at 12:55

## 2023-01-01 RX ADMIN — SENNA PLUS 10 MILLILITER(S): 8.6 TABLET ORAL at 21:12

## 2023-01-01 RX ADMIN — Medication 3 MILLILITER(S): at 05:42

## 2023-01-01 RX ADMIN — Medication 1: at 00:31

## 2023-01-01 RX ADMIN — Medication 5 MILLIGRAM(S): at 12:24

## 2023-01-01 RX ADMIN — Medication 3 MILLILITER(S): at 05:31

## 2023-01-01 RX ADMIN — CEFEPIME 100 MILLIGRAM(S): 1 INJECTION, POWDER, FOR SOLUTION INTRAMUSCULAR; INTRAVENOUS at 07:31

## 2023-01-01 RX ADMIN — Medication 1 GRAM(S): at 00:00

## 2023-01-01 RX ADMIN — Medication 90 MILLIGRAM(S): at 05:54

## 2023-01-01 RX ADMIN — HUMAN INSULIN 5 UNIT(S): 100 INJECTION, SUSPENSION SUBCUTANEOUS at 01:11

## 2023-01-01 RX ADMIN — CHLORHEXIDINE GLUCONATE 15 MILLILITER(S): 213 SOLUTION TOPICAL at 17:08

## 2023-01-01 RX ADMIN — DEXMEDETOMIDINE HYDROCHLORIDE IN 0.9% SODIUM CHLORIDE 1.89 MICROGRAM(S)/KG/HR: 4 INJECTION INTRAVENOUS at 19:15

## 2023-01-01 RX ADMIN — CHLORHEXIDINE GLUCONATE 15 MILLILITER(S): 213 SOLUTION TOPICAL at 17:13

## 2023-01-01 RX ADMIN — MIDODRINE HYDROCHLORIDE 10 MILLIGRAM(S): 2.5 TABLET ORAL at 06:00

## 2023-01-01 RX ADMIN — Medication 20 MILLIEQUIVALENT(S): at 11:28

## 2023-01-01 RX ADMIN — BUMETANIDE 15 MG/HR: 0.25 INJECTION INTRAMUSCULAR; INTRAVENOUS at 09:10

## 2023-01-01 RX ADMIN — Medication 200 GRAM(S): at 04:24

## 2023-01-01 RX ADMIN — PROPOFOL 2.26 MICROGRAM(S)/KG/MIN: 10 INJECTION, EMULSION INTRAVENOUS at 14:01

## 2023-01-01 RX ADMIN — SODIUM CHLORIDE 100 MILLILITER(S): 9 INJECTION INTRAMUSCULAR; INTRAVENOUS; SUBCUTANEOUS at 12:38

## 2023-01-01 RX ADMIN — Medication 1: at 12:45

## 2023-01-01 RX ADMIN — Medication 60 MILLIGRAM(S): at 17:27

## 2023-01-01 RX ADMIN — BUMETANIDE 2 MILLIGRAM(S): 0.25 INJECTION INTRAMUSCULAR; INTRAVENOUS at 17:17

## 2023-01-01 RX ADMIN — MIDODRINE HYDROCHLORIDE 30 MILLIGRAM(S): 2.5 TABLET ORAL at 06:00

## 2023-01-01 RX ADMIN — Medication 60 MILLIGRAM(S): at 11:33

## 2023-01-01 RX ADMIN — Medication 40 MILLIGRAM(S): at 12:53

## 2023-01-01 RX ADMIN — CHLORHEXIDINE GLUCONATE 15 MILLILITER(S): 213 SOLUTION TOPICAL at 05:52

## 2023-01-01 RX ADMIN — Medication 232 MILLIGRAM(S): at 20:30

## 2023-01-01 RX ADMIN — Medication 5 MG/HR: at 20:17

## 2023-01-01 RX ADMIN — ATORVASTATIN CALCIUM 40 MILLIGRAM(S): 80 TABLET, FILM COATED ORAL at 22:00

## 2023-01-01 RX ADMIN — Medication 25 GRAM(S): at 04:52

## 2023-01-01 RX ADMIN — Medication 40 MILLIGRAM(S): at 20:17

## 2023-01-01 RX ADMIN — CEFEPIME 100 MILLIGRAM(S): 1 INJECTION, POWDER, FOR SOLUTION INTRAMUSCULAR; INTRAVENOUS at 21:00

## 2023-01-01 RX ADMIN — PHENYLEPHRINE HYDROCHLORIDE 7.07 MICROGRAM(S)/KG/MIN: 10 INJECTION INTRAVENOUS at 04:00

## 2023-01-01 RX ADMIN — FENTANYL CITRATE 50 MICROGRAM(S): 50 INJECTION INTRAVENOUS at 09:30

## 2023-01-01 RX ADMIN — CEFEPIME 100 MILLIGRAM(S): 1 INJECTION, POWDER, FOR SOLUTION INTRAMUSCULAR; INTRAVENOUS at 21:29

## 2023-01-01 RX ADMIN — ENOXAPARIN SODIUM 50 MILLIGRAM(S): 100 INJECTION SUBCUTANEOUS at 19:57

## 2023-01-01 RX ADMIN — PANTOPRAZOLE SODIUM 40 MILLIGRAM(S): 20 TABLET, DELAYED RELEASE ORAL at 17:13

## 2023-01-01 RX ADMIN — AZITHROMYCIN 250 MILLIGRAM(S): 500 TABLET, FILM COATED ORAL at 12:23

## 2023-01-01 RX ADMIN — AZITHROMYCIN 250 MILLIGRAM(S): 500 TABLET, FILM COATED ORAL at 12:08

## 2023-01-01 RX ADMIN — CEFEPIME 100 MILLIGRAM(S): 1 INJECTION, POWDER, FOR SOLUTION INTRAMUSCULAR; INTRAVENOUS at 22:25

## 2023-01-01 RX ADMIN — HUMAN INSULIN 5 UNIT(S): 100 INJECTION, SUSPENSION SUBCUTANEOUS at 06:15

## 2023-01-01 RX ADMIN — Medication 500 MICROGRAM(S): at 12:54

## 2023-01-01 RX ADMIN — PROPOFOL 2.26 MICROGRAM(S)/KG/MIN: 10 INJECTION, EMULSION INTRAVENOUS at 11:29

## 2023-01-01 RX ADMIN — Medication 1: at 00:35

## 2023-01-01 RX ADMIN — Medication 1: at 05:59

## 2023-01-01 RX ADMIN — HUMAN INSULIN 5 UNIT(S): 100 INJECTION, SUSPENSION SUBCUTANEOUS at 00:36

## 2023-01-01 RX ADMIN — DEXMEDETOMIDINE HYDROCHLORIDE IN 0.9% SODIUM CHLORIDE 1.89 MICROGRAM(S)/KG/HR: 4 INJECTION INTRAVENOUS at 01:40

## 2023-01-01 RX ADMIN — CHLORHEXIDINE GLUCONATE 1 APPLICATION(S): 213 SOLUTION TOPICAL at 08:56

## 2023-01-01 RX ADMIN — Medication 3 MILLILITER(S): at 11:12

## 2023-01-01 RX ADMIN — Medication 100 GRAM(S): at 01:01

## 2023-01-01 RX ADMIN — Medication 3 MILLILITER(S): at 23:33

## 2023-01-01 RX ADMIN — HUMAN INSULIN 5 UNIT(S): 100 INJECTION, SUSPENSION SUBCUTANEOUS at 18:06

## 2023-01-01 RX ADMIN — FENTANYL CITRATE 100 MICROGRAM(S): 50 INJECTION INTRAVENOUS at 12:13

## 2023-01-01 RX ADMIN — Medication 5 MILLIGRAM(S): at 11:40

## 2023-01-01 RX ADMIN — Medication 500 MILLIGRAM(S): at 12:44

## 2023-01-01 RX ADMIN — PHENYLEPHRINE HYDROCHLORIDE 7.07 MICROGRAM(S)/KG/MIN: 10 INJECTION INTRAVENOUS at 00:40

## 2023-01-01 RX ADMIN — Medication 3 MILLILITER(S): at 23:07

## 2023-01-01 RX ADMIN — CHLORHEXIDINE GLUCONATE 15 MILLILITER(S): 213 SOLUTION TOPICAL at 05:00

## 2023-01-01 RX ADMIN — PANTOPRAZOLE SODIUM 40 MILLIGRAM(S): 20 TABLET, DELAYED RELEASE ORAL at 06:00

## 2023-01-01 RX ADMIN — SODIUM CHLORIDE 250 MILLILITER(S): 9 INJECTION, SOLUTION INTRAVENOUS at 16:06

## 2023-01-01 RX ADMIN — MIDODRINE HYDROCHLORIDE 30 MILLIGRAM(S): 2.5 TABLET ORAL at 22:00

## 2023-01-01 RX ADMIN — FENTANYL CITRATE 50 MICROGRAM(S): 50 INJECTION INTRAVENOUS at 14:11

## 2023-01-01 RX ADMIN — Medication 1 TABLET(S): at 11:29

## 2023-01-01 RX ADMIN — CEFEPIME 100 MILLIGRAM(S): 1 INJECTION, POWDER, FOR SOLUTION INTRAMUSCULAR; INTRAVENOUS at 13:36

## 2023-01-01 RX ADMIN — SODIUM CHLORIDE 4 MILLILITER(S): 9 INJECTION INTRAMUSCULAR; INTRAVENOUS; SUBCUTANEOUS at 05:32

## 2023-01-01 RX ADMIN — ATORVASTATIN CALCIUM 40 MILLIGRAM(S): 80 TABLET, FILM COATED ORAL at 22:01

## 2023-01-01 RX ADMIN — ATORVASTATIN CALCIUM 40 MILLIGRAM(S): 80 TABLET, FILM COATED ORAL at 21:12

## 2023-01-01 RX ADMIN — Medication 3 MILLILITER(S): at 05:23

## 2023-01-01 RX ADMIN — Medication 3 MILLILITER(S): at 05:08

## 2023-01-01 RX ADMIN — Medication 500 MICROGRAM(S): at 05:42

## 2023-01-01 RX ADMIN — HUMAN INSULIN 5 UNIT(S): 100 INJECTION, SUSPENSION SUBCUTANEOUS at 12:30

## 2023-01-01 RX ADMIN — Medication 60 MILLIGRAM(S): at 05:12

## 2023-01-01 RX ADMIN — Medication 5 MILLIGRAM(S): at 21:20

## 2023-01-01 RX ADMIN — PROPOFOL 2.26 MICROGRAM(S)/KG/MIN: 10 INJECTION, EMULSION INTRAVENOUS at 09:30

## 2023-01-01 RX ADMIN — Medication 1 TABLET(S): at 11:58

## 2023-01-01 RX ADMIN — FENTANYL CITRATE 50 MICROGRAM(S): 50 INJECTION INTRAVENOUS at 13:04

## 2023-01-01 RX ADMIN — FENTANYL CITRATE 100 MICROGRAM(S): 50 INJECTION INTRAVENOUS at 12:01

## 2023-01-01 RX ADMIN — Medication 500 MILLIGRAM(S): at 11:39

## 2023-01-01 RX ADMIN — Medication 20 MILLIGRAM(S): at 05:36

## 2023-01-01 RX ADMIN — CEFEPIME 100 MILLIGRAM(S): 1 INJECTION, POWDER, FOR SOLUTION INTRAMUSCULAR; INTRAVENOUS at 09:24

## 2023-01-01 RX ADMIN — DEXMEDETOMIDINE HYDROCHLORIDE IN 0.9% SODIUM CHLORIDE 1.89 MICROGRAM(S)/KG/HR: 4 INJECTION INTRAVENOUS at 02:00

## 2023-01-01 RX ADMIN — Medication 5 MG/HR: at 21:09

## 2023-01-01 RX ADMIN — HUMAN INSULIN 5 UNIT(S): 100 INJECTION, SUSPENSION SUBCUTANEOUS at 12:28

## 2023-01-01 RX ADMIN — SENNA PLUS 10 MILLILITER(S): 8.6 TABLET ORAL at 21:25

## 2023-01-01 RX ADMIN — FENTANYL CITRATE 50 MICROGRAM(S): 50 INJECTION INTRAVENOUS at 12:32

## 2023-01-01 RX ADMIN — PROPOFOL 2.26 MICROGRAM(S)/KG/MIN: 10 INJECTION, EMULSION INTRAVENOUS at 16:21

## 2023-01-01 RX ADMIN — SODIUM CHLORIDE 500 MILLILITER(S): 9 INJECTION INTRAMUSCULAR; INTRAVENOUS; SUBCUTANEOUS at 10:29

## 2023-01-01 RX ADMIN — Medication 5 MG/HR: at 05:53

## 2023-01-01 RX ADMIN — CEFEPIME 100 MILLIGRAM(S): 1 INJECTION, POWDER, FOR SOLUTION INTRAMUSCULAR; INTRAVENOUS at 06:13

## 2023-01-01 RX ADMIN — Medication 40 MILLIEQUIVALENT(S): at 00:47

## 2023-01-01 RX ADMIN — DEXMEDETOMIDINE HYDROCHLORIDE IN 0.9% SODIUM CHLORIDE 1.89 MICROGRAM(S)/KG/HR: 4 INJECTION INTRAVENOUS at 04:15

## 2023-01-01 RX ADMIN — ATORVASTATIN CALCIUM 40 MILLIGRAM(S): 80 TABLET, FILM COATED ORAL at 21:00

## 2023-01-01 RX ADMIN — SODIUM CHLORIDE 4 MILLILITER(S): 9 INJECTION INTRAMUSCULAR; INTRAVENOUS; SUBCUTANEOUS at 05:14

## 2023-01-01 RX ADMIN — Medication 2: at 05:50

## 2023-01-01 RX ADMIN — CEFEPIME 100 MILLIGRAM(S): 1 INJECTION, POWDER, FOR SOLUTION INTRAMUSCULAR; INTRAVENOUS at 05:57

## 2023-01-01 RX ADMIN — HUMAN INSULIN 5 UNIT(S): 100 INJECTION, SUSPENSION SUBCUTANEOUS at 05:16

## 2023-01-01 RX ADMIN — Medication 100 MILLIGRAM(S): at 11:33

## 2023-01-01 RX ADMIN — Medication 20 MILLIGRAM(S): at 06:06

## 2023-01-01 RX ADMIN — CHLORHEXIDINE GLUCONATE 15 MILLILITER(S): 213 SOLUTION TOPICAL at 06:00

## 2023-01-01 RX ADMIN — HUMAN INSULIN 10 UNIT(S): 100 INJECTION, SUSPENSION SUBCUTANEOUS at 23:10

## 2023-01-01 RX ADMIN — MIDODRINE HYDROCHLORIDE 20 MILLIGRAM(S): 2.5 TABLET ORAL at 00:00

## 2023-01-01 RX ADMIN — HUMAN INSULIN 5 UNIT(S): 100 INJECTION, SUSPENSION SUBCUTANEOUS at 11:40

## 2023-01-01 RX ADMIN — Medication 60 MILLIGRAM(S): at 18:01

## 2023-01-01 RX ADMIN — PANTOPRAZOLE SODIUM 40 MILLIGRAM(S): 20 TABLET, DELAYED RELEASE ORAL at 17:16

## 2023-01-01 RX ADMIN — Medication 1 GRAM(S): at 00:20

## 2023-01-01 RX ADMIN — DEXMEDETOMIDINE HYDROCHLORIDE IN 0.9% SODIUM CHLORIDE 1.89 MICROGRAM(S)/KG/HR: 4 INJECTION INTRAVENOUS at 05:02

## 2023-01-01 RX ADMIN — Medication 1 APPLICATION(S): at 12:08

## 2023-01-01 RX ADMIN — Medication 20 MILLIGRAM(S): at 21:54

## 2023-01-01 RX ADMIN — Medication 60 MILLIGRAM(S): at 11:58

## 2023-01-01 RX ADMIN — HEPARIN SODIUM 800 UNIT(S)/HR: 5000 INJECTION INTRAVENOUS; SUBCUTANEOUS at 19:21

## 2023-01-01 RX ADMIN — Medication 2: at 05:40

## 2023-01-01 RX ADMIN — SODIUM CHLORIDE 100 MILLILITER(S): 9 INJECTION INTRAMUSCULAR; INTRAVENOUS; SUBCUTANEOUS at 12:32

## 2023-01-01 RX ADMIN — DEXMEDETOMIDINE HYDROCHLORIDE IN 0.9% SODIUM CHLORIDE 1.89 MICROGRAM(S)/KG/HR: 4 INJECTION INTRAVENOUS at 01:30

## 2023-01-01 RX ADMIN — Medication 20 MILLIGRAM(S): at 06:12

## 2023-01-01 RX ADMIN — CEFEPIME 100 MILLIGRAM(S): 1 INJECTION, POWDER, FOR SOLUTION INTRAMUSCULAR; INTRAVENOUS at 21:09

## 2023-01-01 RX ADMIN — ATORVASTATIN CALCIUM 40 MILLIGRAM(S): 80 TABLET, FILM COATED ORAL at 22:17

## 2023-01-01 RX ADMIN — CEFEPIME 100 MILLIGRAM(S): 1 INJECTION, POWDER, FOR SOLUTION INTRAMUSCULAR; INTRAVENOUS at 13:58

## 2023-01-01 RX ADMIN — Medication 3 MILLILITER(S): at 11:15

## 2023-01-01 RX ADMIN — BUMETANIDE 2 MILLIGRAM(S): 0.25 INJECTION INTRAMUSCULAR; INTRAVENOUS at 08:21

## 2023-01-01 RX ADMIN — Medication 30 MILLIGRAM(S): at 12:11

## 2023-01-01 RX ADMIN — CHLORHEXIDINE GLUCONATE 15 MILLILITER(S): 213 SOLUTION TOPICAL at 06:08

## 2023-01-01 RX ADMIN — Medication 1 APPLICATION(S): at 13:35

## 2023-01-01 RX ADMIN — HEPARIN SODIUM 700 UNIT(S)/HR: 5000 INJECTION INTRAVENOUS; SUBCUTANEOUS at 01:46

## 2023-01-01 RX ADMIN — HUMAN INSULIN 5 UNIT(S): 100 INJECTION, SUSPENSION SUBCUTANEOUS at 05:13

## 2023-01-01 RX ADMIN — PHENYLEPHRINE HYDROCHLORIDE 7.07 MICROGRAM(S)/KG/MIN: 10 INJECTION INTRAVENOUS at 20:19

## 2023-01-01 RX ADMIN — PROPOFOL 2.26 MICROGRAM(S)/KG/MIN: 10 INJECTION, EMULSION INTRAVENOUS at 05:53

## 2023-01-01 RX ADMIN — CHLORHEXIDINE GLUCONATE 15 MILLILITER(S): 213 SOLUTION TOPICAL at 05:15

## 2023-01-01 RX ADMIN — Medication 60 MILLIGRAM(S): at 17:37

## 2023-01-01 RX ADMIN — PHENYLEPHRINE HYDROCHLORIDE 7.07 MICROGRAM(S)/KG/MIN: 10 INJECTION INTRAVENOUS at 21:09

## 2023-01-01 RX ADMIN — ATORVASTATIN CALCIUM 40 MILLIGRAM(S): 80 TABLET, FILM COATED ORAL at 22:09

## 2023-01-01 RX ADMIN — Medication 650 MILLIGRAM(S): at 01:15

## 2023-01-01 RX ADMIN — DEXMEDETOMIDINE HYDROCHLORIDE IN 0.9% SODIUM CHLORIDE 1.89 MICROGRAM(S)/KG/HR: 4 INJECTION INTRAVENOUS at 04:00

## 2023-01-01 RX ADMIN — CHLORHEXIDINE GLUCONATE 15 MILLILITER(S): 213 SOLUTION TOPICAL at 18:04

## 2023-01-01 RX ADMIN — Medication 10 MILLIGRAM(S): at 05:11

## 2023-01-01 RX ADMIN — CEFEPIME 100 MILLIGRAM(S): 1 INJECTION, POWDER, FOR SOLUTION INTRAMUSCULAR; INTRAVENOUS at 05:13

## 2023-01-01 RX ADMIN — SODIUM CHLORIDE 4 MILLILITER(S): 9 INJECTION INTRAMUSCULAR; INTRAVENOUS; SUBCUTANEOUS at 05:05

## 2023-01-01 RX ADMIN — SODIUM CHLORIDE 4 MILLILITER(S): 9 INJECTION INTRAMUSCULAR; INTRAVENOUS; SUBCUTANEOUS at 17:06

## 2023-01-01 RX ADMIN — Medication 500 MICROGRAM(S): at 11:03

## 2023-01-01 RX ADMIN — Medication 102 MILLIGRAM(S): at 21:31

## 2023-01-01 RX ADMIN — HUMAN INSULIN 5 UNIT(S): 100 INJECTION, SUSPENSION SUBCUTANEOUS at 00:30

## 2023-01-01 RX ADMIN — PHENYLEPHRINE HYDROCHLORIDE 7.07 MICROGRAM(S)/KG/MIN: 10 INJECTION INTRAVENOUS at 18:22

## 2023-01-01 RX ADMIN — Medication 3 MILLILITER(S): at 17:28

## 2023-01-01 RX ADMIN — Medication 60 MILLIGRAM(S): at 23:31

## 2023-01-01 RX ADMIN — Medication 20 MILLIGRAM(S): at 05:24

## 2023-01-01 RX ADMIN — CHLORHEXIDINE GLUCONATE 1 APPLICATION(S): 213 SOLUTION TOPICAL at 06:11

## 2023-01-01 RX ADMIN — Medication 20 MILLIGRAM(S): at 05:57

## 2023-01-01 RX ADMIN — AZITHROMYCIN 255 MILLIGRAM(S): 500 TABLET, FILM COATED ORAL at 12:28

## 2023-01-01 RX ADMIN — CHLORHEXIDINE GLUCONATE 1 APPLICATION(S): 213 SOLUTION TOPICAL at 06:07

## 2023-01-01 RX ADMIN — MIDODRINE HYDROCHLORIDE 30 MILLIGRAM(S): 2.5 TABLET ORAL at 05:03

## 2023-01-01 RX ADMIN — Medication 13 MG/HR: at 08:52

## 2023-01-01 RX ADMIN — Medication 5 MILLIGRAM(S): at 01:33

## 2023-01-01 RX ADMIN — MIDODRINE HYDROCHLORIDE 30 MILLIGRAM(S): 2.5 TABLET ORAL at 22:30

## 2023-01-01 RX ADMIN — Medication 3 MILLILITER(S): at 11:09

## 2023-01-01 RX ADMIN — Medication 100 MILLIGRAM(S): at 11:39

## 2023-01-01 RX ADMIN — HUMAN INSULIN 5 UNIT(S): 100 INJECTION, SUSPENSION SUBCUTANEOUS at 00:59

## 2023-01-01 RX ADMIN — HUMAN INSULIN 5 UNIT(S): 100 INJECTION, SUSPENSION SUBCUTANEOUS at 17:36

## 2023-01-01 RX ADMIN — HUMAN INSULIN 5 UNIT(S): 100 INJECTION, SUSPENSION SUBCUTANEOUS at 05:07

## 2023-01-01 RX ADMIN — Medication 60 MILLIGRAM(S): at 06:00

## 2023-01-01 RX ADMIN — CEFEPIME 100 MILLIGRAM(S): 1 INJECTION, POWDER, FOR SOLUTION INTRAMUSCULAR; INTRAVENOUS at 21:54

## 2023-01-01 RX ADMIN — KETAMINE HYDROCHLORIDE 1.35 MG/KG/HR: 100 INJECTION INTRAMUSCULAR; INTRAVENOUS at 03:38

## 2023-01-01 RX ADMIN — Medication 200 GRAM(S): at 02:30

## 2023-01-01 RX ADMIN — Medication 1 GRAM(S): at 12:43

## 2023-01-01 RX ADMIN — Medication 3 MILLILITER(S): at 23:02

## 2023-01-01 RX ADMIN — Medication 1 TABLET(S): at 17:07

## 2023-01-01 RX ADMIN — Medication 60 MILLIGRAM(S): at 05:03

## 2023-01-01 RX ADMIN — ATORVASTATIN CALCIUM 40 MILLIGRAM(S): 80 TABLET, FILM COATED ORAL at 21:09

## 2023-01-01 RX ADMIN — SODIUM CHLORIDE 4 MILLILITER(S): 9 INJECTION INTRAMUSCULAR; INTRAVENOUS; SUBCUTANEOUS at 17:01

## 2023-01-01 RX ADMIN — Medication 15 MG/HR: at 12:45

## 2023-01-01 RX ADMIN — CHLORHEXIDINE GLUCONATE 1 APPLICATION(S): 213 SOLUTION TOPICAL at 05:15

## 2023-01-01 RX ADMIN — DEXMEDETOMIDINE HYDROCHLORIDE IN 0.9% SODIUM CHLORIDE 1.89 MICROGRAM(S)/KG/HR: 4 INJECTION INTRAVENOUS at 07:00

## 2023-01-01 RX ADMIN — CHLORHEXIDINE GLUCONATE 1 APPLICATION(S): 213 SOLUTION TOPICAL at 06:00

## 2023-01-01 RX ADMIN — PROPOFOL 2.26 MICROGRAM(S)/KG/MIN: 10 INJECTION, EMULSION INTRAVENOUS at 17:22

## 2023-01-01 RX ADMIN — CHLORHEXIDINE GLUCONATE 1 APPLICATION(S): 213 SOLUTION TOPICAL at 06:29

## 2023-01-01 RX ADMIN — FENTANYL CITRATE 50 MICROGRAM(S): 50 INJECTION INTRAVENOUS at 06:51

## 2023-01-01 RX ADMIN — PHENYLEPHRINE HYDROCHLORIDE 7.07 MICROGRAM(S)/KG/MIN: 10 INJECTION INTRAVENOUS at 11:45

## 2023-01-01 RX ADMIN — Medication 250 MILLILITER(S): at 00:28

## 2023-01-01 RX ADMIN — LEVALBUTEROL 0.63 MILLIGRAM(S): 1.25 SOLUTION, CONCENTRATE RESPIRATORY (INHALATION) at 05:42

## 2023-01-01 RX ADMIN — DEXMEDETOMIDINE HYDROCHLORIDE IN 0.9% SODIUM CHLORIDE 1.89 MICROGRAM(S)/KG/HR: 4 INJECTION INTRAVENOUS at 19:23

## 2023-01-01 RX ADMIN — HUMAN INSULIN 5 UNIT(S): 100 INJECTION, SUSPENSION SUBCUTANEOUS at 23:30

## 2023-01-01 RX ADMIN — Medication 60 MILLIGRAM(S): at 06:02

## 2023-01-01 RX ADMIN — Medication 1 TABLET(S): at 17:29

## 2023-01-01 RX ADMIN — NALOXEGOL OXALATE 25 MILLIGRAM(S): 12.5 TABLET, FILM COATED ORAL at 00:53

## 2023-01-01 RX ADMIN — Medication 2.5 MILLIGRAM(S): at 16:59

## 2023-01-01 RX ADMIN — DEXMEDETOMIDINE HYDROCHLORIDE IN 0.9% SODIUM CHLORIDE 1.89 MICROGRAM(S)/KG/HR: 4 INJECTION INTRAVENOUS at 08:40

## 2023-01-01 RX ADMIN — BUMETANIDE 10 MG/HR: 0.25 INJECTION INTRAMUSCULAR; INTRAVENOUS at 05:02

## 2023-01-01 RX ADMIN — Medication 1: at 23:17

## 2023-01-01 RX ADMIN — Medication 1: at 23:25

## 2023-01-01 RX ADMIN — FENTANYL CITRATE 50 MICROGRAM(S): 50 INJECTION INTRAVENOUS at 04:23

## 2023-01-01 RX ADMIN — PHENYLEPHRINE HYDROCHLORIDE 7.07 MICROGRAM(S)/KG/MIN: 10 INJECTION INTRAVENOUS at 05:02

## 2023-01-01 RX ADMIN — Medication 13 MG/HR: at 07:46

## 2023-01-01 RX ADMIN — CEFEPIME 100 MILLIGRAM(S): 1 INJECTION, POWDER, FOR SOLUTION INTRAMUSCULAR; INTRAVENOUS at 14:29

## 2023-01-01 RX ADMIN — Medication 3 MILLILITER(S): at 22:06

## 2023-01-01 RX ADMIN — Medication 3 MILLILITER(S): at 11:23

## 2023-01-01 RX ADMIN — Medication 500 MILLIGRAM(S): at 12:25

## 2023-01-01 RX ADMIN — HEPARIN SODIUM 700 UNIT(S)/HR: 5000 INJECTION INTRAVENOUS; SUBCUTANEOUS at 11:24

## 2023-01-01 RX ADMIN — FENTANYL CITRATE 50 MICROGRAM(S): 50 INJECTION INTRAVENOUS at 08:19

## 2023-01-01 RX ADMIN — SODIUM CHLORIDE 4 MILLILITER(S): 9 INJECTION INTRAMUSCULAR; INTRAVENOUS; SUBCUTANEOUS at 05:43

## 2023-01-01 RX ADMIN — Medication 40 MILLIEQUIVALENT(S): at 05:12

## 2023-01-01 RX ADMIN — MIDODRINE HYDROCHLORIDE 20 MILLIGRAM(S): 2.5 TABLET ORAL at 13:35

## 2023-01-01 RX ADMIN — BUMETANIDE 10 MG/HR: 0.25 INJECTION INTRAMUSCULAR; INTRAVENOUS at 23:30

## 2023-01-01 RX ADMIN — SODIUM CHLORIDE 4 MILLILITER(S): 9 INJECTION INTRAMUSCULAR; INTRAVENOUS; SUBCUTANEOUS at 06:18

## 2023-01-01 RX ADMIN — HUMAN INSULIN 5 UNIT(S): 100 INJECTION, SUSPENSION SUBCUTANEOUS at 12:45

## 2023-01-01 RX ADMIN — Medication 50 MILLILITER(S): at 14:20

## 2023-01-01 RX ADMIN — FENTANYL CITRATE 25 MICROGRAM(S): 50 INJECTION INTRAVENOUS at 05:10

## 2023-01-01 RX ADMIN — CHLORHEXIDINE GLUCONATE 15 MILLILITER(S): 213 SOLUTION TOPICAL at 03:30

## 2023-01-01 RX ADMIN — SODIUM CHLORIDE 60 MILLILITER(S): 9 INJECTION INTRAMUSCULAR; INTRAVENOUS; SUBCUTANEOUS at 10:29

## 2023-01-01 RX ADMIN — FENTANYL CITRATE 50 MICROGRAM(S): 50 INJECTION INTRAVENOUS at 13:19

## 2023-01-01 RX ADMIN — POLYETHYLENE GLYCOL 3350 17 GRAM(S): 17 POWDER, FOR SOLUTION ORAL at 17:13

## 2023-01-01 RX ADMIN — Medication 2: at 23:30

## 2023-01-01 RX ADMIN — Medication 1 APPLICATION(S): at 13:13

## 2023-01-01 RX ADMIN — BUMETANIDE 10 MG/HR: 0.25 INJECTION INTRAMUSCULAR; INTRAVENOUS at 19:31

## 2023-01-01 RX ADMIN — HEPARIN SODIUM 5000 UNIT(S): 5000 INJECTION INTRAVENOUS; SUBCUTANEOUS at 05:03

## 2023-01-01 RX ADMIN — PANTOPRAZOLE SODIUM 40 MILLIGRAM(S): 20 TABLET, DELAYED RELEASE ORAL at 05:58

## 2023-01-01 RX ADMIN — CHLORHEXIDINE GLUCONATE 15 MILLILITER(S): 213 SOLUTION TOPICAL at 05:03

## 2023-01-01 RX ADMIN — Medication 100 MILLIGRAM(S): at 12:10

## 2023-01-01 RX ADMIN — INSULIN HUMAN 8 UNIT(S)/HR: 100 INJECTION, SOLUTION SUBCUTANEOUS at 05:51

## 2023-01-01 RX ADMIN — Medication 1: at 17:36

## 2023-01-01 RX ADMIN — HUMAN INSULIN 10 UNIT(S): 100 INJECTION, SUSPENSION SUBCUTANEOUS at 13:57

## 2023-01-01 RX ADMIN — Medication 50 MILLILITER(S): at 17:37

## 2023-01-01 RX ADMIN — CHLORHEXIDINE GLUCONATE 15 MILLILITER(S): 213 SOLUTION TOPICAL at 17:18

## 2023-01-01 RX ADMIN — Medication 3 MILLILITER(S): at 17:03

## 2023-01-01 RX ADMIN — Medication 1: at 06:15

## 2023-01-01 RX ADMIN — SODIUM CHLORIDE 100 MILLILITER(S): 9 INJECTION INTRAMUSCULAR; INTRAVENOUS; SUBCUTANEOUS at 19:23

## 2023-01-01 RX ADMIN — HUMAN INSULIN 5 UNIT(S): 100 INJECTION, SUSPENSION SUBCUTANEOUS at 05:40

## 2023-01-01 RX ADMIN — CEFEPIME 100 MILLIGRAM(S): 1 INJECTION, POWDER, FOR SOLUTION INTRAMUSCULAR; INTRAVENOUS at 09:10

## 2023-01-01 RX ADMIN — Medication 500 MILLIGRAM(S): at 17:06

## 2023-01-01 RX ADMIN — Medication 1 GRAM(S): at 17:22

## 2023-01-01 RX ADMIN — Medication 4: at 07:59

## 2023-01-01 RX ADMIN — PHENYLEPHRINE HYDROCHLORIDE 7.07 MICROGRAM(S)/KG/MIN: 10 INJECTION INTRAVENOUS at 05:53

## 2023-01-01 RX ADMIN — PANTOPRAZOLE SODIUM 40 MILLIGRAM(S): 20 TABLET, DELAYED RELEASE ORAL at 05:52

## 2023-01-01 RX ADMIN — Medication 3: at 20:15

## 2023-01-01 RX ADMIN — SENNA PLUS 10 MILLILITER(S): 8.6 TABLET ORAL at 22:00

## 2023-01-01 RX ADMIN — Medication 3 MILLILITER(S): at 17:16

## 2023-01-01 RX ADMIN — Medication 20 MILLIGRAM(S): at 05:14

## 2023-01-01 RX ADMIN — ENOXAPARIN SODIUM 50 MILLIGRAM(S): 100 INJECTION SUBCUTANEOUS at 05:24

## 2023-01-01 RX ADMIN — Medication 1: at 23:41

## 2023-01-01 RX ADMIN — SODIUM CHLORIDE 100 MILLILITER(S): 9 INJECTION, SOLUTION INTRAVENOUS at 01:49

## 2023-01-01 RX ADMIN — BUMETANIDE 2 MILLIGRAM(S): 0.25 INJECTION INTRAMUSCULAR; INTRAVENOUS at 06:00

## 2023-01-01 RX ADMIN — Medication 3 MILLILITER(S): at 06:18

## 2023-01-01 RX ADMIN — Medication 10: at 01:11

## 2023-01-01 RX ADMIN — SODIUM CHLORIDE 1000 MILLILITER(S): 9 INJECTION, SOLUTION INTRAVENOUS at 13:19

## 2023-01-01 RX ADMIN — Medication 3 MILLILITER(S): at 05:14

## 2023-01-01 RX ADMIN — Medication 3 MILLILITER(S): at 05:04

## 2023-01-01 RX ADMIN — DEXMEDETOMIDINE HYDROCHLORIDE IN 0.9% SODIUM CHLORIDE 1.89 MICROGRAM(S)/KG/HR: 4 INJECTION INTRAVENOUS at 12:57

## 2023-01-01 RX ADMIN — Medication 1 GRAM(S): at 11:33

## 2023-01-01 RX ADMIN — KETAMINE HYDROCHLORIDE 1.35 MG/KG/HR: 100 INJECTION INTRAMUSCULAR; INTRAVENOUS at 05:02

## 2023-01-01 RX ADMIN — Medication 500 MILLIGRAM(S): at 11:29

## 2023-01-01 RX ADMIN — PHENYLEPHRINE HYDROCHLORIDE 7.07 MICROGRAM(S)/KG/MIN: 10 INJECTION INTRAVENOUS at 19:31

## 2023-01-01 RX ADMIN — Medication 13 MG/HR: at 07:00

## 2023-01-01 RX ADMIN — HUMAN INSULIN 5 UNIT(S): 100 INJECTION, SUSPENSION SUBCUTANEOUS at 05:50

## 2023-01-01 RX ADMIN — MIDODRINE HYDROCHLORIDE 20 MILLIGRAM(S): 2.5 TABLET ORAL at 06:00

## 2023-01-01 RX ADMIN — Medication 100 MILLIGRAM(S): at 17:06

## 2023-01-01 RX ADMIN — Medication 5 MG/HR: at 16:47

## 2023-01-01 RX ADMIN — POTASSIUM PHOSPHATE, MONOBASIC POTASSIUM PHOSPHATE, DIBASIC 62.5 MILLIMOLE(S): 236; 224 INJECTION, SOLUTION INTRAVENOUS at 04:52

## 2023-01-01 RX ADMIN — INSULIN HUMAN 4 UNIT(S): 100 INJECTION, SOLUTION SUBCUTANEOUS at 06:07

## 2023-01-01 RX ADMIN — DEXMEDETOMIDINE HYDROCHLORIDE IN 0.9% SODIUM CHLORIDE 1.89 MICROGRAM(S)/KG/HR: 4 INJECTION INTRAVENOUS at 08:09

## 2023-01-01 RX ADMIN — PANTOPRAZOLE SODIUM 40 MILLIGRAM(S): 20 TABLET, DELAYED RELEASE ORAL at 12:09

## 2023-01-01 RX ADMIN — WARFARIN SODIUM 3 MILLIGRAM(S): 2.5 TABLET ORAL at 22:17

## 2023-01-01 RX ADMIN — DESMOPRESSIN ACETATE 220 MICROGRAM(S): 0.1 TABLET ORAL at 09:51

## 2023-01-01 RX ADMIN — CEFEPIME 100 MILLIGRAM(S): 1 INJECTION, POWDER, FOR SOLUTION INTRAMUSCULAR; INTRAVENOUS at 21:25

## 2023-01-01 RX ADMIN — FENTANYL CITRATE 50 MICROGRAM(S): 50 INJECTION INTRAVENOUS at 06:21

## 2023-01-01 RX ADMIN — Medication 10 MILLIGRAM(S): at 13:57

## 2023-01-01 RX ADMIN — DESMOPRESSIN ACETATE 215 MICROGRAM(S): 0.1 TABLET ORAL at 20:15

## 2023-01-01 RX ADMIN — BUMETANIDE 10 MG/HR: 0.25 INJECTION INTRAMUSCULAR; INTRAVENOUS at 02:00

## 2023-01-01 RX ADMIN — Medication 1 APPLICATION(S): at 12:43

## 2023-01-01 RX ADMIN — Medication 3 MILLILITER(S): at 23:37

## 2023-01-01 RX ADMIN — Medication 15 MG/HR: at 21:30

## 2023-01-01 RX ADMIN — Medication 15 MG/HR: at 16:36

## 2023-01-01 RX ADMIN — MIDODRINE HYDROCHLORIDE 30 MILLIGRAM(S): 2.5 TABLET ORAL at 13:02

## 2023-01-01 RX ADMIN — PANTOPRAZOLE SODIUM 40 MILLIGRAM(S): 20 TABLET, DELAYED RELEASE ORAL at 17:08

## 2023-01-01 RX ADMIN — Medication 1: at 17:10

## 2023-01-01 RX ADMIN — MIDODRINE HYDROCHLORIDE 10 MILLIGRAM(S): 2.5 TABLET ORAL at 22:00

## 2023-01-01 RX ADMIN — BUMETANIDE 2 MILLIGRAM(S): 0.25 INJECTION INTRAMUSCULAR; INTRAVENOUS at 05:30

## 2023-01-01 RX ADMIN — Medication 3 MILLILITER(S): at 12:07

## 2023-01-01 RX ADMIN — Medication 2: at 17:08

## 2023-01-01 RX ADMIN — PHENYLEPHRINE HYDROCHLORIDE 7.07 MICROGRAM(S)/KG/MIN: 10 INJECTION INTRAVENOUS at 08:39

## 2023-01-01 RX ADMIN — PHENYLEPHRINE HYDROCHLORIDE 7.07 MICROGRAM(S)/KG/MIN: 10 INJECTION INTRAVENOUS at 07:46

## 2023-01-01 RX ADMIN — CEFEPIME 100 MILLIGRAM(S): 1 INJECTION, POWDER, FOR SOLUTION INTRAMUSCULAR; INTRAVENOUS at 13:57

## 2023-01-01 RX ADMIN — PANTOPRAZOLE SODIUM 40 MILLIGRAM(S): 20 TABLET, DELAYED RELEASE ORAL at 18:20

## 2023-01-01 RX ADMIN — Medication 2: at 12:31

## 2023-01-01 RX ADMIN — SODIUM CHLORIDE 100 MILLILITER(S): 9 INJECTION INTRAMUSCULAR; INTRAVENOUS; SUBCUTANEOUS at 21:29

## 2023-01-01 RX ADMIN — HUMAN INSULIN 5 UNIT(S): 100 INJECTION, SUSPENSION SUBCUTANEOUS at 05:45

## 2023-01-01 RX ADMIN — Medication 580 MILLIGRAM(S): at 21:00

## 2023-01-01 RX ADMIN — Medication 100 MILLIGRAM(S): at 12:39

## 2023-01-01 RX ADMIN — Medication 650 MILLIGRAM(S): at 00:45

## 2023-01-01 RX ADMIN — Medication 12: at 17:16

## 2023-01-01 RX ADMIN — Medication 5 MILLIGRAM(S): at 11:30

## 2023-01-01 RX ADMIN — Medication 250 MILLIGRAM(S): at 16:06

## 2023-01-01 RX ADMIN — POLYETHYLENE GLYCOL 3350 17 GRAM(S): 17 POWDER, FOR SOLUTION ORAL at 05:36

## 2023-01-01 RX ADMIN — PROPOFOL 2.26 MICROGRAM(S)/KG/MIN: 10 INJECTION, EMULSION INTRAVENOUS at 05:12

## 2023-01-01 RX ADMIN — Medication 350 MILLIGRAM(S): at 21:30

## 2023-01-01 RX ADMIN — Medication 10 MILLIGRAM(S): at 08:55

## 2023-01-01 RX ADMIN — Medication 1 TABLET(S): at 12:08

## 2023-01-01 RX ADMIN — Medication 2: at 19:57

## 2023-01-01 RX ADMIN — ATORVASTATIN CALCIUM 40 MILLIGRAM(S): 80 TABLET, FILM COATED ORAL at 22:25

## 2023-01-01 RX ADMIN — PHENYLEPHRINE HYDROCHLORIDE 7.07 MICROGRAM(S)/KG/MIN: 10 INJECTION INTRAVENOUS at 19:30

## 2023-01-01 RX ADMIN — Medication 100 GRAM(S): at 03:23

## 2023-01-01 RX ADMIN — INSULIN HUMAN 4 UNIT(S)/HR: 100 INJECTION, SOLUTION SUBCUTANEOUS at 20:19

## 2023-01-01 RX ADMIN — PHENYLEPHRINE HYDROCHLORIDE 17 MICROGRAM(S)/KG/MIN: 10 INJECTION INTRAVENOUS at 21:54

## 2023-01-01 RX ADMIN — PHENYLEPHRINE HYDROCHLORIDE 7.07 MICROGRAM(S)/KG/MIN: 10 INJECTION INTRAVENOUS at 01:30

## 2023-01-01 RX ADMIN — PANTOPRAZOLE SODIUM 40 MILLIGRAM(S): 20 TABLET, DELAYED RELEASE ORAL at 17:18

## 2023-01-01 RX ADMIN — Medication 20 MILLIGRAM(S): at 17:07

## 2023-01-01 RX ADMIN — LEVALBUTEROL 0.63 MILLIGRAM(S): 1.25 SOLUTION, CONCENTRATE RESPIRATORY (INHALATION) at 22:07

## 2023-01-01 RX ADMIN — WARFARIN SODIUM 3 MILLIGRAM(S): 2.5 TABLET ORAL at 22:09

## 2023-01-01 RX ADMIN — Medication 1 TABLET(S): at 11:33

## 2023-01-01 RX ADMIN — Medication 500 MILLIGRAM(S): at 12:51

## 2023-01-01 RX ADMIN — Medication 17 MG/HR: at 08:23

## 2023-01-01 RX ADMIN — Medication 17 MG/HR: at 05:35

## 2023-01-01 RX ADMIN — HUMAN INSULIN 5 UNIT(S): 100 INJECTION, SUSPENSION SUBCUTANEOUS at 17:43

## 2023-01-01 RX ADMIN — CHLORHEXIDINE GLUCONATE 15 MILLILITER(S): 213 SOLUTION TOPICAL at 18:37

## 2023-01-01 RX ADMIN — HUMAN INSULIN 5 UNIT(S): 100 INJECTION, SUSPENSION SUBCUTANEOUS at 17:17

## 2023-01-01 RX ADMIN — Medication 1 TABLET(S): at 11:39

## 2023-01-01 RX ADMIN — FENTANYL CITRATE 50 MICROGRAM(S): 50 INJECTION INTRAVENOUS at 09:45

## 2023-01-01 RX ADMIN — DEXMEDETOMIDINE HYDROCHLORIDE IN 0.9% SODIUM CHLORIDE 1.89 MICROGRAM(S)/KG/HR: 4 INJECTION INTRAVENOUS at 05:11

## 2023-01-01 RX ADMIN — SODIUM CHLORIDE 500 MILLILITER(S): 9 INJECTION, SOLUTION INTRAVENOUS at 12:30

## 2023-01-01 RX ADMIN — Medication 3 MILLILITER(S): at 23:08

## 2023-01-01 RX ADMIN — FENTANYL CITRATE 50 MICROGRAM(S): 50 INJECTION INTRAVENOUS at 14:26

## 2023-01-01 RX ADMIN — ROBINUL 0.4 MILLIGRAM(S): 0.2 INJECTION INTRAMUSCULAR; INTRAVENOUS at 12:02

## 2023-01-01 RX ADMIN — PROPOFOL 2.26 MICROGRAM(S)/KG/MIN: 10 INJECTION, EMULSION INTRAVENOUS at 05:35

## 2023-01-01 RX ADMIN — SODIUM CHLORIDE 4 MILLILITER(S): 9 INJECTION INTRAMUSCULAR; INTRAVENOUS; SUBCUTANEOUS at 17:28

## 2023-01-01 RX ADMIN — Medication 100 GRAM(S): at 21:37

## 2023-01-01 RX ADMIN — Medication 1: at 17:23

## 2023-01-01 RX ADMIN — Medication 17 MG/HR: at 19:22

## 2023-01-01 RX ADMIN — HEPARIN SODIUM 5000 UNIT(S): 5000 INJECTION INTRAVENOUS; SUBCUTANEOUS at 17:11

## 2023-01-01 RX ADMIN — Medication 4: at 00:36

## 2023-01-01 RX ADMIN — Medication 100 MILLIGRAM(S): at 12:25

## 2023-01-01 RX ADMIN — PROPOFOL 2.26 MICROGRAM(S)/KG/MIN: 10 INJECTION, EMULSION INTRAVENOUS at 11:34

## 2023-01-01 RX ADMIN — Medication 50 MILLILITER(S): at 09:29

## 2023-01-01 RX ADMIN — BUMETANIDE 10 MG/HR: 0.25 INJECTION INTRAMUSCULAR; INTRAVENOUS at 18:22

## 2023-01-01 RX ADMIN — Medication 6: at 00:58

## 2023-01-01 RX ADMIN — CHLORHEXIDINE GLUCONATE 15 MILLILITER(S): 213 SOLUTION TOPICAL at 17:17

## 2023-01-01 RX ADMIN — HUMAN INSULIN 10 UNIT(S): 100 INJECTION, SUSPENSION SUBCUTANEOUS at 05:58

## 2023-01-01 RX ADMIN — CHLORHEXIDINE GLUCONATE 15 MILLILITER(S): 213 SOLUTION TOPICAL at 05:54

## 2023-01-01 RX ADMIN — AZITHROMYCIN 250 MILLIGRAM(S): 500 TABLET, FILM COATED ORAL at 11:39

## 2023-01-01 RX ADMIN — Medication 125 MICROGRAM(S): at 02:44

## 2023-01-01 RX ADMIN — FENTANYL CITRATE 50 MICROGRAM(S): 50 INJECTION INTRAVENOUS at 04:53

## 2023-01-01 RX ADMIN — CHLORHEXIDINE GLUCONATE 15 MILLILITER(S): 213 SOLUTION TOPICAL at 17:47

## 2023-01-01 RX ADMIN — HUMAN INSULIN 5 UNIT(S): 100 INJECTION, SUSPENSION SUBCUTANEOUS at 00:35

## 2023-01-01 RX ADMIN — Medication 2: at 18:13

## 2023-01-01 RX ADMIN — POLYETHYLENE GLYCOL 3350 17 GRAM(S): 17 POWDER, FOR SOLUTION ORAL at 18:05

## 2023-01-01 RX ADMIN — Medication 13 MG/HR: at 08:31

## 2023-01-01 RX ADMIN — Medication 500 MICROGRAM(S): at 23:07

## 2023-01-01 RX ADMIN — Medication 3 MILLILITER(S): at 17:19

## 2023-01-01 RX ADMIN — Medication 13 MG/HR: at 01:49

## 2023-01-01 RX ADMIN — Medication 350 MILLIGRAM(S): at 21:00

## 2023-01-01 RX ADMIN — Medication 1 TABLET(S): at 12:42

## 2023-01-01 RX ADMIN — Medication 3 MILLILITER(S): at 17:09

## 2023-01-01 RX ADMIN — INSULIN HUMAN 4 UNIT(S)/HR: 100 INJECTION, SOLUTION SUBCUTANEOUS at 06:10

## 2023-01-01 RX ADMIN — Medication 60 MILLIGRAM(S): at 05:24

## 2023-01-01 RX ADMIN — PHENYLEPHRINE HYDROCHLORIDE 7.07 MICROGRAM(S)/KG/MIN: 10 INJECTION INTRAVENOUS at 08:31

## 2023-01-01 RX ADMIN — DEXMEDETOMIDINE HYDROCHLORIDE IN 0.9% SODIUM CHLORIDE 1.89 MICROGRAM(S)/KG/HR: 4 INJECTION INTRAVENOUS at 07:46

## 2023-01-01 RX ADMIN — Medication 100 MEQ/KG/HR: at 05:53

## 2023-01-01 RX ADMIN — FENTANYL CITRATE 50 MICROGRAM(S): 50 INJECTION INTRAVENOUS at 15:20

## 2023-01-01 RX ADMIN — Medication 13 MG/HR: at 00:40

## 2023-01-01 RX ADMIN — POLYETHYLENE GLYCOL 3350 17 GRAM(S): 17 POWDER, FOR SOLUTION ORAL at 05:58

## 2023-01-01 RX ADMIN — Medication 5 MILLIGRAM(S): at 02:52

## 2023-01-01 RX ADMIN — SODIUM CHLORIDE 4 MILLILITER(S): 9 INJECTION INTRAMUSCULAR; INTRAVENOUS; SUBCUTANEOUS at 05:08

## 2023-01-01 RX ADMIN — BUMETANIDE 10 MG/HR: 0.25 INJECTION INTRAMUSCULAR; INTRAVENOUS at 01:55

## 2023-01-01 RX ADMIN — Medication 150 MEQ/KG/HR: at 11:58

## 2023-01-01 RX ADMIN — Medication 5 MG/HR: at 21:53

## 2023-01-01 RX ADMIN — Medication 3 MILLILITER(S): at 11:04

## 2023-01-01 RX ADMIN — HUMAN INSULIN 5 UNIT(S): 100 INJECTION, SUSPENSION SUBCUTANEOUS at 17:12

## 2023-01-01 RX ADMIN — HEPARIN SODIUM 700 UNIT(S)/HR: 5000 INJECTION INTRAVENOUS; SUBCUTANEOUS at 12:27

## 2023-01-01 RX ADMIN — PHENYLEPHRINE HYDROCHLORIDE 7.07 MICROGRAM(S)/KG/MIN: 10 INJECTION INTRAVENOUS at 07:07

## 2023-01-01 RX ADMIN — Medication 500 MICROGRAM(S): at 17:00

## 2023-01-01 RX ADMIN — AZITHROMYCIN 250 MILLIGRAM(S): 500 TABLET, FILM COATED ORAL at 12:27

## 2023-01-01 RX ADMIN — PANTOPRAZOLE SODIUM 10 MG/HR: 20 TABLET, DELAYED RELEASE ORAL at 12:57

## 2023-01-01 RX ADMIN — MIDODRINE HYDROCHLORIDE 30 MILLIGRAM(S): 2.5 TABLET ORAL at 13:53

## 2023-01-01 RX ADMIN — CEFEPIME 100 MILLIGRAM(S): 1 INJECTION, POWDER, FOR SOLUTION INTRAMUSCULAR; INTRAVENOUS at 13:55

## 2023-01-01 RX ADMIN — CHLORHEXIDINE GLUCONATE 15 MILLILITER(S): 213 SOLUTION TOPICAL at 17:19

## 2023-01-01 RX ADMIN — CEFEPIME 100 MILLIGRAM(S): 1 INJECTION, POWDER, FOR SOLUTION INTRAMUSCULAR; INTRAVENOUS at 22:48

## 2023-01-01 RX ADMIN — PROPOFOL 2.26 MICROGRAM(S)/KG/MIN: 10 INJECTION, EMULSION INTRAVENOUS at 01:45

## 2023-01-01 RX ADMIN — Medication 90 MILLIGRAM(S): at 00:45

## 2023-01-01 RX ADMIN — INSULIN HUMAN 3 UNIT(S): 100 INJECTION, SOLUTION SUBCUTANEOUS at 20:20

## 2023-01-01 RX ADMIN — CEFEPIME 100 MILLIGRAM(S): 1 INJECTION, POWDER, FOR SOLUTION INTRAMUSCULAR; INTRAVENOUS at 05:52

## 2023-01-01 RX ADMIN — HUMAN INSULIN 5 UNIT(S): 100 INJECTION, SUSPENSION SUBCUTANEOUS at 18:25

## 2023-01-01 RX ADMIN — Medication 3: at 12:20

## 2023-01-01 RX ADMIN — HUMAN INSULIN 10 UNIT(S): 100 INJECTION, SUSPENSION SUBCUTANEOUS at 01:45

## 2023-01-01 RX ADMIN — CHLORHEXIDINE GLUCONATE 15 MILLILITER(S): 213 SOLUTION TOPICAL at 17:31

## 2023-01-01 RX ADMIN — Medication 100 GRAM(S): at 01:34

## 2023-01-01 RX ADMIN — Medication 1: at 12:28

## 2023-01-01 RX ADMIN — Medication 100 MILLIGRAM(S): at 11:58

## 2023-01-01 RX ADMIN — PANTOPRAZOLE SODIUM 40 MILLIGRAM(S): 20 TABLET, DELAYED RELEASE ORAL at 23:17

## 2023-01-01 RX ADMIN — LEVALBUTEROL 0.63 MILLIGRAM(S): 1.25 SOLUTION, CONCENTRATE RESPIRATORY (INHALATION) at 23:18

## 2023-01-01 RX ADMIN — CHLORHEXIDINE GLUCONATE 15 MILLILITER(S): 213 SOLUTION TOPICAL at 17:22

## 2023-01-01 RX ADMIN — FENTANYL CITRATE 50 MICROGRAM(S): 50 INJECTION INTRAVENOUS at 21:07

## 2023-01-01 RX ADMIN — FENTANYL CITRATE 50 MICROGRAM(S): 50 INJECTION INTRAVENOUS at 12:16

## 2023-01-01 RX ADMIN — Medication 500 MILLIGRAM(S): at 11:58

## 2023-01-01 RX ADMIN — HEPARIN SODIUM 5000 UNIT(S): 5000 INJECTION INTRAVENOUS; SUBCUTANEOUS at 22:00

## 2023-01-01 RX ADMIN — DEXMEDETOMIDINE HYDROCHLORIDE IN 0.9% SODIUM CHLORIDE 1.89 MICROGRAM(S)/KG/HR: 4 INJECTION INTRAVENOUS at 23:45

## 2023-01-01 RX ADMIN — Medication 6: at 12:22

## 2023-01-01 RX ADMIN — Medication 500 MILLIGRAM(S): at 11:35

## 2023-01-01 RX ADMIN — Medication 40 MILLIEQUIVALENT(S): at 18:41

## 2023-01-01 RX ADMIN — Medication 50 MILLILITER(S): at 17:55

## 2023-01-01 RX ADMIN — DEXMEDETOMIDINE HYDROCHLORIDE IN 0.9% SODIUM CHLORIDE 1.89 MICROGRAM(S)/KG/HR: 4 INJECTION INTRAVENOUS at 08:32

## 2023-01-01 RX ADMIN — PROPOFOL 2.26 MICROGRAM(S)/KG/MIN: 10 INJECTION, EMULSION INTRAVENOUS at 20:19

## 2023-01-01 RX ADMIN — PANTOPRAZOLE SODIUM 40 MILLIGRAM(S): 20 TABLET, DELAYED RELEASE ORAL at 17:11

## 2023-01-01 RX ADMIN — CEFEPIME 100 MILLIGRAM(S): 1 INJECTION, POWDER, FOR SOLUTION INTRAMUSCULAR; INTRAVENOUS at 14:17

## 2023-01-01 RX ADMIN — PANTOPRAZOLE SODIUM 10 MG/HR: 20 TABLET, DELAYED RELEASE ORAL at 02:05

## 2023-01-01 RX ADMIN — PROPOFOL 2.26 MICROGRAM(S)/KG/MIN: 10 INJECTION, EMULSION INTRAVENOUS at 21:09

## 2023-01-01 RX ADMIN — Medication 1: at 13:17

## 2023-01-01 RX ADMIN — SODIUM CHLORIDE 500 MILLILITER(S): 9 INJECTION, SOLUTION INTRAVENOUS at 14:30

## 2023-01-01 RX ADMIN — Medication 60 MILLIGRAM(S): at 22:17

## 2023-01-01 RX ADMIN — HUMAN INSULIN 5 UNIT(S): 100 INJECTION, SUSPENSION SUBCUTANEOUS at 11:58

## 2023-01-01 RX ADMIN — CEFEPIME 100 MILLIGRAM(S): 1 INJECTION, POWDER, FOR SOLUTION INTRAMUSCULAR; INTRAVENOUS at 14:46

## 2023-01-01 RX ADMIN — Medication 20 MILLIGRAM(S): at 05:52

## 2023-01-01 RX ADMIN — Medication 1000 MILLIGRAM(S): at 06:23

## 2023-01-01 RX ADMIN — Medication 500 MICROGRAM(S): at 23:18

## 2023-01-01 RX ADMIN — BUMETANIDE 15 MG/HR: 0.25 INJECTION INTRAMUSCULAR; INTRAVENOUS at 17:59

## 2023-01-01 RX ADMIN — Medication 6: at 05:35

## 2023-01-01 RX ADMIN — HUMAN INSULIN 5 UNIT(S): 100 INJECTION, SUSPENSION SUBCUTANEOUS at 17:08

## 2023-01-01 RX ADMIN — Medication 7.5 MG/HR: at 15:45

## 2023-01-01 RX ADMIN — HUMAN INSULIN 5 UNIT(S): 100 INJECTION, SUSPENSION SUBCUTANEOUS at 23:17

## 2023-01-01 RX ADMIN — Medication 3 MILLILITER(S): at 11:08

## 2023-01-01 RX ADMIN — Medication 3 MILLILITER(S): at 06:03

## 2023-01-01 RX ADMIN — Medication 3 MILLILITER(S): at 17:06

## 2023-01-01 RX ADMIN — ENOXAPARIN SODIUM 50 MILLIGRAM(S): 100 INJECTION SUBCUTANEOUS at 20:15

## 2023-01-01 RX ADMIN — CEFEPIME 100 MILLIGRAM(S): 1 INJECTION, POWDER, FOR SOLUTION INTRAMUSCULAR; INTRAVENOUS at 13:53

## 2023-01-01 RX ADMIN — HUMAN INSULIN 5 UNIT(S): 100 INJECTION, SUSPENSION SUBCUTANEOUS at 23:42

## 2023-01-01 RX ADMIN — Medication 100 MILLIGRAM(S): at 12:51

## 2023-01-01 RX ADMIN — SENNA PLUS 10 MILLILITER(S): 8.6 TABLET ORAL at 21:00

## 2023-01-01 RX ADMIN — Medication 5 MILLIGRAM(S): at 03:57

## 2023-01-01 RX ADMIN — Medication 3 MILLILITER(S): at 23:32

## 2023-01-01 RX ADMIN — CEFEPIME 100 MILLIGRAM(S): 1 INJECTION, POWDER, FOR SOLUTION INTRAMUSCULAR; INTRAVENOUS at 15:25

## 2023-01-01 RX ADMIN — CEFEPIME 100 MILLIGRAM(S): 1 INJECTION, POWDER, FOR SOLUTION INTRAMUSCULAR; INTRAVENOUS at 21:31

## 2023-01-01 RX ADMIN — Medication 2: at 05:13

## 2023-01-01 RX ADMIN — Medication 3 MILLILITER(S): at 02:22

## 2023-01-01 RX ADMIN — Medication 20 MILLIGRAM(S): at 14:20

## 2023-01-01 RX ADMIN — Medication 13 MG/HR: at 23:55

## 2023-01-01 RX ADMIN — FENTANYL CITRATE 50 MICROGRAM(S): 50 INJECTION INTRAVENOUS at 21:38

## 2023-01-01 RX ADMIN — DEXMEDETOMIDINE HYDROCHLORIDE IN 0.9% SODIUM CHLORIDE 1.89 MICROGRAM(S)/KG/HR: 4 INJECTION INTRAVENOUS at 21:15

## 2023-01-01 RX ADMIN — Medication 1 GRAM(S): at 19:14

## 2023-01-01 RX ADMIN — HUMAN INSULIN 5 UNIT(S): 100 INJECTION, SUSPENSION SUBCUTANEOUS at 11:34

## 2023-01-01 RX ADMIN — SENNA PLUS 10 MILLILITER(S): 8.6 TABLET ORAL at 21:08

## 2023-01-01 RX ADMIN — Medication 1: at 17:44

## 2023-01-01 RX ADMIN — Medication 1 GRAM(S): at 17:37

## 2023-01-01 RX ADMIN — DEXMEDETOMIDINE HYDROCHLORIDE IN 0.9% SODIUM CHLORIDE 1.89 MICROGRAM(S)/KG/HR: 4 INJECTION INTRAVENOUS at 17:37

## 2023-01-01 RX ADMIN — CEFEPIME 100 MILLIGRAM(S): 1 INJECTION, POWDER, FOR SOLUTION INTRAMUSCULAR; INTRAVENOUS at 21:11

## 2023-01-01 RX ADMIN — Medication 102 MILLIGRAM(S): at 05:51

## 2023-01-01 RX ADMIN — Medication 1: at 05:45

## 2023-01-01 RX ADMIN — Medication 200 GRAM(S): at 01:08

## 2023-01-01 RX ADMIN — Medication 1 GRAM(S): at 23:18

## 2023-01-01 RX ADMIN — Medication 102 MILLIGRAM(S): at 03:04

## 2023-01-01 RX ADMIN — Medication 50 MILLILITER(S): at 17:07

## 2023-01-01 RX ADMIN — Medication 50 MILLILITER(S): at 00:50

## 2023-01-01 RX ADMIN — Medication 2: at 05:16

## 2023-01-01 RX ADMIN — PROPOFOL 2.26 MICROGRAM(S)/KG/MIN: 10 INJECTION, EMULSION INTRAVENOUS at 17:38

## 2023-01-01 RX ADMIN — CHLORHEXIDINE GLUCONATE 15 MILLILITER(S): 213 SOLUTION TOPICAL at 17:37

## 2023-01-01 RX ADMIN — Medication 3 MILLILITER(S): at 19:57

## 2023-01-01 RX ADMIN — CEFEPIME 100 MILLIGRAM(S): 1 INJECTION, POWDER, FOR SOLUTION INTRAMUSCULAR; INTRAVENOUS at 22:06

## 2023-01-01 RX ADMIN — CHLORHEXIDINE GLUCONATE 15 MILLILITER(S): 213 SOLUTION TOPICAL at 05:35

## 2023-01-01 RX ADMIN — Medication 1: at 11:29

## 2023-01-01 RX ADMIN — BUMETANIDE 15 MG/HR: 0.25 INJECTION INTRAMUSCULAR; INTRAVENOUS at 01:30

## 2023-01-01 RX ADMIN — HUMAN INSULIN 5 UNIT(S): 100 INJECTION, SUSPENSION SUBCUTANEOUS at 12:32

## 2023-01-01 RX ADMIN — AZITHROMYCIN 250 MILLIGRAM(S): 500 TABLET, FILM COATED ORAL at 12:54

## 2023-01-01 RX ADMIN — CHLORHEXIDINE GLUCONATE 1 APPLICATION(S): 213 SOLUTION TOPICAL at 05:25

## 2023-01-01 RX ADMIN — DEXMEDETOMIDINE HYDROCHLORIDE IN 0.9% SODIUM CHLORIDE 1.89 MICROGRAM(S)/KG/HR: 4 INJECTION INTRAVENOUS at 12:18

## 2023-01-01 RX ADMIN — LEVALBUTEROL 0.63 MILLIGRAM(S): 1.25 SOLUTION, CONCENTRATE RESPIRATORY (INHALATION) at 14:01

## 2023-01-01 RX ADMIN — PHENYLEPHRINE HYDROCHLORIDE 7.07 MICROGRAM(S)/KG/MIN: 10 INJECTION INTRAVENOUS at 01:00

## 2023-01-01 RX ADMIN — Medication 1: at 17:34

## 2023-01-01 RX ADMIN — MIDODRINE HYDROCHLORIDE 30 MILLIGRAM(S): 2.5 TABLET ORAL at 14:20

## 2023-01-01 RX ADMIN — Medication 10 MILLIGRAM(S): at 05:04

## 2023-01-01 RX ADMIN — FENTANYL CITRATE 50 MICROGRAM(S): 50 INJECTION INTRAVENOUS at 07:45

## 2023-01-01 RX ADMIN — CHLORHEXIDINE GLUCONATE 15 MILLILITER(S): 213 SOLUTION TOPICAL at 17:11

## 2023-01-01 RX ADMIN — Medication 40 MILLIGRAM(S): at 05:54

## 2023-01-01 RX ADMIN — MIDODRINE HYDROCHLORIDE 20 MILLIGRAM(S): 2.5 TABLET ORAL at 22:00

## 2023-01-01 RX ADMIN — HUMAN INSULIN 5 UNIT(S): 100 INJECTION, SUSPENSION SUBCUTANEOUS at 23:25

## 2023-01-01 RX ADMIN — Medication 40 MILLIGRAM(S): at 00:46

## 2023-01-01 RX ADMIN — SODIUM CHLORIDE 500 MILLILITER(S): 9 INJECTION, SOLUTION INTRAVENOUS at 14:29

## 2023-01-01 RX ADMIN — FENTANYL CITRATE 25 MICROGRAM(S): 50 INJECTION INTRAVENOUS at 04:55

## 2023-01-08 NOTE — ED ADULT NURSE NOTE - OBJECTIVE STATEMENT
77y female pmh Afib on Eliquis, Asthma, DMT2, HTN, HLD, Dementia. Presents to the ED for shortness of breath, ams, disoriented, weight loss, fatigue, cough for a few days. Daughter at bedside who states that she cares for pt. Was treated for UTI recently. Poor p.o intake. Denies n/v/d, dizziness, chest pain, fever, lightheadedness. 20g IV in L forearm. Side rails up, bed in lowest position, safety maintained.

## 2023-01-08 NOTE — ED PROVIDER NOTE - CARE PLAN
Principal Discharge DX:	Acute respiratory failure with hypoxia  Secondary Diagnosis:	Febrile illness   1

## 2023-01-08 NOTE — ED PROVIDER NOTE - CLINICAL SUMMARY MEDICAL DECISION MAKING FREE TEXT BOX
Eldelry female pmh as above last admit for hypoxic resp failure requiring bibap 2019 now pw sob/wheeze hypoxia. Concerns for same. Check xr,ekg labs, tx neb. Probable admit  Sumeet Whipple MD, Facep

## 2023-01-08 NOTE — H&P ADULT - ASSESSMENT
77-year-old female with past medical history of DM, HTN, HLD, asthma on daily steroids, A. fib on Eliquis and digoxin presenting with shortness of breath.  Patient unable to provide history.  Daughter at bedside reports patient has had increasing shortness of breath, wheezing, cough for the past week now.  Patient also with increasing generalized weakness, fatigue and decreased p.o. intake.  Denies fever/chills, chest pain, abdominal pain, nausea, vomiting, diarrhea, dysuria.  Family reports patient had a similar episode 1 year ago when she was admitted to [facility] with UTI.  pt with sig medical hx, chronic a.fib with sob ?sec sepsis/ pneumoniae, UTI  start on ceftriaxone  tele  for increase hr, will increase Cardizem dose  continue AC  cardiac enzyme  tsh  echo  RVP negative

## 2023-01-08 NOTE — H&P ADULT - HISTORY OF PRESENT ILLNESS
CHIEF COMPLAINT:Patient is a 77y old  Female who presents with a chief complaint of     HPI:  77-year-old female with past medical history of DM, HTN, HLD, asthma on daily steroids, A. fib on Eliquis and digoxin presenting with shortness of breath.  Patient unable to provide history.  Daughter at bedside reports patient has had increasing shortness of breath, wheezing, cough for the past week now.  Patient also with increasing generalized weakness, fatigue and decreased p.o. intake.  Denies fever/chills, chest pain, abdominal pain, nausea, vomiting, diarrhea, dysuria.  Family reports patient had a similar episode 1 year ago when she was admitted to [facility] with UTI.    PAST MEDICAL & SURGICAL HISTORY:  Asthma  Diabetes mellitus  Atrial fibrillation, chronic  Type 2 diabetes mellitus  Hypertension  Dementia      No significant past surgical history      MEDICATIONS  (STANDING):  vancomycin  IVPB. 1000 milliGRAM(s) IV Intermittent once  oxyCODONE 5 mg oral tablet: 1 tab(s) orally every 4 hours, As needed, Severe Pain (7 - 10) MDD:6 tab  · 	Cardizem  mg/24 hours oral capsule, extended release: 1 cap(s) orally once a day   · 	guaiFENesin 100 mg/5 mL oral liquid: 5 milliliter(s) orally every 6 hours, As needed, Cough  · 	aspirin 81 mg oral delayed release tablet: 1 tab(s) orally once a day  · 	atorvastatin 40 mg oral tablet: 1 tab(s) orally once a day (at bedtime)  · 	acetaminophen 325 mg oral tablet: 2 tab(s) orally every 6 hours, As needed, Mild Pain (1 - 3)  · 	Advair Diskus 250 mcg-50 mcg inhalation powder: 1 puff(s) inhaled 2 times a day  · 	NexIUM OTC 20 mg oral delayed release capsule: 1 cap(s) orally once a day (in the morning)  · 	metFORMIN 1000 mg oral tablet: 1 tab(s) orally once a day as needed for elevated blood sugar levels  · 	warfarin 3 mg oral tablet: 1 tab(s) orally once a day    MEDICATIONS  (PRN):      FAMILY HISTORY:  Family history of diabetes mellitus (Mother)        SOCIAL HISTORY:    [ x] Non-smoker  [ ] Smoker  [ ] Alcohol    Allergies    Avelox (Pruritus)  Levaquin (Unknown)    Intolerances    	    REVIEW OF SYSTEMS:  CONSTITUTIONAL: No fever, weight loss, or fatigue  EYES: No eye pain, visual disturbances, or discharge  ENT:  No difficulty hearing, tinnitus, vertigo; No sinus or throat pain  NECK: No pain or stiffness  RESPIRATORY: No cough, wheezing, chills or hemoptysis; + Shortness of Breath  CARDIOVASCULAR: No chest pain,+ palpitations, passing out, dizziness, or leg swelling  GASTROINTESTINAL: No abdominal or epigastric pain. No nausea, vomiting, or hematemesis; No diarrhea or constipation. No melena or hematochezia.  GENITOURINARY: No dysuria, frequency, hematuria, or incontinence  NEUROLOGICAL: No headaches, memory loss, loss of strength, numbness, or tremors  SKIN: No itching, burning, rashes, or lesions   LYMPH Nodes: No enlarged glands  ENDOCRINE: No heat or cold intolerance; No hair loss  MUSCULOSKELETAL: No joint pain or swelling; No muscle, back, or extremity pain  PSYCHIATRIC: No depression, anxiety, mood swings, or difficulty sleeping  HEME/LYMPH: No easy bruising, or bleeding gums  ALLERGY AND IMMUNOLOGIC: No hives or eczema	    x[ ] All others negative	  [ ] Unable to obtain    PHYSICAL EXAM:  T(C): 37.9 (01-08-23 @ 13:02), Max: 37.9 (01-08-23 @ 13:02)  HR: 131 (01-08-23 @ 13:02) (96 - 159)  BP: 146/86 (01-08-23 @ 13:02) (129/78 - 146/86)  RR: 30 (01-08-23 @ 13:02) (22 - 35)  SpO2: 95% (01-08-23 @ 13:02) (83% - 100%)  Wt(kg): --  I&O's Summary      Appearance: Normal	  HEENT:   Normal oral mucosa, PERRL, EOMI	  Lymphatic: No lymphadenopathy  Cardiovascular: Normal S1 S2, No JVD, + murmurs, No edema  Respiratory :rhonchi  Psychiatry: A & O x 3, Mood & affect appropriate  Gastrointestinal:  Soft, Non-tender, + BS	  Skin: No rashes, No ecchymoses, No cyanosis	  Extremities: Normal range of motion, No clubbing, cyanosis or edema  Vascular: Peripheral pulses palpable 2+ bilaterally    TELEMETRY: 	    ECG:  	  RADIOLOGY:  OTHER: 	  	  LABS:	 	    CARDIAC MARKERS:                              13.9   23.80 )-----------( 284      ( 08 Jan 2023 11:57 )             44.4     01-08    132<L>  |  90<L>  |  11  ----------------------------<  220<H>  4.1   |  27  |  0.64    Ca    9.2      08 Jan 2023 11:57    TPro  6.2  /  Alb  3.1<L>  /  TBili  1.0  /  DBili  x   /  AST  16  /  ALT  11  /  AlkPhos  107  01-08    proBNP:   Lipid Profile:   HgA1c:   TSH:   PT/INR - ( 08 Jan 2023 11:57 )   PT: 19.3 sec;   INR: 1.67 ratio         PTT - ( 08 Jan 2023 11:57 )  PTT:29.2 sec    PREVIOUS DIAGNOSTIC TESTING:    :    < from: Xray Chest 1 View- PORTABLE-Urgent (01.08.23 @ 12:28) >  FINDINGS:  LUNGS:  Study is limited by positioning. Minimal vague patchy opacity at the left   lung base.  PLEURA: No pleural effusion or pneumothorax.  HEART AND MEDIASTINUM: Heart size is within normal limits.  SKELETON: There is no acute osseus abnormality,.    IMPRESSION:  Minimal vague patchy opacity at the left lung base which may represent   atelectasis or pneumonia    < end of copied text >      · EKG Date/Time: 08-Jan-2023 11:37  · Rate: 121  · Interpretation: normal sinus rhythm  QS v1-4  · MO: 148  · QRS: 72

## 2023-01-08 NOTE — ED PROVIDER NOTE - ATTENDING APP SHARED VISIT CONTRIBUTION OF CARE
Private Physician Joselito Adams pcp  77y female pmh Afib on Eliquis, Asthma , DMT2 on metfrormin. HTN, HLD, Dementia. Last admitted 2019 for uti/sepsis/hypoxia, Now comes to ed c/o shortness of breath onset past week w anorexia. no fever, chills. dysuira, has pain upper abd w meals for years no recent change. No Covid vaccine. No known covid contact. No nausea and vomiting. +constipation last two days ago. Cough w/o sputum, no hemoptysis. PT c/o 10lbs over past year. Private Physician Joselito Adams pcp  77y female pmh Afib on Eliquis, Asthma , DMT2 on metfrormin. HTN, HLD, Dementia. Last admitted 2019 for uti/sepsis/hypoxia, Now comes to ed c/o shortness of breath onset past week w anorexia. no fever, chills. dysuira, has pain upper abd w meals for years no recent change. No Covid vaccine. No known covid contact. No nausea and vomiting. +constipation last two days ago. Cough w/o sputum, no hemoptysis. PT c/o 10lbs over past year. PE Elderly female awake alert looking acutely ill/dyspenic Heent normocephalic atraumatic neck supple Chest Tachypenic fair entry,prolonged exp phase and hansel wheeze. CV tachy abd soft +bs no mass guarding. Neuro awake alert moves all extr  Sumeet Whipple MD, Facep

## 2023-01-08 NOTE — ED PROVIDER NOTE - PHYSICAL EXAMINATION
Gen: tachypneic, sitting forward  Skin: No rashes or lesions  HEENT: NC/AT, PERRLA, EOMI, MMM  Resp: labored, diffuse wheezing  Cardiac: tachycardic s1s2  GI: ND, +BS, Soft, NT  Ext: 2+ pedal edema BLE, FROM in all extremities  Neuro: moving all extremities

## 2023-01-08 NOTE — ED ADULT NURSE NOTE - NSIMPLEMENTINTERV_GEN_ALL_ED
Implemented All Fall with Harm Risk Interventions:  [place] to call system. Call bell, personal items and telephone within reach. Instruct patient to call for assistance. Room bathroom lighting operational. Non-slip footwear when patient is off stretcher. Physically safe environment: no spills, clutter or unnecessary equipment. Stretcher in lowest position, wheels locked, appropriate side rails in place. Provide visual cue, wrist band, yellow gown, etc. Monitor gait and stability. Monitor for mental status changes and reorient to person, place, and time. Review medications for side effects contributing to fall risk. Reinforce activity limits and safety measures with patient and family. Provide visual clues: red socks.

## 2023-01-08 NOTE — ED PROVIDER NOTE - NS ED ATTENDING STATEMENT MOD
This was a shared visit with the HEYDI. I reviewed and verified the documentation and independently performed the documented:

## 2023-01-08 NOTE — ED PROVIDER NOTE - PROGRESS NOTE DETAILS
Dr Angie Whipple MD, Facep Attempted To reach Dr Angie foster, Attempted to contact Musa who previuosly covered for him kristin Whipple MD, Facep Discussed with Dr RICARDO Marcus (admits for Angeline)  Sumeet Whipple MD, Facep

## 2023-01-08 NOTE — ED PROVIDER NOTE - OBJECTIVE STATEMENT
77-year-old female with past medical history of DM, HTN, HLD, asthma on daily steroids, A. fib on Eliquis and digoxin presenting with shortness of breath.  Patient unable to provide history.  Daughter at bedside reports patient has had increasing shortness of breath, wheezing, cough for the past week now.  Patient also with increasing generalized weakness, fatigue and decreased p.o. intake.  Denies fever/chills, chest pain, abdominal pain, nausea, vomiting, diarrhea, dysuria.  Family reports patient had a similar episode 1 year ago when she was admitted to [facility] with UTI.

## 2023-01-08 NOTE — H&P ADULT - NSHPLABSRESULTS_GEN_ALL_CORE
< from: TTE with Doppler (w/Cont) (02.01.19 @ 14:40) >    1. Calcified trileaflet aortic valve with normal opening.  2. Endocardial visualization enhanced with intravenous  injection of Ultrasonic Enhancing Agent (Definity). Mild  segmental left ventricular systolic dysfunction. The septum  is hypokinetic.  3. Mild Right ventricular enlargement with mild  decreased  right ventricular systolic function. RV S' 0.8    < from: CT Angio Chest PE Protocol w/ IV Cont (06.03.22 @ 21:58) >    No pulmonary embolism.    Relatively unchanged nonspecific minimal patchy bilateral groundglass   opacities,as compared to CT chest on 8/30/2011. Unchanged 5 mm right   lower lobe lung nodule (series 2:56). New nodular opacity in the right   lower lobe measuring up to 8 mm (series 3:213) for which repeat CT in   6-12 months is recommended to reassess  Urine Microscopic-Add On (NC) (01.08.23 @ 14:26)    Bacteria: Negative    Epithelial Cells: 3 /hpf    Red Blood Cell - Urine: 6 /hpf    White Blood Cell - Urine: 53 /HPF    Hyaline Casts: 2 /lpf      .  Respiratory Viral Panel with COVID-19 by ARMAND (01.08.23 @ 11:56)    Rapid RVP Result: [facility]    SARS-CoV-2: [facility]: This Respiratory Panel uses polymerase chain reaction (PCR) to detect for  adenovirus; coronavirus (HKU1, NL63, 229E, OC43); human metapneumovirus  (hMPV); human enterovirus/rhinovirus (Entero/RV); influenza A; influenza  A/H1; influenza A/H3; influenza A/H1-2009; influenza B; parainfluenza  viruses 1, 2, 3, 4; respiratory syncytial virus; Mycoplasma pneumoniae;  Chlamydophila pneumoniae; and SARS-CoV-2.

## 2023-01-09 NOTE — ED ADULT NURSE REASSESSMENT NOTE - NS ED NURSE REASSESS COMMENT FT1
ANTONIO Schmitt contacted for sustained tachycardia, HR 130s-160s. Will medicate as ordered by team and in EMAR.

## 2023-01-09 NOTE — CONSULT NOTE ADULT - SUBJECTIVE AND OBJECTIVE BOX
Patient is a 77y old  Female who presents with a chief complaint of sob/ rapid a.fib (09 Jan 2023 07:56)    HPI:  CHIEF COMPLAINT:Patient is a 77y old  Female who presents with a chief complaint of     HPI:  77-year-old female with past medical history of DM, HTN, HLD, asthma on daily steroids, A. fib on Eliquis and digoxin presenting with shortness of breath.  Patient unable to provide history.  Daughter at bedside reports patient has had increasing shortness of breath, wheezing, cough for the past week now.  Patient also with increasing generalized weakness, fatigue and decreased p.o. intake.  Denies fever/chills, chest pain, abdominal pain, nausea, vomiting, diarrhea, dysuria.  Family reports patient had a similar episode 1 year ago when she was admitted to [facility] with UTI.    PAST MEDICAL & SURGICAL HISTORY:  Asthma  Diabetes mellitus  Atrial fibrillation, chronic  Type 2 diabetes mellitus  Hypertension  Dementia      No significant past surgical history      MEDICATIONS  (STANDING):  vancomycin  IVPB. 1000 milliGRAM(s) IV Intermittent once  oxyCODONE 5 mg oral tablet: 1 tab(s) orally every 4 hours, As needed, Severe Pain (7 - 10) MDD:6 tab  · 	Cardizem  mg/24 hours oral capsule, extended release: 1 cap(s) orally once a day   · 	guaiFENesin 100 mg/5 mL oral liquid: 5 milliliter(s) orally every 6 hours, As needed, Cough  · 	aspirin 81 mg oral delayed release tablet: 1 tab(s) orally once a day  · 	atorvastatin 40 mg oral tablet: 1 tab(s) orally once a day (at bedtime)  · 	acetaminophen 325 mg oral tablet: 2 tab(s) orally every 6 hours, As needed, Mild Pain (1 - 3)  · 	Advair Diskus 250 mcg-50 mcg inhalation powder: 1 puff(s) inhaled 2 times a day  · 	NexIUM OTC 20 mg oral delayed release capsule: 1 cap(s) orally once a day (in the morning)  · 	metFORMIN 1000 mg oral tablet: 1 tab(s) orally once a day as needed for elevated blood sugar levels  · 	warfarin 3 mg oral tablet: 1 tab(s) orally once a day    MEDICATIONS  (PRN):      FAMILY HISTORY:  Family history of diabetes mellitus (Mother)        SOCIAL HISTORY:    [ x] Non-smoker  [ ] Smoker  [ ] Alcohol    Allergies    Avelox (Pruritus)  Levaquin (Unknown)    Intolerances    	    REVIEW OF SYSTEMS:  CONSTITUTIONAL: No fever, weight loss, or fatigue  EYES: No eye pain, visual disturbances, or discharge  ENT:  No difficulty hearing, tinnitus, vertigo; No sinus or throat pain  NECK: No pain or stiffness  RESPIRATORY: No cough, wheezing, chills or hemoptysis; + Shortness of Breath  CARDIOVASCULAR: No chest pain,+ palpitations, passing out, dizziness, or leg swelling  GASTROINTESTINAL: No abdominal or epigastric pain. No nausea, vomiting, or hematemesis; No diarrhea or constipation. No melena or hematochezia.  GENITOURINARY: No dysuria, frequency, hematuria, or incontinence  NEUROLOGICAL: No headaches, memory loss, loss of strength, numbness, or tremors  SKIN: No itching, burning, rashes, or lesions   LYMPH Nodes: No enlarged glands  ENDOCRINE: No heat or cold intolerance; No hair loss  MUSCULOSKELETAL: No joint pain or swelling; No muscle, back, or extremity pain  PSYCHIATRIC: No depression, anxiety, mood swings, or difficulty sleeping  HEME/LYMPH: No easy bruising, or bleeding gums  ALLERGY AND IMMUNOLOGIC: No hives or eczema	    x[ ] All others negative	  [ ] Unable to obtain    PHYSICAL EXAM:  T(C): 37.9 (01-08-23 @ 13:02), Max: 37.9 (01-08-23 @ 13:02)  HR: 131 (01-08-23 @ 13:02) (96 - 159)  BP: 146/86 (01-08-23 @ 13:02) (129/78 - 146/86)  RR: 30 (01-08-23 @ 13:02) (22 - 35)  SpO2: 95% (01-08-23 @ 13:02) (83% - 100%)  Wt(kg): --  I&O's Summary         HEENT:   Normal oral mucosa, PERRL, EOMI	  Lymphatic: No lymphadenopathy  Cardiovascular: Normal S1 S2, No JVD, + murmurs, No edema  Respiratory :rhonchi  Psychiatry: A & O x 3, Mood & affect appropriate  Gastrointestinal:  Soft, Non-tender, + BS	  Skin: No rashes, No ecchymoses, No cyanosis	  Extremities: Normal range of motion, No clubbing, cyanosis or edema  Vascular: Peripheral pulses palpable 2+ bilaterally    TELEMETRY: 	    ECG:  	  RADIOLOGY:  OTHER: 	  	  LABS:	 	    CARDIAC MARKERS:                              13.9   23.80 )-----------( 284      ( 08 Jan 2023 11:57 )             44.4     01-08    132<L>  |  90<L>  |  11  ----------------------------<  220<H>  4.1   |  27  |  0.64    Ca    9.2      08 Jan 2023 11:57    TPro  6.2  /  Alb  3.1<L>  /  TBili  1.0  /  DBili  x   /  AST  16  /  ALT  11  /  AlkPhos  107  01-08    proBNP:   Lipid Profile:   HgA1c:   TSH:   PT/INR - ( 08 Jan 2023 11:57 )   PT: 19.3 sec;   INR: 1.67 ratio         PTT - ( 08 Jan 2023 11:57 )  PTT:29.2 sec    PREVIOUS DIAGNOSTIC TESTING:    :    < from: Xray Chest 1 View- PORTABLE-Urgent (01.08.23 @ 12:28) >  FINDINGS:  LUNGS:  Study is limited by positioning. Minimal vague patchy opacity at the left   lung base.  PLEURA: No pleural effusion or pneumothorax.  HEART AND MEDIASTINUM: Heart size is within normal limits.  SKELETON: There is no acute osseus abnormality,.    IMPRESSION:  Minimal vague patchy opacity at the left lung base which may represent   atelectasis or pneumonia    < end of copied text >      · EKG Date/Time: 08-Jan-2023 11:37  · Rate: 121  · Interpretation: normal sinus rhythm  QS v1-4  · MI: 148  · QRS: 72   (08 Jan 2023 15:40)      PAST MEDICAL & SURGICAL HISTORY:  Asthma      Diabetes mellitus      Atrial fibrillation, chronic      Type 2 diabetes mellitus      Hypertension      Dementia      No significant past surgical history          Social history:    FAMILY HISTORY:  Family history of diabetes mellitus (Mother)           Psychiatric:No delusions or hallucinations	    Hematology/Lymphatics:	No LN swelling.No gum bleeding     Endocrine:	No recent weight gain or loss.No abnormal heat/cold intolerance    Allergic/Immunologic:	No hives or rash   Allergies    Avelox (Pruritus)  Levaquin (Unknown)    Intolerances        Antimicrobials:    cefepime   IVPB 1000 milliGRAM(s) IV Intermittent every 8 hours        Vital Signs Last 24 Hrs  T(C): 36.9 (09 Jan 2023 09:40), Max: 37.9 (08 Jan 2023 13:02)  T(F): 98.4 (09 Jan 2023 09:40), Max: 100.2 (08 Jan 2023 13:02)  HR: 147 (09 Jan 2023 10:47) (95 - 147)  BP: 122/59 (09 Jan 2023 10:47) (102/66 - 158/63)  BP(mean): 68 (09 Jan 2023 10:47) (60 - 77)  RR: 29 (09 Jan 2023 10:47) (24 - 38)  SpO2: 100% (09 Jan 2023 10:47) (95% - 100%)    Parameters below as of 09 Jan 2023 10:47  Patient On (Oxygen Delivery Method): nasal cannula  O2 Flow (L/min): 6      PHYSICAL EXAM:Pleasant patient in no acute distress.      Constitutional:Comfortable.Awake and alert  No cachexia     Eyes:PERRL EOMI.NO discharge or conjunctival injection    ENMT:No sinus tenderness.No thrush.No pharyngeal exudate or erythema.Fair dental hygiene    Neck:Supple,No LN,no JVD      Respiratory:Good air entry bilaterally,CTA    Cardiovascular:S1 S2 wnl, No murmurs,rub or gallops    Gastrointestinal:Soft BS(+) no tenderness no masses ,No rebound or guarding    Genitourinary:No CVA tendereness     Rectal:    Extremities:No cyanosis,clubbing or edema.    Vascular:peripheral pulses felt    Neurological:AAO X 3,No grossly focal deficits    Skin:No rash     Lymph Nodes:No palpable LNs    Musculoskeletal:No joint swelling or LOM    Psychiatric:Affect normal.                                13.2   30.66 )-----------( 265      ( 09 Jan 2023 06:11 )             41.5         01-09    133<L>  |  89<L>  |  15  ----------------------------<  197<H>  3.6   |  26  |  0.69    Ca    9.3      09 Jan 2023 06:11    TPro  6.2  /  Alb  3.1<L>  /  TBili  1.0  /  DBili  x   /  AST  16  /  ALT  11  /  AlkPhos  110  01-09      RECENT CULTURES:      MICROBIOLOGY:          Radiology:      Assessment:        Recommendations and Plan:    Pager [phone]  After 5 pm/weekends or if no response :[phone]

## 2023-01-09 NOTE — CONSULT NOTE ADULT - SUBJECTIVE AND OBJECTIVE BOX
23 @ 11:28    Patient is a 77y old  Female who presents with a chief complaint of sob/ rapid a.fib (2023 07:56)   Daughter is at bedside who contributed to the story       HPI:  CHIEF COMPLAINT: Patient is a 77y old  Female who presents with a chief complaint of     HPI:  77-year-old female with past medical history of DM, HTN, HLD, asthma on daily steroids, A. fib on Eliquis and digoxin presenting with shortness of breath.  Patient unable to provide history.  Daughter at bedside reports patient has had increasing shortness of breath, wheezing, cough for the past week now.  Patient also with increasing generalized weakness, fatigue and decreased p.o. intake.  Denies fever/chills, chest pain, abdominal pain, nausea, vomiting, diarrhea, dysuria.  Family reports patient had a similar episode 1 year ago when she was admitted to [facility] with UTI.   she says she hs severe copd / asthma and is on baseline prednisone of 5 mg a day :  for last one week her prednisone was increased to 20 mg a day but she got more SOB and hence was brought to er:  had rapid Afib :  she was on bipap overnight for resp distress    ?FOLLOWING PRESENT  [x ] Hx of PE/DVT, [y ] Hx COPD, [ y] Hx of Asthma, [y ] Hx of Hospitalization, [x ]  Hx of BiPAP/CPAP use, [ x] Hx of ERIC    Allergies    Avelox (Pruritus)  Levaquin (Unknown)    Intolerances        PAST MEDICAL & SURGICAL HISTORY:  Asthma      Diabetes mellitus      Atrial fibrillation, chronic      Type 2 diabetes mellitus      Hypertension      Dementia      No significant past surgical history          FAMILY HISTORY:  Family history of diabetes mellitus (Mother)        Social History: [ passive smokeing ] TOBACCO                  [ x ] ETOH                                 [  x] IVDA/DRUGS    REVIEW OF SYSTEMS      General:	x    Skin/Breast:x  	  Ophthalmologic:x  	  ENMT:	x    Respiratory and Thorax: sob,  palpitations :; mild cough with streaks of blood:   	  Cardiovascular:	x    Gastrointestinal:	x    Genitourinary:	x    Musculoskeletal:	x    Neurological:	x    Psychiatric:	x    Hematology/Lymphatics:	x    Endocrine:	x    Allergic/Immunologic:	x    MEDICATIONS  (STANDING):  albuterol/ipratropium for Nebulization 3 milliLiter(s) Nebulizer every 6 hours  aspirin enteric coated 81 milliGRAM(s) Oral daily  atorvastatin 40 milliGRAM(s) Oral at bedtime  cefepime   IVPB 1000 milliGRAM(s) IV Intermittent every 8 hours  dextrose 5%. 1000 milliLiter(s) (100 mL/Hr) IV Continuous <Continuous>  dextrose 5%. 1000 milliLiter(s) (50 mL/Hr) IV Continuous <Continuous>  dextrose 50% Injectable 25 Gram(s) IV Push once  dextrose 50% Injectable 12.5 Gram(s) IV Push once  dextrose 50% Injectable 25 Gram(s) IV Push once  diltiazem    Tablet 60 milliGRAM(s) Oral every 6 hours  enoxaparin Injectable 50 milliGRAM(s) SubCutaneous every 12 hours  glucagon  Injectable 1 milliGRAM(s) IntraMuscular once  insulin lispro (ADMELOG) corrective regimen sliding scale   SubCutaneous three times a day before meals  warfarin 3 milliGRAM(s) Oral once    MEDICATIONS  (PRN):  acetaminophen     Tablet .. 650 milliGRAM(s) Oral every 6 hours PRN Temp greater or equal to 38C (100.4F), Mild Pain (1 - 3)  dextrose Oral Gel 15 Gram(s) Oral once PRN Blood Glucose LESS THAN 70 milliGRAM(s)/deciliter  oxyCODONE    IR 5 milliGRAM(s) Oral every 8 hours PRN Severe Pain (7 - 10)       Vital Signs Last 24 Hrs  T(C): 36.9 (2023 09:40), Max: 37.9 (2023 13:02)  T(F): 98.4 (2023 09:40), Max: 100.2 (2023 13:02)  HR: 147 (2023 10:47) (95 - 147)  BP: 122/59 (2023 10:47) (102/66 - 158/63)  BP(mean): 68 (2023 10:47) (60 - 77)  RR: 29 (2023 10:47) (24 - 38)  SpO2: 100% (2023 10:47) (95% - 100%)    Parameters below as of 2023 10:47  Patient On (Oxygen Delivery Method): nasal cannula  O2 Flow (L/min): 6  Orthostatic VS          I&O's Summary      Physical Exam:   GENERAL: NAD, well-groomed, well-developed  HEENT: ROSA/   Atraumatic, Normocephalic  ENMT: No tonsillar erythema, exudates, or enlargement; Moist mucous membranes, Good dentition, No lesions  NECK: Supple, No JVD, Normal thyroid  CHEST/LUNG:poor air entry  : mild wheezing:  exp    CVS: Regular rate and rhythm; No murmurs, rubs, or gallops  GI: : Soft, Nontender, Nondistended; Bowel sounds present  NERVOUS SYSTEM:  Alert & awake and mild resp distress:   EXTREMITIES: -edema  LYMPH: No lymphadenopathy noted  SKIN: No rashes or lesions  ENDOCRINOLOGY: No Thyromegaly  PSYCH: Appropriate    Labs:  9.2<64<4>>39<<7.375>>9.2<<3><<4><<5<<399>>SARS-CoV-2: NotDetec (2023 11:56)    CARDIAC MARKERS ( 2023 17:16 )  x     / x     / 32 U/L / x     / 4.9 ng/mL                            13.2   30.66 )-----------( 265      ( 2023 06:11 )             41.5                         13.9   23.80 )-----------( 284      ( 2023 11:57 )             44.4     01-09    133<L>  |  89<L>  |  15  ----------------------------<  197<H>  3.6   |  26  |  0.69  -08    132<L>  |  90<L>  |  11  ----------------------------<  220<H>  4.1   |  27  |  0.64    Ca    9.3      2023 06:11  Ca    9.2      2023 11:57    TPro  6.2  /  Alb  3.1<L>  /  TBili  1.0  /  DBili  x   /  AST  16  /  ALT  11  /  AlkPhos  110  01-09  TPro  6.2  /  Alb  3.1<L>  /  TBili  1.0  /  DBili  x   /  AST  16  /  ALT  11  /  AlkPhos  107  01-08    CAPILLARY BLOOD GLUCOSE      POCT Blood Glucose.: 258 mg/dL (2023 09:11)  POCT Blood Glucose.: 249 mg/dL (2023 17:48)    LIVER FUNCTIONS - ( 2023 06:11 )  Alb: 3.1 g/dL / Pro: 6.2 g/dL / ALK PHOS: 110 U/L / ALT: 11 U/L / AST: 16 U/L / GGT: x           PT/INR - ( 2023 06:11 )   PT: 16.8 sec;   INR: 1.46 ratio         PTT - ( 2023 11:57 )  PTT:29.2 sec  Urinalysis Basic - ( 2023 14:26 )    Color: Light Yellow / Appearance: Slightly Turbid / S.015 / pH: x  Gluc: x / Ketone: Small  / Bili: Negative / Urobili: Negative   Blood: x / Protein: 30 mg/dL / Nitrite: Negative   Leuk Esterase: Large / RBC: 6 /hpf / WBC 53 /HPF   Sq Epi: x / Non Sq Epi: 3 /hpf / Bacteria: Negative      D DImerLactate, Blood: 1.7 mmol/L ( @ 15:39)        Studies  Chest X-RAY  CT SCAN Chest   CT Abdomen  Venous Dopplers: LE:   Others            rad< from: Xray Chest 1 View- PORTABLE-Urgent (23 @ 12:28) >    COMPARISON: Chest x-ray 6/3/2022    FINDINGS:  LUNGS:  Study is limited by positioning. Minimal vague patchy opacity at the left   lung base.  PLEURA: No pleural effusion or pneumothorax.  HEART AND MEDIASTINUM: Heart size is within normal limits.  SKELETON: There is no acute osseus abnormality,.    IMPRESSION:  Minimal vague patchy opacity at the left lung base which may represent   atelectasis or pneumonia    --- End of Report ---           FITO OGLESBY MD; Resident Radiologist  This document has been electronically signed.  JOSELINE WRIGHT MD; Attending Radiologist  This document has been electronically signed. 2023  1:07PM    < end of copied text >  < from: CT Angio Chest PE Protocol w/ IV Cont (22 @ 21:58) >  ACC: [id number] EXAM:  CT ANGIO CHEST PULM [facility]                          PROCEDURE DATE:  2022          INTERPRETATION:  CLINICAL INFORMATION: Tachycardia and increased oxygen   demands. Evaluate for pulmonary embolism    COMPARISON: CT chest 2011.    CONTRAST/COMPLICATIONS:  IV Contrast: Omnipaque 350  90 cc administered   0 cc discarded  Oral Contrast: NONE  Complications: None reported at time of study completion    PROCEDURE:  CT Angiogram of the chest was obtained with intravenous contrast. Three   dimensional maximum intensity projection (MIP) images were generated.    FINDINGS:    PULMONARY ANGIOGRAM: No pulmonary embolism in the main, lobar, segmental   or subsegmental pulmonary arteries. Main pulmonary artery is not dilated.    LYMPH NODES: No lymphadenopathy.    HEART/VASCULATURE: Heart size is within normal limits. No pericardial   effusion. Atherosclerotic calcifications of the thoracic aorta, great   vessels, and coronary arteries.    AIRWAYS/LUNGS/PLEURA: Central airways are patent. Relatively unchanged   minimal patchy bilateral groundglass opacities. Unchanged 5 mm right   lower lobe pulmonary nodule (2:56). New nodular opacity in the right   lower lobe measuring up to 8 mm (series 3:213).    UPPER ABDOMEN: Calcified granulomas in the liver.    BONES/SOFT TISSUES: Degenerative changes of the spine and lumbar   levoscoliosis.    IMPRESSION:    No pulmonary embolism.    Relatively unchanged nonspecific minimal patchy bilateral groundglass   opacities,as compared to CT chest on 2011. Unchanged 5 mm right   lower lobe lung nodule (series 2:56). New nodular opacity in the right   lower lobe measuring up to 8 mm (series 3:213) for which repeat CT in   6-12 months is recommended to reassess.    --- End of Report ---          ADRIENNE DANIEL MD; Resident Radiology  This document has been electronically signed.    < end of copied text >

## 2023-01-09 NOTE — PROGRESS NOTE ADULT - SUBJECTIVE AND OBJECTIVE BOX
CARDIOLOGY     PROGRESS  NOTE   ________________________________________________    CHIEF COMPLAINT:Patient is a 77y old  Female who presents with a chief complaint of sob/ rapid a.fib (08 Jan 2023 15:40)  still on bipap  	  REVIEW OF SYSTEMS:  CONSTITUTIONAL: No fever, weight loss, or fatigue  EYES: No eye pain, visual disturbances, or discharge  ENT:  No difficulty hearing, tinnitus, vertigo; No sinus or throat pain  NECK: No pain or stiffness  RESPIRATORY: No cough, wheezing, chills or hemoptysis; + Shortness of Breath  CARDIOVASCULAR: No chest pain, palpitations, passing out, dizziness, or leg swelling  GASTROINTESTINAL: No abdominal or epigastric pain. No nausea, vomiting, or hematemesis; No diarrhea or constipation. No melena or hematochezia.  GENITOURINARY: No dysuria, frequency, hematuria, or incontinence  NEUROLOGICAL: No headaches, memory loss, loss of strength, numbness, or tremors  SKIN: No itching, burning, rashes, or lesions   LYMPH Nodes: No enlarged glands  ENDOCRINE: No heat or cold intolerance; No hair loss  MUSCULOSKELETAL: No joint pain or swelling; No muscle, back, or extremity pain  PSYCHIATRIC: No depression, anxiety, mood swings, or difficulty sleeping  HEME/LYMPH: No easy bruising, or bleeding gums  ALLERGY AND IMMUNOLOGIC: No hives or eczema	    [ ] All others negative	  [ x Unable to obtain    PHYSICAL EXAM:  T(C): 36.8 (01-09-23 @ 07:18), Max: 37.9 (01-08-23 @ 13:02)  HR: 127 (01-09-23 @ 07:18) (95 - 159)  BP: 129/53 (01-09-23 @ 07:18) (102/66 - 158/63)  RR: 30 (01-09-23 @ 07:18) (22 - 38)  SpO2: 98% (01-09-23 @ 07:18) (83% - 100%)  Wt(kg): --  I&O's Summary      Appearance: Normal	  HEENT:   Normal oral mucosa, PERRL, EOMI	  Lymphatic: No lymphadenopathy  Cardiovascular: Normal S1 S2, No JVD, + murmurs, No edema  Respiratory: decreasae bs  Psychiatry: A & O x 3, Mood & affect appropriate  Gastrointestinal:  Soft, Non-tender, + BS	  Skin: No rashes, No ecchymoses, No cyanosis	  Neurologic: Non-focal  Extremities: Normal range of motion, No clubbing, cyanosis or edema  Vascular: Peripheral pulses palpable 2+ bilaterally    MEDICATIONS  (STANDING):  albuterol/ipratropium for Nebulization 3 milliLiter(s) Nebulizer every 6 hours  aspirin enteric coated 81 milliGRAM(s) Oral daily  atorvastatin 40 milliGRAM(s) Oral at bedtime  cefepime   IVPB 1000 milliGRAM(s) IV Intermittent every 8 hours  dextrose 5%. 1000 milliLiter(s) (100 mL/Hr) IV Continuous <Continuous>  dextrose 5%. 1000 milliLiter(s) (50 mL/Hr) IV Continuous <Continuous>  dextrose 50% Injectable 25 Gram(s) IV Push once  dextrose 50% Injectable 12.5 Gram(s) IV Push once  dextrose 50% Injectable 25 Gram(s) IV Push once  diltiazem    Tablet 60 milliGRAM(s) Oral every 8 hours  glucagon  Injectable 1 milliGRAM(s) IntraMuscular once  insulin lispro (ADMELOG) corrective regimen sliding scale   SubCutaneous three times a day before meals      TELEMETRY: 	    ECG:  	  RADIOLOGY:  OTHER: 	  	  LABS:	 	    CARDIAC MARKERS:  CARDIAC MARKERS ( 08 Jan 2023 17:16 )  x     / x     / 32 U/L / x     / 4.9 ng/mL                                13.2   30.66 )-----------( 265      ( 09 Jan 2023 06:11 )             41.5     01-09    133<L>  |  89<L>  |  15  ----------------------------<  197<H>  3.6   |  26  |  0.69    Ca    9.3      09 Jan 2023 06:11    TPro  6.2  /  Alb  3.1<L>  /  TBili  1.0  /  DBili  x   /  AST  16  /  ALT  11  /  AlkPhos  110  01-09    proBNP:   Lipid Profile:   HgA1c:   TSH:   PT/INR - ( 09 Jan 2023 06:11 )   PT: 16.8 sec;   INR: 1.46 ratio         PTT - ( 08 Jan 2023 11:57 )  PTT:29.2 sec  Respiratory Viral Panel with COVID-19 by ARMAND (01.08.23 @ 11:56)    Rapid RVP Result: [facility]    SARS-CoV-2: [facility]: This Respiratory Panel uses polymerase chain reaction (PCR) to detect for  adenovirus; coronavirus (HKU1, NL63, 229E, OC43); human metapneumovirus  (hMPV); human enterovirus/rhinovirus (Entero/RV); influenza A; influenza  A/H1; influenza A/H3; influenza A/H1-2009; influenza B; parainfluenza  viruses 1, 2, 3, 4; respiratory syncytial virus; Mycoplasma pneumoniae;  Chlamydophila pneumoniae; and SARS-CoV-2.    Troponin T, High Sensitivity (01.08.23 @ 17:16)    Troponin T, High Sensitivity Result: 44: Specimen not hemolyzed  *  *  Rapid upward or downward changes in high-sensitivity troponin levels  suggest acute myocardial injury. Renal impairment may cause sustained  troponin elevations.  Normal: <6 - 14 ng/L  Indeterminate: 15-51 ng/L  Elevated: > 51 ng/L  See http://labs/test/TROPTHS on the [facility] intranet for more  information ng/L    Assessment and plan  ---------------------------  77-year-old female with past medical history of DM, HTN, HLD, asthma on daily steroids, A. fib on Eliquis and digoxin presenting with shortness of breath.  Patient unable to provide history.  Daughter at bedside reports patient has had increasing shortness of breath, wheezing, cough for the past week now.  Patient also with increasing generalized weakness, fatigue and decreased p.o. intake.  Denies fever/chills, chest pain, abdominal pain, nausea, vomiting, diarrhea, dysuria.  Family reports patient had a similar episode 1 year ago when she was admitted to [facility] with UTI.  pt with sig medical hx, chronic a.fib with sob ?sec sepsis/ pneumoniae, UTI  start on ceftriaxone  tele  for increase hr, will increase Cardizem dose to 60 mg q6h  ID eval  pulmonary eval  continue AC  cardiac enzyme  tsh  echo  RVP negative  nutrition eval severe protein deficiency

## 2023-01-10 NOTE — PROGRESS NOTE ADULT - ASSESSMENT
77 year old with  dementia, DM, on home oxygen for chronic pulmonary disease, non ambulatory  Admitted for sob, anorexia, malaise for the last 8 days  CXR with Left Lung base ? pneumonia  Worsening leukocytosis, no fever  SOB/hypoxia on bipap  Worsening WBC--given solumedrol  Treat for bacterial pneumonia pending workup  Overall, Bacterial pneumonia, leukocytosis, hypoxia  - Cefepime 1g q 12 (decreased dose)  - Planned for CT chest today--follow up read  - O2 supplementation per team  - Monitor for alternate signs infection  - Trend WBC to normal, monitor Cr    Armin Ortega MD  Contact on TEAMS messaging from 9am - 5pm  From 5pm-9am, on weekends, or if no response call [phone]

## 2023-01-10 NOTE — PROGRESS NOTE ADULT - SUBJECTIVE AND OBJECTIVE BOX
Date of Service: 01-10-23 @ 11:50    Patient is a 77y old  Female who presents with a chief complaint of sob/ rapid a.fib (10 Tim 2023 08:56)      Any change in ROS: Pt is doing  poorly:   sheis tachypneic and on bipap :  has RVR too :      MEDICATIONS  (STANDING):  aspirin enteric coated 81 milliGRAM(s) Oral daily  atorvastatin 40 milliGRAM(s) Oral at bedtime  cefepime   IVPB 1000 milliGRAM(s) IV Intermittent every 8 hours  dextrose 5%. 1000 milliLiter(s) (100 mL/Hr) IV Continuous <Continuous>  dextrose 5%. 1000 milliLiter(s) (50 mL/Hr) IV Continuous <Continuous>  dextrose 50% Injectable 25 Gram(s) IV Push once  dextrose 50% Injectable 12.5 Gram(s) IV Push once  dextrose 50% Injectable 25 Gram(s) IV Push once  diltiazem    Tablet 90 milliGRAM(s) Oral every 6 hours  enoxaparin Injectable 50 milliGRAM(s) SubCutaneous every 12 hours  glucagon  Injectable 1 milliGRAM(s) IntraMuscular once  insulin lispro (ADMELOG) corrective regimen sliding scale   SubCutaneous three times a day before meals  ipratropium    for Nebulization 500 MICROGram(s) Nebulizer every 6 hours  levalbuterol Inhalation 0.63 milliGRAM(s) Inhalation every 8 hours  methylPREDNISolone sodium succinate Injectable 40 milliGRAM(s) IV Push every 6 hours  methylPREDNISolone sodium succinate Injectable   IV Push     MEDICATIONS  (PRN):  acetaminophen     Tablet .. 650 milliGRAM(s) Oral every 6 hours PRN Temp greater or equal to 38C (100.4F), Mild Pain (1 - 3)  dextrose Oral Gel 15 Gram(s) Oral once PRN Blood Glucose LESS THAN 70 milliGRAM(s)/deciliter  oxyCODONE    IR 5 milliGRAM(s) Oral every 8 hours PRN Severe Pain (7 - 10)    Vital Signs Last 24 Hrs  T(C): 36.9 (10 Tim 2023 08:45), Max: 36.9 (10 Tim 2023 08:45)  T(F): 98.4 (10 Tim 2023 08:45), Max: 98.4 (10 Tim 2023 08:45)  HR: 125 (10 Tim 2023 09:55) (93 - 170)  BP: 139/65 (10 Tim 2023 09:45) (101/466 - 145/74)  BP(mean): 84 (10 Tim 2023 03:00) (60 - 104)  RR: 33 (10 Tim 2023 08:45) (27 - 36)  SpO2: 99% (10 Tim 2023 09:55) (95% - 100%)    Parameters below as of 10 Tim 2023 08:45  Patient On (Oxygen Delivery Method): BiPAP/CPAP        I&O's Summary        Physical Exam:   GENERAL: NAD, well-groomed, well-developed  HEENT: ROSA/   Atraumatic, Normocephalic  ENMT: No tonsillar erythema, exudates, or enlargement; Moist mucous membranes, Good dentition, No lesions  NECK: Supple, No JVD, Normal thyroid  CHEST/LUNG:very poor air entry bilaterally  : exp wheezing  CVS: Regular rate and rhythm; No murmurs, rubs, or gallops  GI: : Soft, Nontender, Nondistended; Bowel sounds present  NERVOUS SYSTEM:  Alert & Oriented X3: tachepneic  EXTREMITIES: -edema  LYMPH: No lymphadenopathy noted  SKIN: No rashes or lesions  ENDOCRINOLOGY: No Thyromegaly  PSYCH: Appropriate    Labs:  ABG - ( 10 Tim 2023 02:08 )  pH, Arterial: 7.46  pH, Blood: x     /  pCO2: 38    /  pO2: 132   / HCO3: 27    / Base Excess: 3.1   /  SaO2: 100.0           37  CARDIAC MARKERS ( 2023 17:16 )  x     / x     / 32 U/L / x     / 4.9 ng/mL                            13.1   33.40 )-----------( 302      ( 10 Tim 2023 07:06 )             40.7                         13.2   30.66 )-----------( 265      ( 2023 06:11 )             41.5                         13.9   23.80 )-----------( 284      ( 2023 11:57 )             44.4     01-10    131<L>  |  85<L>  |  24<H>  ----------------------------<  291<H>  3.4<L>   |  25  |  0.88      133<L>  |  89<L>  |  15  ----------------------------<  197<H>  3.6   |  26  |  0.69  -    132<L>  |  90<L>  |  11  ----------------------------<  220<H>  4.1   |  27  |  0.64    Ca    9.2      10 Tim 2023 07:06  Ca    9.3      2023 06:11  Ca    9.2      2023 11:57    TPro  6.3  /  Alb  3.1<L>  /  TBili  0.9  /  DBili  x   /  AST  13  /  ALT  10  /  AlkPhos  126<H>  01-10  TPro  6.2  /  Alb  3.1<L>  /  TBili  1.0  /  DBili  x   /  AST  16  /  ALT  11  /  AlkPhos  110    TPro  6.2  /  Alb  3.1<L>  /  TBili  1.0  /  DBili  x   /  AST  16  /  ALT  11  /  AlkPhos  107  08    CAPILLARY BLOOD GLUCOSE      POCT Blood Glucose.: 293 mg/dL (10 Tim 2023 11:23)  POCT Blood Glucose.: 310 mg/dL (10 Tim 2023 07:51)  POCT Blood Glucose.: 212 mg/dL (2023 19:54)  POCT Blood Glucose.: 200 mg/dL (2023 13:11)      LIVER FUNCTIONS - ( 10 Tim 2023 07:06 )  Alb: 3.1 g/dL / Pro: 6.3 g/dL / ALK PHOS: 126 U/L / ALT: 10 U/L / AST: 13 U/L / GGT: x           PT/INR - ( 2023 06:11 )   PT: 16.8 sec;   INR: 1.46 ratio         PTT - ( 2023 11:57 )  PTT:29.2 sec  Urinalysis Basic - ( 2023 14:26 )    Color: Light Yellow / Appearance: Slightly Turbid / S.015 / pH: x  Gluc: x / Ketone: Small  / Bili: Negative / Urobili: Negative   Blood: x / Protein: 30 mg/dL / Nitrite: Negative   Leuk Esterase: Large / RBC: 6 /hpf / WBC 53 /HPF   Sq Epi: x / Non Sq Epi: 3 /hpf / Bacteria: Negative      Procalcitonin, Serum: 1.05 ng/mL (01-10 @ 07:06)  Procalcitonin, Serum: 0.39 ng/mL ( @ 06:11)  Lactate, Blood: 1.7 mmol/L ( @ 15:39)        RECENT CULTURES:   @ 14:35 .Blood Blood-Peripheral     rad< from: Xray Chest 1 View- PORTABLE-Urgent (23 @ 12:28) >    ACC: [id number] EXAM:  XR CHEST PORTABLE URGENT 1V                          PROCEDURE DATE:  2023          INTERPRETATION:  EXAMINATION: XR CHEST URGENT    CLINICAL INDICATION: Sepsis    TECHNIQUE: Single frontal, portable view of the chest wasobtained.    COMPARISON: Chest x-ray 6/3/2022    FINDINGS:  LUNGS:  Study is limited by positioning. Minimal vague patchy opacity at the left   lung base.  PLEURA: No pleural effusion or pneumothorax.  HEART AND MEDIASTINUM: Heart size is within normal limits.  SKELETON: There is no acute osseus abnormality,.    IMPRESSION:  Minimal vague patchy opacity at the left lung base which may represent   atelectasis or pneumonia    --- End of Report ---           FITO OGLESBY MD; Resident Radiologist  This document has been electronically signed.  JOSELINE WRIGHT MD; Attending Radiologist  This document has been electronically signed. 2023  1:07PM    < end of copied text >             No growth to date.     @ 14:26 Clean Catch Clean Catch (Midstream)                >=3 organisms. Probable collection contamination.          RESPIRATORY CULTURES:          Studies  Chest X-RAY  CT SCAN Chest   Venous Dopplers: LE:   CT Abdomen  Others

## 2023-01-10 NOTE — PROGRESS NOTE ADULT - SUBJECTIVE AND OBJECTIVE BOX
CC: F/U PNA    Saw/spoke to patient. On bipap. Awake, communicative. Daughter at bedside.    Allergies  Avelox (Pruritus)  Levaquin (Unknown)    ANTIMICROBIALS:  cefepime   IVPB 1000 every 8 hours    PE:    Vital Signs Last 24 Hrs  T(C): 36.8 (10 Tim 2023 13:30), Max: 36.9 (10 Tim 2023 08:45)  T(F): 98.2 (10 Tim 2023 13:30), Max: 98.4 (10 Tim 2023 08:45)  HR: 130 (10 Tim 2023 14:42) (93 - 170)  BP: 129/72 (10 Tim 2023 13:30) (101/466 - 145/74)  BP(mean): 84 (10 Tim 2023 03:00) (74 - 104)  RR: 30 (10 Tim 2023 13:30) (27 - 35)  SpO2: 99% (10 Tim 2023 14:42) (95% - 100%)    Gen: AOx3, NAD, non-toxic  CV: Nontachycardic  Resp: Breathing comfortably, bipap  Abd: Soft, nontender  IV/Skin: No thrombophlebitis    LABS:                        13.1   33.40 )-----------( 302      ( 10 Tim 2023 07:06 )             40.7     01-10    131<L>  |  85<L>  |  24<H>  ----------------------------<  291<H>  3.4<L>   |  25  |  0.88    Ca    9.2      10 Tim 2023 07:06    TPro  6.3  /  Alb  3.1<L>  /  TBili  0.9  /  DBili  x   /  AST  13  /  ALT  10  /  AlkPhos  126<H>  01-10    MICROBIOLOGY:    .Blood Blood-Peripheral  01-08-23   No growth to date.  --  --    Clean Catch Clean Catch (Midstream)  01-08-23   >=3 organisms. Probable collection contamination.  --  --    Rapid RVP Result: NotDetec (01-08 @ 11:56)    (otherwise reviewed)    RADIOLOGY:    1/8 XR:    FINDINGS:  LUNGS:  Study is limited by positioning. Minimal vague patchy opacity at the left   lung base.  PLEURA: No pleural effusion or pneumothorax.  HEART AND MEDIASTINUM: Heart size is within normal limits.  SKELETON: There is no acute osseus abnormality,.    IMPRESSION:  Minimal vague patchy opacity at the left lung base which may represent   atelectasis or pneumonia

## 2023-01-10 NOTE — CHART NOTE - NSCHARTNOTEFT_GEN_A_CORE
MICU Accept Note    CHIEF COMPLAINT:     HPI:  CHIEF COMPLAINT:Patient is a 77y old  Female who presents with a chief complaint of     HPI:  77-year-old female with past medical history of DM, HTN, HLD, asthma on daily steroids, A. fib on Eliquis and digoxin presenting with shortness of breath.  Patient unable to provide history.  Daughter at bedside reports patient has had increasing shortness of breath, wheezing, cough for the past week now.  Patient also with increasing generalized weakness, fatigue and decreased p.o. intake.  Denies fever/chills, chest pain, abdominal pain, nausea, vomiting, diarrhea, dysuria.  Family reports patient had a similar episode 1 year ago when she was admitted to [facility] with UTI.    PAST MEDICAL & SURGICAL HISTORY:  Asthma  Diabetes mellitus  Atrial fibrillation, chronic  Type 2 diabetes mellitus  Hypertension  Dementia      No significant past surgical history      MEDICATIONS  (STANDING):  vancomycin  IVPB. 1000 milliGRAM(s) IV Intermittent once  oxyCODONE 5 mg oral tablet: 1 tab(s) orally every 4 hours, As needed, Severe Pain (7 - 10) MDD:6 tab  · 	Cardizem  mg/24 hours oral capsule, extended release: 1 cap(s) orally once a day   · 	guaiFENesin 100 mg/5 mL oral liquid: 5 milliliter(s) orally every 6 hours, As needed, Cough  · 	aspirin 81 mg oral delayed release tablet: 1 tab(s) orally once a day  · 	atorvastatin 40 mg oral tablet: 1 tab(s) orally once a day (at bedtime)  · 	acetaminophen 325 mg oral tablet: 2 tab(s) orally every 6 hours, As needed, Mild Pain (1 - 3)  · 	Advair Diskus 250 mcg-50 mcg inhalation powder: 1 puff(s) inhaled 2 times a day  · 	NexIUM OTC 20 mg oral delayed release capsule: 1 cap(s) orally once a day (in the morning)  · 	metFORMIN 1000 mg oral tablet: 1 tab(s) orally once a day as needed for elevated blood sugar levels  · 	warfarin 3 mg oral tablet: 1 tab(s) orally once a day    MEDICATIONS  (PRN):      FAMILY HISTORY:  Family history of diabetes mellitus (Mother)        SOCIAL HISTORY:    [ x] Non-smoker  [ ] Smoker  [ ] Alcohol    Allergies    Avelox (Pruritus)  Levaquin (Unknown)    Intolerances    	    REVIEW OF SYSTEMS:  CONSTITUTIONAL: No fever, weight loss, or fatigue  EYES: No eye pain, visual disturbances, or discharge  ENT:  No difficulty hearing, tinnitus, vertigo; No sinus or throat pain  NECK: No pain or stiffness  RESPIRATORY: No cough, wheezing, chills or hemoptysis; + Shortness of Breath  CARDIOVASCULAR: No chest pain,+ palpitations, passing out, dizziness, or leg swelling  GASTROINTESTINAL: No abdominal or epigastric pain. No nausea, vomiting, or hematemesis; No diarrhea or constipation. No melena or hematochezia.  GENITOURINARY: No dysuria, frequency, hematuria, or incontinence  NEUROLOGICAL: No headaches, memory loss, loss of strength, numbness, or tremors  SKIN: No itching, burning, rashes, or lesions   LYMPH Nodes: No enlarged glands  ENDOCRINE: No heat or cold intolerance; No hair loss  MUSCULOSKELETAL: No joint pain or swelling; No muscle, back, or extremity pain  PSYCHIATRIC: No depression, anxiety, mood swings, or difficulty sleeping  HEME/LYMPH: No easy bruising, or bleeding gums  ALLERGY AND IMMUNOLOGIC: No hives or eczema	    x[ ] All others negative	  [ ] Unable to obtain    PHYSICAL EXAM:  T(C): 37.9 (01-08-23 @ 13:02), Max: 37.9 (01-08-23 @ 13:02)  HR: 131 (01-08-23 @ 13:02) (96 - 159)  BP: 146/86 (01-08-23 @ 13:02) (129/78 - 146/86)  RR: 30 (01-08-23 @ 13:02) (22 - 35)  SpO2: 95% (01-08-23 @ 13:02) (83% - 100%)  Wt(kg): --  I&O's Summary      Appearance: Normal	  HEENT:   Normal oral mucosa, PERRL, EOMI	  Lymphatic: No lymphadenopathy  Cardiovascular: Normal S1 S2, No JVD, + murmurs, No edema  Respiratory :rhonchi  Psychiatry: A & O x 3, Mood & affect appropriate  Gastrointestinal:  Soft, Non-tender, + BS	  Skin: No rashes, No ecchymoses, No cyanosis	  Extremities: Normal range of motion, No clubbing, cyanosis or edema  Vascular: Peripheral pulses palpable 2+ bilaterally    TELEMETRY: 	    ECG:  	  RADIOLOGY:  OTHER: 	  	  LABS:	 	    CARDIAC MARKERS:                              13.9   23.80 )-----------( 284      ( 08 Jan 2023 11:57 )             44.4     01-08    132<L>  |  90<L>  |  11  ----------------------------<  220<H>  4.1   |  27  |  0.64    Ca    9.2      08 Jan 2023 11:57    TPro  6.2  /  Alb  3.1<L>  /  TBili  1.0  /  DBili  x   /  AST  16  /  ALT  11  /  AlkPhos  107  01-08    proBNP:   Lipid Profile:   HgA1c:   TSH:   PT/INR - ( 08 Jan 2023 11:57 )   PT: 19.3 sec;   INR: 1.67 ratio         PTT - ( 08 Jan 2023 11:57 )  PTT:29.2 sec    PREVIOUS DIAGNOSTIC TESTING:    :    < from: Xray Chest 1 View- PORTABLE-Urgent (01.08.23 @ 12:28) >  FINDINGS:  LUNGS:  Study is limited by positioning. Minimal vague patchy opacity at the left   lung base.  PLEURA: No pleural effusion or pneumothorax.  HEART AND MEDIASTINUM: Heart size is within normal limits.  SKELETON: There is no acute osseus abnormality,.    IMPRESSION:  Minimal vague patchy opacity at the left lung base which may represent   atelectasis or pneumonia    < end of copied text >      · EKG Date/Time: 08-Jan-2023 11:37  · Rate: 121  · Interpretation: normal sinus rhythm  QS v1-4  · MN: 148  · QRS: 72   (08 Jan 2023 15:40)      INTERVAL HISTORY:    PAST MEDICAL & SURGICAL HISTORY:  Asthma      Diabetes mellitus      Atrial fibrillation, chronic      Type 2 diabetes mellitus      Hypertension      Dementia      No significant past surgical history          FAMILY HISTORY:  Family history of diabetes mellitus (Mother)        Social History:      HOME MEDICATIONS:  Home Medications:  acetaminophen 325 mg oral tablet: 2 tab(s) orally every 6 hours, As needed, Mild Pain (1 - 3) (11 Feb 2019 09:42)  Advair Diskus 250 mcg-50 mcg inhalation powder: 1 puff(s) inhaled 2 times a day (01 Feb 2019 11:23)  aspirin 81 mg oral delayed release tablet: 1 tab(s) orally once a day (11 Feb 2019 09:42)  guaiFENesin 100 mg/5 mL oral liquid: 5 milliliter(s) orally every 6 hours, As needed, Cough (11 Feb 2019 09:42)  metFORMIN 1000 mg oral tablet: 1 tab(s) orally once a day as needed for elevated blood sugar levels (01 Feb 2019 11:23)  NexIUM OTC 20 mg oral delayed release capsule: 1 cap(s) orally once a day (in the morning) (01 Feb 2019 11:23)  warfarin 3 mg oral tablet: 1 tab(s) orally once a day  Note: held per PCP due to elevated INR (01 Feb 2019 11:23)      Allergies    Avelox (Pruritus)  Levaquin (Unknown)    Intolerances        REVIEW OF SYSTEMS:  Constitutional: No fevers, chills, weight loss, weight gain  HEENT: No vision problems, eye pain, nasal congestion, rhinorrhea, sore throat, dysphagia  CV: No chest pain, orthopnea, palpitations  Resp: No cough, dyspnea, wheezing, hemoptysis  GI: No nausea, vomiting, diarrhea, constipation, abdominal pain  : [ ] dysuria [ ] nocturia [ ] hematuria [ ] increased urinary frequency  Musculoskeletal: [ ] back pain [ ] myalgias [ ] arthralgias [ ] fracture  Skin: [ ] rash [ ] itch  Neurological: [ ] headache [ ] dizziness [ ] syncope [ ] weakness [ ] numbness  Psychiatric: [ ] anxiety [ ] depression  Endocrine: [ ] diabetes [ ] thyroid problem  Hematologic/Lymphatic: [ ] anemia [ ] bleeding problem  Allergic/Immunologic: [ ] itchy eyes [ ] nasal discharge [ ] hives [ ] angioedema  [ ] All other systems negative  [ ] Unable to assess ROS because ________    OBJECTIVE:  ICU Vital Signs Last 24 Hrs  T(C): 36.8 (10 Tim 2023 13:30), Max: 36.9 (10 Tim 2023 08:45)  T(F): 98.2 (10 Tim 2023 13:30), Max: 98.4 (10 Tim 2023 08:45)  HR: 130 (10 Tim 2023 14:42) (93 - 170)  BP: 129/72 (10 Tim 2023 13:30) (101/466 - 145/74)  BP(mean): 84 (10 Tim 2023 03:00) (74 - 104)  ABP: --  ABP(mean): --  RR: 30 (10 Tim 2023 13:30) (27 - 35)  SpO2: 99% (10 Tim 2023 14:42) (95% - 100%)    O2 Parameters below as of 10 Tim 2023 13:30  Patient On (Oxygen Delivery Method): BiPAP/CPAP              CAPILLARY BLOOD GLUCOSE      POCT Blood Glucose.: 293 mg/dL (10 Tim 2023 11:23)      PHYSICAL EXAM:  GENERAL: NAD, well-developed  HEAD:  Atraumatic, Normocephalic  EYES: EOMI, PERRLA, conjunctiva and sclera clear  NECK: Supple, No JVD  CHEST/LUNG: Clear to auscultation bilaterally; No wheeze  HEART: Regular rate and rhythm; No murmurs, rubs, or gallops  ABDOMEN: Soft, Nontender, Nondistended; Bowel sounds present  EXTREMITIES:  2+ Peripheral Pulses, No clubbing, cyanosis, or edema  PSYCH: AAOx3, appropriate affect  NEUROLOGY: non-focal, hoffman  SKIN: No rashes or lesions    LINES:     HOSPITAL MEDICATIONS:  MEDICATIONS  (STANDING):  aspirin enteric coated 81 milliGRAM(s) Oral daily  atorvastatin 40 milliGRAM(s) Oral at bedtime  cefepime   IVPB 1000 milliGRAM(s) IV Intermittent every 12 hours  dextrose 5%. 1000 milliLiter(s) (100 mL/Hr) IV Continuous <Continuous>  dextrose 5%. 1000 milliLiter(s) (50 mL/Hr) IV Continuous <Continuous>  dextrose 50% Injectable 25 Gram(s) IV Push once  dextrose 50% Injectable 12.5 Gram(s) IV Push once  dextrose 50% Injectable 25 Gram(s) IV Push once  diltiazem    Tablet 90 milliGRAM(s) Oral every 6 hours  enoxaparin Injectable 50 milliGRAM(s) SubCutaneous every 12 hours  glucagon  Injectable 1 milliGRAM(s) IntraMuscular once  insulin lispro (ADMELOG) corrective regimen sliding scale   SubCutaneous three times a day before meals  ipratropium    for Nebulization 500 MICROGram(s) Nebulizer every 6 hours  levalbuterol Inhalation 0.63 milliGRAM(s) Inhalation every 8 hours  methylPREDNISolone sodium succinate Injectable 40 milliGRAM(s) IV Push every 6 hours  methylPREDNISolone sodium succinate Injectable   IV Push     MEDICATIONS  (PRN):  acetaminophen     Tablet .. 650 milliGRAM(s) Oral every 6 hours PRN Temp greater or equal to 38C (100.4F), Mild Pain (1 - 3)  dextrose Oral Gel 15 Gram(s) Oral once PRN Blood Glucose LESS THAN 70 milliGRAM(s)/deciliter  oxyCODONE    IR 5 milliGRAM(s) Oral every 8 hours PRN Severe Pain (7 - 10)      LABS:                        13.1   33.40 )-----------( 302      ( 10 Tim 2023 07:06 )             40.7     01-10    131<L>  |  85<L>  |  24<H>  ----------------------------<  291<H>  3.4<L>   |  25  |  0.88    Ca    9.2      10 Tim 2023 07:06    TPro  6.3  /  Alb  3.1<L>  /  TBili  0.9  /  DBili  x   /  AST  13  /  ALT  10  /  AlkPhos  126<H>  01-10    PT/INR - ( 09 Jan 2023 06:11 )   PT: 16.8 sec;   INR: 1.46 ratio           ABG - ( 10 Tim 2023 02:08 )  pH, Arterial: 7.46  pH, Blood: x     /  pCO2: 38    /  pO2: 132   / HCO3: 27    / Base Excess: 3.1   /  SaO2: 100.0             HCO3, Venous: 37 mmol/L (01-08-23 @ 11:48)  pCO2, Venous: 64 mmHg (01-08-23 @ 11:48)  pH, Venous: 7.37 (01-08-23 @ 11:48)  pO2, Venous: 39 mmHg (01-08-23 @ 11:48)        CARDIAC MARKERS ( 08 Jan 2023 17:16 )  x     / x     / 32 U/L / x     / 4.9 ng/mL      CAPILLARY BLOOD GLUCOSE      POCT Blood Glucose.: 293 mg/dL (10 Tim 2023 11:23)  POCT Blood Glucose.: 310 mg/dL (10 Tim 2023 07:51)  POCT Blood Glucose.: 212 mg/dL (09 Jan 2023 19:54)      RADIOLOGY & ADDITIONAL TESTS:    ASSESSMENT/PLAN:      # NEURO    # CARDIAC    # PULM    # RENAL    # GI     # HEME/ONC    # ID    # ENDO    DVT MICU Accept Note    CHIEF COMPLAINT:     HPI:  CHIEF COMPLAINT:Patient is a 77y old  Female who presents with a chief complaint of     HPI:df  77-year-old female with past medical history of DM, HTN, HLD, asthma on daily steroids, A. fib on Eliquis and digoxin presenting with shortness of breath.  Patient unable to provide history.  Daughter at bedside reports patient has had increasing shortness of breath, wheezing, cough for the past week now.  Patient also with increasing generalized weakness, fatigue and decreased p.o. intake.  Denies fever/chills, chest pain, abdominal pain, nausea, vomiting, diarrhea, dysuria.  Family reports patient had a similar episode 1 year ago when she was admitted to [facility] with UTI.    PAST MEDICAL & SURGICAL HISTORY:  Asthma  Diabetes mellitus  Atrial fibrillation, chronic  Type 2 diabetes mellitus  Hypertension  Dementia      No significant past surgical history      MEDICATIONS  (STANDING):  vancomycin  IVPB. 1000 milliGRAM(s) IV Intermittent once  oxyCODONE 5 mg oral tablet: 1 tab(s) orally every 4 hours, As needed, Severe Pain (7 - 10) MDD:6 tab  · 	Cardizem  mg/24 hours oral capsule, extended release: 1 cap(s) orally once a day   · 	guaiFENesin 100 mg/5 mL oral liquid: 5 milliliter(s) orally every 6 hours, As needed, Cough  · 	aspirin 81 mg oral delayed release tablet: 1 tab(s) orally once a day  · 	atorvastatin 40 mg oral tablet: 1 tab(s) orally once a day (at bedtime)  · 	acetaminophen 325 mg oral tablet: 2 tab(s) orally every 6 hours, As needed, Mild Pain (1 - 3)  · 	Advair Diskus 250 mcg-50 mcg inhalation powder: 1 puff(s) inhaled 2 times a day  · 	NexIUM OTC 20 mg oral delayed release capsule: 1 cap(s) orally once a day (in the morning)  · 	metFORMIN 1000 mg oral tablet: 1 tab(s) orally once a day as needed for elevated blood sugar levels  · 	warfarin 3 mg oral tablet: 1 tab(s) orally once a day    MEDICATIONS  (PRN):      FAMILY HISTORY:  Family history of diabetes mellitus (Mother)        SOCIAL HISTORY:    [ x] Non-smoker  [ ] Smoker  [ ] Alcohol    Allergies    Avelox (Pruritus)  Levaquin (Unknown)    Intolerances    	    REVIEW OF SYSTEMS:  CONSTITUTIONAL: No fever, weight loss, or fatigue  EYES: No eye pain, visual disturbances, or discharge  ENT:  No difficulty hearing, tinnitus, vertigo; No sinus or throat pain  NECK: No pain or stiffness  RESPIRATORY: No cough, wheezing, chills or hemoptysis; + Shortness of Breath  CARDIOVASCULAR: No chest pain,+ palpitations, passing out, dizziness, or leg swelling  GASTROINTESTINAL: No abdominal or epigastric pain. No nausea, vomiting, or hematemesis; No diarrhea or constipation. No melena or hematochezia.  GENITOURINARY: No dysuria, frequency, hematuria, or incontinence  NEUROLOGICAL: No headaches, memory loss, loss of strength, numbness, or tremors  SKIN: No itching, burning, rashes, or lesions   LYMPH Nodes: No enlarged glands  ENDOCRINE: No heat or cold intolerance; No hair loss  MUSCULOSKELETAL: No joint pain or swelling; No muscle, back, or extremity pain  PSYCHIATRIC: No depression, anxiety, mood swings, or difficulty sleeping  HEME/LYMPH: No easy bruising, or bleeding gums  ALLERGY AND IMMUNOLOGIC: No hives or eczema	    x[ ] All others negative	  [ ] Unable to obtain    PHYSICAL EXAM:  T(C): 37.9 (01-08-23 @ 13:02), Max: 37.9 (01-08-23 @ 13:02)  HR: 131 (01-08-23 @ 13:02) (96 - 159)  BP: 146/86 (01-08-23 @ 13:02) (129/78 - 146/86)  RR: 30 (01-08-23 @ 13:02) (22 - 35)  SpO2: 95% (01-08-23 @ 13:02) (83% - 100%)  Wt(kg): --  I&O's Summary      Appearance: Normal	  HEENT:   Normal oral mucosa, PERRL, EOMI	  Lymphatic: No lymphadenopathy  Cardiovascular: Normal S1 S2, No JVD, + murmurs, No edema  Respiratory :rhonchi  Psychiatry: A & O x 3, Mood & affect appropriate  Gastrointestinal:  Soft, Non-tender, + BS	  Skin: No rashes, No ecchymoses, No cyanosis	  Extremities: Normal range of motion, No clubbing, cyanosis or edema  Vascular: Peripheral pulses palpable 2+ bilaterally    TELEMETRY: 	    ECG:  	  RADIOLOGY:  OTHER: 	  	  LABS:	 	    CARDIAC MARKERS:                              13.9   23.80 )-----------( 284      ( 08 Jan 2023 11:57 )             44.4     01-08    132<L>  |  90<L>  |  11  ----------------------------<  220<H>  4.1   |  27  |  0.64    Ca    9.2      08 Jan 2023 11:57    TPro  6.2  /  Alb  3.1<L>  /  TBili  1.0  /  DBili  x   /  AST  16  /  ALT  11  /  AlkPhos  107  01-08    proBNP:   Lipid Profile:   HgA1c:   TSH:   PT/INR - ( 08 Jan 2023 11:57 )   PT: 19.3 sec;   INR: 1.67 ratio         PTT - ( 08 Jan 2023 11:57 )  PTT:29.2 sec    PREVIOUS DIAGNOSTIC TESTING:    :    < from: Xray Chest 1 View- PORTABLE-Urgent (01.08.23 @ 12:28) >  FINDINGS:  LUNGS:  Study is limited by positioning. Minimal vague patchy opacity at the left   lung base.  PLEURA: No pleural effusion or pneumothorax.  HEART AND MEDIASTINUM: Heart size is within normal limits.  SKELETON: There is no acute osseus abnormality,.    IMPRESSION:  Minimal vague patchy opacity at the left lung base which may represent   atelectasis or pneumonia    < end of copied text >      · EKG Date/Time: 08-Jan-2023 11:37  · Rate: 121  · Interpretation: normal sinus rhythm  QS v1-4  · [place]: 148  · QRS: 72   (08 Jan 2023 15:40)      INTERVAL HISTORY:    PAST MEDICAL & SURGICAL HISTORY:  Asthma      Diabetes mellitus      Atrial fibrillation, chronic      Type 2 diabetes mellitus      Hypertension      Dementia      No significant past surgical history          FAMILY HISTORY:  Family history of diabetes mellitus (Mother)        Social History:      HOME MEDICATIONS:  Home Medications:  acetaminophen 325 mg oral tablet: 2 tab(s) orally every 6 hours, As needed, Mild Pain (1 - 3) (11 Feb 2019 09:42)  Advair Diskus 250 mcg-50 mcg inhalation powder: 1 puff(s) inhaled 2 times a day (01 Feb 2019 11:23)  aspirin 81 mg oral delayed release tablet: 1 tab(s) orally once a day (11 Feb 2019 09:42)  guaiFENesin 100 mg/5 mL oral liquid: 5 milliliter(s) orally every 6 hours, As needed, Cough (11 Feb 2019 09:42)  metFORMIN 1000 mg oral tablet: 1 tab(s) orally once a day as needed for elevated blood sugar levels (01 Feb 2019 11:23)  NexIUM OTC 20 mg oral delayed release capsule: 1 cap(s) orally once a day (in the morning) (01 Feb 2019 11:23)  warfarin 3 mg oral tablet: 1 tab(s) orally once a day  Note: held per PCP due to elevated INR (01 Feb 2019 11:23)      Allergies    Avelox (Pruritus)  Levaquin (Unknown)    Intolerances        REVIEW OF SYSTEMS:  Constitutional: No fevers, chills, weight loss, weight gain  HEENT: No vision problems, eye pain, nasal congestion, rhinorrhea, sore throat, dysphagia  CV: No chest pain, orthopnea, palpitations  Resp: No cough, dyspnea, wheezing, hemoptysis  GI: No nausea, vomiting, diarrhea, constipation, abdominal pain  : [ ] dysuria [ ] nocturia [ ] hematuria [ ] increased urinary frequency  Musculoskeletal: [ ] back pain [ ] myalgias [ ] arthralgias [ ] fracture  Skin: [ ] rash [ ] itch  Neurological: [ ] headache [ ] dizziness [ ] syncope [ ] weakness [ ] numbness  Psychiatric: [ ] anxiety [ ] depression  Endocrine: [ ] diabetes [ ] thyroid problem  Hematologic/Lymphatic: [ ] anemia [ ] bleeding problem  Allergic/Immunologic: [ ] itchy eyes [ ] nasal discharge [ ] hives [ ] angioedema  [ ] All other systems negative  [ ] Unable to assess ROS because ________    OBJECTIVE:  ICU Vital Signs Last 24 Hrs  T(C): 36.8 (10 Tim 2023 13:30), Max: 36.9 (10 Tim 2023 08:45)  T(F): 98.2 (10 Tim 2023 13:30), Max: 98.4 (10 Tim 2023 08:45)  HR: 130 (10 Tim 2023 14:42) (93 - 170)  BP: 129/72 (10 Tim 2023 13:30) (101/466 - 145/74)  BP(mean): 84 (10 Tim 2023 03:00) (74 - 104)  ABP: --  ABP(mean): --  RR: 30 (10 Tim 2023 13:30) (27 - 35)  SpO2: 99% (10 Tim 2023 14:42) (95% - 100%)    O2 Parameters below as of 10 Tim 2023 13:30  Patient On (Oxygen Delivery Method): BiPAP/CPAP              CAPILLARY BLOOD GLUCOSE      POCT Blood Glucose.: 293 mg/dL (10 Tim 2023 11:23)      PHYSICAL EXAM:  GENERAL: NAD, well-developed  HEAD:  Atraumatic, Normocephalic  EYES: EOMI, PERRLA, conjunctiva and sclera clear  NECK: Supple, No JVD  CHEST/LUNG: Clear to auscultation bilaterally; No wheeze  HEART: Regular rate and rhythm; No murmurs, rubs, or gallops  ABDOMEN: Soft, Nontender, Nondistended; Bowel sounds present  EXTREMITIES:  2+ Peripheral Pulses, No clubbing, cyanosis, or edema  PSYCH: AAOx3, appropriate affect  NEUROLOGY: non-focal, hoffman  SKIN: No rashes or lesions    LINES:     HOSPITAL MEDICATIONS:  MEDICATIONS  (STANDING):  aspirin enteric coated 81 milliGRAM(s) Oral daily  atorvastatin 40 milliGRAM(s) Oral at bedtime  cefepime   IVPB 1000 milliGRAM(s) IV Intermittent every 12 hours  dextrose 5%. 1000 milliLiter(s) (100 mL/Hr) IV Continuous <Continuous>  dextrose 5%. 1000 milliLiter(s) (50 mL/Hr) IV Continuous <Continuous>  dextrose 50% Injectable 25 Gram(s) IV Push once  dextrose 50% Injectable 12.5 Gram(s) IV Push once  dextrose 50% Injectable 25 Gram(s) IV Push once  diltiazem    Tablet 90 milliGRAM(s) Oral every 6 hours  enoxaparin Injectable 50 milliGRAM(s) SubCutaneous every 12 hours  glucagon  Injectable 1 milliGRAM(s) IntraMuscular once  insulin lispro (ADMELOG) corrective regimen sliding scale   SubCutaneous three times a day before meals  ipratropium    for Nebulization 500 MICROGram(s) Nebulizer every 6 hours  levalbuterol Inhalation 0.63 milliGRAM(s) Inhalation every 8 hours  methylPREDNISolone sodium succinate Injectable 40 milliGRAM(s) IV Push every 6 hours  methylPREDNISolone sodium succinate Injectable   IV Push     MEDICATIONS  (PRN):  acetaminophen     Tablet .. 650 milliGRAM(s) Oral every 6 hours PRN Temp greater or equal to 38C (100.4F), Mild Pain (1 - 3)  dextrose Oral Gel 15 Gram(s) Oral once PRN Blood Glucose LESS THAN 70 milliGRAM(s)/deciliter  oxyCODONE    IR 5 milliGRAM(s) Oral every 8 hours PRN Severe Pain (7 - 10)      LABS:                        13.1   33.40 )-----------( 302      ( 10 Tim 2023 07:06 )             40.7     01-10    131<L>  |  85<L>  |  24<H>  ----------------------------<  291<H>  3.4<L>   |  25  |  0.88    Ca    9.2      10 Tim 2023 07:06    TPro  6.3  /  Alb  3.1<L>  /  TBili  0.9  /  DBili  x   /  AST  13  /  ALT  10  /  AlkPhos  126<H>  01-10    PT/INR - ( 09 Jan 2023 06:11 )   PT: 16.8 sec;   INR: 1.46 ratio           ABG - ( 10 Tim 2023 02:08 )  pH, Arterial: 7.46  pH, Blood: x     /  pCO2: 38    /  pO2: 132   / HCO3: 27    / Base Excess: 3.1   /  SaO2: 100.0             HCO3, Venous: 37 mmol/L (01-08-23 @ 11:48)  pCO2, Venous: 64 mmHg (01-08-23 @ 11:48)  pH, Venous: 7.37 (01-08-23 @ 11:48)  pO2, Venous: 39 mmHg (01-08-23 @ 11:48)        CARDIAC MARKERS ( 08 Jan 2023 17:16 )  x     / x     / 32 U/L / x     / 4.9 ng/mL      CAPILLARY BLOOD GLUCOSE      POCT Blood Glucose.: 293 mg/dL (10 Tim 2023 11:23)  POCT Blood Glucose.: 310 mg/dL (10 Tim 2023 07:51)  POCT Blood Glucose.: 212 mg/dL (09 Jan 2023 19:54)      RADIOLOGY & ADDITIONAL TESTS:    ASSESSMENT/PLAN:      # NEURO    # CARDIAC    # PULM    # RENAL    # GI     # HEME/ONC    # ID    # ENDO    DVT MICU Accept Note    CHIEF COMPLAINT:     HPI:  CHIEF COMPLAINT:Patient is a 77y old  Female who presents with a chief complaint of     HPI:df  77-year-old female with past medical history of DM, HTN, HLD, asthma on daily steroids, A. fib on Eliquis and digoxin presenting with shortness of breath.  Patient unable to provide history.  Daughter at bedside reports patient has had increasing shortness of breath, wheezing, cough for the past week now.  Patient also with increasing generalized weakness, fatigue and decreased p.o. intake.  Denies fever/chills, chest pain, abdominal pain, nausea, vomiting, diarrhea, dysuria.  Family reports patient had a similar episode 1 year ago when she was admitted to [facility] with UTI.    In the hospital, patient found to be in AFib w/ RVR, seen by Dr. Marcus,     PAST MEDICAL & SURGICAL HISTORY:  Asthma  Diabetes mellitus  Atrial fibrillation, chronic  Type 2 diabetes mellitus  Hypertension  Dementia      No significant past surgical history      MEDICATIONS  (STANDING):  vancomycin  IVPB. 1000 milliGRAM(s) IV Intermittent once  oxyCODONE 5 mg oral tablet: 1 tab(s) orally every 4 hours, As needed, Severe Pain (7 - 10) MDD:6 tab  · 	Cardizem  mg/24 hours oral capsule, extended release: 1 cap(s) orally once a day   · 	guaiFENesin 100 mg/5 mL oral liquid: 5 milliliter(s) orally every 6 hours, As needed, Cough  · 	aspirin 81 mg oral delayed release tablet: 1 tab(s) orally once a day  · 	atorvastatin 40 mg oral tablet: 1 tab(s) orally once a day (at bedtime)  · 	acetaminophen 325 mg oral tablet: 2 tab(s) orally every 6 hours, As needed, Mild Pain (1 - 3)  · 	Advair Diskus 250 mcg-50 mcg inhalation powder: 1 puff(s) inhaled 2 times a day  · 	NexIUM OTC 20 mg oral delayed release capsule: 1 cap(s) orally once a day (in the morning)  · 	metFORMIN 1000 mg oral tablet: 1 tab(s) orally once a day as needed for elevated blood sugar levels  · 	warfarin 3 mg oral tablet: 1 tab(s) orally once a day    MEDICATIONS  (PRN):      FAMILY HISTORY:  Family history of diabetes mellitus (Mother)        SOCIAL HISTORY:    [ x] Non-smoker  [ ] Smoker  [ ] Alcohol    Allergies    Avelox (Pruritus)  Levaquin (Unknown)    Intolerances    	    REVIEW OF SYSTEMS:  CONSTITUTIONAL: No fever, weight loss, or fatigue  EYES: No eye pain, visual disturbances, or discharge  ENT:  No difficulty hearing, tinnitus, vertigo; No sinus or throat pain  NECK: No pain or stiffness  RESPIRATORY: No cough, wheezing, chills or hemoptysis; + Shortness of Breath  CARDIOVASCULAR: No chest pain,+ palpitations, passing out, dizziness, or leg swelling  GASTROINTESTINAL: No abdominal or epigastric pain. No nausea, vomiting, or hematemesis; No diarrhea or constipation. No melena or hematochezia.  GENITOURINARY: No dysuria, frequency, hematuria, or incontinence  NEUROLOGICAL: No headaches, memory loss, loss of strength, numbness, or tremors  SKIN: No itching, burning, rashes, or lesions   LYMPH Nodes: No enlarged glands  ENDOCRINE: No heat or cold intolerance; No hair loss  MUSCULOSKELETAL: No joint pain or swelling; No muscle, back, or extremity pain  PSYCHIATRIC: No depression, anxiety, mood swings, or difficulty sleeping  HEME/LYMPH: No easy bruising, or bleeding gums  ALLERGY AND IMMUNOLOGIC: No hives or eczema	    x[ ] All others negative	  [ ] Unable to obtain    PHYSICAL EXAM:  T(C): 37.9 (01-08-23 @ 13:02), Max: 37.9 (01-08-23 @ 13:02)  HR: 131 (01-08-23 @ 13:02) (96 - 159)  BP: 146/86 (01-08-23 @ 13:02) (129/78 - 146/86)  RR: 30 (01-08-23 @ 13:02) (22 - 35)  SpO2: 95% (01-08-23 @ 13:02) (83% - 100%)  Wt(kg): --  I&O's Summary      Appearance: Normal	  HEENT:   Normal oral mucosa, PERRL, EOMI	  Lymphatic: No lymphadenopathy  Cardiovascular: Normal S1 S2, No JVD, + murmurs, No edema  Respiratory :rhonchi  Psychiatry: A & O x 3, Mood & affect appropriate  Gastrointestinal:  Soft, Non-tender, + BS	  Skin: No rashes, No ecchymoses, No cyanosis	  Extremities: Normal range of motion, No clubbing, cyanosis or edema  Vascular: Peripheral pulses palpable 2+ bilaterally    TELEMETRY: 	    ECG:  	  RADIOLOGY:  OTHER: 	  	  LABS:	 	    CARDIAC MARKERS:                              13.9   23.80 )-----------( 284      ( 08 Jan 2023 11:57 )             44.4     01-08    132<L>  |  90<L>  |  11  ----------------------------<  220<H>  4.1   |  27  |  0.64    Ca    9.2      08 Jan 2023 11:57    TPro  6.2  /  Alb  3.1<L>  /  TBili  1.0  /  DBili  x   /  AST  16  /  ALT  11  /  AlkPhos  107  01-08    proBNP:   Lipid Profile:   HgA1c:   TSH:   PT/INR - ( 08 Jan 2023 11:57 )   PT: 19.3 sec;   INR: 1.67 ratio         PTT - ( 08 Jan 2023 11:57 )  PTT:29.2 sec    PREVIOUS DIAGNOSTIC TESTING:    :    < from: Xray Chest 1 View- PORTABLE-Urgent (01.08.23 @ 12:28) >  FINDINGS:  LUNGS:  Study is limited by positioning. Minimal vague patchy opacity at the left   lung base.  PLEURA: No pleural effusion or pneumothorax.  HEART AND MEDIASTINUM: Heart size is within normal limits.  SKELETON: There is no acute osseus abnormality,.    IMPRESSION:  Minimal vague patchy opacity at the left lung base which may represent   atelectasis or pneumonia    < end of copied text >      · EKG Date/Time: 08-Jan-2023 11:37  · Rate: 121  · Interpretation: normal sinus rhythm  QS v1-4  · HI: 148  · QRS: 72   (08 Jan 2023 15:40)      INTERVAL HISTORY:    PAST MEDICAL & SURGICAL HISTORY:  Asthma      Diabetes mellitus      Atrial fibrillation, chronic      Type 2 diabetes mellitus      Hypertension      Dementia      No significant past surgical history          FAMILY HISTORY:  Family history of diabetes mellitus (Mother)        Social History:      HOME MEDICATIONS:  Home Medications:  acetaminophen 325 mg oral tablet: 2 tab(s) orally every 6 hours, As needed, Mild Pain (1 - 3) (11 Feb 2019 09:42)  Advair Diskus 250 mcg-50 mcg inhalation powder: 1 puff(s) inhaled 2 times a day (01 Feb 2019 11:23)  aspirin 81 mg oral delayed release tablet: 1 tab(s) orally once a day (11 Feb 2019 09:42)  guaiFENesin 100 mg/5 mL oral liquid: 5 milliliter(s) orally every 6 hours, As needed, Cough (11 Feb 2019 09:42)  metFORMIN 1000 mg oral tablet: 1 tab(s) orally once a day as needed for elevated blood sugar levels (01 Feb 2019 11:23)  NexIUM OTC 20 mg oral delayed release capsule: 1 cap(s) orally once a day (in the morning) (01 Feb 2019 11:23)  warfarin 3 mg oral tablet: 1 tab(s) orally once a day  Note: held per PCP due to elevated INR (01 Feb 2019 11:23)      Allergies    Avelox (Pruritus)  Levaquin (Unknown)    Intolerances        REVIEW OF SYSTEMS:  Constitutional: No fevers, chills, weight loss, weight gain  HEENT: No vision problems, eye pain, nasal congestion, rhinorrhea, sore throat, dysphagia  CV: No chest pain, orthopnea, palpitations  Resp: No cough, dyspnea, wheezing, hemoptysis  GI: No nausea, vomiting, diarrhea, constipation, abdominal pain  : [ ] dysuria [ ] nocturia [ ] hematuria [ ] increased urinary frequency  Musculoskeletal: [ ] back pain [ ] myalgias [ ] arthralgias [ ] fracture  Skin: [ ] rash [ ] itch  Neurological: [ ] headache [ ] dizziness [ ] syncope [ ] weakness [ ] numbness  Psychiatric: [ ] anxiety [ ] depression  Endocrine: [ ] diabetes [ ] thyroid problem  Hematologic/Lymphatic: [ ] anemia [ ] bleeding problem  Allergic/Immunologic: [ ] itchy eyes [ ] nasal discharge [ ] hives [ ] angioedema  [ ] All other systems negative  [ ] Unable to assess ROS because ________    OBJECTIVE:  ICU Vital Signs Last 24 Hrs  T(C): 36.8 (10 Tim 2023 13:30), Max: 36.9 (10 Tim 2023 08:45)  T(F): 98.2 (10 Tim 2023 13:30), Max: 98.4 (10 Tim 2023 08:45)  HR: 130 (10 Tim 2023 14:42) (93 - 170)  BP: 129/72 (10 Tim 2023 13:30) (101/466 - 145/74)  BP(mean): 84 (10 Tim 2023 03:00) (74 - 104)  ABP: --  ABP(mean): --  RR: 30 (10 Tim 2023 13:30) (27 - 35)  SpO2: 99% (10 Tim 2023 14:42) (95% - 100%)    O2 Parameters below as of 10 Tim 2023 13:30  Patient On (Oxygen Delivery Method): BiPAP/CPAP              CAPILLARY BLOOD GLUCOSE      POCT Blood Glucose.: 293 mg/dL (10 Tim 2023 11:23)      PHYSICAL EXAM:  GENERAL: NAD, well-developed  HEAD:  Atraumatic, Normocephalic  EYES: EOMI, PERRLA, conjunctiva and sclera clear  NECK: Supple, No JVD  CHEST/LUNG: Clear to auscultation bilaterally; No wheeze  HEART: Regular rate and rhythm; No murmurs, rubs, or gallops  ABDOMEN: Soft, Nontender, Nondistended; Bowel sounds present  EXTREMITIES:  2+ Peripheral Pulses, No clubbing, cyanosis, or edema  PSYCH: AAOx3, appropriate affect  NEUROLOGY: non-focal, hoffman  SKIN: No rashes or lesions    LINES:     HOSPITAL MEDICATIONS:  MEDICATIONS  (STANDING):  aspirin enteric coated 81 milliGRAM(s) Oral daily  atorvastatin 40 milliGRAM(s) Oral at bedtime  cefepime   IVPB 1000 milliGRAM(s) IV Intermittent every 12 hours  dextrose 5%. 1000 milliLiter(s) (100 mL/Hr) IV Continuous <Continuous>  dextrose 5%. 1000 milliLiter(s) (50 mL/Hr) IV Continuous <Continuous>  dextrose 50% Injectable 25 Gram(s) IV Push once  dextrose 50% Injectable 12.5 Gram(s) IV Push once  dextrose 50% Injectable 25 Gram(s) IV Push once  diltiazem    Tablet 90 milliGRAM(s) Oral every 6 hours  enoxaparin Injectable 50 milliGRAM(s) SubCutaneous every 12 hours  glucagon  Injectable 1 milliGRAM(s) IntraMuscular once  insulin lispro (ADMELOG) corrective regimen sliding scale   SubCutaneous three times a day before meals  ipratropium    for Nebulization 500 MICROGram(s) Nebulizer every 6 hours  levalbuterol Inhalation 0.63 milliGRAM(s) Inhalation every 8 hours  methylPREDNISolone sodium succinate Injectable 40 milliGRAM(s) IV Push every 6 hours  methylPREDNISolone sodium succinate Injectable   IV Push     MEDICATIONS  (PRN):  acetaminophen     Tablet .. 650 milliGRAM(s) Oral every 6 hours PRN Temp greater or equal to 38C (100.4F), Mild Pain (1 - 3)  dextrose Oral Gel 15 Gram(s) Oral once PRN Blood Glucose LESS THAN 70 milliGRAM(s)/deciliter  oxyCODONE    IR 5 milliGRAM(s) Oral every 8 hours PRN Severe Pain (7 - 10)      LABS:                        13.1   33.40 )-----------( 302      ( 10 Tim 2023 07:06 )             40.7     01-10    131<L>  |  85<L>  |  24<H>  ----------------------------<  291<H>  3.4<L>   |  25  |  0.88    Ca    9.2      10 Tim 2023 07:06    TPro  6.3  /  Alb  3.1<L>  /  TBili  0.9  /  DBili  x   /  AST  13  /  ALT  10  /  AlkPhos  126<H>  01-10    PT/INR - ( 09 Jan 2023 06:11 )   PT: 16.8 sec;   INR: 1.46 ratio           ABG - ( 10 Tim 2023 02:08 )  pH, Arterial: 7.46  pH, Blood: x     /  pCO2: 38    /  pO2: 132   / HCO3: 27    / Base Excess: 3.1   /  SaO2: 100.0             HCO3, Venous: 37 mmol/L (01-08-23 @ 11:48)  pCO2, Venous: 64 mmHg (01-08-23 @ 11:48)  pH, Venous: 7.37 (01-08-23 @ 11:48)  pO2, Venous: 39 mmHg (01-08-23 @ 11:48)        CARDIAC MARKERS ( 08 Jan 2023 17:16 )  x     / x     / 32 U/L / x     / 4.9 ng/mL      CAPILLARY BLOOD GLUCOSE      POCT Blood Glucose.: 293 mg/dL (10 Tim 2023 11:23)  POCT Blood Glucose.: 310 mg/dL (10 Tim 2023 07:51)  POCT Blood Glucose.: 212 mg/dL (09 Jan 2023 19:54)      RADIOLOGY & ADDITIONAL TESTS:    ASSESSMENT/PLAN:      # NEURO    # CARDIAC    # PULM    # RENAL    # GI     # HEME/ONC    # ID    # ENDO    DVT MICU Accept Note    CHIEF COMPLAINT:     HPI:  CHIEF COMPLAINT:Patient is a 77y old  Female who presents with a chief complaint of     HPI:df  77-year-old female with past medical history of DM, HTN, HLD, asthma on daily steroids, A. fib on Eliquis and digoxin presenting with shortness of breath.  Patient unable to provide history.  Daughter at bedside reports patient has had increasing shortness of breath, wheezing, cough for the past week now.  Patient also with increasing generalized weakness, fatigue and decreased p.o. intake.  Denies fever/chills, chest pain, abdominal pain, nausea, vomiting, diarrhea, dysuria.  Family reports patient had a similar episode 1 year ago when she was admitted to [facility] with UTI.    In the hospital, patient found to be in AFib w/ RVR, seen by Dr. Marcus,     PAST MEDICAL & SURGICAL HISTORY:  Asthma  Diabetes mellitus  Atrial fibrillation, chronic  Type 2 diabetes mellitus  Hypertension  Dementia      No significant past surgical history      MEDICATIONS  (STANDING):  vancomycin  IVPB. 1000 milliGRAM(s) IV Intermittent once  oxyCODONE 5 mg oral tablet: 1 tab(s) orally every 4 hours, As needed, Severe Pain (7 - 10) MDD:6 tab  · 	Cardizem  mg/24 hours oral capsule, extended release: 1 cap(s) orally once a day   · 	guaiFENesin 100 mg/5 mL oral liquid: 5 milliliter(s) orally every 6 hours, As needed, Cough  · 	aspirin 81 mg oral delayed release tablet: 1 tab(s) orally once a day  · 	atorvastatin 40 mg oral tablet: 1 tab(s) orally once a day (at bedtime)  · 	acetaminophen 325 mg oral tablet: 2 tab(s) orally every 6 hours, As needed, Mild Pain (1 - 3)  · 	Advair Diskus 250 mcg-50 mcg inhalation powder: 1 puff(s) inhaled 2 times a day  · 	NexIUM OTC 20 mg oral delayed release capsule: 1 cap(s) orally once a day (in the morning)  · 	metFORMIN 1000 mg oral tablet: 1 tab(s) orally once a day as needed for elevated blood sugar levels  · 	warfarin 3 mg oral tablet: 1 tab(s) orally once a day    MEDICATIONS  (PRN):      FAMILY HISTORY:  Family history of diabetes mellitus (Mother)        SOCIAL HISTORY:    [ x] Non-smoker  [ ] Smoker  [ ] Alcohol    Allergies    Avelox (Pruritus)  Levaquin (Unknown)    Intolerances    PHYSICAL EXAM:  T(C): 37.9 (01-08-23 @ 13:02), Max: 37.9 (01-08-23 @ 13:02)  HR: 131 (01-08-23 @ 13:02) (96 - 159)  BP: 146/86 (01-08-23 @ 13:02) (129/78 - 146/86)  RR: 30 (01-08-23 @ 13:02) (22 - 35)  SpO2: 95% (01-08-23 @ 13:02) (83% - 100%)  Wt(kg): --  I&O's Summary      Appearance: Normal	  HEENT:   Normal oral mucosa, PERRL, EOMI	  Lymphatic: No lymphadenopathy  Cardiovascular: Normal S1 S2, No JVD, + murmurs, No edema  Respiratory :rhonchi  Psychiatry: A & O x 3, Mood & affect appropriate  Gastrointestinal:  Soft, Non-tender, + BS	  Skin: No rashes, No ecchymoses, No cyanosis	  Extremities: Normal range of motion, No clubbing, cyanosis or edema  Vascular: Peripheral pulses palpable 2+ bilaterally    TELEMETRY: 	    ECG:  	  RADIOLOGY:  OTHER: 	  	  LABS:                           13.1   33.40 )-----------( 302      ( 10 Tim 2023 07:06 )             40.7     01-10    131<L>  |  85<L>  |  24<H>  ----------------------------<  291<H>  3.4<L>   |  25  |  0.88    Ca    9.2      10 Tim 2023 07:06    TPro  6.3  /  Alb  3.1<L>  /  TBili  0.9  /  DBili  x   /  AST  13  /  ALT  10  /  AlkPhos  126<H>  01-10        PT/INR - ( 09 Jan 2023 06:11 )   PT: 16.8 sec;   INR: 1.46 ratio      LIVER FUNCTIONS - ( 10 Tim 2023 07:06 )  Alb: 3.1 g/dL / Pro: 6.3 g/dL / ALK PHOS: 126 U/L / ALT: 10 U/L / AST: 13 U/L / GGT: x           I&O's Summary      CARDIAC MARKERS ( 08 Jan 2023 17:16 )  x     / x     / 32 U/L / x     / 4.9 ng/mL     /     CAPILLARY BLOOD GLUCOSE      POCT Blood Glucose.: 293 mg/dL (10 Tim 2023 11:23)  POCT Blood Glucose.: 310 mg/dL (10 Tim 2023 07:51)  POCT Blood Glucose.: 212 mg/dL (09 Jan 2023 19:54)      MICRO:    PREVIOUS DIAGNOSTIC TESTING:     < from: Xray Chest 1 View- PORTABLE-Urgent (01.08.23 @ 12:28) >  IMPRESSION:  Minimal vague patchy opacity at the left lung base which may represent   atelectasis or pneumonia    < end of copied text >      · EKG Date/Time: 08-Jan-2023 11:37  · Rate: 121  · Interpretation: normal sinus rhythm  QS v1-4  · NM: 148  · QRS: 72   (08 Jan 2023 15:40)      ASSESSMENT/PLAN:  77-year-old female with past medical history of DM, HTN, HLD, asthma on daily steroids, A. fib on Eliquis and digoxin presenting with shortness of breath, found to be in Afib w/RVR; admitted to MICU for Acute Hypoxic Respiratory Failure in s/o Asthma and Bronchiectasis leading to intubation and sedation.     NEURO    CARDIAC    PULM    RENAL    GI     HEME/ONC    ID    ENDO    DVT    ETHICS MICU Accept Note    CHIEF COMPLAINT:     HPI:  CHIEF COMPLAINT:Patient is a 77y old  Female who presents with a chief complaint of     HPI:df  77-year-old female with past medical history of DM, HTN, HLD, asthma on daily steroids, A. fib on Eliquis and digoxin presenting with shortness of breath.  Patient unable to provide history.  Daughter at bedside reports patient has had increasing shortness of breath, wheezing, cough for the past week now.  Patient also with increasing generalized weakness, fatigue and decreased p.o. intake.  Denies fever/chills, chest pain, abdominal pain, nausea, vomiting, diarrhea, dysuria.  Family reports patient had a similar episode 1 year ago when she was admitted to [facility] with UTI.    In the hospital, patient found to be in AFib w/ RVR, seen by Dr. Marcus, continued on Diltiazem. Seen by Dr. You, Pulmonology, started on Solumedrol 40mg q6h. All infectious workup negative so far, CT Angio negative for PE, CT Chest w/ Bilateral lower lobe predominant patchy and branching opacities suspicious for pneumonia. Small left pleural effusion. Patient currently on Cefepime for empiric PNA coverage and BiPAP for hypoxemia.    PAST MEDICAL & SURGICAL HISTORY:  Asthma  Diabetes mellitus  Atrial fibrillation, chronic  Type 2 diabetes mellitus  Hypertension  Dementia      No significant past surgical history      MEDICATIONS  (STANDING):  vancomycin  IVPB. 1000 milliGRAM(s) IV Intermittent once  oxyCODONE 5 mg oral tablet: 1 tab(s) orally every 4 hours, As needed, Severe Pain (7 - 10) MDD:6 tab  · 	Cardizem  mg/24 hours oral capsule, extended release: 1 cap(s) orally once a day   · 	guaiFENesin 100 mg/5 mL oral liquid: 5 milliliter(s) orally every 6 hours, As needed, Cough  · 	aspirin 81 mg oral delayed release tablet: 1 tab(s) orally once a day  · 	atorvastatin 40 mg oral tablet: 1 tab(s) orally once a day (at bedtime)  · 	acetaminophen 325 mg oral tablet: 2 tab(s) orally every 6 hours, As needed, Mild Pain (1 - 3)  · 	Advair Diskus 250 mcg-50 mcg inhalation powder: 1 puff(s) inhaled 2 times a day  · 	NexIUM OTC 20 mg oral delayed release capsule: 1 cap(s) orally once a day (in the morning)  · 	metFORMIN 1000 mg oral tablet: 1 tab(s) orally once a day as needed for elevated blood sugar levels  · 	warfarin 3 mg oral tablet: 1 tab(s) orally once a day    MEDICATIONS  (PRN):      FAMILY HISTORY:  Family history of diabetes mellitus (Mother)        SOCIAL HISTORY:    [ x] Non-smoker  [ ] Smoker  [ ] Alcohol    Allergies    Avelox (Pruritus)  Levaquin (Unknown)    Intolerances    PHYSICAL EXAM:  T(C): 37.9 (01-08-23 @ 13:02), Max: 37.9 (01-08-23 @ 13:02)  HR: 131 (01-08-23 @ 13:02) (96 - 159)  BP: 146/86 (01-08-23 @ 13:02) (129/78 - 146/86)  RR: 30 (01-08-23 @ 13:02) (22 - 35)  SpO2: 95% (01-08-23 @ 13:02) (83% - 100%)  Wt(kg): --  I&O's Summary      Appearance: Normal	  HEENT:   Normal oral mucosa, PERRL, EOMI	  Lymphatic: No lymphadenopathy  Cardiovascular: Normal S1 S2, No JVD, + murmurs, No edema  Respiratory :rhonchi  Psychiatry: A & O x 3, Mood & affect appropriate  Gastrointestinal:  Soft, Non-tender, + BS	  Skin: No rashes, No ecchymoses, No cyanosis	  Extremities: Normal range of motion, No clubbing, cyanosis or edema  Vascular: Peripheral pulses palpable 2+ bilaterally    TELEMETRY: 	    ECG:  	  RADIOLOGY:  OTHER: 	  	  LABS:                           13.1   33.40 )-----------( 302      ( 10 Tim 2023 07:06 )             40.7     01-10    131<L>  |  85<L>  |  24<H>  ----------------------------<  291<H>  3.4<L>   |  25  |  0.88    Ca    9.2      10 Tim 2023 07:06    TPro  6.3  /  Alb  3.1<L>  /  TBili  0.9  /  DBili  x   /  AST  13  /  ALT  10  /  AlkPhos  126<H>  01-10        PT/INR - ( 09 Jan 2023 06:11 )   PT: 16.8 sec;   INR: 1.46 ratio      LIVER FUNCTIONS - ( 10 Tim 2023 07:06 )  Alb: 3.1 g/dL / Pro: 6.3 g/dL / ALK PHOS: 126 U/L / ALT: 10 U/L / AST: 13 U/L / GGT: x           I&O's Summary      CARDIAC MARKERS ( 08 Jan 2023 17:16 )  x     / x     / 32 U/L / x     / 4.9 ng/mL     /     CAPILLARY BLOOD GLUCOSE      POCT Blood Glucose.: 293 mg/dL (10 Tim 2023 11:23)  POCT Blood Glucose.: 310 mg/dL (10 Tim 2023 07:51)  POCT Blood Glucose.: 212 mg/dL (09 Jan 2023 19:54)      MICRO:    PREVIOUS DIAGNOSTIC TESTING:     < from: Xray Chest 1 View- PORTABLE-Urgent (01.08.23 @ 12:28) >  IMPRESSION:  Minimal vague patchy opacity at the left lung base which may represent   atelectasis or pneumonia    < end of copied text >      · EKG Date/Time: 08-Jan-2023 11:37  · Rate: 121  · Interpretation: normal sinus rhythm  QS v1-4  · [place]: 148  · QRS: 72   (08 Jan 2023 15:40)      ASSESSMENT/PLAN:  77-year-old female with past medical history of DM, HTN, HLD, asthma on daily steroids, A. fib on Eliquis and digoxin presenting with shortness of breath, found to be in Afib w/RVR; admitted to MICU for Acute Hypoxic Respiratory Failure in s/o Asthma and Bronchiectasis leading to intubation and sedation.     NEURO  # Intubated & Sedated  - Mental status was at baseline before intubation; intubated for Acute Hypoxic Respiratory Failure  - ABG w/o hypercapnia; PCO2 38; no signs of hypercapnic encephalopathy  - RASS goal 0 to -1  - Daily SAT/SBT    CARDIAC  # Afib w/ RVR  Admitted in RVR; being followed by Dr. Marcus, Cardiology; Afib w/ RVR thought to be due to stress from hypoxemia  - HR in the 130s 1/10  - Continue Diltiazem as per Cardiology; currently at Dilt 90mg q6h  - follow-up Echo  - follow-up TSH results    # HTN  No indication of hypotension.  - currently on Diltiazem 90mg q6h  - closely monitor BP    PULM  # Acute Hypoxemic Respiratory Failure  # Asthma  # Bronchiectasis  CT Angio r/o PE; currently thought to be due to Asthma exacerbation vs bronchiectasis vs ?PNA;   - tachypneic on BiPAP to 30s, saturating 100% but is tiring out as of now  - intubate and sedate to assist with airway clearance  - chest PT; airway clearance; hypertonic saline  - daily SBT/SATs  - latest ABG is 7.46| 38|132  - continue solumedrol 40mg q6h  - DuoNebs q6h   - currently of Cefepime (1/10-) for empiric coverage; can do 5 day course  - follow-up Legionella antigen, final BCx read; RVP negative    RENAL  - no active issues currently    GI   - no active issues currently  - Diet: NPO on vent; place OG and start tube feeds  - Bowel regimen: miralax, senna    HEME/ONC  # Leukocytosis  No active indications of infections, all infectious workup negative so far; likely in s/o solumedrol usage but no diff to conform neutrophilia so far  - continue to monitor    ID  # Sepsis  CT Chest w/ bilateral lower lobe predominant patchy and branching opacities suspicious for pneumonia. Small left pleural effusion. Leukocytosis, tachycardia and tachypnea; No active indications of infections, all infectious workup negative so far; leukocytosis likely in s/o solumedrol usage but no diff to conform neutrophilia so far; tachycardia likely in s/o tachypnea and AHRF  - Continue Cefepime (1/10-) for empiric PNA coverage  - follow-up SCx    ENDO  # Hyponatremia  Na 132; Na 131 in 06/22, may be chronic  - continue fluid resuscitation   - monitor daily BMPs    # T2DM  A1c 8.1 1/09/23  - place on sliding scale NPH    DVT  Lovenox 50mg BID    ETHICS  Full Code MICU Accept Note    CHIEF COMPLAINT:     HPI:  CHIEF COMPLAINT:Patient is a 77y old  Female who presents with a chief complaint of     HPI:df  77-year-old female with past medical history of DM, HTN, HLD, asthma on daily steroids, A. fib on Eliquis and digoxin presenting with shortness of breath.  Patient unable to provide history.  Daughter at bedside reports patient has had increasing shortness of breath, wheezing, cough for the past week now.  Patient also with increasing generalized weakness, fatigue and decreased p.o. intake.  Denies fever/chills, chest pain, abdominal pain, nausea, vomiting, diarrhea, dysuria.  Family reports patient had a similar episode 1 year ago when she was admitted to [facility] with UTI.    In the hospital, patient found to be in AFib w/ RVR, seen by Dr. Marcus, continued on Diltiazem. Seen by Dr. You, Pulmonology, started on Solumedrol 40mg q6h. All infectious workup negative so far, CT Angio negative for PE, CT Chest w/ Bilateral lower lobe predominant patchy and branching opacities suspicious for pneumonia. Small left pleural effusion. Patient currently on Cefepime for empiric PNA coverage and BiPAP for hypoxemia.    PAST MEDICAL & SURGICAL HISTORY:  Asthma  Diabetes mellitus  Atrial fibrillation, chronic  Type 2 diabetes mellitus  Hypertension  Dementia      No significant past surgical history      MEDICATIONS  (STANDING):  vancomycin  IVPB. 1000 milliGRAM(s) IV Intermittent once  oxyCODONE 5 mg oral tablet: 1 tab(s) orally every 4 hours, As needed, Severe Pain (7 - 10) MDD:6 tab  · 	Cardizem  mg/24 hours oral capsule, extended release: 1 cap(s) orally once a day   · 	guaiFENesin 100 mg/5 mL oral liquid: 5 milliliter(s) orally every 6 hours, As needed, Cough  · 	aspirin 81 mg oral delayed release tablet: 1 tab(s) orally once a day  · 	atorvastatin 40 mg oral tablet: 1 tab(s) orally once a day (at bedtime)  · 	acetaminophen 325 mg oral tablet: 2 tab(s) orally every 6 hours, As needed, Mild Pain (1 - 3)  · 	Advair Diskus 250 mcg-50 mcg inhalation powder: 1 puff(s) inhaled 2 times a day  · 	NexIUM OTC 20 mg oral delayed release capsule: 1 cap(s) orally once a day (in the morning)  · 	metFORMIN 1000 mg oral tablet: 1 tab(s) orally once a day as needed for elevated blood sugar levels  · 	warfarin 3 mg oral tablet: 1 tab(s) orally once a day    MEDICATIONS  (PRN):      FAMILY HISTORY:  Family history of diabetes mellitus (Mother)        SOCIAL HISTORY:    [ x] Non-smoker  [ ] Smoker  [ ] Alcohol    Allergies    Avelox (Pruritus)  Levaquin (Unknown)    Intolerances    PHYSICAL EXAM:  T(C): 37.9 (01-08-23 @ 13:02), Max: 37.9 (01-08-23 @ 13:02)  HR: 131 (01-08-23 @ 13:02) (96 - 159)  BP: 146/86 (01-08-23 @ 13:02) (129/78 - 146/86)  RR: 30 (01-08-23 @ 13:02) (22 - 35)  SpO2: 95% (01-08-23 @ 13:02) (83% - 100%)  Wt(kg): --  I&O's Summary      Appearance: Normal	  HEENT:   Normal oral mucosa, PERRL, EOMI	  Lymphatic: No lymphadenopathy  Cardiovascular: Normal S1 S2, No JVD, + murmurs, No edema  Respiratory :rhonchi  Psychiatry: A & O x 3, Mood & affect appropriate  Gastrointestinal:  Soft, Non-tender, + BS	  Skin: No rashes, No ecchymoses, No cyanosis	  Extremities: Normal range of motion, No clubbing, cyanosis or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

## 2023-01-10 NOTE — PROGRESS NOTE ADULT - ASSESSMENT
77-year-old female with past medical history of DM, HTN, HLD, asthma on daily steroids, A. fib on Eliquis and digoxin presenting with shortness of breath.  Patient unable to provide history.  Daughter at bedside reports patient has had increasing shortness of breath, wheezing, cough for the past week now.  Patient also with increasing generalized weakness, fatigue and decreased p.o. intake.  Denies fever/chills, chest pain, abdominal pain, nausea, vomiting, diarrhea, dysuria.  Family reports patient had a similar episode 1 year ago when she was admitted to [facility] with UTI.  pt with sig medical hx, chronic a.fib with sob ?sec sepsis/ pneumoniae, UTI  start on ceftriaxone  tele  for increase hr, will increase Cardizem dose  continue AC  cardiac enzyme  tsh  echo  RVP negative      ASTHMA/ COPD exacerbation;   A Fibrillation  with rvr:  DM:  HTN:   HLD:     1/09:    ASTHMA/ COPD exacerbation; she was using performist at home with 5 mg of prednisone  not sure why she was not on any other meds:  sill start Symbicort too:  along with BD and is already on cefepime:  though pneumonia to me is not very convincing : will probably need ct scan chest   A Fibrillation  with rvr: Hr still elevated:  on cardizem : would defer to cards:  on ac:  lovenox  + coumadin : check echo   DM:: monitor and control   HTN: : controlled  HLD: on statins  :    dw team    1/10:    ASTHMA/ COPD exacerbation; she was using performist at home with 5 mg of prednisone  not sure why she was not on any other meds:  sill start Symbicort too:  along with BD and is already on cefepime:  WBC increased : though pneumonia to me is not very convincing : will probably need ct scan chest : she rused for ct chest : increase steorids 40 mg Q 6 hours : ABG today seems reasonable   A Fibrillation  with rvr: Hr still elevated:  on cardizem : HR is till very high  :   DM:: monitor and control   HTN: : controlled  HLD: on statins:    MICU  consult:  high risk for intubation:   :    josiasacp and daughter

## 2023-01-10 NOTE — PROVIDER CONTACT NOTE (CHANGE IN STATUS NOTIFICATION) - SITUATION
Patient has had a sustained afib with rvr sustaining in 160-170s since on coming shift. Patient is on bipap.

## 2023-01-10 NOTE — CHART NOTE - NSCHARTNOTEFT_GEN_A_CORE
RN to PA- Patient is dyspneic with respiratory rate 30-40s , however sustaining O2 sat >95%. Patient is also tachycardic  with heart rate trending between 130-170s non sustaining. Patient has no complaints of chest pain, however is feeling shortness of breath.     Vital Signs Last 24 Hrs  T(C): 36.6 (09 Jan 2023 20:05), Max: 36.9 (09 Jan 2023 05:33)  T(F): 97.9 (09 Jan 2023 20:05), Max: 98.4 (09 Jan 2023 05:33)  HR: 143 (10 Tim 2023 02:12) (95 - 147)  BP: 115/63 (09 Jan 2023 20:05) (115/63 - 153/75)  BP(mean): 104 (09 Jan 2023 17:43) (60 - 104)  RR: 30 (09 Jan 2023 20:05) (28 - 38)  SpO2: 98% (10 Tim 2023 02:12) (96% - 100%)    Parameters below as of 09 Jan 2023 20:05  Patient On (Oxygen Delivery Method): nasal cannula  O2 Flow (L/min): 6    plan:   EKG stat expressed afib w/ RVR 160s. ABG ordered stat. Will follow up. Discussed with attending, kimber Sherman to give digoxin 125 IVP x1. If tachycardia does not resolve or show improvement, okay to give additional  cardizem 5 IVP x1. RN to PA- Patient is dyspneic with respiratory rate 30-40s , however sustaining O2 sat >95%. Patient is also tachycardic  with heart rate trending between 130-170s non sustaining. Patient has no complaints of chest pain, however is feeling shortness of breath.     Vital Signs Last 24 Hrs  T(C): 36.6 (09 Jan 2023 20:05), Max: 36.9 (09 Jan 2023 05:33)  T(F): 97.9 (09 Jan 2023 20:05), Max: 98.4 (09 Jan 2023 05:33)  HR: 143 (10 Tim 2023 02:12) (95 - 147)  BP: 115/63 (09 Jan 2023 20:05) (115/63 - 153/75)  BP(mean): 104 (09 Jan 2023 17:43) (60 - 104)  RR: 30 (09 Jan 2023 20:05) (28 - 38)  SpO2: 98% (10 Tim 2023 02:12) (96% - 100%)    Parameters below as of 09 Jan 2023 20:05  Patient On (Oxygen Delivery Method): nasal cannula  O2 Flow (L/min): 6    plan:   EKG stat expressed afib w/ RVR 160s.   ABG ordered stat. Will follow up.   Patient is back on bipap for comfort.  C/w solumedrol, bipap  Discussed with attending, kimber Sherman to give digoxin 125 IVP x1. If tachycardia does not resolve or show improvement, okay to give additional cardizem 5 IVP x1. RN to PA- Patient is dyspneic with respiratory rate 30-40s , however sustaining O2 sat >95%. Patient is also tachycardic  with heart rate trending between 130-170s non sustaining. Patient has no complaints of chest pain, however is feeling shortness of breath.     Vital Signs Last 24 Hrs  T(C): 36.6 (09 Jan 2023 20:05), Max: 36.9 (09 Jan 2023 05:33)  T(F): 97.9 (09 Jan 2023 20:05), Max: 98.4 (09 Jan 2023 05:33)  HR: 143 (10 Tim 2023 02:12) (95 - 147)  BP: 115/63 (09 Jan 2023 20:05) (115/63 - 153/75)  BP(mean): 104 (09 Jan 2023 17:43) (60 - 104)  RR: 30 (09 Jan 2023 20:05) (28 - 38)  SpO2: 98% (10 Tim 2023 02:12) (96% - 100%)    Parameters below as of 09 Jan 2023 20:05  Patient On (Oxygen Delivery Method): nasal cannula  O2 Flow (L/min): 6    plan:   EKG stat expressed afib w/ RVR 160s.   ABG ordered stat. Will follow up.   Patient is back on bipap for comfort.  C/w solumedrol, bipap  Discussed with attending, kimber Sherman to give digoxin 125 IVP x1. If tachycardia does not resolve or show improvement, kimber to give additional cardizem 5 IVP x1.    ADDENDUM- 01/10//23- 3:13AM  Patient is still afib w/ RVR with heart rate fluctuating between RN to PA- Patient is dyspneic with respiratory rate 30-40s , however sustaining O2 sat >95%. Patient is also tachycardic  with heart rate trending between 130-170s non sustaining. Patient has no complaints of chest pain, however is feeling shortness of breath.     Vital Signs Last 24 Hrs  T(C): 36.6 (09 Jan 2023 20:05), Max: 36.9 (09 Jan 2023 05:33)  T(F): 97.9 (09 Jan 2023 20:05), Max: 98.4 (09 Jan 2023 05:33)  HR: 143 (10 Tim 2023 02:12) (95 - 147)  BP: 115/63 (09 Jan 2023 20:05) (115/63 - 153/75)  BP(mean): 104 (09 Jan 2023 17:43) (60 - 104)  RR: 30 (09 Jan 2023 20:05) (28 - 38)  SpO2: 98% (10 Tim 2023 02:12) (96% - 100%)    Parameters below as of 09 Jan 2023 20:05  Patient On (Oxygen Delivery Method): nasal cannula  O2 Flow (L/min): 6    plan:   EKG stat expressed afib w/ RVR 160s.   ABG ordered stat. Will follow up.   Patient is back on bipap for comfort.  C/w solumedrol, bipap  Discussed with attending, kimber Sherman to give digoxin 125 IVP x1. If tachycardia does not resolve or show improvement, okay to give additional cardizem 5 IVP x1.    ADDENDUM- 01/10//23- 3:44AM  Patient is still afib w/ RVR with heart rate fluctuating between 120-170s non sustaining. Patient to receive 5mg cardizem IVP x1. Will c/w monitoring overnight.     Marcelle Ramon PA-C  [id number]

## 2023-01-10 NOTE — PROGRESS NOTE ADULT - SUBJECTIVE AND OBJECTIVE BOX
CARDIOLOGY     PROGRESS  NOTE   ________________________________________________    CHIEF COMPLAINT:Patient is a 77y old  Female who presents with a chief complaint of sob/ rapid a.fib (09 Jan 2023 11:30)  events noted  	  REVIEW OF SYSTEMS:  CONSTITUTIONAL: No fever, weight loss, or fatigue  EYES: No eye pain, visual disturbances, or discharge  ENT:  No difficulty hearing, tinnitus, vertigo; No sinus or throat pain  NECK: No pain or stiffness  RESPIRATORY: No cough, wheezing, chills or hemoptysis; + Shortness of Breath  CARDIOVASCULAR: No chest pain, palpitations, passing out, dizziness, or leg swelling  GASTROINTESTINAL: No abdominal or epigastric pain. No nausea, vomiting, or hematemesis; No diarrhea or constipation. No melena or hematochezia.  GENITOURINARY: No dysuria, frequency, hematuria, or incontinence  NEUROLOGICAL: No headaches, memory loss, loss of strength, numbness, or tremors  SKIN: No itching, burning, rashes, or lesions   LYMPH Nodes: No enlarged glands  ENDOCRINE: No heat or cold intolerance; No hair loss  MUSCULOSKELETAL: No joint pain or swelling; No muscle, back, or extremity pain  PSYCHIATRIC: No depression, anxiety, mood swings, or difficulty sleeping  HEME/LYMPH: No easy bruising, or bleeding gums  ALLERGY AND IMMUNOLOGIC: No hives or eczema	    [ ] All others negative	  [ ] Unable to obtain    PHYSICAL EXAM:  T(C): 36.6 (01-10-23 @ 07:56), Max: 36.9 (01-09-23 @ 09:40)  HR: 137 (01-10-23 @ 07:56) (93 - 152)  BP: 145/74 (01-10-23 @ 07:56) (101/466 - 145/74)  RR: 35 (01-10-23 @ 07:56) (27 - 36)  SpO2: 100% (01-10-23 @ 07:56) (95% - 100%)  Wt(kg): --  I&O's Summary      Appearance: Normal	  HEENT:   Normal oral mucosa, PERRL, EOMI	  Lymphatic: No lymphadenopathy  Cardiovascular: Normal S1 S2, No JVD, + murmurs, No edema  Respiratory: decrease bs  Psychiatry: A & O x 3, Mood & affect appropriate  Gastrointestinal:  Soft, Non-tender, + BS	  Skin: No rashes, No ecchymoses, No cyanosis	  Neurologic: Non-focal  Extremities: Normal range of motion, No clubbing, cyanosis or edema  Vascular: Peripheral pulses palpable 2+ bilaterally    MEDICATIONS  (STANDING):  aspirin enteric coated 81 milliGRAM(s) Oral daily  atorvastatin 40 milliGRAM(s) Oral at bedtime  cefepime   IVPB 1000 milliGRAM(s) IV Intermittent every 8 hours  dextrose 5%. 1000 milliLiter(s) (100 mL/Hr) IV Continuous <Continuous>  dextrose 5%. 1000 milliLiter(s) (50 mL/Hr) IV Continuous <Continuous>  dextrose 50% Injectable 25 Gram(s) IV Push once  dextrose 50% Injectable 12.5 Gram(s) IV Push once  dextrose 50% Injectable 25 Gram(s) IV Push once  diltiazem    Tablet 60 milliGRAM(s) Oral every 6 hours  enoxaparin Injectable 50 milliGRAM(s) SubCutaneous every 12 hours  glucagon  Injectable 1 milliGRAM(s) IntraMuscular once  insulin lispro (ADMELOG) corrective regimen sliding scale   SubCutaneous three times a day before meals  ipratropium 17 MICROgram(s) HFA Inhaler 1 Puff(s) Inhalation every 6 hours  levalbuterol Inhalation 0.63 milliGRAM(s) Inhalation every 8 hours  methylPREDNISolone sodium succinate Injectable 40 milliGRAM(s) IV Push every 6 hours  methylPREDNISolone sodium succinate Injectable   IV Push       TELEMETRY: 	    ECG:  	  RADIOLOGY:  OTHER: 	  	  LABS:	 	    CARDIAC MARKERS:  CARDIAC MARKERS ( 08 Jan 2023 17:16 )  x     / x     / 32 U/L / x     / 4.9 ng/mL                                13.1   33.40 )-----------( 302      ( 10 Tim 2023 07:06 )             40.7     01-10    131<L>  |  85<L>  |  24<H>  ----------------------------<  291<H>  3.4<L>   |  25  |  0.88    Ca    9.2      10 Tim 2023 07:06    TPro  6.3  /  Alb  3.1<L>  /  TBili  0.9  /  DBili  x   /  AST  13  /  ALT  10  /  AlkPhos  126<H>  01-10    proBNP:   Lipid Profile:   HgA1c:   TSH: Thyroid Stimulating Hormone, Serum: 1.17 uIU/mL (01-09 @ 06:11)    PT/INR - ( 09 Jan 2023 06:11 )   PT: 16.8 sec;   INR: 1.46 ratio         PTT - ( 08 Jan 2023 11:57 )  PTT:29.2 sec     admitted for sob, anorexia, malaise for the last 8 days  no sick contacts    received flu vaccinae but never covid   Denies any UTI symptoms despite the pyuria   no tenderness in abd  had cholecystectomy two years ago,    chest xray is difficult to interpret   ifiltrate is subtle   pna is possible but not definite     agree with empiric cefepime  would get CT of the chest and abd to clarify picture     Assessment and plan  ---------------------------  77-year-old female with past medical history of DM, HTN, HLD, asthma on daily steroids, A. fib on Eliquis and digoxin presenting with shortness of breath.  Patient unable to provide history.  Daughter at bedside reports patient has had increasing shortness of breath, wheezing, cough for the past week now.  Patient also with increasing generalized weakness, fatigue and decreased p.o. intake.  Denies fever/chills, chest pain, abdominal pain, nausea, vomiting, diarrhea, dysuria.  Family reports patient had a similar episode 1 year ago when she was admitted to [facility] with UTI.  pt with sig medical hx, chronic a.fib with sob ?sec sepsis/ pneumoniae, UTI  start on ceftriaxone  tele  for increase hr, will increase Cardizem dose to 60 mg q6h  ID eval appreciated  pulmonary eval appreciated  continue AC  cardiac enzyme noted  tsh  echo awaiting  RVP negative  nutrition eval severe protein deficiency  increase hr increase Cardizem dose will consider iv Cardizem drip, increase hr ?sec to stress of hypoxemia  continue Lovenox for AC  continue abx

## 2023-01-11 NOTE — SWALLOW BEDSIDE ASSESSMENT ADULT - COMMENTS
1/10 CT CHEST Bilateral lower lobe predominant patchy and branching opacities suspicious for pneumonia. Small left pleural effusion.    *Consult received 2/9/2019 for bedside swallow evaluation; however pt tolerating current diet and sore throat resolved. Therefore, no intervention provided.

## 2023-01-11 NOTE — DIETITIAN INITIAL EVALUATION ADULT - ENTERAL
Given pt's pressor requirements, Vital AF at 10 mL/hr increasing only as tolerated and electrolytes WNL to goal rate 50 mL/hr x18 hrs to provide total volume 900 mL, 1080 kcals (Total 1260 kcals with propofol providing ~180 kcals), 68 gm protein, and 730 mL free water. Meets ~33 kcals/kG and ~1.8 gm protein/kG based on dosing wt 37.7 kG. Continue to trend provision of propofol, currently providing <250 kcals.

## 2023-01-11 NOTE — DIETITIAN INITIAL EVALUATION ADULT - NSFNSNUTRHOMESUPPLEMENTFT_GEN_A_CORE
Pt drank Boost until ~1 month ago, stopped secondary to GI distress. Took multivitamin, Vitamin B12, and Vitamin D.

## 2023-01-11 NOTE — ADVANCED PRACTICE NURSE CONSULT - ASSESSMENT
When wound care RN arrived on unit, patient was found lying in a low air loss pressure redistribution support surface style bed with daughter, Ariana, at bedside who consented to skin consult. Patient Tamar was intubated with B/L wrist restraints and a andrade catheter in place. She is unable to turn independently and staff assistance x 2 was provided. Once turned, fecal incontinence was apparent and pericare was provided. Once skin was cleansed, the wound care RN was able to visualize an area of persistent nonblanchable deep red erythema with purple hues measuring approximately 14cm x 8cm x 0cm from B/L buttocks/sacrum extending toward ischium. Within this location there are scattered areas of superficial partial thickness skin loss measuring approximately 3cm x 3cm x 0.2cm over B/L buttocks/sacrum. Presentation of this wound is consistent with a deep tissue injury in evolution,  present on admission. Additionally, on midline spine is a similar area of intact, nonblanchable dark red erythema with purple undertones measuring approximately 6cm x 3cm x 0cm- consistent with a deep tissue injury, present on admission. Once consult was complete, patient was placed in a right side-lying position utilizing pillow positioner assistive devices. Family was educated on the need for routine turning and positioning to prevent pressure injuries.

## 2023-01-11 NOTE — SWALLOW BEDSIDE ASSESSMENT ADULT - SWALLOW EVAL: DIAGNOSIS
Consult received and appreciated. Chart reviewed. Pt currently intubated, d/w team new consult to be placed when patient medically appropriate.

## 2023-01-11 NOTE — PROCEDURE NOTE - NSTRACHPOSTINTU_RESP_A_CORE
visualized with bronch/Appropriate capnography/Breath sounds bilateral/Breath sounds equal/Chest excursion noted/Chest X-Ray

## 2023-01-11 NOTE — PROGRESS NOTE ADULT - ASSESSMENT
77-year-old female with past medical history of DM, HTN, HLD, asthma on daily steroids, A. fib on Eliquis and digoxin presenting with shortness of breath.  Patient unable to provide history.  Daughter at bedside reports patient has had increasing shortness of breath, wheezing, cough for the past week now.  Patient also with increasing generalized weakness, fatigue and decreased p.o. intake.  Denies fever/chills, chest pain, abdominal pain, nausea, vomiting, diarrhea, dysuria.  Family reports patient had a similar episode 1 year ago when she was admitted to [facility] with UTI.  pt with sig medical hx, chronic a.fib with sob ?sec sepsis/ pneumoniae, UTI  start on ceftriaxone  tele  for increase hr, will increase Cardizem dose  continue AC  cardiac enzyme  tsh  echo  RVP negative   ASSESSMENT/PLAN:  77-year-old female with past medical history of DM, HTN, HLD, asthma on daily steroids, A. fib on Eliquis and digoxin presenting with shortness of breath, found to be in Afib w/RVR; admitted to MICU for Acute Hypoxic Respiratory Failure in s/o Asthma and Bronchiectasis w/ possible PNA, leading to intubation and sedation.     NEURO  # Intubated & Sedated  - Mental status was at baseline before intubation; intubated for Acute Hypoxic Respiratory Failure  - ABG w/o hypercapnia; PCO2 38; no signs of hypercapnic encephalopathy  - RASS goal 0 to -1  - Daily SAT/SBT    CARDIAC  # Afib w/ RVR  Admitted in RVR; being followed by Dr. Marcus, Cardiology; Afib w/ RVR thought to be due to stress from hypoxemia  - HR in the 130s 1/10  - Continue Diltiazem as per Cardiology; currently at Dilt 90mg q6h  - follow-up Echo  - follow-up TSH results    # HTN  No indication of hypotension.  - currently on Diltiazem 90mg q6h  - closely monitor BP    PULM  # Acute Hypoxemic Respiratory Failure  # Asthma  # Bronchiectasis  CT Angio r/o PE; currently thought to be due to Asthma exacerbation vs bronchiectasis vs ?PNA;   - tachypneic on BiPAP to 30s, saturating 100% but is tiring out as of now  - intubate and sedate to assist with airway clearance  - chest PT; airway clearance; hypertonic saline  - daily SBT/SATs  - latest ABG is 7.46| 38|132  - continue solumedrol 40mg q6h  - DuoNebs q6h   - currently of Cefepime (1/10-) for empiric coverage; can do 5 day course  - follow-up Legionella antigen, final BCx read; RVP negative    RENAL  - no active issues currently    GI   - no active issues currently  - Diet: NPO on vent; place OG and start tube feeds  - Bowel regimen: miralax, senna    HEME/ONC  # Leukocytosis  No active indications of infections, all infectious workup negative so far; likely in s/o solumedrol usage but no diff to conform neutrophilia so far  - continue to monitor    ID  # Sepsis  CT Chest w/ bilateral lower lobe predominant patchy and branching opacities suspicious for pneumonia. Small left pleural effusion. Leukocytosis, tachycardia and tachypnea; No active indications of infections, all infectious workup negative so far; leukocytosis likely in s/o solumedrol usage but no diff to conform neutrophilia so far; tachycardia likely in s/o tachypnea and AHRF  - Continue Cefepime (1/10-) for empiric PNA coverage  - follow-up SCx, Urine legionella antigen, final BCx, BAL cultures    ENDO  # Hyponatremia  Na 132; Na 131 in 06/22, may be chronic  - continue fluid resuscitation   - monitor daily BMPs    # T2DM  A1c 8.1 1/09/23  - place on sliding scale NPH    DVT  Lovenox 50mg BID    ETHICS  Full Code     ASSESSMENT/PLAN:  77-year-old female with past medical history of DM, HTN, HLD, asthma on daily steroids, A. fib on Eliquis and digoxin presenting with shortness of breath, found to be in Afib w/RVR; admitted to MICU for Acute Hypoxic Respiratory Failure in s/o Asthma and Bronchiectasis w/ possible PNA, leading to intubation and sedation.     NEURO  # Intubated & Sedated  - Mental status was at baseline before intubation; intubated for Acute Hypoxic Respiratory Failure  - ABG w/o hypercapnia; PCO2 38; no signs of hypercapnic encephalopathy  - RASS goal 0 to -1  - Daily SAT/SBT    CARDIAC  # Afib w/ RVR  Admitted in RVR; being followed by Dr. Marcus, Cardiology; Afib w/ RVR thought to be due to stress from hypoxemia  - HR in the 130s 1/10  - Continue Diltiazem as per Cardiology; currently at Dilt 90mg q6h  - follow-up Echo  - follow-up TSH results    # HTN  No indication of hypotension.  - currently on Diltiazem 90mg q6h  - closely monitor BP  - can d/c fentanyl     PULM  # Acute Hypoxemic Respiratory Failure  # Asthma  # Bronchiectasis  CT Angio r/o PE; currently thought to be due to Asthma exacerbation vs bronchiectasis vs ?PNA;   - tachypneic on BiPAP to 30s, saturating 100% but is tiring out as of now  - intubate and sedate to assist with airway clearance  - chest PT; airway clearance; hypertonic saline  - daily SBT/SATs  - latest ABG is 7.46| 38|132  - decrease to solumedrol 20q6h  - DuoNebs q6h   - currently of Cefepime (1/10-) for empiric coverage; can do 5 day course  - follow-up Legionella antigen, final BCx read; RVP negative    RENAL  - no active issues currently    GI   - no active issues currently  - Diet: NPO on vent; place OG and start tube feeds  - Bowel regimen: miralax, senna    HEME/ONC  # Leukocytosis  No active indications of infections, all infectious workup negative so far; likely in s/o solumedrol usage but no diff to conform neutrophilia so far  - continue to monitor    ID  # Sepsis  CT Chest w/ bilateral lower lobe predominant patchy and branching opacities suspicious for pneumonia. Small left pleural effusion. Leukocytosis, tachycardia and tachypnea; No active indications of infections, all infectious workup negative so far; leukocytosis likely in s/o solumedrol usage but no diff to conform neutrophilia so far; tachycardia likely in s/o tachypnea and AHRF  - Continue Cefepime (1/10-) for empiric PNA coverage  - follow-up SCx, Urine legionella antigen, final BCx, BAL cultures    ENDO  # Hyponatremia  Na 132; Na 131 in 06/22, may be chronic  - continue fluid resuscitation   - monitor daily BMPs    # T2DM  A1c 8.1 1/09/23  - place on sliding scale NPH    DVT  Lovenox 50mg BID    ETHICS  Full Code     ASSESSMENT/PLAN:  77-year-old female with past medical history of DM, HTN, HLD, asthma on daily steroids, A. fib on Eliquis and digoxin presenting with shortness of breath, found to be in Afib w/RVR; admitted to MICU for Acute Hypoxic Respiratory Failure in s/o Asthma and Bronchiectasis w/ possible PNA, leading to intubation and sedation.     NEURO  # Intubated & Sedated  - Mental status was at baseline before intubation; intubated for Acute Hypoxic Respiratory Failure  - ABG w/o hypercapnia; PCO2 38; no signs of hypercapnic encephalopathy  - RASS goal 0 to -1  - Daily SAT/SBT    CARDIAC  # Afib w/ RVR  Admitted in RVR; being followed by Dr. Marcus, Cardiology; Afib w/ RVR thought to be due to stress from hypoxemia  - HR in the 130s 1/10  - Continue Diltiazem as per Cardiology; currently at Dilt 90mg q6h  - follow-up Echo  - follow-up TSH results    # HTN  No indication of hypotension.  - currently on Diltiazem 90mg q6h  - closely monitor BP  - can d/c fentanyl     PULM  # Acute Hypoxemic Respiratory Failure  # Asthma  # Bronchiectasis  CT Angio r/o PE; currently thought to be due to Asthma exacerbation vs bronchiectasis vs ?PNA;   - tachypneic on BiPAP to 30s, saturating 100% but is tiring out as of now  - intubate and sedate to assist with airway clearance  - chest PT; airway clearance; hypertonic saline; IPV  - daily SBT/SATs  - latest ABG is 7.46| 32|211  - decrease to solumedrol 20q6h  - DuoNebs q6h   - currently of Cefepime (1/10-) for empiric coverage; can do 5 day course  - follow-up Legionella antigen, final BCx read; RVP negative; BAL cx    RENAL  # SHANE  Cr shot up to 2.36 <- 1.87 <- 1.97 <- 0.64  - Likely prerenal in the setting of low PO intake   - Follow-up urine lytes  - Maintenance fluids started: 60cc/hr    GI   - no active issues currently  - Diet: NPO on vent; place OG and start tube feeds  - Bowel regimen: miralax, senna    HEME/ONC  # Leukocytosis  WBC now 41.8; increasing  No active indications of infections, all infectious workup negative so far; likely in s/o solumedrol usage, with neutrophilia  - continue to monitor    # INR 11.1 (00:00 1/11)-> 4.5 (6 AM 1/11)  - Given Vitamin K overnight  - check afternoon PT/INR     ID  # Sepsis  CT Chest w/ bilateral lower lobe predominant patchy and branching opacities suspicious for pneumonia. Small left pleural effusion. Leukocytosis, tachycardia and tachypnea; No active indications of infections, all infectious workup negative so far; leukocytosis likely in s/o solumedrol usage but no diff to conform neutrophilia so far; tachycardia likely in s/o tachypnea and AHRF  - Continue Cefepime (1/10-) for empiric PNA coverage  - follow-up SCx, Urine legionella antigen, final BCx, BAL cultures    ENDO  # Hyponatremia  Na 126today  <- 132<- 131 in 06/22, may be chronic  - continue fluid resuscitation   - monitor daily BMPs  - Urine lytes  - Maintenance fluids at 60 cc's/hr    # T2DM  A1c 8.1 1/09/23  BGs high; likely in s/o steroid usage  - place on sliding scale NPH  - admelog     DVT  - Hold Lovenox 50mg BID    ETHICS  Full Code

## 2023-01-11 NOTE — PATIENT PROFILE ADULT - FALL HARM RISK - HARM RISK INTERVENTIONS

## 2023-01-11 NOTE — DIETITIAN INITIAL EVALUATION ADULT - NSFNSADHERENCEPTAFT_GEN_A_CORE
Pt was not on therapeutic diet PTA secondary to poor PO intake. Warfarin noted in H&P, however per daughter changed to Eliquis ~2 years ago.     Hx of T2DM; took metformin. Checked BG in the morning and before dinner. Recent A1C 8.1 % (01-09-23 @ 06:11) indicates fair glycemic control in setting of advanced age.

## 2023-01-11 NOTE — DIETITIAN INITIAL EVALUATION ADULT - NSFNSPHYEXAMSKINFT_GEN_A_CORE
Pressure Injury 1: lumbar spine, Suspected deep tissue injury  Pressure Injury 2: Left:,ischium, Stage II  Pressure Injury 3: none, none  Pressure Injury 4: none, none  Pressure Injury 5: none, none  Pressure Injury 6: none, none  Pressure Injury 7: none, none  Pressure Injury 8: none, none  Pressure Injury 9: none, none  Pressure Injury 10: none, none  Pressure Injury 11: none, none, Pressure Injury 1: lumbar spine, Suspected deep tissue injury  Pressure Injury 2: Left:,ischium, Stage II  Pressure Injury 3: none, none  Pressure Injury 4: none, none  Pressure Injury 5: none, none  Pressure Injury 6: none, none  Pressure Injury 7: none, none  Pressure Injury 8: none, none  Pressure Injury 9: none, none  Pressure Injury 10: none, none  Pressure Injury 11: none, none Pressure Injury 1: lumbar spine, Suspected deep tissue injury  Pressure Injury 2: Left:,ischium, Stage II

## 2023-01-11 NOTE — DIETITIAN INITIAL EVALUATION ADULT - REASON INDICATOR FOR ASSESSMENT
Pt seen for length of stay  Source: Medical record and daughter at bedside (pt intubated + sedated)  Pt seen for length of stay and pressure injury stage 2 or >  Source: Medical record and daughter at bedside (pt intubated + sedated)

## 2023-01-11 NOTE — PROCEDURE NOTE - NSBRONCHPROCDETAILS_GEN_A_CORE_FT
Pt was intubated and sedated in the MICU. Bronchoscope was inserted through ETT. ETT noted to be in good position. Airway evaluation revealed jack without any abnormalities. Scant mucous noted throughout airways with airway friability throughout. Bronchoscope wedged in RML and 60cc of normal saline instilled with good return. BAL studies sent off.     Bronchoscope then withdrawn from ETT.    Pt tolerated procedure well with no complications.

## 2023-01-11 NOTE — PATIENT PROFILE ADULT - FUNCTIONAL ASSESSMENT - BASIC MOBILITY 6.
1-calculated by average/Not able to assess (calculate score using [facility] averaging method)

## 2023-01-11 NOTE — PROCEDURE NOTE - NSINFORMCONSENT_GEN_A_CORE
Benefits, risks, and possible complications of procedure explained to patient/caregiver who verbalized understanding and gave verbal consent.
obtained from fam members at bedside/Benefits, risks, and possible complications of procedure explained to patient/caregiver who verbalized understanding and gave verbal consent.
This was an emergent procedure.

## 2023-01-11 NOTE — DIETITIAN INITIAL EVALUATION ADULT - ORAL INTAKE PTA/DIET HISTORY
Pt with significant decrease in PO intake and appetite  Pt with significant decrease in PO intake and appetite 2 weeks PTA. Allergy to fish/shellfish noted resulting in vomiting.

## 2023-01-11 NOTE — DIETITIAN INITIAL EVALUATION ADULT - ADD RECOMMEND
As medically feasible, recommend multivitamin and thiamine for refeeding risk with Vitamin C to aid in wound healing. Malnutrition alert placed in chart. Continue to trend labs, weight, skin integrity, and intake.

## 2023-01-11 NOTE — PROGRESS NOTE ADULT - SUBJECTIVE AND OBJECTIVE BOX
infectious diseases progress note:    Patient is a 77y old  Female who presents with a chief complaint of sob/ rapid a.fib (11 Jan 2023 07:06)        Acute respiratory failure with hypoxia               Allergies    Avelox (Pruritus)  Levaquin (Unknown)    Intolerances        ANTIBIOTICS/RELEVANT:  antimicrobials  cefepime   IVPB 1000 milliGRAM(s) IV Intermittent every 12 hours    immunologic:    OTHER:  acetaminophen     Tablet .. 650 milliGRAM(s) Oral every 6 hours PRN  atorvastatin 40 milliGRAM(s) Oral at bedtime  chlorhexidine 0.12% Liquid 15 milliLiter(s) Oral Mucosa every 12 hours  chlorhexidine 4% Liquid 1 Application(s) Topical <User Schedule>  dextrose 5%. 1000 milliLiter(s) IV Continuous <Continuous>  dextrose 5%. 1000 milliLiter(s) IV Continuous <Continuous>  dextrose 50% Injectable 25 Gram(s) IV Push once  dextrose 50% Injectable 12.5 Gram(s) IV Push once  dextrose 50% Injectable 25 Gram(s) IV Push once  dextrose Oral Gel 15 Gram(s) Oral once PRN  diltiazem    Tablet 90 milliGRAM(s) Oral every 6 hours  fentaNYL    Injectable 50 MICROGram(s) IV Push every 2 hours PRN  glucagon  Injectable 1 milliGRAM(s) IntraMuscular once  insulin lispro (ADMELOG) corrective regimen sliding scale   SubCutaneous every 6 hours  ipratropium    for Nebulization 500 MICROGram(s) Nebulizer every 6 hours  levalbuterol Inhalation 0.63 milliGRAM(s) Inhalation every 8 hours  methylPREDNISolone sodium succinate Injectable 40 milliGRAM(s) IV Push every 8 hours  methylPREDNISolone sodium succinate Injectable   IV Push   oxyCODONE    IR 5 milliGRAM(s) Oral every 8 hours PRN  phenylephrine    Infusion 1.2 MICROgram(s)/kG/Min IV Continuous <Continuous>  propofol Infusion 10 MICROgram(s)/kG/Min IV Continuous <Continuous>      Objective:  Vital Signs Last 24 Hrs  T(C): 38.3 (11 Jan 2023 07:00), Max: 38.3 (11 Jan 2023 06:00)  T(F): 100.9 (11 Jan 2023 07:00), Max: 100.9 (11 Jan 2023 06:00)  HR: 102 (11 Jan 2023 07:15) (91 - 170)  BP: 110/50 (11 Jan 2023 07:15) (66/48 - 160/84)  BP(mean): 68 (11 Jan 2023 07:15) (53 - 111)  RR: 20 (11 Jan 2023 07:15) (18 - 38)  SpO2: 100% (11 Jan 2023 07:15) (93% - 100%)    Parameters below as of 11 Jan 2023 05:32  Patient On (Oxygen Delivery Method): ventilator           Eyes:ROSA, EOMI  Ear/Nose/Throat: no oral lesion, no sinus tenderness on percussion	  Neck:no JVD, no lymphadenopathy, supple  Respiratory: CTA hansel  Cardiovascular: S1S2 RRR, no murmurs  Gastrointestinal:soft, (+) BS, no HSM  Extremities:no e/e/c        LABS:                        12.8   41.29 )-----------( 348      ( 11 Jan 2023 06:19 )             38.4     01-11    126<L>  |  83<L>  |  48<H>  ----------------------------<  244<H>  4.7   |  19<L>  |  2.36<H>    Ca    9.0      11 Jan 2023 06:19  Phos  6.1     01-11  Mg     2.0     01-11    TPro  5.5<L>  /  Alb  2.4<L>  /  TBili  0.4  /  DBili  x   /  AST  19  /  ALT  8<L>  /  AlkPhos  129<H>  01-11    PT/INR - ( 11 Jan 2023 06:19 )   PT: 53.7 sec;   INR: 4.60 ratio         PTT - ( 11 Jan 2023 06:19 )  PTT:43.1 sec        MICROBIOLOGY:    RECENT CULTURES:  01-08 @ 14:35 .Blood Blood-Peripheral                No growth to date.    01-08 @ 14:26 Clean Catch Clean Catch (Midstream)                >=3 organisms. Probable collection contamination.          RESPIRATORY CULTURES:              RADIOLOGY & ADDITIONAL STUDIES:        Pager [phone]  After 5 pm/weekends or if no response :[phone]

## 2023-01-11 NOTE — DIETITIAN INITIAL EVALUATION ADULT - NS FNS DIET ORDER
Diet, NPO with Tube Feed:   Tube Feeding Modality: Orogastric  Glucerna 1.5 Gavin (GLUCERNA1.5RTH)  Total Volume for 24 Hours (mL): 720  Intermittent  Starting Tube Feed Rate {mL per Hour}: 10  Increase Tube Feed Rate by (mL): 10    Every 4 hours  Until Goal Tube Feed Rate (mL per Hour): 40  Tube Feeding Hours ON: 18  Tube Feeding OFF (Hours): 6  Tube Feed Start Time: 11:00 (01-11-23 @ 03:31)

## 2023-01-11 NOTE — DIETITIAN INITIAL EVALUATION ADULT - OTHER INFO
Wt Hx:   Dosing wt 37.7 kG/83.1 lbs.   UBW ~90 lbs with at least 5 lb unintentional wt loss over last 2 years. Ht per daughter: 62-63 inches IBW: 112.5 lbs   IBW%: 74%  Wt Hx per HIE (lbs): 101 (6/12/20), 100 (12/16/21), 95 (6/2/22).   Wt from 6/2/22 vs current dosing wt indicates 12.5% wt loss x6 months.     Nutrition-Related Concerns:   - Intubated 1/10. On propofol at current rate 6.8 mL/hr, if to continue at current rate x24 hours provides ~180 kcals  - Pressors: phenylephrine at 2.1 MICROgram(s)/kG/Min  - SHANE. Phosphorus trending up   - Sepsis  - Elevated BG noted, noted on steroid which can elevate BG. Ordered for sliding scale of insulin   - Hyponatremia. Ordered for sodium chloride 0.9% at 60 mL/hr

## 2023-01-11 NOTE — PROGRESS NOTE ADULT - SUBJECTIVE AND OBJECTIVE BOX
CARDIOLOGY     PROGRESS  NOTE   ________________________________________________    CHIEF COMPLAINT:Patient is a 77y old  Female who presents with a chief complaint of sob/ rapid a.fib (11 Jan 2023 08:36)  pt transferred to icu yesterday intubated sedated  	  REVIEW OF SYSTEMS:  CONSTITUTIONAL: No fever, weight loss, or fatigue  EYES: No eye pain, visual disturbances, or discharge  ENT:  No difficulty hearing, tinnitus, vertigo; No sinus or throat pain  NECK: No pain or stiffness  RESPIRATORY: No cough, wheezing, chills or hemoptysis; No Shortness of Breath  CARDIOVASCULAR: No chest pain, palpitations, passing out, dizziness, or leg swelling  GASTROINTESTINAL: No abdominal or epigastric pain. No nausea, vomiting, or hematemesis; No diarrhea or constipation. No melena or hematochezia.  GENITOURINARY: No dysuria, frequency, hematuria, or incontinence  NEUROLOGICAL: No headaches, memory loss, loss of strength, numbness, or tremors  SKIN: No itching, burning, rashes, or lesions   LYMPH Nodes: No enlarged glands  ENDOCRINE: No heat or cold intolerance; No hair loss  MUSCULOSKELETAL: No joint pain or swelling; No muscle, back, or extremity pain  PSYCHIATRIC: No depression, anxiety, mood swings, or difficulty sleeping  HEME/LYMPH: No easy bruising, or bleeding gums  ALLERGY AND IMMUNOLOGIC: No hives or eczema	    [ ] All others negative	  [x ] Unable to obtain    PHYSICAL EXAM:  T(C): 38.3 (01-11-23 @ 07:00), Max: 38.3 (01-11-23 @ 06:00)  HR: 91 (01-11-23 @ 08:54) (91 - 160)  BP: 110/50 (01-11-23 @ 07:15) (66/48 - 160/84)  RR: 20 (01-11-23 @ 07:15) (18 - 38)  SpO2: 100% (01-11-23 @ 08:54) (93% - 100%)  Wt(kg): --  I&O's Summary    10 Tim 2023 07:01  -  11 Jan 2023 07:00  --------------------------------------------------------  IN: 578.8 mL / OUT: 400 mL / NET: 178.8 mL        Appearance: sedated on vent	  HEENT:   Normal oral mucosa, PERRL, EOMI	  Lymphatic: No lymphadenopathy  Cardiovascular: Normal S1 S2, No JVD, + murmurs, No edema  Respiratory: Lungs clear to auscultation	  Gastrointestinal:  Soft, Non-tender, + BS	  Skin: No rashes, No ecchymoses, No cyanosis	  Neurologic: Non-focal  Extremities: Normal range of motion, No clubbing, cyanosis or edema  Vascular: Peripheral pulses palpable 2+ bilaterally    MEDICATIONS  (STANDING):  atorvastatin 40 milliGRAM(s) Oral at bedtime  cefepime   IVPB 1000 milliGRAM(s) IV Intermittent every 12 hours  chlorhexidine 0.12% Liquid 15 milliLiter(s) Oral Mucosa every 12 hours  chlorhexidine 4% Liquid 1 Application(s) Topical <User Schedule>  dextrose 5%. 1000 milliLiter(s) (100 mL/Hr) IV Continuous <Continuous>  dextrose 5%. 1000 milliLiter(s) (50 mL/Hr) IV Continuous <Continuous>  dextrose 50% Injectable 25 Gram(s) IV Push once  dextrose 50% Injectable 12.5 Gram(s) IV Push once  dextrose 50% Injectable 25 Gram(s) IV Push once  diltiazem    Tablet 90 milliGRAM(s) Oral every 6 hours  glucagon  Injectable 1 milliGRAM(s) IntraMuscular once  insulin lispro (ADMELOG) corrective regimen sliding scale   SubCutaneous every 6 hours  ipratropium    for Nebulization 500 MICROGram(s) Nebulizer every 6 hours  levalbuterol Inhalation 0.63 milliGRAM(s) Inhalation every 8 hours  methylPREDNISolone sodium succinate Injectable 40 milliGRAM(s) IV Push every 8 hours  methylPREDNISolone sodium succinate Injectable   IV Push   phenylephrine    Infusion 1.2 MICROgram(s)/kG/Min (17 mL/Hr) IV Continuous <Continuous>  propofol Infusion 10 MICROgram(s)/kG/Min (2.26 mL/Hr) IV Continuous <Continuous>      TELEMETRY: 	    ECG:  	  RADIOLOGY:  OTHER: 	  	  LABS:	 	    CARDIAC MARKERS:                                12.8   41.29 )-----------( 348      ( 11 Jan 2023 06:19 )             38.4     01-11    126<L>  |  83<L>  |  48<H>  ----------------------------<  244<H>  4.7   |  19<L>  |  2.36<H>    Ca    9.0      11 Jan 2023 06:19  Phos  6.1     01-11  Mg     2.0     01-11    TPro  5.5<L>  /  Alb  2.4<L>  /  TBili  0.4  /  DBili  x   /  AST  19  /  ALT  8<L>  /  AlkPhos  129<H>  01-11    proBNP:   Lipid Profile:   HgA1c:   TSH: Thyroid Stimulating Hormone, Serum: 1.17 uIU/mL (01-09 @ 06:11)    PT/INR - ( 11 Jan 2023 06:19 )   PT: 53.7 sec;   INR: 4.60 ratio         PTT - ( 11 Jan 2023 06:19 )  PTT:43.1 sec      Assessment and plan  ---------------------------  77-year-old female with past medical history of DM, HTN, HLD, asthma on daily steroids, A. fib on Eliquis and digoxin presenting with shortness of breath.  Patient unable to provide history.  Daughter at bedside reports patient has had increasing shortness of breath, wheezing, cough for the past week now.  Patient also with increasing generalized weakness, fatigue and decreased p.o. intake.  Denies fever/chills, chest pain, abdominal pain, nausea, vomiting, diarrhea, dysuria.  Family reports patient had a similar episode 1 year ago when she was admitted to [facility] with UTI.  pt with sig medical hx, chronic a.fib with sob ?sec sepsis/ pneumoniae, UTI  sedated on vent  continue cardizem for rate control  AC for a.fib

## 2023-01-11 NOTE — PROGRESS NOTE ADULT - ASSESSMENT
77-year-old female with past medical history of DM, HTN, HLD, asthma on daily steroids, A. fib on Eliquis and digoxin presenting with shortness of breath.  Patient unable to provide history.  Daughter at bedside reports patient has had increasing shortness of breath, wheezing, cough for the past week now.  Patient also with increasing generalized weakness, fatigue and decreased p.o. intake.  Denies fever/chills, chest pain, abdominal pain, nausea, vomiting, diarrhea, dysuria.  Family reports patient had a similar episode 1 year ago when she was admitted to [facility] with UTI.  pt with sig medical hx, chronic a.fib with sob ?sec sepsis/ pneumoniae, UTI  start on ceftriaxone  tele  for increase hr, will increase Cardizem dose  continue AC  cardiac enzyme  tsh  echo  RVP negative      ASTHMA/ COPD exacerbation;   A Fibrillation  with rvr:  DM:  HTN:   HLD:     1/09:    ASTHMA/ COPD exacerbation; she was using performist at home with 5 mg of prednisone  not sure why she was not on any other meds:  sill start Symbicort too:  along with BD and is already on cefepime:  though pneumonia to me is not very convincing : will probably need ct scan chest   A Fibrillation  with rvr: Hr still elevated:  on cardizem : would defer to cards:  on ac:  lovenox  + coumadin : check echo   DM:: monitor and control   HTN: : controlled  HLD: on statins  :    dw team    1/10:    ASTHMA/ COPD exacerbation; she was using performist at home with 5 mg of prednisone  not sure why she was not on any other meds:  sill start Symbicort too:  along with BD and is already on cefepime:  WBC increased : though pneumonia to me is not very convincing : will probably need ct scan chest : she rused for ct chest : increase steorids 40 mg Q 6 hours : ABG today seems reasonable   A Fibrillation  with rvr: Hr still elevated:  on cardizem : HR is till very high  :   DM:: monitor and control   HTN: : controlled  HLD: on statins:    MICU  consult:  high risk for intubation:   :    edy and daughter    1/11:    ASTHMA/ COPD exacerbation; now s/p bronch: RML lavage done:  wait cultures results: she was using performist at home with 5 mg of prednisone  not sure why she was not on any other meds:  Cont BD:  and antibiotics  WBC still increased :on cefepime:    A Fibrillation  with rvr: Hr still elevated:  on cardizem : HR is better now:  on cardiem   DM:: monitor and control   HTN: : controlled  HLD: on statins:    josias perez  ATTENDING  :

## 2023-01-11 NOTE — DIETITIAN INITIAL EVALUATION ADULT - PERTINENT MEDS FT
MEDICATIONS  (STANDING):  atorvastatin 40 milliGRAM(s) Oral at bedtime  azithromycin  IVPB 500 milliGRAM(s) IV Intermittent <User Schedule>  cefepime   IVPB 1000 milliGRAM(s) IV Intermittent every 12 hours  chlorhexidine 0.12% Liquid 15 milliLiter(s) Oral Mucosa every 12 hours  chlorhexidine 4% Liquid 1 Application(s) Topical <User Schedule>  dextrose 5%. 1000 milliLiter(s) (100 mL/Hr) IV Continuous <Continuous>  dextrose 5%. 1000 milliLiter(s) (50 mL/Hr) IV Continuous <Continuous>  dextrose 50% Injectable 25 Gram(s) IV Push once  dextrose 50% Injectable 12.5 Gram(s) IV Push once  dextrose 50% Injectable 25 Gram(s) IV Push once  diltiazem    Tablet 90 milliGRAM(s) Oral every 6 hours  glucagon  Injectable 1 milliGRAM(s) IntraMuscular once  insulin lispro (ADMELOG) corrective regimen sliding scale   SubCutaneous every 6 hours  ipratropium    for Nebulization 500 MICROGram(s) Nebulizer every 6 hours  levalbuterol Inhalation 0.63 milliGRAM(s) Inhalation every 8 hours  methylPREDNISolone sodium succinate Injectable 20 milliGRAM(s) IV Push every 8 hours  phenylephrine    Infusion 1.2 MICROgram(s)/kG/Min (17 mL/Hr) IV Continuous <Continuous>  propofol Infusion 10 MICROgram(s)/kG/Min (2.26 mL/Hr) IV Continuous <Continuous>  sodium chloride 0.9%. 1000 milliLiter(s) (60 mL/Hr) IV Continuous <Continuous>  sodium chloride 3%  Inhalation 4 milliLiter(s) Inhalation every 12 hours    MEDICATIONS  (PRN):  acetaminophen     Tablet .. 650 milliGRAM(s) Oral every 6 hours PRN Temp greater or equal to 38C (100.4F), Mild Pain (1 - 3)  dextrose Oral Gel 15 Gram(s) Oral once PRN Blood Glucose LESS THAN 70 milliGRAM(s)/deciliter

## 2023-01-11 NOTE — PROGRESS NOTE ADULT - ASSESSMENT
77 year old with  dementia, DM, on home oxygen for chronic pulmonary disease, non ambulatory  Admitted for sob, anorexia, malaise for the last 8 days  CXR with Left Lung base ? pneumonia  Worsening leukocytosis, no fever  SOB/hypoxia on bipap  Worsening WBC--given solumedrol  Treat for bacterial pneumonia    - Cefepime 1g q 12 (decreased dose)    CT chest today--f reviewed  likely pna and underlying  bronchiectasis   - O2 supplementation per team  check for legionella urine ag

## 2023-01-11 NOTE — CHART NOTE - NSCHARTNOTEFT_GEN_A_CORE
: Dayo Quinonez, Ashok Gallegos    INDICATION: Respiratory failure    PROCEDURE:  [ x ] LIMITED ECHO  [ x ] LIMITED CHEST  [ ] LIMITED RETROPERITONEAL  [ x ] LIMITED ABDOMINAL  [ ] LIMITED DVT  [ ] NEEDLE GUIDANCE VASCULAR  [ ] NEEDLE GUIDANCE THORACENTESIS  [ ] NEEDLE GUIDANCE PARACENTESIS  [ ] NEEDLE GUIDANCE PERICARDIOCENTESIS  [ ] OTHER    FINDINGS:  Lungs: A line predominance. Lung sliding b/l. Minimal pleural effusions b/l, no consolidations.  Heart: Normal LVSF. LV > RV. Thickened RV free wall indicating chronicity. RV free wall collapse noted during systole. Small pericardial effusion. IVC indeterminate  Abd: Bladder decompressed. Morales catheter balloon visualized in the bladder.     INTERPRETATION:  Normal aeration pattern.  RV free wall collapse noted during systole. Small pericardial effusion. Formal echo pending.  Morales in place.    Images uploaded on [employer]. Supervised by attending physician Ashok Fitch  TEAMS : Dayo Quinonez, Ashok Gallegos    INDICATION: Respiratory failure    PROCEDURE:  [ x ] LIMITED ECHO  [ x ] LIMITED CHEST  [ ] LIMITED RETROPERITONEAL  [ x ] LIMITED ABDOMINAL  [ ] LIMITED DVT  [ ] NEEDLE GUIDANCE VASCULAR  [ ] NEEDLE GUIDANCE THORACENTESIS  [ ] NEEDLE GUIDANCE PARACENTESIS  [ ] NEEDLE GUIDANCE PERICARDIOCENTESIS  [ ] OTHER    FINDINGS:  Lungs: A line predominance. Lung sliding b/l. Minimal pleural effusions b/l, no consolidations.  Heart: Normal LVSF. LV > RV. Thickened RV free wall indicating chronicity. RV free wall collapse noted during systole. Small pericardial effusion. IVC indeterminate  Abd: Bladder decompressed. Morales catheter balloon visualized in the bladder.     INTERPRETATION:  Normal aeration pattern.  RV free wall collapse noted during systole. Small pericardial effusion. Formal echo pending.  Morales in place.    Images uploaded on [employer]. Supervised by attending physician Ashok Fitch  TEAMS    I have assisted and supervised entire procedure.                            Ashok Gallegos MD

## 2023-01-11 NOTE — DIETITIAN INITIAL EVALUATION ADULT - PERTINENT LABORATORY DATA
01-11    126<L>  |  83<L>  |  48<H>  ----------------------------<  244<H>  4.7   |  19<L>  |  2.36<H>    Ca    9.0      11 Jan 2023 06:19  Phos  6.1     01-11  Mg     2.0     01-11    TPro  5.5<L>  /  Alb  2.4<L>  /  TBili  0.4  /  DBili  x   /  AST  19  /  ALT  8<L>  /  AlkPhos  129<H>  01-11  POCT Blood Glucose.: 233 mg/dL (01-11-23 @ 06:03)  A1C with Estimated Average Glucose Result: 8.1 % (01-09-23 @ 06:11)

## 2023-01-11 NOTE — PROGRESS NOTE ADULT - SUBJECTIVE AND OBJECTIVE BOX
Date of Service: 01-11-23 @ 13:26    Patient is a 77y old  Female who presents with a chief complaint of Acute respiratory failure with hypoxia     (11 Jan 2023 11:17)      Any change in ROS:  lindsay curiel:  ibrahima noted:  pt is now intubated and is on full [facility] vent support     MEDICATIONS  (STANDING):  atorvastatin 40 milliGRAM(s) Oral at bedtime  azithromycin  IVPB 500 milliGRAM(s) IV Intermittent <User Schedule>  cefepime   IVPB 1000 milliGRAM(s) IV Intermittent every 12 hours  chlorhexidine 0.12% Liquid 15 milliLiter(s) Oral Mucosa every 12 hours  chlorhexidine 4% Liquid 1 Application(s) Topical <User Schedule>  dextrose 5%. 1000 milliLiter(s) (100 mL/Hr) IV Continuous <Continuous>  dextrose 5%. 1000 milliLiter(s) (50 mL/Hr) IV Continuous <Continuous>  dextrose 50% Injectable 25 Gram(s) IV Push once  dextrose 50% Injectable 12.5 Gram(s) IV Push once  dextrose 50% Injectable 25 Gram(s) IV Push once  diltiazem    Tablet 90 milliGRAM(s) Oral every 6 hours  glucagon  Injectable 1 milliGRAM(s) IntraMuscular once  insulin lispro (ADMELOG) corrective regimen sliding scale   SubCutaneous every 6 hours  ipratropium    for Nebulization 500 MICROGram(s) Nebulizer every 6 hours  levalbuterol Inhalation 0.63 milliGRAM(s) Inhalation every 8 hours  methylPREDNISolone sodium succinate Injectable 20 milliGRAM(s) IV Push every 8 hours  phenylephrine    Infusion 1.2 MICROgram(s)/kG/Min (17 mL/Hr) IV Continuous <Continuous>  propofol Infusion 10 MICROgram(s)/kG/Min (2.26 mL/Hr) IV Continuous <Continuous>  sodium chloride 0.9%. 1000 milliLiter(s) (60 mL/Hr) IV Continuous <Continuous>  sodium chloride 3%  Inhalation 4 milliLiter(s) Inhalation every 12 hours    MEDICATIONS  (PRN):  acetaminophen     Tablet .. 650 milliGRAM(s) Oral every 6 hours PRN Temp greater or equal to 38C (100.4F), Mild Pain (1 - 3)  dextrose Oral Gel 15 Gram(s) Oral once PRN Blood Glucose LESS THAN 70 milliGRAM(s)/deciliter    Vital Signs Last 24 Hrs  T(C): 36.4 (11 Jan 2023 12:00), Max: 38.3 (11 Jan 2023 06:00)  T(F): 97.5 (11 Jan 2023 12:00), Max: 100.9 (11 Jan 2023 06:00)  HR: 113 (11 Jan 2023 12:45) (69 - 160)  BP: 108/59 (11 Jan 2023 12:45) (66/48 - 160/84)  BP(mean): 78 (11 Jan 2023 12:45) (53 - 111)  RR: 40 (11 Jan 2023 12:45) (18 - 51)  SpO2: 100% (11 Jan 2023 12:45) (93% - 100%)    Parameters below as of 11 Jan 2023 11:16  Patient On (Oxygen Delivery Method): IPV      Mode: AC/ CMV (Assist Control/ Continuous Mandatory Ventilation)  RR (machine): 20  TV (machine): 350  FiO2: 30  PEEP: 5  ITime: 1  MAP: 12  PIP: 33    I&O's Summary    10 Tim 2023 07:01  -  11 Jan 2023 07:00  --------------------------------------------------------  IN: 578.8 mL / OUT: 400 mL / NET: 178.8 mL    11 Jan 2023 07:01  -  11 Jan 2023 13:26  --------------------------------------------------------  IN: 835 mL / OUT: 900 mL / NET: -65 mL          Physical Exam:   GENERAL: Thin cachectic women  HEENT: ROSA/   Atraumatic, Normocephalic  ENMT: No tonsillar erythema, exudates, or enlargement; Moist mucous membranes, Good dentition, No lesions  NECK: Supple, No JVD, Normal thyroid  CHEST/LUNG: poor air entry  CVS: Regular rate and rhythm; No murmurs, rubs, or gallops  GI: : Soft, Nontender, Nondistended; Bowel sounds present  NERVOUS SYSTEM:  sedatd and intubated  EXTREMITIES: - edema  LYMPH: No lymphadenopathy noted  SKIN: No rashes or lesions  ENDOCRINOLOGY: No Thyromegaly  PSYCH: sedated and intubated    Labs:  ABG - ( 11 Jan 2023 00:21 )  pH, Arterial: 7.46  pH, Blood: x     /  pCO2: 32    /  pO2: 211   / HCO3: 23    / Base Excess: -0.3  /  SaO2: 100.0           60.0, 37                            12.8   41.29 )-----------( 348      ( 11 Jan 2023 06:19 )             38.4                         12.5   40.54 )-----------( 359      ( 11 Jan 2023 01:38 )             37.4                         12.3   41.70 )-----------( 352      ( 11 Jan 2023 00:31 )             37.2                         13.1   33.40 )-----------( 302      ( 10 Tim 2023 07:06 )             40.7                         13.2   30.66 )-----------( 265      ( 09 Jan 2023 06:11 )             41.5                         13.9   23.80 )-----------( 284      ( 08 Jan 2023 11:57 )             44.4     01-11    126<L>  |  83<L>  |  48<H>  ----------------------------<  244<H>  4.7   |  19<L>  |  2.36<H>  01-11    125<L>  |  83<L>  |  43<H>  ----------------------------<  209<H>  4.2   |  21<L>  |  1.87<H>  01-11    128<L>  |  88<L>  |  46<H>  ----------------------------<  216<H>  3.8   |  20<L>  |  1.97<H>  01-10    131<L>  |  85<L>  |  24<H>  ----------------------------<  291<H>  3.4<L>   |  25  |  0.88  01-09    133<L>  |  89<L>  |  15  ----------------------------<  197<H>  3.6   |  26  |  0.69  01-08    132<L>  |  90<L>  |  11  ----------------------------<  220<H>  4.1   |  27  |  0.64    Ca    9.0      11 Jan 2023 06:19  Ca    8.9      11 Jan 2023 01:38  Ca    9.1      11 Jan 2023 00:31  Ca    9.2      10 Tim 2023 07:06  Phos  6.1     01-11  Phos  4.8     01-11  Phos  4.9     01-11  Mg     2.0     01-11  Mg     2.0     01-11  Mg     2.0     01-11    TPro  5.5<L>  /  Alb  2.4<L>  /  TBili  0.4  /  DBili  x   /  AST  19  /  ALT  8<L>  /  AlkPhos  129<H>  01-11  TPro  5.7<L>  /  Alb  2.2<L>  /  TBili  0.4  /  DBili  x   /  AST  <5<L>  /  ALT  <5<L>  /  AlkPhos  121<H>  01-11  TPro  5.4<L>  /  Alb  2.5<L>  /  TBili  0.5  /  DBili  x   /  AST  16  /  ALT  7<L>  /  AlkPhos  118  01-11  TPro  6.3  /  Alb  3.1<L>  /  TBili  0.9  /  DBili  x   /  AST  13  /  ALT  10  /  AlkPhos  126<H>  01-10  TPro  6.2  /  Alb  3.1<L>  /  TBili  1.0  /  DBili  x   /  AST  16  /  ALT  11  /  AlkPhos  110  01-09  TPro  6.2  /  Alb  3.1<L>  /  TBili  1.0  /  DBili  x   /  AST  16  /  ALT  11  /  AlkPhos  107  01-08    CAPILLARY BLOOD GLUCOSE      POCT Blood Glucose.: 255 mg/dL (11 Jan 2023 12:20)  POCT Blood Glucose.: 233 mg/dL (11 Jan 2023 06:03)  POCT Blood Glucose.: 226 mg/dL (10 Tim 2023 17:47)      LIVER FUNCTIONS - ( 11 Jan 2023 06:19 )  Alb: 2.4 g/dL / Pro: 5.5 g/dL / ALK PHOS: 129 U/L / ALT: 8 U/L / AST: 19 U/L / GGT: x           PT/INR - ( 11 Jan 2023 06:19 )   PT: 53.7 sec;   INR: 4.60 ratio         PTT - ( 11 Jan 2023 06:19 )  PTT:43.1 sec    Procalcitonin, Serum: 1.05 ng/mL (01-10 @ 07:06)  Procalcitonin, Serum: 0.39 ng/mL (01-09 @ 06:11)  Lactate, Blood: 1.7 mmol/L (01-08 @ 15:39)        RECENT CULTURES:  01-10 @ 20:08 .Bronchial Bronchial Lavage       Few polymorphonuclear leukocytes seen per low power field  No Squamous epithelial cells seen per low power field  No organisms seen             01-08 @ 14:35 .Blood Blood-Peripheral            rad< from: Xray Chest 1 View- PORTABLE-Urgent (Xray Chest 1 View- PORTABLE-Urgent .) (01.10.23 @ 19:55) >    INTERPRETATION:  CLINICAL INFORMATION: Intubation.    TECHNIQUE: Portable AP radiograph of the chest.    COMPARISON: Chest x-ray 1/8/2023.    FINDINGS: Clear lungs. No pleural effusions or pneumothorax. Cardiac   silhouette is unchanged. No acute osseous pathology.    Endotracheal tube terminates above the jack. Enteric tube projects   below the diaphragm with its distal segment excluded from view.      IMPRESSION:    Clear lungs. Tubes as described.    --- End of Report ---           OLIVA ALAMO MD; Resident Radiologist  This document has been electronically signed.  JAIRO TATUM MD; Attending Radiologist    < end of copied text >  < from: CT Chest No Cont (01.10.23 @ 12:31) >  FINDINGS:    LYMPH NODES: No lymphadenopathy.    HEART/VASCULATURE: Heart size is normal. No pericardial effusion. Aortic   valve calcifications. Aortic and coronary artery calcifications.    AIRWAYS/LUNGS/PLEURA: Patent central airways. Bilateral lower lobe   predominant patchy and branching opacities. Small left pleural effusion   with associated passive atelectasis.    UPPER ABDOMEN: Unchanged calcified liver granulomas.    BONES/SOFT TISSUES: Exaggerated kyphosis of the thoracic spine.   Degenerative changes of the spine. Soft tissues are unremarkable.    IMPRESSION:    Bilateral lower lobe predominant patchy and branching opacities   suspicious for pneumonia.    Small left pleural effusion.        --- End of Report ---           KIMI GERARDO MD; Resident Radiologist  This document has been electronically signed.    < end of copied text >      No growth to date.    01-08 @ 14:26 Clean Catch Clean Catch (Midstream)                >=3 organisms. Probable collection contamination.          RESPIRATORY CULTURES:          Studies  Chest X-RAY  CT SCAN Chest   Venous Dopplers: LE:   CT Abdomen  Others

## 2023-01-11 NOTE — PROGRESS NOTE ADULT - SUBJECTIVE AND OBJECTIVE BOX
Harry Monge MD  Internal Medicine, PGY1    MICU Progress Note    CHIEF COMPLAINT: CRUZITO BATES is a 78yo Female with PMH *** presenting with ***.    Interval Events:    Meds administered:  fentaNYL    Injectable: 50 MICROGram(s) IV Push (01-11 @ 06:21)  chlorhexidine 4% Liquid: 1 Application(s) Topical (01-11 @ 06:11)  insulin lispro (ADMELOG) corrective regimen sliding scale: 2 Unit(s) SubCutaneous (01-11 @ 06:06)  methylPREDNISolone sodium succinate Injectable: 40 milliGRAM(s) IV Push (01-11 @ 05:54)  diltiazem    Tablet: 90 milliGRAM(s) Oral (01-11 @ 05:54)  chlorhexidine 0.12% Liquid: 15 milliLiter(s) Oral Mucosa (01-11 @ 05:54)  acetaminophen    Suspension ..: 1000 milliGRAM(s) Oral (01-11 @ 05:53)  levalbuterol Inhalation: 0.63 milliGRAM(s) Inhalation (01-11 @ 05:32)  ipratropium    for Nebulization: 500 MICROGram(s) Nebulizer (01-11 @ 05:32)  fentaNYL    Injectable: 50 MICROGram(s) IV Push (01-11 @ 04:23)  phytonadione  IVPB: 102 mL/Hr IV Intermittent (01-11 @ 03:04)  methylPREDNISolone sodium succinate Injectable: 40 milliGRAM(s) IV Push (01-11 @ 00:46)  diltiazem    Tablet: 90 milliGRAM(s) Oral (01-11 @ 00:45)  ipratropium    for Nebulization: 500 MICROGram(s) Nebulizer (01-10 @ 23:07)  cefepime   IVPB: 100 mL/Hr IV Intermittent (01-10 @ 22:25)  atorvastatin: 40 milliGRAM(s) Oral (01-10 @ 22:25)  levalbuterol Inhalation: 0.63 milliGRAM(s) Inhalation (01-10 @ 22:07)  calcium gluconate IVPB: 100 mL/Hr IV Intermittent (01-10 @ 21:37)  phenylephrine    Infusion: 17 mL/Hr IV Continuous (01-10 @ 21:27)  fentaNYL    Injectable: 50 MICROGram(s) IV Push (01-10 @ 21:07)  methylPREDNISolone sodium succinate Injectable: 40 milliGRAM(s) IV Push (01-10 @ 20:17)  insulin lispro (ADMELOG) corrective regimen sliding scale: 3 Unit(s) SubCutaneous (01-10 @ 20:15)  enoxaparin Injectable: 50 milliGRAM(s) SubCutaneous (01-10 @ 20:15)        OBJECTIVE:  Vitals:  T(F): 99.9 (01-11-23 @ 04:00), Max: 100.2 (01-10-23 @ 20:00)  HR: 107 (01-11-23 @ 06:45) (91 - 170)  BP: 104/46 (01-11-23 @ 06:45) (66/48 - 160/84)  BP(mean): 67 (01-11-23 @ 06:45) (53 - 111)  ABP: --  ABP(mean): --  RR: 20 (01-11-23 @ 06:45) (18 - 38)  SpO2: 100% (01-11-23 @ 06:45) (93% - 100%)  I/O Detail 24H    10 Tim 2023 07:01  -  11 Jan 2023 07:00  --------------------------------------------------------  IN:    Enteral Tube Flush: 60 mL    IV PiggyBack: 150 mL    Phenylephrine: 178.2 mL    Propofol: 63.5 mL  Total IN: 451.7 mL    OUT:    Intermittent Catheterization - Urethral (mL): 400 mL  Total OUT: 400 mL    Total NET: 51.7 mL          HOSPITAL MEDICATIONS:  Standing Meds:  atorvastatin 40 milliGRAM(s) Oral at bedtime  cefepime   IVPB 1000 milliGRAM(s) IV Intermittent every 12 hours  chlorhexidine 0.12% Liquid 15 milliLiter(s) Oral Mucosa every 12 hours  chlorhexidine 4% Liquid 1 Application(s) Topical <User Schedule>  dextrose 5%. 1000 milliLiter(s) IV Continuous <Continuous>  dextrose 5%. 1000 milliLiter(s) IV Continuous <Continuous>  dextrose 50% Injectable 25 Gram(s) IV Push once  dextrose 50% Injectable 12.5 Gram(s) IV Push once  dextrose 50% Injectable 25 Gram(s) IV Push once  diltiazem    Tablet 90 milliGRAM(s) Oral every 6 hours  glucagon  Injectable 1 milliGRAM(s) IntraMuscular once  insulin lispro (ADMELOG) corrective regimen sliding scale   SubCutaneous every 6 hours  ipratropium    for Nebulization 500 MICROGram(s) Nebulizer every 6 hours  levalbuterol Inhalation 0.63 milliGRAM(s) Inhalation every 8 hours  methylPREDNISolone sodium succinate Injectable 40 milliGRAM(s) IV Push every 8 hours  methylPREDNISolone sodium succinate Injectable   IV Push   phenylephrine    Infusion 1.2 MICROgram(s)/kG/Min IV Continuous <Continuous>  propofol Infusion 10 MICROgram(s)/kG/Min IV Continuous <Continuous>      PRN Meds:  acetaminophen     Tablet .. 650 milliGRAM(s) Oral every 6 hours PRN  dextrose Oral Gel 15 Gram(s) Oral once PRN  fentaNYL    Injectable 50 MICROGram(s) IV Push every 2 hours PRN  oxyCODONE    IR 5 milliGRAM(s) Oral every 8 hours PRN      PHYSICAL EXAM:  GENERAL: NAD, lying in bed comfortably  HEAD:  Atraumatic, Normocephalic  EYES: EOMI, PERRLA, conjunctiva and sclera clear  ENT: Moist mucous membranes  NECK: Supple, No JVD  CHEST/LUNG: Clear to auscultation bilaterally; No rales, rhonchi, wheezing, or rubs. Unlabored respirations  HEART: Regular rate and rhythm; No murmurs, rubs, or gallops  ABDOMEN: Bowel sounds present; Soft, Nontender, Nondistended. No hepatomegaly  EXTREMITIES:  2+ Peripheral Pulses, brisk capillary refill. No clubbing, cyanosis, or edema  NERVOUS SYSTEM:  Alert & Oriented X3, speech clear. No deficits   MSK: FROM all 4 extremities, full and equal strength  SKIN: No rashes or lesions   Harry Monge MD  Internal Medicine, PGY1    MICU Progress Note    CHIEF COMPLAINT: CRUZITO BATES is a 77-year-old female with past medical history of DM, HTN, HLD, asthma on daily steroids, A. fib on Eliquis and digoxin presenting with shortness of breath, found to be in Afib w/RVR; admitted to MICU for Acute Hypoxic Respiratory Failure in s/o Asthma and Bronchiectasis w/ possible PNA, leading to intubation and sedation.     Interval Events: no acute events overnight; received pushes of fentanyl overnight for Afib w/ RVR and agitation on vent    Meds administered:  fentaNYL    Injectable: 50 MICROGram(s) IV Push (01-11 @ 06:21)  chlorhexidine 4% Liquid: 1 Application(s) Topical (01-11 @ 06:11)  insulin lispro (ADMELOG) corrective regimen sliding scale: 2 Unit(s) SubCutaneous (01-11 @ 06:06)  methylPREDNISolone sodium succinate Injectable: 40 milliGRAM(s) IV Push (01-11 @ 05:54)  diltiazem    Tablet: 90 milliGRAM(s) Oral (01-11 @ 05:54)  chlorhexidine 0.12% Liquid: 15 milliLiter(s) Oral Mucosa (01-11 @ 05:54)  acetaminophen    Suspension ..: 1000 milliGRAM(s) Oral (01-11 @ 05:53)  levalbuterol Inhalation: 0.63 milliGRAM(s) Inhalation (01-11 @ 05:32)  ipratropium    for Nebulization: 500 MICROGram(s) Nebulizer (01-11 @ 05:32)  fentaNYL    Injectable: 50 MICROGram(s) IV Push (01-11 @ 04:23)  phytonadione  IVPB: 102 mL/Hr IV Intermittent (01-11 @ 03:04)  methylPREDNISolone sodium succinate Injectable: 40 milliGRAM(s) IV Push (01-11 @ 00:46)  diltiazem    Tablet: 90 milliGRAM(s) Oral (01-11 @ 00:45)  ipratropium    for Nebulization: 500 MICROGram(s) Nebulizer (01-10 @ 23:07)  cefepime   IVPB: 100 mL/Hr IV Intermittent (01-10 @ 22:25)  atorvastatin: 40 milliGRAM(s) Oral (01-10 @ 22:25)  levalbuterol Inhalation: 0.63 milliGRAM(s) Inhalation (01-10 @ 22:07)  calcium gluconate IVPB: 100 mL/Hr IV Intermittent (01-10 @ 21:37)  phenylephrine    Infusion: 17 mL/Hr IV Continuous (01-10 @ 21:27)  fentaNYL    Injectable: 50 MICROGram(s) IV Push (01-10 @ 21:07)  methylPREDNISolone sodium succinate Injectable: 40 milliGRAM(s) IV Push (01-10 @ 20:17)  insulin lispro (ADMELOG) corrective regimen sliding scale: 3 Unit(s) SubCutaneous (01-10 @ 20:15)  enoxaparin Injectable: 50 milliGRAM(s) SubCutaneous (01-10 @ 20:15)        OBJECTIVE:  Vitals:  T(F): 99.9 (01-11-23 @ 04:00), Max: 100.2 (01-10-23 @ 20:00)  HR: 107 (01-11-23 @ 06:45) (91 - 170)  BP: 104/46 (01-11-23 @ 06:45) (66/48 - 160/84)  BP(mean): 67 (01-11-23 @ 06:45) (53 - 111)  ABP: --  ABP(mean): --  RR: 20 (01-11-23 @ 06:45) (18 - 38)  SpO2: 100% (01-11-23 @ 06:45) (93% - 100%)  I/O Detail 24H    10 Tim 2023 07:01  -  11 Jan 2023 07:00  --------------------------------------------------------  IN:    Enteral Tube Flush: 60 mL    IV PiggyBack: 150 mL    Phenylephrine: 178.2 mL    Propofol: 63.5 mL  Total IN: 451.7 mL    OUT:    Intermittent Catheterization - Urethral (mL): 400 mL  Total OUT: 400 mL    Total NET: 51.7 mL          HOSPITAL MEDICATIONS:  Standing Meds:  atorvastatin 40 milliGRAM(s) Oral at bedtime  cefepime   IVPB 1000 milliGRAM(s) IV Intermittent every 12 hours  chlorhexidine 0.12% Liquid 15 milliLiter(s) Oral Mucosa every 12 hours  chlorhexidine 4% Liquid 1 Application(s) Topical <User Schedule>  dextrose 5%. 1000 milliLiter(s) IV Continuous <Continuous>  dextrose 5%. 1000 milliLiter(s) IV Continuous <Continuous>  dextrose 50% Injectable 25 Gram(s) IV Push once  dextrose 50% Injectable 12.5 Gram(s) IV Push once  dextrose 50% Injectable 25 Gram(s) IV Push once  diltiazem    Tablet 90 milliGRAM(s) Oral every 6 hours  glucagon  Injectable 1 milliGRAM(s) IntraMuscular once  insulin lispro (ADMELOG) corrective regimen sliding scale   SubCutaneous every 6 hours  ipratropium    for Nebulization 500 MICROGram(s) Nebulizer every 6 hours  levalbuterol Inhalation 0.63 milliGRAM(s) Inhalation every 8 hours  methylPREDNISolone sodium succinate Injectable 40 milliGRAM(s) IV Push every 8 hours  methylPREDNISolone sodium succinate Injectable   IV Push   phenylephrine    Infusion 1.2 MICROgram(s)/kG/Min IV Continuous <Continuous>  propofol Infusion 10 MICROgram(s)/kG/Min IV Continuous <Continuous>      PRN Meds:  acetaminophen     Tablet .. 650 milliGRAM(s) Oral every 6 hours PRN  dextrose Oral Gel 15 Gram(s) Oral once PRN  fentaNYL    Injectable 50 MICROGram(s) IV Push every 2 hours PRN  oxyCODONE    IR 5 milliGRAM(s) Oral every 8 hours PRN      PHYSICAL EXAM:  GENERAL: NAD, intubated and sedated  HEAD:  Atraumatic, Normocephalic  EYES: EOMI, PERRLA, conjunctiva and sclera clear  ENT: Moist mucous membranes  NECK: Supple, No JVD  CHEST/LUNG: Clear to auscultation bilaterally; No rales, rhonchi, wheezing, or rubs. Unlabored respirations  HEART: Regular rate and rhythm; No murmurs, rubs, or gallops  ABDOMEN: Bowel sounds present; Soft, Nontender, Nondistended. No hepatomegaly  EXTREMITIES:  2+ Peripheral Pulses, brisk capillary refill. No clubbing, cyanosis, or edema  NERVOUS SYSTEM:  withdraws to pain; does not follow commands currently   MSK: withdraws from pain  SKIN: No rashes or lesions

## 2023-01-11 NOTE — ADVANCED PRACTICE NURSE CONSULT - RECOMMEDATIONS
Impression:    fecal incontinence  incontinence associated dermatitis  B/L buttocks/sacrum/ischium deep tissue injury, present on admission  midline spine deep tissue injury, present on admission      Recommendations:    1) continue turning and positioning q2 and PRN utilizing offloading assistive devices  2) continue with routine pericare daily and PRN soiling  3) encourage optimal nutrition  4) waffle cushion when oob to chair  5) B/L LE complete cair air fluidized boots to offload heels/feet  6) triad protective barrier cream to B/L buttocks/sacrum/ischium daily and PRN soiling  7) incontinence management - consider external female urinary catheter to divert urine from skin once andrade removed  8) midline spine and other rogers prominences (elbows/heels/hips)- cleanse skin with NS and pat dry, apply cavilon to skin then cover rogers prominences with allevyn foam dressing and change every other day and/or PRN soiling      Plan discussed with EM Madrigal at bedside

## 2023-01-11 NOTE — SWALLOW BEDSIDE ASSESSMENT ADULT - SLP PERTINENT HISTORY OF CURRENT PROBLEM
77-year-old female with past medical history of DM, HTN, HLD, asthma on daily steroids, A. fib on Eliquis and digoxin presenting with shortness of breath, found to be in Afib w/RVR; admitted to MICU for Acute Hypoxic Respiratory Failure in s/o Asthma and Bronchiectasis w/ possible PNA, leading to intubation and sedation.

## 2023-01-11 NOTE — DIETITIAN NUTRITION RISK NOTIFICATION - TREATMENT: THE FOLLOWING DIET HAS BEEN RECOMMENDED
Diet, NPO with Tube Feed:   Tube Feeding Modality: Orogastric  Glucerna 1.5 Gavin (GLUCERNA1.5RTH)  Total Volume for 24 Hours (mL): 720  Intermittent  Starting Tube Feed Rate {mL per Hour}: 10  Increase Tube Feed Rate by (mL): 10    Every 4 hours  Until Goal Tube Feed Rate (mL per Hour): 40  Tube Feeding Hours ON: 18  Tube Feeding OFF (Hours): 6  Tube Feed Start Time: 11:00 (01-11-23 @ 03:31) [Active]

## 2023-01-11 NOTE — DIETITIAN INITIAL EVALUATION ADULT - OTHER CALCULATIONS
Nutrient needs made with consideration for advanced age, pressure injury, intubation, and malnutrition. Fluid needs deferred to provider. The Tate State Equation (PSU) 2003b was used to calculate resting energy expenditure: 1237 kcals

## 2023-01-11 NOTE — ADVANCED PRACTICE NURSE CONSULT - REASON FOR CONSULT
Wound care consult initiated by RN to assess patient's skin for a possible lumbar spine deep tissue injury and left ischium stage 2, present on admission      Reason for Admission: sob/ rapid a.fib  History of Present Illness:   CHIEF COMPLAINT:Patient is a 77y old  Female who presents with a chief complaint of     HPI:  77-year-old female with past medical history of DM, HTN, HLD, asthma on daily steroids, A. fib on Eliquis and digoxin presenting with shortness of breath.  Patient unable to provide history.  Daughter at bedside reports patient has had increasing shortness of breath, wheezing, cough for the past week now.  Patient also with increasing generalized weakness, fatigue and decreased p.o. intake.  Denies fever/chills, chest pain, abdominal pain, nausea, vomiting, diarrhea, dysuria.  Family reports patient had a similar episode 1 year ago when she was admitted to [facility] with UTI

## 2023-01-12 NOTE — PROGRESS NOTE ADULT - SUBJECTIVE AND OBJECTIVE BOX
infectious diseases progress note:    Patient is a 77y old  Female who presents with a chief complaint of sob/ rapid a.fib (12 Jan 2023 07:53)        Acute respiratory failure with hypoxia               Allergies    Avelox (Pruritus)  fish (Vomiting)  Levaquin (Unknown)  shellfish (Vomiting)    Intolerances        ANTIBIOTICS/RELEVANT:  antimicrobials  azithromycin  IVPB 500 milliGRAM(s) IV Intermittent <User Schedule>  cefepime   IVPB 1000 milliGRAM(s) IV Intermittent every 12 hours    immunologic:    OTHER:  acetaminophen     Tablet .. 650 milliGRAM(s) Oral every 6 hours PRN  atorvastatin 40 milliGRAM(s) Oral at bedtime  chlorhexidine 0.12% Liquid 15 milliLiter(s) Oral Mucosa every 12 hours  chlorhexidine 4% Liquid 1 Application(s) Topical <User Schedule>  dextrose 5%. 1000 milliLiter(s) IV Continuous <Continuous>  dextrose 5%. 1000 milliLiter(s) IV Continuous <Continuous>  dextrose 50% Injectable 25 Gram(s) IV Push once  dextrose 50% Injectable 12.5 Gram(s) IV Push once  dextrose 50% Injectable 25 Gram(s) IV Push once  dextrose Oral Gel 15 Gram(s) Oral once PRN  diltiazem Infusion 7.5 mG/Hr IV Continuous <Continuous>  glucagon  Injectable 1 milliGRAM(s) IntraMuscular once  insulin lispro (ADMELOG) corrective regimen sliding scale   SubCutaneous every 6 hours  insulin NPH human recombinant 5 Unit(s) SubCutaneous every 6 hours  ipratropium    for Nebulization 500 MICROGram(s) Nebulizer every 6 hours  levalbuterol Inhalation 0.63 milliGRAM(s) Inhalation every 8 hours  methylPREDNISolone sodium succinate Injectable 20 milliGRAM(s) IV Push every 8 hours  phenylephrine    Infusion 1.2 MICROgram(s)/kG/Min IV Continuous <Continuous>  polyethylene glycol 3350 17 Gram(s) Oral every 12 hours  propofol Infusion 10 MICROgram(s)/kG/Min IV Continuous <Continuous>  senna Syrup 10 milliLiter(s) Oral at bedtime  sodium chloride 0.9%. 1000 milliLiter(s) IV Continuous <Continuous>  sodium chloride 3%  Inhalation 4 milliLiter(s) Inhalation every 12 hours      Objective:  Vital Signs Last 24 Hrs  T(C): 36.7 (12 Jan 2023 08:00), Max: 37.7 (11 Jan 2023 16:00)  T(F): 98.1 (12 Jan 2023 08:00), Max: 99.9 (11 Jan 2023 16:00)  HR: 99 (12 Jan 2023 08:00) (69 - 136)  BP: 105/51 (12 Jan 2023 08:00) (76/46 - 150/60)  BP(mean): 73 (12 Jan 2023 08:00) (56 - 87)  RR: 20 (12 Jan 2023 08:00) (19 - 51)  SpO2: 100% (12 Jan 2023 08:00) (96% - 100%)    Parameters below as of 12 Jan 2023 05:42  Patient On (Oxygen Delivery Method): IPV           Eyes:ROSA, EOMI  Ear/Nose/Throat: no oral lesion, no sinus tenderness on percussion	  Neck:no JVD, no lymphadenopathy, supple  Respiratory: CTA hansel  Cardiovascular: S1S2 RRR, no murmurs  Gastrointestinal:soft, (+) BS, no HSM  Extremities:no e/e/c        LABS:                        11.4   36.68 )-----------( 303      ( 12 Jan 2023 00:19 )             33.2     01-12    128<L>  |  94<L>  |  52<H>  ----------------------------<  217<H>  3.5   |  20<L>  |  2.44<H>    Ca    7.2<L>      12 Jan 2023 00:18  Phos  3.8     01-12  Mg     1.8     01-12    TPro  4.6<L>  /  Alb  2.0<L>  /  TBili  0.2  /  DBili  x   /  AST  34  /  ALT  24  /  AlkPhos  112  01-12    PT/INR - ( 12 Jan 2023 00:20 )   PT: 16.5 sec;   INR: 1.42 ratio         PTT - ( 12 Jan 2023 00:20 )  PTT:31.3 sec        MICROBIOLOGY:    RECENT CULTURES:  01-10 @ 22:10 .Blood Blood-Peripheral                No growth to date.    01-10 @ 22:09 .Blood Blood-Peripheral                No growth to date.    01-10 @ 20:08 .Bronchial Bronchial Lavage       Few polymorphonuclear leukocytes seen per low power field  No Squamous epithelial cells seen per low power field  No organisms seen           Normal Respiratory Janette present    01-08 @ 14:35 .Blood Blood-Peripheral                No growth to date.    01-08 @ 14:26 Clean Catch Clean Catch (Midstream)                >=3 organisms. Probable collection contamination.          RESPIRATORY CULTURES:              RADIOLOGY & ADDITIONAL STUDIES:        Pager [phone]  After 5 pm/weekends or if no response :[phone]

## 2023-01-12 NOTE — PROGRESS NOTE ADULT - ASSESSMENT
77-year-old female with past medical history of DM, HTN, HLD, asthma on daily steroids, A. fib on Eliquis and digoxin presenting with shortness of breath.  Patient unable to provide history.  Daughter at bedside reports patient has had increasing shortness of breath, wheezing, cough for the past week now.  Patient also with increasing generalized weakness, fatigue and decreased p.o. intake.  Denies fever/chills, chest pain, abdominal pain, nausea, vomiting, diarrhea, dysuria.  Family reports patient had a similar episode 1 year ago when she was admitted to [facility] with UTI.  pt with sig medical hx, chronic a.fib with sob ?sec sepsis/ pneumoniae, UTI  start on ceftriaxone  tele  for increase hr, will increase Cardizem dose  continue AC  cardiac enzyme  tsh  echo  RVP negative      ASTHMA/ COPD exacerbation;   A Fibrillation  with rvr:  DM:  HTN:   HLD:     1/09:    ASTHMA/ COPD exacerbation; she was using performist at home with 5 mg of prednisone  not sure why she was not on any other meds:  sill start Symbicort too:  along with BD and is already on cefepime:  though pneumonia to me is not very convincing : will probably need ct scan chest   A Fibrillation  with rvr: Hr still elevated:  on cardizem : would defer to cards:  on ac:  lovenox  + coumadin : check echo   DM:: monitor and control   HTN: : controlled  HLD: on statins  :    dw team    1/10:    ASTHMA/ COPD exacerbation; she was using performist at home with 5 mg of prednisone  not sure why she was not on any other meds:  sill start Symbicort too:  along with BD and is already on cefepime:  WBC increased : though pneumonia to me is not very convincing : will probably need ct scan chest : she rused for ct chest : increase steorids 40 mg Q 6 hours : ABG today seems reasonable   A Fibrillation  with rvr: Hr still elevated:  on cardizem : HR is till very high  :   DM:: monitor and control   HTN: : controlled  HLD: on statins:    MICU  consult:  high risk for intubation:   :    edy and daughter    1/11:    ASTHMA/ COPD exacerbation; now s/p bronch: RML lavage done:  wait cultures results: she was using performist at home with 5 mg of prednisone  not sure why she was not on any other meds:  Cont BD:  and antibiotics  WBC still increased :on cefepime:    A Fibrillation  with rvr: Hr still elevated:  on cardizem : HR is better now:  on cardiem   DM:: monitor and control   HTN: : controlled  HLD: on statins:    josias micu  ATTENDING      1/12:    ASTHMA/ COPD exacerbation; now s/p bronch: RML lavage done:  negative  cultures so far: : she was using performist at home with 5 mg of prednisone  not sure why she was not on any other meds:  Cont BD:  and antibiotics  WBC still increased: but little down today  :on cefepime:    A Fibrillation  with rvr: Hr still elevated:  on cardizem : HR is better now:  on cardiem drip   DM:: monitor and control   HTN: : controlled  HLD: on statins:    josias leonardu  team   :

## 2023-01-12 NOTE — PROGRESS NOTE ADULT - SUBJECTIVE AND OBJECTIVE BOX
Harry Monge MD  Internal Medicine, PGY1    MICU Progress Note    CHIEF COMPLAINT: CRUZITO BATES is a 76yo Female with PMH *** presenting with ***.    Interval Events:    Meds administered:  chlorhexidine 4% Liquid: 1 Application(s) Topical (01-12 @ 06:29)  sodium chloride 3%  Inhalation: 4 milliLiter(s) Inhalation (01-12 @ 05:43)  levalbuterol Inhalation: 0.63 milliGRAM(s) Inhalation (01-12 @ 05:42)  ipratropium    for Nebulization: 500 MICROGram(s) Nebulizer (01-12 @ 05:42)  polyethylene glycol 3350: 17 Gram(s) Oral (01-12 @ 05:14)  methylPREDNISolone sodium succinate Injectable: 20 milliGRAM(s) IV Push (01-12 @ 05:14)  insulin lispro (ADMELOG) corrective regimen sliding scale: 2 Unit(s) SubCutaneous (01-12 @ 05:13)  insulin NPH human recombinant: 5 Unit(s) SubCutaneous (01-12 @ 05:13)  chlorhexidine 0.12% Liquid: 15 milliLiter(s) Oral Mucosa (01-12 @ 05:13)  sodium chloride 0.9%.: 50 mL/Hr IV Continuous (01-12 @ 01:18)  calcium gluconate IVPB: 200 mL/Hr IV Intermittent (01-12 @ 01:08)  insulin lispro (ADMELOG) corrective regimen sliding scale: 4 Unit(s) SubCutaneous (01-12 @ 00:36)  insulin NPH human recombinant: 5 Unit(s) SubCutaneous (01-12 @ 00:36)  levalbuterol Inhalation: 0.63 milliGRAM(s) Inhalation (01-11 @ 23:18)  ipratropium    for Nebulization: 500 MICROGram(s) Nebulizer (01-11 @ 23:18)  methylPREDNISolone sodium succinate Injectable: 20 milliGRAM(s) IV Push (01-11 @ 21:54)  cefepime   IVPB: 100 mL/Hr IV Intermittent (01-11 @ 21:54)        OBJECTIVE:  Vitals:  T(F): 98.1 (01-12-23 @ 08:00), Max: 99.9 (01-11-23 @ 16:00)  HR: 101 (01-12-23 @ 07:30) (69 - 136)  BP: 111/54 (01-12-23 @ 07:30) (76/46 - 150/60)  BP(mean): 77 (01-12-23 @ 07:30) (56 - 87)  ABP: --  ABP(mean): --  RR: 20 (01-12-23 @ 07:30) (19 - 51)  SpO2: 100% (01-12-23 @ 07:30) (96% - 100%)  I/O Detail 24H    11 Jan 2023 07:01  -  12 Jan 2023 07:00  --------------------------------------------------------  IN:    Diltiazem: 131 mL    Diltiazem: 30 mL    Enteral Tube Flush: 200 mL    IV PiggyBack: 350 mL    Lactated Ringers: 1000 mL    Phenylephrine: 722.4 mL    Propofol: 170.5 mL    sodium chloride 0.9%: 1320 mL    sodium chloride 0.9%: 240 mL    sodium chloride 0.9%: 300 mL    Sodium Chloride 0.9% Bolus: 500 mL  Total IN: 4963.9 mL    OUT:    Indwelling Catheter - Urethral (mL): 300 mL    Other (mL): 1800 mL  Total OUT: 2100 mL    Total NET: 2863.9 mL          HOSPITAL MEDICATIONS:  Standing Meds:  atorvastatin 40 milliGRAM(s) Oral at bedtime  azithromycin  IVPB 500 milliGRAM(s) IV Intermittent <User Schedule>  cefepime   IVPB 1000 milliGRAM(s) IV Intermittent every 12 hours  chlorhexidine 0.12% Liquid 15 milliLiter(s) Oral Mucosa every 12 hours  chlorhexidine 4% Liquid 1 Application(s) Topical <User Schedule>  dextrose 5%. 1000 milliLiter(s) IV Continuous <Continuous>  dextrose 5%. 1000 milliLiter(s) IV Continuous <Continuous>  dextrose 50% Injectable 25 Gram(s) IV Push once  dextrose 50% Injectable 12.5 Gram(s) IV Push once  dextrose 50% Injectable 25 Gram(s) IV Push once  diltiazem Infusion 7.5 mG/Hr IV Continuous <Continuous>  glucagon  Injectable 1 milliGRAM(s) IntraMuscular once  insulin lispro (ADMELOG) corrective regimen sliding scale   SubCutaneous every 6 hours  insulin NPH human recombinant 5 Unit(s) SubCutaneous every 6 hours  ipratropium    for Nebulization 500 MICROGram(s) Nebulizer every 6 hours  levalbuterol Inhalation 0.63 milliGRAM(s) Inhalation every 8 hours  methylPREDNISolone sodium succinate Injectable 20 milliGRAM(s) IV Push every 8 hours  phenylephrine    Infusion 1.2 MICROgram(s)/kG/Min IV Continuous <Continuous>  polyethylene glycol 3350 17 Gram(s) Oral every 12 hours  propofol Infusion 10 MICROgram(s)/kG/Min IV Continuous <Continuous>  senna Syrup 10 milliLiter(s) Oral at bedtime  sodium chloride 0.9%. 1000 milliLiter(s) IV Continuous <Continuous>  sodium chloride 3%  Inhalation 4 milliLiter(s) Inhalation every 12 hours      PRN Meds:  acetaminophen     Tablet .. 650 milliGRAM(s) Oral every 6 hours PRN  dextrose Oral Gel 15 Gram(s) Oral once PRN      PHYSICAL EXAM:  GENERAL: NAD, lying in bed comfortably  HEAD:  Atraumatic, Normocephalic  EYES: EOMI, PERRLA, conjunctiva and sclera clear  ENT: Moist mucous membranes  NECK: Supple, No JVD  CHEST/LUNG: Clear to auscultation bilaterally; No rales, rhonchi, wheezing, or rubs. Unlabored respirations  HEART: Regular rate and rhythm; No murmurs, rubs, or gallops  ABDOMEN: Bowel sounds present; Soft, Nontender, Nondistended. No hepatomegaly  EXTREMITIES:  2+ Peripheral Pulses, brisk capillary refill. No clubbing, cyanosis, or edema  NERVOUS SYSTEM:  Alert & Oriented X3, speech clear. No deficits   MSK: FROM all 4 extremities, full and equal strength  SKIN: No rashes or lesions   Harry Monge MD  Internal Medicine, PGY1    MICU Progress Note    CHIEF COMPLAINT: CRUZITO BATES is a 77-year-old female with past medical history of DM, HTN, HLD, asthma on daily steroids, A. fib on Eliquis and digoxin presenting with shortness of breath, found to be in Afib w/RVR; admitted to MICU for Acute Hypoxic Respiratory Failure in s/o Asthma and Bronchiectasis w/ possible PNA, leading to intubation and sedation.     Interval Events: no acute events overnight    Meds administered:  chlorhexidine 4% Liquid: 1 Application(s) Topical (01-12 @ 06:29)  sodium chloride 3%  Inhalation: 4 milliLiter(s) Inhalation (01-12 @ 05:43)  levalbuterol Inhalation: 0.63 milliGRAM(s) Inhalation (01-12 @ 05:42)  ipratropium    for Nebulization: 500 MICROGram(s) Nebulizer (01-12 @ 05:42)  polyethylene glycol 3350: 17 Gram(s) Oral (01-12 @ 05:14)  methylPREDNISolone sodium succinate Injectable: 20 milliGRAM(s) IV Push (01-12 @ 05:14)  insulin lispro (ADMELOG) corrective regimen sliding scale: 2 Unit(s) SubCutaneous (01-12 @ 05:13)  insulin NPH human recombinant: 5 Unit(s) SubCutaneous (01-12 @ 05:13)  chlorhexidine 0.12% Liquid: 15 milliLiter(s) Oral Mucosa (01-12 @ 05:13)  sodium chloride 0.9%.: 50 mL/Hr IV Continuous (01-12 @ 01:18)  calcium gluconate IVPB: 200 mL/Hr IV Intermittent (01-12 @ 01:08)  insulin lispro (ADMELOG) corrective regimen sliding scale: 4 Unit(s) SubCutaneous (01-12 @ 00:36)  insulin NPH human recombinant: 5 Unit(s) SubCutaneous (01-12 @ 00:36)  levalbuterol Inhalation: 0.63 milliGRAM(s) Inhalation (01-11 @ 23:18)  ipratropium    for Nebulization: 500 MICROGram(s) Nebulizer (01-11 @ 23:18)  methylPREDNISolone sodium succinate Injectable: 20 milliGRAM(s) IV Push (01-11 @ 21:54)  cefepime   IVPB: 100 mL/Hr IV Intermittent (01-11 @ 21:54)        OBJECTIVE:  Vitals:  T(F): 98.1 (01-12-23 @ 08:00), Max: 99.9 (01-11-23 @ 16:00)  HR: 101 (01-12-23 @ 07:30) (69 - 136)  BP: 111/54 (01-12-23 @ 07:30) (76/46 - 150/60)  BP(mean): 77 (01-12-23 @ 07:30) (56 - 87)  ABP: --  ABP(mean): --  RR: 20 (01-12-23 @ 07:30) (19 - 51)  SpO2: 100% (01-12-23 @ 07:30) (96% - 100%)  I/O Detail 24H    11 Jan 2023 07:01  -  12 Jan 2023 07:00  --------------------------------------------------------  IN:    Diltiazem: 131 mL    Diltiazem: 30 mL    Enteral Tube Flush: 200 mL    IV PiggyBack: 350 mL    Lactated Ringers: 1000 mL    Phenylephrine: 722.4 mL    Propofol: 170.5 mL    sodium chloride 0.9%: 1320 mL    sodium chloride 0.9%: 240 mL    sodium chloride 0.9%: 300 mL    Sodium Chloride 0.9% Bolus: 500 mL  Total IN: 4963.9 mL    OUT:    Indwelling Catheter - Urethral (mL): 300 mL    Other (mL): 1800 mL  Total OUT: 2100 mL    Total NET: 2863.9 mL          HOSPITAL MEDICATIONS:  Standing Meds:  atorvastatin 40 milliGRAM(s) Oral at bedtime  azithromycin  IVPB 500 milliGRAM(s) IV Intermittent <User Schedule>  cefepime   IVPB 1000 milliGRAM(s) IV Intermittent every 12 hours  chlorhexidine 0.12% Liquid 15 milliLiter(s) Oral Mucosa every 12 hours  chlorhexidine 4% Liquid 1 Application(s) Topical <User Schedule>  dextrose 5%. 1000 milliLiter(s) IV Continuous <Continuous>  dextrose 5%. 1000 milliLiter(s) IV Continuous <Continuous>  dextrose 50% Injectable 25 Gram(s) IV Push once  dextrose 50% Injectable 12.5 Gram(s) IV Push once  dextrose 50% Injectable 25 Gram(s) IV Push once  diltiazem Infusion 7.5 mG/Hr IV Continuous <Continuous>  glucagon  Injectable 1 milliGRAM(s) IntraMuscular once  insulin lispro (ADMELOG) corrective regimen sliding scale   SubCutaneous every 6 hours  insulin NPH human recombinant 5 Unit(s) SubCutaneous every 6 hours  ipratropium    for Nebulization 500 MICROGram(s) Nebulizer every 6 hours  levalbuterol Inhalation 0.63 milliGRAM(s) Inhalation every 8 hours  methylPREDNISolone sodium succinate Injectable 20 milliGRAM(s) IV Push every 8 hours  phenylephrine    Infusion 1.2 MICROgram(s)/kG/Min IV Continuous <Continuous>  polyethylene glycol 3350 17 Gram(s) Oral every 12 hours  propofol Infusion 10 MICROgram(s)/kG/Min IV Continuous <Continuous>  senna Syrup 10 milliLiter(s) Oral at bedtime  sodium chloride 0.9%. 1000 milliLiter(s) IV Continuous <Continuous>  sodium chloride 3%  Inhalation 4 milliLiter(s) Inhalation every 12 hours      PRN Meds:  acetaminophen     Tablet .. 650 milliGRAM(s) Oral every 6 hours PRN  dextrose Oral Gel 15 Gram(s) Oral once PRN      PHYSICAL EXAM:  GENERAL: NAD, lying in bed comfortably  HEAD:  Atraumatic, Normocephalic  EYES: EOMI, PERRLA, conjunctiva and sclera clear  ENT: Moist mucous membranes  NECK: Supple, No JVD  CHEST/LUNG: Clear to auscultation bilaterally; No rales, rhonchi, wheezing, or rubs. Unlabored respirations  HEART: Regular rate and rhythm; No murmurs, rubs, or gallops  ABDOMEN: Bowel sounds present; Soft, Nontender, Nondistended. No hepatomegaly  EXTREMITIES:  2+ Peripheral Pulses, brisk capillary refill. No clubbing, cyanosis, or edema  NERVOUS SYSTEM:  Alert; opens eyes, follows commands  MSK: coordinated and equal strength of extremities  SKIN: No rashes or lesions

## 2023-01-12 NOTE — PROGRESS NOTE ADULT - SUBJECTIVE AND OBJECTIVE BOX
CARDIOLOGY     PROGRESS  NOTE   ________________________________________________    CHIEF COMPLAINT:Patient is a 77y old  Female who presents with a chief complaint of sob/ rapid a.fib (12 Jan 2023 11:55)  sedated on vent  	  REVIEW OF SYSTEMS:  CONSTITUTIONAL: No fever, weight loss, or fatigue  EYES: No eye pain, visual disturbances, or discharge  ENT:  No difficulty hearing, tinnitus, vertigo; No sinus or throat pain  NECK: No pain or stiffness  RESPIRATORY: No cough, wheezing, chills or hemoptysis; No Shortness of Breath  CARDIOVASCULAR: No chest pain, palpitations, passing out, dizziness, or leg swelling  GASTROINTESTINAL: No abdominal or epigastric pain. No nausea, vomiting, or hematemesis; No diarrhea or constipation. No melena or hematochezia.  GENITOURINARY: No dysuria, frequency, hematuria, or incontinence  NEUROLOGICAL: No headaches, memory loss, loss of strength, numbness, or tremors  SKIN: No itching, burning, rashes, or lesions   LYMPH Nodes: No enlarged glands  ENDOCRINE: No heat or cold intolerance; No hair loss  MUSCULOSKELETAL: No joint pain or swelling; No muscle, back, or extremity pain  PSYCHIATRIC: No depression, anxiety, mood swings, or difficulty sleeping  HEME/LYMPH: No easy bruising, or bleeding gums  ALLERGY AND IMMUNOLOGIC: No hives or eczema	    [ ] All others negative	  [x ] Unable to obtain    PHYSICAL EXAM:  T(C): 36.9 (01-12-23 @ 12:00), Max: 37.7 (01-11-23 @ 16:00)  HR: 108 (01-12-23 @ 12:45) (72 - 136)  BP: 120/53 (01-12-23 @ 12:45) (71/43 - 150/60)  RR: 20 (01-12-23 @ 12:45) (19 - 40)  SpO2: 100% (01-12-23 @ 12:45) (96% - 100%)  Wt(kg): --  I&O's Summary    11 Jan 2023 07:01  -  12 Jan 2023 07:00  --------------------------------------------------------  IN: 4963.9 mL / OUT: 2100 mL / NET: 2863.9 mL    12 Jan 2023 07:01  -  12 Jan 2023 13:23  --------------------------------------------------------  IN: 522.4 mL / OUT: 145 mL / NET: 377.4 mL        Appearance: Normal / on vent	  HEENT:   Normal oral mucosa, PERRL, EOMI	  Lymphatic: No lymphadenopathy  Cardiovascular: Normal S1 S2, No JVD, + murmurs, No edema  Respiratory: decrease bs  Gastrointestinal:  Soft, Non-tender, + BS	  Skin: No rashes, No ecchymoses, No cyanosis	  Extremities: No clubbing, cyanosis or edema      MEDICATIONS  (STANDING):  albuterol/ipratropium for Nebulization 3 milliLiter(s) Nebulizer every 6 hours  ascorbic acid 500 milliGRAM(s) Oral daily  atorvastatin 40 milliGRAM(s) Oral at bedtime  azithromycin  IVPB 500 milliGRAM(s) IV Intermittent <User Schedule>  cefepime   IVPB 1000 milliGRAM(s) IV Intermittent every 12 hours  chlorhexidine 0.12% Liquid 15 milliLiter(s) Oral Mucosa every 12 hours  chlorhexidine 4% Liquid 1 Application(s) Topical <User Schedule>  dextrose 5%. 1000 milliLiter(s) (100 mL/Hr) IV Continuous <Continuous>  dextrose 5%. 1000 milliLiter(s) (50 mL/Hr) IV Continuous <Continuous>  dextrose 50% Injectable 25 Gram(s) IV Push once  dextrose 50% Injectable 12.5 Gram(s) IV Push once  dextrose 50% Injectable 25 Gram(s) IV Push once  diltiazem Infusion 7.5 mG/Hr (7.5 mL/Hr) IV Continuous <Continuous>  glucagon  Injectable 1 milliGRAM(s) IntraMuscular once  insulin lispro (ADMELOG) corrective regimen sliding scale   SubCutaneous every 6 hours  insulin NPH human recombinant 5 Unit(s) SubCutaneous every 6 hours  methylPREDNISolone sodium succinate Injectable 20 milliGRAM(s) IV Push every 12 hours  multivitamin 1 Tablet(s) Oral daily  phenylephrine    Infusion 1.202 MICROgram(s)/kG/Min (17 mL/Hr) IV Continuous <Continuous>  polyethylene glycol 3350 17 Gram(s) Oral every 12 hours  propofol Infusion 10 MICROgram(s)/kG/Min (2.26 mL/Hr) IV Continuous <Continuous>  senna Syrup 10 milliLiter(s) Oral at bedtime  sodium chloride 0.9%. 1000 milliLiter(s) (50 mL/Hr) IV Continuous <Continuous>  sodium chloride 3%  Inhalation 4 milliLiter(s) Inhalation every 12 hours  thiamine 100 milliGRAM(s) Oral daily      TELEMETRY: 	    ECG:  	  RADIOLOGY:  OTHER: 	  	  LABS:	 	    CARDIAC MARKERS:                                11.4   36.68 )-----------( 303      ( 12 Jan 2023 00:19 )             33.2     01-12    128<L>  |  94<L>  |  52<H>  ----------------------------<  217<H>  3.5   |  20<L>  |  2.44<H>    Ca    7.2<L>      12 Jan 2023 00:18  Phos  3.8     01-12  Mg     1.8     01-12    TPro  4.6<L>  /  Alb  2.0<L>  /  TBili  0.2  /  DBili  x   /  AST  34  /  ALT  24  /  AlkPhos  112  01-12    proBNP:   Lipid Profile:   HgA1c:   TSH: Thyroid Stimulating Hormone, Serum: 1.17 uIU/mL (01-09 @ 06:11)    PT/INR - ( 12 Jan 2023 00:20 )   PT: 16.5 sec;   INR: 1.42 ratio         PTT - ( 12 Jan 2023 00:20 )  PTT:31.3 sec    < from: TTE with Doppler (w/Cont) (01.11.23 @ 08:27) >  Mitral Valve: Mitral valve notwell visualized, probably  normal. Minimal mitral regurgitation.  Aortic Valve/Aorta: Aortic valve not well visualized. Peak  transaortic valve gradient equals 3 mm Hg. Peak left  ventricular outflow tract gradient equals 2 mm Hg.  Left Atrium: Left atrium not well visualized.  Left Ventricle: Endocardial visualization enhanced with  intravenous injection of Ultrasonic Enhancing Agent  (Definity). Hyperdynamic left ventricular systolic  function. No left ventricular thrombus. Indeterminate  diastolic function.  Right Heart: Normal right atrium. Normal right ventricular  size and function. Tricuspid valve not well visualized,  probably normal. Minimal tricuspid regurgitation. Pulmonic  valve not well visualized, probably normal. No pulmonic  regurgitation.  Pericardium/Pleura: Normal pericardium with trace  pericardial effusion.  Hemodynamic: Color Doppler demonstrates no evidence of a  patent foramen ovale.  ------------------------------------------------------------------------  Conclusions:  1. Endocardial visualization enhanced with intravenous  injection of Ultrasonic Enhancing Agent (Definity).  Hyperdynamic left ventricular systolic function. No left  ventricular thrombus.  2. Normal right ventricular size and function.    Assessment and plan  ---------------------------  77-year-old female with past medical history of DM, HTN, HLD, asthma on daily steroids, A. fib on Eliquis and digoxin presenting with shortness of breath.  Patient unable to provide history.  Daughter at bedside reports patient has had increasing shortness of breath, wheezing, cough for the past week now.  Patient also with increasing generalized weakness, fatigue and decreased p.o. intake.  Denies fever/chills, chest pain, abdominal pain, nausea, vomiting, diarrhea, dysuria.  Family reports patient had a similar episode 1 year ago when she was admitted to [facility] with UTI.  pt with sig medical hx, chronic a.fib with sob ?sec sepsis/ pneumoniae, UTI  sedated on vent  continue cardizem for rate control decrease dose as possible  AC for a.fib  continue abx and steroid  echo noted

## 2023-01-12 NOTE — PROGRESS NOTE ADULT - ASSESSMENT
ASSESSMENT/PLAN:  77-year-old female with past medical history of DM, HTN, HLD, asthma on daily steroids, A. fib on Eliquis and digoxin presenting with shortness of breath, found to be in Afib w/RVR; admitted to MICU for Acute Hypoxic Respiratory Failure in s/o Asthma and Bronchiectasis w/ possible PNA, leading to intubation and sedation.     NEURO  # Intubated & Sedated  - Mental status was at baseline before intubation; intubated for Acute Hypoxic Respiratory Failure  - ABG w/o hypercapnia; PCO2 38; no signs of hypercapnic encephalopathy  - RASS goal 0 to -1  - Daily SAT/SBT    CARDIAC  # Afib w/ RVR  Admitted in RVR; being followed by Dr. Marcus, Cardiology; Afib w/ RVR thought to be due to stress from hypoxemia  - HR in the 130s 1/10  - Continue Diltiazem as per Cardiology; currently at Dilt 90mg q6h  - follow-up Echo  - follow-up TSH results    # HTN  No indication of hypotension.  - currently on Diltiazem 90mg q6h  - closely monitor BP  - can d/c fentanyl     PULM  # Acute Hypoxemic Respiratory Failure  # Asthma  # Bronchiectasis  CT Angio r/o PE; currently thought to be due to Asthma exacerbation vs bronchiectasis vs ?PNA;   - tachypneic on BiPAP to 30s, saturating 100% but is tiring out as of now  - intubate and sedate to assist with airway clearance  - chest PT; airway clearance; hypertonic saline; IPV  - daily SBT/SATs  - latest ABG is 7.46| 32|211  - decrease to solumedrol 20q6h  - DuoNebs q6h   - currently of Cefepime (1/10-) for empiric coverage; can do 5 day course  - follow-up Legionella antigen, final BCx read; RVP negative; BAL cx    RENAL  # SHANE  Cr shot up to 2.36 <- 1.87 <- 1.97 <- 0.64  - Likely prerenal in the setting of low PO intake   - Follow-up urine lytes  - Maintenance fluids started: 60cc/hr    GI   - no active issues currently  - Diet: NPO on vent; place OG and start tube feeds  - Bowel regimen: miralax, senna    HEME/ONC  # Leukocytosis  WBC now 41.8; increasing  No active indications of infections, all infectious workup negative so far; likely in s/o solumedrol usage, with neutrophilia  - continue to monitor    # INR 11.1 (00:00 1/11)-> 4.5 (6 AM 1/11)  - Given Vitamin K overnight  - check afternoon PT/INR     ID  # Sepsis  CT Chest w/ bilateral lower lobe predominant patchy and branching opacities suspicious for pneumonia. Small left pleural effusion. Leukocytosis, tachycardia and tachypnea; No active indications of infections, all infectious workup negative so far; leukocytosis likely in s/o solumedrol usage but no diff to conform neutrophilia so far; tachycardia likely in s/o tachypnea and AHRF  - Continue Cefepime (1/10-) for empiric PNA coverage  - follow-up SCx, Urine legionella antigen, final BCx, BAL cultures    ENDO  # Hyponatremia  Na 126today  <- 132<- 131 in 06/22, may be chronic  - continue fluid resuscitation   - monitor daily BMPs  - Urine lytes  - Maintenance fluids at 60 cc's/hr    # T2DM  A1c 8.1 1/09/23  BGs high; likely in s/o steroid usage  - place on sliding scale NPH  - admelog     DVT  - Hold Lovenox 50mg BID    ETHICS  Full Code     ASSESSMENT/PLAN:  77-year-old female with past medical history of DM, HTN, HLD, asthma on daily steroids, A. fib on Eliquis and digoxin presenting with shortness of breath, found to be in Afib w/RVR; admitted to MICU for Acute Hypoxic Respiratory Failure in s/o Asthma and Bronchiectasis w/ possible PNA, leading to intubation and sedation.     NEURO  # Intubated & Sedated  - Mental status was at baseline before intubation; intubated for Acute Hypoxic Respiratory Failure  - ABG w/o hypercapnia; PCO2 38; no signs of hypercapnic encephalopathy  - RASS goal 0 to -1  - Daily SAT/SBT    CARDIAC  # Afib w/ RVR  Admitted in RVR; being followed by Dr. Marcus, Cardiology; Afib w/ RVR thought to be due to stress from hypoxemia  - HR in the 130s 1/10  - on Diltiazem drip, more rate controlled today  - follow-up Echo  - follow-up TSH results    # HTN  No indication of hypotension.  - currently on Diltiazem 90mg q6h  - closely monitor BP  - can d/c fentanyl     PULM  # Acute Hypoxemic Respiratory Failure  # Asthma  # Bronchiectasis  CT Angio r/o PE; currently thought to be due to Asthma exacerbation vs bronchiectasis vs ?PNA;   - tachypneic on BiPAP to 30s, saturating 100% but is tiring out as of now  - intubate and sedate to assist with airway clearance  - chest PT; airway clearance; hypertonic saline; stop IPCV  - daily SBT/SATs  - latest ABG is 7.46| 32|211  - decrease to solumedrol 20q12h  - DuoNebs q6h   - currently of Cefepime (1/10-) for empiric coverage; can do 5 day course  - follow-up Legionella antigen, final BCx read; RVP negative; BAL cx    RENAL  # SHANE  Cr shot up to 2.44 <- 2.55 <-2.36 <- 1.87 <- 1.97 <- 0.64  - Likely prerenal in the setting of low PO intake   - Follow-up urine lytes  - Maintenance fluids started: 100cc/hr    GI   - no active issues currently  - Diet: NPO on vent; place OG and start tube feeds  - Bowel regimen: miralax, senna    HEME/ONC  # Leukocytosis  WBC now 36; decreasing  No active indications of infections, all infectious workup negative so far; likely in s/o solumedrol usage, with neutrophilia  - continue to monitor    # INR 11.1 (00:00 1/11)-> 4.5 (6 AM 1/11) -> 1.42 (1/12)  - continue to hold Lovenox    ID  # Sepsis  CT Chest w/ bilateral lower lobe predominant patchy and branching opacities suspicious for pneumonia. Small left pleural effusion. Leukocytosis, tachycardia and tachypnea; No active indications of infections, all infectious workup negative so far; leukocytosis likely in s/o solumedrol usage but no diff to conform neutrophilia so far; tachycardia likely in s/o tachypnea and AHRF  - Continue Cefepime (1/10-) for empiric PNA coverage  - follow-up SCx, Urine legionella antigen, final BCx, BAL cultures    ENDO  # Hyponatremia  Na 128 <- 126  <- 132<- 131 in 06/22, may be chronic  - continue fluid resuscitation   - monitor daily BMPs  - Urine lytes  - Maintenance fluids at 100cc's/hr    # T2DM  A1c 8.1 1/09/23  BGs high; likely in s/o steroid usage  - place on sliding scale NPH  - admelog     DVT  - Hold Lovenox 50mg BID    ETHICS  Full Code     ASSESSMENT/PLAN:  77-year-old female with past medical history of DM, HTN, HLD, asthma on daily steroids, A. fib on Eliquis and digoxin presenting with shortness of breath, found to be in Afib w/RVR; admitted to MICU for Acute Hypoxic Respiratory Failure in s/o Asthma and Bronchiectasis w/ possible PNA, leading to intubation and sedation.     NEURO  # Intubated & Sedated  - Mental status was at baseline before intubation; intubated for Acute Hypoxic Respiratory Failure  - ABG w/o hypercapnia; PCO2 38; no signs of hypercapnic encephalopathy  - RASS goal 0 to -1  - Daily SAT/SBT    CARDIAC  # Afib w/ RVR  Admitted in RVR; being followed by Dr. Marcus, Cardiology; Afib w/ RVR thought to be due to stress from hypoxemia  - HR in the 130s 1/10  - on Diltiazem drip, more rate controlled today  - follow-up Echo  - follow-up TSH results    # HTN  No indication of hypotension.  - currently on Diltiazem 90mg q6h  - closely monitor BP  - can d/c fentanyl     PULM  # Acute Hypoxemic Respiratory Failure  # Asthma  # Bronchiectasis  CT Angio r/o PE; currently thought to be due to Asthma exacerbation vs bronchiectasis vs ?PNA;   - tachypneic on BiPAP to 30s, saturating 100% but is tiring out as of now  - intubate and sedate to assist with airway clearance  - chest PT; airway clearance; hypertonic saline; stop IPCV  - daily SBT/SATs  - latest ABG is 7.46| 32|211  - decrease to solumedrol 20q12h  - DuoNebs q6h   - currently of Cefepime (1/10-) for empiric coverage; follow infectious diseases recommendations  - follow-up Legionella antigen, final BCx read; RVP negative; BAL cx    RENAL  # SAHNE  Cr shot up to 2.44 <- 2.55 <-2.36 <- 1.87 <- 1.97 <- 0.64  - Likely prerenal in the setting of low PO intake   - Follow-up urine lytes  - Maintenance fluids started: 100cc/hr    GI   - no active issues currently  - Diet: NPO on vent; place OG and start tube feeds  - Bowel regimen: miralax, senna    HEME/ONC  # Leukocytosis  WBC now 36; decreasing  No active indications of infections, all infectious workup negative so far; likely in s/o solumedrol usage, with neutrophilia  - continue to monitor    # INR 11.1 (00:00 1/11)-> 4.5 (6 AM 1/11) -> 1.42 (1/12)  - continue to hold Lovenox    ID  # Sepsis  CT Chest w/ bilateral lower lobe predominant patchy and branching opacities suspicious for pneumonia. Small left pleural effusion. Leukocytosis, tachycardia and tachypnea; No active indications of infections, all infectious workup negative so far; leukocytosis likely in s/o solumedrol usage but no diff to conform neutrophilia so far; tachycardia likely in s/o tachypnea and AHRF  - Continue Cefepime (1/10-) for empiric PNA coverage  - follow-up SCx, Urine legionella antigen, final BCx, BAL cultures    ENDO  # Hyponatremia  Na 128 <- 126  <- 132<- 131 in 06/22, may be chronic  - continue fluid resuscitation   - monitor daily BMPs  - Urine lytes  - Maintenance fluids at 100cc's/hr    # T2DM  A1c 8.1 1/09/23  BGs high; likely in s/o steroid usage  - place on sliding scale NPH  - admelog     DVT  - Hold Lovenox 50mg BID    ETHICS  Full Code

## 2023-01-12 NOTE — PROGRESS NOTE ADULT - ASSESSMENT
77 year old with  dementia, DM, on home oxygen for chronic pulmonary disease, non ambulatory  Admitted for sob, anorexia, malaise for the last 8 days  CXR with Left Lung base ? pneumonia  Worsening leukocytosis, no fever  SOB/hypoxia    Worsening WBC--given solumedrol  Treat for bacterial pneumonia    - Cefepime 1g q 12 (decreased dose)    CT chest --f reviewed  likely pna and underlying  bronchiectasis      legionella ag negative so Zithromax can be stopped

## 2023-01-12 NOTE — PROGRESS NOTE ADULT - SUBJECTIVE AND OBJECTIVE BOX
Date of Service: 01-12-23 @ 11:55    Patient is a 77y old  Female who presents with a chief complaint of sob/ rapid a.fib (12 Jan 2023 08:47)      Any change in ROS:  intubated and sedated and on full vent support:     MEDICATIONS  (STANDING):  albuterol/ipratropium for Nebulization 3 milliLiter(s) Nebulizer every 6 hours  atorvastatin 40 milliGRAM(s) Oral at bedtime  azithromycin  IVPB 500 milliGRAM(s) IV Intermittent <User Schedule>  cefepime   IVPB 1000 milliGRAM(s) IV Intermittent every 12 hours  chlorhexidine 0.12% Liquid 15 milliLiter(s) Oral Mucosa every 12 hours  chlorhexidine 4% Liquid 1 Application(s) Topical <User Schedule>  dextrose 5%. 1000 milliLiter(s) (100 mL/Hr) IV Continuous <Continuous>  dextrose 5%. 1000 milliLiter(s) (50 mL/Hr) IV Continuous <Continuous>  dextrose 50% Injectable 25 Gram(s) IV Push once  dextrose 50% Injectable 12.5 Gram(s) IV Push once  dextrose 50% Injectable 25 Gram(s) IV Push once  diltiazem Infusion 7.5 mG/Hr (7.5 mL/Hr) IV Continuous <Continuous>  glucagon  Injectable 1 milliGRAM(s) IntraMuscular once  insulin lispro (ADMELOG) corrective regimen sliding scale   SubCutaneous every 6 hours  insulin NPH human recombinant 5 Unit(s) SubCutaneous every 6 hours  methylPREDNISolone sodium succinate Injectable 20 milliGRAM(s) IV Push every 12 hours  phenylephrine    Infusion 1.202 MICROgram(s)/kG/Min (17 mL/Hr) IV Continuous <Continuous>  polyethylene glycol 3350 17 Gram(s) Oral every 12 hours  propofol Infusion 10 MICROgram(s)/kG/Min (2.26 mL/Hr) IV Continuous <Continuous>  senna Syrup 10 milliLiter(s) Oral at bedtime  sodium chloride 0.9%. 1000 milliLiter(s) (50 mL/Hr) IV Continuous <Continuous>  sodium chloride 3%  Inhalation 4 milliLiter(s) Inhalation every 12 hours    MEDICATIONS  (PRN):  acetaminophen     Tablet .. 650 milliGRAM(s) Oral every 6 hours PRN Temp greater or equal to 38C (100.4F), Mild Pain (1 - 3)  dextrose Oral Gel 15 Gram(s) Oral once PRN Blood Glucose LESS THAN 70 milliGRAM(s)/deciliter    Vital Signs Last 24 Hrs  T(C): 36.7 (12 Jan 2023 08:00), Max: 37.7 (11 Jan 2023 16:00)  T(F): 98.1 (12 Jan 2023 08:00), Max: 99.9 (11 Jan 2023 16:00)  HR: 94 (12 Jan 2023 11:26) (72 - 136)  BP: 89/46 (12 Jan 2023 11:00) (71/43 - 150/60)  BP(mean): 64 (12 Jan 2023 11:00) (53 - 87)  RR: 24 (12 Jan 2023 11:00) (19 - 40)  SpO2: 100% (12 Jan 2023 11:26) (96% - 100%)    Parameters below as of 12 Jan 2023 08:00  Patient On (Oxygen Delivery Method): ventilator    O2 Concentration (%): 30  Mode: AC/ CMV (Assist Control/ Continuous Mandatory Ventilation)  RR (machine): 20  TV (machine): 350  FiO2: 30  PEEP: 5  ITime: 1  MAP: 10  PIP: 32    I&O's Summary    11 Jan 2023 07:01  -  12 Jan 2023 07:00  --------------------------------------------------------  IN: 4963.9 mL / OUT: 2100 mL / NET: 2863.9 mL    12 Jan 2023 07:01  -  12 Jan 2023 11:55  --------------------------------------------------------  IN: 428.6 mL / OUT: 135 mL / NET: 293.6 mL          Physical Exam:   GENERAL: NAD, well-groomed, well-developed  HEENT: ROSA/   Atraumatic, Normocephalic  ENMT: No tonsillar erythema, exudates, or enlargement; Moist mucous membranes, Good dentition, No lesions  NECK: Supple, No JVD, Normal thyroid  CHEST/LUNG: poor air entry:   CVS: Regular rate and rhythm; No murmurs, rubs, or gallops  GI: : Soft, Nontender, Nondistended; Bowel sounds present  NERVOUS SYSTEM:  SEDATED AND INTUBATED  EXTREMITIES:  - edema  LYMPH: No lymphadenopathy noted  SKIN: No rashes or lesions  ENDOCRINOLOGY: No Thyromegaly  PSYCH: calm     Labs:  ABG - ( 12 Jan 2023 00:00 )  pH, Arterial: 7.42  pH, Blood: x     /  pCO2: 35    /  pO2: 140   / HCO3: 23    / Base Excess: -1.3  /  SaO2: 99.4            60.0, 37                            11.4   36.68 )-----------( 303      ( 12 Jan 2023 00:19 )             33.2                         12.8   41.29 )-----------( 348      ( 11 Jan 2023 06:19 )             38.4                         12.5   40.54 )-----------( 359      ( 11 Jan 2023 01:38 )             37.4                         12.3   41.70 )-----------( 352      ( 11 Jan 2023 00:31 )             37.2                         13.1   33.40 )-----------( 302      ( 10 Tim 2023 07:06 )             40.7                         13.2   30.66 )-----------( 265      ( 09 Jan 2023 06:11 )             41.5                         13.9   23.80 )-----------( 284      ( 08 Jan 2023 11:57 )             44.4     01-12    128<L>  |  94<L>  |  52<H>  ----------------------------<  217<H>  3.5   |  20<L>  |  2.44<H>  01-11    126<L>  |  87<L>  |  52<H>  ----------------------------<  475<HH>  3.8   |  20<L>  |  2.58<H>  01-11    126<L>  |  83<L>  |  48<H>  ----------------------------<  244<H>  4.7   |  19<L>  |  2.36<H>  01-11    125<L>  |  83<L>  |  43<H>  ----------------------------<  209<H>  4.2   |  21<L>  |  1.87<H>  01-11    128<L>  |  88<L>  |  46<H>  ----------------------------<  216<H>  3.8   |  20<L>  |  1.97<H>  01-10    131<L>  |  85<L>  |  24<H>  ----------------------------<  291<H>  3.4<L>   |  25  |  0.88  01-09    133<L>  |  89<L>  |  15  ----------------------------<  197<H>  3.6   |  26  |  0.69  01-08    132<L>  |  90<L>  |  11  ----------------------------<  220<H>  4.1   |  27  |  0.64    Ca    7.2<L>      12 Jan 2023 00:18  Ca    7.6<L>      11 Jan 2023 15:31  Ca    9.0      11 Jan 2023 06:19  Ca    8.9      11 Jan 2023 01:38  Ca    9.1      11 Jan 2023 00:31  Phos  3.8     01-12  Phos  4.7     01-11  Phos  6.1     01-11  Phos  4.8     01-11  Phos  4.9     01-11  Mg     1.8     01-12  Mg     1.8     01-11  Mg     2.0     01-11  Mg     2.0     01-11  Mg     2.0     01-11    TPro  4.6<L>  /  Alb  2.0<L>  /  TBili  0.2  /  DBili  x   /  AST  34  /  ALT  24  /  AlkPhos  112  01-12  TPro  4.6<L>  /  Alb  2.0<L>  /  TBili  0.3  /  DBili  x   /  AST  52<H>  /  ALT  24  /  AlkPhos  108  01-11  TPro  5.5<L>  /  Alb  2.4<L>  /  TBili  0.4  /  DBili  x   /  AST  19  /  ALT  8<L>  /  AlkPhos  129<H>  01-11  TPro  5.7<L>  /  Alb  2.2<L>  /  TBili  0.4  /  DBili  x   /  AST  <5<L>  /  ALT  <5<L>  /  AlkPhos  121<H>  01-11  TPro  5.4<L>  /  Alb  2.5<L>  /  TBili  0.5  /  DBili  x   /  AST  16  /  ALT  7<L>  /  AlkPhos  118  01-11  TPro  6.3  /  Alb  3.1<L>  /  TBili  0.9  /  DBili  x   /  AST  13  /  ALT  10  /  AlkPhos  126<H>  01-10    CAPILLARY BLOOD GLUCOSE      POCT Blood Glucose.: 173 mg/dL (12 Jan 2023 05:09)  POCT Blood Glucose.: 421 mg/dL (11 Jan 2023 18:23)  POCT Blood Glucose.: 438 mg/dL (11 Jan 2023 17:14)  POCT Blood Glucose.: 472 mg/dL (11 Jan 2023 17:10)  POCT Blood Glucose.: 255 mg/dL (11 Jan 2023 12:20)      LIVER FUNCTIONS - ( 12 Jan 2023 00:18 )  Alb: 2.0 g/dL / Pro: 4.6 g/dL / ALK PHOS: 112 U/L / ALT: 24 U/L / AST: 34 U/L / GGT: x           PT/INR - ( 12 Jan 2023 00:20 )   PT: 16.5 sec;   INR: 1.42 ratio         PTT - ( 12 Jan 2023 00:20 )  PTT:31.3 sec    Procalcitonin, Serum: 1.05 ng/mL (01-10 @ 07:06)  Procalcitonin, Serum: 0.39 ng/mL (01-09 @ 06:11)  Lactate, Blood: 1.7 mmol/L (01-08 @ 15:39)        RECENT CULTURES:  01-10 @ 22:10 .Blood Blood-Peripheral         rad< from: Xray Chest 1 View- PORTABLE-Urgent (Xray Chest 1 View- PORTABLE-Urgent .) (01.10.23 @ 19:55) >    ACC: [id number] EXAM:  XR CHEST PORTABLE URGENT 1V                          PROCEDURE DATE:  01/10/2023          INTERPRETATION:  CLINICAL INFORMATION: Intubation.    TECHNIQUE: Portable AP radiograph of the chest.    COMPARISON: Chest x-ray 1/8/2023.    FINDINGS: Clear lungs. No pleural effusions or pneumothorax. Cardiac   silhouette is unchanged. No acute osseous pathology.    Endotracheal tube terminates above the jack. Enteric tube projects   below the diaphragm with its distal segment excluded from view.      IMPRESSION:    Clear lungs. Tubes as described.    --- End of Report ---           OLIVA ALAMO MD; Resident Radiologist  This document has been electronically signed.  JAIRO TATUM MD; Attending Radiologist  This document has been electronically signed. Jan 11 2023  8:48AM    < end of copied text >  < from: CT Chest No Cont (01.10.23 @ 12:31) >    FINDINGS:    LYMPH NODES: No lymphadenopathy.    HEART/VASCULATURE: Heart size is normal. No pericardial effusion. Aortic   valve calcifications. Aortic and coronary artery calcifications.    AIRWAYS/LUNGS/PLEURA: Patent central airways. Bilateral lower lobe   predominant patchy and branching opacities. Small left pleural effusion   with associated passive atelectasis.    UPPER ABDOMEN: Unchanged calcified liver granulomas.    BONES/SOFT TISSUES: Exaggerated kyphosis of the thoracic spine.   Degenerative changes of the spine. Soft tissues are unremarkable.    IMPRESSION:    Bilateral lower lobe predominant patchy and branching opacities   suspicious for pneumonia.    Small left pleural effusion.        --- End of Report ---           KIMI GERARDO MD; Resident Radiologist  This document has been electronically signed.  ALIRIO LARA MD; Attending Radiologist  This document has been electronically signed. Tim 10 2023  4:05PM    < end of copied text >         No growth to date.    01-10 @ 22:09 .Blood Blood-Peripheral                No growth to date.    01-10 @ 20:08 .Bronchial Bronchial Lavage       Few polymorphonuclear leukocytes seen per low power field  No Squamous epithelial cells seen per low power field  No organisms seen           Normal Respiratory Janette present    01-08 @ 14:35 .Blood Blood-Peripheral                No growth to date.    01-08 @ 14:26 Clean Catch Clean Catch (Midstream)                >=3 organisms. Probable collection contamination.          RESPIRATORY CULTURES:          Studies  Chest X-RAY  CT SCAN Chest   Venous Dopplers: LE:   CT Abdomen  Others

## 2023-01-13 NOTE — CONSULT NOTE ADULT - PROBLEM SELECTOR RECOMMENDATION 2
- Defer to MICU team   - Failed CPAP trial today, will continue to monitor  - Will continue GOC regarding reintubation if pt medically safe to be extubated

## 2023-01-13 NOTE — CONSULT NOTE ADULT - SUBJECTIVE AND OBJECTIVE BOX
HPI:  CHIEF COMPLAINT:Patient is a 77y old  Female who presents with a chief complaint of     HPI:  77-year-old female with past medical history of DM, HTN, HLD, asthma on daily steroids, A. fib on Eliquis and digoxin presenting with shortness of breath.  Patient unable to provide history.  Daughter at bedside reports patient has had increasing shortness of breath, wheezing, cough for the past week now.  Patient also with increasing generalized weakness, fatigue and decreased p.o. intake.  Denies fever/chills, chest pain, abdominal pain, nausea, vomiting, diarrhea, dysuria.  Family reports patient had a similar episode 1 year ago when she was admitted to [facility] with UTI. Pt intubated for Acute Hypoxic Respiratory Failure  (08 Jan 2023 15:40)    PERTINENT PM/SXH:   Asthma    Diabetes mellitus    Atrial fibrillation, chronic    Type 2 diabetes mellitus    Hypertension    Dementia      Afib    Asthma    HTN (Hypertension)    Diabetes Mellitus    No significant past surgical history      FAMILY HISTORY:  Family history of diabetes mellitus (Mother)      Family Hx substance abuse [ ]yes [ ]no  ITEMS NOT CHECKED ARE NOT PRESENT    SOCIAL HISTORY:   Significant other/partner[x ]: James  Children[x ]: Angel, Shyann, Ariana  Buddhist/Spirituality: Cheondoism  Substance hx:  [ ]   Tobacco hx:  [ ]   Alcohol hx: [ ]   Home Opioid hx:  [ ] I-Stop Reference No:  Living Situation: [x ]Home  [ ]Long term care  [ ]Rehab [ ]Other    ADVANCE DIRECTIVES:    DNR/MOLST  [ ]  Living Will  [ ]   DECISION MAKER(s):  [ ] Health Care Proxy(s)  [x ] Surrogate(s): James(Ramez) Tamar  [ ] Guardian           Name(s): Phone Number(s): James Boyle()     BASELINE (I)ADL(s) (prior to admission):  [place]: [ ]Total  [x ] Moderate [ ]Dependent    Allergies    Avelox (Pruritus)  fish (Vomiting)  Levaquin (Unknown)  shellfish (Vomiting)    Intolerances    MEDICATIONS  (STANDING):  albuterol/ipratropium for Nebulization 3 milliLiter(s) Nebulizer every 6 hours  ascorbic acid 500 milliGRAM(s) Oral daily  atorvastatin 40 milliGRAM(s) Oral at bedtime  azithromycin   Tablet 250 milliGRAM(s) Oral <User Schedule>  cefepime   IVPB 500 milliGRAM(s) IV Intermittent every 8 hours  chlorhexidine 0.12% Liquid 15 milliLiter(s) Oral Mucosa every 12 hours  chlorhexidine 4% Liquid 1 Application(s) Topical <User Schedule>  dexMEDEtomidine Infusion 0.2 MICROgram(s)/kG/Hr (1.89 mL/Hr) IV Continuous <Continuous>  dextrose 5%. 1000 milliLiter(s) (100 mL/Hr) IV Continuous <Continuous>  dextrose 5%. 1000 milliLiter(s) (50 mL/Hr) IV Continuous <Continuous>  dextrose 50% Injectable 25 Gram(s) IV Push once  dextrose 50% Injectable 12.5 Gram(s) IV Push once  dextrose 50% Injectable 25 Gram(s) IV Push once  diltiazem Infusion 17 mG/Hr (17 mL/Hr) IV Continuous <Continuous>  glucagon  Injectable 1 milliGRAM(s) IntraMuscular once  heparin  Infusion.  Unit(s)/Hr (7 mL/Hr) IV Continuous <Continuous>  insulin lispro (ADMELOG) corrective regimen sliding scale   SubCutaneous every 6 hours  insulin NPH human recombinant 5 Unit(s) SubCutaneous every 6 hours  multivitamin 1 Tablet(s) Oral daily  phenylephrine    Infusion 1.202 MICROgram(s)/kG/Min (17 mL/Hr) IV Continuous <Continuous>  polyethylene glycol 3350 17 Gram(s) Oral every 12 hours  propofol Infusion 10 MICROgram(s)/kG/Min (2.26 mL/Hr) IV Continuous <Continuous>  senna Syrup 10 milliLiter(s) Oral at bedtime  sodium chloride 0.9%. 2400 milliLiter(s) (100 mL/Hr) IV Continuous <Continuous>  sodium chloride 3%  Inhalation 4 milliLiter(s) Inhalation every 12 hours  thiamine 100 milliGRAM(s) Oral daily    MEDICATIONS  (PRN):  acetaminophen     Tablet .. 650 milliGRAM(s) Oral every 6 hours PRN Temp greater or equal to 38C (100.4F), Mild Pain (1 - 3)  dextrose Oral Gel 15 Gram(s) Oral once PRN Blood Glucose LESS THAN 70 milliGRAM(s)/deciliter    PRESENT SYMPTOMS: [x ]Unable to self-report  [ ] CPOT [x ] PAINADs [x ] RDOS  Source if other than patient:  [ ]Family   [ ]Team     Pain: [ ]yes [ ]no  QOL impact -   Location -                    Aggravating factors -  Quality -  Radiation -  Timing-  Severity (0-10 scale):  Minimal acceptable level (0-10 scale):     CPOT:    https://www.[facility].org/getattachment/[email or web address]/Critical-Care-Pain-Observation-Tool-(CPOT)    PAINAD Score: See PAINAD tool and score below     Dyspnea:                           [ ]Mild [ ]Moderate [ ]Severe    RDOS: See RDOS tool and score below   0 to 2  minimal or no respiratory distress   3  mild distress  4 to 6 moderate distress  >7 severe distress      Anxiety:                             [ ]Mild [ ]Moderate [ ]Severe  Fatigue:                             [ ]Mild [ ]Moderate [ ]Severe  Nausea:                             [ ]Mild [ ]Moderate [ ]Severe  Loss of appetite:              [ ]Mild [ ]Moderate [ ]Severe  Constipation:                    [ ]Mild [ ]Moderate [ ]Severe    PCSSQ[Palliative Care Spiritual Screening Question]   Severity (0-10):  Score of 4 or > indicate consideration of Chaplaincy referral.  Chaplaincy Referral: [ ] yes [x ] refused [ ] following [ ] Deferred     Caregiver [place]? : [ ] yes [ ] no [x ] Deferred [ ] Declined             Social work referral [ ] Patient & Family Centered Care Referral [ ]     Anticipatory Grief present?:  [ ] yes [ ] no  [x ] Deferred                  Social work referral [ ] Chaplaincy Referral [ ]    		  Other Symptoms:  [x ]All other review of systems negative: pt intubated/sedated     Palliative Performance Status Version 2:   See PPSv2 tool and score below          PHYSICAL EXAM:  Vital Signs Last 24 Hrs  T(C): 37.2 (13 Jan 2023 11:00), Max: 37.2 (12 Jan 2023 19:45)  T(F): 99 (13 Jan 2023 11:00), Max: 99 (12 Jan 2023 19:45)  HR: 94 (13 Jan 2023 14:00) (89 - 133)  BP: 112/60 (13 Jan 2023 14:00) (81/42 - 119/52)  BP(mean): 80 (13 Jan 2023 14:00) (57 - 91)  RR: 24 (13 Jan 2023 14:00) (20 - 32)  SpO2: 100% (13 Jan 2023 14:00) (80% - 100%)    Parameters below as of 13 Jan 2023 11:09  Patient On (Oxygen Delivery Method): ventilator     I&O's Summary    12 Jan 2023 07:01  -  13 Jan 2023 07:00  --------------------------------------------------------  IN: 3202.5 mL / OUT: 465 mL / NET: 2737.5 mL    13 Jan 2023 07:01  -  13 Jan 2023 14:36  --------------------------------------------------------  IN: 1287.9 mL / OUT: 73 mL / NET: 1214.9 mL      GENERAL: [ ]Cachexia    [ ]Alert  [ ]Oriented x   [ x]Lethargic  [ ]Unarousable  [ ]Verbal  [ ]Non-Verbal  Behavioral:   [ ] Anxiety  [ ] Delirium [ ] Agitation [ ] Other  HEENT:  [ ]Normal   [ ]Dry mouth   [x ]ET Tube/Trach  [ ]Oral lesions  PULMONARY:   [ ]Clear [ ]Tachypnea  [ ]Audible excessive secretions   [ ]Rhonchi        [ ]Right [ ]Left [ ]Bilateral  [x ]Crackles        [ ]Right [ ]Left [x ]Bilateral  [ ]Wheezing     [ ]Right [ ]Left [ ]Bilateral  [ ]Diminished breath sounds [ ]right [ ]left [ ]bilateral  CARDIOVASCULAR:    [ ]Regular [x ]Irregular [x ]Tachy  [ ]Catracho [ ]Murmur [ ]Other  GASTROINTESTINAL:  [x ]Soft  [ ]Distended   [x ]+BS  [x ]Non tender [ ]Tender  [ ]Other [ ]PEG [ ]OGT/ NGT  Last BM: 1/13/2023  GENITOURINARY:  [ ]Normal [x ] Incontinent   [ ]Oliguria/Anuria   [ x]Morales  MUSCULOSKELETAL:   [ ]Normal   [x ]Weakness  [x ]Bed/Wheelchair bound [ ]Edema  NEUROLOGIC:   [ ]No focal deficits  [x ]Cognitive impairment: pt intubated/sedated   [ ]Dysphagia [ ]Dysarthria [ ]Paresis [ ]Other   SKIN:   [x ]Normal  [ ]Rash  [ ]Other  [ ]Pressure ulcer(s)       Present on admission [ ]y [ ]n    CRITICAL CARE:  [x ] Shock Present  [x ]Septic [ ]Cardiogenic [ ]Neurologic [ ]Hypovolemic  [ ]  Vasopressors [ ]  Inotropes   [ x]Respiratory failure present [x ]Mechanical ventilation [ ]Non-invasive ventilatory support [ ]High flow  Mode: AC/ CMV (Assist Control/ Continuous Mandatory Ventilation), RR (machine): 20, TV (machine): 350, FiO2: 30, PEEP: 5, ITime: 1, MAP: 13, PIP: 32  [ ]Acute  [x ]Chronic [ ]Hypoxic  [ ]Hypercarbic [ ]Other  [ ]Other organ failure     LABS:                        9.9    24.68 )-----------( 199      ( 13 Jan 2023 00:19 )             29.9   01-13    128<L>  |  97  |  58<H>  ----------------------------<  297<H>  3.7   |  19<L>  |  2.24<H>    Ca    7.5<L>      13 Jan 2023 00:18  Phos  3.9     01-13  Mg     1.8     01-13    TPro  4.3<L>  /  Alb  1.8<L>  /  TBili  0.2  /  DBili  x   /  AST  23  /  ALT  21  /  AlkPhos  99  01-13  PT/INR - ( 13 Jan 2023 00:19 )   PT: 22.1 sec;   INR: 1.91 ratio         PTT - ( 13 Jan 2023 00:19 )  PTT:29.0 sec      RADIOLOGY & ADDITIONAL STUDIES:  < from: Xray Abdomen 1 View PORTABLE -Urgent (Xray Abdomen 1 View PORTABLE -Urgent .) (01.11.23 @ 17:49) >  ACC: [id number] EXAM:  XR ABDOMEN PORTABLE URGENT 1V                          PROCEDURE DATE:  01/11/2023          INTERPRETATION:  CLINICAL INFORMATION: Increased orogastric tube output,   constipation.    EXAM: Single frontal view of the abdomen.    COMPARISON: CT abdomen and pelvis from 2/1/2019.    FINDINGS:  Status post bilateral femoral ORIF.  Enteric tube with tip in the distal stomach/proximal duodenum.  Temperature probe overlies the rectum.    Nonobstructive bowel gas pattern.  There isno intraperitoneal free air.  Levoscoliosis of the lumbar spine.    IMPRESSION:  Nonobstructive bowel gas pattern.    --- End of Report ---           STAR JOHNSTON MD; Resident Radiologist  This document has been electronically signed.  JOSELINE WRIGHT MD; Attending Radiologist  This document has been electronically signed. Jan 12 2023  8:44AM    < end of copied text >      PROTEIN CALORIE MALNUTRITION PRESENT: [ ]mild [ ]moderate [ ]severe [ ]underweight [ ]morbid obesity  https://www.andeal.org/vault/2440/web/files/ONC/Table_Clinical%20Characteristics%20to%20Document%20Malnutrition-White%20JV%20et%20al%202012.pdf    Height (cm): 154.9 (01-10-23 @ 18:10), 152.4 (06-03-22 @ 16:04)  Weight (kg): 37.7 (01-10-23 @ 18:10), 43.1 (06-03-22 @ 16:04)  BMI (kg/m2): 15.7 (01-10-23 @ 18:10), 18.6 (06-03-22 @ 16:04)    [x ]PPSV2 < or = to 30% [ ]significant weight loss  [ ]poor nutritional intake  [ ]anasarca[ ]Artificial Nutrition      Other REFERRALS:  [ ]Hospice  [ ]Child Life  [ ]Social Work  [ ]Case management [ ]Holistic Therapy     Goals of Care Document: see below HPI:   CHIEF COMPLAINT:Patient is a 77y old  Female who presents with a chief complaint of shortness of breath    HPI:  77-year-old female with past medical history of DM, HTN, HLD, asthma on daily steroids, A. fib on Eliquis and digoxin presenting with shortness of breath.  Patient unable to provide history.  Daughter at bedside reports patient has had increasing shortness of breath, wheezing, cough for the past week now.  Patient also with increasing generalized weakness, fatigue and decreased p.o. intake.  Denies fever/chills, chest pain, abdominal pain, nausea, vomiting, diarrhea, dysuria.  Family reports patient had a similar episode 1 year ago when she was admitted to [facility] with UTI. Pt intubated for Acute Hypoxic Respiratory Failure  (08 Jan 2023 15:40)    PERTINENT PM/SXH:   Asthma    Diabetes mellitus    Atrial fibrillation, chronic    Type 2 diabetes mellitus    Hypertension    Dementia      Afib    Asthma    HTN (Hypertension)    Diabetes Mellitus    No significant past surgical history      FAMILY HISTORY:  Family history of diabetes mellitus (Mother)      Family Hx substance abuse [ ]yes [ ]no  ITEMS NOT CHECKED ARE NOT PRESENT    SOCIAL HISTORY:   Significant other/partner[x ]: James  Children[x ]: Angel, Shyann, Ariana  Roman Catholic/Spirituality: Zoroastrian  Substance hx:  [ ]   Tobacco hx:  [ ]   Alcohol hx: [ ]   Home Opioid hx:  [ ] I-Stop Reference No:  Living Situation: [x ]Home  [ ]Long term care  [ ]Rehab [ ]Other    ADVANCE DIRECTIVES:    DNR/MOLST  [ ]  Living Will  [ ]   DECISION MAKER(s):  [ ] Health Care Proxy(s)  [x ] Surrogate(s): James Boyle (Andrew)  [ ] Guardian           Name(s): Phone Number(s): James Boyle()     BASELINE (I)ADL(s) (prior to admission):  [place]: [ ]Total  [x ] Moderate [ ]Dependent    Allergies    Avelox (Pruritus)  fish (Vomiting)  Levaquin (Unknown)  shellfish (Vomiting)    Intolerances    MEDICATIONS  (STANDING):  albuterol/ipratropium for Nebulization 3 milliLiter(s) Nebulizer every 6 hours  ascorbic acid 500 milliGRAM(s) Oral daily  atorvastatin 40 milliGRAM(s) Oral at bedtime  azithromycin   Tablet 250 milliGRAM(s) Oral <User Schedule>  cefepime   IVPB 500 milliGRAM(s) IV Intermittent every 8 hours  chlorhexidine 0.12% Liquid 15 milliLiter(s) Oral Mucosa every 12 hours  chlorhexidine 4% Liquid 1 Application(s) Topical <User Schedule>  dexMEDEtomidine Infusion 0.2 MICROgram(s)/kG/Hr (1.89 mL/Hr) IV Continuous <Continuous>  dextrose 5%. 1000 milliLiter(s) (100 mL/Hr) IV Continuous <Continuous>  dextrose 5%. 1000 milliLiter(s) (50 mL/Hr) IV Continuous <Continuous>  dextrose 50% Injectable 25 Gram(s) IV Push once  dextrose 50% Injectable 12.5 Gram(s) IV Push once  dextrose 50% Injectable 25 Gram(s) IV Push once  diltiazem Infusion 17 mG/Hr (17 mL/Hr) IV Continuous <Continuous>  glucagon  Injectable 1 milliGRAM(s) IntraMuscular once  heparin  Infusion.  Unit(s)/Hr (7 mL/Hr) IV Continuous <Continuous>  insulin lispro (ADMELOG) corrective regimen sliding scale   SubCutaneous every 6 hours  insulin NPH human recombinant 5 Unit(s) SubCutaneous every 6 hours  multivitamin 1 Tablet(s) Oral daily  phenylephrine    Infusion 1.202 MICROgram(s)/kG/Min (17 mL/Hr) IV Continuous <Continuous>  polyethylene glycol 3350 17 Gram(s) Oral every 12 hours  propofol Infusion 10 MICROgram(s)/kG/Min (2.26 mL/Hr) IV Continuous <Continuous>  senna Syrup 10 milliLiter(s) Oral at bedtime  sodium chloride 0.9%. 2400 milliLiter(s) (100 mL/Hr) IV Continuous <Continuous>  sodium chloride 3%  Inhalation 4 milliLiter(s) Inhalation every 12 hours  thiamine 100 milliGRAM(s) Oral daily    MEDICATIONS  (PRN):  acetaminophen     Tablet .. 650 milliGRAM(s) Oral every 6 hours PRN Temp greater or equal to 38C (100.4F), Mild Pain (1 - 3)  dextrose Oral Gel 15 Gram(s) Oral once PRN Blood Glucose LESS THAN 70 milliGRAM(s)/deciliter    PRESENT SYMPTOMS: [x ]Unable to self-report  [ ] CPOT [x ] PAINADs [x ] RDOS  Source if other than patient:  [ ]Family   [ ]Team     Pain: [ ]yes [ ]no  QOL impact -   Location -                    Aggravating factors -  Quality -  Radiation -  Timing-  Severity (0-10 scale):  Minimal acceptable level (0-10 scale):     CPOT:    https://www.[email or web address]/getattachment/[email or web address]/Critical-Care-Pain-Observation-Tool-(CPOT)    PAINAD Score: See PAINAD tool and score below     Dyspnea:                           [ ]Mild [ ]Moderate [ ]Severe    RDOS: See RDOS tool and score below   0 to 2  minimal or no respiratory distress   3  mild distress  4 to 6 moderate distress  >7 severe distress      Anxiety:                             [ ]Mild [ ]Moderate [ ]Severe  Fatigue:                             [ ]Mild [ ]Moderate [ ]Severe  Nausea:                             [ ]Mild [ ]Moderate [ ]Severe  Loss of appetite:              [ ]Mild [ ]Moderate [ ]Severe  Constipation:                    [ ]Mild [ ]Moderate [ ]Severe    PCSSQ[Palliative Care Spiritual Screening Question]   Severity (0-10):  Score of 4 or > indicate consideration of Chaplaincy referral.  Chaplaincy Referral: [ ] yes [x ] refused [ ] following [ ] Deferred     Caregiver [place]? : [ ] yes [ ] no [x ] Deferred [ ] Declined             Social work referral [ ] Patient & Family Centered Care Referral [ ]     Anticipatory Grief present?:  [ ] yes [ ] no  [x ] Deferred                  Social work referral [ ] Chaplaincy Referral [ ]    		  Other Symptoms:  [x ]All other review of systems negative: pt intubated/sedated     Palliative Performance Status Version 2:   See PPSv2 tool and score below          PHYSICAL EXAM:  Vital Signs Last 24 Hrs  T(C): 37.2 (13 Jan 2023 11:00), Max: 37.2 (12 Jan 2023 19:45)  T(F): 99 (13 Jan 2023 11:00), Max: 99 (12 Jan 2023 19:45)  HR: 94 (13 Jan 2023 14:00) (89 - 133)  BP: 112/60 (13 Jan 2023 14:00) (81/42 - 119/52)  BP(mean): 80 (13 Jan 2023 14:00) (57 - 91)  RR: 24 (13 Jan 2023 14:00) (20 - 32)  SpO2: 100% (13 Jan 2023 14:00) (80% - 100%)    Parameters below as of 13 Jan 2023 11:09  Patient On (Oxygen Delivery Method): ventilator     I&O's Summary    12 Jan 2023 07:01  -  13 Jan 2023 07:00  --------------------------------------------------------  IN: 3202.5 mL / OUT: 465 mL / NET: 2737.5 mL    13 Jan 2023 07:01  -  13 Jan 2023 14:36  --------------------------------------------------------  IN: 1287.9 mL / OUT: 73 mL / NET: 1214.9 mL      GENERAL: [ ]Cachexia    [ ]Alert  [ ]Oriented x   [ x]Lethargic  [ ]Unarousable  [ ]Verbal  [ ]Non-Verbal  Behavioral:   [ ] Anxiety  [ ] Delirium [ ] Agitation [ ] Other  HEENT:  [ ]Normal   [ ]Dry mouth   [x ]ET Tube/Trach  [ ]Oral lesions  PULMONARY:   [ ]Clear [ ]Tachypnea  [ ]Audible excessive secretions   [ ]Rhonchi        [ ]Right [ ]Left [ ]Bilateral  [x ]Crackles        [ ]Right [ ]Left [x ]Bilateral  [ ]Wheezing     [ ]Right [ ]Left [ ]Bilateral  [ ]Diminished breath sounds [ ]right [ ]left [ ]bilateral  CARDIOVASCULAR:    [ ]Regular [x ]Irregular [x ]Tachy  [ ]Catracho [ ]Murmur [ ]Other  GASTROINTESTINAL:  [x ]Soft  [ ]Distended   [x ]+BS  [x ]Non tender [ ]Tender  [ ]Other [ ]PEG [ ]OGT/ NGT  Last BM: 1/13/2023  GENITOURINARY:  [ ]Normal [x ] Incontinent   [ ]Oliguria/Anuria   [ x]Morales  MUSCULOSKELETAL:   [ ]Normal   [x ]Weakness  [x ]Bed/Wheelchair bound [ ]Edema  NEUROLOGIC:   [ ]No focal deficits  [x ]Cognitive impairment: pt intubated/sedated   [ ]Dysphagia [ ]Dysarthria [ ]Paresis [ ]Other   SKIN:   [x ]Normal  [ ]Rash  [ ]Other  [ ]Pressure ulcer(s)       Present on admission [ ]y [ ]n    CRITICAL CARE:  [x ] Shock Present  [x ]Septic [ ]Cardiogenic [ ]Neurologic [ ]Hypovolemic  [ ]  Vasopressors [ ]  Inotropes   [ x]Respiratory failure present [x ]Mechanical ventilation [ ]Non-invasive ventilatory support [ ]High flow  Mode: AC/ CMV (Assist Control/ Continuous Mandatory Ventilation), RR (machine): 20, TV (machine): 350, FiO2: 30, PEEP: 5, ITime: 1, MAP: 13, PIP: 32  [ ]Acute  [x ]Chronic [ ]Hypoxic  [ ]Hypercarbic [ ]Other  [ ]Other organ failure     LABS:                        9.9    24.68 )-----------( 199      ( 13 Jan 2023 00:19 )             29.9   01-13    128<L>  |  97  |  58<H>  ----------------------------<  297<H>  3.7   |  19<L>  |  2.24<H>    Ca    7.5<L>      13 Jan 2023 00:18  Phos  3.9     01-13  Mg     1.8     01-13    TPro  4.3<L>  /  Alb  1.8<L>  /  TBili  0.2  /  DBili  x   /  AST  23  /  ALT  21  /  AlkPhos  99  01-13  PT/INR - ( 13 Jan 2023 00:19 )   PT: 22.1 sec;   INR: 1.91 ratio         PTT - ( 13 Jan 2023 00:19 )  PTT:29.0 sec      RADIOLOGY & ADDITIONAL STUDIES:  < from: Xray Abdomen 1 View PORTABLE -Urgent (Xray Abdomen 1 View PORTABLE -Urgent .) (01.11.23 @ 17:49) >  ACC: [id number] EXAM:  XR ABDOMEN PORTABLE URGENT 1V                          PROCEDURE DATE:  01/11/2023          INTERPRETATION:  CLINICAL INFORMATION: Increased orogastric tube output,   constipation.    EXAM: Single frontal view of the abdomen.    COMPARISON: CT abdomen and pelvis from 2/1/2019.    FINDINGS:  Status post bilateral femoral ORIF.  Enteric tube with tip in the distal stomach/proximal duodenum.  Temperature probe overlies the rectum.    Nonobstructive bowel gas pattern.  There isno intraperitoneal free air.  Levoscoliosis of the lumbar spine.    IMPRESSION:  Nonobstructive bowel gas pattern.    --- End of Report ---           STAR JOHNSTON MD; Resident Radiologist  This document has been electronically signed.  JOSELINE WRIGHT MD; Attending Radiologist  This document has been electronically signed. Jan 12 2023  8:44AM    < end of copied text >      PROTEIN CALORIE MALNUTRITION PRESENT: [ ]mild [ ]moderate [ ]severe [ ]underweight [ ]morbid obesity  https://www.andeal.org/vault/2440/web/files/ONC/Table_Clinical%20Characteristics%20to%20Document%20Malnutrition-White%20JV%20et%20al%202012.pdf    Height (cm): 154.9 (01-10-23 @ 18:10), 152.4 (06-03-22 @ 16:04)  Weight (kg): 37.7 (01-10-23 @ 18:10), 43.1 (06-03-22 @ 16:04)  BMI (kg/m2): 15.7 (01-10-23 @ 18:10), 18.6 (06-03-22 @ 16:04)    [x ]PPSV2 < or = to 30% [ ]significant weight loss  [x ]poor nutritional intake  [ ]anasarca[ ]Artificial Nutrition      Other REFERRALS:  [ ]Hospice  [ ]Child Life  [x ]Social Work  [ ]Case management [ ]Holistic Therapy     Goals of Care Document: see below

## 2023-01-13 NOTE — PROGRESS NOTE ADULT - ASSESSMENT
77-year-old female with past medical history of DM, HTN, HLD, asthma on daily steroids, A. fib on Eliquis and digoxin presenting with shortness of breath.  Patient unable to provide history.  Daughter at bedside reports patient has had increasing shortness of breath, wheezing, cough for the past week now.  Patient also with increasing generalized weakness, fatigue and decreased p.o. intake.  Denies fever/chills, chest pain, abdominal pain, nausea, vomiting, diarrhea, dysuria.  Family reports patient had a similar episode 1 year ago when she was admitted to [facility] with UTI.  pt with sig medical hx, chronic a.fib with sob ?sec sepsis/ pneumoniae, UTI  start on ceftriaxone  tele  for increase hr, will increase Cardizem dose  continue AC  cardiac enzyme  tsh  echo  RVP negative      ASTHMA/ COPD exacerbation;   A Fibrillation  with rvr:  DM:  HTN:   HLD:     1/09:    ASTHMA/ COPD exacerbation; she was using performist at home with 5 mg of prednisone  not sure why she was not on any other meds:  sill start Symbicort too:  along with BD and is already on cefepime:  though pneumonia to me is not very convincing : will probably need ct scan chest   A Fibrillation  with rvr: Hr still elevated:  on cardizem : would defer to cards:  on ac:  lovenox  + coumadin : check echo   DM:: monitor and control   HTN: : controlled  HLD: on statins  :    dw team    1/10:    ASTHMA/ COPD exacerbation; she was using performist at home with 5 mg of prednisone  not sure why she was not on any other meds:  sill start Symbicort too:  along with BD and is already on cefepime:  WBC increased : though pneumonia to me is not very convincing : will probably need ct scan chest : she rused for ct chest : increase steorids 40 mg Q 6 hours : ABG today seems reasonable   A Fibrillation  with rvr: Hr still elevated:  on cardizem : HR is till very high  :   DM:: monitor and control   HTN: : controlled  HLD: on statins:    MICU  consult:  high risk for intubation:   :    edy and daughter    1/11:    ASTHMA/ COPD exacerbation; now s/p bronch: RML lavage done:  wait cultures results: she was using performist at home with 5 mg of prednisone  not sure why she was not on any other meds:  Cont BD:  and antibiotics  WBC still increased :on cefepime:    A Fibrillation  with rvr: Hr still elevated:  on cardizem : HR is better now:  on cardiem   DM:: monitor and control   HTN: : controlled  HLD: on statins:    josias micu  ATTENDING      1/12:    ASTHMA/ COPD exacerbation; now s/p bronch: RML lavage done:  negative  cultures so far: : she was using performist at home with 5 mg of prednisone  not sure why she was not on any other meds:  Cont BD:  and antibiotics  WBC still increased: but little down today  :on cefepime:    A Fibrillation  with rvr: Hr still elevated:  on cardizem : HR is better now:  on cardiem drip   DM:: monitor and control   HTN: : controlled  HLD: on statins:    josias micu  team    1/13:       ASTHMA/ COPD exacerbation; now s/p bronch: RML lavage done:  negative  cultures so far: :  cont full vent support:  management  of vent per MICU : weaning as perprotocol  ;  on zothromax and cefepime : off steroids   A Fibrillation  with rvr: Hr still elevated:  on Cardizem : HR is better now:  on cardiem drip   DM:: monitor and control   HTN: : controlled  HLD: on statins:    josias micu  team   :

## 2023-01-13 NOTE — CONSULT NOTE ADULT - NS ATTEND AMEND GEN_ALL_CORE FT
Palliative care consulted for GOC in patient with hx of bronchiectasis now with respiratory failure requiring intubation. Patient failed CPAP trial and discussion held with family today to discuss options for care including ongoing mechanical ventilation, trach/peg if continued inability to wean vs compassionate extubation. Family appreciative of discussion and will discuss as a family pending clinical course. Family states patient would not want interventions such as trach/peg but are deferring to patients spouse for final decision making. Rest as documented above. we will continue to follow for ongoing goc pending clinical course.   [phone]

## 2023-01-13 NOTE — PROGRESS NOTE ADULT - ASSESSMENT
77 year old with  dementia, DM, on home oxygen for chronic pulmonary disease, non ambulatory  Admitted for sob, anorexia, malaise for the last 8 days  CXR with Left Lung base ? pneumonia  Worsening leukocytosis, no fever  SOB/hypoxia    Worsening WBC--given solumedrol  Treat for bacterial pneumonia    - Cefepime 1g q 12 (decreased dose)    CT chest --f reviewed  likely pna and underlying  bronchiectasis      legionella ag negative so Zithromax can be stopped   cachetic  prognosis guarded

## 2023-01-13 NOTE — PROGRESS NOTE ADULT - SUBJECTIVE AND OBJECTIVE BOX
infectious diseases progress note:    Patient is a 77y old  Female who presents with a chief complaint of sob/ rapid a.fib (13 Jan 2023 07:10)        Acute respiratory failure with hypoxia             Allergies    Avelox (Pruritus)  fish (Vomiting)  Levaquin (Unknown)  shellfish (Vomiting)    Intolerances        ANTIBIOTICS/RELEVANT:  antimicrobials  azithromycin  IVPB 500 milliGRAM(s) IV Intermittent <User Schedule>  cefepime   IVPB 1000 milliGRAM(s) IV Intermittent every 12 hours    immunologic:    OTHER:  acetaminophen     Tablet .. 650 milliGRAM(s) Oral every 6 hours PRN  albuterol/ipratropium for Nebulization 3 milliLiter(s) Nebulizer every 6 hours  ascorbic acid 500 milliGRAM(s) Oral daily  atorvastatin 40 milliGRAM(s) Oral at bedtime  chlorhexidine 0.12% Liquid 15 milliLiter(s) Oral Mucosa every 12 hours  chlorhexidine 4% Liquid 1 Application(s) Topical <User Schedule>  dextrose 5%. 1000 milliLiter(s) IV Continuous <Continuous>  dextrose 5%. 1000 milliLiter(s) IV Continuous <Continuous>  dextrose 50% Injectable 25 Gram(s) IV Push once  dextrose 50% Injectable 12.5 Gram(s) IV Push once  dextrose 50% Injectable 25 Gram(s) IV Push once  dextrose Oral Gel 15 Gram(s) Oral once PRN  diltiazem Infusion 17 mG/Hr IV Continuous <Continuous>  glucagon  Injectable 1 milliGRAM(s) IntraMuscular once  insulin lispro (ADMELOG) corrective regimen sliding scale   SubCutaneous every 6 hours  insulin NPH human recombinant 5 Unit(s) SubCutaneous every 6 hours  methylPREDNISolone sodium succinate Injectable 20 milliGRAM(s) IV Push every 12 hours  multivitamin 1 Tablet(s) Oral daily  phenylephrine    Infusion 1.202 MICROgram(s)/kG/Min IV Continuous <Continuous>  polyethylene glycol 3350 17 Gram(s) Oral every 12 hours  propofol Infusion 10 MICROgram(s)/kG/Min IV Continuous <Continuous>  senna Syrup 10 milliLiter(s) Oral at bedtime  sodium chloride 0.9%. 1000 milliLiter(s) IV Continuous <Continuous>  sodium chloride 0.9%. 1000 milliLiter(s) IV Continuous <Continuous>  sodium chloride 3%  Inhalation 4 milliLiter(s) Inhalation every 12 hours  thiamine 100 milliGRAM(s) Oral daily      Objective:  Vital Signs Last 24 Hrs  T(C): 36.8 (13 Jan 2023 04:00), Max: 37.2 (12 Jan 2023 19:45)  T(F): 98.2 (13 Jan 2023 04:00), Max: 99 (12 Jan 2023 19:45)  HR: 91 (13 Jan 2023 07:00) (89 - 133)  BP: 101/51 (13 Jan 2023 07:00) (71/43 - 126/56)  BP(mean): 71 (13 Jan 2023 07:00) (53 - 81)  RR: 24 (13 Jan 2023 07:00) (20 - 32)  SpO2: 100% (13 Jan 2023 07:00) (80% - 100%)    Parameters below as of 12 Jan 2023 19:45  Patient On (Oxygen Delivery Method): ventilator    O2 Concentration (%): 30       Eyes:ROSA, EOMI  Ear/Nose/Throat: no oral lesion, no sinus tenderness on percussion	  Neck:no JVD, no lymphadenopathy, supple  Respiratory: CTA hansel  Cardiovascular: S1S2 RRR, no murmurs  Gastrointestinal:soft, (+) BS, no HSM  Extremities:no e/e/c        LABS:                        9.9    24.68 )-----------( 199      ( 13 Jan 2023 00:19 )             29.9     01-13    128<L>  |  97  |  58<H>  ----------------------------<  297<H>  3.7   |  19<L>  |  2.24<H>    Ca    7.5<L>      13 Jan 2023 00:18  Phos  3.9     01-13  Mg     1.8     01-13    TPro  4.3<L>  /  Alb  1.8<L>  /  TBili  0.2  /  DBili  x   /  AST  23  /  ALT  21  /  AlkPhos  99  01-13    PT/INR - ( 13 Jan 2023 00:19 )   PT: 22.1 sec;   INR: 1.91 ratio         PTT - ( 13 Jan 2023 00:19 )  PTT:29.0 sec        MICROBIOLOGY:    RECENT CULTURES:  01-10 @ 22:10 .Blood Blood-Peripheral                No growth to date.    01-10 @ 22:09 .Blood Blood-Peripheral                No growth to date.    01-10 @ 20:08 .Bronchial Bronchial Lavage       Few polymorphonuclear leukocytes seen per low power field  No Squamous epithelial cells seen per low power field  No organisms seen           Normal Respiratory Janette present    01-08 @ 14:35 .Blood Blood-Peripheral                No growth to date.    01-08 @ 14:26 Clean Catch Clean Catch (Midstream)                >=3 organisms. Probable collection contamination.          RESPIRATORY CULTURES:              RADIOLOGY & ADDITIONAL STUDIES:        Pager [phone]  After 5 pm/weekends or if no response :[phone]

## 2023-01-13 NOTE — CONSULT NOTE ADULT - PROBLEM SELECTOR RECOMMENDATION 5
- Will continue to follow for GOC   - Can be reached by TEAMS M-F 9-5 Caryl Aldana Any other time please page [phone] if needed

## 2023-01-13 NOTE — CONSULT NOTE ADULT - PROBLEM SELECTOR RECOMMENDATION 3
- Defer to MICU team  - Discussed with family that chronic lung disease makes it increasingly difficult to wean off ventilator     - On home O2 3.5L

## 2023-01-13 NOTE — PROGRESS NOTE ADULT - ASSESSMENT
ASSESSMENT/PLAN:  77-year-old female with past medical history of DM, HTN, HLD, asthma on daily steroids, A. fib on Eliquis and digoxin presenting with shortness of breath, found to be in Afib w/RVR; admitted to MICU for Acute Hypoxic Respiratory Failure in s/o Asthma and Bronchiectasis w/ possible PNA, leading to intubation and sedation.     NEURO  # Intubated & Sedated  - Mental status was at baseline before intubation; intubated for Acute Hypoxic Respiratory Failure  - ABG w/o hypercapnia; PCO2 38; no signs of hypercapnic encephalopathy  - RASS goal 0 to -1  - Daily SAT/SBT    CARDIAC  # Afib w/ RVR  Admitted in RVR; being followed by Dr. Marcus, Cardiology; Afib w/ RVR thought to be due to stress from hypoxemia  - HR in the 130s 1/10  - on Diltiazem drip, more rate controlled today  - follow-up Echo  - follow-up TSH results    # HTN  No indication of hypotension.  - currently on Diltiazem 90mg q6h  - closely monitor BP  - can d/c fentanyl     PULM  # Acute Hypoxemic Respiratory Failure  # Asthma  # Bronchiectasis  CT Angio r/o PE; currently thought to be due to Asthma exacerbation vs bronchiectasis vs ?PNA;   - tachypneic on BiPAP to 30s, saturating 100% but is tiring out as of now  - intubate and sedate to assist with airway clearance  - chest PT; airway clearance; hypertonic saline; stop IPCV  - daily SBT/SATs  - latest ABG is 7.46| 32|211  - decrease to solumedrol 20q12h  - DuoNebs q6h   - currently of Cefepime (1/10-) for empiric coverage; follow infectious diseases recommendations  - follow-up Legionella antigen, final BCx read; RVP negative; BAL cx    RENAL  # SHANE  Cr shot up to 2.44 <- 2.55 <-2.36 <- 1.87 <- 1.97 <- 0.64  - Likely prerenal in the setting of low PO intake   - Follow-up urine lytes  - Maintenance fluids started: 100cc/hr    GI   - no active issues currently  - Diet: NPO on vent; place OG and start tube feeds  - Bowel regimen: miralax, senna    HEME/ONC  # Leukocytosis  WBC now 36; decreasing  No active indications of infections, all infectious workup negative so far; likely in s/o solumedrol usage, with neutrophilia  - continue to monitor    # INR 11.1 (00:00 1/11)-> 4.5 (6 AM 1/11) -> 1.42 (1/12)  - continue to hold Lovenox    ID  # Sepsis  CT Chest w/ bilateral lower lobe predominant patchy and branching opacities suspicious for pneumonia. Small left pleural effusion. Leukocytosis, tachycardia and tachypnea; No active indications of infections, all infectious workup negative so far; leukocytosis likely in s/o solumedrol usage but no diff to conform neutrophilia so far; tachycardia likely in s/o tachypnea and AHRF  - Continue Cefepime (1/10-) for empiric PNA coverage  - follow-up SCx, Urine legionella antigen, final BCx, BAL cultures    ENDO  # Hyponatremia  Na 128 <- 126  <- 132<- 131 in 06/22, may be chronic  - continue fluid resuscitation   - monitor daily BMPs  - Urine lytes  - Maintenance fluids at 100cc's/hr    # T2DM  A1c 8.1 1/09/23  BGs high; likely in s/o steroid usage  - place on sliding scale NPH  - admelog     DVT  - Hold Lovenox 50mg BID    ETHICS  Full Code     ASSESSMENT/PLAN:  77-year-old female with past medical history of DM, HTN, HLD, asthma on daily steroids, A. fib on Eliquis and digoxin presenting with shortness of breath, found to be in Afib w/RVR; admitted to MICU for Acute Hypoxic Respiratory Failure in s/o Asthma and Bronchiectasis w/ possible PNA, leading to intubation and sedation.     NEURO  # Intubated & Sedated  - Mental status was at baseline before intubation; intubated for Acute Hypoxic Respiratory Failure  - ABG w/o hypercapnia; PCO2 38; no signs of hypercapnic encephalopathy  - RASS goal 0 to -1  - Daily SAT/SBT    CARDIAC  # Afib w/ RVR  Admitted in RVR; being followed by Dr. Marcus, Cardiology; Afib w/ RVR thought to be due to stress from hypoxemia  - HR in the 130s 1/10  - on Diltiazem drip, more rate controlled today  - follow-up Echo  - follow-up TSH results    # HTN  No indication of hypotension.  - currently on Diltiazem 90mg q6h  - closely monitor BP  - can d/c fentanyl     PULM  # Acute Hypoxemic Respiratory Failure  # Asthma  # Bronchiectasis  CT Angio r/o PE; currently thought to be due to Asthma exacerbation vs bronchiectasis vs ?PNA;   - tachypneic on BiPAP to 30s, saturating 100% but is tiring out as of now  - intubate and sedate to assist with airway clearance  - chest PT; airway clearance; hypertonic saline; stop IPCV  - daily SBT/SATs  - latest ABG is 7.46| 32|211  - decrease to solumedrol 20q12h  - DuoNebs q6h   - currently of Cefepime (1/10-) for empiric coverage; follow infectious diseases recommendations  - follow-up Legionella antigen, final BCx read; RVP negative; BAL cx    RENAL  # SHANE  Cr shot up to 2.24 <---2.44 <- 2.55 <-2.36 <- 1.87 <- 1.97 <- 0.64  - Likely prerenal in the setting of low PO intake   - Follow-up urine lytes  - Maintenance fluids started: 100cc/hr    GI   - no active issues currently  - Diet: NPO on vent; place OG and start tube feeds  - Bowel regimen: miralax, senna    HEME/ONC  # Leukocytosis  WBC now 36; decreasing  No active indications of infections, all infectious workup negative so far; likely in s/o solumedrol usage, with neutrophilia  - continue to monitor    # INR 11.1 (00:00 1/11)-> 4.5 (6 AM 1/11) -> 1.42 (1/12)  - continue to hold Lovenox    ID  # Sepsis  CT Chest w/ bilateral lower lobe predominant patchy and branching opacities suspicious for pneumonia. Small left pleural effusion. Leukocytosis, tachycardia and tachypnea; No active indications of infections, all infectious workup negative so far; leukocytosis likely in s/o solumedrol usage but no diff to conform neutrophilia so far; tachycardia likely in s/o tachypnea and AHRF  - Continue Cefepime (1/10-) for empiric PNA coverage  - follow-up SCx, Urine legionella antigen, final BCx, BAL cultures    ENDO  # Hyponatremia  Na 128 <- 126  <- 132<- 131 in 06/22, may be chronic  - continue fluid resuscitation   - monitor daily BMPs  - Urine lytes  - Maintenance fluids at 100cc's/hr    # T2DM  A1c 8.1 1/09/23  BGs high; likely in s/o steroid usage  - place on sliding scale NPH  - admelog     DVT  - Hold Lovenox 50mg BID    ETHICS  Full Code     ASSESSMENT/PLAN:  77-year-old female with past medical history of DM, HTN, HLD, asthma on daily steroids, A. fib on Eliquis and digoxin presenting with shortness of breath, found to be in Afib w/RVR; admitted to MICU for Acute Hypoxic Respiratory Failure in s/o Asthma and Bronchiectasis w/ possible PNA, leading to intubation and sedation.     NEURO  # Intubated & Sedated  - Mental status was at baseline before intubation; intubated for Acute Hypoxic Respiratory Failure  - ABG w/o hypercapnia; PCO2 38; no signs of hypercapnic encephalopathy  - RASS goal 0 to -1  - Daily SAT/SBT    CARDIAC  # Afib w/ RVR  Admitted in RVR; being followed by Dr. Marcus, Cardiology; Afib w/ RVR thought to be due to stress from hypoxemia  - HR in the 130s 1/10  - on Diltiazem drip, more rate controlled today  - follow-up Echo  - follow-up TSH results    # HTN  No indication of hypotension.  - currently on Diltiazem 90mg q6h  - closely monitor BP  - can d/c fentanyl     PULM  # Acute Hypoxemic Respiratory Failure  # Asthma  # Bronchiectasis  CT Angio r/o PE; currently thought to be due to Asthma exacerbation vs bronchiectasis vs ?PNA;   - tachypneic on BiPAP to 30s, saturating 100% but is tiring out as of now  - intubate and sedate to assist with airway clearance  - chest PT; airway clearance; hypertonic saline; stop IPCV  - daily SBT/SATs  - latest ABG is 7.46| 32|211  - decrease to solumedrol 20q24h  - DuoNebs q6h   - currently of Cefepime (1/10-) for empiric coverage; follow infectious diseases recommendations  - BAL growing few Pseudomonas; wait for sensitivities  - follow-up Legionella antigen, final BCx read; RVP negative; BAL cx    RENAL  # SHANE  Cr shot up to 2.24 <---2.44 <- 2.55 <-2.36 <- 1.87 <- 1.97 <- 0.64  - Likely prerenal in the setting of low PO intake   - Follow-up urine lytes  - Maintenance fluids started: 100cc/hr    GI   - no active issues currently  - Diet: NPO on vent; place OG and start tube feeds  - Bowel regimen: miralax, senna    HEME/ONC  # Leukocytosis  WBC now 24; decreasing  No active indications of infections, all infectious workup negative so far; likely in s/o solumedrol usage, with neutrophilia  - continue to monitor    # INR 11.1 (00:00 1/11)-> 4.5 (6 AM 1/11) -> 1.42 (1/12)  - continue to hold Lovenox    ID  # Sepsis  CT Chest w/ bilateral lower lobe predominant patchy and branching opacities suspicious for pneumonia. Small left pleural effusion. Leukocytosis, tachycardia and tachypnea; No active indications of infections, all infectious workup negative so far; leukocytosis likely in s/o solumedrol usage but no diff to conform neutrophilia so far; tachycardia likely in s/o tachypnea and AHRF  - Continue Cefepime (1/10-) for empiric PNA coverage  - follow-up SCx, Urine legionella antigen, final BCx, BAL cultures    ENDO  # Hyponatremia  Na 128 <- 126  <- 132<- 131 in 06/22, may be chronic  - continue fluid resuscitation   - monitor daily BMPs  - Urine lytes  - Maintenance fluids at 100cc's/hr    # T2DM  A1c 8.1 1/09/23  BGs high; likely in s/o steroid usage  - place on sliding scale NPH  - admelog     DVT  - Hold Lovenox 50mg BID    ETHICS  Full Code

## 2023-01-13 NOTE — PROGRESS NOTE ADULT - SUBJECTIVE AND OBJECTIVE BOX
Harry Monge MD  Internal Medicine, PGY1    MICU Progress Note    CHIEF COMPLAINT: CRUZITO BATES is a 76yo Female with PMH *** presenting with ***.    Interval Events:    Meds administered:  chlorhexidine 4% Liquid: 1 Application(s) Topical (01-13 @ 06:11)  polyethylene glycol 3350: 17 Gram(s) Oral (01-13 @ 05:36)  methylPREDNISolone sodium succinate Injectable: 20 milliGRAM(s) IV Push (01-13 @ 05:36)  insulin NPH human recombinant: 5 Unit(s) SubCutaneous (01-13 @ 05:36)  insulin lispro (ADMELOG) corrective regimen sliding scale: 6 Unit(s) SubCutaneous (01-13 @ 05:35)  chlorhexidine 0.12% Liquid: 15 milliLiter(s) Oral Mucosa (01-13 @ 05:35)  sodium chloride 3%  Inhalation: 4 milliLiter(s) Inhalation (01-13 @ 05:14)  albuterol/ipratropium for Nebulization: 3 milliLiter(s) Nebulizer (01-13 @ 05:14)  insulin NPH human recombinant: 5 Unit(s) SubCutaneous (01-13 @ 00:59)  insulin lispro (ADMELOG) corrective regimen sliding scale: 6 Unit(s) SubCutaneous (01-13 @ 00:58)  albuterol/ipratropium for Nebulization: 3 milliLiter(s) Nebulizer (01-12 @ 23:08)  senna Syrup: 10 milliLiter(s) Oral (01-12 @ 21:25)  cefepime   IVPB: 100 mL/Hr IV Intermittent (01-12 @ 21:25)  atorvastatin: 40 milliGRAM(s) Oral (01-12 @ 21:25)  diltiazem Infusion: 17 mL/Hr IV Continuous (01-12 @ 20:33)        OBJECTIVE:  Vitals:  T(F): 98.2 (01-13-23 @ 04:00), Max: 99 (01-12-23 @ 19:45)  HR: 91 (01-13-23 @ 07:00) (89 - 133)  BP: 101/51 (01-13-23 @ 07:00) (71/43 - 126/56)  BP(mean): 71 (01-13-23 @ 07:00) (53 - 81)  ABP: --  ABP(mean): --  RR: 24 (01-13-23 @ 07:00) (20 - 32)  SpO2: 100% (01-13-23 @ 07:00) (80% - 100%)  I/O Detail 24H    12 Jan 2023 07:01  -  13 Jan 2023 07:00  --------------------------------------------------------  IN:    Diltiazem: 65 mL    Diltiazem: 87.5 mL    Diltiazem: 187 mL    Enteral Tube Flush: 330 mL    Glucerna 1.5: 460 mL    IV PiggyBack: 50 mL    Other (mL): 10 mL    Phenylephrine: 42.4 mL    Phenylephrine: 99.1 mL    Phenylephrine: 83.3 mL    Propofol: 142.4 mL    Propofol: 47.6 mL    sodium chloride 0.9%: 675 mL    sodium chloride 0.9%: 900 mL  Total IN: 3179.3 mL    OUT:    Indwelling Catheter - Urethral (mL): 455 mL    Other (mL): 10 mL  Total OUT: 465 mL    Total NET: 2714.3 mL          HOSPITAL MEDICATIONS:  Standing Meds:  albuterol/ipratropium for Nebulization 3 milliLiter(s) Nebulizer every 6 hours  ascorbic acid 500 milliGRAM(s) Oral daily  atorvastatin 40 milliGRAM(s) Oral at bedtime  azithromycin  IVPB 500 milliGRAM(s) IV Intermittent <User Schedule>  cefepime   IVPB 1000 milliGRAM(s) IV Intermittent every 12 hours  chlorhexidine 0.12% Liquid 15 milliLiter(s) Oral Mucosa every 12 hours  chlorhexidine 4% Liquid 1 Application(s) Topical <User Schedule>  dextrose 5%. 1000 milliLiter(s) IV Continuous <Continuous>  dextrose 5%. 1000 milliLiter(s) IV Continuous <Continuous>  dextrose 50% Injectable 25 Gram(s) IV Push once  dextrose 50% Injectable 12.5 Gram(s) IV Push once  dextrose 50% Injectable 25 Gram(s) IV Push once  diltiazem Infusion 17 mG/Hr IV Continuous <Continuous>  glucagon  Injectable 1 milliGRAM(s) IntraMuscular once  insulin lispro (ADMELOG) corrective regimen sliding scale   SubCutaneous every 6 hours  insulin NPH human recombinant 5 Unit(s) SubCutaneous every 6 hours  methylPREDNISolone sodium succinate Injectable 20 milliGRAM(s) IV Push every 12 hours  multivitamin 1 Tablet(s) Oral daily  phenylephrine    Infusion 1.202 MICROgram(s)/kG/Min IV Continuous <Continuous>  polyethylene glycol 3350 17 Gram(s) Oral every 12 hours  propofol Infusion 10 MICROgram(s)/kG/Min IV Continuous <Continuous>  senna Syrup 10 milliLiter(s) Oral at bedtime  sodium chloride 0.9%. 1000 milliLiter(s) IV Continuous <Continuous>  sodium chloride 0.9%. 1000 milliLiter(s) IV Continuous <Continuous>  sodium chloride 3%  Inhalation 4 milliLiter(s) Inhalation every 12 hours  thiamine 100 milliGRAM(s) Oral daily      PRN Meds:  acetaminophen     Tablet .. 650 milliGRAM(s) Oral every 6 hours PRN  dextrose Oral Gel 15 Gram(s) Oral once PRN      PHYSICAL EXAM:  GENERAL: NAD, lying in bed comfortably  HEAD:  Atraumatic, Normocephalic  EYES: EOMI, PERRLA, conjunctiva and sclera clear  ENT: Moist mucous membranes  NECK: Supple, No JVD  CHEST/LUNG: Clear to auscultation bilaterally; No rales, rhonchi, wheezing, or rubs. Unlabored respirations  HEART: Regular rate and rhythm; No murmurs, rubs, or gallops  ABDOMEN: Bowel sounds present; Soft, Nontender, Nondistended. No hepatomegaly  EXTREMITIES:  2+ Peripheral Pulses, brisk capillary refill. No clubbing, cyanosis, or edema  NERVOUS SYSTEM:  Alert & Oriented X3, speech clear. No deficits   MSK: FROM all 4 extremities, full and equal strength  SKIN: No rashes or lesions   Harry Monge MD  Internal Medicine, PGY1    MICU Progress Note    CHIEF COMPLAINT: CRUZITO BATES is a 77-year-old female with past medical history of DM, HTN, HLD, asthma on daily steroids, A. fib on Eliquis and digoxin presenting with shortness of breath, found to be in Afib w/RVR; admitted to MICU for Acute Hypoxic Respiratory Failure in s/o Asthma and Bronchiectasis w/ possible PNA, leading to intubation and sedation.     Interval Events: no acute events; [place] stopped, off sedation    Meds administered:  chlorhexidine 4% Liquid: 1 Application(s) Topical (01-13 @ 06:11)  polyethylene glycol 3350: 17 Gram(s) Oral (01-13 @ 05:36)  methylPREDNISolone sodium succinate Injectable: 20 milliGRAM(s) IV Push (01-13 @ 05:36)  insulin NPH human recombinant: 5 Unit(s) SubCutaneous (01-13 @ 05:36)  insulin lispro (ADMELOG) corrective regimen sliding scale: 6 Unit(s) SubCutaneous (01-13 @ 05:35)  chlorhexidine 0.12% Liquid: 15 milliLiter(s) Oral Mucosa (01-13 @ 05:35)  sodium chloride 3%  Inhalation: 4 milliLiter(s) Inhalation (01-13 @ 05:14)  albuterol/ipratropium for Nebulization: 3 milliLiter(s) Nebulizer (01-13 @ 05:14)  insulin NPH human recombinant: 5 Unit(s) SubCutaneous (01-13 @ 00:59)  insulin lispro (ADMELOG) corrective regimen sliding scale: 6 Unit(s) SubCutaneous (01-13 @ 00:58)  albuterol/ipratropium for Nebulization: 3 milliLiter(s) Nebulizer (01-12 @ 23:08)  senna Syrup: 10 milliLiter(s) Oral (01-12 @ 21:25)  cefepime   IVPB: 100 mL/Hr IV Intermittent (01-12 @ 21:25)  atorvastatin: 40 milliGRAM(s) Oral (01-12 @ 21:25)  diltiazem Infusion: 17 mL/Hr IV Continuous (01-12 @ 20:33)        OBJECTIVE:  Vitals:  T(F): 98.2 (01-13-23 @ 04:00), Max: 99 (01-12-23 @ 19:45)  HR: 91 (01-13-23 @ 07:00) (89 - 133)  BP: 101/51 (01-13-23 @ 07:00) (71/43 - 126/56)  BP(mean): 71 (01-13-23 @ 07:00) (53 - 81)  ABP: --  ABP(mean): --  RR: 24 (01-13-23 @ 07:00) (20 - 32)  SpO2: 100% (01-13-23 @ 07:00) (80% - 100%)  I/O Detail 24H    12 Jan 2023 07:01  -  13 Jan 2023 07:00  --------------------------------------------------------  IN:    Diltiazem: 65 mL    Diltiazem: 87.5 mL    Diltiazem: 187 mL    Enteral Tube Flush: 330 mL    Glucerna 1.5: 460 mL    IV PiggyBack: 50 mL    Other (mL): 10 mL    Phenylephrine: 42.4 mL    Phenylephrine: 99.1 mL    Phenylephrine: 83.3 mL    Propofol: 142.4 mL    Propofol: 47.6 mL    sodium chloride 0.9%: 675 mL    sodium chloride 0.9%: 900 mL  Total IN: 3179.3 mL    OUT:    Indwelling Catheter - Urethral (mL): 455 mL    Other (mL): 10 mL  Total OUT: 465 mL    Total NET: 2714.3 mL          HOSPITAL MEDICATIONS:  Standing Meds:  albuterol/ipratropium for Nebulization 3 milliLiter(s) Nebulizer every 6 hours  ascorbic acid 500 milliGRAM(s) Oral daily  atorvastatin 40 milliGRAM(s) Oral at bedtime  azithromycin  IVPB 500 milliGRAM(s) IV Intermittent <User Schedule>  cefepime   IVPB 1000 milliGRAM(s) IV Intermittent every 12 hours  chlorhexidine 0.12% Liquid 15 milliLiter(s) Oral Mucosa every 12 hours  chlorhexidine 4% Liquid 1 Application(s) Topical <User Schedule>  dextrose 5%. 1000 milliLiter(s) IV Continuous <Continuous>  dextrose 5%. 1000 milliLiter(s) IV Continuous <Continuous>  dextrose 50% Injectable 25 Gram(s) IV Push once  dextrose 50% Injectable 12.5 Gram(s) IV Push once  dextrose 50% Injectable 25 Gram(s) IV Push once  diltiazem Infusion 17 mG/Hr IV Continuous <Continuous>  glucagon  Injectable 1 milliGRAM(s) IntraMuscular once  insulin lispro (ADMELOG) corrective regimen sliding scale   SubCutaneous every 6 hours  insulin NPH human recombinant 5 Unit(s) SubCutaneous every 6 hours  methylPREDNISolone sodium succinate Injectable 20 milliGRAM(s) IV Push every 12 hours  multivitamin 1 Tablet(s) Oral daily  phenylephrine    Infusion 1.202 MICROgram(s)/kG/Min IV Continuous <Continuous>  polyethylene glycol 3350 17 Gram(s) Oral every 12 hours  propofol Infusion 10 MICROgram(s)/kG/Min IV Continuous <Continuous>  senna Syrup 10 milliLiter(s) Oral at bedtime  sodium chloride 0.9%. 1000 milliLiter(s) IV Continuous <Continuous>  sodium chloride 0.9%. 1000 milliLiter(s) IV Continuous <Continuous>  sodium chloride 3%  Inhalation 4 milliLiter(s) Inhalation every 12 hours  thiamine 100 milliGRAM(s) Oral daily      PRN Meds:  acetaminophen     Tablet .. 650 milliGRAM(s) Oral every 6 hours PRN  dextrose Oral Gel 15 Gram(s) Oral once PRN      PHYSICAL EXAM:  GENERAL: NAD, lying in bed comfortably  HEAD:  Atraumatic, Normocephalic  EYES: EOMI, PERRLA, conjunctiva and sclera clear  ENT: Moist mucous membranes  NECK: Supple, No JVD  CHEST/LUNG: Clear to auscultation bilaterally; No rales, rhonchi, wheezing, or rubs. Unlabored respirations  HEART: Regular rate and rhythm; No murmurs, rubs, or gallops  ABDOMEN: Bowel sounds present; Soft, Nontender, Nondistended. No hepatomegaly  EXTREMITIES:  2+ Peripheral Pulses, brisk capillary refill. No clubbing, cyanosis, or edema  NERVOUS SYSTEM:  Alert & Oriented X3, speech clear. No deficits   MSK: FROM all 4 extremities, full and equal strength  SKIN: No rashes or lesions

## 2023-01-13 NOTE — PROGRESS NOTE ADULT - SUBJECTIVE AND OBJECTIVE BOX
CARDIOLOGY     PROGRESS  NOTE   ________________________________________________    CHIEF COMPLAINT:Patient is a 77y old  Female who presents with a chief complaint of sob/ rapid a.fib (13 Jan 2023 08:31)  sedated on vent  	  REVIEW OF SYSTEMS:  CONSTITUTIONAL: No fever, weight loss, or fatigue  EYES: No eye pain, visual disturbances, or discharge  ENT:  No difficulty hearing, tinnitus, vertigo; No sinus or throat pain  NECK: No pain or stiffness  RESPIRATORY: No cough, wheezing, chills or hemoptysis; No Shortness of Breath  CARDIOVASCULAR: No chest pain, palpitations, passing out, dizziness, or leg swelling  GASTROINTESTINAL: No abdominal or epigastric pain. No nausea, vomiting, or hematemesis; No diarrhea or constipation. No melena or hematochezia.  GENITOURINARY: No dysuria, frequency, hematuria, or incontinence  NEUROLOGICAL: No headaches, memory loss, loss of strength, numbness, or tremors  SKIN: No itching, burning, rashes, or lesions   LYMPH Nodes: No enlarged glands  ENDOCRINE: No heat or cold intolerance; No hair loss  MUSCULOSKELETAL: No joint pain or swelling; No muscle, back, or extremity pain  PSYCHIATRIC: No depression, anxiety, mood swings, or difficulty sleeping  HEME/LYMPH: No easy bruising, or bleeding gums  ALLERGY AND IMMUNOLOGIC: No hives or eczema	    [ ] All others negative	  [x ] Unable to obtain    PHYSICAL EXAM:  T(C): 37 (01-13-23 @ 07:00), Max: 37.2 (01-12-23 @ 19:45)  HR: 97 (01-13-23 @ 09:36) (89 - 133)  BP: 117/64 (01-13-23 @ 09:00) (81/42 - 126/56)  RR: 32 (01-13-23 @ 09:00) (20 - 32)  SpO2: 100% (01-13-23 @ 09:36) (80% - 100%)  Wt(kg): --  I&O's Summary    12 Jan 2023 07:01  -  13 Jan 2023 07:00  --------------------------------------------------------  IN: 3202.5 mL / OUT: 465 mL / NET: 2737.5 mL    13 Jan 2023 07:01  -  13 Jan 2023 10:18  --------------------------------------------------------  IN: 326 mL / OUT: 35 mL / NET: 291 mL        Appearance: Normal	  HEENT:   Normal oral mucosa, PERRL, EOMI	  Lymphatic: No lymphadenopathy  Cardiovascular: Normal S1 S2, No JVD, + murmurs, No edema  Respiratory: rhonchi  Gastrointestinal:  Soft, Non-tender, + BS	  Skin: No rashes, No ecchymoses, No cyanosis	  Extremities: Normal range of motion, No clubbing, cyanosis or edema  Vascular: Peripheral pulses palpable 2+ bilaterally    MEDICATIONS  (STANDING):  albuterol/ipratropium for Nebulization 3 milliLiter(s) Nebulizer every 6 hours  ascorbic acid 500 milliGRAM(s) Oral daily  atorvastatin 40 milliGRAM(s) Oral at bedtime  azithromycin   Tablet 250 milliGRAM(s) Oral <User Schedule>  cefepime   IVPB 500 milliGRAM(s) IV Intermittent every 8 hours  chlorhexidine 0.12% Liquid 15 milliLiter(s) Oral Mucosa every 12 hours  chlorhexidine 4% Liquid 1 Application(s) Topical <User Schedule>  dexMEDEtomidine Infusion 0.2 MICROgram(s)/kG/Hr (1.89 mL/Hr) IV Continuous <Continuous>  dextrose 5%. 1000 milliLiter(s) (100 mL/Hr) IV Continuous <Continuous>  dextrose 5%. 1000 milliLiter(s) (50 mL/Hr) IV Continuous <Continuous>  dextrose 50% Injectable 25 Gram(s) IV Push once  dextrose 50% Injectable 12.5 Gram(s) IV Push once  dextrose 50% Injectable 25 Gram(s) IV Push once  diltiazem Infusion 17 mG/Hr (17 mL/Hr) IV Continuous <Continuous>  glucagon  Injectable 1 milliGRAM(s) IntraMuscular once  heparin   Injectable 5000 Unit(s) SubCutaneous every 12 hours  insulin lispro (ADMELOG) corrective regimen sliding scale   SubCutaneous every 6 hours  insulin NPH human recombinant 5 Unit(s) SubCutaneous every 6 hours  multivitamin 1 Tablet(s) Oral daily  phenylephrine    Infusion 1.202 MICROgram(s)/kG/Min (17 mL/Hr) IV Continuous <Continuous>  polyethylene glycol 3350 17 Gram(s) Oral every 12 hours  potassium chloride   Solution 20 milliEquivalent(s) Oral once  propofol Infusion 10 MICROgram(s)/kG/Min (2.26 mL/Hr) IV Continuous <Continuous>  senna Syrup 10 milliLiter(s) Oral at bedtime  sodium chloride 0.9%. 1000 milliLiter(s) (75 mL/Hr) IV Continuous <Continuous>  sodium chloride 0.9%. 1000 milliLiter(s) (75 mL/Hr) IV Continuous <Continuous>  sodium chloride 3%  Inhalation 4 milliLiter(s) Inhalation every 12 hours  thiamine 100 milliGRAM(s) Oral daily      TELEMETRY: 	    ECG:  	  RADIOLOGY:  OTHER: 	  	  LABS:	 	    CARDIAC MARKERS:                                9.9    24.68 )-----------( 199      ( 13 Jan 2023 00:19 )             29.9     01-13    128<L>  |  97  |  58<H>  ----------------------------<  297<H>  3.7   |  19<L>  |  2.24<H>    Ca    7.5<L>      13 Jan 2023 00:18  Phos  3.9     01-13  Mg     1.8     01-13    TPro  4.3<L>  /  Alb  1.8<L>  /  TBili  0.2  /  DBili  x   /  AST  23  /  ALT  21  /  AlkPhos  99  01-13    proBNP:   Lipid Profile:   HgA1c:   TSH: Thyroid Stimulating Hormone, Serum: 1.17 uIU/mL (01-09 @ 06:11)    PT/INR - ( 13 Jan 2023 00:19 )   PT: 22.1 sec;   INR: 1.91 ratio         PTT - ( 13 Jan 2023 00:19 )  PTT:29.0 sec      Assessment and plan  ---------------------------  77-year-old female with past medical history of DM, HTN, HLD, asthma on daily steroids, A. fib on Eliquis and digoxin presenting with shortness of breath.  Patient unable to provide history.  Daughter at bedside reports patient has had increasing shortness of breath, wheezing, cough for the past week now.  Patient also with increasing generalized weakness, fatigue and decreased p.o. intake.  Denies fever/chills, chest pain, abdominal pain, nausea, vomiting, diarrhea, dysuria.  Family reports patient had a similar episode 1 year ago when she was admitted to [facility] with UTI.  pt with sig medical hx, chronic a.fib with sob ?sec sepsis/ pneumoniae, UTI  sedated on vent  AC for a.fib  continue abx and steroid  echo noted  still sedated on vent   continue abx  hr is well controlled

## 2023-01-13 NOTE — CONSULT NOTE ADULT - PROBLEM SELECTOR RECOMMENDATION 4
- see GOC note above  - pt remains full code as all of family is involved in decision making, ultimately James() has final decision

## 2023-01-13 NOTE — CONSULT NOTE ADULT - CONVERSATION DETAILS
Met with  James and daughter Ariana regarding pts current clinical status in MICU. Difficult for James to participate because he did not have his hearing aides but remained present for conversation. Daughter Ariana describes mom as a  her whole life, raised 4 of her children and her life is centered around her family. Discussed that patient and  live with their son Angel and Ariana lives across the street. They are very close and rely on one another to help with the care of patient. Ariana described her mom's health as worsening over the past year. Limited mobility and needing increased assistance with ADLs. Ariana denies any [facility] affiliations and refuses chaplaincy at this time.     Discussed pts status following failed CPAP trial this morning. Daughter verbalized that mom has always said "just let me die." Talked about what this means to the family. She stated that she believed that this hospitalization that mom is sicker then she has ever been and is concerned about removing the ventilator. Discussed that during CPAP trial patient appeared in discomfort and her heart rhythm changed. Educated on the stress that mechanical ventilation can cause the body. Ariana questioned what would be next if patient was unable to come off of the ventilator. Discussed trach and peg which Ariana said her mother would never want and her father would not want that either. In addition discussed palliative extubation and trial of intubation and if inability to wean would possibly elect for palliative extubation.     Ariana stated that all decisions are made as a family and that patient's  has final say. She stated they cannot make any decisions today as they will have to meet and discuss.

## 2023-01-13 NOTE — PROGRESS NOTE ADULT - SUBJECTIVE AND OBJECTIVE BOX
Date of Service: 01-13-23 @ 17:56    Patient is a 77y old  Female who presents with a chief complaint of sob/ rapid a.fib (13 Jan 2023 14:36)      Any change in ROS: remains intubated     MEDICATIONS  (STANDING):  albuterol/ipratropium for Nebulization 3 milliLiter(s) Nebulizer every 6 hours  ascorbic acid 500 milliGRAM(s) Oral daily  atorvastatin 40 milliGRAM(s) Oral at bedtime  azithromycin   Tablet 250 milliGRAM(s) Oral <User Schedule>  cefepime   IVPB 500 milliGRAM(s) IV Intermittent every 8 hours  chlorhexidine 0.12% Liquid 15 milliLiter(s) Oral Mucosa every 12 hours  chlorhexidine 4% Liquid 1 Application(s) Topical <User Schedule>  dexMEDEtomidine Infusion 0.2 MICROgram(s)/kG/Hr (1.89 mL/Hr) IV Continuous <Continuous>  dextrose 5%. 1000 milliLiter(s) (100 mL/Hr) IV Continuous <Continuous>  dextrose 5%. 1000 milliLiter(s) (50 mL/Hr) IV Continuous <Continuous>  dextrose 50% Injectable 25 Gram(s) IV Push once  dextrose 50% Injectable 12.5 Gram(s) IV Push once  dextrose 50% Injectable 25 Gram(s) IV Push once  diltiazem Infusion 17 mG/Hr (17 mL/Hr) IV Continuous <Continuous>  glucagon  Injectable 1 milliGRAM(s) IntraMuscular once  heparin  Infusion.  Unit(s)/Hr (7 mL/Hr) IV Continuous <Continuous>  insulin lispro (ADMELOG) corrective regimen sliding scale   SubCutaneous every 6 hours  insulin NPH human recombinant 5 Unit(s) SubCutaneous every 6 hours  multivitamin 1 Tablet(s) Oral daily  phenylephrine    Infusion 1.202 MICROgram(s)/kG/Min (17 mL/Hr) IV Continuous <Continuous>  polyethylene glycol 3350 17 Gram(s) Oral every 12 hours  propofol Infusion 10 MICROgram(s)/kG/Min (2.26 mL/Hr) IV Continuous <Continuous>  senna Syrup 10 milliLiter(s) Oral at bedtime  sodium chloride 0.9%. 2400 milliLiter(s) (100 mL/Hr) IV Continuous <Continuous>  sodium chloride 3%  Inhalation 4 milliLiter(s) Inhalation every 12 hours  thiamine 100 milliGRAM(s) Oral daily    MEDICATIONS  (PRN):  acetaminophen     Tablet .. 650 milliGRAM(s) Oral every 6 hours PRN Temp greater or equal to 38C (100.4F), Mild Pain (1 - 3)  dextrose Oral Gel 15 Gram(s) Oral once PRN Blood Glucose LESS THAN 70 milliGRAM(s)/deciliter    Vital Signs Last 24 Hrs  T(C): 36.8 (13 Jan 2023 15:00), Max: 37.2 (12 Jan 2023 19:45)  T(F): 98.2 (13 Jan 2023 15:00), Max: 99 (12 Jan 2023 19:45)  HR: 92 (13 Jan 2023 17:45) (86 - 133)  BP: 119/73 (13 Jan 2023 15:00) (81/42 - 119/73)  BP(mean): 88 (13 Jan 2023 15:00) (57 - 91)  RR: 28 (13 Jan 2023 15:00) (20 - 32)  SpO2: 99% (13 Jan 2023 17:45) (80% - 100%)    Parameters below as of 13 Jan 2023 17:45  Patient On (Oxygen Delivery Method): ventilator      Mode: AC/ CMV (Assist Control/ Continuous Mandatory Ventilation)  RR (machine): 20  TV (machine): 350  FiO2: 30  PEEP: 5  ITime: 1  MAP: 13  PIP: 31    I&O's Summary    12 Jan 2023 07:01  -  13 Jan 2023 07:00  --------------------------------------------------------  IN: 3202.5 mL / OUT: 465 mL / NET: 2737.5 mL    13 Jan 2023 07:01  -  13 Jan 2023 17:56  --------------------------------------------------------  IN: 1464.9 mL / OUT: 78 mL / NET: 1386.9 mL          Physical Exam:   GENERAL: NAD, well-groomed, well-developed  HEENT: ROSA/   Atraumatic, Normocephalic  ENMT: No tonsillar erythema, exudates, or enlargement; Moist mucous membranes, Good dentition, No lesions  NECK: Supple, No JVD, Normal thyroid  CHEST/LUNG: Clear to auscultaion  CVS: Regular rate and rhythm; No murmurs, rubs, or gallops  GI: : Soft, Nontender, Nondistended; Bowel sounds present  NERVOUS SYSTEM:  intubated and sedatd  EXTREMITIES:  - edema  LYMPH: No lymphadenopathy noted  SKIN: No rashes or lesions  ENDOCRINOLOGY: No Thyromegaly  PSYCH:calm     Labs:  ABG - ( 13 Jan 2023 00:04 )  pH, Arterial: 7.41  pH, Blood: x     /  pCO2: 31    /  pO2: 116   / HCO3: 20    / Base Excess: -4.3  /  SaO2: 99.0            60.0, 37                            9.9    24.68 )-----------( 199      ( 13 Jan 2023 00:19 )             29.9                         11.4   36.68 )-----------( 303      ( 12 Jan 2023 00:19 )             33.2                         12.8   41.29 )-----------( 348      ( 11 Jan 2023 06:19 )             38.4                         12.5   40.54 )-----------( 359      ( 11 Jan 2023 01:38 )             37.4                         12.3   41.70 )-----------( 352      ( 11 Jan 2023 00:31 )             37.2                         13.1   33.40 )-----------( 302      ( 10 Tim 2023 07:06 )             40.7     01-13    128<L>  |  97  |  58<H>  ----------------------------<  297<H>  3.7   |  19<L>  |  2.24<H>  01-12    128<L>  |  94<L>  |  52<H>  ----------------------------<  217<H>  3.5   |  20<L>  |  2.44<H>  01-11    126<L>  |  87<L>  |  52<H>  ----------------------------<  475<HH>  3.8   |  20<L>  |  2.58<H>  01-11    126<L>  |  83<L>  |  48<H>  ----------------------------<  244<H>  4.7   |  19<L>  |  2.36<H>  01-11    125<L>  |  83<L>  |  43<H>  ----------------------------<  209<H>  4.2   |  21<L>  |  1.87<H>  01-11    128<L>  |  88<L>  |  46<H>  ----------------------------<  216<H>  3.8   |  20<L>  |  1.97<H>  01-10    131<L>  |  85<L>  |  24<H>  ----------------------------<  291<H>  3.4<L>   |  25  |  0.88    Ca    7.5<L>      13 Jan 2023 00:18  Ca    7.2<L>      12 Jan 2023 00:18  Phos  3.9     01-13  Phos  3.8     01-12  Mg     1.8     01-13  Mg     1.8     01-12    TPro  4.3<L>  /  Alb  1.8<L>  /  TBili  0.2  /  DBili  x   /  AST  23  /  ALT  21  /  AlkPhos  99  01-13  TPro  4.6<L>  /  Alb  2.0<L>  /  TBili  0.2  /  DBili  x   /  AST  34  /  ALT  24  /  AlkPhos  112  01-12  TPro  4.6<L>  /  Alb  2.0<L>  /  TBili  0.3  /  DBili  x   /  AST  52<H>  /  ALT  24  /  AlkPhos  108  01-11  TPro  5.5<L>  /  Alb  2.4<L>  /  TBili  0.4  /  DBili  x   /  AST  19  /  ALT  8<L>  /  AlkPhos  129<H>  01-11  TPro  5.7<L>  /  Alb  2.2<L>  /  TBili  0.4  /  DBili  x   /  AST  <5<L>  /  ALT  <5<L>  /  AlkPhos  121<H>  01-11  TPro  5.4<L>  /  Alb  2.5<L>  /  TBili  0.5  /  DBili  x   /  AST  16  /  ALT  7<L>  /  AlkPhos  118  01-11    CAPILLARY BLOOD GLUCOSE      POCT Blood Glucose.: 231 mg/dL (13 Jan 2023 17:29)  POCT Blood Glucose.: >600 mg/dL (13 Jan 2023 17:26)  POCT Blood Glucose.: 320 mg/dL (13 Jan 2023 11:54)  POCT Blood Glucose.: 283 mg/dL (13 Jan 2023 05:29)      LIVER FUNCTIONS - ( 13 Jan 2023 00:18 )  Alb: 1.8 g/dL / Pro: 4.3 g/dL / ALK PHOS: 99 U/L / ALT: 21 U/L / AST: 23 U/L / GGT: x           PT/INR - ( 13 Jan 2023 00:19 )   PT: 22.1 sec;   INR: 1.91 ratio         PTT - ( 13 Jan 2023 00:19 )  PTT:29.0 sec    Procalcitonin, Serum: 1.05 ng/mL (01-10 @ 07:06)        RECENT CULTURES:  01-10 @ 22:10 .Blood Blood-Peripheral                No growth to date.    01-10 @ 22:09 .Blood Blood-Peripheral       rad< from: Xray Chest 1 View- PORTABLE-Urgent (Xray Chest 1 View- PORTABLE-Urgent .) (01.10.23 @ 19:55) >    TECHNIQUE: Portable AP radiograph of the chest.    COMPARISON: Chest x-ray 1/8/2023.    FINDINGS: Clear lungs. No pleural effusions or pneumothorax. Cardiac   silhouette is unchanged. No acute osseous pathology.    Endotracheal tube terminates above the jack. Enteric tube projects   below the diaphragm with its distal segment excluded from view.      IMPRESSION:    Clear lungs. Tubes as described.    --- End of Report ---           OLIVA ALAMO MD; Resident Radiologist  This document has been electronically signed.  JAIRO TATUM MD; Attending Radiologist  This document has been electronically signed. Jan 11 2023  8:48AM    < end of copied text >  < from: CT Chest No Cont (01.10.23 @ 12:31) >    AIRWAYS/LUNGS/PLEURA: Patent central airways. Bilateral lower lobe   predominant patchy and branching opacities. Small left pleural effusion   with associated passive atelectasis.    UPPER ABDOMEN: Unchanged calcified liver granulomas.    BONES/SOFT TISSUES: Exaggerated kyphosis of the thoracic spine.   Degenerative changes of the spine. Soft tissues are unremarkable.    IMPRESSION:    Bilateral lower lobe predominant patchy and branching opacities   suspicious for pneumonia.    Small left pleural effusion.        --- End of Report ---    < end of copied text >           No growth to date.    01-10 @ 20:08 .Bronchial Bronchial Lavage       Few polymorphonuclear leukocytes seen per low power field  No Squamous epithelial cells seen per low power field  No organisms seen           Few Pseudomonas aeruginosa Susceptibility to follow.    01-08 @ 14:35 .Blood Blood-Peripheral                No growth to date.    01-08 @ 14:26 Clean Catch Clean Catch (Midstream)                >=3 organisms. Probable collection contamination.          RESPIRATORY CULTURES:          Studies  Chest X-RAY  CT SCAN Chest   Venous Dopplers: LE:   CT Abdomen  Others

## 2023-01-14 NOTE — PROGRESS NOTE ADULT - SUBJECTIVE AND OBJECTIVE BOX
Date of Service: 01-14-23 @ 12:29    Patient is a 77y old  Female who presents with a chief complaint of sob/ rapid a.fib (14 Jan 2023 08:55)      Any change in ROS: remains intubated  on full vent support:       MEDICATIONS  (STANDING):  albuterol/ipratropium for Nebulization 3 milliLiter(s) Nebulizer every 6 hours  ascorbic acid 500 milliGRAM(s) Oral daily  atorvastatin 40 milliGRAM(s) Oral at bedtime  azithromycin   Tablet 250 milliGRAM(s) Oral <User Schedule>  cefepime   IVPB 500 milliGRAM(s) IV Intermittent every 8 hours  chlorhexidine 0.12% Liquid 15 milliLiter(s) Oral Mucosa every 12 hours  chlorhexidine 4% Liquid 1 Application(s) Topical <User Schedule>  dexMEDEtomidine Infusion 0.2 MICROgram(s)/kG/Hr (1.89 mL/Hr) IV Continuous <Continuous>  dextrose 5%. 1000 milliLiter(s) (100 mL/Hr) IV Continuous <Continuous>  dextrose 5%. 1000 milliLiter(s) (50 mL/Hr) IV Continuous <Continuous>  dextrose 50% Injectable 25 Gram(s) IV Push once  dextrose 50% Injectable 12.5 Gram(s) IV Push once  dextrose 50% Injectable 25 Gram(s) IV Push once  dextrose 50% Injectable 50 milliLiter(s) IV Push every 15 minutes  dextrose 50% Injectable 25 milliLiter(s) IV Push every 15 minutes  diltiazem Infusion 17 mG/Hr (17 mL/Hr) IV Continuous <Continuous>  glucagon  Injectable 1 milliGRAM(s) IntraMuscular once  heparin  Infusion.  Unit(s)/Hr (7 mL/Hr) IV Continuous <Continuous>  insulin regular Infusion 4 Unit(s)/Hr (4 mL/Hr) IV Continuous <Continuous>  lactated ringers Bolus 500 milliLiter(s) IV Bolus once  methylPREDNISolone sodium succinate Injectable 20 milliGRAM(s) IV Push daily  multivitamin 1 Tablet(s) Oral daily  phenylephrine    Infusion 0.5 MICROgram(s)/kG/Min (7.07 mL/Hr) IV Continuous <Continuous>  polyethylene glycol 3350 17 Gram(s) Oral every 12 hours  propofol Infusion 10 MICROgram(s)/kG/Min (2.26 mL/Hr) IV Continuous <Continuous>  senna Syrup 10 milliLiter(s) Oral at bedtime  sodium chloride 0.9%. 2400 milliLiter(s) (100 mL/Hr) IV Continuous <Continuous>  sodium chloride 3%  Inhalation 4 milliLiter(s) Inhalation every 12 hours  thiamine 100 milliGRAM(s) Oral daily    MEDICATIONS  (PRN):  acetaminophen     Tablet .. 650 milliGRAM(s) Oral every 6 hours PRN Temp greater or equal to 38C (100.4F), Mild Pain (1 - 3)  dextrose Oral Gel 15 Gram(s) Oral once PRN Blood Glucose LESS THAN 70 milliGRAM(s)/deciliter    Vital Signs Last 24 Hrs  T(C): 37.1 (14 Jan 2023 08:00), Max: 37.1 (14 Jan 2023 08:00)  T(F): 98.8 (14 Jan 2023 08:00), Max: 98.8 (14 Jan 2023 08:00)  HR: 93 (14 Jan 2023 12:15) (73 - 124)  BP: 104/50 (14 Jan 2023 12:15) (53/21 - 145/60)  BP(mean): 72 (14 Jan 2023 12:15) (29 - 97)  RR: 28 (14 Jan 2023 12:15) (24 - 39)  SpO2: 96% (14 Jan 2023 12:15) (91% - 100%)    Parameters below as of 14 Jan 2023 11:59  Patient On (Oxygen Delivery Method): ventilator      Mode: AC/ CMV (Assist Control/ Continuous Mandatory Ventilation)  RR (machine): 20  TV (machine): 350  FiO2: 30  PEEP: 5  ITime: 1  MAP: 16  PIP: 30    I&O's Summary    13 Jan 2023 07:01  -  14 Jan 2023 07:00  --------------------------------------------------------  IN: 4593.5 mL / OUT: 548 mL / NET: 4045.5 mL    14 Jan 2023 07:01  -  14 Jan 2023 12:29  --------------------------------------------------------  IN: 589.7 mL / OUT: 70 mL / NET: 519.7 mL          Physical Exam:   GENERAL: NAD, well-groomed, well-developed  HEENT: ROSA/   Atraumatic, Normocephalic  ENMT: No tonsillar erythema, exudates, or enlargement; Moist mucous membranes, Good dentition, No lesions  NECK: Supple, No JVD, Normal thyroid  CHEST/LUNG: poor air entry bilaterally:  no wheezing:   CVS: Regular rate and rhythm; No murmurs, rubs, or gallops  GI: : Soft, Nontender, Nondistended; Bowel sounds present  NERVOUS SYSTEM: sedated and intuabted  EXTREMITIES:  - edema  LYMPH: No lymphadenopathy noted  SKIN: No rashes or lesions  ENDOCRINOLOGY: No Thyromegaly  PSYCH: sedated    Labs:  ABG - ( 14 Jan 2023 09:08 )  pH, Arterial: 7.24  pH, Blood: x     /  pCO2: 38    /  pO2: 66    / HCO3: 16    / Base Excess: -10.3 /  SaO2: 92.1            30, 7, 60.0, 37                            9.5    24.58 )-----------( 172      ( 14 Jan 2023 01:15 )             28.3                         9.8    25.78 )-----------( 178      ( 13 Jan 2023 18:04 )             29.3                         9.9    24.68 )-----------( 199      ( 13 Jan 2023 00:19 )             29.9                         11.4   36.68 )-----------( 303      ( 12 Jan 2023 00:19 )             33.2                         12.8   41.29 )-----------( 348      ( 11 Jan 2023 06:19 )             38.4                         12.5   40.54 )-----------( 359      ( 11 Jan 2023 01:38 )             37.4                         12.3   41.70 )-----------( 352      ( 11 Jan 2023 00:31 )             37.2     01-14    129<L>  |  101  |  69<H>  ----------------------------<  426<H>  4.4   |  17<L>  |  1.86<H>  01-13    127<L>  |  96  |  66<H>  ----------------------------<  369<H>  4.5   |  19<L>  |  2.03<H>  01-13    128<L>  |  97  |  58<H>  ----------------------------<  297<H>  3.7   |  19<L>  |  2.24<H>  01-12    128<L>  |  94<L>  |  52<H>  ----------------------------<  217<H>  3.5   |  20<L>  |  2.44<H>  01-11    126<L>  |  87<L>  |  52<H>  ----------------------------<  475<HH>  3.8   |  20<L>  |  2.58<H>  01-11    126<L>  |  83<L>  |  48<H>  ----------------------------<  244<H>  4.7   |  19<L>  |  2.36<H>  01-11    125<L>  |  83<L>  |  43<H>  ----------------------------<  209<H>  4.2   |  21<L>  |  1.87<H>  01-11    128<L>  |  88<L>  |  46<H>  ----------------------------<  216<H>  3.8   |  20<L>  |  1.97<H>    Ca    7.1<L>      14 Jan 2023 01:15  Ca    7.3<L>      13 Jan 2023 18:04  Ca    7.5<L>      13 Jan 2023 00:18  Phos  2.3     01-14  Phos  2.6     01-13  Phos  3.9     01-13  Mg     1.7     01-14  Mg     1.7     01-13  Mg     1.8     01-13    TPro  4.2<L>  /  Alb  1.8<L>  /  TBili  0.2  /  DBili  x   /  AST  18  /  ALT  22  /  AlkPhos  139<H>  01-14  TPro  4.3<L>  /  Alb  1.8<L>  /  TBili  0.2  /  DBili  x   /  AST  23  /  ALT  21  /  AlkPhos  99  01-13  TPro  4.6<L>  /  Alb  2.0<L>  /  TBili  0.2  /  DBili  x   /  AST  34  /  ALT  24  /  AlkPhos  112  01-12  TPro  4.6<L>  /  Alb  2.0<L>  /  TBili  0.3  /  DBili  x   /  AST  52<H>  /  ALT  24  /  AlkPhos  108  01-11  TPro  5.5<L>  /  Alb  2.4<L>  /  TBili  0.4  /  DBili  x   /  AST  19  /  ALT  8<L>  /  AlkPhos  129<H>  01-11  TPro  5.7<L>  /  Alb  2.2<L>  /  TBili  0.4  /  DBili  x   /  AST  <5<L>  /  ALT  <5<L>  /  AlkPhos  121<H>  01-11    CAPILLARY BLOOD GLUCOSE      POCT Blood Glucose.: 103 mg/dL (14 Jan 2023 12:17)  POCT Blood Glucose.: 96 mg/dL (14 Jan 2023 12:15)  POCT Blood Glucose.: 131 mg/dL (14 Jan 2023 11:03)  POCT Blood Glucose.: 218 mg/dL (14 Jan 2023 10:06)  POCT Blood Glucose.: 198 mg/dL (14 Jan 2023 09:07)  POCT Blood Glucose.: 241 mg/dL (14 Jan 2023 08:26)  POCT Blood Glucose.: 112 mg/dL (14 Jan 2023 08:13)  POCT Blood Glucose.: 365 mg/dL (14 Jan 2023 06:59)  POCT Blood Glucose.: 346 mg/dL (14 Jan 2023 05:46)  POCT Blood Glucose.: 384 mg/dL (14 Jan 2023 04:32)  POCT Blood Glucose.: 385 mg/dL (14 Jan 2023 00:59)  POCT Blood Glucose.: 231 mg/dL (13 Jan 2023 17:29)  POCT Blood Glucose.: >600 mg/dL (13 Jan 2023 17:26)      LIVER FUNCTIONS - ( 14 Jan 2023 01:15 )  Alb: 1.8 g/dL / Pro: 4.2 g/dL / ALK PHOS: 139 U/L / ALT: 22 U/L / AST: 18 U/L / GGT: x           PT/INR - ( 14 Jan 2023 01:15 )   PT: 24.8 sec;   INR: 2.12 ratio         PTT - ( 14 Jan 2023 10:48 )  PTT:84.3 sec          RECENT CULTURES:  01-10 @ 22:10 .Blood Blood-Peripheral                No growth to date.    01-10 @ 22:09 .Blood Blood-Peripheral                No growth to date.    01-10 @ 20:08 .Bronchial Bronchial Lavage   JOE    Few polymorphonuclear leukocytes seen per low power field  No Squamous epithelial cells seen per low power field  No organisms seen    Pseudomonas aeruginosa  Pseudomonas aeruginosa     Few Pseudomonas aeruginosa  Normal Respiratory Janette absent    01-08 @ 14:35 .Blood Blood-Peripheral     rad< from: Xray Abdomen 1 View PORTABLE -Urgent (Xray Abdomen 1 View PORTABLE -Urgent .) (01.11.23 @ 17:49) >  EXAM: Single frontal view of the abdomen.    COMPARISON: CT abdomen and pelvis from 2/1/2019.    FINDINGS:  Status post bilateral femoral ORIF.  Enteric tube with tip in the distal stomach/proximal duodenum.  Temperature probe overlies the rectum.    Nonobstructive bowel gas pattern.  There isno intraperitoneal free air.  Levoscoliosis of the lumbar spine.    IMPRESSION:  Nonobstructive bowel gas pattern.    --- End of Report ---           STAR JOHNSTON MD; Resident Radiologist  This document has been electronically signed.  JOSELINE WRIGHT MD; Attending Radiologist  This document has been electronically signed. Jan 12 2023  8:44AM    < end of copied text >  < from: Xray Chest 1 View- PORTABLE-Urgent (Xray Chest 1 View- PORTABLE-Urgent .) (01.10.23 @ 19:55) >  ACC: [id number] EXAM:  XR CHEST PORTABLE URGENT 1V                          PROCEDURE DATE:  01/10/2023          INTERPRETATION:  CLINICAL INFORMATION: Intubation.    TECHNIQUE: Portable AP radiograph of the chest.    COMPARISON: Chest x-ray 1/8/2023.    FINDINGS: Clear lungs. No pleural effusions or pneumothorax. Cardiac   silhouette is unchanged. No acute osseous pathology.    Endotracheal tube terminates above the jack. Enteric tube projects   below the diaphragm with its distal segment excluded from view.      IMPRESSION:    Clear lungs. Tubes as described.    --- End of Report ---           OLIVA ALAMO MD; Resident Radiologist  This document has been electronically signed.  JAIRO TATUM MD; Attending Radiologist  This document has been electronically signed. Jan 11 2023  8:48AM    < end of copied text >  < from: CT Chest No Cont (01.10.23 @ 12:31) >  LYMPH NODES: No lymphadenopathy.    HEART/VASCULATURE: Heart size is normal. No pericardial effusion. Aortic   valve calcifications. Aortic and coronary artery calcifications.    AIRWAYS/LUNGS/PLEURA: Patent central airways. Bilateral lower lobe   predominant patchy and branching opacities. Small left pleural effusion   with associated passive atelectasis.    UPPER ABDOMEN: Unchanged calcified liver granulomas.    BONES/SOFT TISSUES: Exaggerated kyphosis of the thoracic spine.   Degenerative changes of the spine. Soft tissues are unremarkable.    IMPRESSION:    Bilateral lower lobe predominant patchy and branching opacities   suspicious for pneumonia.    Small left pleural effusion.        --- End of Report ---           KIMI GERARDO MD; Resident Radiologist  This document has been electronically signed.  ALIRIO LARA MD; Attending Radiologist  This document has been electronically signed. Tim 10 2023  4:05PM    < end of copied text >             No Growth Final    01-08 @ 14:26 Clean Catch Clean Catch (Midstream)                >=3 organisms. Probable collection contamination.          RESPIRATORY CULTURES:          Studies  Chest X-RAY  CT SCAN Chest   Venous Dopplers: LE:   CT Abdomen  Others

## 2023-01-14 NOTE — PROGRESS NOTE ADULT - ASSESSMENT
77-year-old female with past medical history of DM, HTN, HLD, asthma on daily steroids, A. fib on Eliquis and digoxin presenting with shortness of breath.  Patient unable to provide history.  Daughter at bedside reports patient has had increasing shortness of breath, wheezing, cough for the past week now.  Patient also with increasing generalized weakness, fatigue and decreased p.o. intake.  Denies fever/chills, chest pain, abdominal pain, nausea, vomiting, diarrhea, dysuria.  Family reports patient had a similar episode 1 year ago when she was admitted to [facility] with UTI.  pt with sig medical hx, chronic a.fib with sob ?sec sepsis/ pneumoniae, UTI  start on ceftriaxone  tele  for increase hr, will increase Cardizem dose  continue AC  cardiac enzyme  tsh  echo  RVP negative      ASTHMA/ COPD exacerbation;   A Fibrillation  with rvr:  DM:  HTN:   HLD:     1/09:    ASTHMA/ COPD exacerbation; she was using performist at home with 5 mg of prednisone  not sure why she was not on any other meds:  sill start Symbicort too:  along with BD and is already on cefepime:  though pneumonia to me is not very convincing : will probably need ct scan chest   A Fibrillation  with rvr: Hr still elevated:  on cardizem : would defer to cards:  on ac:  lovenox  + coumadin : check echo   DM:: monitor and control   HTN: : controlled  HLD: on statins  :    dw team    1/10:    ASTHMA/ COPD exacerbation; she was using performist at home with 5 mg of prednisone  not sure why she was not on any other meds:  sill start Symbicort too:  along with BD and is already on cefepime:  WBC increased : though pneumonia to me is not very convincing : will probably need ct scan chest : she rused for ct chest : increase steorids 40 mg Q 6 hours : ABG today seems reasonable   A Fibrillation  with rvr: Hr still elevated:  on cardizem : HR is till very high  :   DM:: monitor and control   HTN: : controlled  HLD: on statins:    MICU  consult:  high risk for intubation:   :    edy and daughter    1/11:    ASTHMA/ COPD exacerbation; now s/p bronch: RML lavage done:  wait cultures results: she was using performist at home with 5 mg of prednisone  not sure why she was not on any other meds:  Cont BD:  and antibiotics  WBC still increased :on cefepime:    A Fibrillation  with rvr: Hr still elevated:  on cardizem : HR is better now:  on cardiem   DM:: monitor and control   HTN: : controlled  HLD: on statins:    josias micu  ATTENDING      1/12:    ASTHMA/ COPD exacerbation; now s/p bronch: RML lavage done:  negative  cultures so far: : she was using performist at home with 5 mg of prednisone  not sure why she was not on any other meds:  Cont BD:  and antibiotics  WBC still increased: but little down today  :on cefepime:    A Fibrillation  with rvr: Hr still elevated:  on cardizem : HR is better now:  on cardiem drip   DM:: monitor and control   HTN: : controlled  HLD: on statins:    josias micu  team    1/13:       ASTHMA/ COPD exacerbation; now s/p bronch: RML lavage done:  negative  cultures so far: :  cont full vent support:  management  of vent per MICU : weaning as perprotocol  ;  on zothromax and cefepime : off steroids   A Fibrillation  with rvr: Hr still elevated:  on Cardizem : HR is better now:  on cardiem drip   DM:: monitor and control   HTN: : controlled  HLD: on statins:    josias micu  team   :  1/14:       ASTHMA/ COPD exacerbation; now s/p bronch: RML lavage done:  Pseudomonas:  on cefepime:  : :  cont full vent support:  management  of vent per MICU : weaning as pe rprotocol  ;  on 20 mg of steroids today   A Fibrillation  with rvr: Hr still elevated:  on Cardizem : HR is better now:  on cardiem drip : Blood pressure is stable:   DM:: monitor and control   HTN: : controlled  HLD: on statins:    josias micu  Attending

## 2023-01-14 NOTE — PROGRESS NOTE ADULT - SUBJECTIVE AND OBJECTIVE BOX
INTERVAL HPI/OVERNIGHT EVENTS: Glucose has been in 400s, was placed on an insulin gtt.    SUBJECTIVE: Patient seen and examined at bedside.       VITAL SIGNS:  ICU Vital Signs Last 24 Hrs  T(C): 36.6 (14 Jan 2023 04:00), Max: 37.2 (13 Jan 2023 11:00)  T(F): 97.9 (14 Jan 2023 04:00), Max: 99 (13 Jan 2023 11:00)  HR: 107 (14 Jan 2023 07:00) (86 - 124)  BP: 126/59 (14 Jan 2023 07:00) (109/56 - 145/60)  BP(mean): 85 (14 Jan 2023 07:00) (65 - 97)  ABP: --  ABP(mean): --  RR: 39 (14 Jan 2023 07:00) (24 - 39)  SpO2: 93% (14 Jan 2023 07:00) (93% - 100%)    O2 Parameters below as of 14 Jan 2023 06:01  Patient On (Oxygen Delivery Method): ventilator          Mode: AC/ CMV (Assist Control/ Continuous Mandatory Ventilation), RR (machine): 20, TV (machine): 350, FiO2: 21, PEEP: 5, ITime: 1, MAP: 12, PIP: 24  Plateau pressure:   P/F ratio:     01-13 @ 07:01  -  01-14 @ 07:00  --------------------------------------------------------  IN: 4593.5 mL / OUT: 548 mL / NET: 4045.5 mL      CAPILLARY BLOOD GLUCOSE      POCT Blood Glucose.: 241 mg/dL (14 Jan 2023 08:26)    ECG:    PHYSICAL EXAM:    GENERAL: NAD, lying in bed comfortably  HEAD:  Atraumatic, Normocephalic  EYES: EOMI, PERRLA, conjunctiva and sclera clear  ENT: Moist mucous membranes  NECK: Supple, No JVD  CHEST/LUNG: Clear to auscultation bilaterally; No rales, rhonchi, wheezing, or rubs. Unlabored respirations  HEART: Regular rate and rhythm; No murmurs, rubs, or gallops  ABDOMEN: Bowel sounds present; Soft, Nontender, Nondistended. No hepatomegaly  EXTREMITIES:  2+ Peripheral Pulses, brisk capillary refill. No clubbing, cyanosis, or edema  NERVOUS SYSTEM:  Alert & Oriented X3, speech clear. No deficits   MSK: FROM all 4 extremities, full and equal strength  SKIN: No rashes or lesions    MEDICATIONS:  MEDICATIONS  (STANDING):  albuterol/ipratropium for Nebulization 3 milliLiter(s) Nebulizer every 6 hours  ascorbic acid 500 milliGRAM(s) Oral daily  atorvastatin 40 milliGRAM(s) Oral at bedtime  azithromycin   Tablet 250 milliGRAM(s) Oral <User Schedule>  cefepime   IVPB 500 milliGRAM(s) IV Intermittent every 8 hours  chlorhexidine 0.12% Liquid 15 milliLiter(s) Oral Mucosa every 12 hours  chlorhexidine 4% Liquid 1 Application(s) Topical <User Schedule>  dexMEDEtomidine Infusion 0.2 MICROgram(s)/kG/Hr (1.89 mL/Hr) IV Continuous <Continuous>  dextrose 5%. 1000 milliLiter(s) (100 mL/Hr) IV Continuous <Continuous>  dextrose 5%. 1000 milliLiter(s) (50 mL/Hr) IV Continuous <Continuous>  dextrose 50% Injectable 25 Gram(s) IV Push once  dextrose 50% Injectable 12.5 Gram(s) IV Push once  dextrose 50% Injectable 25 Gram(s) IV Push once  dextrose 50% Injectable 50 milliLiter(s) IV Push every 15 minutes  dextrose 50% Injectable 25 milliLiter(s) IV Push every 15 minutes  diltiazem Infusion 17 mG/Hr (17 mL/Hr) IV Continuous <Continuous>  glucagon  Injectable 1 milliGRAM(s) IntraMuscular once  heparin  Infusion.  Unit(s)/Hr (7 mL/Hr) IV Continuous <Continuous>  insulin regular Infusion 4 Unit(s)/Hr (4 mL/Hr) IV Continuous <Continuous>  methylPREDNISolone sodium succinate Injectable 20 milliGRAM(s) IV Push daily  multivitamin 1 Tablet(s) Oral daily  phenylephrine    Infusion 1.202 MICROgram(s)/kG/Min (17 mL/Hr) IV Continuous <Continuous>  polyethylene glycol 3350 17 Gram(s) Oral every 12 hours  propofol Infusion 10 MICROgram(s)/kG/Min (2.26 mL/Hr) IV Continuous <Continuous>  senna Syrup 10 milliLiter(s) Oral at bedtime  sodium chloride 0.9%. 2400 milliLiter(s) (100 mL/Hr) IV Continuous <Continuous>  sodium chloride 3%  Inhalation 4 milliLiter(s) Inhalation every 12 hours  thiamine 100 milliGRAM(s) Oral daily    MEDICATIONS  (PRN):  acetaminophen     Tablet .. 650 milliGRAM(s) Oral every 6 hours PRN Temp greater or equal to 38C (100.4F), Mild Pain (1 - 3)  dextrose Oral Gel 15 Gram(s) Oral once PRN Blood Glucose LESS THAN 70 milliGRAM(s)/deciliter  fentaNYL    Injectable 25 MICROGram(s) IV Push every 1 hour PRN Mild Pain (1 - 3)      ALLERGIES:  Allergies    Avelox (Pruritus)  fish (Vomiting)  Levaquin (Unknown)  shellfish (Vomiting)    Intolerances        LABS:                        9.5    24.58 )-----------( 172      ( 14 Jan 2023 01:15 )             28.3     01-14    129<L>  |  101  |  69<H>  ----------------------------<  426<H>  4.4   |  17<L>  |  1.86<H>    Ca    7.1<L>      14 Jan 2023 01:15  Phos  2.3     01-14  Mg     1.7     01-14    TPro  4.2<L>  /  Alb  1.8<L>  /  TBili  0.2  /  DBili  x   /  AST  18  /  ALT  22  /  AlkPhos  139<H>  01-14    PT/INR - ( 14 Jan 2023 01:15 )   PT: 24.8 sec;   INR: 2.12 ratio         PTT - ( 14 Jan 2023 01:15 )  PTT:104.6 sec      RADIOLOGY & ADDITIONAL TESTS: Reviewed.

## 2023-01-14 NOTE — PROGRESS NOTE ADULT - ASSESSMENT
ASSESSMENT/PLAN:  77-year-old female with past medical history of DM, HTN, HLD, asthma on daily steroids, A. fib on Eliquis and digoxin presenting with shortness of breath, found to be in Afib w/RVR; admitted to MICU for Acute Hypoxic Respiratory Failure in s/o Asthma and Bronchiectasis w/ possible PNA, leading to intubation and sedation.     NEURO  # Intubated & Sedated  - Mental status was at baseline before intubation; intubated for Acute Hypoxic Respiratory Failure  - ABG w/o hypercapnia; PCO2 38; no signs of hypercapnic encephalopathy  - RASS goal 0 to -1  - Daily SAT/SBT    CARDIAC  # Afib w/ RVR  Admitted in RVR; being followed by Dr. Marcus, Cardiology; Afib w/ RVR thought to be due to stress from hypoxemia  - HR in the 130s 1/10  - on Diltiazem drip, more rate controlled today  - follow-up Echo  - follow-up TSH results    # HTN  No indication of hypotension.  - currently on Diltiazem 90mg q6h  - closely monitor BP  - can d/c fentanyl     PULM  # Acute Hypoxemic Respiratory Failure  # Asthma  # Bronchiectasis  CT Angio r/o PE; currently thought to be due to Asthma exacerbation vs bronchiectasis vs ?PNA;   - tachypneic on BiPAP to 30s, saturating 100% but is tiring out as of now  - intubate and sedate to assist with airway clearance  - chest PT; airway clearance; hypertonic saline; stop IPCV  - daily SBT/SATs  - latest ABG is 7.46| 32|211  - decrease to solumedrol 20q24h  - DuoNebs q6h   - currently of Cefepime (1/10-) for empiric coverage; follow infectious diseases recommendations  - BAL growing few Pseudomonas; wait for sensitivities  - follow-up Legionella antigen, final BCx read; RVP negative; BAL cx    RENAL  # SHANE  Cr shot up to 2.24 <---2.44 <- 2.55 <-2.36 <- 1.87 <- 1.97 <- 0.64  - Likely prerenal in the setting of low PO intake   - Follow-up urine lytes  - Maintenance fluids started: 100cc/hr    GI   - no active issues currently  - Diet: NPO on vent; place OG and start tube feeds  - Bowel regimen: miralax, senna    HEME/ONC  # Leukocytosis  WBC now 24; decreasing  No active indications of infections, all infectious workup negative so far; likely in s/o solumedrol usage, with neutrophilia  - continue to monitor    # INR 11.1 (00:00 1/11)-> 4.5 (6 AM 1/11) -> 1.42 (1/12)  - continue to hold Lovenox    ID  # Sepsis  CT Chest w/ bilateral lower lobe predominant patchy and branching opacities suspicious for pneumonia. Small left pleural effusion. Leukocytosis, tachycardia and tachypnea; No active indications of infections, all infectious workup negative so far; leukocytosis likely in s/o solumedrol usage but no diff to conform neutrophilia so far; tachycardia likely in s/o tachypnea and AHRF  - Continue Cefepime (1/10-) for empiric PNA coverage  - follow-up SCx, Urine legionella antigen, final BCx, BAL cultures    ENDO  # Hyponatremia  Na 128 <- 126  <- 132<- 131 in 06/22, may be chronic  - continue fluid resuscitation   - monitor daily BMPs  - Urine lytes  - Maintenance fluids at 100cc's/hr    # T2DM  A1c 8.1 1/09/23  BGs high; likely in s/o steroid usage  - place on sliding scale NPH  - admelog     DVT  - Hold Lovenox 50mg BID    ETHICS  Full Code

## 2023-01-14 NOTE — PROGRESS NOTE ADULT - SUBJECTIVE AND OBJECTIVE BOX
CARDIOLOGY     PROGRESS  NOTE   ________________________________________________    CHIEF COMPLAINT:Patient is a 77y old  Female who presents with a chief complaint of sob/ rapid a.fib (14 Jan 2023 12:29)  sedated on vent  	  REVIEW OF SYSTEMS:  CONSTITUTIONAL: No fever, weight loss, or fatigue  EYES: No eye pain, visual disturbances, or discharge  ENT:  No difficulty hearing, tinnitus, vertigo; No sinus or throat pain  NECK: No pain or stiffness  RESPIRATORY: No cough, wheezing, chills or hemoptysis; No Shortness of Breath  CARDIOVASCULAR: No chest pain, palpitations, passing out, dizziness, or leg swelling  GASTROINTESTINAL: No abdominal or epigastric pain. No nausea, vomiting, or hematemesis; No diarrhea or constipation. No melena or hematochezia.  GENITOURINARY: No dysuria, frequency, hematuria, or incontinence  NEUROLOGICAL: No headaches, memory loss, loss of strength, numbness, or tremors  SKIN: No itching, burning, rashes, or lesions   LYMPH Nodes: No enlarged glands  ENDOCRINE: No heat or cold intolerance; No hair loss  MUSCULOSKELETAL: No joint pain or swelling; No muscle, back, or extremity pain  PSYCHIATRIC: No depression, anxiety, mood swings, or difficulty sleeping  HEME/LYMPH: No easy bruising, or bleeding gums  ALLERGY AND IMMUNOLOGIC: No hives or eczema	    [ ] All others negative	  [x ] Unable to obtain    PHYSICAL EXAM:  T(C): 36.5 (01-14-23 @ 12:00), Max: 37.1 (01-14-23 @ 08:00)  HR: 86 (01-14-23 @ 15:15) (71 - 124)  BP: 128/53 (01-14-23 @ 15:15) (53/21 - 150/61)  RR: 36 (01-14-23 @ 15:15) (25 - 45)  SpO2: 99% (01-14-23 @ 15:15) (91% - 100%)  Wt(kg): --  I&O's Summary    13 Jan 2023 07:01  -  14 Jan 2023 07:00  --------------------------------------------------------  IN: 4593.5 mL / OUT: 548 mL / NET: 4045.5 mL    14 Jan 2023 07:01  -  14 Jan 2023 15:18  --------------------------------------------------------  IN: 1421.5 mL / OUT: 85 mL / NET: 1336.5 mL        Appearance: Normal	  HEENT:   Normal oral mucosa, PERRL, EOMI	  Lymphatic: No lymphadenopathy  Cardiovascular: Normal S1 S2, No JVD, + murmurs, No edema  Respiratory: rhonchi  Psychiatry: A & O x 3, Mood & affect appropriate  Gastrointestinal:  Soft, Non-tender, + BS	  Skin: No rashes, No ecchymoses, No cyanosis	  Extremities:, No clubbing, cyanosis or edema    MEDICATIONS  (STANDING):  albuterol/ipratropium for Nebulization 3 milliLiter(s) Nebulizer every 6 hours  ascorbic acid 500 milliGRAM(s) Oral daily  atorvastatin 40 milliGRAM(s) Oral at bedtime  azithromycin   Tablet 250 milliGRAM(s) Oral <User Schedule>  cefepime   IVPB 500 milliGRAM(s) IV Intermittent every 8 hours  chlorhexidine 0.12% Liquid 15 milliLiter(s) Oral Mucosa every 12 hours  chlorhexidine 4% Liquid 1 Application(s) Topical <User Schedule>  dexMEDEtomidine Infusion 0.2 MICROgram(s)/kG/Hr (1.89 mL/Hr) IV Continuous <Continuous>  dextrose 5%. 1000 milliLiter(s) (100 mL/Hr) IV Continuous <Continuous>  dextrose 5%. 1000 milliLiter(s) (50 mL/Hr) IV Continuous <Continuous>  dextrose 50% Injectable 25 Gram(s) IV Push once  dextrose 50% Injectable 12.5 Gram(s) IV Push once  dextrose 50% Injectable 25 Gram(s) IV Push once  dextrose 50% Injectable 50 milliLiter(s) IV Push every 15 minutes  dextrose 50% Injectable 25 milliLiter(s) IV Push every 15 minutes  diltiazem Infusion 15 mG/Hr (15 mL/Hr) IV Continuous <Continuous>  glucagon  Injectable 1 milliGRAM(s) IntraMuscular once  methylPREDNISolone sodium succinate Injectable 20 milliGRAM(s) IV Push daily  multivitamin 1 Tablet(s) Oral daily  phenylephrine    Infusion 0.5 MICROgram(s)/kG/Min (7.07 mL/Hr) IV Continuous <Continuous>  polyethylene glycol 3350 17 Gram(s) Oral every 12 hours  propofol Infusion 10 MICROgram(s)/kG/Min (2.26 mL/Hr) IV Continuous <Continuous>  senna Syrup 10 milliLiter(s) Oral at bedtime  sodium chloride 0.9%. 2400 milliLiter(s) (100 mL/Hr) IV Continuous <Continuous>  sodium chloride 3%  Inhalation 4 milliLiter(s) Inhalation every 12 hours  thiamine 100 milliGRAM(s) Oral daily      TELEMETRY: 	    ECG:  	  RADIOLOGY:  OTHER: 	  	  LABS:	 	    CARDIAC MARKERS:                                9.0    31.38 )-----------( 167      ( 14 Jan 2023 13:23 )             28.2     01-14    122<L>  |  93<L>  |  63<H>  ----------------------------<  446<H>  4.6   |  18<L>  |  1.77<H>    Ca    6.4<LL>      14 Jan 2023 13:23  Phos  4.8     01-14  Mg     2.2     01-14    TPro  3.7<L>  /  Alb  1.5<L>  /  TBili  0.2  /  DBili  x   /  AST  49<H>  /  ALT  49<H>  /  AlkPhos  106  01-14    proBNP:   Lipid Profile:   HgA1c:   TSH: Thyroid Stimulating Hormone, Serum: 1.17 uIU/mL (01-09 @ 06:11)    PT/INR - ( 14 Jan 2023 01:15 )   PT: 24.8 sec;   INR: 2.12 ratio         PTT - ( 14 Jan 2023 10:48 )  PTT:84.3 sec      Assessment and plan  ---------------------------  77-year-old female with past medical history of DM, HTN, HLD, asthma on daily steroids, A. fib on Eliquis and digoxin presenting with shortness of breath.  Patient unable to provide history.  Daughter at bedside reports patient has had increasing shortness of breath, wheezing, cough for the past week now.  Patient also with increasing generalized weakness, fatigue and decreased p.o. intake.  Denies fever/chills, chest pain, abdominal pain, nausea, vomiting, diarrhea, dysuria.  Family reports patient had a similar episode 1 year ago when she was admitted to [facility] with UTI.  pt with sig medical hx, chronic a.fib with sob ?sec sepsis/ pneumoniae, UTI  sedated on vent  AC for a.fib  continue abx and steroid  echo noted  still sedated on vent   continue abx  hr is well controlled still on cardizem drip  sepsis on iv abx  fu renal function closely  nutrition

## 2023-01-15 NOTE — PROGRESS NOTE ADULT - SUBJECTIVE AND OBJECTIVE BOX
Harry Monge MD  Internal Medicine, PGY1    MICU Progress Note    CHIEF COMPLAINT: CRUZITO BATES is a 76yo Female with PMH *** presenting with ***.    Interval Events:    Meds administered:  sodium chloride 3%  Inhalation: 4 milliLiter(s) Inhalation (01-15 @ 06:18)  albuterol/ipratropium for Nebulization: 3 milliLiter(s) Nebulizer (01-15 @ 06:18)  chlorhexidine 4% Liquid: 1 Application(s) Topical (01-15 @ 06:15)  chlorhexidine 0.12% Liquid: 15 milliLiter(s) Oral Mucosa (01-15 @ 06:15)  cefepime   IVPB: 100 mL/Hr IV Intermittent (01-15 @ 06:13)  methylPREDNISolone sodium succinate Injectable: 20 milliGRAM(s) IV Push (01-15 @ 06:12)  dextrose 5% + sodium chloride 0.9%.: 100 mL/Hr IV Continuous (01-15 @ 01:33)  diltiazem Infusion: 13 mL/Hr IV Continuous (01-15 @ 01:29)  albumin human  5% IVPB: 250 mL/Hr IV Intermittent (01-15 @ 00:28)  albuterol/ipratropium for Nebulization: 3 milliLiter(s) Nebulizer (01-14 @ 23:33)  cefepime   IVPB: 100 mL/Hr IV Intermittent (01-14 @ 21:29)        OBJECTIVE:  Vitals:  T(F): 98.1 (01-15-23 @ 04:00), Max: 99 (01-15-23 @ 00:00)  HR: 80 (01-15-23 @ 06:32) (71 - 110)  BP: 124/54 (01-15-23 @ 05:45) (53/21 - 150/61)  BP(mean): 78 (01-15-23 @ 05:45) (29 - 88)  ABP: --  ABP(mean): --  RR: 33 (01-15-23 @ 05:45) (21 - 45)  SpO2: 100% (01-15-23 @ 06:32) (88% - 100%)  I/O Detail 24H    14 Jan 2023 07:01  -  15 Tim 2023 07:00  --------------------------------------------------------  IN:    Dexmedetomidine: 43.7 mL    dextrose 5% + sodium chloride 0.9%: 600 mL    Diltiazem: 78 mL    Diltiazem: 195 mL    Diltiazem: 83 mL    Enteral Tube Flush: 60 mL    Heparin Infusion: 56 mL    Insulin: 21 mL    IV PiggyBack: 100 mL    Lactated Ringers Bolus: 1000 mL    Phenylephrine: 687.5 mL    Phenylephrine: 28.3 mL    Propofol: 25.8 mL    sodium chloride 0.9%: 1300 mL    sodium chloride 0.9%: 500 mL  Total IN: 4778.3 mL    OUT:    Glucerna 1.5: 0 mL    Indwelling Catheter - Urethral (mL): 335 mL    Nasogastric/Oral tube (mL): 300 mL    Phenylephrine: 0 mL    Propofol: 0 mL  Total OUT: 635 mL    Total NET: 4143.3 mL          HOSPITAL MEDICATIONS:  Standing Meds:  albuterol/ipratropium for Nebulization 3 milliLiter(s) Nebulizer every 6 hours  ascorbic acid 500 milliGRAM(s) Oral daily  atorvastatin 40 milliGRAM(s) Oral at bedtime  azithromycin   Tablet 250 milliGRAM(s) Oral <User Schedule>  cefepime   IVPB 500 milliGRAM(s) IV Intermittent every 8 hours  chlorhexidine 0.12% Liquid 15 milliLiter(s) Oral Mucosa every 12 hours  chlorhexidine 4% Liquid 1 Application(s) Topical <User Schedule>  dexMEDEtomidine Infusion 0.2 MICROgram(s)/kG/Hr IV Continuous <Continuous>  dextrose 5% + sodium chloride 0.9%. 1000 milliLiter(s) IV Continuous <Continuous>  dextrose 5%. 1000 milliLiter(s) IV Continuous <Continuous>  dextrose 5%. 1000 milliLiter(s) IV Continuous <Continuous>  dextrose 50% Injectable 25 Gram(s) IV Push once  dextrose 50% Injectable 12.5 Gram(s) IV Push once  dextrose 50% Injectable 25 Gram(s) IV Push once  dextrose 50% Injectable 50 milliLiter(s) IV Push every 15 minutes  dextrose 50% Injectable 25 milliLiter(s) IV Push every 15 minutes  diltiazem Infusion 13 mG/Hr IV Continuous <Continuous>  glucagon  Injectable 1 milliGRAM(s) IntraMuscular once  methylPREDNISolone sodium succinate Injectable 20 milliGRAM(s) IV Push daily  multivitamin 1 Tablet(s) Oral daily  phenylephrine    Infusion 0.5 MICROgram(s)/kG/Min IV Continuous <Continuous>  polyethylene glycol 3350 17 Gram(s) Oral every 12 hours  propofol Infusion 10 MICROgram(s)/kG/Min IV Continuous <Continuous>  senna Syrup 10 milliLiter(s) Oral at bedtime  sodium chloride 3%  Inhalation 4 milliLiter(s) Inhalation every 12 hours  thiamine 100 milliGRAM(s) Oral daily      PRN Meds:  acetaminophen     Tablet .. 650 milliGRAM(s) Oral every 6 hours PRN  dextrose Oral Gel 15 Gram(s) Oral once PRN      PHYSICAL EXAM:  GENERAL: NAD, lying in bed comfortably  HEAD:  Atraumatic, Normocephalic  EYES: EOMI, PERRLA, conjunctiva and sclera clear  ENT: Moist mucous membranes  NECK: Supple, No JVD  CHEST/LUNG: Clear to auscultation bilaterally; No rales, rhonchi, wheezing, or rubs. Unlabored respirations  HEART: Regular rate and rhythm; No murmurs, rubs, or gallops  ABDOMEN: Bowel sounds present; Soft, Nontender, Nondistended. No hepatomegaly  EXTREMITIES:  2+ Peripheral Pulses, brisk capillary refill. No clubbing, cyanosis, or edema  NERVOUS SYSTEM:  Alert & Oriented X3, speech clear. No deficits   MSK: FROM all 4 extremities, full and equal strength  SKIN: No rashes or lesions   Harry Monge MD  Internal Medicine, PGY1    MICU Progress Note    CHIEF COMPLAINT: CRUZITO BATES is a 77-year-old female with past medical history of DM, HTN, HLD, asthma on daily steroids, A. fib on Eliquis and digoxin presenting with shortness of breath, found to be in Afib w/RVR; admitted to MICU for Acute Hypoxic Respiratory Failure in s/o Asthma and Bronchiectasis w/ possible PNA, leading to intubation and sedation.    Interval Events: pt w/ blood through rectum, thought to be due to hemorrhoids; AXR w/ nonobstructive bowel gas    Meds administered:  sodium chloride 3%  Inhalation: 4 milliLiter(s) Inhalation (01-15 @ 06:18)  albuterol/ipratropium for Nebulization: 3 milliLiter(s) Nebulizer (01-15 @ 06:18)  chlorhexidine 4% Liquid: 1 Application(s) Topical (01-15 @ 06:15)  chlorhexidine 0.12% Liquid: 15 milliLiter(s) Oral Mucosa (01-15 @ 06:15)  cefepime   IVPB: 100 mL/Hr IV Intermittent (01-15 @ 06:13)  methylPREDNISolone sodium succinate Injectable: 20 milliGRAM(s) IV Push (01-15 @ 06:12)  dextrose 5% + sodium chloride 0.9%.: 100 mL/Hr IV Continuous (01-15 @ 01:33)  diltiazem Infusion: 13 mL/Hr IV Continuous (01-15 @ 01:29)  albumin human  5% IVPB: 250 mL/Hr IV Intermittent (01-15 @ 00:28)  albuterol/ipratropium for Nebulization: 3 milliLiter(s) Nebulizer (01-14 @ 23:33)  cefepime   IVPB: 100 mL/Hr IV Intermittent (01-14 @ 21:29)        OBJECTIVE:  Vitals:  T(F): 98.1 (01-15-23 @ 04:00), Max: 99 (01-15-23 @ 00:00)  HR: 80 (01-15-23 @ 06:32) (71 - 110)  BP: 124/54 (01-15-23 @ 05:45) (53/21 - 150/61)  BP(mean): 78 (01-15-23 @ 05:45) (29 - 88)  ABP: --  ABP(mean): --  RR: 33 (01-15-23 @ 05:45) (21 - 45)  SpO2: 100% (01-15-23 @ 06:32) (88% - 100%)  I/O Detail 24H    14 Jan 2023 07:01  -  15 Tim 2023 07:00  --------------------------------------------------------  IN:    Dexmedetomidine: 43.7 mL    dextrose 5% + sodium chloride 0.9%: 600 mL    Diltiazem: 78 mL    Diltiazem: 195 mL    Diltiazem: 83 mL    Enteral Tube Flush: 60 mL    Heparin Infusion: 56 mL    Insulin: 21 mL    IV PiggyBack: 100 mL    Lactated Ringers Bolus: 1000 mL    Phenylephrine: 687.5 mL    Phenylephrine: 28.3 mL    Propofol: 25.8 mL    sodium chloride 0.9%: 1300 mL    sodium chloride 0.9%: 500 mL  Total IN: 4778.3 mL    OUT:    Glucerna 1.5: 0 mL    Indwelling Catheter - Urethral (mL): 335 mL    Nasogastric/Oral tube (mL): 300 mL    Phenylephrine: 0 mL    Propofol: 0 mL  Total OUT: 635 mL    Total NET: 4143.3 mL          HOSPITAL MEDICATIONS:  Standing Meds:  albuterol/ipratropium for Nebulization 3 milliLiter(s) Nebulizer every 6 hours  ascorbic acid 500 milliGRAM(s) Oral daily  atorvastatin 40 milliGRAM(s) Oral at bedtime  azithromycin   Tablet 250 milliGRAM(s) Oral <User Schedule>  cefepime   IVPB 500 milliGRAM(s) IV Intermittent every 8 hours  chlorhexidine 0.12% Liquid 15 milliLiter(s) Oral Mucosa every 12 hours  chlorhexidine 4% Liquid 1 Application(s) Topical <User Schedule>  dexMEDEtomidine Infusion 0.2 MICROgram(s)/kG/Hr IV Continuous <Continuous>  dextrose 5% + sodium chloride 0.9%. 1000 milliLiter(s) IV Continuous <Continuous>  dextrose 5%. 1000 milliLiter(s) IV Continuous <Continuous>  dextrose 5%. 1000 milliLiter(s) IV Continuous <Continuous>  dextrose 50% Injectable 25 Gram(s) IV Push once  dextrose 50% Injectable 12.5 Gram(s) IV Push once  dextrose 50% Injectable 25 Gram(s) IV Push once  dextrose 50% Injectable 50 milliLiter(s) IV Push every 15 minutes  dextrose 50% Injectable 25 milliLiter(s) IV Push every 15 minutes  diltiazem Infusion 13 mG/Hr IV Continuous <Continuous>  glucagon  Injectable 1 milliGRAM(s) IntraMuscular once  methylPREDNISolone sodium succinate Injectable 20 milliGRAM(s) IV Push daily  multivitamin 1 Tablet(s) Oral daily  phenylephrine    Infusion 0.5 MICROgram(s)/kG/Min IV Continuous <Continuous>  polyethylene glycol 3350 17 Gram(s) Oral every 12 hours  propofol Infusion 10 MICROgram(s)/kG/Min IV Continuous <Continuous>  senna Syrup 10 milliLiter(s) Oral at bedtime  sodium chloride 3%  Inhalation 4 milliLiter(s) Inhalation every 12 hours  thiamine 100 milliGRAM(s) Oral daily      PRN Meds:  acetaminophen     Tablet .. 650 milliGRAM(s) Oral every 6 hours PRN  dextrose Oral Gel 15 Gram(s) Oral once PRN      PHYSICAL EXAM:  GENERAL: NAD, lying in bed comfortably  HEAD:  Atraumatic, Normocephalic  EYES: EOMI, PERRLA, conjunctiva and sclera clear  ENT: Moist mucous membranes  NECK: Supple, No JVD  CHEST/LUNG: Clear to auscultation bilaterally; No rales, rhonchi, wheezing, or rubs. Unlabored respirations  HEART: Regular rate and rhythm; No murmurs, rubs, or gallops  ABDOMEN: Bowel sounds present; Soft, Nontender, Nondistended. No hepatomegaly  EXTREMITIES:  2+ Peripheral Pulses, brisk capillary refill. No clubbing, cyanosis, or edema  NERVOUS SYSTEM:  Alert; Opens eyes and follows commands;   MSK: coordinated movement of extremities; good muscle bulk  SKIN: No rashes or lesions

## 2023-01-15 NOTE — PROGRESS NOTE ADULT - SUBJECTIVE AND OBJECTIVE BOX
CARDIOLOGY     PROGRESS  NOTE   ________________________________________________    CHIEF COMPLAINT:Patient is a 77y old  Female who presents with a chief complaint of sob/ rapid a.fib (15 Tim 2023 07:21)  sedated on vent  interval Events: pt w/ blood through rectum, thought to be due to hemorrhoids; AXR w/ nonobstructive bowel gas  	  REVIEW OF SYSTEMS:  CONSTITUTIONAL: No fever, weight loss, or fatigue  EYES: No eye pain, visual disturbances, or discharge  ENT:  No difficulty hearing, tinnitus, vertigo; No sinus or throat pain  NECK: No pain or stiffness  RESPIRATORY: No cough, wheezing, chills or hemoptysis; No Shortness of Breath  CARDIOVASCULAR: No chest pain, palpitations, passing out, dizziness, or leg swelling  GASTROINTESTINAL: No abdominal or epigastric pain. No nausea, vomiting, or hematemesis; No diarrhea or constipation. No melena or hematochezia.  GENITOURINARY: No dysuria, frequency, hematuria, or incontinence  NEUROLOGICAL: No headaches, memory loss, loss of strength, numbness, or tremors  SKIN: No itching, burning, rashes, or lesions   LYMPH Nodes: No enlarged glands  ENDOCRINE: No heat or cold intolerance; No hair loss  MUSCULOSKELETAL: No joint pain or swelling; No muscle, back, or extremity pain  PSYCHIATRIC: No depression, anxiety, mood swings, or difficulty sleeping  HEME/LYMPH: No easy bruising, or bleeding gums  ALLERGY AND IMMUNOLOGIC: No hives or eczema	    [ ] All others negative	  [x ] Unable to obtain    PHYSICAL EXAM:  T(C): 36 (01-15-23 @ 18:00), Max: 37.2 (01-15-23 @ 00:00)  HR: 67 (01-15-23 @ 19:00) (67 - 100)  BP: 102/47 (01-15-23 @ 19:00) (76/37 - 124/58)  RR: 22 (01-15-23 @ 19:00) (20 - 40)  SpO2: 100% (01-15-23 @ 19:00) (88% - 100%)  Wt(kg): --  I&O's Summary    14 Jan 2023 07:01  -  15 Tim 2023 07:00  --------------------------------------------------------  IN: 5328.3 mL / OUT: 635 mL / NET: 4693.3 mL    15 Tim 2023 07:01  -  15 Tim 2023 20:18  --------------------------------------------------------  IN: 3422 mL / OUT: 155 mL / NET: 3267 mL        Appearance: sedated on vent  HEENT:   Normal oral mucosa, PERRL, EOMI	  Lymphatic: No lymphadenopathy  Cardiovascular: Normal S1 S2, No JVD, + murmurs, No edema  Respiratory: rhonchi  Gastrointestinal:  Soft, Non-tender, + BS	  Skin: No rashes, No ecchymoses, No cyanosis	  Extremities:, No clubbing, cyanosis or edema    MEDICATIONS  (STANDING):  albuterol/ipratropium for Nebulization 3 milliLiter(s) Nebulizer every 6 hours  ascorbic acid 500 milliGRAM(s) Oral daily  atorvastatin 40 milliGRAM(s) Oral at bedtime  azithromycin   Tablet 250 milliGRAM(s) Oral <User Schedule>  cefepime   IVPB 500 milliGRAM(s) IV Intermittent every 8 hours  chlorhexidine 0.12% Liquid 15 milliLiter(s) Oral Mucosa every 12 hours  chlorhexidine 4% Liquid 1 Application(s) Topical <User Schedule>  desmopressin IVPB 15 MICROGram(s) IV Intermittent once  dexMEDEtomidine Infusion 0.2 MICROgram(s)/kG/Hr (1.89 mL/Hr) IV Continuous <Continuous>  dextrose 50% Injectable 25 Gram(s) IV Push once  dextrose 50% Injectable 12.5 Gram(s) IV Push once  dextrose 50% Injectable 25 Gram(s) IV Push once  dextrose 50% Injectable 50 milliLiter(s) IV Push every 15 minutes  dextrose 50% Injectable 25 milliLiter(s) IV Push every 15 minutes  dextrose 50% Injectable 50 milliLiter(s) IV Push every 15 minutes  dextrose 50% Injectable 25 milliLiter(s) IV Push every 15 minutes  dextrose 50% Injectable 50 milliLiter(s) IV Push every 15 minutes  dextrose 50% Injectable 25 milliLiter(s) IV Push every 15 minutes  dextrose 50% Injectable 50 milliLiter(s) IV Push every 15 minutes  dextrose 50% Injectable 25 milliLiter(s) IV Push every 15 minutes  diltiazem Infusion 5 mG/Hr (5 mL/Hr) IV Continuous <Continuous>  glucagon  Injectable 1 milliGRAM(s) IntraMuscular once  insulin regular  human recombinant. 3 Unit(s) SubCutaneous once  insulin regular Infusion 4 Unit(s)/Hr (4 mL/Hr) IV Continuous <Continuous>  lactated ringers. 2400 milliLiter(s) (100 mL/Hr) IV Continuous <Continuous>  methylPREDNISolone sodium succinate Injectable 20 milliGRAM(s) IV Push daily  multivitamin 1 Tablet(s) Oral daily  pantoprazole  Injectable 40 milliGRAM(s) IV Push two times a day  phenylephrine    Infusion 0.5 MICROgram(s)/kG/Min (7.07 mL/Hr) IV Continuous <Continuous>  phytonadione  IVPB 10 milliGRAM(s) IV Intermittent once  polyethylene glycol 3350 17 Gram(s) Oral every 12 hours  propofol Infusion 10 MICROgram(s)/kG/Min (2.26 mL/Hr) IV Continuous <Continuous>  senna Syrup 10 milliLiter(s) Oral at bedtime  sodium bicarbonate  Infusion 0.597 mEq/kG/Hr (150 mL/Hr) IV Continuous <Continuous>  sodium chloride 3%  Inhalation 4 milliLiter(s) Inhalation every 12 hours  thiamine 100 milliGRAM(s) Oral daily      TELEMETRY: 	    ECG:  	  RADIOLOGY:  OTHER: 	  	  LABS:	 	    CARDIAC MARKERS:                                6.3    24.14 )-----------( 89       ( 15 Tim 2023 17:07 )             19.0     01-15    134<L>  |  103  |  72<H>  ----------------------------<  501<HH>  4.9   |  18<L>  |  2.11<H>    Ca    6.5<LL>      15 Tim 2023 17:07  Phos  5.3     01-15  Mg     2.1     01-15    TPro  3.5<L>  /  Alb  1.7<L>  /  TBili  0.6  /  DBili  x   /  AST  741<H>  /  ALT  728<H>  /  AlkPhos  101  01-15    proBNP:   Lipid Profile:   HgA1c:   TSH: Thyroid Stimulating Hormone, Serum: 1.17 uIU/mL (01-09 @ 06:11)    PT/INR - ( 15 Tim 2023 00:26 )   PT: 37.4 sec;   INR: 3.21 ratio         PTT - ( 15 Tim 2023 00:26 )  PTT:34.5 sec      Assessment and plan  ---------------------------  77-year-old female with past medical history of DM, HTN, HLD, asthma on daily steroids, A. fib on Eliquis and digoxin presenting with shortness of breath.  Patient unable to provide history.  Daughter at bedside reports patient has had increasing shortness of breath, wheezing, cough for the past week now.  Patient also with increasing generalized weakness, fatigue and decreased p.o. intake.  Denies fever/chills, chest pain, abdominal pain, nausea, vomiting, diarrhea, dysuria.  Family reports patient had a similar episode 1 year ago when she was admitted to [facility] with UTI.  pt with sig medical hx, chronic a.fib with sob ?sec sepsis/ pneumoniae, UTI  sedated on vent  continue abx and steroid  echo noted  AC held sec to bleeding  still sedated on vent   continue abx  hr is well controlled still on Cardizem drip  continue pressor keep MAP>65  sepsis on iv abx  fu renal function closely  a.fib hr is well controlled on Cardizem 5 mg/ hr  nutrition

## 2023-01-15 NOTE — PROGRESS NOTE ADULT - ASSESSMENT
ASSESSMENT/PLAN:  77-year-old female with past medical history of DM, HTN, HLD, asthma on daily steroids, A. fib on Eliquis and digoxin presenting with shortness of breath, found to be in Afib w/RVR; admitted to MICU for Acute Hypoxic Respiratory Failure in s/o Asthma and Bronchiectasis w/ possible PNA, leading to intubation and sedation.     NEURO  # Intubated & Sedated  - Mental status was at baseline before intubation; intubated for Acute Hypoxic Respiratory Failure  - ABG w/o hypercapnia; PCO2 38; no signs of hypercapnic encephalopathy  - RASS goal 0 to -1  - Daily SAT/SBT    CARDIAC  # Afib w/ RVR  Admitted in RVR; being followed by Dr. Marcus, Cardiology; Afib w/ RVR thought to be due to stress from hypoxemia  - HR in the 130s 1/10  - on Diltiazem drip, more rate controlled today  - follow-up Echo  - follow-up TSH results    # HTN  No indication of hypotension.  - currently on Diltiazem 90mg q6h  - closely monitor BP  - can d/c fentanyl     PULM  # Acute Hypoxemic Respiratory Failure  # Asthma  # Bronchiectasis  CT Angio r/o PE; currently thought to be due to Asthma exacerbation vs bronchiectasis vs ?PNA;   - tachypneic on BiPAP to 30s, saturating 100% but is tiring out as of now  - intubate and sedate to assist with airway clearance  - chest PT; airway clearance; hypertonic saline; stop IPCV  - daily SBT/SATs  - latest ABG is 7.46| 32|211  - decrease to solumedrol 20q24h  - DuoNebs q6h   - currently of Cefepime (1/10-) for empiric coverage; follow infectious diseases recommendations  - BAL growing few Pseudomonas; wait for sensitivities  - follow-up Legionella antigen, final BCx read; RVP negative; BAL cx    RENAL  # SHANE  Cr shot up to 2.24 <---2.44 <- 2.55 <-2.36 <- 1.87 <- 1.97 <- 0.64  - Likely prerenal in the setting of low PO intake   - Follow-up urine lytes  - Maintenance fluids started: 100cc/hr    GI   - no active issues currently  - Diet: NPO on vent; place OG and start tube feeds  - Bowel regimen: miralax, senna    HEME/ONC  # Leukocytosis  WBC now 24; decreasing  No active indications of infections, all infectious workup negative so far; likely in s/o solumedrol usage, with neutrophilia  - continue to monitor    # INR 11.1 (00:00 1/11)-> 4.5 (6 AM 1/11) -> 1.42 (1/12)  - continue to hold Lovenox    ID  # Sepsis  CT Chest w/ bilateral lower lobe predominant patchy and branching opacities suspicious for pneumonia. Small left pleural effusion. Leukocytosis, tachycardia and tachypnea; No active indications of infections, all infectious workup negative so far; leukocytosis likely in s/o solumedrol usage but no diff to conform neutrophilia so far; tachycardia likely in s/o tachypnea and AHRF  - Continue Cefepime (1/10-) for empiric PNA coverage  - follow-up SCx, Urine legionella antigen, final BCx, BAL cultures    ENDO  # Hyponatremia  Na 128 <- 126  <- 132<- 131 in 06/22, may be chronic  - continue fluid resuscitation   - monitor daily BMPs  - Urine lytes  - Maintenance fluids at 100cc's/hr    # T2DM  A1c 8.1 1/09/23  BGs high; likely in s/o steroid usage  - place on sliding scale NPH  - admelog     DVT  - Hold Lovenox 50mg BID    ETHICS  Full Code     ASSESSMENT/PLAN:  77-year-old female with past medical history of DM, HTN, HLD, asthma on daily steroids, A. fib on Eliquis and digoxin presenting with shortness of breath, found to be in Afib w/RVR; admitted to MICU for Acute Hypoxic Respiratory Failure in s/o Asthma and Bronchiectasis w/ possible PNA, leading to intubation and sedation.     NEURO  # Intubated & Sedated  - Mental status was at baseline before intubation; intubated for Acute Hypoxic Respiratory Failure  - ABG w/o hypercapnia; PCO2 38; no signs of hypercapnic encephalopathy  - RASS goal 0 to -1  - Daily SAT/SBT    CARDIAC  # Afib w/ RVR  Admitted in RVR; being followed by Dr. Marcus, Cardiology; Afib w/ RVR thought to be due to stress from hypoxemia  - on Diltiazem drip, rate controlled for now  - Echo (1/11) uneventful  - TSH normal    # HTN  No indication of hypotension.  - currently on Diltiazem drip  - closely monitor BP    PULM  # Acute Hypoxemic Respiratory Failure  # Asthma  # Bronchiectasis  CT Angio r/o PE; currently thought to be due to Asthma exacerbation vs bronchiectasis vs ?PNA;   - tachypneic on BiPAP to 30s, saturating 100% but is tiring out as of now  - intubate and sedate to assist with airway clearance  - chest PT; airway clearance; hypertonic saline  - daily SBT/SATs  - decrease to solumedrol 20q24h  - DuoNebs q6h   - currently of Cefepime (1/10-) for empiric coverage; follow infectious diseases recommendations  - BAL growing few Pseudomonas; wait for sensitivities  - follow-up Legionella antigen, final BCx read; RVP negative; BAL cx  - latest ABG is 7.24|34|93  - start Bicarb drip @150cc/hr    RENAL  # SHANE  Cr shot up to 1.87<---2.24 <---2.44 <- 2.55 <-2.36 <- 1.87 <- 1.97 <- 0.64  - urine lytes showing FeNa 2.2%-> ATN; treat with fluids  - Maintenance fluids: 100cc/hr LR; can d/c D5    GI   - no active issues currently  - Diet: NPO on vent; place OG and start tube feeds  - Bowel regimen: miralax, senna    HEME/ONC  # Leukocytosis  WBC now 24; decreasing  No active indications of infections, all infectious workup negative so far; likely in s/o solumedrol usage, with neutrophilia  - continue to monitor    # INR 11.1 (00:00 1/11)-> 4.5 (6 AM 1/11) -> 1.42 (1/12)  - continue to hold Lovenox    ID  # Sepsis  CT Chest w/ bilateral lower lobe predominant patchy and branching opacities suspicious for pneumonia. Small left pleural effusion. Leukocytosis, tachycardia and tachypnea; No active indications of infections, all infectious workup negative so far; leukocytosis likely in s/o solumedrol usage but no diff to conform neutrophilia so far; tachycardia likely in s/o tachypnea and AHRF  - Continue Cefepime (1/10-) for empiric PNA coverage  - follow-up SCx, Urine legionella antigen, final BCx, BAL cultures    ENDO  # Hyponatremia  Na 128 <- 126  <- 132<- 131 in 06/22, may be chronic  - continue fluid resuscitation   - monitor daily BMPs  - Urine lytes  - Maintenance fluids at 100cc's/hr    # T2DM  A1c 8.1 1/09/23  BGs high; likely in s/o steroid usage  - place on sliding scale NPH  - admelog     DVT  - Hold Lovenox 50mg BID    ETHICS  Full Code

## 2023-01-16 NOTE — PROGRESS NOTE ADULT - SUBJECTIVE AND OBJECTIVE BOX
INTERVAL HPI/OVERNIGHT EVENTS:    SUBJECTIVE: Patient seen and examined at bedside.     CRUZITO BATES is a 77-year-old female with past medical history of DM, HTN, HLD, asthma on daily steroids, A. fib on Eliquis and digoxin presenting with shortness of breath, found to be in Afib w/RVR; admitted to MICU for Acute Hypoxic Respiratory Failure in s/o Asthma and Bronchiectasis w/ possible PNA, leading to intubation and sedation.    1.Acute on chronic  hypoxemic respiratory failure due to acute exacerbation of bronchiectasis / asthma and possible pneumonia.       Continue current AC vent settings.  Did not tolerate PS wean today. Restart propofol.      Continue solumedrol to 20mg  daily.      Continue bronchodilators      Continue cefepime  Adjust dose for renal failure.       Stop IPV. Minimal secretions. Stop.     Chest PT      3% saline nebulizers with Duo nebs.     Azithromycin  M, W, F for   antiinflammatory effects.  2. Ileus. Clinically resolved.   3.  Hypotension.  Pt now on phenylephrine 3. Titrate for  SBP  Lactic acid decreasing. Pt with metabolic acidosis from renal failure.  4. DVT prophylaxis sq heparin  5. GOC. Full code.  6. Chronic atrial fib. with RVR.  Continue diltiazem drip.  7. Renal Acute renal failure from hypovolemia and ATN. . Creatinine increasing again. Bicarb 13. Start bicarbonate drip.  8. Shock liver. LFTS increasing.  9. Episode of BRBPR yesterday. and agn this afternoon. ? hemorrhoids. Follow H/H . May need Gi evaluation.      Subjective;   patient is sedated on propofol. also receiving; diltiazem gtt 5mg/hr, bicarb, phenylephrine 2, insulin 7u/hr gtt  ventilated fio2 100%, peep 5    VITAL SIGNS:  ICU Vital Signs Last 24 Hrs  T(C): 35 (16 Jan 2023 07:00), Max: 36.4 (15 Tim 2023 12:00)  T(F): 95 (16 Jan 2023 07:00), Max: 97.5 (15 Tim 2023 12:00)  HR: 65 (16 Jan 2023 07:30) (60 - 100)  BP: 98/49 (16 Jan 2023 07:30) (89/44 - 127/51)  BP(mean): 70 (16 Jan 2023 07:30) (61 - 84)  ABP: --  ABP(mean): --  RR: 20 (16 Jan 2023 07:30) (20 - 40)  SpO2: 100% (16 Jan 2023 07:30) (95% - 100%)    O2 Parameters below as of 16 Jan 2023 07:00  Patient On (Oxygen Delivery Method): ventilator    O2 Concentration (%): 30      Mode: AC/ CMV (Assist Control/ Continuous Mandatory Ventilation), RR (machine): 20, TV (machine): 350, FiO2: 30, PEEP: 5, ITime: 1, MAP: 12, PIP: 36  Plateau pressure:   P/F ratio:     01-15 @ 07:01  -  01-16 @ 07:00  --------------------------------------------------------  IN: 7147.1 mL / OUT: 815 mL / NET: 6332.1 mL      CAPILLARY BLOOD GLUCOSE      POCT Blood Glucose.: 174 mg/dL (16 Jan 2023 07:52)    ECG:    PHYSICAL EXAM:    General:   HEENT:   Neck:   Respiratory:   Cardiovascular:   Abdomen:   Extremities:  Neurological:    MEDICATIONS:  MEDICATIONS  (STANDING):  albuterol/ipratropium for Nebulization 3 milliLiter(s) Nebulizer every 6 hours  ascorbic acid 500 milliGRAM(s) Oral daily  atorvastatin 40 milliGRAM(s) Oral at bedtime  azithromycin   Tablet 250 milliGRAM(s) Oral <User Schedule>  cefepime   IVPB 500 milliGRAM(s) IV Intermittent every 8 hours  chlorhexidine 0.12% Liquid 15 milliLiter(s) Oral Mucosa every 12 hours  chlorhexidine 4% Liquid 1 Application(s) Topical <User Schedule>  dexMEDEtomidine Infusion 0.2 MICROgram(s)/kG/Hr (1.89 mL/Hr) IV Continuous <Continuous>  dextrose 50% Injectable 25 Gram(s) IV Push once  dextrose 50% Injectable 12.5 Gram(s) IV Push once  dextrose 50% Injectable 25 Gram(s) IV Push once  dextrose 50% Injectable 50 milliLiter(s) IV Push every 15 minutes  dextrose 50% Injectable 25 milliLiter(s) IV Push every 15 minutes  dextrose 50% Injectable 50 milliLiter(s) IV Push every 15 minutes  dextrose 50% Injectable 25 milliLiter(s) IV Push every 15 minutes  dextrose 50% Injectable 50 milliLiter(s) IV Push every 15 minutes  dextrose 50% Injectable 25 milliLiter(s) IV Push every 15 minutes  dextrose 50% Injectable 50 milliLiter(s) IV Push every 15 minutes  dextrose 50% Injectable 25 milliLiter(s) IV Push every 15 minutes  diltiazem Infusion 5 mG/Hr (5 mL/Hr) IV Continuous <Continuous>  glucagon  Injectable 1 milliGRAM(s) IntraMuscular once  insulin regular Infusion 8 Unit(s)/Hr (8 mL/Hr) IV Continuous <Continuous>  methylPREDNISolone sodium succinate Injectable 20 milliGRAM(s) IV Push daily  multivitamin 1 Tablet(s) Oral daily  pantoprazole  Injectable 40 milliGRAM(s) IV Push two times a day  phenylephrine    Infusion 0.5 MICROgram(s)/kG/Min (7.07 mL/Hr) IV Continuous <Continuous>  phytonadione  IVPB 10 milliGRAM(s) IV Intermittent every 24 hours  polyethylene glycol 3350 17 Gram(s) Oral every 12 hours  propofol Infusion 10 MICROgram(s)/kG/Min (2.26 mL/Hr) IV Continuous <Continuous>  senna Syrup 10 milliLiter(s) Oral at bedtime  sodium bicarbonate  Infusion 0.398 mEq/kG/Hr (100 mL/Hr) IV Continuous <Continuous>  sodium chloride 3%  Inhalation 4 milliLiter(s) Inhalation every 12 hours  thiamine 100 milliGRAM(s) Oral daily    MEDICATIONS  (PRN):  acetaminophen     Tablet .. 650 milliGRAM(s) Oral every 6 hours PRN Temp greater or equal to 38C (100.4F), Mild Pain (1 - 3)  dextrose Oral Gel 15 Gram(s) Oral once PRN Blood Glucose LESS THAN 70 milliGRAM(s)/deciliter      ALLERGIES:  Allergies    Avelox (Pruritus)  fish (Vomiting)  Levaquin (Unknown)  shellfish (Vomiting)    Intolerances    fentanyl (Other)      LABS:                        6.9    16.39 )-----------( 63       ( 16 Jan 2023 06:35 )             20.0     01-16    134<L>  |  100  |  70<H>  ----------------------------<  417<H>  3.7   |  19<L>  |  1.91<H>    Ca    7.1<L>      16 Jan 2023 00:33  Phos  4.2     01-16  Mg     1.9     01-16    TPro  4.2<L>  /  Alb  2.2<L>  /  TBili  0.9  /  DBili  x   /  AST  482<H>  /  ALT  539<H>  /  AlkPhos  124<H>  01-16    PT/INR - ( 16 Jan 2023 00:36 )   PT: 24.2 sec;   INR: 2.07 ratio         PTT - ( 16 Jan 2023 00:36 )  PTT:31.2 sec      RADIOLOGY & ADDITIONAL TESTS: Reviewed.   INTERVAL HPI/OVERNIGHT EVENTS:    SUBJECTIVE: Patient seen and examined at bedside.     CRUZITO BATES is a 77-year-old female with past medical history of DM, HTN, HLD, asthma on daily steroids, A. fib on Eliquis and digoxin presenting with shortness of breath, found to be in Afib w/RVR; admitted to MICU for Acute Hypoxic Respiratory Failure in s/o Asthma and Bronchiectasis w/ possible PNA, leading to intubation and sedation.    Subjective;   patient is sedated on propofol. also receiving; diltiazem gtt 5mg/hr, bicarb, phenylephrine 2, insulin 7u/hr gtt  on ventilator  rass -5    VITAL SIGNS:  ICU Vital Signs Last 24 Hrs  T(C): 35 (16 Jan 2023 07:00), Max: 36.4 (15 Tim 2023 12:00)  T(F): 95 (16 Jan 2023 07:00), Max: 97.5 (15 Tim 2023 12:00)  HR: 65 (16 Jan 2023 07:30) (60 - 100)  BP: 98/49 (16 Jan 2023 07:30) (89/44 - 127/51)  BP(mean): 70 (16 Jan 2023 07:30) (61 - 84)  ABP: --  ABP(mean): --  RR: 20 (16 Jan 2023 07:30) (20 - 40)  SpO2: 100% (16 Jan 2023 07:30) (95% - 100%)    O2 Parameters below as of 16 Jan 2023 07:00  Patient On (Oxygen Delivery Method): ventilator    O2 Concentration (%): 30      Mode: AC/ CMV (Assist Control/ Continuous Mandatory Ventilation), RR (machine): 20, TV (machine): 350, FiO2: 30, PEEP: 5, ITime: 1, MAP: 12, PIP: 36  Plateau pressure:   P/F ratio:     01-15 @ 07:01  -  01-16 @ 07:00  --------------------------------------------------------  IN: 7147.1 mL / OUT: 815 mL / NET: 6332.1 mL      CAPILLARY BLOOD GLUCOSE      POCT Blood Glucose.: 174 mg/dL (16 Jan 2023 07:52)    ECG:    PHYSICAL EXAM:    General: sedated, intubated  Respiratory: no distress, on ventilator, in synchrony   Cardiovascular: regular rate and rhythm   Abdomen: soft, not distended   Extremities: no edema   Neurological: on sedation, -5    MEDICATIONS:  MEDICATIONS  (STANDING):  albuterol/ipratropium for Nebulization 3 milliLiter(s) Nebulizer every 6 hours  ascorbic acid 500 milliGRAM(s) Oral daily  atorvastatin 40 milliGRAM(s) Oral at bedtime  azithromycin   Tablet 250 milliGRAM(s) Oral <User Schedule>  cefepime   IVPB 500 milliGRAM(s) IV Intermittent every 8 hours  chlorhexidine 0.12% Liquid 15 milliLiter(s) Oral Mucosa every 12 hours  chlorhexidine 4% Liquid 1 Application(s) Topical <User Schedule>  dexMEDEtomidine Infusion 0.2 MICROgram(s)/kG/Hr (1.89 mL/Hr) IV Continuous <Continuous>  dextrose 50% Injectable 25 Gram(s) IV Push once  dextrose 50% Injectable 12.5 Gram(s) IV Push once  dextrose 50% Injectable 25 Gram(s) IV Push once  dextrose 50% Injectable 50 milliLiter(s) IV Push every 15 minutes  dextrose 50% Injectable 25 milliLiter(s) IV Push every 15 minutes  dextrose 50% Injectable 50 milliLiter(s) IV Push every 15 minutes  dextrose 50% Injectable 25 milliLiter(s) IV Push every 15 minutes  dextrose 50% Injectable 50 milliLiter(s) IV Push every 15 minutes  dextrose 50% Injectable 25 milliLiter(s) IV Push every 15 minutes  dextrose 50% Injectable 50 milliLiter(s) IV Push every 15 minutes  dextrose 50% Injectable 25 milliLiter(s) IV Push every 15 minutes  diltiazem Infusion 5 mG/Hr (5 mL/Hr) IV Continuous <Continuous>  glucagon  Injectable 1 milliGRAM(s) IntraMuscular once  insulin regular Infusion 8 Unit(s)/Hr (8 mL/Hr) IV Continuous <Continuous>  methylPREDNISolone sodium succinate Injectable 20 milliGRAM(s) IV Push daily  multivitamin 1 Tablet(s) Oral daily  pantoprazole  Injectable 40 milliGRAM(s) IV Push two times a day  phenylephrine    Infusion 0.5 MICROgram(s)/kG/Min (7.07 mL/Hr) IV Continuous <Continuous>  phytonadione  IVPB 10 milliGRAM(s) IV Intermittent every 24 hours  polyethylene glycol 3350 17 Gram(s) Oral every 12 hours  propofol Infusion 10 MICROgram(s)/kG/Min (2.26 mL/Hr) IV Continuous <Continuous>  senna Syrup 10 milliLiter(s) Oral at bedtime  sodium bicarbonate  Infusion 0.398 mEq/kG/Hr (100 mL/Hr) IV Continuous <Continuous>  sodium chloride 3%  Inhalation 4 milliLiter(s) Inhalation every 12 hours  thiamine 100 milliGRAM(s) Oral daily    MEDICATIONS  (PRN):  acetaminophen     Tablet .. 650 milliGRAM(s) Oral every 6 hours PRN Temp greater or equal to 38C (100.4F), Mild Pain (1 - 3)  dextrose Oral Gel 15 Gram(s) Oral once PRN Blood Glucose LESS THAN 70 milliGRAM(s)/deciliter      ALLERGIES:  Allergies    Avelox (Pruritus)  fish (Vomiting)  Levaquin (Unknown)  shellfish (Vomiting)    Intolerances    fentanyl (Other)      LABS:                        6.9    16.39 )-----------( 63       ( 16 Jan 2023 06:35 )             20.0     01-16    134<L>  |  100  |  70<H>  ----------------------------<  417<H>  3.7   |  19<L>  |  1.91<H>    Ca    7.1<L>      16 Jan 2023 00:33  Phos  4.2     01-16  Mg     1.9     01-16    TPro  4.2<L>  /  Alb  2.2<L>  /  TBili  0.9  /  DBili  x   /  AST  482<H>  /  ALT  539<H>  /  AlkPhos  124<H>  01-16    PT/INR - ( 16 Jan 2023 00:36 )   PT: 24.2 sec;   INR: 2.07 ratio         PTT - ( 16 Jan 2023 00:36 )  PTT:31.2 sec      RADIOLOGY & ADDITIONAL TESTS: Reviewed.

## 2023-01-16 NOTE — PROGRESS NOTE ADULT - ASSESSMENT
77-year-old female with past medical history of DM, HTN, HLD, asthma on daily steroids, A. fib on Eliquis and digoxin presenting with shortness of breath.  Patient unable to provide history.  Daughter at bedside reports patient has had increasing shortness of breath, wheezing, cough for the past week now.  Patient also with increasing generalized weakness, fatigue and decreased p.o. intake.  Denies fever/chills, chest pain, abdominal pain, nausea, vomiting, diarrhea, dysuria.  Family reports patient had a similar episode 1 year ago when she was admitted to [facility] with UTI.  pt with sig medical hx, chronic a.fib with sob ?sec sepsis/ pneumoniae, UTI  start on ceftriaxone  tele  for increase hr, will increase Cardizem dose  continue AC  cardiac enzyme  tsh  echo  RVP negative      ASTHMA/ COPD exacerbation;   A Fibrillation  with rvr:  DM:  HTN:   HLD:     1/09:    ASTHMA/ COPD exacerbation; she was using performist at home with 5 mg of prednisone  not sure why she was not on any other meds:  sill start Symbicort too:  along with BD and is already on cefepime:  though pneumonia to me is not very convincing : will probably need ct scan chest   A Fibrillation  with rvr: Hr still elevated:  on cardizem : would defer to cards:  on ac:  lovenox  + coumadin : check echo   DM:: monitor and control   HTN: : controlled  HLD: on statins  :    dw team    1/10:    ASTHMA/ COPD exacerbation; she was using performist at home with 5 mg of prednisone  not sure why she was not on any other meds:  sill start Symbicort too:  along with BD and is already on cefepime:  WBC increased : though pneumonia to me is not very convincing : will probably need ct scan chest : she rused for ct chest : increase steorids 40 mg Q 6 hours : ABG today seems reasonable   A Fibrillation  with rvr: Hr still elevated:  on cardizem : HR is till very high  :   DM:: monitor and control   HTN: : controlled  HLD: on statins:    MICU  consult:  high risk for intubation:   :    edy and daughter    1/11:    ASTHMA/ COPD exacerbation; now s/p bronch: RML lavage done:  wait cultures results: she was using performist at home with 5 mg of prednisone  not sure why she was not on any other meds:  Cont BD:  and antibiotics  WBC still increased :on cefepime:    A Fibrillation  with rvr: Hr still elevated:  on cardizem : HR is better now:  on cardiem   DM:: monitor and control   HTN: : controlled  HLD: on statins:    josias micu  ATTENDING      1/12:    ASTHMA/ COPD exacerbation; now s/p bronch: RML lavage done:  negative  cultures so far: : she was using performist at home with 5 mg of prednisone  not sure why she was not on any other meds:  Cont BD:  and antibiotics  WBC still increased: but little down today  :on cefepime:    A Fibrillation  with rvr: Hr still elevated:  on cardizem : HR is better now:  on cardiem drip   DM:: monitor and control   HTN: : controlled  HLD: on statins:    josias micu  team    1/13:       ASTHMA/ COPD exacerbation; now s/p bronch: RML lavage done:  negative  cultures so far: :  cont full vent support:  management  of vent per MICU : weaning as perprotocol  ;  on zothromax and cefepime : off steroids   A Fibrillation  with rvr: Hr still elevated:  on Cardizem : HR is better now:  on cardiem drip   DM:: monitor and control   HTN: : controlled  HLD: on statins:    josias micu  team   :  1/14:       ASTHMA/ COPD exacerbation; now s/p bronch: RML lavage done:  Pseudomonas:  on cefepime:  : :  cont full vent support:  management  of vent per MICU : weaning as pe rprotocol  ;  on 20 mg of steroids today   A Fibrillation  with rvr: Hr still elevated:  on Cardizem : HR is better now:  on cardiem drip : Blood pressure is stable:   DM:: monitor and control   HTN: : controlled  HLD: on statins:    josias micu  Attending    1/16:    ASTHMA/ COPD exacerbation; now s/p bronch: RML lavage done:  Pseudomonas:  on cefepime:  and azithromycin for anti inflammatory effects:  :  still on full vent support:  has severe copd:   A Fibrillation  with rvr: Hr still elevated:  on Cardizem : HR is better now:  on Cardizem drip : Blood pressure is stable:   Thrombocytopenia:  ? etiology : check for robbi   DM:: monitor and control   HTN: : controlled  HLD: on statins:   overall pt is still critically ill:    josias micu  Attending today

## 2023-01-16 NOTE — PROGRESS NOTE ADULT - SUBJECTIVE AND OBJECTIVE BOX
CARDIOLOGY     PROGRESS  NOTE   ________________________________________________    CHIEF COMPLAINT:Patient is a 77y old  Female who presents with a chief complaint of sob/ rapid a.fib (16 Jan 2023 07:55)  sedated on vent  	  REVIEW OF SYSTEMS:  CONSTITUTIONAL: No fever, weight loss, or fatigue  EYES: No eye pain, visual disturbances, or discharge  ENT:  No difficulty hearing, tinnitus, vertigo; No sinus or throat pain  NECK: No pain or stiffness  RESPIRATORY: No cough, wheezing, chills or hemoptysis; No Shortness of Breath  CARDIOVASCULAR: No chest pain, palpitations, passing out, dizziness, or leg swelling  GASTROINTESTINAL: No abdominal or epigastric pain. No nausea, vomiting, or hematemesis; No diarrhea or constipation. No melena or hematochezia.  GENITOURINARY: No dysuria, frequency, hematuria, or incontinence  NEUROLOGICAL: No headaches, memory loss, loss of strength, numbness, or tremors  SKIN: No itching, burning, rashes, or lesions   LYMPH Nodes: No enlarged glands  ENDOCRINE: No heat or cold intolerance; No hair loss  MUSCULOSKELETAL: No joint pain or swelling; No muscle, back, or extremity pain  PSYCHIATRIC: No depression, anxiety, mood swings, or difficulty sleeping  HEME/LYMPH: No easy bruising, or bleeding gums  ALLERGY AND IMMUNOLOGIC: No hives or eczema	    [ ] All others negative	  [x ] Unable to obtain    PHYSICAL EXAM:  T(C): 36 (01-16-23 @ 09:00), Max: 36.4 (01-15-23 @ 12:00)  HR: 89 (01-16-23 @ 09:15) (60 - 100)  BP: 113/50 (01-16-23 @ 09:15) (89/44 - 127/51)  RR: 20 (01-16-23 @ 09:15) (20 - 40)  SpO2: 100% (01-16-23 @ 09:15) (95% - 100%)  Wt(kg): --  I&O's Summary    15 Tim 2023 07:01  -  16 Jan 2023 07:00  --------------------------------------------------------  IN: 7175.4 mL / OUT: 815 mL / NET: 6360.4 mL    16 Jan 2023 07:01  -  16 Jan 2023 10:12  --------------------------------------------------------  IN: 592.5 mL / OUT: 15 mL / NET: 577.5 mL        Appearance: Normal	  HEENT:   Normal oral mucosa, PERRL, EOMI	  Lymphatic: No lymphadenopathy  Cardiovascular: Normal S1 S2, No JVD, +murmurs, No edema  Respiratory: Lungs clear to auscultation	  Psychiatry: on vent  Gastrointestinal:  Soft, Non-tender, + BS	  Skin: No rashes, No ecchymoses, No cyanosis	  Neurologic: Non-focal  Extremities: No edema      MEDICATIONS  (STANDING):  albuterol/ipratropium for Nebulization 3 milliLiter(s) Nebulizer every 6 hours  ascorbic acid 500 milliGRAM(s) Oral daily  atorvastatin 40 milliGRAM(s) Oral at bedtime  azithromycin   Tablet 250 milliGRAM(s) Oral <User Schedule>  cefepime   IVPB 500 milliGRAM(s) IV Intermittent every 8 hours  chlorhexidine 0.12% Liquid 15 milliLiter(s) Oral Mucosa every 12 hours  chlorhexidine 4% Liquid 1 Application(s) Topical <User Schedule>  dexMEDEtomidine Infusion 0.2 MICROgram(s)/kG/Hr (1.89 mL/Hr) IV Continuous <Continuous>  dextrose 50% Injectable 25 Gram(s) IV Push once  dextrose 50% Injectable 12.5 Gram(s) IV Push once  dextrose 50% Injectable 25 Gram(s) IV Push once  dextrose 50% Injectable 50 milliLiter(s) IV Push every 15 minutes  dextrose 50% Injectable 25 milliLiter(s) IV Push every 15 minutes  dextrose 50% Injectable 50 milliLiter(s) IV Push every 15 minutes  dextrose 50% Injectable 25 milliLiter(s) IV Push every 15 minutes  dextrose 50% Injectable 50 milliLiter(s) IV Push every 15 minutes  dextrose 50% Injectable 25 milliLiter(s) IV Push every 15 minutes  dextrose 50% Injectable 50 milliLiter(s) IV Push every 15 minutes  dextrose 50% Injectable 25 milliLiter(s) IV Push every 15 minutes  diltiazem Infusion 5 mG/Hr (5 mL/Hr) IV Continuous <Continuous>  glucagon  Injectable 1 milliGRAM(s) IntraMuscular once  insulin regular Infusion 3 Unit(s)/Hr (3 mL/Hr) IV Continuous <Continuous>  methylPREDNISolone sodium succinate Injectable 20 milliGRAM(s) IV Push daily  multivitamin 1 Tablet(s) Oral daily  pantoprazole  Injectable 40 milliGRAM(s) IV Push two times a day  phenylephrine    Infusion 0.5 MICROgram(s)/kG/Min (7.07 mL/Hr) IV Continuous <Continuous>  phytonadione   Solution 5 milliGRAM(s) Oral daily  phytonadione  IVPB 10 milliGRAM(s) IV Intermittent every 24 hours  polyethylene glycol 3350 17 Gram(s) Oral every 12 hours  propofol Infusion 10 MICROgram(s)/kG/Min (2.26 mL/Hr) IV Continuous <Continuous>  senna Syrup 10 milliLiter(s) Oral at bedtime  sodium bicarbonate  Infusion 0.398 mEq/kG/Hr (100 mL/Hr) IV Continuous <Continuous>  sodium chloride 3%  Inhalation 4 milliLiter(s) Inhalation every 12 hours  thiamine 100 milliGRAM(s) Oral daily      TELEMETRY: 	    ECG:  	  RADIOLOGY:  OTHER: 	  	  LABS:	 	    CARDIAC MARKERS:                                6.9    16.39 )-----------( 63       ( 16 Jan 2023 06:35 )             20.0     01-16    137  |  101  |  68<H>  ----------------------------<  118<H>  3.5   |  22  |  1.77<H>    Ca    7.2<L>      16 Jan 2023 09:35  Phos  3.6     01-16  Mg     2.0     01-16    TPro  4.3<L>  /  Alb  2.1<L>  /  TBili  0.8  /  DBili  x   /  AST  248<H>  /  ALT  497<H>  /  AlkPhos  119  01-16    proBNP:   Lipid Profile:   HgA1c:   TSH: Thyroid Stimulating Hormone, Serum: 1.17 uIU/mL (01-09 @ 06:11)    PT/INR - ( 16 Jan 2023 00:36 )   PT: 24.2 sec;   INR: 2.07 ratio         PTT - ( 16 Jan 2023 00:36 )  PTT:31.2 sec      Assessment and plan  ---------------------------  77-year-old female with past medical history of DM, HTN, HLD, asthma on daily steroids, A. fib on Eliquis and digoxin presenting with shortness of breath.  Patient unable to provide history.  Daughter at bedside reports patient has had increasing shortness of breath, wheezing, cough for the past week now.  Patient also with increasing generalized weakness, fatigue and decreased p.o. intake.  Denies fever/chills, chest pain, abdominal pain, nausea, vomiting, diarrhea, dysuria.  Family reports patient had a similar episode 1 year ago when she was admitted to [facility] with UTI.  pt with sig medical hx, chronic a.fib with sob ?sec sepsis/ pneumoniae, UTI  sedated on vent  continue abx and steroid  echo noted  AC held sec to bleeding  still sedated on vent   continue abx  hr is well controlled still on Cardizem drip  continue pressor keep MAP>65  sepsis on iv abx  fu renal function closely  a.fib hr is well controlled on Cardizem 5 mg/ hr  nutrition  micu care/ vent

## 2023-01-16 NOTE — PROGRESS NOTE ADULT - SUBJECTIVE AND OBJECTIVE BOX
Date of Service: 01-16-23 @ 11:44    Patient is a 77y old  Female who presents with a chief complaint of sob/ rapid a.fib (16 Jan 2023 10:12)      Any change in ROS: seems same to me:  remains intubated:  :  had an episode of gi bleed : ? hemorrhoids    MEDICATIONS  (STANDING):  albuterol/ipratropium for Nebulization 3 milliLiter(s) Nebulizer every 6 hours  ascorbic acid 500 milliGRAM(s) Oral daily  atorvastatin 40 milliGRAM(s) Oral at bedtime  azithromycin   Tablet 250 milliGRAM(s) Oral <User Schedule>  cefepime   IVPB 500 milliGRAM(s) IV Intermittent every 8 hours  chlorhexidine 0.12% Liquid 15 milliLiter(s) Oral Mucosa every 12 hours  chlorhexidine 4% Liquid 1 Application(s) Topical <User Schedule>  dexMEDEtomidine Infusion 0.2 MICROgram(s)/kG/Hr (1.89 mL/Hr) IV Continuous <Continuous>  dextrose 50% Injectable 25 Gram(s) IV Push once  dextrose 50% Injectable 12.5 Gram(s) IV Push once  dextrose 50% Injectable 25 Gram(s) IV Push once  dextrose 50% Injectable 50 milliLiter(s) IV Push every 15 minutes  dextrose 50% Injectable 25 milliLiter(s) IV Push every 15 minutes  dextrose 50% Injectable 50 milliLiter(s) IV Push every 15 minutes  dextrose 50% Injectable 25 milliLiter(s) IV Push every 15 minutes  dextrose 50% Injectable 50 milliLiter(s) IV Push every 15 minutes  dextrose 50% Injectable 25 milliLiter(s) IV Push every 15 minutes  dextrose 50% Injectable 50 milliLiter(s) IV Push every 15 minutes  dextrose 50% Injectable 25 milliLiter(s) IV Push every 15 minutes  diltiazem Infusion 5 mG/Hr (5 mL/Hr) IV Continuous <Continuous>  glucagon  Injectable 1 milliGRAM(s) IntraMuscular once  insulin regular Infusion 3 Unit(s)/Hr (3 mL/Hr) IV Continuous <Continuous>  methylPREDNISolone sodium succinate Injectable 20 milliGRAM(s) IV Push daily  multivitamin 1 Tablet(s) Oral daily  pantoprazole  Injectable 40 milliGRAM(s) IV Push two times a day  phenylephrine    Infusion 0.5 MICROgram(s)/kG/Min (7.07 mL/Hr) IV Continuous <Continuous>  phytonadione   Solution 5 milliGRAM(s) Oral daily  polyethylene glycol 3350 17 Gram(s) Oral every 12 hours  propofol Infusion 10 MICROgram(s)/kG/Min (2.26 mL/Hr) IV Continuous <Continuous>  senna Syrup 10 milliLiter(s) Oral at bedtime  sodium chloride 3%  Inhalation 4 milliLiter(s) Inhalation every 12 hours  thiamine 100 milliGRAM(s) Oral daily    MEDICATIONS  (PRN):  acetaminophen     Tablet .. 650 milliGRAM(s) Oral every 6 hours PRN Temp greater or equal to 38C (100.4F), Mild Pain (1 - 3)  dextrose Oral Gel 15 Gram(s) Oral once PRN Blood Glucose LESS THAN 70 milliGRAM(s)/deciliter    Vital Signs Last 24 Hrs  T(C): 36 (16 Jan 2023 09:00), Max: 36.4 (15 Tim 2023 12:00)  T(F): 96.8 (16 Jan 2023 09:00), Max: 97.5 (15 Tim 2023 12:00)  HR: 86 (16 Jan 2023 11:15) (60 - 108)  BP: 124/54 (16 Jan 2023 11:15) (89/44 - 133/58)  BP(mean): 78 (16 Jan 2023 11:15) (61 - 83)  RR: 20 (16 Jan 2023 11:15) (20 - 40)  SpO2: 100% (16 Jan 2023 11:15) (95% - 100%)    Parameters below as of 16 Jan 2023 11:15  Patient On (Oxygen Delivery Method): ventilator      Mode: AC/ CMV (Assist Control/ Continuous Mandatory Ventilation)  RR (machine): 20  TV (machine): 350  FiO2: 30  PEEP: 5  ITime: 1  MAP: 12  PIP: 38    I&O's Summary    15 Tim 2023 07:01  -  16 Jan 2023 07:00  --------------------------------------------------------  IN: 7175.4 mL / OUT: 815 mL / NET: 6360.4 mL    16 Jan 2023 07:01  -  16 Jan 2023 11:44  --------------------------------------------------------  IN: 684.3 mL / OUT: 15 mL / NET: 669.3 mL          Physical Exam:   GENERAL: NAD, well-groomed, well-developed  HEENT: ROSA/   Atraumatic, Normocephalic  ENMT: No tonsillar erythema, exudates, or enlargement; Moist mucous membranes, Good dentition, No lesions  NECK: Supple, No JVD, Normal thyroid  CHEST/LUNG: poor air entry bilaterally:   CVS: Regular rate and rhythm; No murmurs, rubs, or gallops  GI: : Soft, Nontender, Nondistended; Bowel sounds present  NERVOUS SYSTEM:  sedated and intubated:   EXTREMITIES:  - edema  LYMPH: No lymphadenopathy noted  SKIN: No rashes or lesions  ENDOCRINOLOGY: No Thyromegaly  PSYCH: sedated and intubated:     Labs:  ABG - ( 16 Jan 2023 00:10 )  pH, Arterial: 7.41  pH, Blood: x     /  pCO2: 32    /  pO2: 155   / HCO3: 20    / Base Excess: -3.9  /  SaO2: 98.9            30, 40, 30, 30, 7, 60.0                            6.9    16.39 )-----------( 63       ( 16 Jan 2023 06:35 )             20.0                         7.6    20.56 )-----------( 72       ( 16 Jan 2023 00:35 )             22.5                         6.3    24.14 )-----------( 89       ( 15 Tim 2023 17:07 )             19.0                         8.1    30.39 )-----------( 119      ( 15 Tim 2023 00:25 )             24.5                         9.2    33.32 )-----------( 133      ( 14 Jan 2023 18:02 )             27.8                         9.0    31.38 )-----------( 167      ( 14 Jan 2023 13:23 )             28.2                         9.5    24.58 )-----------( 172      ( 14 Jan 2023 01:15 )             28.3                         9.8    25.78 )-----------( 178      ( 13 Jan 2023 18:04 )             29.3     01-16    137  |  101  |  68<H>  ----------------------------<  118<H>  3.5   |  22  |  1.77<H>  01-16    134<L>  |  100  |  70<H>  ----------------------------<  417<H>  3.7   |  19<L>  |  1.91<H>  01-15    134<L>  |  103  |  72<H>  ----------------------------<  501<HH>  4.9   |  18<L>  |  2.11<H>  01-15    134<L>  |  105  |  67<H>  ----------------------------<  80  5.1   |  13<L>  |  1.87<H>  01-14    132<L>  |  105  |  68<H>  ----------------------------<  100<H>  4.7   |  17<L>  |  1.78<H>  01-14    122<L>  |  93<L>  |  63<H>  ----------------------------<  446<H>  4.6   |  18<L>  |  1.77<H>  01-14    129<L>  |  101  |  69<H>  ----------------------------<  426<H>  4.4   |  17<L>  |  1.86<H>  01-13    127<L>  |  96  |  66<H>  ----------------------------<  369<H>  4.5   |  19<L>  |  2.03<H>  01-13    128<L>  |  97  |  58<H>  ----------------------------<  297<H>  3.7   |  19<L>  |  2.24<H>    Ca    7.2<L>      16 Jan 2023 09:35  Ca    7.1<L>      16 Jan 2023 00:33  Ca    6.5<LL>      15 Tim 2023 17:07  Ca    6.5<LL>      15 Tim 2023 00:24  Ca    7.0<L>      14 Jan 2023 18:02  Phos  3.6     01-16  Phos  4.2     01-16  Phos  5.3     01-15  Phos  5.5     01-15  Phos  4.8     01-14  Mg     2.0     01-16  Mg     1.9     01-16  Mg     2.1     01-15  Mg     2.2     01-14    TPro  4.3<L>  /  Alb  2.1<L>  /  TBili  0.8  /  DBili  x   /  AST  248<H>  /  ALT  497<H>  /  AlkPhos  119  01-16  TPro  4.2<L>  /  Alb  2.2<L>  /  TBili  0.9  /  DBili  x   /  AST  482<H>  /  ALT  539<H>  /  AlkPhos  124<H>  01-16  TPro  3.5<L>  /  Alb  1.7<L>  /  TBili  0.6  /  DBili  x   /  AST  741<H>  /  ALT  728<H>  /  AlkPhos  101  01-15  TPro  3.8<L>  /  Alb  1.9<L>  /  TBili  0.4  /  DBili  x   /  AST  554<H>  /  ALT  460<H>  /  AlkPhos  81  01-15  TPro  3.8<L>  /  Alb  1.6<L>  /  TBili  0.2  /  DBili  x   /  AST  143<H>  /  ALT  124<H>  /  AlkPhos  107  01-14  TPro  3.7<L>  /  Alb  1.5<L>  /  TBili  0.2  /  DBili  x   /  AST  49<H>  /  ALT  49<H>  /  AlkPhos  106  01-14    CAPILLARY BLOOD GLUCOSE      POCT Blood Glucose.: 93 mg/dL (16 Jan 2023 10:52)  POCT Blood Glucose.: 116 mg/dL (16 Jan 2023 09:56)  POCT Blood Glucose.: 159 mg/dL (16 Jan 2023 08:47)  POCT Blood Glucose.: 174 mg/dL (16 Jan 2023 07:52)  POCT Blood Glucose.: 225 mg/dL (16 Jan 2023 06:15)  POCT Blood Glucose.: 240 mg/dL (16 Jan 2023 05:26)  POCT Blood Glucose.: 295 mg/dL (16 Jan 2023 04:07)  POCT Blood Glucose.: 301 mg/dL (16 Jan 2023 03:18)  POCT Blood Glucose.: 336 mg/dL (16 Jan 2023 02:04)  POCT Blood Glucose.: 326 mg/dL (16 Jan 2023 01:08)  POCT Blood Glucose.: 445 mg/dL (15 Tim 2023 23:05)  POCT Blood Glucose.: 202 mg/dL (15 Tim 2023 23:04)  POCT Blood Glucose.: 486 mg/dL (15 Tim 2023 22:05)  POCT Blood Glucose.: 491 mg/dL (15 Tim 2023 21:18)  POCT Blood Glucose.: 475 mg/dL (15 Tim 2023 20:08)  POCT Blood Glucose.: 451 mg/dL (15 Tim 2023 20:07)  POCT Blood Glucose.: 473 mg/dL (15 Tim 2023 17:13)  POCT Blood Glucose.: 330 mg/dL (15 Tim 2023 12:03)      LIVER FUNCTIONS - ( 16 Jan 2023 09:35 )  Alb: 2.1 g/dL / Pro: 4.3 g/dL / ALK PHOS: 119 U/L / ALT: 497 U/L / AST: 248 U/L / GGT: x           PT/INR - ( 16 Jan 2023 00:36 )   PT: 24.2 sec;   INR: 2.07 ratio         PTT - ( 16 Jan 2023 00:36 )  PTT:31.2 sec    D-Dimer Assay, Quantitative: 368 ng/mL DDU (01-16 @ 00:36)  Lactate, Blood: 3.7 mmol/L (01-16 @ 00:37)        RECENT CULTURES:  01-14 @ 07:47 ET Tube ET Tube       Numerous polymorphonuclear leukocytes per low power field  Few Squamous epithelial cells per low power field  Numerous Gram Negative Rods seen per oil power field  Few Gram Positive Rods seen per oil power field  Few Yeast like cells seen per oil power field           Normal Respiratory Janette present    01-10 @ 22:10 .Blood Blood-Peripheral       < from: Xray Chest 1 View- PORTABLE-Urgent (Xray Chest 1 View- PORTABLE-Urgent .) (01.10.23 @ 19:55) >    ACC: [id number] EXAM:  XR CHEST PORTABLE URGENT 1V                          PROCEDURE DATE:  01/10/2023          INTERPRETATION:  CLINICAL INFORMATION: Intubation.    TECHNIQUE: Portable AP radiograph of the chest.    COMPARISON: Chest x-ray 1/8/2023.    FINDINGS: Clear lungs. No pleural effusions or pneumothorax. Cardiac   silhouette is unchanged. No acute osseous pathology.    Endotracheal tube terminates above the jack. Enteric tube projects   below the diaphragm with its distal segment excluded from view.      IMPRESSION:    Clear lungs. Tubes as described.    --- End of Report ---      < end of copied text >  < from: CT Chest No Cont (01.10.23 @ 12:31) >  FINDINGS:    LYMPH NODES: No lymphadenopathy.    HEART/VASCULATURE: Heart size is normal. No pericardial effusion. Aortic   valve calcifications. Aortic and coronary artery calcifications.    AIRWAYS/LUNGS/PLEURA: Patent central airways. Bilateral lower lobe   predominant patchy and branching opacities. Small left pleural effusion   with associated passive atelectasis.    UPPER ABDOMEN: Unchanged calcified liver granulomas.    BONES/SOFT TISSUES: Exaggerated kyphosis of the thoracic spine.   Degenerative changes of the spine. Soft tissues are unremarkable.    IMPRESSION:    Bilateral lower lobe predominant patchy and branching opacities   suspicious for pneumonia.    Small left pleural effusion.        --- End of Report ---    < end of copied text >           No Growth Final    01-10 @ 22:09 .Blood Blood-Peripheral                No Growth Final    01-10 @ 20:08 .Bronchial Bronchial Lavage   JOE    Few polymorphonuclear leukocytes seen per low power field  No Squamous epithelial cells seen per low power field  No organisms seen    Pseudomonas aeruginosa  Pseudomonas aeruginosa     Few Pseudomonas aeruginosa  Normal Respiratory Janette absent          RESPIRATORY CULTURES:          Studies  Chest X-RAY  CT SCAN Chest   Venous Dopplers: LE:   CT Abdomen  Others

## 2023-01-16 NOTE — PROGRESS NOTE ADULT - ASSESSMENT
ASSESSMENT/PLAN:  77-year-old female with past medical history of DM, HTN, HLD, asthma on daily steroids, A. fib on Eliquis and digoxin presenting with shortness of breath, found to be in Afib w/RVR; admitted to MICU for Acute Hypoxic Respiratory Failure in s/o Asthma and Bronchiectasis w/ possible PNA, leading to intubation and sedation.     NEURO  # Intubated & Sedated  - Mental status was at baseline before intubation; intubated for Acute Hypoxic Respiratory Failure  - ABG w/o hypercapnia; PCO2 38; no signs of hypercapnic encephalopathy  - RASS goal 0 to -1  - Daily SAT/SBT    CARDIAC  # Afib w/ RVR  Admitted in RVR; being followed by Dr. Marcus, Cardiology; Afib w/ RVR thought to be due to stress from hypoxemia  - on Diltiazem drip, Chronic atrial fib. with RVR.  Continue diltiazem drip.  - Echo (1/11) uneventful  - TSH normal    Hypotension requiring pressors:   - continued on phenylephrine     PULM  # Acute Hypoxemic Respiratory Failure  # Asthma  # Bronchiectasis  Acute on chronic  hypoxemic respiratory failure due to acute exacerbation of bronchiectasis / asthma and possible pneumonia.       Continue current AC vent settings.  Did not tolerate PS wean today. Restart propofol.      Continue solumedrol to 20mg  daily.      Continue bronchodilators      Continue cefepime  Adjust dose for renal failure.       Stop IPV. Minimal secretions. Stop.     Chest PT      3% saline nebulizers with Duo nebs.     Azithromycin  M, W, F for   antiinflammatory effects.    CT Angio r/o PE; currently thought to be due to Asthma exacerbation vs bronchiectasis vs ?PNA;   - tachypneic on BiPAP to 30s, saturating 100% but is tiring out as of now  - intubate and sedate to assist with airway clearance  - chest PT; airway clearance; hypertonic saline  - daily SBT/SATs  - decrease to solumedrol 20q24h  - DuoNebs q6h   - currently of Cefepime (1/10-) for empiric coverage; follow infectious diseases recommendations  - BAL growing few Pseudomonas; wait for sensitivities  - follow-up Legionella antigen, final BCx read; RVP negative; BAL cx  - latest ABG is 7.24|34|93  - start Bicarb drip @150cc/hr    RENAL  # SHANE  Cr shot up to 1.87<---2.24 <---2.44 <- 2.55 <-2.36 <- 1.87 <- 1.97 <- 0.64  - urine lytes showing FeNa 2.2%-> ATN; treat with fluids  - Maintenance fluids: 100cc/hr LR; can d/c D5    GI   - no active issues currently  - Diet: NPO on vent; place OG and start tube feeds  - Bowel regimen: miralax, senna    HEME/ONC  # Leukocytosis  WBC now 24; decreasing  No active indications of infections, all infectious workup negative so far; likely in s/o solumedrol usage, with neutrophilia  - continue to monitor    # INR 11.1 (00:00 1/11)-> 4.5 (6 AM 1/11) -> 1.42 (1/12)  - continue to hold Lovenox    ID  # Sepsis  CT Chest w/ bilateral lower lobe predominant patchy and branching opacities suspicious for pneumonia. Small left pleural effusion. Leukocytosis, tachycardia and tachypnea; No active indications of infections, all infectious workup negative so far; leukocytosis likely in s/o solumedrol usage but no diff to conform neutrophilia so far; tachycardia likely in s/o tachypnea and AHRF  - Continue Cefepime (1/10-) for empiric PNA coverage  - follow-up SCx, Urine legionella antigen, final BCx, BAL cultures    ENDO  # Hyponatremia  Na 128 <- 126  <- 132<- 131 in 06/22, may be chronic  - continue fluid resuscitation   - monitor daily BMPs  - Urine lytes  - Maintenance fluids at 100cc's/hr    # T2DM  A1c 8.1 1/09/23  BGs high; likely in s/o steroid usage  - place on sliding scale NPH  - admelog     DVT  - Hold Lovenox 50mg BID    ETHICS  Full Code     ASSESSMENT/PLAN:  77-year-old female with past medical history of DM, HTN, HLD, asthma on daily steroids, A. fib on Eliquis and digoxin presenting with shortness of breath, found to be in Afib w/RVR; admitted to MICU for Acute Hypoxic Respiratory Failure in s/o Asthma and Bronchiectasis w/ possible PNA, leading to intubation and sedation. Currently urine output worsening, attempting to to SBP daily.     NEURO  intubated on propofol  rass -5    CARDIAC  # Afib w/ RVR  Admitted in RVR; being followed by Dr. Marcus, Cardiology; Afib w/ RVR thought to be due to stress from hypoxemia  - Chronic atrial fib. with RVR.  Continue diltiazem drip.  - Echo (1/11) EF 75%  1. Endocardial visualization enhanced with intravenous  injection of Ultrasonic Enhancing Agent (Definity).  Hyperdynamic left ventricular systolic function. No left  ventricular thrombus.  2. Normal right ventricular size and function.  - TSH normal    Hypotension requiring pressors:   - continued on phenylephrine     PULM  #Acute on chronic  hypoxemic respiratory failure due to acute exacerbation of bronchiectasis / asthma and possible pneumonia.   Continue current AC vent settings.  Did not tolerate PS wean today. Restart propofol.  Continue solumedrol to 20mg  daily.  Continue bronchodilators  Continue cefepime  Adjust dose for renal failure.   Stop IPV. Minimal secretions. Stop.  Chest PT   3% saline nebulizers with Duo nebs.  Azithromycin  M, W, F for   antiinflammatory effects.    CT Angio r/o PE; currently thought to be due to Asthma exacerbation vs bronchiectasis vs ?PNA;   - tachypneic on BiPAP to 30s, saturating 100% but is tiring out as of now  - intubate and sedate to assist with airway clearance  - chest PT; airway clearance; hypertonic saline  - daily SBT/SATs  - solumedrol 20q24h  - DuoNebs q6h   - currently of Cefepime (1/10-) for empiric coverage; follow infectious diseases recommendations  - BAL growing few Pseudomonas; wait for sensitivities  - follow-up Legionella antigen- negative, final BCx- negative; RVP negative; BAL cx  - Bicarb drip @150cc/hr -> discontinued as bicarb above 22    RENAL  # SHANE  Cr shot up to 1.87<---2.24 <---2.44 <- 2.55 <-2.36 <- 1.87 <- 1.97 <- 0.64  - urine lytes showing FeNa 2.2%-> ATN; treat with fluids  - urine output decreaSed to 5cc/hr 1/16. Will trial bolus fluids.     GI   - no active issues currently  - Diet: tube feeds  - Bowel regimen: miralax, senna    HEME/ONC  # Leukocytosis  - cultures negative. off abx. possibly from steroids, downtrending from 40, now 16.     # INR 11.1 (00:00 1/11)-> 4.5 (6 AM 1/11) -> 1.42 (1/12)  - continue to hold Lovenox    ID  # Sepsis  CT Chest w/ bilateral lower lobe predominant patchy and branching opacities suspicious for pneumonia. Small left pleural effusion. Leukocytosis, tachycardia and tachypnea; No active indications of infections, all infectious workup negative so far; leukocytosis likely in s/o solumedrol usage but no diff to conform neutrophilia so far; tachycardia likely in s/o tachypnea and AHRF  - Continue Cefepime (1/10-) for empiric PNA coverage  - Urine legionella antigen - neg, final BCx 1/10 - neg, BAL cultures - numerous GNR, few gram pos       ENDO  DM2, hyperglycemia  - hyperglycemic 450s 1/15, started on insulin gtt 1/15pm. likely in setting of steroids.   - 1/16 - fs improved to 105, will transition to NPH q6h 10u (was previously on 15q6h)      DVT  - Hold Lovenox 50mg BID    ETHICS  Full Code  family at bedside, updated

## 2023-01-16 NOTE — CONSULT NOTE ADULT - SUBJECTIVE AND OBJECTIVE BOX
HPI:  CHIEF COMPLAINT:Patient is a 77y old  Female who presents with a chief complaint of     HPI:  77-year-old female with past medical history of DM, HTN, HLD, asthma on daily steroids, A. fib on Eliquis and digoxin presenting with shortness of breath.  Patient unable to provide history.  Daughter at bedside reports patient has had increasing shortness of breath, wheezing, cough for the past week now.  Patient also with increasing generalized weakness, fatigue and decreased p.o. intake.  Denies fever/chills, chest pain, abdominal pain, nausea, vomiting, diarrhea, dysuria.  Family reports patient had a similar episode 1 year ago when she was admitted to [facility] with UTI.    PAST MEDICAL & SURGICAL HISTORY:  Asthma  Diabetes mellitus  Atrial fibrillation, chronic  Type 2 diabetes mellitus  Hypertension  Dementia      No significant past surgical history      MEDICATIONS  (STANDING):  vancomycin  IVPB. 1000 milliGRAM(s) IV Intermittent once  oxyCODONE 5 mg oral tablet: 1 tab(s) orally every 4 hours, As needed, Severe Pain (7 - 10) MDD:6 tab  · 	Cardizem  mg/24 hours oral capsule, extended release: 1 cap(s) orally once a day   · 	guaiFENesin 100 mg/5 mL oral liquid: 5 milliliter(s) orally every 6 hours, As needed, Cough  · 	aspirin 81 mg oral delayed release tablet: 1 tab(s) orally once a day  · 	atorvastatin 40 mg oral tablet: 1 tab(s) orally once a day (at bedtime)  · 	acetaminophen 325 mg oral tablet: 2 tab(s) orally every 6 hours, As needed, Mild Pain (1 - 3)  · 	Advair Diskus 250 mcg-50 mcg inhalation powder: 1 puff(s) inhaled 2 times a day  · 	NexIUM OTC 20 mg oral delayed release capsule: 1 cap(s) orally once a day (in the morning)  · 	metFORMIN 1000 mg oral tablet: 1 tab(s) orally once a day as needed for elevated blood sugar levels  · 	warfarin 3 mg oral tablet: 1 tab(s) orally once a day    MEDICATIONS  (PRN):      FAMILY HISTORY:  Family history of diabetes mellitus (Mother)        SOCIAL HISTORY:    [ x] Non-smoker  [ ] Smoker  [ ] Alcohol    Allergies    Avelox (Pruritus)  Levaquin (Unknown)    Intolerances    	    REVIEW OF SYSTEMS:  CONSTITUTIONAL: No fever, weight loss, or fatigue  EYES: No eye pain, visual disturbances, or discharge  ENT:  No difficulty hearing, tinnitus, vertigo; No sinus or throat pain  NECK: No pain or stiffness  RESPIRATORY: No cough, wheezing, chills or hemoptysis; + Shortness of Breath  CARDIOVASCULAR: No chest pain,+ palpitations, passing out, dizziness, or leg swelling  GASTROINTESTINAL: No abdominal or epigastric pain. No nausea, vomiting, or hematemesis; No diarrhea or constipation. No melena or hematochezia.  GENITOURINARY: No dysuria, frequency, hematuria, or incontinence  NEUROLOGICAL: No headaches, memory loss, loss of strength, numbness, or tremors  SKIN: No itching, burning, rashes, or lesions   LYMPH Nodes: No enlarged glands  ENDOCRINE: No heat or cold intolerance; No hair loss  MUSCULOSKELETAL: No joint pain or swelling; No muscle, back, or extremity pain  PSYCHIATRIC: No depression, anxiety, mood swings, or difficulty sleeping  HEME/LYMPH: No easy bruising, or bleeding gums  ALLERGY AND IMMUNOLOGIC: No hives or eczema	    x[ ] All others negative	  [ ] Unable to obtain    PHYSICAL EXAM:  T(C): 37.9 (01-08-23 @ 13:02), Max: 37.9 (01-08-23 @ 13:02)  HR: 131 (01-08-23 @ 13:02) (96 - 159)  BP: 146/86 (01-08-23 @ 13:02) (129/78 - 146/86)  RR: 30 (01-08-23 @ 13:02) (22 - 35)  SpO2: 95% (01-08-23 @ 13:02) (83% - 100%)  Wt(kg): --  I&O's Summary      Appearance: Normal	  HEENT:   Normal oral mucosa, PERRL, EOMI	  Lymphatic: No lymphadenopathy  Cardiovascular: Normal S1 S2, No JVD, + murmurs, No edema  Respiratory :rhonchi  Psychiatry: A & O x 3, Mood & affect appropriate  Gastrointestinal:  Soft, Non-tender, + BS	  Skin: No rashes, No ecchymoses, No cyanosis	  Extremities: Normal range of motion, No clubbing, cyanosis or edema  Vascular: Peripheral pulses palpable 2+ bilaterally    TELEMETRY: 	    ECG:  	  RADIOLOGY:  OTHER: 	  	  LABS:	 	    CARDIAC MARKERS:                              13.9   23.80 )-----------( 284      ( 08 Jan 2023 11:57 )             44.4     01-08    132<L>  |  90<L>  |  11  ----------------------------<  220<H>  4.1   |  27  |  0.64    Ca    9.2      08 Jan 2023 11:57    TPro  6.2  /  Alb  3.1<L>  /  TBili  1.0  /  DBili  x   /  AST  16  /  ALT  11  /  AlkPhos  107  01-08    proBNP:   Lipid Profile:   HgA1c:   TSH:   PT/INR - ( 08 Jan 2023 11:57 )   PT: 19.3 sec;   INR: 1.67 ratio         PTT - ( 08 Jan 2023 11:57 )  PTT:29.2 sec    PREVIOUS DIAGNOSTIC TESTING:    :    < from: Xray Chest 1 View- PORTABLE-Urgent (01.08.23 @ 12:28) >  FINDINGS:  LUNGS:  Study is limited by positioning. Minimal vague patchy opacity at the left   lung base.  PLEURA: No pleural effusion or pneumothorax.  HEART AND MEDIASTINUM: Heart size is within normal limits.  SKELETON: There is no acute osseus abnormality,.    IMPRESSION:  Minimal vague patchy opacity at the left lung base which may represent   atelectasis or pneumonia    < end of copied text >      · EKG Date/Time: 08-Jan-2023 11:37  · Rate: 121  · Interpretation: normal sinus rhythm  QS v1-4  · MS: 148  · QRS: 72   (08 Jan 2023 15:40)      REVIEW OF SYSTEMS:    CONSTITUTIONAL: No weakness, fevers or chills  EYES/ENT: No visual changes;  No vertigo or throat pain   NECK: No pain or stiffness  RESPIRATORY: No cough, wheezing, hemoptysis; No shortness of breath  CARDIOVASCULAR: No chest pain or palpitations  GASTROINTESTINAL: No abdominal or epigastric pain. No nausea, vomiting, or hematemesis; No diarrhea or constipation. No melena or hematochezia.  GENITOURINARY: No dysuria, frequency or hematuria  NEUROLOGICAL: No numbness or weakness  SKIN: No itching, burning, rashes, or lesions   All other review of systems is negative unless indicated above.      Medications:   MEDICATIONS  (STANDING):  albuterol/ipratropium for Nebulization 3 milliLiter(s) Nebulizer every 6 hours  ascorbic acid 500 milliGRAM(s) Oral daily  atorvastatin 40 milliGRAM(s) Oral at bedtime  azithromycin   Tablet 250 milliGRAM(s) Oral <User Schedule>  cefepime   IVPB 500 milliGRAM(s) IV Intermittent every 8 hours  chlorhexidine 0.12% Liquid 15 milliLiter(s) Oral Mucosa every 12 hours  chlorhexidine 4% Liquid 1 Application(s) Topical <User Schedule>  dexMEDEtomidine Infusion 0.2 MICROgram(s)/kG/Hr (1.89 mL/Hr) IV Continuous <Continuous>  dextrose 5%. 1000 milliLiter(s) (50 mL/Hr) IV Continuous <Continuous>  dextrose 5%. 1000 milliLiter(s) (100 mL/Hr) IV Continuous <Continuous>  dextrose 50% Injectable 50 milliLiter(s) IV Push every 15 minutes  dextrose 50% Injectable 50 milliLiter(s) IV Push every 15 minutes  diltiazem Infusion 5 mG/Hr (5 mL/Hr) IV Continuous <Continuous>  glucagon  Injectable 1 milliGRAM(s) IntraMuscular once  insulin lispro (ADMELOG) corrective regimen sliding scale   SubCutaneous every 6 hours  insulin NPH human recombinant 10 Unit(s) SubCutaneous every 6 hours  methylPREDNISolone sodium succinate Injectable 20 milliGRAM(s) IV Push daily  multivitamin 1 Tablet(s) Oral daily  pantoprazole  Injectable 40 milliGRAM(s) IV Push two times a day  phenylephrine    Infusion 0.5 MICROgram(s)/kG/Min (7.07 mL/Hr) IV Continuous <Continuous>  phytonadione   Solution 5 milliGRAM(s) Oral daily  polyethylene glycol 3350 17 Gram(s) Oral every 12 hours  propofol Infusion 10 MICROgram(s)/kG/Min (2.26 mL/Hr) IV Continuous <Continuous>  senna Syrup 10 milliLiter(s) Oral at bedtime  sodium chloride 3%  Inhalation 4 milliLiter(s) Inhalation every 12 hours  thiamine 100 milliGRAM(s) Oral daily    MEDICATIONS  (PRN):  acetaminophen     Tablet .. 650 milliGRAM(s) Oral every 6 hours PRN Temp greater or equal to 38C (100.4F), Mild Pain (1 - 3)      Allergies    Avelox (Pruritus)  fish (Vomiting)  Levaquin (Unknown)  shellfish (Vomiting)    Intolerances    fentanyl (Other)      PAST MEDICAL & SURGICAL HISTORY:  Asthma      Diabetes mellitus      Atrial fibrillation, chronic      Type 2 diabetes mellitus      Hypertension      Dementia      No significant past surgical history          Social :    No smoking       No ETOH use            Vital Signs Last 24 Hrs  T(C): 37 (16 Jan 2023 20:00), Max: 37.2 (16 Jan 2023 16:00)  T(F): 98.6 (16 Jan 2023 20:00), Max: 99 (16 Jan 2023 16:00)  HR: 82 (16 Jan 2023 20:30) (60 - 124)  BP: 100/49 (16 Jan 2023 20:30) (89/44 - 133/58)  BP(mean): 70 (16 Jan 2023 20:30) (63 - 85)  RR: 20 (16 Jan 2023 20:30) (20 - 36)  SpO2: 100% (16 Jan 2023 20:30) (95% - 100%)    Parameters below as of 16 Jan 2023 20:00  Patient On (Oxygen Delivery Method): ventilator    O2 Concentration (%): 30  CAPILLARY BLOOD GLUCOSE      POCT Blood Glucose.: 136 mg/dL (16 Jan 2023 18:03)  POCT Blood Glucose.: 132 mg/dL (16 Jan 2023 16:50)  POCT Blood Glucose.: 137 mg/dL (16 Jan 2023 15:16)  POCT Blood Glucose.: 130 mg/dL (16 Jan 2023 13:51)  POCT Blood Glucose.: 98 mg/dL (16 Jan 2023 11:46)  POCT Blood Glucose.: 90 mg/dL (16 Jan 2023 11:45)  POCT Blood Glucose.: 93 mg/dL (16 Jan 2023 10:52)  POCT Blood Glucose.: 116 mg/dL (16 Jan 2023 09:56)  POCT Blood Glucose.: 159 mg/dL (16 Jan 2023 08:47)  POCT Blood Glucose.: 174 mg/dL (16 Jan 2023 07:52)  POCT Blood Glucose.: 225 mg/dL (16 Jan 2023 06:15)  POCT Blood Glucose.: 240 mg/dL (16 Jan 2023 05:26)  POCT Blood Glucose.: 295 mg/dL (16 Jan 2023 04:07)  POCT Blood Glucose.: 301 mg/dL (16 Jan 2023 03:18)  POCT Blood Glucose.: 336 mg/dL (16 Jan 2023 02:04)  POCT Blood Glucose.: 326 mg/dL (16 Jan 2023 01:08)  POCT Blood Glucose.: 445 mg/dL (15 Tim 2023 23:05)  POCT Blood Glucose.: 202 mg/dL (15 Tim 2023 23:04)  POCT Blood Glucose.: 486 mg/dL (15 Tim 2023 22:05)  POCT Blood Glucose.: 491 mg/dL (15 Tim 2023 21:18)      01-15 @ 07:01  -  01-16 @ 07:00  --------------------------------------------------------  IN: 7175.4 mL / OUT: 815 mL / NET: 6360.4 mL    01-16 @ 07:01 - 01-16 @ 21:03  --------------------------------------------------------  IN: 2134.1 mL / OUT: 50 mL / NET: 2084.1 mL        Physical Exam:    Daily     Daily   General:  Well appearing, NAD, not cachetic  HEENT:  Nonicteric, PERRLA  CV:  RRR, no murmur, no JVD  Lungs:  CTA B/L, no wheezes, rales, rhonchi  Abdomen:  Soft, non-tender, no distended, positive BS, no hepatosplenomegaly  Extremities:  2+ pulses, no c/c, no edema  Skin:  Warm and dry, no rashes  :  No andrade  Neuro:  AAOx3, non-focal, CN II-XII grossly intact  No Restraints    LABS:                        9.7    24.09 )-----------( 67       ( 16 Jan 2023 13:08 )             27.6     01-16    137  |  101  |  68<H>  ----------------------------<  118<H>  3.5   |  22  |  1.77<H>    Ca    7.2<L>      16 Jan 2023 09:35  Phos  3.6     01-16  Mg     2.0     01-16    TPro  4.3<L>  /  Alb  2.1<L>  /  TBili  0.8  /  DBili  x   /  AST  248<H>  /  ALT  497<H>  /  AlkPhos  119  01-16    PT/INR - ( 16 Jan 2023 00:36 )   PT: 24.2 sec;   INR: 2.07 ratio         PTT - ( 16 Jan 2023 00:36 )  PTT:31.2 sec            RADIOLOGY & ADDITIONAL TESTS:    ---------------------------------------------------------------------------  I personally reviewed: [  ]EKG   [  ]CXR    [  ] CT    [  ]Other  ---------------------------------------------------------------------------     HPI:  CHIEF COMPLAINT:Patient is a 77y old  Female who presents with a chief complaint of     HPI:  pt is intubated and sedated     77-year-old female with past medical history of DM, HTN, HLD, asthma on daily steroids, A. fib on Eliquis and digoxin presenting with shortness of breath.  Patient unable to provide history.  Daughter at bedside reports patient has had increasing shortness of breath, wheezing, cough for the past week now.  Patient also with increasing generalized weakness, fatigue and decreased p.o. intake.  Denies fever/chills, chest pain, abdominal pain, nausea, vomiting, diarrhea, dysuria.  Family reports patient had a similar episode 1 year ago when she was admitted to [facility] with UTI.  found to be in Afib w/RVR; admitted to MICU for Acute Hypoxic Respiratory Failure in s/o Asthma and Bronchiectasis w/ possible PNA, leading to intubation and sedation. Currently urine output worsening, attempting to to SBP daily.   PAST MEDICAL & SURGICAL HISTORY:  Asthma  Diabetes mellitus  Atrial fibrillation, chronic  Type 2 diabetes mellitus  Hypertension  Dementia      No significant past surgical history      MEDICATIONS  (STANDING):  vancomycin  IVPB. 1000 milliGRAM(s) IV Intermittent once  oxyCODONE 5 mg oral tablet: 1 tab(s) orally every 4 hours, As needed, Severe Pain (7 - 10) MDD:6 tab  · 	Cardizem  mg/24 hours oral capsule, extended release: 1 cap(s) orally once a day   · 	guaiFENesin 100 mg/5 mL oral liquid: 5 milliliter(s) orally every 6 hours, As needed, Cough  · 	aspirin 81 mg oral delayed release tablet: 1 tab(s) orally once a day  · 	atorvastatin 40 mg oral tablet: 1 tab(s) orally once a day (at bedtime)  · 	acetaminophen 325 mg oral tablet: 2 tab(s) orally every 6 hours, As needed, Mild Pain (1 - 3)  · 	Advair Diskus 250 mcg-50 mcg inhalation powder: 1 puff(s) inhaled 2 times a day  · 	NexIUM OTC 20 mg oral delayed release capsule: 1 cap(s) orally once a day (in the morning)  · 	metFORMIN 1000 mg oral tablet: 1 tab(s) orally once a day as needed for elevated blood sugar levels  · 	warfarin 3 mg oral tablet: 1 tab(s) orally once a day    MEDICATIONS  (PRN):      FAMILY HISTORY:  Family history of diabetes mellitus (Mother)        SOCIAL HISTORY:    [ x] Non-smoker  [ ] Smoker  [ ] Alcohol    Allergies    Avelox (Pruritus)  Levaquin (Unknown)    Intolerances          Social :    No smoking       No ETOH use            Vital Signs Last 24 Hrs  T(C): 37 (16 Jan 2023 20:00), Max: 37.2 (16 Jan 2023 16:00)  T(F): 98.6 (16 Jan 2023 20:00), Max: 99 (16 Jan 2023 16:00)  HR: 82 (16 Jan 2023 20:30) (60 - 124)  BP: 100/49 (16 Jan 2023 20:30) (89/44 - 133/58)  BP(mean): 70 (16 Jan 2023 20:30) (63 - 85)  RR: 20 (16 Jan 2023 20:30) (20 - 36)  SpO2: 100% (16 Jan 2023 20:30) (95% - 100%)    Parameters below as of 16 Jan 2023 20:00  Patient On (Oxygen Delivery Method): ventilator    O2 Concentration (%): 30  CAPILLARY BLOOD GLUCOSE      POCT Blood Glucose.: 136 mg/dL (16 Jan 2023 18:03)  POCT Blood Glucose.: 132 mg/dL (16 Jan 2023 16:50)  POCT Blood Glucose.: 137 mg/dL (16 Jan 2023 15:16)  POCT Blood Glucose.: 130 mg/dL (16 Jan 2023 13:51)  POCT Blood Glucose.: 98 mg/dL (16 Jan 2023 11:46)  POCT Blood Glucose.: 90 mg/dL (16 Jan 2023 11:45)  POCT Blood Glucose.: 93 mg/dL (16 Jan 2023 10:52)  POCT Blood Glucose.: 116 mg/dL (16 Jan 2023 09:56)  POCT Blood Glucose.: 159 mg/dL (16 Jan 2023 08:47)  POCT Blood Glucose.: 174 mg/dL (16 Jan 2023 07:52)  POCT Blood Glucose.: 225 mg/dL (16 Jan 2023 06:15)  POCT Blood Glucose.: 240 mg/dL (16 Jan 2023 05:26)  POCT Blood Glucose.: 295 mg/dL (16 Jan 2023 04:07)  POCT Blood Glucose.: 301 mg/dL (16 Jan 2023 03:18)  POCT Blood Glucose.: 336 mg/dL (16 Jan 2023 02:04)  POCT Blood Glucose.: 326 mg/dL (16 Jan 2023 01:08)  POCT Blood Glucose.: 445 mg/dL (15 Tim 2023 23:05)  POCT Blood Glucose.: 202 mg/dL (15 Tim 2023 23:04)  POCT Blood Glucose.: 486 mg/dL (15 Tim 2023 22:05)  POCT Blood Glucose.: 491 mg/dL (15 Tim 2023 21:18)      01-15 @ 07:01 - 01-16 @ 07:00  --------------------------------------------------------  IN: 7175.4 mL / OUT: 815 mL / NET: 6360.4 mL    01-16 @ 07:01 - 01-16 @ 21:03  --------------------------------------------------------  IN: 2134.1 mL / OUT: 50 mL / NET: 2084.1 mL        Physical Exam:    Daily     Daily   General:  intubated sedated   HEENT:  no jvd  CV:  S1S2   Lungs:  CTA anteriorily   Abdomen:  Soft,   Extremities:  edema     LABS:                        9.7    24.09 )-----------( 67       ( 16 Jan 2023 13:08 )             27.6     01-16    137  |  101  |  68<H>  ----------------------------<  118<H>  3.5   |  22  |  1.77<H>    Ca    7.2<L>      16 Jan 2023 09:35  Phos  3.6     01-16  Mg     2.0     01-16    TPro  4.3<L>  /  Alb  2.1<L>  /  TBili  0.8  /  DBili  x   /  AST  248<H>  /  ALT  497<H>  /  AlkPhos  119  01-16    PT/INR - ( 16 Jan 2023 00:36 )   PT: 24.2 sec;   INR: 2.07 ratio         PTT - ( 16 Jan 2023 00:36 )  PTT:31.2 sec            RADIOLOGY & ADDITIONAL TESTS:    ---------------------------------------------------------------------------  I personally reviewed: [  ]EKG   [  ]CXR    [  ] CT    [  ]Other  ---------------------------------------------------------------------------

## 2023-01-17 NOTE — PROGRESS NOTE ADULT - SUBJECTIVE AND OBJECTIVE BOX
CARDIOLOGY     PROGRESS  NOTE   ________________________________________________    CHIEF COMPLAINT:Patient is a 77y old  Female who presents with a chief complaint of sob/ rapid a.fib (17 Jan 2023 09:15)  on vent  	  REVIEW OF SYSTEMS:  CONSTITUTIONAL: No fever, weight loss, or fatigue  EYES: No eye pain, visual disturbances, or discharge  ENT:  No difficulty hearing, tinnitus, vertigo; No sinus or throat pain  NECK: No pain or stiffness  RESPIRATORY: No cough, wheezing, chills or hemoptysis; No Shortness of Breath  CARDIOVASCULAR: No chest pain, palpitations, passing out, dizziness, or leg swelling  GASTROINTESTINAL: No abdominal or epigastric pain. No nausea, vomiting, or hematemesis; No diarrhea or constipation. No melena or hematochezia.  GENITOURINARY: No dysuria, frequency, hematuria, or incontinence  NEUROLOGICAL: No headaches, memory loss, loss of strength, numbness, or tremors  SKIN: No itching, burning, rashes, or lesions   LYMPH Nodes: No enlarged glands  ENDOCRINE: No heat or cold intolerance; No hair loss  MUSCULOSKELETAL: No joint pain or swelling; No muscle, back, or extremity pain  PSYCHIATRIC: No depression, anxiety, mood swings, or difficulty sleeping  HEME/LYMPH: No easy bruising, or bleeding gums  ALLERGY AND IMMUNOLOGIC: No hives or eczema	    [x ] All others negative	  [ ] Unable to obtain    PHYSICAL EXAM:  T(C): 36.7 (01-17-23 @ 08:00), Max: 37.2 (01-16-23 @ 16:00)  HR: 117 (01-17-23 @ 10:00) (74 - 125)  BP: 130/64 (01-17-23 @ 10:00) (92/44 - 131/53)  RR: 24 (01-17-23 @ 10:00) (20 - 36)  SpO2: 100% (01-17-23 @ 10:00) (96% - 100%)  Wt(kg): --  I&O's Summary    16 Jan 2023 07:01  -  17 Jan 2023 07:00  --------------------------------------------------------  IN: 2802.9 mL / OUT: 100 mL / NET: 2702.9 mL    17 Jan 2023 07:01  -  17 Jan 2023 10:44  --------------------------------------------------------  IN: 73.2 mL / OUT: 60 mL / NET: 13.2 mL        Appearance: Normal	  HEENT:   Normal oral mucosa, PERRL, EOMI	  Lymphatic: No lymphadenopathy  Cardiovascular: Normal S1 S2, No JVD, + murmurs, No edema  Respiratoryrhonchi  Psychiatry: A & O x 3, Mood & affect appropriate  Gastrointestinal:  Soft, Non-tender, + BS	  Skin: No rashes, No ecchymoses, No cyanosis	  Neurologic: Non-focal  Extremities: Normal range of motion, No clubbing, cyanosis or edema  Vascular: Peripheral pulses palpable 2+ bilaterally    MEDICATIONS  (STANDING):  albuterol/ipratropium for Nebulization 3 milliLiter(s) Nebulizer every 6 hours  ascorbic acid 500 milliGRAM(s) Oral daily  atorvastatin 40 milliGRAM(s) Oral at bedtime  azithromycin   Tablet 250 milliGRAM(s) Oral <User Schedule>  cefepime   IVPB 500 milliGRAM(s) IV Intermittent every 8 hours  chlorhexidine 0.12% Liquid 15 milliLiter(s) Oral Mucosa every 12 hours  chlorhexidine 4% Liquid 1 Application(s) Topical <User Schedule>  dexMEDEtomidine Infusion 0.2 MICROgram(s)/kG/Hr (1.89 mL/Hr) IV Continuous <Continuous>  dextrose 5%. 1000 milliLiter(s) (50 mL/Hr) IV Continuous <Continuous>  dextrose 5%. 1000 milliLiter(s) (100 mL/Hr) IV Continuous <Continuous>  dextrose 50% Injectable 50 milliLiter(s) IV Push every 15 minutes  dextrose 50% Injectable 50 milliLiter(s) IV Push every 15 minutes  diltiazem Infusion 5 mG/Hr (5 mL/Hr) IV Continuous <Continuous>  glucagon  Injectable 1 milliGRAM(s) IntraMuscular once  insulin lispro (ADMELOG) corrective regimen sliding scale   SubCutaneous every 6 hours  insulin NPH human recombinant 10 Unit(s) SubCutaneous every 6 hours  methylPREDNISolone sodium succinate Injectable 20 milliGRAM(s) IV Push daily  multivitamin 1 Tablet(s) Oral daily  pantoprazole  Injectable 40 milliGRAM(s) IV Push two times a day  phenylephrine    Infusion 0.5 MICROgram(s)/kG/Min (7.07 mL/Hr) IV Continuous <Continuous>  phytonadione   Solution 5 milliGRAM(s) Oral daily  polyethylene glycol 3350 17 Gram(s) Oral every 12 hours  propofol Infusion 10 MICROgram(s)/kG/Min (2.26 mL/Hr) IV Continuous <Continuous>  senna Syrup 10 milliLiter(s) Oral at bedtime  sodium chloride 3%  Inhalation 4 milliLiter(s) Inhalation every 12 hours  thiamine 100 milliGRAM(s) Oral daily      TELEMETRY: 	    ECG:  	  RADIOLOGY:  OTHER: 	  	  LABS:	 	    CARDIAC MARKERS:                                9.9    21.30 )-----------( 60       ( 17 Jan 2023 00:23 )             28.0     01-17    134<L>  |  99  |  72<H>  ----------------------------<  157<H>  3.7   |  22  |  1.87<H>    Ca    7.3<L>      17 Jan 2023 00:22  Phos  3.6     01-17  Mg     2.0     01-17    TPro  4.2<L>  /  Alb  1.9<L>  /  TBili  2.6<H>  /  DBili  x   /  AST  180<H>  /  ALT  438<H>  /  AlkPhos  145<H>  01-17    proBNP:   Lipid Profile:   HgA1c:   TSH: Thyroid Stimulating Hormone, Serum: 1.17 uIU/mL (01-09 @ 06:11)    PT/INR - ( 17 Jan 2023 00:23 )   PT: 15.7 sec;   INR: 1.36 ratio         PTT - ( 17 Jan 2023 00:23 )  PTT:27.6 sec      Assessment and plan  ---------------------------  77-year-old female with past medical history of DM, HTN, HLD, asthma on daily steroids, A. fib on Eliquis and digoxin presenting with shortness of breath.  Patient unable to provide history.  Daughter at bedside reports patient has had increasing shortness of breath, wheezing, cough for the past week now.  Patient also with increasing generalized weakness, fatigue and decreased p.o. intake.  Denies fever/chills, chest pain, abdominal pain, nausea, vomiting, diarrhea, dysuria.  Family reports patient had a similar episode 1 year ago when she was admitted to [facility] with UTI.  pt with sig medical hx, chronic a.fib with sob ?sec sepsis/ pneumoniae, UTI  sedated on vent  continue abx and steroid  echo noted  AC held sec to bleeding  still sedated on vent   continue abx  hr is well controlled still on Cardizem drip  continue pressor keep MAP>65  sepsis on iv abx  fu renal function closely  a.fib hr is well controlled on Cardizem 5 mg/ hr  nutrition  micu care/ vent  discussed with family

## 2023-01-17 NOTE — CONSULT NOTE ADULT - ATTENDING COMMENTS
Agree w above. 76 yo w hx of Afib on Eliquis admitted for SOB found to be in Afib w RVR and acute respiratory failure requiring intubation. GI consulted for small volume hematochezia this am. Patient has been noted to have drop in Hb since admission with no prior reports of melena or hematochezia. Given clinical status, would hold off endoscopic intervention unless with evidence of active bleeding (melena and overt hematochezia). Trend CBC and transfuse as needed. PPI tx. GI will follow.
SHANE hemodynamically mediated from AFib RVR, resp failure on vent, hypotension on pressors. Oliguria over last 24 hours  She appears to be in ATN at present with volume overload  Would agree with diuretic challenge and may need standing bumex BID   Monitor urine output  Trend Cr daily  No acute needs for renal replacement  Discussed with daughter bedside and over phone   Discussed with MICU    Jaida Gallego MD  Off: [phone]  contact me on teams    (After 5 pm or on weekends please page the on-call fellow/attending, can check [email or web address] for schedule. Login is charlene escobedo, schedule under [facility] medicine, psych, derm)

## 2023-01-17 NOTE — PROGRESS NOTE ADULT - ASSESSMENT
Pt is a 77-year-old female with past medical history of DM, HTN, HLD, asthma on daily steroids, A. fib on Eliquis and digoxin presenting with shortness of breath, found to be in Afib w/RVR. Pt admitted to MICU for Acute Hypoxic Respiratory Failure in s/o Asthma and Bronchiectasis w/ possible PNA, leading to intubation and sedation. GI consulted for episode of hematochezia.    #Hypoxic respiratory failure 2/2 asthma vs bronchiectasis vs PNA. Intubated/sedated  #Anemia. Hgb dropped from 13>6 since admission  #Hematochezia: hemorrhoidal vs diverticular vs ischemic vs mass vs AVM vs other    Recommendations  - trend CBC, keep active T&S, transfuse for Hgb<7  - will hold off on urgent endoscopic intervention at this time given no further episodes of hematochezia and acute illness. If patient were to develop significant bleeding, may then require more urgent intervention    - recommend general anemia w/u given dropping counts despite no prior reported episodes of hematochezia. Pt also w/ INR 11 on admission.   Would obtain CT A/P, heme consult    All recommendations are tentative until note is attested by attending.     Ashley Turner, PGY5  Gastroenterology/Hepatology Fellow  Available on [employer] Teams  [phone] ([employer] Short Range Pager)  [phone] (Long Range Pager)    After 5pm, please contact the on-call GI fellow. [phone]

## 2023-01-17 NOTE — PROGRESS NOTE ADULT - ASSESSMENT
77-year-old female with past medical history of DM, HTN, HLD, asthma on daily steroids, A. fib on Eliquis and digoxin presenting with shortness of breath.  Patient unable to provide history.  Daughter at bedside reports patient has had increasing shortness of breath, wheezing, cough for the past week now.  Patient also with increasing generalized weakness, fatigue and decreased p.o. intake.  Denies fever/chills, chest pain, abdominal pain, nausea, vomiting, diarrhea, dysuria.  Family reports patient had a similar episode 1 year ago when she was admitted to [facility] with UTI.  pt with sig medical hx, chronic a.fib with sob ?sec sepsis/ pneumoniae, UTI  start on ceftriaxone  tele  for increase hr, will increase Cardizem dose  continue AC  cardiac enzyme  tsh  echo  RVP negative      ASTHMA/ COPD exacerbation;   A Fibrillation  with rvr:  DM:  HTN:   HLD:     1/09:    ASTHMA/ COPD exacerbation; she was using performist at home with 5 mg of prednisone  not sure why she was not on any other meds:  sill start Symbicort too:  along with BD and is already on cefepime:  though pneumonia to me is not very convincing : will probably need ct scan chest   A Fibrillation  with rvr: Hr still elevated:  on cardizem : would defer to cards:  on ac:  lovenox  + coumadin : check echo   DM:: monitor and control   HTN: : controlled  HLD: on statins  :    dw team    1/10:    ASTHMA/ COPD exacerbation; she was using performist at home with 5 mg of prednisone  not sure why she was not on any other meds:  sill start Symbicort too:  along with BD and is already on cefepime:  WBC increased : though pneumonia to me is not very convincing : will probably need ct scan chest : she rused for ct chest : increase steorids 40 mg Q 6 hours : ABG today seems reasonable   A Fibrillation  with rvr: Hr still elevated:  on cardizem : HR is till very high  :   DM:: monitor and control   HTN: : controlled  HLD: on statins:    MICU  consult:  high risk for intubation:   :    edy and daughter    1/11:    ASTHMA/ COPD exacerbation; now s/p bronch: RML lavage done:  wait cultures results: she was using performist at home with 5 mg of prednisone  not sure why she was not on any other meds:  Cont BD:  and antibiotics  WBC still increased :on cefepime:    A Fibrillation  with rvr: Hr still elevated:  on cardizem : HR is better now:  on cardiem   DM:: monitor and control   HTN: : controlled  HLD: on statins:    josias micu  ATTENDING      1/12:    ASTHMA/ COPD exacerbation; now s/p bronch: RML lavage done:  negative  cultures so far: : she was using performist at home with 5 mg of prednisone  not sure why she was not on any other meds:  Cont BD:  and antibiotics  WBC still increased: but little down today  :on cefepime:    A Fibrillation  with rvr: Hr still elevated:  on cardizem : HR is better now:  on cardiem drip   DM:: monitor and control   HTN: : controlled  HLD: on statins:    josias micu  team    1/13:       ASTHMA/ COPD exacerbation; now s/p bronch: RML lavage done:  negative  cultures so far: :  cont full vent support:  management  of vent per MICU : weaning as perprotocol  ;  on zothromax and cefepime : off steroids   A Fibrillation  with rvr: Hr still elevated:  on Cardizem : HR is better now:  on cardiem drip   DM:: monitor and control   HTN: : controlled  HLD: on statins:    josias micu  team   :  1/14:       ASTHMA/ COPD exacerbation; now s/p bronch: RML lavage done:  Pseudomonas:  on cefepime:  : :  cont full vent support:  management  of vent per MICU : weaning as pe rprotocol  ;  on 20 mg of steroids today   A Fibrillation  with rvr: Hr still elevated:  on Cardizem : HR is better now:  on cardiem drip : Blood pressure is stable:   DM:: monitor and control   HTN: : controlled  HLD: on statins:    josias micu  Attending    1/16:    ASTHMA/ COPD exacerbation; now s/p bronch: RML lavage done:  Pseudomonas:  on cefepime:  and azithromycin for anti inflammatory effects:  :  still on full vent support:  has severe copd:   A Fibrillation  with rvr: Hr still elevated:  on Cardizem : HR is better now:  on Cardizem drip : Blood pressure is stable:   Thrombocytopenia:  ? etiology : check for robbi   DM:: monitor and control   HTN: : controlled  HLD: on statins:   overall pt is still critically ill:    josias micu  Attending today      1/17:  ASTHMA/ COPD exacerbation; now s/p bronch: RML lavage done:  Pseudomonas:  on cefepime:  and azithromycin for anti inflammatory effects:  :  still on full vent support:  has severe copd: same rx today too :  A Fibrillation  with rvr: Hr still elevated:  on Cardizem : HR is better now:  on Cardizem drip : Blood pressure is stable:   Thrombocytopenia:  ? etiology : check for robbi  : still low:  defer to MICU team   DM:: monitor and control   HTN: : controlled  HLD: on statins:   overall pt is still critically ill:    josias micu  Attending

## 2023-01-17 NOTE — PROGRESS NOTE ADULT - SUBJECTIVE AND OBJECTIVE BOX
INTERVAL HPI/OVERNIGHT EVENTS:    SUBJECTIVE: Patient seen and examined at bedside.     afebrile, blood pressure 110s/50s, spo2 100%  Vent: Fio2 30%, peep 5    gtt: dilt increased from 5 to 15, phenylephrine, propofol,   on glucerna tube feeds    output: not making urine - 0-5cc/hr      VITAL SIGNS:  ICU Vital Signs Last 24 Hrs  T(C): 36.7 (17 Jan 2023 04:00), Max: 37.2 (16 Jan 2023 16:00)  T(F): 98.1 (17 Jan 2023 04:00), Max: 99 (16 Jan 2023 16:00)  HR: 103 (17 Jan 2023 07:00) (65 - 125)  BP: 115/55 (17 Jan 2023 07:00) (92/44 - 133/58)  BP(mean): 79 (17 Jan 2023 07:00) (63 - 89)  ABP: --  ABP(mean): --  RR: 29 (17 Jan 2023 07:00) (20 - 36)  SpO2: 100% (17 Jan 2023 07:00) (98% - 100%)    O2 Parameters below as of 17 Jan 2023 05:04  Patient On (Oxygen Delivery Method): ventilator          Mode: AC/ CMV (Assist Control/ Continuous Mandatory Ventilation), RR (machine): 20, TV (machine): 350, FiO2: 30, PEEP: 5, ITime: 1, MAP: 12, PIP: 30  Plateau pressure:   P/F ratio:     01-16 @ 07:01  -  01-17 @ 07:00  --------------------------------------------------------  IN: 2802.9 mL / OUT: 100 mL / NET: 2702.9 mL      CAPILLARY BLOOD GLUCOSE      POCT Blood Glucose.: 154 mg/dL (17 Jan 2023 05:57)    ECG:    PHYSICAL EXAM:    General:   HEENT:   Neck:   Respiratory:   Cardiovascular:   Abdomen:   Extremities:  Neurological:    MEDICATIONS:  MEDICATIONS  (STANDING):  albuterol/ipratropium for Nebulization 3 milliLiter(s) Nebulizer every 6 hours  ascorbic acid 500 milliGRAM(s) Oral daily  atorvastatin 40 milliGRAM(s) Oral at bedtime  azithromycin   Tablet 250 milliGRAM(s) Oral <User Schedule>  cefepime   IVPB 500 milliGRAM(s) IV Intermittent every 8 hours  chlorhexidine 0.12% Liquid 15 milliLiter(s) Oral Mucosa every 12 hours  chlorhexidine 4% Liquid 1 Application(s) Topical <User Schedule>  dexMEDEtomidine Infusion 0.2 MICROgram(s)/kG/Hr (1.89 mL/Hr) IV Continuous <Continuous>  dextrose 5%. 1000 milliLiter(s) (50 mL/Hr) IV Continuous <Continuous>  dextrose 5%. 1000 milliLiter(s) (100 mL/Hr) IV Continuous <Continuous>  dextrose 50% Injectable 50 milliLiter(s) IV Push every 15 minutes  dextrose 50% Injectable 50 milliLiter(s) IV Push every 15 minutes  diltiazem Infusion 5 mG/Hr (5 mL/Hr) IV Continuous <Continuous>  glucagon  Injectable 1 milliGRAM(s) IntraMuscular once  insulin lispro (ADMELOG) corrective regimen sliding scale   SubCutaneous every 6 hours  insulin NPH human recombinant 10 Unit(s) SubCutaneous every 6 hours  methylPREDNISolone sodium succinate Injectable 20 milliGRAM(s) IV Push daily  multivitamin 1 Tablet(s) Oral daily  pantoprazole  Injectable 40 milliGRAM(s) IV Push two times a day  phenylephrine    Infusion 0.5 MICROgram(s)/kG/Min (7.07 mL/Hr) IV Continuous <Continuous>  phytonadione   Solution 5 milliGRAM(s) Oral daily  polyethylene glycol 3350 17 Gram(s) Oral every 12 hours  propofol Infusion 10 MICROgram(s)/kG/Min (2.26 mL/Hr) IV Continuous <Continuous>  senna Syrup 10 milliLiter(s) Oral at bedtime  sodium chloride 3%  Inhalation 4 milliLiter(s) Inhalation every 12 hours  thiamine 100 milliGRAM(s) Oral daily    MEDICATIONS  (PRN):  acetaminophen     Tablet .. 650 milliGRAM(s) Oral every 6 hours PRN Temp greater or equal to 38C (100.4F), Mild Pain (1 - 3)      ALLERGIES:  Allergies    Avelox (Pruritus)  fish (Vomiting)  Levaquin (Unknown)  shellfish (Vomiting)    Intolerances    fentanyl (Other)      LABS:                        9.9    21.30 )-----------( 60       ( 17 Jan 2023 00:23 )             28.0     01-17    134<L>  |  99  |  72<H>  ----------------------------<  157<H>  3.7   |  22  |  1.87<H>    Ca    7.3<L>      17 Jan 2023 00:22  Phos  3.6     01-17  Mg     2.0     01-17    TPro  4.2<L>  /  Alb  1.9<L>  /  TBili  2.6<H>  /  DBili  x   /  AST  180<H>  /  ALT  438<H>  /  AlkPhos  145<H>  01-17    PT/INR - ( 17 Jan 2023 00:23 )   PT: 15.7 sec;   INR: 1.36 ratio         PTT - ( 17 Jan 2023 00:23 )  PTT:27.6 sec      RADIOLOGY & ADDITIONAL TESTS: Reviewed.   INTERVAL HPI/OVERNIGHT EVENTS:    SUBJECTIVE: Patient seen and examined at bedside.   Follows simple commands such as open eyes, tries to move hands on commands.     afebrile, blood pressure 110s/50s, spo2 100%  Vent: Fio2 30%, peep 5    gtt: dilt increased from 5 to 15, phenylephrine, propofol,   on glucerna tube feeds    output: not making urine - 0-5cc/hr      VITAL SIGNS:  ICU Vital Signs Last 24 Hrs  T(C): 36.7 (17 Jan 2023 04:00), Max: 37.2 (16 Jan 2023 16:00)  T(F): 98.1 (17 Jan 2023 04:00), Max: 99 (16 Jan 2023 16:00)  HR: 103 (17 Jan 2023 07:00) (65 - 125)  BP: 115/55 (17 Jan 2023 07:00) (92/44 - 133/58)  BP(mean): 79 (17 Jan 2023 07:00) (63 - 89)  ABP: --  ABP(mean): --  RR: 29 (17 Jan 2023 07:00) (20 - 36)  SpO2: 100% (17 Jan 2023 07:00) (98% - 100%)    O2 Parameters below as of 17 Jan 2023 05:04  Patient On (Oxygen Delivery Method): ventilator          Mode: AC/ CMV (Assist Control/ Continuous Mandatory Ventilation), RR (machine): 20, TV (machine): 350, FiO2: 30, PEEP: 5, ITime: 1, MAP: 12, PIP: 30  Plateau pressure:   P/F ratio:     01-16 @ 07:01  -  01-17 @ 07:00  --------------------------------------------------------  IN: 2802.9 mL / OUT: 100 mL / NET: 2702.9 mL      CAPILLARY BLOOD GLUCOSE      POCT Blood Glucose.: 154 mg/dL (17 Jan 2023 05:57)    ECG:    PHYSICAL EXAM:    General: intubated, sedated   Respiratory: clear to auscultation b/l   Cardiovascular: irregular rate, tachycardic   Abdomen: soft   Extremities: no edema b/l   Neurological: sedated on propofol, follows simple commands     MEDICATIONS:  MEDICATIONS  (STANDING):  albuterol/ipratropium for Nebulization 3 milliLiter(s) Nebulizer every 6 hours  ascorbic acid 500 milliGRAM(s) Oral daily  atorvastatin 40 milliGRAM(s) Oral at bedtime  azithromycin   Tablet 250 milliGRAM(s) Oral <User Schedule>  cefepime   IVPB 500 milliGRAM(s) IV Intermittent every 8 hours  chlorhexidine 0.12% Liquid 15 milliLiter(s) Oral Mucosa every 12 hours  chlorhexidine 4% Liquid 1 Application(s) Topical <User Schedule>  dexMEDEtomidine Infusion 0.2 MICROgram(s)/kG/Hr (1.89 mL/Hr) IV Continuous <Continuous>  dextrose 5%. 1000 milliLiter(s) (50 mL/Hr) IV Continuous <Continuous>  dextrose 5%. 1000 milliLiter(s) (100 mL/Hr) IV Continuous <Continuous>  dextrose 50% Injectable 50 milliLiter(s) IV Push every 15 minutes  dextrose 50% Injectable 50 milliLiter(s) IV Push every 15 minutes  diltiazem Infusion 5 mG/Hr (5 mL/Hr) IV Continuous <Continuous>  glucagon  Injectable 1 milliGRAM(s) IntraMuscular once  insulin lispro (ADMELOG) corrective regimen sliding scale   SubCutaneous every 6 hours  insulin NPH human recombinant 10 Unit(s) SubCutaneous every 6 hours  methylPREDNISolone sodium succinate Injectable 20 milliGRAM(s) IV Push daily  multivitamin 1 Tablet(s) Oral daily  pantoprazole  Injectable 40 milliGRAM(s) IV Push two times a day  phenylephrine    Infusion 0.5 MICROgram(s)/kG/Min (7.07 mL/Hr) IV Continuous <Continuous>  phytonadione   Solution 5 milliGRAM(s) Oral daily  polyethylene glycol 3350 17 Gram(s) Oral every 12 hours  propofol Infusion 10 MICROgram(s)/kG/Min (2.26 mL/Hr) IV Continuous <Continuous>  senna Syrup 10 milliLiter(s) Oral at bedtime  sodium chloride 3%  Inhalation 4 milliLiter(s) Inhalation every 12 hours  thiamine 100 milliGRAM(s) Oral daily    MEDICATIONS  (PRN):  acetaminophen     Tablet .. 650 milliGRAM(s) Oral every 6 hours PRN Temp greater or equal to 38C (100.4F), Mild Pain (1 - 3)      ALLERGIES:  Allergies    Avelox (Pruritus)  fish (Vomiting)  Levaquin (Unknown)  shellfish (Vomiting)    Intolerances    fentanyl (Other)      LABS:                        9.9    21.30 )-----------( 60       ( 17 Jan 2023 00:23 )             28.0     01-17    134<L>  |  99  |  72<H>  ----------------------------<  157<H>  3.7   |  22  |  1.87<H>    Ca    7.3<L>      17 Jan 2023 00:22  Phos  3.6     01-17  Mg     2.0     01-17    TPro  4.2<L>  /  Alb  1.9<L>  /  TBili  2.6<H>  /  DBili  x   /  AST  180<H>  /  ALT  438<H>  /  AlkPhos  145<H>  01-17    PT/INR - ( 17 Jan 2023 00:23 )   PT: 15.7 sec;   INR: 1.36 ratio         PTT - ( 17 Jan 2023 00:23 )  PTT:27.6 sec      RADIOLOGY & ADDITIONAL TESTS: Reviewed.

## 2023-01-17 NOTE — PROGRESS NOTE ADULT - SUBJECTIVE AND OBJECTIVE BOX
Gastroenterology/Hepatology Progress Note    Interval Events: Hgb stable. Large brown stool reported today.    Allergies:  Avelox (Pruritus)  fentanyl (Other)  fish (Vomiting)  Levaquin (Unknown)  shellfish (Vomiting)    Hospital Medications:  acetaminophen     Tablet .. 650 milliGRAM(s) Oral every 6 hours PRN  albuterol/ipratropium for Nebulization 3 milliLiter(s) Nebulizer every 6 hours  ascorbic acid 500 milliGRAM(s) Oral daily  atorvastatin 40 milliGRAM(s) Oral at bedtime  azithromycin   Tablet 250 milliGRAM(s) Oral <User Schedule>  buMETAnide Injectable 2 milliGRAM(s) IV Push once  cefepime   IVPB 500 milliGRAM(s) IV Intermittent every 8 hours  chlorhexidine 0.12% Liquid 15 milliLiter(s) Oral Mucosa every 12 hours  chlorhexidine 4% Liquid 1 Application(s) Topical <User Schedule>  dexMEDEtomidine Infusion 0.2 MICROgram(s)/kG/Hr IV Continuous <Continuous>  dextrose 5%. 1000 milliLiter(s) IV Continuous <Continuous>  dextrose 5%. 1000 milliLiter(s) IV Continuous <Continuous>  dextrose 50% Injectable 50 milliLiter(s) IV Push every 15 minutes  dextrose 50% Injectable 50 milliLiter(s) IV Push every 15 minutes  diltiazem Infusion 5 mG/Hr IV Continuous <Continuous>  glucagon  Injectable 1 milliGRAM(s) IntraMuscular once  insulin lispro (ADMELOG) corrective regimen sliding scale   SubCutaneous every 6 hours  insulin NPH human recombinant 10 Unit(s) SubCutaneous every 6 hours  methylPREDNISolone sodium succinate Injectable 20 milliGRAM(s) IV Push daily  multivitamin 1 Tablet(s) Oral daily  pantoprazole  Injectable 40 milliGRAM(s) IV Push two times a day  phenylephrine    Infusion 0.5 MICROgram(s)/kG/Min IV Continuous <Continuous>  phytonadione   Solution 5 milliGRAM(s) Oral daily  polyethylene glycol 3350 17 Gram(s) Oral every 12 hours  propofol Infusion 10 MICROgram(s)/kG/Min IV Continuous <Continuous>  senna Syrup 10 milliLiter(s) Oral at bedtime  sodium chloride 3%  Inhalation 4 milliLiter(s) Inhalation every 12 hours  thiamine 100 milliGRAM(s) Oral daily    ROS: 14 point ROS negative unless otherwise state in subjective    PHYSICAL EXAM:   Vital Signs:  Vital Signs Last 24 Hrs  T(C): 36.7 (17 Jan 2023 08:00), Max: 37.2 (16 Jan 2023 16:00)  T(F): 98.1 (17 Jan 2023 08:00), Max: 99 (16 Jan 2023 16:00)  HR: 94 (17 Jan 2023 08:00) (74 - 125)  BP: 109/53 (17 Jan 2023 08:00) (92/44 - 133/58)  BP(mean): 73 (17 Jan 2023 08:00) (63 - 89)  RR: 29 (17 Jan 2023 08:00) (20 - 36)  SpO2: 100% (17 Jan 2023 08:00) (98% - 100%)    Parameters below as of 17 Jan 2023 08:00  Patient On (Oxygen Delivery Method): ventilator    O2 Concentration (%): 30  Daily     Daily     GENERAL:  No acute distress  HEENT:  NCAT, no scleral icterus  CHEST: no resp distress  HEART:  RRR  ABDOMEN:  Soft, non-tender, non-distended  EXTREMITIES:  No cyanosis, clubbing, or edema  SKIN:  No rash/erythema/ecchymoses  NEURO:  Alert and oriented x 3    LABS:                        9.9    21.30 )-----------( 60       ( 17 Jan 2023 00:23 )             28.0     Mean Cell Volume: 79.3 fl (01-17-23 @ 00:23)    01-17    134<L>  |  99  |  72<H>  ----------------------------<  157<H>  3.7   |  22  |  1.87<H>    Ca    7.3<L>      17 Jan 2023 00:22  Phos  3.6     01-17  Mg     2.0     01-17    TPro  4.2<L>  /  Alb  1.9<L>  /  TBili  2.6<H>  /  DBili  x   /  AST  180<H>  /  ALT  438<H>  /  AlkPhos  145<H>  01-17    LIVER FUNCTIONS - ( 17 Jan 2023 00:22 )  Alb: 1.9 g/dL / Pro: 4.2 g/dL / ALK PHOS: 145 U/L / ALT: 438 U/L / AST: 180 U/L / GGT: x           PT/INR - ( 17 Jan 2023 00:23 )   PT: 15.7 sec;   INR: 1.36 ratio         PTT - ( 17 Jan 2023 00:23 )  PTT:27.6 sec    Imaging:  reviewed

## 2023-01-17 NOTE — CONSULT NOTE ADULT - ASSESSMENT
77 year old with  dementia, DM, on home oxygen for chronic pulmonary disease, non ambulatory.     admitted for sob, anorexia, malaise for the last 8 days  no sick contacts    received flu vaccinae but never covid   Denies any UTI symptoms despite the pyuria   no tenderness in abd  had cholecystectomy two years ago,    chest xray is difficult to interpret   ifiltrate is subtle   pna is possible but not definite     agree with empiric cefepime  would get CT of the chest and abd to clarify picture 
77-year-old female with PMHx of DM, HTN, HLD, asthma on daily steroids, A. fib on Eliquis and digoxin presenting with had increasing SOB, wheezing, cough x 1 week. Pt intubated in ER for acute hypoxic respiratory failure and admitted to MICU. Course c/b Afib w/ RVR started on diltiazem gtt. 1/13 Failed CPAP trial. Palliative care now following for [facility] 
77-year-old female with past medical history of DM, HTN, HLD, asthma on daily steroids, A. fib on Eliquis and digoxin presenting with shortness of breath.  Patient unable to provide history.  Daughter at bedside reports patient has had increasing shortness of breath, wheezing, cough for the past week now.  Patient also with increasing generalized weakness, fatigue and decreased p.o. intake.  Denies fever/chills, chest pain, abdominal pain, nausea, vomiting, diarrhea, dysuria.  Family reports patient had a similar episode 1 year ago when she was admitted to [facility] with UTI.  pt with sig medical hx, chronic a.fib with sob ?sec sepsis/ pneumoniae, UTI  start on ceftriaxone  tele  for increase hr, will increase Cardizem dose  continue AC  cardiac enzyme  tsh  echo  RVP negative      ASTHMA/ COPD exacerbation;   A Fibrillation  with rvr:  DM:  HTN:   HLD:     1/09:    ASTHMA/ COPD exacerbation; she was using performist at home with 5 mg of prednisone  not sure why she was not on any other meds:  sill start Symbicort too:  along with BD and is already on cefepime:  though pneumonia to me is not very convincing : will probably need ct scan chest   A Fibrillation  with rvr: Hr still elevated:  on cardizem : would defer to cards:  on ac:  lovenox  + coumadin : check echo   DM:: monitor and control   HTN: : controlled  HLD: on statins  :    dw team
Pt. with SHANE
Pt is a 77-year-old female with past medical history of DM, HTN, HLD, asthma on daily steroids, A. fib on Eliquis and digoxin presenting with shortness of breath, found to be in Afib w/RVR. Pt admitted to MICU for Acute Hypoxic Respiratory Failure in s/o Asthma and Bronchiectasis w/ possible PNA, leading to intubation and sedation. GI consulted for episode of hematochezia.    #Hypoxic respiratory failure 2/2 asthma vs bronchiectasis vs PNA. Intubated/sedated  #Anemia. Hgb dropped from 13>6 since admission  #Hematochezia: hemorrhoidal vs diverticular vs ischemic vs mass vs AVM vs other    Recommendations  - trend CBC, keep active T&S, transfuse for Hgb<7  - will hold off on urgent endoscopic intervention at this time given no further episodes of hematochezia and acute illness. If patient were to develop significant bleeding, may then require more urgent intervention    - recommend general anemia w/u given dropping counts despite no prior reported episodes of hematochezia    All recommendations are tentative until note is attested by attending.     Ashley Turner, PGY5  Gastroenterology/Hepatology Fellow  Available on [employer] Teams  [phone] ([employer] Short Range Pager)  [phone] (Long Range Pager)    After 5pm, please contact the on-call GI fellow. [phone]
77-year-old female with past medical history of DM, HTN, HLD, asthma on daily steroids, A. fib on Eliquis and digoxin presenting with shortness of breath.  Patient unable to provide history.  Daughter at bedside reports patient has had increasing shortness of breath, wheezing, cough for the past week now.  Patient also with increasing generalized weakness, fatigue and decreased p.o. intake.  Denies fever/chills, chest pain, abdominal pain, nausea, vomiting, diarrhea, dysuria.  Family reports patient had a similar episode 1 year ago when she was admitted to [facility] with UTI.  found to be in Afib w/RVR; admitted to MICU for Acute Hypoxic Respiratory Failure in s/o Asthma and Bronchiectasis w/ possible PNA, leading to intubation and sedation. Currently urine output worsening, attempting to to SBP daily.      Afib w/ RVR  cont rate control   off ac due to acute anemia     Acute on chronic  hypoxemic respiratory failure   cont steroids and would consider stress steroids   vented      SHANE: sec to hemodynamics     acute anemia :  s/p transfusion   ? sec to acute bleed ? in settng of supratheraputic INR   cont to monitor andtransfuse prn   gi input noted      Sepsis  cont pressure support and abx         Pt is full code

## 2023-01-17 NOTE — PROGRESS NOTE ADULT - ASSESSMENT
ASSESSMENT/PLAN:  77-year-old female with past medical history of DM, HTN, HLD, asthma on daily steroids, A. fib on Eliquis and digoxin presenting with shortness of breath, found to be in Afib w/RVR; admitted to MICU for Acute Hypoxic Respiratory Failure in s/o Asthma and Bronchiectasis w/ possible PNA, leading to intubation and sedation. Currently urine output worsening, attempting to to SBP daily.     NEURO  intubated on propofol  rass -5    CARDIAC  # Afib w/ RVR  Admitted in RVR; being followed by Dr. Marcus, Cardiology; Afib w/ RVR thought to be due to stress from hypoxemia  - Chronic atrial fib. with RVR.  Continue diltiazem drip. 1/17 - increased to 15 overnight.   - Echo (1/11) EF 75%  1. Endocardial visualization enhanced with intravenous  injection of Ultrasonic Enhancing Agent (Definity).  Hyperdynamic left ventricular systolic function. No left  ventricular thrombus.  2. Normal right ventricular size and function.  - TSH normal    Hypotension requiring pressors:   - continued on phenylephrine     PULM  #Acute on chronic  hypoxemic respiratory failure due to acute exacerbation of bronchiectasis / asthma and possible pneumonia.   Continue current AC vent settings.  Did not tolerate PS wean today. Restart propofol.  Continue solumedrol to 20mg  daily.  Continue bronchodilators  Continue cefepime day 4.   Chest PT   3% saline nebulizers with Duo nebs.  Azithromycin  M, W, F for   antiinflammatory effects.    CT Angio r/o PE; currently thought to be due to Asthma exacerbation vs bronchiectasis vs ?PNA;   - tachypneic on BiPAP to 30s, saturating 100% but is tiring out as of now  - intubate and sedate to assist with airway clearance  - chest PT; airway clearance; hypertonic saline  - daily SBT/SATs  - solumedrol 20q24h  - DuoNebs q6h   - currently of Cefepime (1/10-) for empiric coverage; follow infectious diseases recommendations  - BAL growing few Pseudomonas; wait for sensitivities  - follow-up Legionella antigen- negative, final BCx- negative; RVP negative; BAL cx  - Bicarb drip @150cc/hr -> discontinued 1/16 as bicarb above 22    RENAL  # SHANE  - urine lytes showing FeNa 2.2%-> ATN; treat with fluids. Creatinine stabilized at 1.8 now.   - urine output decreaSed to 5cc/hr 1/16. Will trial bolus fluids.     GI   - concern for GI bleed (hb drop requiring blood transfusion 1/15 and 1/16) and small volume hematoschezia visualized 1/16am per RN. GI consulted - hold off endoscopic evaluation unless further bleeding occurs.   - Diet: tube feeds  - Bowel regimen: miralax, senna    HEME/ONC  # Leukocytosis  - cultures negative. off abx. possibly from steroids, downtrending from 40, now 16.     # INR 11.1 (00:00 1/11)-> 4.5 (6 AM 1/11) -> 1.42 (1/12)  - continue to hold Lovenox in setting of hb drops    ID  # Sepsis  CT Chest w/ bilateral lower lobe predominant patchy and branching opacities suspicious for pneumonia. Small left pleural effusion. Leukocytosis, tachycardia and tachypnea; No active indications of infections, all infectious workup negative so far; leukocytosis likely in s/o solumedrol usage but no diff to conform neutrophilia so far; tachycardia likely in s/o tachypnea and AHRF  - was started on cefepime 1/8 = now it is day 10. Discontinue?   - Urine legionella antigen - neg, final BCx 1/10 - neg, BAL cultures - numerous GNR, few gram pos       ENDO  DM2, hyperglycemia  - hyperglycemic 450s 1/15, started on insulin gtt 1/15pm. likely in setting of steroids.   - 1/16 - 1/17 was on insulin gtt, now on nph 10u q6h + SSI      DVT  - Hold Lovenox 50mg BID    ETHICS  Full Code  family at bedside, updated      ASSESSMENT/PLAN:  77-year-old female with past medical history of DM, HTN, HLD, asthma on daily steroids, A. fib on Eliquis and digoxin presenting with shortness of breath, found to be in Afib w/RVR; admitted to MICU for Acute Hypoxic Respiratory Failure in s/o Asthma and Bronchiectasis w/ possible PNA, leading to intubation and sedation. Currently urine output worsening, attempting to to SBP daily.     NEURO  intubated on propofol, follows simple commands (open eyes, tries to move hands)     CARDIAC  # Afib w/ RVR  Admitted in RVR; being followed by Dr. Marcus, Cardiology; Afib w/ RVR thought to be due to stress from hypoxemia  - Chronic atrial fib. with RVR.  Continue diltiazem drip. 1/17 - increased to 15 overnight.   - Echo (1/11) EF 75%  1. Endocardial visualization enhanced with intravenous  injection of Ultrasonic Enhancing Agent (Definity).  Hyperdynamic left ventricular systolic function. No left  ventricular thrombus.  2. Normal right ventricular size and function.  - TSH normal    Hypotension requiring pressors:   - continued on phenylephrine     PULM  #Acute on chronic  hypoxemic respiratory failure due to acute exacerbation of bronchiectasis / asthma and possible pneumonia.   Continue current AC vent settings.  Did not tolerate PS wean today. Restart propofol.  Continue solumedrol to 20mg  daily.  Continue bronchodilators  Continue cefepime day 10/14 for pseudomonas pneumonia  Chest PT   3% saline nebulizers with Duo nebs.  Azithromycin  M, W, F for   antiinflammatory effects.    RENAL  # SHANE  - urine lytes showing FeNa 2.2%-> ATN; treat with fluids. Creatinine stabilized at 1.8 now.   - urine output decreaSed to 5cc/hr 1/16 did not improve with IVF  - 1/17 - given bumex 2mg with improved urine output. Nephro consulted - f/u recs    GI   - concern for GI bleed (hb drop requiring blood transfusion 1/15 and 1/16) and small volume hematoschezia visualized 1/16am per RN. GI consulted - hold off endoscopic evaluation unless further bleeding occurs.   - GI recommended CT abdomen with contrast to evaluate for gi bleeding - ordered   - Diet: tube feeds  - Bowel regimen: miralax, senna    HEME/ONC  # Leukocytosis  - cultures negative. off abx. possibly from steroids, downtrending from 40, now 16.     # INR 11.1 (00:00 1/11)-> 4.5 (6 AM 1/11) -> 1.42 (1/12)  - continue to hold Lovenox in setting of hb drops    ID  # Sepsis  CT Chest w/ bilateral lower lobe predominant patchy and branching opacities suspicious for pneumonia. Small left pleural effusion. Leukocytosis, tachycardia and tachypnea; No active indications of infections, all infectious workup negative so far; leukocytosis likely in s/o solumedrol usage but no diff to conform neutrophilia so far; tachycardia likely in s/o tachypnea and AHRF  - was started on cefepime 1/8 = now it is day 10. Discontinue?   - Urine legionella antigen - neg, final BCx 1/10 - neg, BAL cultures - numerous GNR, few gram pos       ENDO  DM2, hyperglycemia  - hyperglycemic 450s 1/15, started on insulin gtt 1/15pm. likely in setting of steroids.   - 1/16 - 1/17 was on insulin gtt, now on nph 10u q6h + SSI      DVT  - Hold Lovenox 50mg BID    ETHICS  Full Code  family at bedside, updated      ASSESSMENT/PLAN:  77-year-old female with past medical history of DM, HTN, HLD, asthma on daily steroids, A. fib on Eliquis and digoxin presenting with shortness of breath, found to be in Afib w/RVR; admitted to MICU for Acute Hypoxic Respiratory Failure in s/o Asthma and Bronchiectasis w/ possible PNA, leading to intubation and sedation. Currently urine output worsening, attempting to to SBP daily.     NEURO  intubated on propofol, follows simple commands (open eyes, tries to move hands)     CARDIAC  # Afib w/ RVR  Admitted in RVR; being followed by Dr. Marcus, Cardiology; Afib w/ RVR thought to be due to stress from hypoxemia  - Chronic atrial fib. with RVR.  Continue diltiazem drip. 1/17 - increased to 15 overnight.   - Echo (1/11) EF 75%  1. Endocardial visualization enhanced with intravenous  injection of Ultrasonic Enhancing Agent (Definity).  Hyperdynamic left ventricular systolic function. No left  ventricular thrombus.  2. Normal right ventricular size and function.  - TSH normal    Hypotension requiring pressors:   - continued on phenylephrine     PULM  #Acute on chronic  hypoxemic respiratory failure due to acute exacerbation of bronchiectasis / asthma and possible pneumonia.   Continue current AC vent settings.  Did not tolerate PS wean today. Restart propofol.  Continue solumedrol to 20mg  daily.  Continue bronchodilators  Continue cefepime day 10/14 for pseudomonas pneumonia  Chest PT   3% saline nebulizers with Duo nebs.  Azithromycin  M, W, F for   antiinflammatory effects.    RENAL  # SHANE  - urine lytes showing FeNa 2.2%-> ATN; treat with fluids. Creatinine stabilized at 1.8 now.   - urine output decreaSed to 5cc/hr 1/16 did not improve with IVF  - 1/17 - given bumex 2mg with improved urine output. Nephro consulted - f/u recs    GI   - concern for GI bleed (hb drop requiring blood transfusion 1/15 and 1/16) and small volume hematoschezia visualized 1/16am per RN. GI consulted - hold off endoscopic evaluation unless further bleeding occurs.   - GI recommended CT abdomen with contrast to evaluate for gi bleeding - ordered   - Diet: tube feeds  - Bowel regimen: miralax, senna    HEME/ONC  # Leukocytosis  - cultures negative. off abx. possibly from steroids, downtrending from 40, now 16.     # INR 11.1 (00:00 1/11)-> 4.5 (6 AM 1/11) -> 1.42 (1/12)  - continue to hold Lovenox in setting of hb drops    ID  # Sepsis  CT Chest w/ bilateral lower lobe predominant patchy and branching opacities suspicious for pneumonia. Small left pleural effusion. Leukocytosis, tachycardia and tachypnea; No active indications of infections, all infectious workup negative so far; leukocytosis likely in s/o solumedrol usage but no diff to conform neutrophilia so far; tachycardia likely in s/o tachypnea and AHRF  - was started on cefepime 1/8 = now it is day 10/14  - Urine legionella antigen - neg, final BCx 1/10 - neg, BAL cultures - numerous GNR, few gram pos       ENDO  DM2, hyperglycemia  - hyperglycemic 450s 1/15, started on insulin gtt 1/15pm. likely in setting of steroids.   - 1/16 - 1/17 was on insulin gtt, now on nph 10u q6h + SSI with improved glc       DVT  - Hold Lovenox 50mg BID    ETHICS  Full Code  family at bedside, updated

## 2023-01-17 NOTE — CONSULT NOTE ADULT - PROBLEM SELECTOR RECOMMENDATION 9
Pt. with SHANE suspected in setting of hemodynamics with notable hypotension throughout the hospital course requiring IV vasopressors. On review of [facility] pt noted to have a SCr of 0.64 on admission 1/8, which increased to 2.58 on 1/11, and since then has improved but remains elevated/stable at 1.87 this morning. UA and urine lytes reviewed. Spot urine TP/CR ratio elevated to 1.6. Pt likely with ATN. Recommend to obtain renal sonogram. Pt currently oliguric. Received one dose IV bumex with notable increased UOP over the past hour; andrade catheter in place. Pt clinically volume overloaded. Recommend to initiate IV bumex 2mg BID and monitor any response. No acute indication for RRT at this time, however if remains anuric without improvement in renal function then we will need to consider. Monitor labs and urine output. Avoid nephrotoxins. Dose medications as per eGFR.    If any questions, please feel free to contact me     Tex Lynch  Nephrology Fellow  [facility] Pager: [phone]
- PPSV 10%  - Requires 2 RN for all care/ turn and position

## 2023-01-17 NOTE — CONSULT NOTE ADULT - REASON FOR ADMISSION
sob/ rapid a.fib

## 2023-01-17 NOTE — CONSULT NOTE ADULT - SUBJECTIVE AND OBJECTIVE BOX
[facility] DIVISION OF KIDNEY DISEASES AND HYPERTENSION -- [phone]  -- INITIAL CONSULT NOTE  --------------------------------------------------------------------------------  HPI:  Pt is a 77 year old female with PMH of DM, HTN, HLD, Asthma, and Afib who presented to the hospital with shortness of breath and noted to be in afib with RVR. The patient was emergently intubated for acute hypoxic respiratory failure and admitted to the MICU. The patient has failed multiple attempts at extubation. The nephrology team was consulted for SHANE, On review of [facility] pt noted to have a SCr of 0.64 on admission 1/8, which increased to 2.58 on 1/11, and since then has improved but remains elevated/stable at 1.87 this morning. Over the past few days the patient was noted to have decreasing UOP. The patient was seen and examined today in the MICU, accompanied by her daughter at bedside. The patient is currently intubated, sedated on IV vasopressors unable to provide any history. As per the daughter the patient does not have any history of kidney disease.     PAST HISTORY  --------------------------------------------------------------------------------  PAST MEDICAL & SURGICAL HISTORY:  Asthma      Diabetes mellitus      Atrial fibrillation, chronic      Type 2 diabetes mellitus      Hypertension      Dementia      No significant past surgical history        FAMILY HISTORY:  Family history of diabetes mellitus (Mother)      PAST SOCIAL HISTORY:    ALLERGIES & MEDICATIONS  --------------------------------------------------------------------------------  Allergies    Avelox (Pruritus)  fish (Vomiting)  Levaquin (Unknown)  shellfish (Vomiting)    Intolerances    fentanyl (Other)    Standing Inpatient Medications  albuterol/ipratropium for Nebulization 3 milliLiter(s) Nebulizer every 6 hours  ascorbic acid 500 milliGRAM(s) Oral daily  atorvastatin 40 milliGRAM(s) Oral at bedtime  azithromycin   Tablet 250 milliGRAM(s) Oral <User Schedule>  buMETAnide Injectable 2 milliGRAM(s) IV Push every 12 hours  cefepime   IVPB 500 milliGRAM(s) IV Intermittent every 8 hours  chlorhexidine 0.12% Liquid 15 milliLiter(s) Oral Mucosa every 12 hours  chlorhexidine 4% Liquid 1 Application(s) Topical <User Schedule>  dexMEDEtomidine Infusion 0.2 MICROgram(s)/kG/Hr IV Continuous <Continuous>  dextrose 5%. 1000 milliLiter(s) IV Continuous <Continuous>  dextrose 5%. 1000 milliLiter(s) IV Continuous <Continuous>  dextrose 50% Injectable 50 milliLiter(s) IV Push every 15 minutes  dextrose 50% Injectable 50 milliLiter(s) IV Push every 15 minutes  diltiazem Infusion 5 mG/Hr IV Continuous <Continuous>  glucagon  Injectable 1 milliGRAM(s) IntraMuscular once  insulin lispro (ADMELOG) corrective regimen sliding scale   SubCutaneous every 6 hours  insulin NPH human recombinant 10 Unit(s) SubCutaneous every 6 hours  methylPREDNISolone sodium succinate Injectable 20 milliGRAM(s) IV Push daily  multivitamin 1 Tablet(s) Oral daily  pantoprazole  Injectable 40 milliGRAM(s) IV Push two times a day  phenylephrine    Infusion 0.5 MICROgram(s)/kG/Min IV Continuous <Continuous>  phytonadione   Solution 5 milliGRAM(s) Oral daily  polyethylene glycol 3350 17 Gram(s) Oral every 12 hours  propofol Infusion 10 MICROgram(s)/kG/Min IV Continuous <Continuous>  senna Syrup 10 milliLiter(s) Oral at bedtime  sodium chloride 3%  Inhalation 4 milliLiter(s) Inhalation every 12 hours  thiamine 100 milliGRAM(s) Oral daily    PRN Inpatient Medications  acetaminophen     Tablet .. 650 milliGRAM(s) Oral every 6 hours PRN      REVIEW OF SYSTEMS  Unable to obtain    VITALS/PHYSICAL EXAM  --------------------------------------------------------------------------------  T(C): 36.7 (01-17-23 @ 08:00), Max: 37.2 (01-16-23 @ 16:00)  HR: 96 (01-17-23 @ 11:20) (74 - 133)  BP: 108/49 (01-17-23 @ 10:45) (92/44 - 153/58)  RR: 20 (01-17-23 @ 10:45) (20 - 36)  SpO2: 100% (01-17-23 @ 11:20) (96% - 100%)  Wt(kg): --        01-16-23 @ 07:01  -  01-17-23 @ 07:00  --------------------------------------------------------  IN: 2802.9 mL / OUT: 100 mL / NET: 2702.9 mL    01-17-23 @ 07:01  -  01-17-23 @ 11:29  --------------------------------------------------------  IN: 73.2 mL / OUT: 60 mL / NET: 13.2 mL      Physical Exam:  	Gen: ill appearing  	HEENT: ETT  	Pulm: decreased breath sounds  	CV: S1S2  	Abd: Soft, +BS   	Ext: ++ LE edema B/L  	Neuro: sedated  	Skin: Warm and dry    LABS/STUDIES  --------------------------------------------------------------------------------              9.9    21.30 >-----------<  60       [01-17-23 @ 00:23]              28.0     134  |  99  |  72  ----------------------------<  157      [01-17-23 @ 00:22]  3.7   |  22  |  1.87        Ca     7.3     [01-17-23 @ 00:22]      Mg     2.0     [01-17-23 @ 00:22]      Phos  3.6     [01-17-23 @ 00:22]    TPro  4.2  /  Alb  1.9  /  TBili  2.6  /  DBili  x   /  AST  180  /  ALT  438  /  AlkPhos  145  [01-17-23 @ 00:22]    PT/INR: PT 15.7 , INR 1.36       [01-17-23 @ 00:23]  PTT: 27.6       [01-17-23 @ 00:23]          [01-16-23 @ 00:34]    Creatinine Trend:  SCr 1.87 [01-17 @ 00:22]  SCr 1.77 [01-16 @ 09:35]  SCr 1.91 [01-16 @ 00:33]  SCr 2.11 [01-15 @ 17:07]  SCr 1.87 [01-15 @ 00:24]    Urinalysis - [01-08-23 @ 14:26]      Color Light Yellow / Appearance Slightly Turbid / SG 1.015 / pH 7.0      Gluc 1000 mg/dL / Ketone Small  / Bili Negative / Urobili Negative       Blood Trace / Protein 30 mg/dL / Leuk Est Large / Nitrite Negative      RBC 6 / WBC 53 / Hyaline 2 / Gran  / Sq Epi  / Non Sq Epi 3 / Bacteria Negative    Urine Creatinine 29      [01-14-23 @ 18:07]  Urine Protein 46      [01-14-23 @ 18:07]  Urine Sodium 74      [01-14-23 @ 18:09]  Urine Urea Nitrogen 455      [01-14-23 @ 18:07]  Urine Potassium 39      [01-14-23 @ 18:09]  Urine Osmolality 393      [01-14-23 @ 18:07]    HbA1c 6.6      [02-02-19 @ 10:59]  TSH 1.17      [01-09-23 @ 06:11]    HBsAg Nonreact      [03-03-18 @ 08:11]  HCV 0.12, Nonreact      [03-03-18 @ 08:11]    IMELDA: titer Negative, pattern --      [03-04-18 @ 08:33]

## 2023-01-17 NOTE — CHART NOTE - NSCHARTNOTEFT_GEN_A_CORE
: Perfecto Tejada    INDICATION:  Respiratory failure  Shock    PROCEDURE:  [x ] LIMITED ECHO  [x ] LIMITED CHEST  [ ] LIMITED RETROPERITONEAL  [ ] LIMITED ABDOMINAL  [ ] LIMITED DVT  [ ] NEEDLE GUIDANCE VASCULAR  [ ] NEEDLE GUIDANCE THORACENTESIS  [ ] NEEDLE GUIDANCE PARACENTESIS  [ ] NEEDLE GUIDANCE PERICARDIOCENTESIS  [ ] OTHER    FINDINGS:  Lungs: A line predominance. Lung sliding b/l. Small right sided simple appearing pleural effusions. Moderate left sided simple appearing pleural effusion. Small volume perihepatic and perisplenic ascites noted.   Heart: Grossly normal LVSF. LV > RV. Small pericardial effusion without RV diastolic collapse. IVC is not large.     INTERPRETATION:  B/L pleural effusions.  Ascites  Pericardial effusion with low probability of tamponade.   Distributive shock.    Images uploaded on [employer] : Perfecto Tejada    INDICATION:  Respiratory failure  Shock    PROCEDURE:  [x ] LIMITED ECHO  [x ] LIMITED CHEST  [ ] LIMITED RETROPERITONEAL  [ ] LIMITED ABDOMINAL  [ ] LIMITED DVT  [ ] NEEDLE GUIDANCE VASCULAR  [ ] NEEDLE GUIDANCE THORACENTESIS  [ ] NEEDLE GUIDANCE PARACENTESIS  [ ] NEEDLE GUIDANCE PERICARDIOCENTESIS  [ ] OTHER    FINDINGS:  Lungs: A line predominance. Lung sliding b/l. Small right sided simple appearing pleural effusions. Moderate left sided simple appearing pleural effusion. Small volume perihepatic and perisplenic ascites noted.   Heart: Grossly normal LVSF. LV > RV. Small pericardial effusion without RV diastolic collapse. IVC is not large.     INTERPRETATION:  B/L pleural effusions.  Ascites  Pericardial effusion with low probability of tamponade.   Distributive shock.    Images uploaded on [employer]    Attending Attestation:  I was present during the key portions of the procedure and immediately available during the entire procedure.  Kenroy Calabrese DO  Attending  Pulmonary & Critical Care Medicine

## 2023-01-17 NOTE — PROGRESS NOTE ADULT - SUBJECTIVE AND OBJECTIVE BOX
Date of Service: 01-17-23 @ 13:47    Patient is a 77y old  Female who presents with a chief complaint of sob/ rapid a.fib (17 Jan 2023 11:29)      Any change in ROS: seem same:  daughter at bedside:  intubated and sedated:  on 30% fio2:       MEDICATIONS  (STANDING):  albuterol/ipratropium for Nebulization 3 milliLiter(s) Nebulizer every 6 hours  ascorbic acid 500 milliGRAM(s) Oral daily  atorvastatin 40 milliGRAM(s) Oral at bedtime  azithromycin   Tablet 250 milliGRAM(s) Oral <User Schedule>  buMETAnide Injectable 2 milliGRAM(s) IV Push every 12 hours  cefepime   IVPB 500 milliGRAM(s) IV Intermittent every 8 hours  chlorhexidine 0.12% Liquid 15 milliLiter(s) Oral Mucosa every 12 hours  chlorhexidine 4% Liquid 1 Application(s) Topical <User Schedule>  dexMEDEtomidine Infusion 0.2 MICROgram(s)/kG/Hr (1.89 mL/Hr) IV Continuous <Continuous>  dextrose 5%. 1000 milliLiter(s) (50 mL/Hr) IV Continuous <Continuous>  dextrose 5%. 1000 milliLiter(s) (100 mL/Hr) IV Continuous <Continuous>  dextrose 50% Injectable 50 milliLiter(s) IV Push every 15 minutes  dextrose 50% Injectable 50 milliLiter(s) IV Push every 15 minutes  diltiazem Infusion 5 mG/Hr (5 mL/Hr) IV Continuous <Continuous>  glucagon  Injectable 1 milliGRAM(s) IntraMuscular once  insulin lispro (ADMELOG) corrective regimen sliding scale   SubCutaneous every 6 hours  insulin NPH human recombinant 10 Unit(s) SubCutaneous every 6 hours  methylPREDNISolone sodium succinate Injectable 20 milliGRAM(s) IV Push daily  multivitamin 1 Tablet(s) Oral daily  pantoprazole  Injectable 40 milliGRAM(s) IV Push two times a day  phenylephrine    Infusion 0.5 MICROgram(s)/kG/Min (7.07 mL/Hr) IV Continuous <Continuous>  phytonadione   Solution 5 milliGRAM(s) Oral daily  polyethylene glycol 3350 17 Gram(s) Oral every 12 hours  propofol Infusion 10 MICROgram(s)/kG/Min (2.26 mL/Hr) IV Continuous <Continuous>  senna Syrup 10 milliLiter(s) Oral at bedtime  sodium chloride 3%  Inhalation 4 milliLiter(s) Inhalation every 12 hours  thiamine 100 milliGRAM(s) Oral daily    MEDICATIONS  (PRN):  acetaminophen     Tablet .. 650 milliGRAM(s) Oral every 6 hours PRN Temp greater or equal to 38C (100.4F), Mild Pain (1 - 3)    Vital Signs Last 24 Hrs  T(C): 36.3 (17 Jan 2023 12:00), Max: 37.2 (16 Jan 2023 16:00)  T(F): 97.3 (17 Jan 2023 12:00), Max: 99 (16 Jan 2023 16:00)  HR: 93 (17 Jan 2023 13:15) (82 - 133)  BP: 102/49 (17 Jan 2023 13:15) (92/44 - 153/58)  BP(mean): 70 (17 Jan 2023 13:15) (63 - 89)  RR: 25 (17 Jan 2023 13:15) (20 - 36)  SpO2: 100% (17 Jan 2023 13:15) (96% - 100%)    Parameters below as of 17 Jan 2023 11:20  Patient On (Oxygen Delivery Method): ventilator      Mode: AC/ CMV (Assist Control/ Continuous Mandatory Ventilation)  RR (machine): 20  TV (machine): 350  FiO2: 30  PEEP: 5  ITime: 1  MAP: 11  PIP: 36    I&O's Summary    16 Jan 2023 07:01  -  17 Jan 2023 07:00  --------------------------------------------------------  IN: 2802.9 mL / OUT: 100 mL / NET: 2702.9 mL    17 Jan 2023 07:01  -  17 Jan 2023 13:47  --------------------------------------------------------  IN: 188.8 mL / OUT: 570 mL / NET: -381.2 mL          Physical Exam:   GENERAL: NAD, well-groomed, well-developed  HEENT: ROSA/   Atraumatic, Normocephalic  ENMT: No tonsillar erythema, exudates, or enlargement; Moist mucous membranes, Good dentition, No lesions  NECK: Supple, No JVD, Normal thyroid  CHEST/LUNG:poor air entry   CVS: Regular rate and rhythm; No murmurs, rubs, or gallops  GI: : Soft, Nontender, Nondistended; Bowel sounds present  NERVOUS SYSTEM:  seated and intubated  EXTREMITIES: - edema  LYMPH: No lymphadenopathy noted  SKIN: No rashes or lesions  ENDOCRINOLOGY: No Thyromegaly  PSYCH: calm     Labs:  ABG - ( 17 Jan 2023 00:05 )  pH, Arterial: 7.48  pH, Blood: x     /  pCO2: 33    /  pO2: 99    / HCO3: 25    / Base Excess: 1.4   /  SaO2: 98.4            30, 30, 40, 30, 30, 7, 60.0                            10.7   25.36 )-----------( 71       ( 17 Jan 2023 12:16 )             30.4                         9.9    21.30 )-----------( 60       ( 17 Jan 2023 00:23 )             28.0                         9.7    24.09 )-----------( 67       ( 16 Jan 2023 13:08 )             27.6                         6.9    16.39 )-----------( 63       ( 16 Jan 2023 06:35 )             20.0                         7.6    20.56 )-----------( 72       ( 16 Jan 2023 00:35 )             22.5                         6.3    24.14 )-----------( 89       ( 15 Tim 2023 17:07 )             19.0                         8.1    30.39 )-----------( 119      ( 15 Tim 2023 00:25 )             24.5                         9.2    33.32 )-----------( 133      ( 14 Jan 2023 18:02 )             27.8     01-17    136  |  100  |  75<H>  ----------------------------<  108<H>  3.7   |  25  |  2.01<H>  01-17    134<L>  |  99  |  72<H>  ----------------------------<  157<H>  3.7   |  22  |  1.87<H>  01-16    137  |  101  |  68<H>  ----------------------------<  118<H>  3.5   |  22  |  1.77<H>  01-16    134<L>  |  100  |  70<H>  ----------------------------<  417<H>  3.7   |  19<L>  |  1.91<H>  01-15    134<L>  |  103  |  72<H>  ----------------------------<  501<HH>  4.9   |  18<L>  |  2.11<H>  01-15    134<L>  |  105  |  67<H>  ----------------------------<  80  5.1   |  13<L>  |  1.87<H>  01-14    132<L>  |  105  |  68<H>  ----------------------------<  100<H>  4.7   |  17<L>  |  1.78<H>  01-14    122<L>  |  93<L>  |  63<H>  ----------------------------<  446<H>  4.6   |  18<L>  |  1.77<H>  01-14    129<L>  |  101  |  69<H>  ----------------------------<  426<H>  4.4   |  17<L>  |  1.86<H>  01-13    127<L>  |  96  |  66<H>  ----------------------------<  369<H>  4.5   |  19<L>  |  2.03<H>    Ca    7.4<L>      17 Jan 2023 12:16  Ca    7.3<L>      17 Jan 2023 00:22  Ca    7.2<L>      16 Jan 2023 09:35  Ca    7.1<L>      16 Jan 2023 00:33  Ca    6.5<LL>      15 Tim 2023 17:07  Phos  3.8     01-17  Phos  3.6     01-17  Phos  3.6     01-16  Phos  4.2     01-16  Phos  5.3     01-15  Mg     1.9     01-17  Mg     2.0     01-17  Mg     2.0     01-16  Mg     1.9     01-16    TPro  4.4<L>  /  Alb  2.0<L>  /  TBili  2.4<H>  /  DBili  x   /  AST  163<H>  /  ALT  419<H>  /  AlkPhos  173<H>  01-17  TPro  4.2<L>  /  Alb  1.9<L>  /  TBili  2.6<H>  /  DBili  x   /  AST  180<H>  /  ALT  438<H>  /  AlkPhos  145<H>  01-17  TPro  4.3<L>  /  Alb  2.1<L>  /  TBili  0.8  /  DBili  x   /  AST  248<H>  /  ALT  497<H>  /  AlkPhos  119  01-16  TPro  4.2<L>  /  Alb  2.2<L>  /  TBili  0.9  /  DBili  x   /  AST  482<H>  /  ALT  539<H>  /  AlkPhos  124<H>  01-16  TPro  3.5<L>  /  Alb  1.7<L>  /  TBili  0.6  /  DBili  x   /  AST  741<H>  /  ALT  728<H>  /  AlkPhos  101  01-15  TPro  3.8<L>  /  Alb  1.9<L>  /  TBili  0.4  /  DBili  x   /  AST  554<H>  /  ALT  460<H>  /  AlkPhos  81  01-15    CAPILLARY BLOOD GLUCOSE      POCT Blood Glucose.: 101 mg/dL (17 Jan 2023 11:59)  POCT Blood Glucose.: 154 mg/dL (17 Jan 2023 05:57)  POCT Blood Glucose.: 142 mg/dL (16 Jan 2023 23:09)  POCT Blood Glucose.: 136 mg/dL (16 Jan 2023 18:03)  POCT Blood Glucose.: 132 mg/dL (16 Jan 2023 16:50)  POCT Blood Glucose.: 137 mg/dL (16 Jan 2023 15:16)  POCT Blood Glucose.: 130 mg/dL (16 Jan 2023 13:51)      LIVER FUNCTIONS - ( 17 Jan 2023 12:16 )  Alb: 2.0 g/dL / Pro: 4.4 g/dL / ALK PHOS: 173 U/L / ALT: 419 U/L / AST: 163 U/L / GGT: x           PT/INR - ( 17 Jan 2023 00:23 )   PT: 15.7 sec;   INR: 1.36 ratio         PTT - ( 17 Jan 2023 00:23 )  PTT:27.6 sec    D-Dimer Assay, Quantitative: 368 ng/mL DDU (01-16 @ 00:36)  Lactate, Blood: 3.7 mmol/L (01-16 @ 00:37)        RECENT CULTURES:  01-14 @ 07:47 ET Tube ET Tube       Numerous polymorphonuclear leukocytes per low power field  Few Squamous epithelial cells per low power field  Numerous Gram Negative Rods seen per oil power field  Few Gram Positive Rods seen per oil power field  Few Yeast like cells seen per oil power field           Normal Respiratory Janette present    01-10 @ 22:10 .Blood Blood-Peripheral                No Growth Final    01-10 @ 22:09 .Blood Blood-Peripheral       rad< from: Xray Abdomen 1 View PORTABLE -Urgent (Xray Abdomen 1 View PORTABLE -Urgent .) (01.14.23 @ 12:59) >    ACC: [id number] EXAM:  XR ABDOMEN PORTABLE URGENT 1V                          PROCEDURE DATE:  01/14/2023          INTERPRETATION:  Indication: Increase OGT output.    TECHNIQUE: Single portable view of the abdomen.    COMPARISON: 1/11/2023    FINDINGS: OGT with side-port at the GE junction and tip within the   stomach. This tube should be slightly advanced. The bowel gas pattern is   nonobstructive. There is marked scoliosis of the spine. Surgical hardware   is present in both hips.    IMPRESSION: OGT side-port is at the GE junction. This tube needs to be   advanced. Nonobstructive bowel gas pattern.    --- End of Report ---            KAY DODGE MD; Attending Radiologist  This document has been electronically signed. Jan 14 2023  2:25PM    < end of copied text >  < from: Xray Chest 1 View- PORTABLE-Urgent (Xray Chest 1 View- PORTABLE-Urgent .) (01.10.23 @ 19:55) >  INTERPRETATION:  CLINICAL INFORMATION: Intubation.    TECHNIQUE: Portable AP radiograph of the chest.    COMPARISON: Chest x-ray 1/8/2023.    FINDINGS: Clear lungs. No pleural effusions or pneumothorax. Cardiac   silhouette is unchanged. No acute osseous pathology.    Endotracheal tube terminates above the jack. Enteric tube projects   below the diaphragm with its distal segment excluded from view.      IMPRESSION:    Clear lungs. Tubes as described.    --- End of Report ---           OLIVA ALAMO MD; Resident Radiologist  This document has been electronically signed.  JAIRO TATUM MD; Attending Radiologist  This document has been electronically signed. Jan 11 2023  8:48AM    < end of copied text >           No Growth Final    01-10 @ 20:08 .Bronchial Bronchial Lavage   JOE    Few polymorphonuclear leukocytes seen per low power field  No Squamous epithelial cells seen per low power field  No organisms seen    Pseudomonas aeruginosa  Pseudomonas aeruginosa     Few Pseudomonas aeruginosa  Normal Respiratory Janette absent          RESPIRATORY CULTURES:          Studies  Chest X-RAY  CT SCAN Chest   Venous Dopplers: LE:   CT Abdomen  Others

## 2023-01-18 NOTE — PROGRESS NOTE ADULT - PROBLEM SELECTOR PLAN 1
Pt. with SHANE suspected in setting of hemodynamics with notable hypotension throughout the hospital course requiring IV vasopressors. On review of [facility] pt noted to have a SCr of 0.64 on admission 1/8, which increased to 2.58 on 1/11, and since then has improved but remains elevated/stable at 2.16 this morning. UA and urine lytes reviewed. Spot urine TP/CR ratio elevated to 1.6. Pt likely with ATN. Recommend to obtain renal sonogram. Pt currently non-oliguric, on intermittent IV bumex 2mg BID; UOP noted to be ~2L over the past 24 hours. Pt remains clinically volume overloaded. Continue with IV diuretics at this time to maintain net negative fluid balance. No acute indication for RRT at this time, however if kidney function worsens then we will need to consider. Monitor labs and urine output. Avoid nephrotoxins. Dose medications as per eGFR.    If any questions, please feel free to contact me     Tex Lynch  Nephrology Fellow  [facility] Pager: [phone].

## 2023-01-18 NOTE — PROGRESS NOTE ADULT - SUBJECTIVE AND OBJECTIVE BOX
Date of Service: 01-18-23 @ 12:06    Patient is a 77y old  Female who presents with a chief complaint of sob/ rapid a.fib (18 Jan 2023 11:28)      Any change in ROS:     MEDICATIONS  (STANDING):  albuterol/ipratropium for Nebulization 3 milliLiter(s) Nebulizer every 6 hours  ascorbic acid 500 milliGRAM(s) Oral daily  atorvastatin 40 milliGRAM(s) Oral at bedtime  azithromycin   Tablet 250 milliGRAM(s) Oral <User Schedule>  buMETAnide Injectable 2 milliGRAM(s) IV Push every 12 hours  cefepime   IVPB 500 milliGRAM(s) IV Intermittent every 8 hours  chlorhexidine 0.12% Liquid 15 milliLiter(s) Oral Mucosa every 12 hours  chlorhexidine 4% Liquid 1 Application(s) Topical <User Schedule>  dexMEDEtomidine Infusion 0.2 MICROgram(s)/kG/Hr (1.89 mL/Hr) IV Continuous <Continuous>  dextrose 5%. 1000 milliLiter(s) (50 mL/Hr) IV Continuous <Continuous>  dextrose 5%. 1000 milliLiter(s) (100 mL/Hr) IV Continuous <Continuous>  dextrose 50% Injectable 50 milliLiter(s) IV Push every 15 minutes  dextrose 50% Injectable 50 milliLiter(s) IV Push every 15 minutes  diltiazem Infusion 13 mG/Hr (13 mL/Hr) IV Continuous <Continuous>  glucagon  Injectable 1 milliGRAM(s) IntraMuscular once  insulin lispro (ADMELOG) corrective regimen sliding scale   SubCutaneous every 6 hours  insulin NPH human recombinant 5 Unit(s) SubCutaneous every 6 hours  methylPREDNISolone sodium succinate Injectable 20 milliGRAM(s) IV Push daily  multivitamin 1 Tablet(s) Oral daily  pantoprazole  Injectable 40 milliGRAM(s) IV Push two times a day  phenylephrine    Infusion 0.5 MICROgram(s)/kG/Min (7.07 mL/Hr) IV Continuous <Continuous>  phytonadione   Solution 5 milliGRAM(s) Oral daily  senna Syrup 10 milliLiter(s) Oral at bedtime  sodium chloride 3%  Inhalation 4 milliLiter(s) Inhalation every 12 hours  thiamine 100 milliGRAM(s) Oral daily    MEDICATIONS  (PRN):    Vital Signs Last 24 Hrs  T(C): 37.6 (18 Jan 2023 08:00), Max: 38.1 (18 Jan 2023 00:45)  T(F): 99.7 (18 Jan 2023 08:00), Max: 100.6 (18 Jan 2023 00:45)  HR: 108 (18 Jan 2023 11:03) (84 - 150)  BP: 107/54 (18 Jan 2023 11:00) (78/50 - 150/96)  BP(mean): 78 (18 Jan 2023 11:00) (59 - 666)  RR: 37 (18 Jan 2023 11:00) (20 - 226)  SpO2: 97% (18 Jan 2023 11:03) (88% - 100%)    Parameters below as of 18 Jan 2023 11:03  Patient On (Oxygen Delivery Method): ventilator      Mode: AC/ CMV (Assist Control/ Continuous Mandatory Ventilation)  RR (machine): 20  TV (machine): 350  FiO2: 30  PEEP: 5  ITime: 1  MAP: 14  PIP: 37    I&O's Summary    17 Jan 2023 07:01  -  18 Jan 2023 07:00  --------------------------------------------------------  IN: 1389.2 mL / OUT: 2085 mL / NET: -695.8 mL    18 Jan 2023 07:01  -  18 Jan 2023 12:06  --------------------------------------------------------  IN: 445.1 mL / OUT: 240 mL / NET: 205.1 mL          Physical Exam:   GENERAL: NAD, well-groomed, well-developed  HEENT: ROSA/   Atraumatic, Normocephalic  ENMT: No tonsillar erythema, exudates, or enlargement; Moist mucous membranes, Good dentition, No lesions  NECK: Supple, No JVD, Normal thyroid  CHEST/LUNG: Clear to auscultaion, ; No rales, rhonchi, wheezing, or rubs  CVS: Regular rate and rhythm; No murmurs, rubs, or gallops  GI: : Soft, Nontender, Nondistended; Bowel sounds present  NERVOUS SYSTEM:  Alert & Oriented X3, Good concentration; Motor Strength 5/5 B/L upper and lower extremities; DTRs 2+ intact and symmetric  EXTREMITIES:  2+ Peripheral Pulses, No clubbing, cyanosis, or edema  LYMPH: No lymphadenopathy noted  SKIN: No rashes or lesions  ENDOCRINOLOGY: No Thyromegaly  PSYCH: Appropriate    Labs:  ABG - ( 17 Jan 2023 00:05 )  pH, Arterial: 7.48  pH, Blood: x     /  pCO2: 33    /  pO2: 99    / HCO3: 25    / Base Excess: 1.4   /  SaO2: 98.4            30, 30, 40, 30, 30, 7                            10.6   30.63 )-----------( 90       ( 18 Jan 2023 00:38 )             30.3                         10.7   25.36 )-----------( 71       ( 17 Jan 2023 12:16 )             30.4                         9.9    21.30 )-----------( 60       ( 17 Jan 2023 00:23 )             28.0                         9.7    24.09 )-----------( 67       ( 16 Jan 2023 13:08 )             27.6                         6.9    16.39 )-----------( 63       ( 16 Jan 2023 06:35 )             20.0                         7.6    20.56 )-----------( 72       ( 16 Jan 2023 00:35 )             22.5                         6.3    24.14 )-----------( 89       ( 15 Tim 2023 17:07 )             19.0                         8.1    30.39 )-----------( 119      ( 15 Tim 2023 00:25 )             24.5     01-18    136  |  100  |  81<H>  ----------------------------<  177<H>  3.8   |  25  |  2.16<H>  01-17    136  |  100  |  75<H>  ----------------------------<  108<H>  3.7   |  25  |  2.01<H>  01-17    134<L>  |  99  |  72<H>  ----------------------------<  157<H>  3.7   |  22  |  1.87<H>  01-16    137  |  101  |  68<H>  ----------------------------<  118<H>  3.5   |  22  |  1.77<H>  01-16    134<L>  |  100  |  70<H>  ----------------------------<  417<H>  3.7   |  19<L>  |  1.91<H>  01-15    134<L>  |  103  |  72<H>  ----------------------------<  501<HH>  4.9   |  18<L>  |  2.11<H>  01-15    134<L>  |  105  |  67<H>  ----------------------------<  80  5.1   |  13<L>  |  1.87<H>  01-14    132<L>  |  105  |  68<H>  ----------------------------<  100<H>  4.7   |  17<L>  |  1.78<H>  01-14    122<L>  |  93<L>  |  63<H>  ----------------------------<  446<H>  4.6   |  18<L>  |  1.77<H>    Ca    7.5<L>      18 Jan 2023 00:38  Ca    7.4<L>      17 Jan 2023 12:16  Ca    7.3<L>      17 Jan 2023 00:22  Phos  4.3     01-18  Phos  3.8     01-17  Phos  3.6     01-17  Mg     1.9     01-18  Mg     1.9     01-17  Mg     2.0     01-17    TPro  4.4<L>  /  Alb  1.9<L>  /  TBili  1.1  /  DBili  x   /  AST  123<H>  /  ALT  351<H>  /  AlkPhos  192<H>  01-18  TPro  4.4<L>  /  Alb  2.0<L>  /  TBili  2.4<H>  /  DBili  x   /  AST  163<H>  /  ALT  419<H>  /  AlkPhos  173<H>  01-17  TPro  4.2<L>  /  Alb  1.9<L>  /  TBili  2.6<H>  /  DBili  x   /  AST  180<H>  /  ALT  438<H>  /  AlkPhos  145<H>  01-17  TPro  4.3<L>  /  Alb  2.1<L>  /  TBili  0.8  /  DBili  x   /  AST  248<H>  /  ALT  497<H>  /  AlkPhos  119  01-16  TPro  4.2<L>  /  Alb  2.2<L>  /  TBili  0.9  /  DBili  x   /  AST  482<H>  /  ALT  539<H>  /  AlkPhos  124<H>  01-16  TPro  3.5<L>  /  Alb  1.7<L>  /  TBili  0.6  /  DBili  x   /  AST  741<H>  /  ALT  728<H>  /  AlkPhos  101  01-15    CAPILLARY BLOOD GLUCOSE  209 (18 Jan 2023 05:30)  161 (18 Jan 2023 00:30)      POCT Blood Glucose.: 141 mg/dL (18 Jan 2023 11:31)  POCT Blood Glucose.: 209 mg/dL (18 Jan 2023 05:37)  POCT Blood Glucose.: 161 mg/dL (18 Jan 2023 00:31)  POCT Blood Glucose.: 152 mg/dL (17 Jan 2023 17:53)  POCT Blood Glucose.: 63 mg/dL (17 Jan 2023 17:14)      LIVER FUNCTIONS - ( 18 Jan 2023 00:38 )  Alb: 1.9 g/dL / Pro: 4.4 g/dL / ALK PHOS: 192 U/L / ALT: 351 U/L / AST: 123 U/L / GGT: x           PT/INR - ( 18 Jan 2023 00:38 )   PT: 16.6 sec;   INR: 1.43 ratio         PTT - ( 18 Jan 2023 00:38 )  PTT:27.0 sec    D-Dimer Assay, Quantitative: 368 ng/mL DDU (01-16 @ 00:36)  Lactate, Blood: 3.7 mmol/L (01-16 @ 00:37)        RECENT CULTURES:  01-14 @ 07:47 ET Tube ET Tube       Numerous polymorphonuclear leukocytes per low power field  Few Squamous epithelial cells per low power field  Numerous Gram Negative Rods seen per oil power field  Few Gram Positive Rods seen per oil power field  Few Yeast like cells seen per oil power field           Normal Respiratory Janette present          RESPIRATORY CULTURES:          Studies  Chest X-RAY  CT SCAN Chest   Venous Dopplers: LE:   CT Abdomen  Others        ra< from: Xray Abdomen 1 View PORTABLE -Urgent (Xray Abdomen 1 View PORTABLE -Urgent .) (01.18.23 @ 09:43) >    ACC: [id number] EXAM:  XR ABDOMEN PORTABLE URGENT 1V                          PROCEDURE DATE:  01/18/2023          INTERPRETATION:  CLINICAL INDICATION: Evaluation for free air    TECHNIQUE: AP view(s) of the abdomen.    COMPARISON: Abdominal radiograph 1/14/2023    FINDINGS:  Enteric tube with side-port at the gastroesophageal junction and tip   terminating within the stomach.  Bowel gas pattern is unremarkable. No evidence of pneumoperitoneum or   obstruction.  Bilateral hip arthroplasty.  Rectal temperature probe.  Redemonstration of moderate lumbar spinal levocurvature    IMPRESSION:  No evidence of pneumoperitoneum.    --- End of Report ---           BEATA NOLASCO DO; Resident Radiologist  This document has been electronically signed.    < end of copied text >  < from: Xray Chest 1 View- PORTABLE-Urgent (Xray Chest 1 View- PORTABLE-Urgent .) (01.10.23 @ 19:55) >    PROCEDURE DATE:  01/10/2023          INTERPRETATION:  CLINICAL INFORMATION: Intubation.    TECHNIQUE: Portable AP radiograph of the chest.    COMPARISON: Chest x-ray 1/8/2023.    FINDINGS: Clear lungs. No pleural effusions or pneumothorax. Cardiac   silhouette is unchanged. No acute osseous pathology.    Endotracheal tube terminates above the jack. Enteric tube projects   below the diaphragm with its distal segment excluded from view.      IMPRESSION:    Clear lungs. Tubes as described.    --- End of Report ---           OLIVA ALAMO MD; Resident Radiologist  This document has been electronically signed.    < end of copied text >

## 2023-01-18 NOTE — PROGRESS NOTE ADULT - ASSESSMENT
ASSESSMENT/PLAN:  77-year-old female with past medical history of DM, HTN, HLD, asthma on daily steroids, A. fib on Eliquis and digoxin presenting with shortness of breath, found to be in Afib w/RVR; admitted to MICU for Acute Hypoxic Respiratory Failure in s/o Asthma and Bronchiectasis w/ possible PNA, leading to intubation and sedation. Currently urine output worsening, attempting to to SBP daily.     NEURO  intubated on precedex now, follows simple commands (open eyes, tries to move hands)     CARDIAC  # Afib w/ RVR  Admitted in RVR; being followed by Dr. Marcus, Cardiology; Afib w/ RVR thought to be due to stress from hypoxemia  - Chronic atrial fib. with RVR.  Continue diltiazem drip. 1/17 - increased to 15 overnight.   - Echo (1/11) EF 75%  1. Endocardial visualization enhanced with intravenous  injection of Ultrasonic Enhancing Agent (Definity).  Hyperdynamic left ventricular systolic function. No left  ventricular thrombus.  2. Normal right ventricular size and function.  - TSH normal    Hypotension requiring pressors:   - continued on phenylephrine     PULM  #Acute on chronic  hypoxemic respiratory failure due to acute exacerbation of bronchiectasis / asthma and possible pneumonia.   Continue current AC vent settings.  Did not tolerate PS wean today. Restart propofol.  Continue solumedrol to 20mg  daily.  Continue bronchodilators  Continue cefepime day 10/14 for pseudomonas pneumonia  Chest PT   3% saline nebulizers with Duo nebs.  Azithromycin  M, W, F for   antinflammatory effects.    RENAL  # SHANE  - urine lytes showing FeNa 2.2%-> ATN; treat with fluids. Creatinine stabilized at 1.8 now.   - urine output decreaSed to 5cc/hr 1/16 did not improve with IVF  - 1/17 - given bumex 2mg with improved urine output. Nephro consulted - f/u recs    GI   - concern for GI bleed (hb drop requiring blood transfusion 1/15 and 1/16) and small volume hematoschezia visualized 1/16am per RN. GI consulted - hold off endoscopic evaluation unless further bleeding occurs.   - hold off CT abdomen given hb is stable, no gross gi bleed currently   - Diet: tube feeds  - Bowel regimen: miralax, senna    HEME/ONC  # Leukocytosis  - cultures negative. off abx. possibly from steroids, downtrending from 40, now 16.     # INR 11.1 (00:00 1/11)-> 4.5 (6 AM 1/11) -> 1.42 (1/12)  - continue to hold Lovenox in setting of hb drops    ID  # Sepsis  CT Chest w/ bilateral lower lobe predominant patchy and branching opacities suspicious for pneumonia. Small left pleural effusion. Leukocytosis, tachycardia and tachypnea; No active indications of infections, all infectious workup negative so far; leukocytosis likely in s/o solumedrol usage but no diff to conform neutrophilia so far; tachycardia likely in s/o tachypnea and AHRF  - was started on cefepime 1/8 = now it is day 10/14  - Urine legionella antigen - neg, final BCx 1/10 - neg, BAL cultures - numerous GNR, few gram pos       ENDO  DM2, hyperglycemia  - hyperglycemic 450s 1/15, started on insulin gtt 1/15pm. likely in setting of steroids.   - 1/16 - 1/17 was on insulin gtt, now on nph 10u q6h + SSI with improved glc       DVT  - Hold Lovenox 50mg BID    ETHICS  Full Code  family at bedside, updated      ASSESSMENT/PLAN:  77-year-old female with past medical history of DM, HTN, HLD, asthma on daily steroids, A. fib on Eliquis and digoxin presenting with shortness of breath, found to be in Afib w/RVR; admitted to MICU for Acute Hypoxic Respiratory Failure in s/o Asthma and Bronchiectasis w/ possible PNA, leading to intubation and sedation. Currently urine output worsening, attempting to to SBP daily.     NEURO  intubated on precedex now, follows simple commands (open eyes, tries to move hands)     CARDIAC  # Afib w/ RVR  Admitted in RVR; being followed by Dr. Marcus, Cardiology; Afib w/ RVR thought to be due to stress from hypoxemia  - Chronic atrial fib. with RVR.  Continue diltiazem drip. 1/17 - increased to 15 overnight.   - Echo (1/11) EF 75%  1. Endocardial visualization enhanced with intravenous  injection of Ultrasonic Enhancing Agent (Definity).  Hyperdynamic left ventricular systolic function. No left  ventricular thrombus.  2. Normal right ventricular size and function.  - TSH normal    Hypotension requiring pressors:   - continued on phenylephrine     PULM  #Acute on chronic  hypoxemic respiratory failure due to acute exacerbation of bronchiectasis / asthma and possible pneumonia.   Continue current AC vent settings.  Did not tolerate PS wean 1/17 or 1/18.   Continue solumedrol to 20mg  daily.  Continue bronchodilators  Continue cefepime day 11/14 for pseudomonas pneumonia  Chest PT   3% saline nebulizers with Duo nebs.  Azithromycin  M, W, F for   antinflammatory effects.    RENAL  # HSANE  - urine lytes showing FeNa 2.2%-> ATN; treat with fluids. Creatinine stabilized at 1.8 now.   - urine output decreaSed to 5cc/hr 1/16 did not improve with IVF  - 1/17 - given bumex 2mg with improved urine output. Nephro consulted - f/u recs    GI   - concern for GI bleed (hb drop requiring blood transfusion 1/15 and 1/16) and small volume hematoschezia visualized 1/16am per RN. GI consulted - hold off endoscopic evaluation unless further bleeding occurs.   - hold off CT abdomen given hb is stable, no gross gi bleed currently   - Diet: tube feeds  - Bowel regimen: miralax, senna    HEME/ONC  # Leukocytosis  - cultures negative. off abx. possibly from steroids, downtrending from 40, now 16.     # INR 11.1 (00:00 1/11)-> 4.5 (6 AM 1/11) -> 1.42 (1/12)  - continue to hold Lovenox in setting of hb drops    ID  # Sepsis  CT Chest w/ bilateral lower lobe predominant patchy and branching opacities suspicious for pneumonia. Small left pleural effusion. Leukocytosis, tachycardia and tachypnea; No active indications of infections, all infectious workup negative so far; leukocytosis likely in s/o solumedrol usage but no diff to conform neutrophilia so far; tachycardia likely in s/o tachypnea and AHRF  - was started on cefepime 1/8 = now it is day 10/14  - Urine legionella antigen - neg, final BCx 1/10 - neg, BAL cultures - numerous GNR, few gram pos       ENDO  DM2, hyperglycemia  - hyperglycemic 450s 1/15, started on insulin gtt 1/15pm. likely in setting of steroids.   - 1/16 - 1/17 was on insulin gtt, now on nph 5u q6h + SSI with improved glc       DVT  - Hold Lovenox 50mg BID - monitor for acute VTE    ETHICS  Full Code  family at bedside, updated

## 2023-01-18 NOTE — PROGRESS NOTE ADULT - ASSESSMENT
77-year-old female with past medical history of DM, HTN, HLD, asthma on daily steroids, A. fib on Eliquis and digoxin presenting with shortness of breath.  Patient unable to provide history.  Daughter at bedside reports patient has had increasing shortness of breath, wheezing, cough for the past week now.  Patient also with increasing generalized weakness, fatigue and decreased p.o. intake.  Denies fever/chills, chest pain, abdominal pain, nausea, vomiting, diarrhea, dysuria.  Family reports patient had a similar episode 1 year ago when she was admitted to [facility] with UTI.  pt with sig medical hx, chronic a.fib with sob ?sec sepsis/ pneumoniae, UTI  start on ceftriaxone  tele  for increase hr, will increase Cardizem dose  continue AC  cardiac enzyme  tsh  echo  RVP negative      ASTHMA/ COPD exacerbation;   A Fibrillation  with rvr:  DM:  HTN:   HLD:     1/09:    ASTHMA/ COPD exacerbation; she was using performist at home with 5 mg of prednisone  not sure why she was not on any other meds:  sill start Symbicort too:  along with BD and is already on cefepime:  though pneumonia to me is not very convincing : will probably need ct scan chest   A Fibrillation  with rvr: Hr still elevated:  on cardizem : would defer to cards:  on ac:  lovenox  + coumadin : check echo   DM:: monitor and control   HTN: : controlled  HLD: on statins  :    dw team    1/10:    ASTHMA/ COPD exacerbation; she was using performist at home with 5 mg of prednisone  not sure why she was not on any other meds:  sill start Symbicort too:  along with BD and is already on cefepime:  WBC increased : though pneumonia to me is not very convincing : will probably need ct scan chest : she rused for ct chest : increase steorids 40 mg Q 6 hours : ABG today seems reasonable   A Fibrillation  with rvr: Hr still elevated:  on cardizem : HR is till very high  :   DM:: monitor and control   HTN: : controlled  HLD: on statins:    MICU  consult:  high risk for intubation:   :    edy and daughter    1/11:    ASTHMA/ COPD exacerbation; now s/p bronch: RML lavage done:  wait cultures results: she was using performist at home with 5 mg of prednisone  not sure why she was not on any other meds:  Cont BD:  and antibiotics  WBC still increased :on cefepime:    A Fibrillation  with rvr: Hr still elevated:  on cardizem : HR is better now:  on cardiem   DM:: monitor and control   HTN: : controlled  HLD: on statins:    josias micu  ATTENDING      1/12:    ASTHMA/ COPD exacerbation; now s/p bronch: RML lavage done:  negative  cultures so far: : she was using performist at home with 5 mg of prednisone  not sure why she was not on any other meds:  Cont BD:  and antibiotics  WBC still increased: but little down today  :on cefepime:    A Fibrillation  with rvr: Hr still elevated:  on cardizem : HR is better now:  on cardiem drip   DM:: monitor and control   HTN: : controlled  HLD: on statins:    josias micu  team    1/13:       ASTHMA/ COPD exacerbation; now s/p bronch: RML lavage done:  negative  cultures so far: :  cont full vent support:  management  of vent per MICU : weaning as perprotocol  ;  on zothromax and cefepime : off steroids   A Fibrillation  with rvr: Hr still elevated:  on Cardizem : HR is better now:  on cardiem drip   DM:: monitor and control   HTN: : controlled  HLD: on statins:    josias micu  team   :  1/14:       ASTHMA/ COPD exacerbation; now s/p bronch: RML lavage done:  Pseudomonas:  on cefepime:  : :  cont full vent support:  management  of vent per MICU : weaning as pe rprotocol  ;  on 20 mg of steroids today   A Fibrillation  with rvr: Hr still elevated:  on Cardizem : HR is better now:  on cardiem drip : Blood pressure is stable:   DM:: monitor and control   HTN: : controlled  HLD: on statins:    josias micu  Attending    1/16:    ASTHMA/ COPD exacerbation; now s/p bronch: RML lavage done:  Pseudomonas:  on cefepime:  and azithromycin for anti inflammatory effects:  :  still on full vent support:  has severe copd:   A Fibrillation  with rvr: Hr still elevated:  on Cardizem : HR is better now:  on Cardizem drip : Blood pressure is stable:   Thrombocytopenia:  ? etiology : check for robbi   DM:: monitor and control   HTN: : controlled  HLD: on statins:   overall pt is still critically ill:    josias micu  Attending today      1/17:  ASTHMA/ COPD exacerbation; now s/p bronch: RML lavage done:  Pseudomonas:  on cefepime:  and azithromycin for anti inflammatory effects:  :  still on full vent support:  has severe copd: same rx today too :  A Fibrillation  with rvr: Hr still elevated:  on Cardizem : HR is better now:  on Cardizem drip : Blood pressure is stable:   Thrombocytopenia:  ? etiology : check for robbi  : still low:  defer to MICU team   DM:: monitor and control   HTN: : controlled  HLD: on statins:   overall pt is still critically ill:    josias micu  Attending    1/18/2023    ASTHMA/ COPD exacerbation; now s/p bronch: RML lavage done:  Pseudomonas:  on cefepime:  and azithromycin for anti inflammatory effects:  :  still on full vent support:  has severe copd: same and is on solumedrol : 20 mg a day : defer to primary team   A Fibrillation  with rvr: Hr still elevated:  on Cardizem : HR is better now:  on Cardizem drip : Blood pressure is stable:   Thrombocytopenia:  ? etiology :plts are increasing:   DM:: monitor and control   HTN: : controlled  HLD: on statins:   overall pt is still critically ill:    josias micu  team

## 2023-01-18 NOTE — CHART NOTE - NSCHARTNOTEFT_GEN_A_CORE
Nutrition Follow Up Note  Patient seen for: initial malnutrition follow up. Chart reviewed and events noted.     Per Chart: Pt is a 77-year-old female with past medical history of DM, HTN, HLD, asthma on daily steroids, A. fib on Eliquis and digoxin presenting with shortness of breath, found to be in Afib w/RVR; admitted to MICU for Acute Hypoxic Respiratory Failure in s/o Asthma and Bronchiectasis w/ possible PNA, leading to intubation and sedation. Currently urine output worsening, attempting to to SBP daily.     Source: [] Patient       [x] Medical Record        [x] RN        [] Family at bedside       [] Other:    -If unable to interview patient: [x] Trach/Vent/BiPAP  [] Disoriented/confused/inappropriate to interview    Nutrition-Related Events:   - Pressors:  [x] Yes    [] No - phenylephrine at 2 MICROgram(s)/kG/Min   - Propofol:  [] Yes    [x] No         - Rate: __mL/hr. If maintained x 24 hours, propofol will provide:     Diet Order:   Diet, NPO with Tube Feed:   Tube Feeding Modality: Orogastric  Glucerna 1.5 Gavin (GLUCERNA1.5RTH)  Total Volume for 24 Hours (mL): 360  Continuous  Starting Tube Feed Rate {mL per Hour}: 10  Until Goal Tube Feed Rate (mL per Hour): 20  Tube Feed Duration (in Hours): 18  Tube Feed Start Time: 11:00  Tube Feed Stop Time: 07:00 (23)    EN Order Provides: total volume 360 mL, 540 kcals, 30 gm protein, and 273 mL free water. Trickle feeds   Current Pump Rate: 20 mL/hr  5-Day EN Average Provision per RN flowsheet: 312 mL, 468 kcals, and 26 gm protein  *Feeds stopped on  and restarted at lower rate for residuals of 750 mL on     Is current diet order appropriate/adequate? See recommendations below    PO intake :   [] >75%  Adequate    [] 50-75%  Fair       [] <50%  Poor   [x] N/A    Nutrition-related concerns:  - Intubated 1/10. On Precedex   - SHANE.   - Sepsis  - BMI 15.7 kG/m2, diagnosed by this RD with malnutrition   - Elevated BG noted, noted on steroid which can elevate BG. Ordered for NPH and sliding scale of insulin   - Ordered for multivitamin, thiamine, and ascorbic acid    GI: Concern for GI bleed. Last BM 1/18 x3.   Bowel Regimen? [x] Yes   [] No  -> Ordered for antibiotics and pantoprazole     Weights:   Daily Weight in k.9 ()  No other wts to address, pt with numerous objects on bed likely resulting in elevated wt vs fluid retention. RD will continue to trend as new wts available/able.     Drug Dosing Weight  Height (cm): 154.9 (10 Tim 2023 18:10)  Weight (kg): 37.7 (10 Tim 2023 18:10)  BMI (kg/m2): 15.7 (10 Tim 2023 18:10)    MEDICATIONS  (STANDING):s  ascorbic acid 500 milliGRAM(s) Oral daily  atorvastatin 40 milliGRAM(s) Oral at bedtime  azithromycin   Tablet 250 milliGRAM(s) Oral <User Schedule>  buMETAnide Injectable 2 milliGRAM(s) IV Push every 12 hours  cefepime   IVPB 500 milliGRAM(s) IV Intermittent every 8 hours  dexMEDEtomidine Infusion 0.2 MICROgram(s)/kG/Hr (1.89 mL/Hr) IV Continuous <Continuous>  dextrose 5%. 1000 milliLiter(s) (50 mL/Hr) IV Continuous <Continuous>  dextrose 5%. 1000 milliLiter(s) (100 mL/Hr) IV Continuous <Continuous>  dextrose 50% Injectable 50 milliLiter(s) IV Push every 15 minutes  dextrose 50% Injectable 50 milliLiter(s) IV Push every 15 minutes  diltiazem Infusion 13 mG/Hr (13 mL/Hr) IV Continuous <Continuous>  glucagon  Injectable 1 milliGRAM(s) IntraMuscular once  insulin lispro (ADMELOG) corrective regimen sliding scale   SubCutaneous every 6 hours  insulin NPH human recombinant 5 Unit(s) SubCutaneous every 6 hours  methylPREDNISolone sodium succinate Injectable 20 milliGRAM(s) IV Push daily  multivitamin 1 Tablet(s) Oral daily  pantoprazole  Injectable 40 milliGRAM(s) IV Push two times a day  phenylephrine    Infusion 0.5 MICROgram(s)/kG/Min (7.07 mL/Hr) IV Continuous <Continuous>  phytonadione   Solution 5 milliGRAM(s) Oral daily  senna Syrup 10 milliLiter(s) Oral at bedtime  thiamine 100 milliGRAM(s) Oral daily    Pertinent Labs:  @ 00:38: Na 136, BUN 81<H>, Cr 2.16<H>, <H>, K+ 3.8, Phos 4.3, Mg 1.9, Alk Phos 192<H>, ALT/SGPT 351<H>, AST/SGOT 123<H>   @ 12:16: Na 136, BUN 75<H>, Cr 2.01<H>, <H>, K+ 3.7, Phos 3.8, Mg 1.9, Alk Phos 173<H>, ALT/SGPT 419<H>, AST/SGOT 163<H>    A1C with Estimated Average Glucose Result: 8.1 % (23 @ 06:11)    Finger Sticks:  POCT Blood Glucose.: 141 mg/dL ( @ 11:31)  POCT Blood Glucose.: 209 mg/dL ( @ 05:37)  POCT Blood Glucose.: 161 mg/dL ( @ 00:31)  POCT Blood Glucose.: 152 mg/dL ( @ 17:53)  POCT Blood Glucose.: 63 mg/dL ( @ 17:14)    Skin per nursing documentation: Suspected deep tissue injury lumbar spine, Stage II left ischium   Edema per nursing documentation: 2+ left leg; right leg    Based on dosing wt 37.7 kG with consideration for malnutrition, intubation, pressure injuries   Estimated Energy Needs: (30-35 kcals/kG) 4853-6138 kcals   Estimated Protein Needs: (1.7-2.0 gm/kG) 64-75 gm  Fluid needs deferred to provider.   [facility] Equation: 1226 kcals ()    Previous Nutrition Diagnosis:   1) Severe chronic malnutrition + Underweight   2) Increased protein-energy needs   Nutrition Diagnosis is: [x] ongoing  [] resolved [] not applicable     Nutrition Care Plan:  [x] In Progress  [] Achieved  [] Not applicable    New Nutrition Diagnosis: [x] Not applicable    Nutrition Interventions:     Education Provided:       [] Yes:  [x] No: Not appropriate at this time.     Recommendations:      1) Given concern for GI bleed and pt on pressor, recommend Vital AF at 20 mL/hr increasing only as tolerated and electrolytes WNL to goal rate 55 mL/hr x18 hrs to provide total volume 990 mL, 1188 kcals, 74 gm protein, and 803 mL free water. Meets ~31.5 kcals/kG and ~2.0 gm protein/kG based on dosing wt 37.7 kG.   -> Trend K, Phos, and Mg for refeeding risk given inadequate intake   2) As medically feasible, continue multivitamin and thiamine for refeeding risk with Vitamin C to aid in wound healing    Monitoring and Evaluation:   Continue to monitor nutritional intake, tolerance to diet prescription, weights, labs, skin integrity    RD remains available upon request and will follow up per protocol  Bree Yañez, RD, MS, CDN, [facility] Pager #[phone]

## 2023-01-18 NOTE — PROGRESS NOTE ADULT - ASSESSMENT
77-year-old female with PMHx of DM, HTN, HLD, asthma on daily steroids, A. fib on Eliquis and digoxin presenting with had increasing SOB, wheezing, cough x 1 week. Pt intubated in ER for acute hypoxic respiratory failure and admitted to MICU. Course c/b Afib w/ RVR started on diltiazem gtt. 1/13 Failed CPAP trial. Palliative care now following for [facility]

## 2023-01-18 NOTE — PROGRESS NOTE ADULT - SUBJECTIVE AND OBJECTIVE BOX
Gastroenterology/Hepatology Progress Note    Interval Events:   - Hgb stable   - no further episodes of bleeding     Allergies:  Avelox (Pruritus)  fentanyl (Other)  fish (Vomiting)  Levaquin (Unknown)  shellfish (Vomiting)      Hospital Medications:  albuterol/ipratropium for Nebulization 3 milliLiter(s) Nebulizer every 6 hours  ascorbic acid 500 milliGRAM(s) Oral daily  atorvastatin 40 milliGRAM(s) Oral at bedtime  azithromycin   Tablet 250 milliGRAM(s) Oral <User Schedule>  buMETAnide Injectable 2 milliGRAM(s) IV Push every 12 hours  cefepime   IVPB 500 milliGRAM(s) IV Intermittent every 8 hours  chlorhexidine 0.12% Liquid 15 milliLiter(s) Oral Mucosa every 12 hours  chlorhexidine 4% Liquid 1 Application(s) Topical <User Schedule>  dexMEDEtomidine Infusion 0.2 MICROgram(s)/kG/Hr IV Continuous <Continuous>  dextrose 5%. 1000 milliLiter(s) IV Continuous <Continuous>  dextrose 5%. 1000 milliLiter(s) IV Continuous <Continuous>  dextrose 50% Injectable 50 milliLiter(s) IV Push every 15 minutes  dextrose 50% Injectable 50 milliLiter(s) IV Push every 15 minutes  diltiazem Infusion 13 mG/Hr IV Continuous <Continuous>  glucagon  Injectable 1 milliGRAM(s) IntraMuscular once  insulin lispro (ADMELOG) corrective regimen sliding scale   SubCutaneous every 6 hours  insulin NPH human recombinant 5 Unit(s) SubCutaneous every 6 hours  methylPREDNISolone sodium succinate Injectable 20 milliGRAM(s) IV Push daily  multivitamin 1 Tablet(s) Oral daily  pantoprazole  Injectable 40 milliGRAM(s) IV Push two times a day  phenylephrine    Infusion 0.5 MICROgram(s)/kG/Min IV Continuous <Continuous>  phytonadione   Solution 5 milliGRAM(s) Oral daily  senna Syrup 10 milliLiter(s) Oral at bedtime  sodium chloride 3%  Inhalation 4 milliLiter(s) Inhalation every 12 hours  thiamine 100 milliGRAM(s) Oral daily      ROS: 14 point ROS negative unless otherwise state in subjective    PHYSICAL EXAM:   Vital Signs:  Vital Signs Last 24 Hrs  T(C): 37.6 (2023 08:00), Max: 38.1 (2023 00:45)  T(F): 99.7 (2023 08:00), Max: 100.6 (2023 00:45)  HR: 108 (2023 11:03) (84 - 150)  BP: 107/54 (2023 11:00) (78/50 - 150/96)  BP(mean): 78 (2023 11:00) (59 - 666)  RR: 37 (2023 11:00) (20 - 226)  SpO2: 97% (2023 11:03) (88% - 100%)    Parameters below as of 2023 11:03  Patient On (Oxygen Delivery Method): ventilator      Daily     Daily Weight in k.9 (2023 05:00)    GENERAL: Patient is intubated   HEENT:  NCAT, no scleral icterus  CHEST: on ventilator   HEART:  RRR  ABDOMEN:  Soft, non-tender, non-distended, no masses  EXTREMITIES:  No cyanosis, clubbing, or edema  SKIN:  No rash/erythema/ecchymoses/petechiae/wounds/abscess/warm/dry  NEURO:  Alert and oriented x 0, sedated   LABS:                        10.6   30.63 )-----------( 90       ( 2023 00:38 )             30.3     Mean Cell Volume: 79.9 fl (23 @ 00:38)        136  |  100  |  81<H>  ----------------------------<  177<H>  3.8   |  25  |  2.16<H>    Ca    7.5<L>      2023 00:38  Phos  4.3       Mg     1.9         TPro  4.4<L>  /  Alb  1.9<L>  /  TBili  1.1  /  DBili  x   /  AST  123<H>  /  ALT  351<H>  /  AlkPhos  192<H>      LIVER FUNCTIONS - ( 2023 00:38 )  Alb: 1.9 g/dL / Pro: 4.4 g/dL / ALK PHOS: 192 U/L / ALT: 351 U/L / AST: 123 U/L / GGT: x           PT/INR - ( 2023 00:38 )   PT: 16.6 sec;   INR: 1.43 ratio         PTT - ( 2023 00:38 )  PTT:27.0 sec          Imaging:

## 2023-01-18 NOTE — PROGRESS NOTE ADULT - SUBJECTIVE AND OBJECTIVE BOX
[facility] DIVISION OF KIDNEY DISEASES AND HYPERTENSION -- FOLLOW UP NOTE  --------------------------------------------------------------------------------  Pt is a 77 year old female with PMH of DM, HTN, HLD, Asthma, and Afib who presented to the hospital with shortness of breath and noted to be in afib with RVR. The patient was emergently intubated for acute hypoxic respiratory failure and admitted to the MICU. The patient has failed multiple attempts at extubation. The nephrology team was consulted for SHANE, On review of [facility] pt noted to have a SCr of 0.64 on admission 1/8, which increased to 2.58 on 1/11, and since then has improved but remains elevated/stable at 2.16 this morning.    24 hour events/subjective:  Pt seen and examined this morning in the MICU. Pt remains intubated and on sedation. Unable to obtain ROS.       PAST HISTORY  --------------------------------------------------------------------------------  No significant changes to PMH, PSH, FHx, SHx, unless otherwise noted    ALLERGIES & MEDICATIONS  --------------------------------------------------------------------------------  Allergies    Avelox (Pruritus)  fish (Vomiting)  Levaquin (Unknown)  shellfish (Vomiting)    Intolerances    fentanyl (Other)    Standing Inpatient Medications  albuterol/ipratropium for Nebulization 3 milliLiter(s) Nebulizer every 6 hours  ascorbic acid 500 milliGRAM(s) Oral daily  atorvastatin 40 milliGRAM(s) Oral at bedtime  azithromycin   Tablet 250 milliGRAM(s) Oral <User Schedule>  buMETAnide Injectable 2 milliGRAM(s) IV Push every 12 hours  cefepime   IVPB 500 milliGRAM(s) IV Intermittent every 8 hours  chlorhexidine 0.12% Liquid 15 milliLiter(s) Oral Mucosa every 12 hours  chlorhexidine 4% Liquid 1 Application(s) Topical <User Schedule>  dexMEDEtomidine Infusion 0.2 MICROgram(s)/kG/Hr IV Continuous <Continuous>  dextrose 5%. 1000 milliLiter(s) IV Continuous <Continuous>  dextrose 5%. 1000 milliLiter(s) IV Continuous <Continuous>  dextrose 50% Injectable 50 milliLiter(s) IV Push every 15 minutes  dextrose 50% Injectable 50 milliLiter(s) IV Push every 15 minutes  diltiazem Infusion 13 mG/Hr IV Continuous <Continuous>  glucagon  Injectable 1 milliGRAM(s) IntraMuscular once  insulin lispro (ADMELOG) corrective regimen sliding scale   SubCutaneous every 6 hours  insulin NPH human recombinant 5 Unit(s) SubCutaneous every 6 hours  methylPREDNISolone sodium succinate Injectable 20 milliGRAM(s) IV Push daily  multivitamin 1 Tablet(s) Oral daily  pantoprazole  Injectable 40 milliGRAM(s) IV Push two times a day  phenylephrine    Infusion 0.5 MICROgram(s)/kG/Min IV Continuous <Continuous>  phytonadione   Solution 5 milliGRAM(s) Oral daily  polyethylene glycol 3350 17 Gram(s) Oral every 12 hours  senna Syrup 10 milliLiter(s) Oral at bedtime  sodium chloride 3%  Inhalation 4 milliLiter(s) Inhalation every 12 hours  thiamine 100 milliGRAM(s) Oral daily    PRN Inpatient Medications      REVIEW OF SYSTEMS  Unable to obtain    VITALS/PHYSICAL EXAM  --------------------------------------------------------------------------------  T(C): 37.9 (01-18-23 @ 06:00), Max: 38.1 (01-18-23 @ 00:45)  HR: 110 (01-18-23 @ 06:30) (84 - 150)  BP: 112/53 (01-18-23 @ 06:30) (78/50 - 153/58)  RR: 25 (01-18-23 @ 06:30) (20 - 226)  SpO2: 96% (01-18-23 @ 06:30) (92% - 100%)  Wt(kg): --        01-16-23 @ 07:01  -  01-17-23 @ 07:00  --------------------------------------------------------  IN: 2802.9 mL / OUT: 100 mL / NET: 2702.9 mL    01-17-23 @ 07:01  -  01-18-23 @ 06:35  --------------------------------------------------------  IN: 1319.3 mL / OUT: 2035 mL / NET: -715.7 mL        Physical Exam:  	Gen: ill appearing  	HEENT: ETT  	Pulm: decreased breath sounds  	CV: S1S2  	Abd: Soft, +BS   	Ext: ++ LE edema B/L  	Neuro: sedated  	Skin: Warm and dry    LABS/STUDIES  --------------------------------------------------------------------------------              10.6   30.63 >-----------<  90       [01-18-23 @ 00:38]              30.3     136  |  100  |  81  ----------------------------<  177      [01-18-23 @ 00:38]  3.8   |  25  |  2.16        Ca     7.5     [01-18-23 @ 00:38]      Mg     1.9     [01-18-23 @ 00:38]      Phos  4.3     [01-18-23 @ 00:38]    TPro  4.4  /  Alb  1.9  /  TBili  1.1  /  DBili  x   /  AST  123  /  ALT  351  /  AlkPhos  192  [01-18-23 @ 00:38]    PT/INR: PT 16.6 , INR 1.43       [01-18-23 @ 00:38]  PTT: 27.0       [01-18-23 @ 00:38]      Creatinine Trend:  SCr 2.16 [01-18 @ 00:38]  SCr 2.01 [01-17 @ 12:16]  SCr 1.87 [01-17 @ 00:22]  SCr 1.77 [01-16 @ 09:35]  SCr 1.91 [01-16 @ 00:33]    Urinalysis - [01-08-23 @ 14:26]      Color Light Yellow / Appearance Slightly Turbid / SG 1.015 / pH 7.0      Gluc 1000 mg/dL / Ketone Small  / Bili Negative / Urobili Negative       Blood Trace / Protein 30 mg/dL / Leuk Est Large / Nitrite Negative      RBC 6 / WBC 53 / Hyaline 2 / Gran  / Sq Epi  / Non Sq Epi 3 / Bacteria Negative    Urine Creatinine 29      [01-14-23 @ 18:07]  Urine Protein 46      [01-14-23 @ 18:07]  Urine Sodium 74      [01-14-23 @ 18:09]  Urine Urea Nitrogen 455      [01-14-23 @ 18:07]  Urine Potassium 39      [01-14-23 @ 18:09]  Urine Osmolality 393      [01-14-23 @ 18:07]    HbA1c 6.6      [02-02-19 @ 10:59]  TSH 1.17      [01-09-23 @ 06:11]

## 2023-01-18 NOTE — PROGRESS NOTE ADULT - SUBJECTIVE AND OBJECTIVE BOX
CARDIOLOGY     PROGRESS  NOTE   ________________________________________________    CHIEF COMPLAINT:Patient is a 77y old  Female who presents with a chief complaint of sob/ rapid a.fib (18 Jan 2023 15:33)  on vent  	  REVIEW OF SYSTEMS:  CONSTITUTIONAL: No fever, weight loss, or fatigue  EYES: No eye pain, visual disturbances, or discharge  ENT:  No difficulty hearing, tinnitus, vertigo; No sinus or throat pain  NECK: No pain or stiffness  RESPIRATORY: No cough, wheezing, chills or hemoptysis; + Shortness of Breath  CARDIOVASCULAR: No chest pain, palpitations, passing out, dizziness, or leg swelling  GASTROINTESTINAL: No abdominal or epigastric pain. No nausea, vomiting, or hematemesis; No diarrhea or constipation. No melena or hematochezia.  GENITOURINARY: No dysuria, frequency, hematuria, or incontinence  NEUROLOGICAL: No headaches, memory loss, loss of strength, numbness, or tremors  SKIN: No itching, burning, rashes, or lesions   LYMPH Nodes: No enlarged glands  ENDOCRINE: No heat or cold intolerance; No hair loss  MUSCULOSKELETAL: No joint pain or swelling; No muscle, back, or extremity pain  PSYCHIATRIC: No depression, anxiety, mood swings, or difficulty sleeping  HEME/LYMPH: No easy bruising, or bleeding gums  ALLERGY AND IMMUNOLOGIC: No hives or eczema	    [ ] All others negative	  [ x] Unable to obtain    PHYSICAL EXAM:  T(C): 38.1 (01-18-23 @ 20:00), Max: 38.1 (01-18-23 @ 00:45)  HR: 121 (01-18-23 @ 20:15) (72 - 150)  BP: 120/59 (01-18-23 @ 20:15) (84/39 - 150/96)  RR: 25 (01-18-23 @ 20:15) (20 - 226)  SpO2: 100% (01-18-23 @ 20:15) (88% - 100%)  Wt(kg): --  I&O's Summary    17 Jan 2023 07:01  -  18 Jan 2023 07:00  --------------------------------------------------------  IN: 1389.2 mL / OUT: 2085 mL / NET: -695.8 mL    18 Jan 2023 07:01  -  18 Jan 2023 20:25  --------------------------------------------------------  IN: 1199.5 mL / OUT: 1100 mL / NET: 99.5 mL        Appearance: on vent	  HEENT:   Normal oral mucosa, PERRL, EOMI	  Lymphatic: No lymphadenopathy  Cardiovascular: Normal S1 S2, No JVD, +murmurs, No edema  Respiratory: vent  Psychiatry: A & O x 3, Mood & affect appropriate  Gastrointestinal:  Soft, Non-tender, + BS	  Skin: No rashes, No ecchymoses, No cyanosis	  Neurologic: Non-focal  Extremities: Normal range of motion, No clubbing, cyanosis or edema  Vascular: Peripheral pulses palpable 2+ bilaterally    MEDICATIONS  (STANDING):  albuterol/ipratropium for Nebulization 3 milliLiter(s) Nebulizer every 6 hours  ascorbic acid 500 milliGRAM(s) Oral daily  atorvastatin 40 milliGRAM(s) Oral at bedtime  azithromycin   Tablet 250 milliGRAM(s) Oral <User Schedule>  buMETAnide Injectable 2 milliGRAM(s) IV Push every 12 hours  cefepime   IVPB 500 milliGRAM(s) IV Intermittent every 8 hours  chlorhexidine 0.12% Liquid 15 milliLiter(s) Oral Mucosa every 12 hours  chlorhexidine 4% Liquid 1 Application(s) Topical <User Schedule>  dexMEDEtomidine Infusion 0.2 MICROgram(s)/kG/Hr (1.89 mL/Hr) IV Continuous <Continuous>  dextrose 5%. 1000 milliLiter(s) (50 mL/Hr) IV Continuous <Continuous>  dextrose 5%. 1000 milliLiter(s) (100 mL/Hr) IV Continuous <Continuous>  dextrose 50% Injectable 50 milliLiter(s) IV Push every 15 minutes  dextrose 50% Injectable 50 milliLiter(s) IV Push every 15 minutes  diltiazem Infusion 13 mG/Hr (13 mL/Hr) IV Continuous <Continuous>  glucagon  Injectable 1 milliGRAM(s) IntraMuscular once  insulin lispro (ADMELOG) corrective regimen sliding scale   SubCutaneous every 6 hours  insulin NPH human recombinant 5 Unit(s) SubCutaneous every 6 hours  methylPREDNISolone sodium succinate Injectable 20 milliGRAM(s) IV Push daily  multivitamin 1 Tablet(s) Oral daily  pantoprazole  Injectable 40 milliGRAM(s) IV Push two times a day  phenylephrine    Infusion 0.5 MICROgram(s)/kG/Min (7.07 mL/Hr) IV Continuous <Continuous>  phytonadione   Solution 5 milliGRAM(s) Oral daily  senna Syrup 10 milliLiter(s) Oral at bedtime  sodium chloride 3%  Inhalation 4 milliLiter(s) Inhalation every 12 hours  thiamine 100 milliGRAM(s) Oral daily      TELEMETRY: 	    ECG:  	  RADIOLOGY:  OTHER: 	  	  LABS:	 	    CARDIAC MARKERS:    Mode: AC/ CMV (Assist Control/ Continuous Mandatory Ventilation), RR (machine): 20, TV (machine): 350, FiO2: 30, PEEP: 5, ITime: 1, MAP: 14, PIP: 35  Plateau pressure:   P/F ratio:                               10.6   30.63 )-----------( 90       ( 18 Jan 2023 00:38 )             30.3     01-18    136  |  100  |  81<H>  ----------------------------<  177<H>  3.8   |  25  |  2.16<H>    Ca    7.5<L>      18 Jan 2023 00:38  Phos  4.3     01-18  Mg     1.9     01-18    TPro  4.4<L>  /  Alb  1.9<L>  /  TBili  1.1  /  DBili  x   /  AST  123<H>  /  ALT  351<H>  /  AlkPhos  192<H>  01-18    proBNP:   Lipid Profile:   HgA1c:   TSH: Thyroid Stimulating Hormone, Serum: 1.17 uIU/mL (01-09 @ 06:11)    PT/INR - ( 18 Jan 2023 00:38 )   PT: 16.6 sec;   INR: 1.43 ratio         PTT - ( 18 Jan 2023 00:38 )  PTT:27.0 sec      Assessment and plan  ---------------------------  77-year-old female with past medical history of DM, HTN, HLD, asthma on daily steroids, A. fib on Eliquis and digoxin presenting with shortness of breath.  Patient unable to provide history.  Daughter at bedside reports patient has had increasing shortness of breath, wheezing, cough for the past week now.  Patient also with increasing generalized weakness, fatigue and decreased p.o. intake.  Denies fever/chills, chest pain, abdominal pain, nausea, vomiting, diarrhea, dysuria.  Family reports patient had a similar episode 1 year ago when she was admitted to [facility] with UTI.  pt with sig medical hx, chronic a.fib with sob ?sec sepsis/ pneumoniae, UTI  sedated on vent  continue abx and steroid  echo noted  AC held sec to bleeding  still sedated on vent   continue abx  hr is well controlled still on Cardizem drip  continue pressor keep MAP>65  sepsis on iv abx  fu renal function closely  a.fib hr is well controlled on Cardizem 5 mg/ hr will increase drip as needed  on bumex for diuresis  nutrition  micu care/ vent  discussed with family

## 2023-01-18 NOTE — PROGRESS NOTE ADULT - SUBJECTIVE AND OBJECTIVE BOX
INTERVAL HPI/OVERNIGHT EVENTS:    SUBJECTIVE: Patient seen and examined at bedside.       VITAL SIGNS:  ICU Vital Signs Last 24 Hrs  T(C): 37.8 (18 Jan 2023 07:00), Max: 38.1 (18 Jan 2023 00:45)  T(F): 100 (18 Jan 2023 07:00), Max: 100.6 (18 Jan 2023 00:45)  HR: 101 (18 Jan 2023 07:00) (84 - 150)  BP: 102/50 (18 Jan 2023 07:00) (78/50 - 153/58)  BP(mean): 71 (18 Jan 2023 07:00) (59 - 666)  ABP: --  ABP(mean): --  RR: 24 (18 Jan 2023 07:00) (20 - 226)  SpO2: 96% (18 Jan 2023 07:00) (92% - 100%)    O2 Parameters below as of 18 Jan 2023 05:42  Patient On (Oxygen Delivery Method): ventilator          Mode: AC/ CMV (Assist Control/ Continuous Mandatory Ventilation), RR (machine): 20, TV (machine): 350, FiO2: 30, PEEP: 5, ITime: 1, MAP: 14, PIP: 35  Plateau pressure:   P/F ratio:     01-17 @ 07:01  -  01-18 @ 07:00  --------------------------------------------------------  IN: 1319.3 mL / OUT: 2035 mL / NET: -715.7 mL      CAPILLARY BLOOD GLUCOSE  209 (18 Jan 2023 05:30)      POCT Blood Glucose.: 209 mg/dL (18 Jan 2023 05:37)    ECG:    PHYSICAL EXAM:    General:   HEENT:   Neck:   Respiratory:   Cardiovascular:   Abdomen:   Extremities:  Neurological:    MEDICATIONS:  MEDICATIONS  (STANDING):  albuterol/ipratropium for Nebulization 3 milliLiter(s) Nebulizer every 6 hours  ascorbic acid 500 milliGRAM(s) Oral daily  atorvastatin 40 milliGRAM(s) Oral at bedtime  azithromycin   Tablet 250 milliGRAM(s) Oral <User Schedule>  buMETAnide Injectable 2 milliGRAM(s) IV Push every 12 hours  cefepime   IVPB 500 milliGRAM(s) IV Intermittent every 8 hours  chlorhexidine 0.12% Liquid 15 milliLiter(s) Oral Mucosa every 12 hours  chlorhexidine 4% Liquid 1 Application(s) Topical <User Schedule>  dexMEDEtomidine Infusion 0.2 MICROgram(s)/kG/Hr (1.89 mL/Hr) IV Continuous <Continuous>  dextrose 5%. 1000 milliLiter(s) (50 mL/Hr) IV Continuous <Continuous>  dextrose 5%. 1000 milliLiter(s) (100 mL/Hr) IV Continuous <Continuous>  dextrose 50% Injectable 50 milliLiter(s) IV Push every 15 minutes  dextrose 50% Injectable 50 milliLiter(s) IV Push every 15 minutes  diltiazem Infusion 13 mG/Hr (13 mL/Hr) IV Continuous <Continuous>  glucagon  Injectable 1 milliGRAM(s) IntraMuscular once  insulin lispro (ADMELOG) corrective regimen sliding scale   SubCutaneous every 6 hours  insulin NPH human recombinant 5 Unit(s) SubCutaneous every 6 hours  methylPREDNISolone sodium succinate Injectable 20 milliGRAM(s) IV Push daily  multivitamin 1 Tablet(s) Oral daily  pantoprazole  Injectable 40 milliGRAM(s) IV Push two times a day  phenylephrine    Infusion 0.5 MICROgram(s)/kG/Min (7.07 mL/Hr) IV Continuous <Continuous>  phytonadione   Solution 5 milliGRAM(s) Oral daily  polyethylene glycol 3350 17 Gram(s) Oral every 12 hours  senna Syrup 10 milliLiter(s) Oral at bedtime  sodium chloride 3%  Inhalation 4 milliLiter(s) Inhalation every 12 hours  thiamine 100 milliGRAM(s) Oral daily    MEDICATIONS  (PRN):      ALLERGIES:  Allergies    Avelox (Pruritus)  fish (Vomiting)  Levaquin (Unknown)  shellfish (Vomiting)    Intolerances    fentanyl (Other)      LABS:                        10.6   30.63 )-----------( 90       ( 18 Jan 2023 00:38 )             30.3     01-18    136  |  100  |  81<H>  ----------------------------<  177<H>  3.8   |  25  |  2.16<H>    Ca    7.5<L>      18 Jan 2023 00:38  Phos  4.3     01-18  Mg     1.9     01-18    TPro  4.4<L>  /  Alb  1.9<L>  /  TBili  1.1  /  DBili  x   /  AST  123<H>  /  ALT  351<H>  /  AlkPhos  192<H>  01-18    PT/INR - ( 18 Jan 2023 00:38 )   PT: 16.6 sec;   INR: 1.43 ratio         PTT - ( 18 Jan 2023 00:38 )  PTT:27.0 sec      RADIOLOGY & ADDITIONAL TESTS: Reviewed.   INTERVAL HPI/OVERNIGHT EVENTS:    SUBJECTIVE: Patient seen and examined at bedside.   patient opens eyes, tracks. Tries to move R hand. intubated, sedated on precedex. andrade in place.       VITAL SIGNS:  ICU Vital Signs Last 24 Hrs  T(C): 37.8 (18 Jan 2023 07:00), Max: 38.1 (18 Jan 2023 00:45)  T(F): 100 (18 Jan 2023 07:00), Max: 100.6 (18 Jan 2023 00:45)  HR: 101 (18 Jan 2023 07:00) (84 - 150)  BP: 102/50 (18 Jan 2023 07:00) (78/50 - 153/58)  BP(mean): 71 (18 Jan 2023 07:00) (59 - 666)  ABP: --  ABP(mean): --  RR: 24 (18 Jan 2023 07:00) (20 - 226)  SpO2: 96% (18 Jan 2023 07:00) (92% - 100%)    O2 Parameters below as of 18 Jan 2023 05:42  Patient On (Oxygen Delivery Method): ventilator          Mode: AC/ CMV (Assist Control/ Continuous Mandatory Ventilation), RR (machine): 20, TV (machine): 350, FiO2: 30, PEEP: 5, ITime: 1, MAP: 14, PIP: 35  Plateau pressure:   P/F ratio:     01-17 @ 07:01  -  01-18 @ 07:00  --------------------------------------------------------  IN: 1319.3 mL / OUT: 2035 mL / NET: -715.7 mL      CAPILLARY BLOOD GLUCOSE  209 (18 Jan 2023 05:30)      POCT Blood Glucose.: 209 mg/dL (18 Jan 2023 05:37)    ECG:    PHYSICAL EXAM:    General: intubated, sedated  Respiratory: clear to auscultation b/l  Cardiovascular: regular rate and rhythm   Abdomen: soft  Extremities: no edema  Neurological: sedated on precedex, opens eyes to command, tracks.     MEDICATIONS:  MEDICATIONS  (STANDING):  albuterol/ipratropium for Nebulization 3 milliLiter(s) Nebulizer every 6 hours  ascorbic acid 500 milliGRAM(s) Oral daily  atorvastatin 40 milliGRAM(s) Oral at bedtime  azithromycin   Tablet 250 milliGRAM(s) Oral <User Schedule>  buMETAnide Injectable 2 milliGRAM(s) IV Push every 12 hours  cefepime   IVPB 500 milliGRAM(s) IV Intermittent every 8 hours  chlorhexidine 0.12% Liquid 15 milliLiter(s) Oral Mucosa every 12 hours  chlorhexidine 4% Liquid 1 Application(s) Topical <User Schedule>  dexMEDEtomidine Infusion 0.2 MICROgram(s)/kG/Hr (1.89 mL/Hr) IV Continuous <Continuous>  dextrose 5%. 1000 milliLiter(s) (50 mL/Hr) IV Continuous <Continuous>  dextrose 5%. 1000 milliLiter(s) (100 mL/Hr) IV Continuous <Continuous>  dextrose 50% Injectable 50 milliLiter(s) IV Push every 15 minutes  dextrose 50% Injectable 50 milliLiter(s) IV Push every 15 minutes  diltiazem Infusion 13 mG/Hr (13 mL/Hr) IV Continuous <Continuous>  glucagon  Injectable 1 milliGRAM(s) IntraMuscular once  insulin lispro (ADMELOG) corrective regimen sliding scale   SubCutaneous every 6 hours  insulin NPH human recombinant 5 Unit(s) SubCutaneous every 6 hours  methylPREDNISolone sodium succinate Injectable 20 milliGRAM(s) IV Push daily  multivitamin 1 Tablet(s) Oral daily  pantoprazole  Injectable 40 milliGRAM(s) IV Push two times a day  phenylephrine    Infusion 0.5 MICROgram(s)/kG/Min (7.07 mL/Hr) IV Continuous <Continuous>  phytonadione   Solution 5 milliGRAM(s) Oral daily  polyethylene glycol 3350 17 Gram(s) Oral every 12 hours  senna Syrup 10 milliLiter(s) Oral at bedtime  sodium chloride 3%  Inhalation 4 milliLiter(s) Inhalation every 12 hours  thiamine 100 milliGRAM(s) Oral daily    MEDICATIONS  (PRN):      ALLERGIES:  Allergies    Avelox (Pruritus)  fish (Vomiting)  Levaquin (Unknown)  shellfish (Vomiting)    Intolerances    fentanyl (Other)      LABS:                        10.6   30.63 )-----------( 90       ( 18 Jan 2023 00:38 )             30.3     01-18    136  |  100  |  81<H>  ----------------------------<  177<H>  3.8   |  25  |  2.16<H>    Ca    7.5<L>      18 Jan 2023 00:38  Phos  4.3     01-18  Mg     1.9     01-18    TPro  4.4<L>  /  Alb  1.9<L>  /  TBili  1.1  /  DBili  x   /  AST  123<H>  /  ALT  351<H>  /  AlkPhos  192<H>  01-18    PT/INR - ( 18 Jan 2023 00:38 )   PT: 16.6 sec;   INR: 1.43 ratio         PTT - ( 18 Jan 2023 00:38 )  PTT:27.0 sec      RADIOLOGY & ADDITIONAL TESTS: Reviewed.

## 2023-01-18 NOTE — PROGRESS NOTE ADULT - SUBJECTIVE AND OBJECTIVE BOX
Indication for Geriatrics and Palliative Care Services/INTERVAL HPI: GOC  SUBJECTIVE & OBJECTIVE: Patient seen and examined at bedside. Pt remains on full ventilator support appears dyspneic. Family at bedside daughters Shyann & Ariana and  (surrogate) James as per family this is patient's baseline respiratory appearance       OVERNIGHT EVENTS: pt failed CPAP trial: tachypnea to 40, c/b afib with RVR     DNR on chart:  Allergies    Avelox (Pruritus)  fish (Vomiting)  Levaquin (Unknown)  shellfish (Vomiting)    Intolerances    fentanyl (Other)  MEDICATIONS  (STANDING):  albuterol/ipratropium for Nebulization 3 milliLiter(s) Nebulizer every 6 hours  ascorbic acid 500 milliGRAM(s) Oral daily  atorvastatin 40 milliGRAM(s) Oral at bedtime  azithromycin   Tablet 250 milliGRAM(s) Oral <User Schedule>  buMETAnide Injectable 2 milliGRAM(s) IV Push every 12 hours  cefepime   IVPB 500 milliGRAM(s) IV Intermittent every 8 hours  chlorhexidine 0.12% Liquid 15 milliLiter(s) Oral Mucosa every 12 hours  chlorhexidine 4% Liquid 1 Application(s) Topical <User Schedule>  dexMEDEtomidine Infusion 0.2 MICROgram(s)/kG/Hr (1.89 mL/Hr) IV Continuous <Continuous>  dextrose 5%. 1000 milliLiter(s) (50 mL/Hr) IV Continuous <Continuous>  dextrose 5%. 1000 milliLiter(s) (100 mL/Hr) IV Continuous <Continuous>  dextrose 50% Injectable 50 milliLiter(s) IV Push every 15 minutes  dextrose 50% Injectable 50 milliLiter(s) IV Push every 15 minutes  diltiazem Infusion 13 mG/Hr (13 mL/Hr) IV Continuous <Continuous>  glucagon  Injectable 1 milliGRAM(s) IntraMuscular once  insulin lispro (ADMELOG) corrective regimen sliding scale   SubCutaneous every 6 hours  insulin NPH human recombinant 5 Unit(s) SubCutaneous every 6 hours  methylPREDNISolone sodium succinate Injectable 20 milliGRAM(s) IV Push daily  multivitamin 1 Tablet(s) Oral daily  pantoprazole  Injectable 40 milliGRAM(s) IV Push two times a day  phenylephrine    Infusion 0.5 MICROgram(s)/kG/Min (7.07 mL/Hr) IV Continuous <Continuous>  phytonadione   Solution 5 milliGRAM(s) Oral daily  senna Syrup 10 milliLiter(s) Oral at bedtime  sodium chloride 3%  Inhalation 4 milliLiter(s) Inhalation every 12 hours  thiamine 100 milliGRAM(s) Oral daily    MEDICATIONS  (PRN):      ITEMS UNCHECKED ARE NOT PRESENT    PRESENT SYMPTOMS: [x ]Unable to self-report - see [ ] CPOT [x ] PAINADS [x ] RDOS  Source if other than patient:  [ ]Family   [ ]Team     Pain:  [ ]yes [ ]no  QOL impact -   Location -                    Aggravating factors -  Quality -  Radiation -  Timing-  Severity (0-10 scale):  Minimal acceptable level (0-10 scale):     CPOT:    https://www.[email or web address]/[email or web address]/Critical-Care-Pain-Observation-Tool-(CPOT)    PAINAD Score: See PAINAD tool and score below       Dyspnea:                           [ ]Mild [ ]Moderate [ ]Severe    RDOS: See RDOS tool and score below   0 to 2  minimal or no respiratory distress   3  mild distress  4 to 6 moderate distress  >7 severe distress      Anxiety:                             [ ]Mild [ ]Moderate [ ]Severe  Fatigue:                             [ ]Mild [ ]Moderate [ ]Severe  Nausea:                             [ ]Mild [ ]Moderate [ ]Severe  Loss of appetite:              [ ]Mild [ ]Moderate [ ]Severe  Constipation:                    [ ]Mild [ ]Moderate [ ]Severe    PCSSQ[Palliative Care Spiritual Screening Question]   Severity (0-10):  Score of 4 or > indicate consideration of Chaplaincy referral.  Chaplaincy Referral: [x ] yes [ ] refused [x ] following [ ] Deferred     Caregiver [place]? : [x ] yes [ ] no [ ] Deferred [ ] Declined             Social work referral [x] Patient & Family Centered Care Referral [ ]     Anticipatory Grief present?:  [x ] yes [ ] no  [ ] Deferred                  Social work referral [x ] Chaplaincy Referral [x ]    		  Other Symptoms:  [x ]All other review of systems negative: pt intubated/sedated     Palliative Performance Status Version 2:   See PPSv2 tool and score below         PHYSICAL EXAM:  Vital Signs Last 24 Hrs  T(C): 37.9 (18 Jan 2023 12:00), Max: 38.1 (18 Jan 2023 00:45)  T(F): 100.2 (18 Jan 2023 12:00), Max: 100.6 (18 Jan 2023 00:45)  HR: 96 (18 Jan 2023 15:00) (78 - 150)  BP: 114/52 (18 Jan 2023 15:00) (84/39 - 150/96)  BP(mean): 75 (18 Jan 2023 15:00) (57 - 666)  RR: 39 (18 Jan 2023 15:00) (20 - 226)  SpO2: 99% (18 Jan 2023 15:00) (88% - 100%)    Parameters below as of 18 Jan 2023 11:03  Patient On (Oxygen Delivery Method): ventilator     I&O's Summary    17 Jan 2023 07:01  -  18 Jan 2023 07:00  --------------------------------------------------------  IN: 1389.2 mL / OUT: 2085 mL / NET: -695.8 mL    18 Jan 2023 07:01  -  18 Jan 2023 15:34  --------------------------------------------------------  IN: 770.5 mL / OUT: 840 mL / NET: -69.5 mL       GENERAL: [ ]Cachexia    [x ]Alert  [ ]Oriented x   [x ]Lethargic  [ ]Unarousable  [ ]Verbal  [ ]Non-Verbal  Behavioral:   [ ]Anxiety  [ ]Delirium [ ]Agitation [ ]Other  HEENT:  [ ]Normal   [ ]Dry mouth   [x ]ET Tube/Trach  [ ]Oral lesions  PULMONARY:   [ ]Clear [ ]Tachypnea  [ ]Audible excessive secretions   [ ]Rhonchi        [ ]Right [ ]Left [ ]Bilateral  [x ]Crackles        [ ]Right [ ]Left [x ]Bilateral  [ ]Wheezing     [ ]Right [ ]Left [ ]Bilateral  [ ]Diminished BS [ ] Right [ ]Left [ ]Bilateral  CARDIOVASCULAR:    [ ]Regular [x ]Irregular [x ]Tachy  [ ]Catracho [ ]Murmur [ ]Other  GASTROINTESTINAL:  [x ]Soft  [ ]Distended   [x ]+BS  [x ]Non tender [ ]Tender  [ ]Other [ ]PEG [x ]OGT/ NGT   Last BM: 1/18/2023  GENITOURINARY:  [ ]Normal [ ]Incontinent   [ ]Oliguria/Anuria   [x ]Morales  MUSCULOSKELETAL:   [ ]Normal   [x ]Weakness  [x ]Bed/Wheelchair bound [ ]Edema  NEUROLOGIC:   [ ]No focal deficits  [x ] Cognitive impairment: pt intubated/sedated   [ ] Dysphagia [ ]Dysarthria [ ] Paresis [ ]Other   SKIN:   [ ]Normal  [ ]Rash  [ ]Other  [ x]Pressure ulcer(s) [x ]y [ ]n present on admission    CRITICAL CARE:  [x ]Shock Present  [x ]Septic [ ]Cardiogenic [ ]Neurologic [ ]Hypovolemic  [x ]Vasopressors [ ]Inotropes  [x ]Respiratory failure present [x ]Mechanical Ventilation [ ]Non-invasive ventilatory support [ ]High-Flow Mode: AC/ CMV (Assist Control/ Continuous Mandatory Ventilation), RR (machine): 20, TV (machine): 350, FiO2: 50, PEEP: 5, ITime: 1, MAP: 14, PIP: 35  [x ]Acute  [x ]Chronic [x ]Hypoxic  [ ]Hypercarbic [ ]Other  [x ]Other organ failure    LABS:                        10.6   30.63 )-----------( 90       ( 18 Jan 2023 00:38 )             30.3   01-18    136  |  100  |  81<H>  ----------------------------<  177<H>  3.8   |  25  |  2.16<H>    Ca    7.5<L>      18 Jan 2023 00:38  Phos  4.3     01-18  Mg     1.9     01-18    TPro  4.4<L>  /  Alb  1.9<L>  /  TBili  1.1  /  DBili  x   /  AST  123<H>  /  ALT  351<H>  /  AlkPhos  192<H>  01-18  PT/INR - ( 18 Jan 2023 00:38 )   PT: 16.6 sec;   INR: 1.43 ratio         PTT - ( 18 Jan 2023 00:38 )  PTT:27.0 sec      RADIOLOGY & ADDITIONAL STUDIES:  < from: Xray Abdomen 1 View PORTABLE -Urgent (Xray Abdomen 1 View PORTABLE -Urgent .) (01.18.23 @ 09:43) >  ACC: [id number] EXAM:  XR ABDOMEN PORTABLE URGENT 1V                          PROCEDURE DATE:  01/18/2023          INTERPRETATION:  CLINICAL INDICATION: Evaluation for free air    TECHNIQUE: AP view(s) of the abdomen.    COMPARISON: Abdominal radiograph 1/14/2023    FINDINGS:  Enteric tube with side-port at the gastroesophageal junction and tip   terminating within the stomach.  Bowel gas pattern is unremarkable. No evidence of pneumoperitoneum or   obstruction.  Bilateral hip arthroplasty.  Rectal temperature probe.  Redemonstration of moderate lumbar spinal levocurvature    IMPRESSION:  No evidence of pneumoperitoneum.    --- End of Report ---           BEATA NOLASCO DO; Resident Radiologist  This document has been electronically signed.  JAIRO TATUM MD; Attending Radiologist  This document has been electronically signed. Jan 18 2023 11:51AM    < end of copied text >        Protein Calorie Malnutrition Present: [ ]mild [ ]moderate [ ]severe [ ]underweight [ ]morbid obesity  https://www.andeal.org/vault/2440/web/files/ONC/Table_Clinical%20Characteristics%20to%20Document%20Malnutrition-White%20JV%20et%20al%321403.pdf    Height (cm): 154.9 (01-10-23 @ 18:10), 152.4 (06-03-22 @ 16:04)  Weight (kg): 37.7 (01-10-23 @ 18:10), 43.1 (06-03-22 @ 16:04)  BMI (kg/m2): 15.7 (01-10-23 @ 18:10), 18.6 (06-03-22 @ 16:04)    [x ]PPSV2 < or = 30%  [ ]significant weight loss [ ]poor nutritional intake [ ]anasarca[ ]Artificial Nutrition    Other REFERRALS:  [ ]Hospice  [ ]Child Life  [ ]Social Work  [ ]Case management [ ]Holistic Therapy     Goals of Care Document: see below  Indication for Geriatrics and Palliative Care Services/INTERVAL HPI: GOC  SUBJECTIVE & OBJECTIVE: Patient seen and examined at bedside. Pt remains on full ventilator support appears dyspneic. Family at bedside daughters Shyann & Ariana and  (surrogate) James as per family this is patient's baseline respiratory appearance       OVERNIGHT EVENTS: pt failed CPAP trial: tachypnea to 40, c/b afib with RVR     DNR on chart:  Allergies    Avelox (Pruritus)  fish (Vomiting)  Levaquin (Unknown)  shellfish (Vomiting)    Intolerances    fentanyl (Other)  MEDICATIONS  (STANDING):  albuterol/ipratropium for Nebulization 3 milliLiter(s) Nebulizer every 6 hours  ascorbic acid 500 milliGRAM(s) Oral daily  atorvastatin 40 milliGRAM(s) Oral at bedtime  azithromycin   Tablet 250 milliGRAM(s) Oral <User Schedule>  buMETAnide Injectable 2 milliGRAM(s) IV Push every 12 hours  cefepime   IVPB 500 milliGRAM(s) IV Intermittent every 8 hours  chlorhexidine 0.12% Liquid 15 milliLiter(s) Oral Mucosa every 12 hours  chlorhexidine 4% Liquid 1 Application(s) Topical <User Schedule>  dexMEDEtomidine Infusion 0.2 MICROgram(s)/kG/Hr (1.89 mL/Hr) IV Continuous <Continuous>  dextrose 5%. 1000 milliLiter(s) (50 mL/Hr) IV Continuous <Continuous>  dextrose 5%. 1000 milliLiter(s) (100 mL/Hr) IV Continuous <Continuous>  dextrose 50% Injectable 50 milliLiter(s) IV Push every 15 minutes  dextrose 50% Injectable 50 milliLiter(s) IV Push every 15 minutes  diltiazem Infusion 13 mG/Hr (13 mL/Hr) IV Continuous <Continuous>  glucagon  Injectable 1 milliGRAM(s) IntraMuscular once  insulin lispro (ADMELOG) corrective regimen sliding scale   SubCutaneous every 6 hours  insulin NPH human recombinant 5 Unit(s) SubCutaneous every 6 hours  methylPREDNISolone sodium succinate Injectable 20 milliGRAM(s) IV Push daily  multivitamin 1 Tablet(s) Oral daily  pantoprazole  Injectable 40 milliGRAM(s) IV Push two times a day  phenylephrine    Infusion 0.5 MICROgram(s)/kG/Min (7.07 mL/Hr) IV Continuous <Continuous>  phytonadione   Solution 5 milliGRAM(s) Oral daily  senna Syrup 10 milliLiter(s) Oral at bedtime  sodium chloride 3%  Inhalation 4 milliLiter(s) Inhalation every 12 hours  thiamine 100 milliGRAM(s) Oral daily    MEDICATIONS  (PRN):      ITEMS UNCHECKED ARE NOT PRESENT    PRESENT SYMPTOMS: [x ]Unable to self-report - see [ ] CPOT [x ] PAINADS [x ] RDOS  Source if other than patient:  [ ]Family   [ ]Team     Pain:  [ ]yes [ ]no  QOL impact -   Location -                    Aggravating factors -  Quality -  Radiation -  Timing-  Severity (0-10 scale):  Minimal acceptable level (0-10 scale):     CPOT:    https://www.[email or web address]/getattachment/[email or web address]/Critical-Care-Pain-Observation-Tool-(CPOT)    PAINAD Score: See PAINAD tool and score below       Dyspnea:                           [ ]Mild [ ]Moderate [ ]Severe    RDOS: See RDOS tool and score below   0 to 2  minimal or no respiratory distress   3  mild distress  4 to 6 moderate distress  >7 severe distress      Anxiety:                             [ ]Mild [ ]Moderate [ ]Severe  Fatigue:                             [ ]Mild [ ]Moderate [ ]Severe  Nausea:                             [ ]Mild [ ]Moderate [ ]Severe  Loss of appetite:              [ ]Mild [ ]Moderate [ ]Severe  Constipation:                    [ ]Mild [ ]Moderate [ ]Severe    PCSSQ[Palliative Care Spiritual Screening Question]   Severity (0-10):  Score of 4 or > indicate consideration of Chaplaincy referral.  Chaplaincy Referral: [x ] yes [ ] refused [x ] following [ ] Deferred     Caregiver [place]? : [x ] yes [ ] no [ ] Deferred [ ] Declined             Social work referral [x] Patient & Family Centered Care Referral [ ]     Anticipatory Grief present?:  [x ] yes [ ] no  [ ] Deferred                  Social work referral [x ] Chaplaincy Referral [x ]    		  Other Symptoms:  [x ]All other review of systems negative: pt intubated/sedated     Palliative Performance Status Version 2:   See PPSv2 tool and score below         PHYSICAL EXAM:  Vital Signs Last 24 Hrs  T(C): 37.9 (18 Jan 2023 12:00), Max: 38.1 (18 Jan 2023 00:45)  T(F): 100.2 (18 Jan 2023 12:00), Max: 100.6 (18 Jan 2023 00:45)  HR: 96 (18 Jan 2023 15:00) (78 - 150)  BP: 114/52 (18 Jan 2023 15:00) (84/39 - 150/96)  BP(mean): 75 (18 Jan 2023 15:00) (57 - 666)  RR: 39 (18 Jan 2023 15:00) (20 - 226)  SpO2: 99% (18 Jan 2023 15:00) (88% - 100%)    Parameters below as of 18 Jan 2023 11:03  Patient On (Oxygen Delivery Method): ventilator     I&O's Summary    17 Jan 2023 07:01  -  18 Jan 2023 07:00  --------------------------------------------------------  IN: 1389.2 mL / OUT: 2085 mL / NET: -695.8 mL    18 Jan 2023 07:01  -  18 Jan 2023 15:34  --------------------------------------------------------  IN: 770.5 mL / OUT: 840 mL / NET: -69.5 mL       GENERAL: [ ]Cachexia    [x ]Alert  [ ]Oriented x   [x ]Lethargic  [ ]Unarousable  [ ]Verbal  [ ]Non-Verbal  Behavioral:   [ ]Anxiety  [ ]Delirium [ ]Agitation [ ]Other  HEENT:  [ ]Normal   [ ]Dry mouth   [x ]ET Tube/Trach  [ ]Oral lesions  PULMONARY:   [ ]Clear [ ]Tachypnea  [ ]Audible excessive secretions   [ ]Rhonchi        [ ]Right [ ]Left [ ]Bilateral  [x ]Crackles        [ ]Right [ ]Left [x ]Bilateral  [ ]Wheezing     [ ]Right [ ]Left [ ]Bilateral  [ ]Diminished BS [ ] Right [ ]Left [ ]Bilateral  CARDIOVASCULAR:    [ ]Regular [x ]Irregular [x ]Tachy  [ ]Catracho [ ]Murmur [ ]Other  GASTROINTESTINAL:  [x ]Soft  [ ]Distended   [x ]+BS  [x ]Non tender [ ]Tender  [ ]Other [ ]PEG [x ]OGT/ NGT   Last BM: 1/18/2023  GENITOURINARY:  [ ]Normal [ ]Incontinent   [ ]Oliguria/Anuria   [x ]Morales  MUSCULOSKELETAL:   [ ]Normal   [x ]Weakness  [x ]Bed/Wheelchair bound [ ]Edema  NEUROLOGIC:   [ ]No focal deficits  [x ] Cognitive impairment: pt intubated/sedated   [ ] Dysphagia [ ]Dysarthria [ ] Paresis [ ]Other   SKIN:   [ ]Normal  [ ]Rash  [ ]Other  [ x]Pressure ulcer(s) [x ]y [ ]n present on admission    CRITICAL CARE:  [x ]Shock Present  [x ]Septic [ ]Cardiogenic [ ]Neurologic [ ]Hypovolemic  [x ]Vasopressors [ ]Inotropes  [x ]Respiratory failure present [x ]Mechanical Ventilation [ ]Non-invasive ventilatory support [ ]High-Flow Mode: AC/ CMV (Assist Control/ Continuous Mandatory Ventilation), RR (machine): 20, TV (machine): 350, FiO2: 50, PEEP: 5, ITime: 1, MAP: 14, PIP: 35  [x ]Acute  [x ]Chronic [x ]Hypoxic  [ ]Hypercarbic [ ]Other  [x ]Other organ failure    LABS:                        10.6   30.63 )-----------( 90       ( 18 Jan 2023 00:38 )             30.3   01-18    136  |  100  |  81<H>  ----------------------------<  177<H>  3.8   |  25  |  2.16<H>    Ca    7.5<L>      18 Jan 2023 00:38  Phos  4.3     01-18  Mg     1.9     01-18    TPro  4.4<L>  /  Alb  1.9<L>  /  TBili  1.1  /  DBili  x   /  AST  123<H>  /  ALT  351<H>  /  AlkPhos  192<H>  01-18  PT/INR - ( 18 Jan 2023 00:38 )   PT: 16.6 sec;   INR: 1.43 ratio         PTT - ( 18 Jan 2023 00:38 )  PTT:27.0 sec      RADIOLOGY & ADDITIONAL STUDIES:  < from: Xray Abdomen 1 View PORTABLE -Urgent (Xray Abdomen 1 View PORTABLE -Urgent .) (01.18.23 @ 09:43) >  ACC: [id number] EXAM:  XR ABDOMEN PORTABLE URGENT 1V                          PROCEDURE DATE:  01/18/2023          INTERPRETATION:  CLINICAL INDICATION: Evaluation for free air    TECHNIQUE: AP view(s) of the abdomen.    COMPARISON: Abdominal radiograph 1/14/2023    FINDINGS:  Enteric tube with side-port at the gastroesophageal junction and tip   terminating within the stomach.  Bowel gas pattern is unremarkable. No evidence of pneumoperitoneum or   obstruction.  Bilateral hip arthroplasty.  Rectal temperature probe.  Redemonstration of moderate lumbar spinal levocurvature    IMPRESSION:  No evidence of pneumoperitoneum.    --- End of Report ---           BEATA NOLASCO DO; Resident Radiologist  This document has been electronically signed.  JAIRO TATUM MD; Attending Radiologist  This document has been electronically signed. Jan 18 2023 11:51AM    < end of copied text >        Protein Calorie Malnutrition Present: [ ]mild [ ]moderate [ ]severe [ ]underweight [ ]morbid obesity  https://www.andeal.org/vault/2440/web/files/ONC/Table_Clinical%20Characteristics%20to%20Document%20Malnutrition-White%20JV%20et%20al%162847.pdf    Height (cm): 154.9 (01-10-23 @ 18:10), 152.4 (06-03-22 @ 16:04)  Weight (kg): 37.7 (01-10-23 @ 18:10), 43.1 (06-03-22 @ 16:04)  BMI (kg/m2): 15.7 (01-10-23 @ 18:10), 18.6 (06-03-22 @ 16:04)    [x ]PPSV2 < or = 30%  [ ]significant weight loss [ ]poor nutritional intake [ ]anasarca[ ]Artificial Nutrition    Other REFERRALS:  [ ]Hospice  [ ]Child Life  [ x]Social Work  [ ]Case management [ ]Holistic Therapy     Goals of Care Document: see below

## 2023-01-19 NOTE — PROGRESS NOTE ADULT - SUBJECTIVE AND OBJECTIVE BOX
CARDIOLOGY     PROGRESS  NOTE   ________________________________________________    CHIEF COMPLAINT:Patient is a 77y old  Female who presents with a chief complaint of sob/ rapid a.fib (19 Jan 2023 10:57)  on vent  	  REVIEW OF SYSTEMS:  CONSTITUTIONAL: No fever, weight loss, or fatigue  EYES: No eye pain, visual disturbances, or discharge  ENT:  No difficulty hearing, tinnitus, vertigo; No sinus or throat pain  NECK: No pain or stiffness  RESPIRATORY: No cough, wheezing, chills or hemoptysis; No Shortness of Breath  CARDIOVASCULAR: No chest pain, palpitations, passing out, dizziness, or leg swelling  GASTROINTESTINAL: No abdominal or epigastric pain. No nausea, vomiting, or hematemesis; No diarrhea or constipation. No melena or hematochezia.  GENITOURINARY: No dysuria, frequency, hematuria, or incontinence  NEUROLOGICAL: No headaches, memory loss, loss of strength, numbness, or tremors  SKIN: No itching, burning, rashes, or lesions   LYMPH Nodes: No enlarged glands  ENDOCRINE: No heat or cold intolerance; No hair loss  MUSCULOSKELETAL: No joint pain or swelling; No muscle, back, or extremity pain  PSYCHIATRIC: No depression, anxiety, mood swings, or difficulty sleeping  HEME/LYMPH: No easy bruising, or bleeding gums  ALLERGY AND IMMUNOLOGIC: No hives or eczema	    [ ] All others negative	  [ x] Unable to obtain    PHYSICAL EXAM:  T(C): 37.5 (01-19-23 @ 08:00), Max: 38.1 (01-18-23 @ 20:00)  HR: 97 (01-19-23 @ 10:30) (72 - 145)  BP: 109/56 (01-19-23 @ 10:30) (84/39 - 144/56)  RR: 29 (01-19-23 @ 10:30) (19 - 40)  SpO2: 97% (01-19-23 @ 10:30) (91% - 100%)  Wt(kg): --  I&O's Summary    18 Jan 2023 07:01  -  19 Jan 2023 07:00  --------------------------------------------------------  IN: 2562.6 mL / OUT: 1490 mL / NET: 1072.6 mL    19 Jan 2023 07:01  -  19 Jan 2023 11:10  --------------------------------------------------------  IN: 255 mL / OUT: 30 mL / NET: 225 mL        Appearance: Normal	  HEENT:   Normal oral mucosa, PERRL, EOMI	  Lymphatic: No lymphadenopathy  Cardiovascular: Normal S1 S2, No JVD, + murmurs, No edema  Respiratory: rhonchi  Psychiatry: A & O x 3, Mood & affect appropriate  Gastrointestinal:  Soft, Non-tender, + BS	  Skin: No rashes, No ecchymoses, No cyanosis	  Neurologic: Non-focal  Extremities: Normal range of motion, No clubbing, cyanosis or edema  Vascular: Peripheral pulses palpable 2+ bilaterally    MEDICATIONS  (STANDING):  ascorbic acid 500 milliGRAM(s) Oral daily  atorvastatin 40 milliGRAM(s) Oral at bedtime  azithromycin   Tablet 250 milliGRAM(s) Oral <User Schedule>  buMETAnide Infusion 2 mG/Hr (10 mL/Hr) IV Continuous <Continuous>  cefepime   IVPB 500 milliGRAM(s) IV Intermittent every 8 hours  chlorhexidine 0.12% Liquid 15 milliLiter(s) Oral Mucosa every 12 hours  chlorhexidine 4% Liquid 1 Application(s) Topical <User Schedule>  dexMEDEtomidine Infusion 0.2 MICROgram(s)/kG/Hr (1.89 mL/Hr) IV Continuous <Continuous>  dextrose 5%. 1000 milliLiter(s) (50 mL/Hr) IV Continuous <Continuous>  dextrose 5%. 1000 milliLiter(s) (100 mL/Hr) IV Continuous <Continuous>  dextrose 50% Injectable 50 milliLiter(s) IV Push every 15 minutes  dextrose 50% Injectable 50 milliLiter(s) IV Push every 15 minutes  diltiazem Infusion 13 mG/Hr (13 mL/Hr) IV Continuous <Continuous>  glucagon  Injectable 1 milliGRAM(s) IntraMuscular once  heparin   Injectable 5000 Unit(s) SubCutaneous every 12 hours  insulin lispro (ADMELOG) corrective regimen sliding scale   SubCutaneous every 6 hours  insulin NPH human recombinant 5 Unit(s) SubCutaneous every 6 hours  methylPREDNISolone sodium succinate Injectable 10 milliGRAM(s) IV Push daily  midodrine 10 milliGRAM(s) Oral every 8 hours  multivitamin 1 Tablet(s) Oral daily  pantoprazole  Injectable 40 milliGRAM(s) IV Push two times a day  phenylephrine    Infusion 0.5 MICROgram(s)/kG/Min (7.07 mL/Hr) IV Continuous <Continuous>  phytonadione   Solution 5 milliGRAM(s) Oral daily  senna Syrup 10 milliLiter(s) Oral at bedtime  thiamine 100 milliGRAM(s) Oral daily      TELEMETRY: 	    ECG:  	  RADIOLOGY:  OTHER: 	  	  LABS:	 	    CARDIAC MARKERS:                                9.3    33.62 )-----------( 108      ( 18 Jan 2023 23:46 )             27.3     01-18    136  |  105  |  81<H>  ----------------------------<  180<H>  3.9   |  19<L>  |  1.95<H>    Ca    6.0<LL>      18 Jan 2023 23:46  Phos  4.9     01-18  Mg     1.6     01-18    TPro  3.8<L>  /  Alb  1.3<L>  /  TBili  0.6  /  DBili  x   /  AST  54<H>  /  ALT  174<H>  /  AlkPhos  178<H>  01-18    proBNP:   Lipid Profile:   HgA1c:   TSH: Thyroid Stimulating Hormone, Serum: 1.17 uIU/mL (01-09 @ 06:11)    PT/INR - ( 18 Jan 2023 23:46 )   PT: 16.6 sec;   INR: 1.43 ratio         PTT - ( 18 Jan 2023 23:46 )  PTT:30.5 sec      Assessment and plan  ---------------------------  77-year-old female with past medical history of DM, HTN, HLD, asthma on daily steroids, A. fib on Eliquis and digoxin presenting with shortness of breath.  Patient unable to provide history.  Daughter at bedside reports patient has had increasing shortness of breath, wheezing, cough for the past week now.  Patient also with increasing generalized weakness, fatigue and decreased p.o. intake.  Denies fever/chills, chest pain, abdominal pain, nausea, vomiting, diarrhea, dysuria.  Family reports patient had a similar episode 1 year ago when she was admitted to [facility] with UTI.  pt with sig medical hx, chronic a.fib with sob ?sec sepsis/ pneumoniae, UTI  sedated on vent  continue abx and steroid  echo noted  AC held sec to bleeding  still sedated on vent   continue abx  hr is well controlled still on Cardizem drip  continue pressor keep MAP>65  sepsis on iv abx  fu renal function closely  a.fib hr is well controlled on Cardizem 5 mg/ hr will increase drip as needed  on bumex for diuresis  nutrition  micu care/ vent  discussed with family  cpap trial ?need of treach    	                    CARDIOLOGY     PROGRESS  NOTE   ________________________________________________    CHIEF COMPLAINT:Patient is a 77y old  Female who presents with a chief complaint of sob/ rapid a.fib (19 Jan 2023 10:57)  on vent  	  REVIEW OF SYSTEMS:  CONSTITUTIONAL: No fever, weight loss, or fatigue  EYES: No eye pain, visual disturbances, or discharge  ENT:  No difficulty hearing, tinnitus, vertigo; No sinus or throat pain  NECK: No pain or stiffness  RESPIRATORY: No cough, wheezing, chills or hemoptysis; No Shortness of Breath  CARDIOVASCULAR: No chest pain, palpitations, passing out, dizziness, or leg swelling  GASTROINTESTINAL: No abdominal or epigastric pain. No nausea, vomiting, or hematemesis; No diarrhea or constipation. No melena or hematochezia.  GENITOURINARY: No dysuria, frequency, hematuria, or incontinence  NEUROLOGICAL: No headaches, memory loss, loss of strength, numbness, or tremors  SKIN: No itching, burning, rashes, or lesions   LYMPH Nodes: No enlarged glands  ENDOCRINE: No heat or cold intolerance; No hair loss  MUSCULOSKELETAL: No joint pain or swelling; No muscle, back, or extremity pain  PSYCHIATRIC: No depression, anxiety, mood swings, or difficulty sleeping  HEME/LYMPH: No easy bruising, or bleeding gums  ALLERGY AND IMMUNOLOGIC: No hives or eczema	    [ ] All others negative	  [ x] Unable to obtain    PHYSICAL EXAM:  T(C): 37.5 (01-19-23 @ 08:00), Max: 38.1 (01-18-23 @ 20:00)  HR: 97 (01-19-23 @ 10:30) (72 - 145)  BP: 109/56 (01-19-23 @ 10:30) (84/39 - 144/56)  RR: 29 (01-19-23 @ 10:30) (19 - 40)  SpO2: 97% (01-19-23 @ 10:30) (91% - 100%)  Wt(kg): --  I&O's Summary    18 Jan 2023 07:01  -  19 Jan 2023 07:00  --------------------------------------------------------  IN: 2562.6 mL / OUT: 1490 mL / NET: 1072.6 mL    19 Jan 2023 07:01  -  19 Jan 2023 11:10  --------------------------------------------------------  IN: 255 mL / OUT: 30 mL / NET: 225 mL        Appearance: Normal	  HEENT:   Normal oral mucosa, PERRL, EOMI	  Lymphatic: No lymphadenopathy  Cardiovascular: Normal S1 S2, No JVD, + murmurs, No edema  Respiratory: rhonchi/ vent  Gastrointestinal:  Soft, Non-tender, + BS	  Skin: No rashes, No ecchymoses, No cyanosis	  Extremities:No clubbing, cyanosis or edema  Vascular: Peripheral pulses palpable 2+ bilaterally    MEDICATIONS  (STANDING):  ascorbic acid 500 milliGRAM(s) Oral daily  atorvastatin 40 milliGRAM(s) Oral at bedtime  azithromycin   Tablet 250 milliGRAM(s) Oral <User Schedule>  buMETAnide Infusion 2 mG/Hr (10 mL/Hr) IV Continuous <Continuous>  cefepime   IVPB 500 milliGRAM(s) IV Intermittent every 8 hours  chlorhexidine 0.12% Liquid 15 milliLiter(s) Oral Mucosa every 12 hours  chlorhexidine 4% Liquid 1 Application(s) Topical <User Schedule>  dexMEDEtomidine Infusion 0.2 MICROgram(s)/kG/Hr (1.89 mL/Hr) IV Continuous <Continuous>  dextrose 5%. 1000 milliLiter(s) (50 mL/Hr) IV Continuous <Continuous>  dextrose 5%. 1000 milliLiter(s) (100 mL/Hr) IV Continuous <Continuous>  dextrose 50% Injectable 50 milliLiter(s) IV Push every 15 minutes  dextrose 50% Injectable 50 milliLiter(s) IV Push every 15 minutes  diltiazem Infusion 13 mG/Hr (13 mL/Hr) IV Continuous <Continuous>  glucagon  Injectable 1 milliGRAM(s) IntraMuscular once  heparin   Injectable 5000 Unit(s) SubCutaneous every 12 hours  insulin lispro (ADMELOG) corrective regimen sliding scale   SubCutaneous every 6 hours  insulin NPH human recombinant 5 Unit(s) SubCutaneous every 6 hours  methylPREDNISolone sodium succinate Injectable 10 milliGRAM(s) IV Push daily  midodrine 10 milliGRAM(s) Oral every 8 hours  multivitamin 1 Tablet(s) Oral daily  pantoprazole  Injectable 40 milliGRAM(s) IV Push two times a day  phenylephrine    Infusion 0.5 MICROgram(s)/kG/Min (7.07 mL/Hr) IV Continuous <Continuous>  phytonadione   Solution 5 milliGRAM(s) Oral daily  senna Syrup 10 milliLiter(s) Oral at bedtime  thiamine 100 milliGRAM(s) Oral daily      TELEMETRY: 	    ECG:  	  RADIOLOGY:  OTHER: 	  	  LABS:	 	    CARDIAC MARKERS:                                9.3    33.62 )-----------( 108      ( 18 Jan 2023 23:46 )             27.3     01-18    136  |  105  |  81<H>  ----------------------------<  180<H>  3.9   |  19<L>  |  1.95<H>    Ca    6.0<LL>      18 Jan 2023 23:46  Phos  4.9     01-18  Mg     1.6     01-18    TPro  3.8<L>  /  Alb  1.3<L>  /  TBili  0.6  /  DBili  x   /  AST  54<H>  /  ALT  174<H>  /  AlkPhos  178<H>  01-18    proBNP:   Lipid Profile:   HgA1c:   TSH: Thyroid Stimulating Hormone, Serum: 1.17 uIU/mL (01-09 @ 06:11)    PT/INR - ( 18 Jan 2023 23:46 )   PT: 16.6 sec;   INR: 1.43 ratio         PTT - ( 18 Jan 2023 23:46 )  PTT:30.5 sec      Assessment and plan  ---------------------------  77-year-old female with past medical history of DM, HTN, HLD, asthma on daily steroids, A. fib on Eliquis and digoxin presenting with shortness of breath.  Patient unable to provide history.  Daughter at bedside reports patient has had increasing shortness of breath, wheezing, cough for the past week now.  Patient also with increasing generalized weakness, fatigue and decreased p.o. intake.  Denies fever/chills, chest pain, abdominal pain, nausea, vomiting, diarrhea, dysuria.  Family reports patient had a similar episode 1 year ago when she was admitted to [facility] with UTI.  pt with sig medical hx, chronic a.fib with sob ?sec sepsis/ pneumoniae, UTI  sedated on vent  continue abx and steroid  echo noted  AC held sec to bleeding  still sedated on vent   continue abx  hr is well controlled still on Cardizem drip  continue pressor keep MAP>65  sepsis on iv abx  fu renal function closely  a.fib hr is well controlled on Cardizem 5 mg/ hr will increase drip as needed  on bumex for diuresis  nutrition  micu care/ vent  discussed with family  cpap trial ?need of treach

## 2023-01-19 NOTE — PROGRESS NOTE ADULT - SUBJECTIVE AND OBJECTIVE BOX
INTERVAL HPI/OVERNIGHT EVENTS:    SUBJECTIVE: Patient seen and examined at bedside.       VITAL SIGNS:  ICU Vital Signs Last 24 Hrs  T(C): 37.6 (19 Jan 2023 07:00), Max: 38.1 (18 Jan 2023 20:00)  T(F): 99.7 (19 Jan 2023 07:00), Max: 100.6 (18 Jan 2023 20:00)  HR: 94 (19 Jan 2023 07:00) (72 - 145)  BP: 104/47 (19 Jan 2023 07:00) (84/39 - 150/96)  BP(mean): 68 (19 Jan 2023 07:00) (55 - 116)  ABP: --  ABP(mean): --  RR: 20 (19 Jan 2023 07:00) (19 - 44)  SpO2: 99% (19 Jan 2023 07:00) (88% - 100%)    O2 Parameters below as of 19 Jan 2023 05:48  Patient On (Oxygen Delivery Method): ventilator          Mode: AC/ CMV (Assist Control/ Continuous Mandatory Ventilation), RR (machine): 20, TV (machine): 350, FiO2: 40, PEEP: 5, ITime: 1, MAP: 11, PIP: 37  Plateau pressure:   P/F ratio:     01-18 @ 07:01  -  01-19 @ 07:00  --------------------------------------------------------  IN: 2562.6 mL / OUT: 1490 mL / NET: 1072.6 mL      CAPILLARY BLOOD GLUCOSE  177 (19 Jan 2023 06:00)      POCT Blood Glucose.: 177 mg/dL (19 Jan 2023 06:11)    ECG:    PHYSICAL EXAM:    General:   HEENT:   Neck:   Respiratory:   Cardiovascular:   Abdomen:   Extremities:  Neurological:    MEDICATIONS:  MEDICATIONS  (STANDING):  albuterol/ipratropium for Nebulization 3 milliLiter(s) Nebulizer every 6 hours  ascorbic acid 500 milliGRAM(s) Oral daily  atorvastatin 40 milliGRAM(s) Oral at bedtime  azithromycin   Tablet 250 milliGRAM(s) Oral <User Schedule>  buMETAnide Injectable 2 milliGRAM(s) IV Push every 8 hours  cefepime   IVPB 500 milliGRAM(s) IV Intermittent every 8 hours  chlorhexidine 0.12% Liquid 15 milliLiter(s) Oral Mucosa every 12 hours  chlorhexidine 4% Liquid 1 Application(s) Topical <User Schedule>  dexMEDEtomidine Infusion 0.2 MICROgram(s)/kG/Hr (1.89 mL/Hr) IV Continuous <Continuous>  dextrose 5%. 1000 milliLiter(s) (50 mL/Hr) IV Continuous <Continuous>  dextrose 5%. 1000 milliLiter(s) (100 mL/Hr) IV Continuous <Continuous>  dextrose 50% Injectable 50 milliLiter(s) IV Push every 15 minutes  dextrose 50% Injectable 50 milliLiter(s) IV Push every 15 minutes  diltiazem Infusion 13 mG/Hr (13 mL/Hr) IV Continuous <Continuous>  glucagon  Injectable 1 milliGRAM(s) IntraMuscular once  heparin   Injectable 5000 Unit(s) SubCutaneous every 12 hours  insulin lispro (ADMELOG) corrective regimen sliding scale   SubCutaneous every 6 hours  insulin NPH human recombinant 5 Unit(s) SubCutaneous every 6 hours  methylPREDNISolone sodium succinate Injectable 20 milliGRAM(s) IV Push daily  midodrine 10 milliGRAM(s) Oral every 8 hours  multivitamin 1 Tablet(s) Oral daily  pantoprazole  Injectable 40 milliGRAM(s) IV Push two times a day  phenylephrine    Infusion 0.5 MICROgram(s)/kG/Min (7.07 mL/Hr) IV Continuous <Continuous>  phytonadione   Solution 5 milliGRAM(s) Oral daily  senna Syrup 10 milliLiter(s) Oral at bedtime  sodium chloride 3%  Inhalation 4 milliLiter(s) Inhalation every 12 hours  thiamine 100 milliGRAM(s) Oral daily    MEDICATIONS  (PRN):  acetaminophen    Suspension .. 350 milliGRAM(s) Oral every 6 hours PRN Temp greater or equal to 38C (100.4F), Mild Pain (1 - 3), Moderate Pain (4 - 6)      ALLERGIES:  Allergies    Avelox (Pruritus)  fish (Vomiting)  Levaquin (Unknown)  shellfish (Vomiting)    Intolerances    fentanyl (Other)      LABS:                        9.3    33.62 )-----------( 108      ( 18 Jan 2023 23:46 )             27.3     01-18    136  |  105  |  81<H>  ----------------------------<  180<H>  3.9   |  19<L>  |  1.95<H>    Ca    6.0<LL>      18 Jan 2023 23:46  Phos  4.9     01-18  Mg     1.6     01-18    TPro  3.8<L>  /  Alb  1.3<L>  /  TBili  0.6  /  DBili  x   /  AST  54<H>  /  ALT  174<H>  /  AlkPhos  178<H>  01-18    PT/INR - ( 18 Jan 2023 23:46 )   PT: 16.6 sec;   INR: 1.43 ratio         PTT - ( 18 Jan 2023 23:46 )  PTT:30.5 sec      RADIOLOGY & ADDITIONAL TESTS: Reviewed.   INTERVAL HPI/OVERNIGHT EVENTS:    SUBJECTIVE: Patient seen and examined at bedside.   Overnight - calcium repleted   Earlier in am, patient was agitated, appeared in distress. Fentanyl 50 given, appears comfortable after.   Patient was tachypneic on SBP trial today. Family at bedside, will discuss code status in afternoon.     VITAL SIGNS:  ICU Vital Signs Last 24 Hrs  T(C): 37.6 (19 Jan 2023 07:00), Max: 38.1 (18 Jan 2023 20:00)  T(F): 99.7 (19 Jan 2023 07:00), Max: 100.6 (18 Jan 2023 20:00)  HR: 94 (19 Jan 2023 07:00) (72 - 145)  BP: 104/47 (19 Jan 2023 07:00) (84/39 - 150/96)  BP(mean): 68 (19 Jan 2023 07:00) (55 - 116)  ABP: --  ABP(mean): --  RR: 20 (19 Jan 2023 07:00) (19 - 44)  SpO2: 99% (19 Jan 2023 07:00) (88% - 100%)    O2 Parameters below as of 19 Jan 2023 05:48  Patient On (Oxygen Delivery Method): ventilator          Mode: AC/ CMV (Assist Control/ Continuous Mandatory Ventilation), RR (machine): 20, TV (machine): 350, FiO2: 40, PEEP: 5, ITime: 1, MAP: 11, PIP: 37  Plateau pressure:   P/F ratio:     01-18 @ 07:01  -  01-19 @ 07:00  --------------------------------------------------------  IN: 2562.6 mL / OUT: 1490 mL / NET: 1072.6 mL      CAPILLARY BLOOD GLUCOSE  177 (19 Jan 2023 06:00)      POCT Blood Glucose.: 177 mg/dL (19 Jan 2023 06:11)    PHYSICAL EXAM:    General: sedated, intubated  Respiratory: intubated on ventilator  Cardiovascular: irregular, tachycardic to 100s  Abdomen: soft  Extremities: b/l LE edema    MEDICATIONS:  MEDICATIONS  (STANDING):  albuterol/ipratropium for Nebulization 3 milliLiter(s) Nebulizer every 6 hours  ascorbic acid 500 milliGRAM(s) Oral daily  atorvastatin 40 milliGRAM(s) Oral at bedtime  azithromycin   Tablet 250 milliGRAM(s) Oral <User Schedule>  buMETAnide Injectable 2 milliGRAM(s) IV Push every 8 hours  cefepime   IVPB 500 milliGRAM(s) IV Intermittent every 8 hours  chlorhexidine 0.12% Liquid 15 milliLiter(s) Oral Mucosa every 12 hours  chlorhexidine 4% Liquid 1 Application(s) Topical <User Schedule>  dexMEDEtomidine Infusion 0.2 MICROgram(s)/kG/Hr (1.89 mL/Hr) IV Continuous <Continuous>  dextrose 5%. 1000 milliLiter(s) (50 mL/Hr) IV Continuous <Continuous>  dextrose 5%. 1000 milliLiter(s) (100 mL/Hr) IV Continuous <Continuous>  dextrose 50% Injectable 50 milliLiter(s) IV Push every 15 minutes  dextrose 50% Injectable 50 milliLiter(s) IV Push every 15 minutes  diltiazem Infusion 13 mG/Hr (13 mL/Hr) IV Continuous <Continuous>  glucagon  Injectable 1 milliGRAM(s) IntraMuscular once  heparin   Injectable 5000 Unit(s) SubCutaneous every 12 hours  insulin lispro (ADMELOG) corrective regimen sliding scale   SubCutaneous every 6 hours  insulin NPH human recombinant 5 Unit(s) SubCutaneous every 6 hours  methylPREDNISolone sodium succinate Injectable 20 milliGRAM(s) IV Push daily  midodrine 10 milliGRAM(s) Oral every 8 hours  multivitamin 1 Tablet(s) Oral daily  pantoprazole  Injectable 40 milliGRAM(s) IV Push two times a day  phenylephrine    Infusion 0.5 MICROgram(s)/kG/Min (7.07 mL/Hr) IV Continuous <Continuous>  phytonadione   Solution 5 milliGRAM(s) Oral daily  senna Syrup 10 milliLiter(s) Oral at bedtime  sodium chloride 3%  Inhalation 4 milliLiter(s) Inhalation every 12 hours  thiamine 100 milliGRAM(s) Oral daily    MEDICATIONS  (PRN):  acetaminophen    Suspension .. 350 milliGRAM(s) Oral every 6 hours PRN Temp greater or equal to 38C (100.4F), Mild Pain (1 - 3), Moderate Pain (4 - 6)      ALLERGIES:  Allergies    Avelox (Pruritus)  fish (Vomiting)  Levaquin (Unknown)  shellfish (Vomiting)    Intolerances    fentanyl (Other)      LABS:                        9.3    33.62 )-----------( 108      ( 18 Jan 2023 23:46 )             27.3     01-18    136  |  105  |  81<H>  ----------------------------<  180<H>  3.9   |  19<L>  |  1.95<H>    Ca    6.0<LL>      18 Jan 2023 23:46  Phos  4.9     01-18  Mg     1.6     01-18    TPro  3.8<L>  /  Alb  1.3<L>  /  TBili  0.6  /  DBili  x   /  AST  54<H>  /  ALT  174<H>  /  AlkPhos  178<H>  01-18    PT/INR - ( 18 Jan 2023 23:46 )   PT: 16.6 sec;   INR: 1.43 ratio         PTT - ( 18 Jan 2023 23:46 )  PTT:30.5 sec      RADIOLOGY & ADDITIONAL TESTS: Reviewed.   INTERVAL HPI/OVERNIGHT EVENTS:    SUBJECTIVE: Patient seen and examined at bedside.   Overnight - calcium repleted   Earlier in am, patient was agitated, appeared in distress. Fentanyl 50 given, appears comfortable after.   Patient was tachypneic on SBP trial today. Family at bedside, will discuss code status in afternoon.   7pm update: patient's family stated  (health care proxy) is processing and will not be making a decision today    VITAL SIGNS:  ICU Vital Signs Last 24 Hrs  T(C): 37.6 (19 Jan 2023 07:00), Max: 38.1 (18 Jan 2023 20:00)  T(F): 99.7 (19 Jan 2023 07:00), Max: 100.6 (18 Jan 2023 20:00)  HR: 94 (19 Jan 2023 07:00) (72 - 145)  BP: 104/47 (19 Jan 2023 07:00) (84/39 - 150/96)  BP(mean): 68 (19 Jan 2023 07:00) (55 - 116)  ABP: --  ABP(mean): --  RR: 20 (19 Jan 2023 07:00) (19 - 44)  SpO2: 99% (19 Jan 2023 07:00) (88% - 100%)    O2 Parameters below as of 19 Jan 2023 05:48  Patient On (Oxygen Delivery Method): ventilator          Mode: AC/ CMV (Assist Control/ Continuous Mandatory Ventilation), RR (machine): 20, TV (machine): 350, FiO2: 40, PEEP: 5, ITime: 1, MAP: 11, PIP: 37  Plateau pressure:   P/F ratio:     01-18 @ 07:01  -  01-19 @ 07:00  --------------------------------------------------------  IN: 2562.6 mL / OUT: 1490 mL / NET: 1072.6 mL      CAPILLARY BLOOD GLUCOSE  177 (19 Jan 2023 06:00)      POCT Blood Glucose.: 177 mg/dL (19 Jan 2023 06:11)    PHYSICAL EXAM:    General: sedated, intubated  Respiratory: intubated on ventilator  Cardiovascular: irregular, tachycardic to 100s  Abdomen: soft  Extremities: b/l LE edema    MEDICATIONS:  MEDICATIONS  (STANDING):  albuterol/ipratropium for Nebulization 3 milliLiter(s) Nebulizer every 6 hours  ascorbic acid 500 milliGRAM(s) Oral daily  atorvastatin 40 milliGRAM(s) Oral at bedtime  azithromycin   Tablet 250 milliGRAM(s) Oral <User Schedule>  buMETAnide Injectable 2 milliGRAM(s) IV Push every 8 hours  cefepime   IVPB 500 milliGRAM(s) IV Intermittent every 8 hours  chlorhexidine 0.12% Liquid 15 milliLiter(s) Oral Mucosa every 12 hours  chlorhexidine 4% Liquid 1 Application(s) Topical <User Schedule>  dexMEDEtomidine Infusion 0.2 MICROgram(s)/kG/Hr (1.89 mL/Hr) IV Continuous <Continuous>  dextrose 5%. 1000 milliLiter(s) (50 mL/Hr) IV Continuous <Continuous>  dextrose 5%. 1000 milliLiter(s) (100 mL/Hr) IV Continuous <Continuous>  dextrose 50% Injectable 50 milliLiter(s) IV Push every 15 minutes  dextrose 50% Injectable 50 milliLiter(s) IV Push every 15 minutes  diltiazem Infusion 13 mG/Hr (13 mL/Hr) IV Continuous <Continuous>  glucagon  Injectable 1 milliGRAM(s) IntraMuscular once  heparin   Injectable 5000 Unit(s) SubCutaneous every 12 hours  insulin lispro (ADMELOG) corrective regimen sliding scale   SubCutaneous every 6 hours  insulin NPH human recombinant 5 Unit(s) SubCutaneous every 6 hours  methylPREDNISolone sodium succinate Injectable 20 milliGRAM(s) IV Push daily  midodrine 10 milliGRAM(s) Oral every 8 hours  multivitamin 1 Tablet(s) Oral daily  pantoprazole  Injectable 40 milliGRAM(s) IV Push two times a day  phenylephrine    Infusion 0.5 MICROgram(s)/kG/Min (7.07 mL/Hr) IV Continuous <Continuous>  phytonadione   Solution 5 milliGRAM(s) Oral daily  senna Syrup 10 milliLiter(s) Oral at bedtime  sodium chloride 3%  Inhalation 4 milliLiter(s) Inhalation every 12 hours  thiamine 100 milliGRAM(s) Oral daily    MEDICATIONS  (PRN):  acetaminophen    Suspension .. 350 milliGRAM(s) Oral every 6 hours PRN Temp greater or equal to 38C (100.4F), Mild Pain (1 - 3), Moderate Pain (4 - 6)      ALLERGIES:  Allergies    Avelox (Pruritus)  fish (Vomiting)  Levaquin (Unknown)  shellfish (Vomiting)    Intolerances    fentanyl (Other)      LABS:                        9.3    33.62 )-----------( 108      ( 18 Jan 2023 23:46 )             27.3     01-18    136  |  105  |  81<H>  ----------------------------<  180<H>  3.9   |  19<L>  |  1.95<H>    Ca    6.0<LL>      18 Jan 2023 23:46  Phos  4.9     01-18  Mg     1.6     01-18    TPro  3.8<L>  /  Alb  1.3<L>  /  TBili  0.6  /  DBili  x   /  AST  54<H>  /  ALT  174<H>  /  AlkPhos  178<H>  01-18    PT/INR - ( 18 Jan 2023 23:46 )   PT: 16.6 sec;   INR: 1.43 ratio         PTT - ( 18 Jan 2023 23:46 )  PTT:30.5 sec      RADIOLOGY & ADDITIONAL TESTS: Reviewed.

## 2023-01-19 NOTE — PROGRESS NOTE ADULT - ASSESSMENT
77-year-old female with PMHx of DM, HTN, HLD, asthma on daily steroids, A. fib on Eliquis and digoxin presenting with had increasing SOB, wheezing, cough x 1 week. Pt intubated in ER for acute hypoxic respiratory failure and admitted to MICU. Course c/b Afib w/ RVR started on diltiazem gtt. 1/13 Failed CPAP trial. Palliative care now following for [facility]

## 2023-01-19 NOTE — PROGRESS NOTE ADULT - ASSESSMENT
77-year-old female with past medical history of DM, HTN, HLD, asthma on daily steroids, A. fib on Eliquis and digoxin presenting with shortness of breath.  Patient unable to provide history.  Daughter at bedside reports patient has had increasing shortness of breath, wheezing, cough for the past week now.  Patient also with increasing generalized weakness, fatigue and decreased p.o. intake.  Denies fever/chills, chest pain, abdominal pain, nausea, vomiting, diarrhea, dysuria.  Family reports patient had a similar episode 1 year ago when she was admitted to [facility] with UTI.  pt with sig medical hx, chronic a.fib with sob ?sec sepsis/ pneumoniae, UTI  start on ceftriaxone  tele  for increase hr, will increase Cardizem dose  continue AC  cardiac enzyme  tsh  echo  RVP negative      ASTHMA/ COPD exacerbation;   A Fibrillation  with rvr:  DM:  HTN:   HLD:     1/09:    ASTHMA/ COPD exacerbation; she was using performist at home with 5 mg of prednisone  not sure why she was not on any other meds:  sill start Symbicort too:  along with BD and is already on cefepime:  though pneumonia to me is not very convincing : will probably need ct scan chest   A Fibrillation  with rvr: Hr still elevated:  on cardizem : would defer to cards:  on ac:  lovenox  + coumadin : check echo   DM:: monitor and control   HTN: : controlled  HLD: on statins  :    dw team    1/10:    ASTHMA/ COPD exacerbation; she was using performist at home with 5 mg of prednisone  not sure why she was not on any other meds:  sill start Symbicort too:  along with BD and is already on cefepime:  WBC increased : though pneumonia to me is not very convincing : will probably need ct scan chest : she rused for ct chest : increase steorids 40 mg Q 6 hours : ABG today seems reasonable   A Fibrillation  with rvr: Hr still elevated:  on cardizem : HR is till very high  :   DM:: monitor and control   HTN: : controlled  HLD: on statins:    MICU  consult:  high risk for intubation:   :    edy and daughter    1/11:    ASTHMA/ COPD exacerbation; now s/p bronch: RML lavage done:  wait cultures results: she was using performist at home with 5 mg of prednisone  not sure why she was not on any other meds:  Cont BD:  and antibiotics  WBC still increased :on cefepime:    A Fibrillation  with rvr: Hr still elevated:  on cardizem : HR is better now:  on cardiem   DM:: monitor and control   HTN: : controlled  HLD: on statins:    josias micu  ATTENDING      1/12:    ASTHMA/ COPD exacerbation; now s/p bronch: RML lavage done:  negative  cultures so far: : she was using performist at home with 5 mg of prednisone  not sure why she was not on any other meds:  Cont BD:  and antibiotics  WBC still increased: but little down today  :on cefepime:    A Fibrillation  with rvr: Hr still elevated:  on cardizem : HR is better now:  on cardiem drip   DM:: monitor and control   HTN: : controlled  HLD: on statins:    josias micu  team    1/13:       ASTHMA/ COPD exacerbation; now s/p bronch: RML lavage done:  negative  cultures so far: :  cont full vent support:  management  of vent per MICU : weaning as perprotocol  ;  on zothromax and cefepime : off steroids   A Fibrillation  with rvr: Hr still elevated:  on Cardizem : HR is better now:  on cardiem drip   DM:: monitor and control   HTN: : controlled  HLD: on statins:    josias micu  team   :  1/14:       ASTHMA/ COPD exacerbation; now s/p bronch: RML lavage done:  Pseudomonas:  on cefepime:  : :  cont full vent support:  management  of vent per MICU : weaning as pe rprotocol  ;  on 20 mg of steroids today   A Fibrillation  with rvr: Hr still elevated:  on Cardizem : HR is better now:  on cardiem drip : Blood pressure is stable:   DM:: monitor and control   HTN: : controlled  HLD: on statins:    josias micu  Attending    1/16:    ASTHMA/ COPD exacerbation; now s/p bronch: RML lavage done:  Pseudomonas:  on cefepime:  and azithromycin for anti inflammatory effects:  :  still on full vent support:  has severe copd:   A Fibrillation  with rvr: Hr still elevated:  on Cardizem : HR is better now:  on Cardizem drip : Blood pressure is stable:   Thrombocytopenia:  ? etiology : check for robbi   DM:: monitor and control   HTN: : controlled  HLD: on statins:   overall pt is still critically ill:    josias micu  Attending today      1/17:  ASTHMA/ COPD exacerbation; now s/p bronch: RML lavage done:  Pseudomonas:  on cefepime:  and azithromycin for anti inflammatory effects:  :  still on full vent support:  has severe copd: same rx today too :  A Fibrillation  with rvr: Hr still elevated:  on Cardizem : HR is better now:  on Cardizem drip : Blood pressure is stable:   Thrombocytopenia:  ? etiology : check for robbi  : still low:  defer to MICU team   DM:: monitor and control   HTN: : controlled  HLD: on statins:   overall pt is still critically ill:    dw micu  Attending    1/18/2023    ASTHMA/ COPD exacerbation; now s/p bronch: RML lavage done:  Pseudomonas:  on cefepime:  and azithromycin for anti inflammatory effects:  :  still on full vent support:  has severe copd: same and is on solumedrol : 20 mg a day : defer to primary team   A Fibrillation  with rvr: Hr still elevated:  on Cardizem : HR is better now:  on Cardizem drip : Blood pressure is stable:   Thrombocytopenia:  ? etiology :plts are increasing:   DM:: monitor and control   HTN: : controlled  HLD: on statins:   overall pt is still critically ill:    dw micu  team     1/19:    ASTHMA/ COPD exacerbation; now s/p bronch: RML lavage done:  Pseudomonas:  on cefepime:  and azithromycin for anti inflammatory effects:  :  still on full vent support:  has severe copd: same and is on solumedrol : 10 mg a day : she has not shira wheezing : peak not high   A Fibrillation  with rvr: Hr still elevated:  on Cardizem : HR is better now:  on Cardizem drip : Blood pressure is stable on vasopressors  Thrombocytopenia:  ? etiology :plts are increasing: and further increased today :   Leucocytosis:  further increased and is on antibiotics  DM:: monitor and control   HTN: : controlled  HLD: on statins:   overall pt is still critically ill:  intubated  ?: sedated and is on vasopressors   dw micu  team

## 2023-01-19 NOTE — PROGRESS NOTE ADULT - SUBJECTIVE AND OBJECTIVE BOX
Indication for Geriatrics and Palliative Care Services/INTERVAL HPI: GOC   SUBJECTIVE & OBJECTIVE: PT seen and examined at bedside. Pt remains on full ventilator support and appears dyspneic. Family at bedside: Jaems(), Shyann(daughter), and Robert(son in law).     OVERNIGHT EVENTS: failed CPAP trial    DNR on chart:  Allergies    Avelox (Pruritus)  fish (Vomiting)  Levaquin (Unknown)  shellfish (Vomiting)    Intolerances    fentanyl (Other)  MEDICATIONS  (STANDING):  ascorbic acid 500 milliGRAM(s) Oral daily  atorvastatin 40 milliGRAM(s) Oral at bedtime  azithromycin   Tablet 250 milliGRAM(s) Oral <User Schedule>  buMETAnide Infusion 2 mG/Hr (10 mL/Hr) IV Continuous <Continuous>  cefepime   IVPB 500 milliGRAM(s) IV Intermittent every 8 hours  chlorhexidine 0.12% Liquid 15 milliLiter(s) Oral Mucosa every 12 hours  chlorhexidine 4% Liquid 1 Application(s) Topical <User Schedule>  dexMEDEtomidine Infusion 0.2 MICROgram(s)/kG/Hr (1.89 mL/Hr) IV Continuous <Continuous>  dextrose 5%. 1000 milliLiter(s) (50 mL/Hr) IV Continuous <Continuous>  dextrose 5%. 1000 milliLiter(s) (100 mL/Hr) IV Continuous <Continuous>  dextrose 50% Injectable 50 milliLiter(s) IV Push every 15 minutes  dextrose 50% Injectable 50 milliLiter(s) IV Push every 15 minutes  diltiazem Infusion 13 mG/Hr (13 mL/Hr) IV Continuous <Continuous>  glucagon  Injectable 1 milliGRAM(s) IntraMuscular once  heparin   Injectable 5000 Unit(s) SubCutaneous every 12 hours  insulin lispro (ADMELOG) corrective regimen sliding scale   SubCutaneous every 6 hours  insulin NPH human recombinant 5 Unit(s) SubCutaneous every 6 hours  methylPREDNISolone sodium succinate Injectable 10 milliGRAM(s) IV Push daily  midodrine 10 milliGRAM(s) Oral every 8 hours  multivitamin 1 Tablet(s) Oral daily  pantoprazole  Injectable 40 milliGRAM(s) IV Push two times a day  phenylephrine    Infusion 0.5 MICROgram(s)/kG/Min (7.07 mL/Hr) IV Continuous <Continuous>  senna Syrup 10 milliLiter(s) Oral at bedtime  thiamine 100 milliGRAM(s) Oral daily    MEDICATIONS  (PRN):  acetaminophen    Suspension .. 350 milliGRAM(s) Oral every 6 hours PRN Temp greater or equal to 38C (100.4F), Mild Pain (1 - 3), Moderate Pain (4 - 6)  albuterol/ipratropium for Nebulization 3 milliLiter(s) Nebulizer every 6 hours PRN Respiratory Distress  fentaNYL    Injectable 50 MICROGram(s) IV Push every 4 hours PRN Severe Pain (7 - 10)      ITEMS UNCHECKED ARE NOT PRESENT    PRESENT SYMPTOMS: [ ]Unable to self-report - see [ ] CPOT [ ] PAINADS [ ] RDOS  Source if other than patient:  [ ]Family   [ ]Team     Pain:  [ ]yes [ ]no  QOL impact -   Location -                    Aggravating factors -  Quality -  Radiation -  Timing-  Severity (0-10 scale):  Minimal acceptable level (0-10 scale):     CPOT:    https://www.[facility].org/getattachment/[email or web address]/Critical-Care-Pain-Observation-Tool-(CPOT)    PAINAD Score: See PAINAD tool and score below       Dyspnea:                           [ ]Mild [ ]Moderate [ ]Severe    RDOS: See RDOS tool and score below   0 to 2  minimal or no respiratory distress   3  mild distress  4 to 6 moderate distress  >7 severe distress      Anxiety:                             [ ]Mild [ ]Moderate [ ]Severe  Fatigue:                             [ ]Mild [ ]Moderate [ ]Severe  Nausea:                             [ ]Mild [ ]Moderate [ ]Severe  Loss of appetite:              [ ]Mild [ ]Moderate [ ]Severe  Constipation:                    [ ]Mild [ ]Moderate [ ]Severe    PCSSQ[Palliative Care Spiritual Screening Question]   Severity (0-10):  Score of 4 or > indicate consideration of Chaplaincy referral.  Chaplaincy Referral: [ ] yes [ ] refused [ ] following [ ] Deferred     Caregiver [place]? : [ ] yes [ ] no [ ] Deferred [ ] Declined             Social work referral [ ] Patient & Family Centered Care Referral [ ]     Anticipatory Grief present?:  [ ] yes [ ] no  [ ] Deferred                  Social work referral [ ] Chaplaincy Referral [ ]    		  Other Symptoms:  [ ]All other review of systems negative     Palliative Performance Status Version 2:   See PPSv2 tool and score below         PHYSICAL EXAM:  Vital Signs Last 24 Hrs  T(C): 37.3 (19 Jan 2023 12:00), Max: 38.1 (18 Jan 2023 20:00)  T(F): 99.1 (19 Jan 2023 12:00), Max: 100.6 (18 Jan 2023 20:00)  HR: 110 (19 Jan 2023 13:00) (72 - 145)  BP: 112/55 (19 Jan 2023 13:00) (89/46 - 144/56)  BP(mean): 79 (19 Jan 2023 13:00) (55 - 85)  RR: 28 (19 Jan 2023 13:00) (19 - 40)  SpO2: 96% (19 Jan 2023 13:00) (95% - 100%)    Parameters below as of 19 Jan 2023 08:00  Patient On (Oxygen Delivery Method): ventilator    O2 Concentration (%): 40 I&O's Summary    18 Jan 2023 07:01  -  19 Jan 2023 07:00  --------------------------------------------------------  IN: 2562.6 mL / OUT: 1490 mL / NET: 1072.6 mL    19 Jan 2023 07:01  -  19 Jan 2023 14:02  --------------------------------------------------------  IN: 587.4 mL / OUT: 210 mL / NET: 377.4 mL       GENERAL: [ ]Cachexia    [ ]Alert  [ ]Oriented x   [ ]Lethargic  [ ]Unarousable  [ ]Verbal  [ ]Non-Verbal  Behavioral:   [ ]Anxiety  [ ]Delirium [ ]Agitation [ ]Other  HEENT:  [ ]Normal   [ ]Dry mouth   [ ]ET Tube/Trach  [ ]Oral lesions  PULMONARY:   [ ]Clear [ ]Tachypnea  [ ]Audible excessive secretions   [ ]Rhonchi        [ ]Right [ ]Left [ ]Bilateral  [ ]Crackles        [ ]Right [ ]Left [ ]Bilateral  [ ]Wheezing     [ ]Right [ ]Left [ ]Bilateral  [ ]Diminished BS [ ] Right [ ]Left [ ]Bilateral  CARDIOVASCULAR:    [ ]Regular [ ]Irregular [ ]Tachy  [ ]Catracho [ ]Murmur [ ]Other  GASTROINTESTINAL:  [ ]Soft  [ ]Distended   [ ]+BS  [ ]Non tender [ ]Tender  [ ]Other [ ]PEG [ ]OGT/ NGT   Last BM:   GENITOURINARY:  [ ]Normal [ ]Incontinent   [ ]Oliguria/Anuria   [ ]Morales  MUSCULOSKELETAL:   [ ]Normal   [ ]Weakness  [ ]Bed/Wheelchair bound [ ]Edema  NEUROLOGIC:   [ ]No focal deficits  [ ] Cognitive impairment  [ ] Dysphagia [ ]Dysarthria [ ] Paresis [ ]Other   SKIN:   [ ]Normal  [ ]Rash  [ ]Other  [ ]Pressure ulcer(s) [ ]y [ ]n present on admission    CRITICAL CARE:  [ ]Shock Present  [ ]Septic [ ]Cardiogenic [ ]Neurologic [ ]Hypovolemic  [ ]Vasopressors [ ]Inotropes  [ ]Respiratory failure present [ ]Mechanical Ventilation [ ]Non-invasive ventilatory support [ ]High-Flow Mode: AC/ CMV (Assist Control/ Continuous Mandatory Ventilation), RR (machine): 20, TV (machine): 350, FiO2: 40, PEEP: 5, ITime: 1, MAP: 12, PIP: 34  [ ]Acute  [ ]Chronic [ ]Hypoxic  [ ]Hypercarbic [ ]Other  [ ]Other organ failure     LABS:                        9.3    33.62 )-----------( 108      ( 18 Jan 2023 23:46 )             27.3   01-18    136  |  105  |  81<H>  ----------------------------<  180<H>  3.9   |  19<L>  |  1.95<H>    Ca    6.0<LL>      18 Jan 2023 23:46  Phos  4.9     01-18  Mg     1.6     01-18    TPro  3.8<L>  /  Alb  1.3<L>  /  TBili  0.6  /  DBili  x   /  AST  54<H>  /  ALT  174<H>  /  AlkPhos  178<H>  01-18  PT/INR - ( 18 Jan 2023 23:46 )   PT: 16.6 sec;   INR: 1.43 ratio         PTT - ( 18 Jan 2023 23:46 )  PTT:30.5 sec      RADIOLOGY & ADDITIONAL STUDIES:    Protein Calorie Malnutrition Present: [ ]mild [ ]moderate [ ]severe [ ]underweight [ ]morbid obesity  https://www.andeal.org/vault/2440/web/files/ONC/Table_Clinical%20Characteristics%20to%20Document%20Malnutrition-White%20JV%20et%20al%202012.pdf    Height (cm): 154.9 (01-10-23 @ 18:10), 152.4 (06-03-22 @ 16:04)  Weight (kg): 37.7 (01-10-23 @ 18:10), 43.1 (06-03-22 @ 16:04)  BMI (kg/m2): 15.7 (01-10-23 @ 18:10), 18.6 (06-03-22 @ 16:04)    [ ]PPSV2 < or = 30%  [ ]significant weight loss [ ]poor nutritional intake [ ]anasarca[ ]Artificial Nutrition    Other REFERRALS:  [ ]Hospice  [ ]Child Life  [ ]Social Work  [ ]Case management [ ]Holistic Therapy     Goals of Care Document: Indication for Geriatrics and Palliative Care Services/INTERVAL HPI: GOC   SUBJECTIVE & OBJECTIVE: PT seen and examined at bedside. Pt remains on full ventilator support and appears dyspneic. Family at bedside: James(), Shyann(daughter), and Robert(son in law).     OVERNIGHT EVENTS: failed CPAP trial    DNR on chart:  Allergies    Avelox (Pruritus)  fish (Vomiting)  Levaquin (Unknown)  shellfish (Vomiting)    Intolerances    fentanyl (Other)  MEDICATIONS  (STANDING):  ascorbic acid 500 milliGRAM(s) Oral daily  atorvastatin 40 milliGRAM(s) Oral at bedtime  azithromycin   Tablet 250 milliGRAM(s) Oral <User Schedule>  buMETAnide Infusion 2 mG/Hr (10 mL/Hr) IV Continuous <Continuous>  cefepime   IVPB 500 milliGRAM(s) IV Intermittent every 8 hours  chlorhexidine 0.12% Liquid 15 milliLiter(s) Oral Mucosa every 12 hours  chlorhexidine 4% Liquid 1 Application(s) Topical <User Schedule>  dexMEDEtomidine Infusion 0.2 MICROgram(s)/kG/Hr (1.89 mL/Hr) IV Continuous <Continuous>  dextrose 5%. 1000 milliLiter(s) (50 mL/Hr) IV Continuous <Continuous>  dextrose 5%. 1000 milliLiter(s) (100 mL/Hr) IV Continuous <Continuous>  dextrose 50% Injectable 50 milliLiter(s) IV Push every 15 minutes  dextrose 50% Injectable 50 milliLiter(s) IV Push every 15 minutes  diltiazem Infusion 13 mG/Hr (13 mL/Hr) IV Continuous <Continuous>  glucagon  Injectable 1 milliGRAM(s) IntraMuscular once  heparin   Injectable 5000 Unit(s) SubCutaneous every 12 hours  insulin lispro (ADMELOG) corrective regimen sliding scale   SubCutaneous every 6 hours  insulin NPH human recombinant 5 Unit(s) SubCutaneous every 6 hours  methylPREDNISolone sodium succinate Injectable 10 milliGRAM(s) IV Push daily  midodrine 10 milliGRAM(s) Oral every 8 hours  multivitamin 1 Tablet(s) Oral daily  pantoprazole  Injectable 40 milliGRAM(s) IV Push two times a day  phenylephrine    Infusion 0.5 MICROgram(s)/kG/Min (7.07 mL/Hr) IV Continuous <Continuous>  senna Syrup 10 milliLiter(s) Oral at bedtime  thiamine 100 milliGRAM(s) Oral daily    MEDICATIONS  (PRN):  acetaminophen    Suspension .. 350 milliGRAM(s) Oral every 6 hours PRN Temp greater or equal to 38C (100.4F), Mild Pain (1 - 3), Moderate Pain (4 - 6)  albuterol/ipratropium for Nebulization 3 milliLiter(s) Nebulizer every 6 hours PRN Respiratory Distress  fentaNYL    Injectable 50 MICROGram(s) IV Push every 4 hours PRN Severe Pain (7 - 10)      ITEMS UNCHECKED ARE NOT PRESENT    PRESENT SYMPTOMS: [x ]Unable to self-report - see [ ] CPOT [x ] PAINADS [x ] RDOS  Source if other than patient:  [ ]Family   [x ]Team     Pain:  [ ]yes [ ]no  QOL impact -   Location -                    Aggravating factors -  Quality -  Radiation -  Timing-  Severity (0-10 scale):  Minimal acceptable level (0-10 scale):     CPOT:    https://www.[facility].org/getattachment/[email or web address]/Critical-Care-Pain-Observation-Tool-(CPOT)    PAINAD Score: See PAINAD tool and score below       Dyspnea:                           [ ]Mild [ ]Moderate [ ]Severe    RDOS: See RDOS tool and score below   0 to 2  minimal or no respiratory distress   3  mild distress  4 to 6 moderate distress  >7 severe distress      Anxiety:                             [ ]Mild [ ]Moderate [ ]Severe  Fatigue:                             [ ]Mild [ ]Moderate [ ]Severe  Nausea:                             [ ]Mild [ ]Moderate [ ]Severe  Loss of appetite:              [ ]Mild [ ]Moderate [ ]Severe  Constipation:                    [ ]Mild [ ]Moderate [ ]Severe    PCSSQ[Palliative Care Spiritual Screening Question]   Severity (0-10):  Score of 4 or > indicate consideration of Chaplaincy referral.  Chaplaincy Referral: [x ] yes [ ] refused [x] following [ ] Deferred     Caregiver [place]? : [x ] yes [ ] no [ ] Deferred [ ] Declined             Social work referral [x ] Patient & Family Centered Care Referral [ ]     Anticipatory Grief present?:  [x ] yes [ ] no  [ ] Deferred                  Social work referral [ ] Chaplaincy Referral [x ]    		  Other Symptoms:  [ ]All other review of systems negative     Palliative Performance Status Version 2:   See PPSv2 tool and score below         PHYSICAL EXAM:  Vital Signs Last 24 Hrs  T(C): 37.3 (19 Jan 2023 12:00), Max: 38.1 (18 Jan 2023 20:00)  T(F): 99.1 (19 Jan 2023 12:00), Max: 100.6 (18 Jan 2023 20:00)  HR: 110 (19 Jan 2023 13:00) (72 - 145)  BP: 112/55 (19 Jan 2023 13:00) (89/46 - 144/56)  BP(mean): 79 (19 Jan 2023 13:00) (55 - 85)  RR: 28 (19 Jan 2023 13:00) (19 - 40)  SpO2: 96% (19 Jan 2023 13:00) (95% - 100%)    Parameters below as of 19 Jan 2023 08:00  Patient On (Oxygen Delivery Method): ventilator    O2 Concentration (%): 40 I&O's Summary    18 Jan 2023 07:01  -  19 Jan 2023 07:00  --------------------------------------------------------  IN: 2562.6 mL / OUT: 1490 mL / NET: 1072.6 mL    19 Jan 2023 07:01  -  19 Jan 2023 14:02  --------------------------------------------------------  IN: 587.4 mL / OUT: 210 mL / NET: 377.4 mL       GENERAL: [ ]Cachexia    [ ]Alert  [ ]Oriented x   [x ]Lethargic  [ ]Unarousable  [ ]Verbal  [ ]Non-Verbal  Behavioral:   [ ]Anxiety  [ ]Delirium [ ]Agitation [ ]Other  HEENT:  [ ]Normal   [ ]Dry mouth   [x ]ET Tube/Trach  [ ]Oral lesions  PULMONARY:   [ ]Clear [ ]Tachypnea  [ ]Audible excessive secretions   [ ]Rhonchi        [ ]Right [ ]Left [ ]Bilateral  [x ]Crackles        [ ]Right [ ]Left [x ]Bilateral  [ ]Wheezing     [ ]Right [ ]Left [ ]Bilateral  [x ]Diminished BS [ ] Right [ ]Left [x ]Bilateral  CARDIOVASCULAR:    [ ]Regular [x ]Irregular [x ]Tachy  [ ]Catracho [ ]Murmur [ ]Other  GASTROINTESTINAL:  [x ]Soft  [ ]Distended   [x ]+BS  [x ]Non tender [ ]Tender  [ ]Other [ ]PEG [x ]OGT/ NGT   Last BM: 1/19   GENITOURINARY:  [ ]Normal [ ]Incontinent   [ ]Oliguria/Anuria   [x ]Morales  MUSCULOSKELETAL:   [ ]Normal   [x ]Weakness  [x ]Bed/Wheelchair bound [ ]Edema  NEUROLOGIC:   [ ]No focal deficits  [x ] Cognitive impairment: pt on precedex gtt   [ ] Dysphagia [ ]Dysarthria [ ] Paresis [ ]Other   SKIN:   [ ]Normal  [ ]Rash  [ ]Other  [x ]Pressure ulcer(s) [x ]y [ ]n present on admission    CRITICAL CARE:  [x ]Shock Present  [x ]Septic [ ]Cardiogenic [ ]Neurologic [ ]Hypovolemic  [x ]Vasopressors [ ]Inotropes  [x ]Respiratory failure present [x ]Mechanical Ventilation [ ]Non-invasive ventilatory support [ ]High-Flow Mode: AC/ CMV (Assist Control/ Continuous Mandatory Ventilation), RR (machine): 20, TV (machine): 350, FiO2: 40, PEEP: 5, ITime: 1, MAP: 12, PIP: 34  [ ]Acute  [ ]Chronic [ ]Hypoxic  [ ]Hypercarbic [ ]Other  [ ]Other organ failure     LABS:                        9.3    33.62 )-----------( 108      ( 18 Jan 2023 23:46 )             27.3   01-18    136  |  105  |  81<H>  ----------------------------<  180<H>  3.9   |  19<L>  |  1.95<H>    Ca    6.0<LL>      18 Jan 2023 23:46  Phos  4.9     01-18  Mg     1.6     01-18    TPro  3.8<L>  /  Alb  1.3<L>  /  TBili  0.6  /  DBili  x   /  AST  54<H>  /  ALT  174<H>  /  AlkPhos  178<H>  01-18  PT/INR - ( 18 Jan 2023 23:46 )   PT: 16.6 sec;   INR: 1.43 ratio         PTT - ( 18 Jan 2023 23:46 )  PTT:30.5 sec      RADIOLOGY & ADDITIONAL STUDIES:    < from: Xray Abdomen 1 View PORTABLE -Urgent (Xray Abdomen 1 View PORTABLE -Urgent .) (01.18.23 @ 09:43) >    ACC: [id number] EXAM:  XR ABDOMEN PORTABLE URGENT 1V                          PROCEDURE DATE:  01/18/2023          INTERPRETATION:  CLINICAL INDICATION: Evaluation for free air    TECHNIQUE: AP view(s) of the abdomen.    COMPARISON: Abdominal radiograph 1/14/2023    FINDINGS:  Enteric tube with side-port at the gastroesophageal junction and tip   terminating within the stomach.  Bowel gas pattern is unremarkable. No evidence of pneumoperitoneum or   obstruction.  Bilateral hip arthroplasty.  Rectal temperature probe.  Redemonstration of moderate lumbar spinal levocurvature    IMPRESSION:  No evidence of pneumoperitoneum.    --- End of Report ---           BEATA NOLASCO DO; Resident Radiologist  This document has been electronically signed.  JAIRO TATUM MD; Attending Radiologist  This document has been electronically signed. Jan 18 2023 11:51AM    < end of copied text >    Protein Calorie Malnutrition Present: [ ]mild [ ]moderate [ ]severe [ ]underweight [ ]morbid obesity  https://www.andeal.org/vault/2440/web/files/ONC/Table_Clinical%20Characteristics%20to%20Document%20Malnutrition-White%20JV%20et%20al%202012.pdf    Height (cm): 154.9 (01-10-23 @ 18:10), 152.4 (06-03-22 @ 16:04)  Weight (kg): 37.7 (01-10-23 @ 18:10), 43.1 (06-03-22 @ 16:04)  BMI (kg/m2): 15.7 (01-10-23 @ 18:10), 18.6 (06-03-22 @ 16:04)    [x ]PPSV2 < or = 30%  [ ]significant weight loss [ ]poor nutritional intake [ ]anasarca[ ]Artificial Nutrition    Other REFERRALS:  [ ]Hospice  [ ]Child Life  [ ]Social Work  [ ]Case management [ ]Holistic Therapy     Goals of Care Document: see below Indication for Geriatrics and Palliative Care Services/INTERVAL HPI: GOC   SUBJECTIVE & OBJECTIVE: PT seen and examined at bedside. Pt remains on full ventilator support and appears dyspneic. Family at bedside: James(), Shyann(daughter), and Robert(son in law).     OVERNIGHT EVENTS: failed CPAP trial    DNR on chart:  Allergies    Avelox (Pruritus)  fish (Vomiting)  Levaquin (Unknown)  shellfish (Vomiting)    Intolerances    fentanyl (Other)  MEDICATIONS  (STANDING):  ascorbic acid 500 milliGRAM(s) Oral daily  atorvastatin 40 milliGRAM(s) Oral at bedtime  azithromycin   Tablet 250 milliGRAM(s) Oral <User Schedule>  buMETAnide Infusion 2 mG/Hr (10 mL/Hr) IV Continuous <Continuous>  cefepime   IVPB 500 milliGRAM(s) IV Intermittent every 8 hours  chlorhexidine 0.12% Liquid 15 milliLiter(s) Oral Mucosa every 12 hours  chlorhexidine 4% Liquid 1 Application(s) Topical <User Schedule>  dexMEDEtomidine Infusion 0.2 MICROgram(s)/kG/Hr (1.89 mL/Hr) IV Continuous <Continuous>  dextrose 5%. 1000 milliLiter(s) (50 mL/Hr) IV Continuous <Continuous>  dextrose 5%. 1000 milliLiter(s) (100 mL/Hr) IV Continuous <Continuous>  dextrose 50% Injectable 50 milliLiter(s) IV Push every 15 minutes  dextrose 50% Injectable 50 milliLiter(s) IV Push every 15 minutes  diltiazem Infusion 13 mG/Hr (13 mL/Hr) IV Continuous <Continuous>  glucagon  Injectable 1 milliGRAM(s) IntraMuscular once  heparin   Injectable 5000 Unit(s) SubCutaneous every 12 hours  insulin lispro (ADMELOG) corrective regimen sliding scale   SubCutaneous every 6 hours  insulin NPH human recombinant 5 Unit(s) SubCutaneous every 6 hours  methylPREDNISolone sodium succinate Injectable 10 milliGRAM(s) IV Push daily  midodrine 10 milliGRAM(s) Oral every 8 hours  multivitamin 1 Tablet(s) Oral daily  pantoprazole  Injectable 40 milliGRAM(s) IV Push two times a day  phenylephrine    Infusion 0.5 MICROgram(s)/kG/Min (7.07 mL/Hr) IV Continuous <Continuous>  senna Syrup 10 milliLiter(s) Oral at bedtime  thiamine 100 milliGRAM(s) Oral daily    MEDICATIONS  (PRN):  acetaminophen    Suspension .. 350 milliGRAM(s) Oral every 6 hours PRN Temp greater or equal to 38C (100.4F), Mild Pain (1 - 3), Moderate Pain (4 - 6)  albuterol/ipratropium for Nebulization 3 milliLiter(s) Nebulizer every 6 hours PRN Respiratory Distress  fentaNYL    Injectable 50 MICROGram(s) IV Push every 4 hours PRN Severe Pain (7 - 10)      ITEMS UNCHECKED ARE NOT PRESENT    PRESENT SYMPTOMS: [x ]Unable to self-report - see [ ] CPOT [x ] PAINADS [x ] RDOS  Source if other than patient:  [ ]Family   [x ]Team     Pain:  [ ]yes [ ]no  QOL impact -   Location -                    Aggravating factors -  Quality -  Radiation -  Timing-  Severity (0-10 scale):  Minimal acceptable level (0-10 scale):     CPOT:    https://www.[facility].org/getattachment/[email or web address]/Critical-Care-Pain-Observation-Tool-(CPOT)    PAINAD Score: See PAINAD tool and score below       Dyspnea:                           [ ]Mild [ ]Moderate [ ]Severe    RDOS: See RDOS tool and score below   0 to 2  minimal or no respiratory distress   3  mild distress  4 to 6 moderate distress  >7 severe distress      Anxiety:                             [ ]Mild [ ]Moderate [ ]Severe  Fatigue:                             [ ]Mild [ ]Moderate [ ]Severe  Nausea:                             [ ]Mild [ ]Moderate [ ]Severe  Loss of appetite:              [ ]Mild [ ]Moderate [ ]Severe  Constipation:                    [ ]Mild [ ]Moderate [ ]Severe    PCSSQ[Palliative Care Spiritual Screening Question]   Severity (0-10):  Score of 4 or > indicate consideration of Chaplaincy referral.  Chaplaincy Referral: [x ] yes [ ] refused [x] following [ ] Deferred     Caregiver [place]? : [x ] yes [ ] no [ ] Deferred [ ] Declined             Social work referral [x ] Patient & Family Centered Care Referral [ ]     Anticipatory Grief present?:  [x ] yes [ ] no  [ ] Deferred                  Social work referral [ ] Chaplaincy Referral [x ]    		  Other Symptoms:  [ ]All other review of systems negative     Palliative Performance Status Version 2:   See PPSv2 tool and score below         PHYSICAL EXAM:  Vital Signs Last 24 Hrs  T(C): 37.3 (19 Jan 2023 12:00), Max: 38.1 (18 Jan 2023 20:00)  T(F): 99.1 (19 Jan 2023 12:00), Max: 100.6 (18 Jan 2023 20:00)  HR: 110 (19 Jan 2023 13:00) (72 - 145)  BP: 112/55 (19 Jan 2023 13:00) (89/46 - 144/56)  BP(mean): 79 (19 Jan 2023 13:00) (55 - 85)  RR: 28 (19 Jan 2023 13:00) (19 - 40)  SpO2: 96% (19 Jan 2023 13:00) (95% - 100%)    Parameters below as of 19 Jan 2023 08:00  Patient On (Oxygen Delivery Method): ventilator    O2 Concentration (%): 40 I&O's Summary    18 Jan 2023 07:01  -  19 Jan 2023 07:00  --------------------------------------------------------  IN: 2562.6 mL / OUT: 1490 mL / NET: 1072.6 mL    19 Jan 2023 07:01  -  19 Jan 2023 14:02  --------------------------------------------------------  IN: 587.4 mL / OUT: 210 mL / NET: 377.4 mL       GENERAL: [ ]Cachexia    [ ]Alert  [ ]Oriented x   [x ]Lethargic  [ ]Unarousable  [ ]Verbal  [ ]Non-Verbal  Behavioral:   [ ]Anxiety  [ ]Delirium [ ]Agitation [ ]Other  HEENT:  [ ]Normal   [ ]Dry mouth   [x ]ET Tube/Trach  [ ]Oral lesions  PULMONARY:   [ ]Clear [ ]Tachypnea  [ ]Audible excessive secretions   [ ]Rhonchi        [ ]Right [ ]Left [ ]Bilateral  [x ]Crackles        [ ]Right [ ]Left [x ]Bilateral  [ ]Wheezing     [ ]Right [ ]Left [ ]Bilateral  [x ]Diminished BS [ ] Right [ ]Left [x ]Bilateral  CARDIOVASCULAR:    [ ]Regular [x ]Irregular [x ]Tachy  [ ]Catracho [ ]Murmur [ ]Other  GASTROINTESTINAL:  [x ]Soft  [ ]Distended   [x ]+BS  [x ]Non tender [ ]Tender  [ ]Other [ ]PEG [x ]OGT/ NGT   Last BM: 1/19   GENITOURINARY:  [ ]Normal [ ]Incontinent   [ ]Oliguria/Anuria   [x ]Morales  MUSCULOSKELETAL:   [ ]Normal   [x ]Weakness  [x ]Bed/Wheelchair bound [ ]Edema  NEUROLOGIC:   [ ]No focal deficits  [x ] Cognitive impairment: pt on precedex gtt   [ ] Dysphagia [ ]Dysarthria [ ] Paresis [ ]Other   SKIN:   [ ]Normal  [ ]Rash  [ ]Other  [x ]Pressure ulcer(s) [x ]y [ ]n present on admission    CRITICAL CARE:  [x ]Shock Present  [x ]Septic [ ]Cardiogenic [ ]Neurologic [ ]Hypovolemic  [x ]Vasopressors [ ]Inotropes  [x ]Respiratory failure present [x ]Mechanical Ventilation [ ]Non-invasive ventilatory support [ ]High-Flow Mode: AC/ CMV (Assist Control/ Continuous Mandatory Ventilation), RR (machine): 20, TV (machine): 350, FiO2: 40, PEEP: 5, ITime: 1, MAP: 12, PIP: 34  [ ]Acute  [ ]Chronic [ ]Hypoxic  [ ]Hypercarbic [ ]Other  [ ]Other organ failure     LABS:                        9.3    33.62 )-----------( 108      ( 18 Jan 2023 23:46 )             27.3   01-18    136  |  105  |  81<H>  ----------------------------<  180<H>  3.9   |  19<L>  |  1.95<H>    Ca    6.0<LL>      18 Jan 2023 23:46  Phos  4.9     01-18  Mg     1.6     01-18    TPro  3.8<L>  /  Alb  1.3<L>  /  TBili  0.6  /  DBili  x   /  AST  54<H>  /  ALT  174<H>  /  AlkPhos  178<H>  01-18  PT/INR - ( 18 Jan 2023 23:46 )   PT: 16.6 sec;   INR: 1.43 ratio         PTT - ( 18 Jan 2023 23:46 )  PTT:30.5 sec      RADIOLOGY & ADDITIONAL STUDIES:    < from: Xray Abdomen 1 View PORTABLE -Urgent (Xray Abdomen 1 View PORTABLE -Urgent .) (01.18.23 @ 09:43) >    ACC: [id number] EXAM:  XR ABDOMEN PORTABLE URGENT 1V                          PROCEDURE DATE:  01/18/2023          INTERPRETATION:  CLINICAL INDICATION: Evaluation for free air    TECHNIQUE: AP view(s) of the abdomen.    COMPARISON: Abdominal radiograph 1/14/2023    FINDINGS:  Enteric tube with side-port at the gastroesophageal junction and tip   terminating within the stomach.  Bowel gas pattern is unremarkable. No evidence of pneumoperitoneum or   obstruction.  Bilateral hip arthroplasty.  Rectal temperature probe.  Redemonstration of moderate lumbar spinal levocurvature    IMPRESSION:  No evidence of pneumoperitoneum.    --- End of Report ---           BEATA NOLASCO DO; Resident Radiologist  This document has been electronically signed.  JAIRO TATUM MD; Attending Radiologist  This document has been electronically signed. Jan 18 2023 11:51AM    < end of copied text >    Protein Calorie Malnutrition Present: [ ]mild [ ]moderate [ ]severe [ ]underweight [ ]morbid obesity  https://www.andeal.org/vault/2440/web/files/ONC/Table_Clinical%20Characteristics%20to%20Document%20Malnutrition-White%20JV%20et%20al%202012.pdf    Height (cm): 154.9 (01-10-23 @ 18:10), 152.4 (06-03-22 @ 16:04)  Weight (kg): 37.7 (01-10-23 @ 18:10), 43.1 (06-03-22 @ 16:04)  BMI (kg/m2): 15.7 (01-10-23 @ 18:10), 18.6 (06-03-22 @ 16:04)    [x ]PPSV2 < or = 30%  [ ]significant weight loss [ ]poor nutritional intake [ ]anasarca[ ]Artificial Nutrition    Other REFERRALS:  [ ]Hospice  [ ]Child Life  [ x]Social Work  [ ]Case management [ ]Holistic Therapy     Goals of Care Document: see below

## 2023-01-19 NOTE — PROGRESS NOTE ADULT - PROBLEM SELECTOR PLAN 1
Pt. with SHANE suspected in setting of hemodynamics with notable hypotension throughout the hospital course requiring IV vasopressors. On review of [facility] pt noted to have a SCr of 0.64 on admission 1/8, which increased to 2.58 on 1/11, and since then has improved but remains elevated/stable at 2.16 this morning. UA and urine lytes reviewed. Spot urine TP/CR ratio elevated to 1.6. Pt likely with ATN. Pt currently non-oliguric, on intermittent IV bumex 2mg BID; UOP noted to be ~1.4L over the past 24 hours, however remains net positive fluid balance. Pt remains clinically volume overloaded. Recommend to increase diuretics to bumex infusion maintain net negative fluid balance. No acute indication for RRT at this time, however if kidney function worsens then we will need to consider. Monitor labs and urine output. Avoid nephrotoxins. Dose medications as per eGFR.    If any questions, please feel free to contact me     Tex Lynch  Nephrology Fellow  [facility] Pager: [phone] Pt. with SHANE suspected in setting of hemodynamics with notable hypotension throughout the hospital course requiring IV vasopressors. On review of [facility] pt noted to have a SCr of 0.64 on admission 1/8, which increased to 2.58 on 1/11, and since then has improved but remains elevated/stable at 1.95 this morning. UA and urine lytes reviewed. Spot urine TP/CR ratio elevated to 1.6. Pt likely with ATN. Pt currently non-oliguric, on intermittent IV bumex 2mg BID; UOP noted to be ~1.4L over the past 24 hours, however remains net positive fluid balance. Pt remains clinically volume overloaded. Recommend to increase diuretics to bumex infusion maintain net negative fluid balance. No acute indication for RRT at this time, however if kidney function worsens then we will need to consider. Monitor labs and urine output. Avoid nephrotoxins. Dose medications as per eGFR.    If any questions, please feel free to contact me     Tex Lynch  Nephrology Fellow  [facility] Pager: [phone]

## 2023-01-19 NOTE — PROGRESS NOTE ADULT - SUBJECTIVE AND OBJECTIVE BOX
Date of Service: 01-19-23 @ 10:57    Patient is a 77y old  Female who presents with a chief complaint of sob/ rapid a.fib (19 Jan 2023 07:24)      Any change in ROS: she seems same:  intubated and in on full vent support:   sedated and on pressors:       MEDICATIONS  (STANDING):  ascorbic acid 500 milliGRAM(s) Oral daily  atorvastatin 40 milliGRAM(s) Oral at bedtime  azithromycin   Tablet 250 milliGRAM(s) Oral <User Schedule>  buMETAnide Infusion 2 mG/Hr (10 mL/Hr) IV Continuous <Continuous>  cefepime   IVPB 500 milliGRAM(s) IV Intermittent every 8 hours  chlorhexidine 0.12% Liquid 15 milliLiter(s) Oral Mucosa every 12 hours  chlorhexidine 4% Liquid 1 Application(s) Topical <User Schedule>  dexMEDEtomidine Infusion 0.2 MICROgram(s)/kG/Hr (1.89 mL/Hr) IV Continuous <Continuous>  dextrose 5%. 1000 milliLiter(s) (50 mL/Hr) IV Continuous <Continuous>  dextrose 5%. 1000 milliLiter(s) (100 mL/Hr) IV Continuous <Continuous>  dextrose 50% Injectable 50 milliLiter(s) IV Push every 15 minutes  dextrose 50% Injectable 50 milliLiter(s) IV Push every 15 minutes  diltiazem Infusion 13 mG/Hr (13 mL/Hr) IV Continuous <Continuous>  glucagon  Injectable 1 milliGRAM(s) IntraMuscular once  heparin   Injectable 5000 Unit(s) SubCutaneous every 12 hours  insulin lispro (ADMELOG) corrective regimen sliding scale   SubCutaneous every 6 hours  insulin NPH human recombinant 5 Unit(s) SubCutaneous every 6 hours  methylPREDNISolone sodium succinate Injectable 10 milliGRAM(s) IV Push daily  midodrine 10 milliGRAM(s) Oral every 8 hours  multivitamin 1 Tablet(s) Oral daily  pantoprazole  Injectable 40 milliGRAM(s) IV Push two times a day  phenylephrine    Infusion 0.5 MICROgram(s)/kG/Min (7.07 mL/Hr) IV Continuous <Continuous>  phytonadione   Solution 5 milliGRAM(s) Oral daily  senna Syrup 10 milliLiter(s) Oral at bedtime  thiamine 100 milliGRAM(s) Oral daily    MEDICATIONS  (PRN):  acetaminophen    Suspension .. 350 milliGRAM(s) Oral every 6 hours PRN Temp greater or equal to 38C (100.4F), Mild Pain (1 - 3), Moderate Pain (4 - 6)  albuterol/ipratropium for Nebulization 3 milliLiter(s) Nebulizer every 6 hours PRN Respiratory Distress    Vital Signs Last 24 Hrs  T(C): 37.5 (19 Jan 2023 08:00), Max: 38.1 (18 Jan 2023 20:00)  T(F): 99.5 (19 Jan 2023 08:00), Max: 100.6 (18 Jan 2023 20:00)  HR: 97 (19 Jan 2023 10:30) (72 - 145)  BP: 109/56 (19 Jan 2023 10:30) (84/39 - 144/56)  BP(mean): 78 (19 Jan 2023 10:30) (55 - 85)  RR: 29 (19 Jan 2023 10:30) (19 - 40)  SpO2: 97% (19 Jan 2023 10:30) (91% - 100%)    Parameters below as of 19 Jan 2023 08:00  Patient On (Oxygen Delivery Method): ventilator    O2 Concentration (%): 40  Mode: AC/ CMV (Assist Control/ Continuous Mandatory Ventilation)  RR (machine): 20  TV (machine): 350  FiO2: 40  PEEP: 5  ITime: 1  MAP: 12  PIP: 34    I&O's Summary    18 Jan 2023 07:01  -  19 Jan 2023 07:00  --------------------------------------------------------  IN: 2562.6 mL / OUT: 1490 mL / NET: 1072.6 mL    19 Jan 2023 07:01  -  19 Jan 2023 10:57  --------------------------------------------------------  IN: 255 mL / OUT: 30 mL / NET: 225 mL          Physical Exam:   GENERAL: weak  thin elderly women  HEENT: ROSA/   Atraumatic, Normocephalic  ENMT: No tonsillar erythema, exudates, or enlargement; Moist mucous membranes, Good dentition, No lesions  NECK: Supple, No JVD, Normal thyroid  CHEST/LUNG: poor air entery    CVS: Regular rate and rhythm; No murmurs, rubs, or gallops  GI: : Soft, Nontender, Nondistended; Bowel sounds present  NERVOUS SYSTEM:  sedated and intubated:    EXTREMITIES:  - edema  LYMPH: No lymphadenopathy noted  SKIN: No rashes or lesions  ENDOCRINOLOGY: No Thyromegaly  PSYCH: calm     Labs:  ABG - ( 19 Jan 2023 04:10 )  pH, Arterial: 7.32  pH, Blood: x     /  pCO2: 44    /  pO2: 29    / HCO3: 23    / Base Excess: -3.3  /  SaO2: 49.9            30, 30, 40, 30, 30, 7                            9.3    33.62 )-----------( 108      ( 18 Jan 2023 23:46 )             27.3                         10.6   30.63 )-----------( 90       ( 18 Jan 2023 00:38 )             30.3                         10.7   25.36 )-----------( 71       ( 17 Jan 2023 12:16 )             30.4                         9.9    21.30 )-----------( 60       ( 17 Jan 2023 00:23 )             28.0                         9.7    24.09 )-----------( 67       ( 16 Jan 2023 13:08 )             27.6                         6.9    16.39 )-----------( 63       ( 16 Jan 2023 06:35 )             20.0                         7.6    20.56 )-----------( 72       ( 16 Jan 2023 00:35 )             22.5                         6.3    24.14 )-----------( 89       ( 15 Tim 2023 17:07 )             19.0     01-18    136  |  105  |  81<H>  ----------------------------<  180<H>  3.9   |  19<L>  |  1.95<H>  01-18    136  |  100  |  81<H>  ----------------------------<  177<H>  3.8   |  25  |  2.16<H>  01-17    136  |  100  |  75<H>  ----------------------------<  108<H>  3.7   |  25  |  2.01<H>  01-17    134<L>  |  99  |  72<H>  ----------------------------<  157<H>  3.7   |  22  |  1.87<H>  01-16    137  |  101  |  68<H>  ----------------------------<  118<H>  3.5   |  22  |  1.77<H>  01-16    134<L>  |  100  |  70<H>  ----------------------------<  417<H>  3.7   |  19<L>  |  1.91<H>  01-15    134<L>  |  103  |  72<H>  ----------------------------<  501<HH>  4.9   |  18<L>  |  2.11<H>    Ca    6.0<LL>      18 Jan 2023 23:46  Ca    7.5<L>      18 Jan 2023 00:38  Ca    7.4<L>      17 Jan 2023 12:16  Phos  4.9     01-18  Phos  4.3     01-18  Phos  3.8     01-17  Mg     1.6     01-18  Mg     1.9     01-18  Mg     1.9     01-17    TPro  3.8<L>  /  Alb  1.3<L>  /  TBili  0.6  /  DBili  x   /  AST  54<H>  /  ALT  174<H>  /  AlkPhos  178<H>  01-18  TPro  4.4<L>  /  Alb  1.9<L>  /  TBili  1.1  /  DBili  x   /  AST  123<H>  /  ALT  351<H>  /  AlkPhos  192<H>  01-18  TPro  4.4<L>  /  Alb  2.0<L>  /  TBili  2.4<H>  /  DBili  x   /  AST  163<H>  /  ALT  419<H>  /  AlkPhos  173<H>  01-17  TPro  4.2<L>  /  Alb  1.9<L>  /  TBili  2.6<H>  /  DBili  x   /  AST  180<H>  /  ALT  438<H>  /  AlkPhos  145<H>  01-17  TPro  4.3<L>  /  Alb  2.1<L>  /  TBili  0.8  /  DBili  x   /  AST  248<H>  /  ALT  497<H>  /  AlkPhos  119  01-16  TPro  4.2<L>  /  Alb  2.2<L>  /  TBili  0.9  /  DBili  x   /  AST  482<H>  /  ALT  539<H>  /  AlkPhos  124<H>  01-16    CAPILLARY BLOOD GLUCOSE  177 (19 Jan 2023 06:00)  173 (18 Jan 2023 23:30)      POCT Blood Glucose.: 177 mg/dL (19 Jan 2023 06:11)  POCT Blood Glucose.: 173 mg/dL (18 Jan 2023 23:35)  POCT Blood Glucose.: 164 mg/dL (18 Jan 2023 17:15)  POCT Blood Glucose.: 141 mg/dL (18 Jan 2023 11:31)      LIVER FUNCTIONS - ( 18 Jan 2023 23:46 )  Alb: 1.3 g/dL / Pro: 3.8 g/dL / ALK PHOS: 178 U/L / ALT: 174 U/L / AST: 54 U/L / GGT: x           PT/INR - ( 18 Jan 2023 23:46 )   PT: 16.6 sec;   INR: 1.43 ratio         PTT - ( 18 Jan 2023 23:46 )  PTT:30.5 sec    D-Dimer Assay, Quantitative: 368 ng/mL DDU (01-16 @ 00:36)  Lactate, Blood: 3.7 mmol/L (01-16 @ 00:37)        RECENT CULTURES:  01-17 @ 19:52 .Blood Blood-Peripheral            rad< from: Xray Chest 1 View- PORTABLE-Urgent (Xray Chest 1 View- PORTABLE-Urgent .) (01.10.23 @ 19:55) >    ACC: [id number] EXAM:  XR CHEST PORTABLE URGENT 1V                          PROCEDURE DATE:  01/10/2023          INTERPRETATION:  CLINICAL INFORMATION: Intubation.    TECHNIQUE: Portable AP radiograph of the chest.    COMPARISON: Chest x-ray 1/8/2023.    FINDINGS: Clear lungs. No pleural effusions or pneumothorax. Cardiac   silhouette is unchanged. No acute osseous pathology.    Endotracheal tube terminates above the jack. Enteric tube projects   below the diaphragm with its distal segment excluded from view.      IMPRESSION:    Clear lungs. Tubes as described.    --- End of Report ---           OLIVA ALAMO MD; Resident Radiologist  This document has been electronically signed.  JAIRO TATUM MD; Attending Radiologist  This document has been electronically signed. Jan 11 2023  8:48AM    < end of copied text >  < from: CT Chest No Cont (01.10.23 @ 12:31) >  FINDINGS:    LYMPH NODES: No lymphadenopathy.    HEART/VASCULATURE: Heart size is normal. No pericardial effusion. Aortic   valve calcifications. Aortic and coronary artery calcifications.    AIRWAYS/LUNGS/PLEURA: Patent central airways. Bilateral lower lobe   predominant patchy and branching opacities. Small left pleural effusion   with associated passive atelectasis.    UPPER ABDOMEN: Unchanged calcified liver granulomas.    BONES/SOFT TISSUES: Exaggerated kyphosis of the thoracic spine.   Degenerative changes of the spine. Soft tissues are unremarkable.    IMPRESSION:    Bilateral lower lobe predominant patchy and branching opacities   suspicious for pneumonia.    Small left pleural effusion.        < end of copied text >      No growth to date.    01-17 @ 18:53 .Blood Blood-Peripheral                No growth to date.    01-14 @ 07:47 ET Tube ET Tube       Numerous polymorphonuclear leukocytes per low power field  Few Squamous epithelial cells per low power field  Numerous Gram Negative Rods seen per oil power field  Few Gram Positive Rods seen per oil power field  Few Yeast like cells seen per oil power field           Normal Respiratory Janette present          RESPIRATORY CULTURES:          Studies  Chest X-RAY  CT SCAN Chest   Venous Dopplers: LE:   CT Abdomen  Others

## 2023-01-19 NOTE — PROGRESS NOTE ADULT - ASSESSMENT
ASSESSMENT/PLAN:  77-year-old female with past medical history of DM, HTN, HLD, asthma on daily steroids, A. fib on Eliquis and digoxin presenting with shortness of breath, found to be in Afib w/RVR; admitted to MICU for Acute Hypoxic Respiratory Failure in s/o Asthma and Bronchiectasis w/ possible PNA, leading to intubation and sedation. Currently urine output worsening, attempting to to SBP daily.     NEURO  intubated on precedex       CARDIAC  # Afib w/ RVR  Admitted in RVR; being followed by Dr. Marcus, Cardiology; Afib w/ RVR thought to be due to stress from hypoxemia  - Chronic atrial fib. with RVR.  Continue diltiazem drip.   - Echo (1/11) EF 75%  1. Endocardial visualization enhanced with intravenous  injection of Ultrasonic Enhancing Agent (Definity).  Hyperdynamic left ventricular systolic function. No left  ventricular thrombus.  2. Normal right ventricular size and function.  - TSH normal    Hypotension requiring pressors:   - continued on phenylephrine     PULM  #Acute on chronic  hypoxemic respiratory failure due to acute exacerbation of bronchiectasis / asthma and possible pneumonia.   Continue current AC vent settings.  Did not tolerate PS wean 1/17 or 1/18.   Continue solumedrol to 20mg  daily.  Continue bronchodilators  Continue cefepime day 11/14 for pseudomonas pneumonia  Chest PT   3% saline nebulizers with Duo nebs.  Azithromycin  M, W, F for   antinflammatory effects.    RENAL  # SHANE  - urine lytes showing FeNa 2.2%-> ATN; treat with fluids. Creatinine stabilized at 1.8 now.   - urine output decreaSed to 5cc/hr 1/16 did not improve with IVF  - 1/17 - given bumex 2mg with improved urine output. Nephro consulted - increased bumex 2mg iv to tid    Hypocalcemia  - received 2g calcium gluconate overnight    GI   - concern for GI bleed (hb drop requiring blood transfusion 1/15 and 1/16) and small volume hematoschezia visualized 1/16am per RN. GI consulted - hold off endoscopic evaluation unless further bleeding occurs.   - hold off CT abdomen given hb is stable, no gross gi bleed currently   - Diet: tube feeds  - Bowel regimen: miralax, senna    HEME/ONC  # Leukocytosis  - cultures negative. off abx. possibly from steroids, downtrending from 40, now 16.     # INR 11.1 (00:00 1/11)-> 4.5 (6 AM 1/11) -> 1.42 (1/12)  - continue to hold Lovenox in setting of hb drops    ID  # Sepsis  CT Chest w/ bilateral lower lobe predominant patchy and branching opacities suspicious for pneumonia. Small left pleural effusion. Leukocytosis, tachycardia and tachypnea; No active indications of infections, all infectious workup negative so far; leukocytosis likely in s/o solumedrol usage but no diff to conform neutrophilia so far; tachycardia likely in s/o tachypnea and AHRF  - was started on cefepime 1/8 = now it is day 10/14  - Urine legionella antigen - neg, final BCx 1/10 - neg, BAL cultures - numerous GNR, few gram pos       ENDO  DM2, hyperglycemia  - hyperglycemic 450s 1/15, started on insulin gtt 1/15pm. likely in setting of steroids.   - 1/16 - 1/17 was on insulin gtt, now on nph 5u q6h + SSI with improved glc       DVT  - Hold Lovenox 50mg BID - monitor for acute VTE    ETHICS  Full Code  family at bedside, updated      ASSESSMENT/PLAN:  77-year-old female with past medical history of DM, HTN, HLD, asthma on daily steroids, A. fib on Eliquis and digoxin presenting with shortness of breath, found to be in Afib w/RVR; admitted to MICU for Acute Hypoxic Respiratory Failure in s/o Asthma and Bronchiectasis w/ possible PNA, leading to intubation and sedation. Currently urine output worsening, attempting to to SBP daily.     NEURO  intubated on precedex       CARDIAC  # Afib w/ RVR  Admitted in RVR; being followed by Dr. Marcus, Cardiology; Afib w/ RVR thought to be due to stress from hypoxemia  - Chronic atrial fib. with RVR.  Continue diltiazem drip, today at 10  - Echo (1/11) EF 75%  1. Endocardial visualization enhanced with intravenous  injection of Ultrasonic Enhancing Agent (Definity).  Hyperdynamic left ventricular systolic function. No left  ventricular thrombus.  2. Normal right ventricular size and function.  - TSH normal    Hypotension requiring pressors:   - continued on phenylephrine     PULM  #Acute on chronic  hypoxemic respiratory failure due to acute exacerbation of bronchiectasis / asthma and possible pneumonia.   Continue current AC vent settings.  Did not tolerate PS wean 1/17 or 1/18.   Continue solumedrol to 20mg  daily.  Continue bronchodilators  Continue cefepime day 11/14 for pseudomonas pneumonia  Chest PT   3% saline nebulizers - discontinued, with Duo nebs - changed to prn  Azithromycin  M, W, F for   antinflammatory effects.    RENAL  # SHANE  - urine lytes showing FeNa 2.2%-> ATN; treat with fluids. Creatinine stabilized at 1.8 now.   - urine output decreased to 5cc/hr 1/16 did not improve with IVF  - bumex 2mg bid -> bumex infusion     Hypocalcemia  - received 2g calcium gluconate overnight    GI   - concern for GI bleed (hb drop requiring blood transfusion 1/15 and 1/16) and small volume hematoschezia visualized 1/16am per RN. GI consulted - hold off endoscopic evaluation unless further bleeding occurs.   - hold off CT abdomen given hb is stable, no gross gi bleed currently   - Diet: tube feeds  - Bowel regimen: miralax, senna    HEME/ONC  # Leukocytosis  - cultures negative. off abx. possibly from steroids, downtrending from 40, now 16.     # INR 11.1 (00:00 1/11)-> 4.5 (6 AM 1/11) -> 1.42 (1/12)  - continue to hold Lovenox in setting of hb drops    ID  # Sepsis  CT Chest w/ bilateral lower lobe predominant patchy and branching opacities suspicious for pneumonia. Small left pleural effusion. Leukocytosis, tachycardia and tachypnea; No active indications of infections, all infectious workup negative so far; leukocytosis likely in s/o solumedrol usage but no diff to conform neutrophilia so far; tachycardia likely in s/o tachypnea and AHRF  - was started on cefepime 1/8 = now it is day 12/14  - Urine legionella antigen - neg, final BCx 1/10 - neg, BAL cultures - numerous GNR, few gram pos       ENDO  DM2, hyperglycemia  - hyperglycemic 450s 1/15, started on insulin gtt 1/15pm. likely in setting of steroids.   - 1/16 - 1/17 was on insulin gtt, now on nph 5u q6h + SSI with improved glc       DVT  - Hold Lovenox 50mg BID - monitor for acute VTE  - heparin 5000 bid     ETHICS  Full Code  family at bedside, updated      ASSESSMENT/PLAN:  77-year-old female with past medical history of DM, HTN, HLD, asthma on daily steroids, A. fib on Eliquis and digoxin presenting with shortness of breath, found to be in Afib w/RVR; admitted to MICU for Acute Hypoxic Respiratory Failure in s/o Asthma and Bronchiectasis w/ possible PNA, leading to intubation and sedation. Currently urine output worsening, attempting to to SBP daily.     NEURO  intubated on precedex       CARDIAC  # Afib w/ RVR  Admitted in RVR; being followed by Dr. Marcus, Cardiology; Afib w/ RVR thought to be due to stress from hypoxemia  - Chronic atrial fib. with RVR.  Continue diltiazem drip, today at 10  - Echo (1/11) EF 75%  1. Endocardial visualization enhanced with intravenous  injection of Ultrasonic Enhancing Agent (Definity).  Hyperdynamic left ventricular systolic function. No left  ventricular thrombus.  2. Normal right ventricular size and function.  - TSH normal    Hypotension requiring pressors:   - continued on phenylephrine     PULM  #Acute on chronic  hypoxemic respiratory failure due to acute exacerbation of bronchiectasis / asthma and possible pneumonia.   Continue current AC vent settings.  Did not tolerate PS wean 1/17 or 1/18.   Continue solumedrol to 20mg  daily.  Continue bronchodilators  Continue cefepime day 11/14 for pseudomonas pneumonia  Chest PT   3% saline nebulizers - discontinued, with Duo nebs - changed to prn  Azithromycin  M, W, F for   antinflammatory effects.    RENAL  # SHAEN  - urine lytes showing FeNa 2.2%-> ATN; treat with fluids. Creatinine stabilized at 1.8 now.   - urine output decreased to 5cc/hr 1/16 did not improve with IVF  - bumex 2mg bid -> bumex infusion     Hypocalcemia  - received 2g calcium gluconate overnight    GI   - concern for GI bleed (hb drop requiring blood transfusion 1/15 and 1/16) and small volume hematoschezia visualized 1/16am per RN. GI consulted - hold off endoscopic evaluation unless further bleeding occurs.   - hold off CT abdomen given hb is stable, no gross gi bleed currently   - Diet: tube feeds  - Bowel regimen: miralax, senna    HEME/ONC  # Leukocytosis  - cultures negative. off abx. possibly from steroids, downtrending from 40, now 16.     # INR 11.1 (00:00 1/11)-> 4.5 (6 AM 1/11) -> 1.42 (1/12)  - continue to hold Lovenox in setting of hb drops    ID  # Sepsis  CT Chest w/ bilateral lower lobe predominant patchy and branching opacities suspicious for pneumonia. Small left pleural effusion. Leukocytosis, tachycardia and tachypnea; No active indications of infections, all infectious workup negative so far; leukocytosis likely in s/o solumedrol usage but no diff to conform neutrophilia so far; tachycardia likely in s/o tachypnea and AHRF  - was started on cefepime 1/8 = now it is day 12/14  - Urine legionella antigen - neg, final BCx 1/10 - neg, BAL cultures - numerous GNR, few gram pos       ENDO  DM2, hyperglycemia  - hyperglycemic 450s 1/15, started on insulin gtt 1/15pm. likely in setting of steroids.   - 1/16 - 1/17 was on insulin gtt, now on nph 5u q6h + SSI with improved glc       DVT  - Hold Lovenox 50mg BID - monitor for acute VTE  - heparin 5000 bid     ETHICS  full code  family declined to make decision today regarding code status. Though patient's children do not want chest compressions, patient's  is wanting further time to process the news. Told to "go away" by family when approached for GOC discussion.      ASSESSMENT/PLAN:  77-year-old female with past medical history of DM, HTN, HLD, asthma on daily steroids, A. fib on Eliquis and digoxin presenting with shortness of breath, found to be in Afib w/RVR; admitted to MICU for Acute Hypoxic Respiratory Failure in s/o Asthma and Bronchiectasis w/ possible PNA, leading to intubation and sedation. Currently urine output worsening, attempting to to SBP daily.     NEURO  intubated on precedex       CARDIAC  # Afib w/ RVR  Admitted in RVR; being followed by Dr. Marcus, Cardiology; Afib w/ RVR thought to be due to stress from hypoxemia  - Chronic atrial fib. with RVR.  Continue diltiazem drip, today at 10  - Echo (1/11) EF 75%  1. Endocardial visualization enhanced with intravenous  injection of Ultrasonic Enhancing Agent (Definity).  Hyperdynamic left ventricular systolic function. No left  ventricular thrombus.  2. Normal right ventricular size and function.  - TSH normal    Hypotension requiring pressors:   - continued on phenylephrine     PULM  #Acute on chronic  hypoxemic respiratory failure due to acute exacerbation of bronchiectasis / asthma and possible pneumonia.   Continue current AC vent settings.  Did not tolerate PS wean 1/17 or 1/18.   Continue solumedrol to 20mg  daily.  Continue bronchodilators  Continue cefepime day 11/14 for pseudomonas pneumonia  Chest PT   3% saline nebulizers - discontinued, with Duo nebs - changed to prn  Azithromycin  M, W, F for   antinflammatory effects.    RENAL  # SHANE  - urine lytes showing FeNa 2.2%-> ATN; treat with fluids. Creatinine stabilized at 1.8 now.   - urine output decreased to 5cc/hr 1/16 did not improve with IVF  - bumex 2mg bid -> bumex infusion     Hypocalcemia  - received 2g calcium gluconate overnight    GI   - concern for GI bleed (hb drop requiring blood transfusion 1/15 and 1/16) and small volume hematoschezia visualized 1/16am per RN. GI consulted - hold off endoscopic evaluation unless further bleeding occurs.   - hold off CT abdomen given hb is stable, no gross gi bleed currently   - Diet: tube feeds  - Bowel regimen: miralax, senna    HEME/ONC  # Leukocytosis  - cultures negative. off abx. possibly from steroids, downtrending from 40, now 16.     # INR 11.1 (00:00 1/11)-> 4.5 (6 AM 1/11) -> 1.42 (1/12)  - continue to hold Lovenox in setting of hb drops    ID  # Sepsis  CT Chest w/ bilateral lower lobe predominant patchy and branching opacities suspicious for pneumonia. Small left pleural effusion. Leukocytosis, tachycardia and tachypnea; No active indications of infections, all infectious workup negative so far; leukocytosis likely in s/o solumedrol usage but no diff to conform neutrophilia so far; tachycardia likely in s/o tachypnea and AHRF  - was started on cefepime 1/8 = now it is day 12/14  - Urine legionella antigen - neg, final BCx 1/10 - neg, BAL cultures - numerous GNR, few gram pos       ENDO  DM2, hyperglycemia  - hyperglycemic 450s 1/15, started on insulin gtt 1/15pm. likely in setting of steroids.   - 1/16 - 1/17 was on insulin gtt, now on nph 5u q6h + SSI with improved glc       DVT  - Hold Lovenox 50mg BID - monitor for acute VTE  - heparin 5000 bid     ETHICS  full code  family declined to make decision today regarding code status. Though patient's children do not want chest compressions, patient's  is wanting further time to process the news.

## 2023-01-19 NOTE — PROGRESS NOTE ADULT - SUBJECTIVE AND OBJECTIVE BOX
Mother and sister at bedside        Stacey Elliott  12/20/18 1947 [facility] DIVISION OF KIDNEY DISEASES AND HYPERTENSION -- FOLLOW UP NOTE  --------------------------------------------------------------------------------  Pt is a 77 year old female with PMH of DM, HTN, HLD, Asthma, and Afib who presented to the hospital with shortness of breath and noted to be in afib with RVR. The patient was emergently intubated for acute hypoxic respiratory failure and admitted to the MICU. The patient has failed multiple attempts at extubation. The nephrology team was consulted for SHANE, On review of [facility] pt noted to have a SCr of 0.64 on admission 1/8, which increased to 2.58 on 1/11, and since then has improved but remains elevated/stable at 1.95 this morning.    24 hour events/subjective:  Pt seen and examined this morning in the MICU. Pt remains intubated and on sedation. Unable to obtain ROS.         PAST HISTORY  --------------------------------------------------------------------------------  No significant changes to PMH, PSH, FHx, SHx, unless otherwise noted    ALLERGIES & MEDICATIONS  --------------------------------------------------------------------------------  Allergies    Avelox (Pruritus)  fish (Vomiting)  Levaquin (Unknown)  shellfish (Vomiting)    Intolerances    fentanyl (Other)    Standing Inpatient Medications  albuterol/ipratropium for Nebulization 3 milliLiter(s) Nebulizer every 6 hours  ascorbic acid 500 milliGRAM(s) Oral daily  atorvastatin 40 milliGRAM(s) Oral at bedtime  azithromycin   Tablet 250 milliGRAM(s) Oral <User Schedule>  buMETAnide Injectable 2 milliGRAM(s) IV Push every 12 hours  cefepime   IVPB 500 milliGRAM(s) IV Intermittent every 8 hours  chlorhexidine 0.12% Liquid 15 milliLiter(s) Oral Mucosa every 12 hours  chlorhexidine 4% Liquid 1 Application(s) Topical <User Schedule>  dexMEDEtomidine Infusion 0.2 MICROgram(s)/kG/Hr IV Continuous <Continuous>  dextrose 5%. 1000 milliLiter(s) IV Continuous <Continuous>  dextrose 5%. 1000 milliLiter(s) IV Continuous <Continuous>  dextrose 50% Injectable 50 milliLiter(s) IV Push every 15 minutes  dextrose 50% Injectable 50 milliLiter(s) IV Push every 15 minutes  diltiazem Infusion 13 mG/Hr IV Continuous <Continuous>  glucagon  Injectable 1 milliGRAM(s) IntraMuscular once  heparin   Injectable 5000 Unit(s) SubCutaneous every 12 hours  insulin lispro (ADMELOG) corrective regimen sliding scale   SubCutaneous every 6 hours  insulin NPH human recombinant 5 Unit(s) SubCutaneous every 6 hours  methylPREDNISolone sodium succinate Injectable 20 milliGRAM(s) IV Push daily  midodrine 10 milliGRAM(s) Oral every 8 hours  multivitamin 1 Tablet(s) Oral daily  pantoprazole  Injectable 40 milliGRAM(s) IV Push two times a day  phenylephrine    Infusion 0.5 MICROgram(s)/kG/Min IV Continuous <Continuous>  phytonadione   Solution 5 milliGRAM(s) Oral daily  senna Syrup 10 milliLiter(s) Oral at bedtime  sodium chloride 3%  Inhalation 4 milliLiter(s) Inhalation every 12 hours  thiamine 100 milliGRAM(s) Oral daily    PRN Inpatient Medications  acetaminophen    Suspension .. 350 milliGRAM(s) Oral every 6 hours PRN      REVIEW OF SYSTEMS  Unable to obtain    VITALS/PHYSICAL EXAM  --------------------------------------------------------------------------------  T(C): 37.8 (01-19-23 @ 06:15), Max: 38.1 (01-18-23 @ 20:00)  HR: 83 (01-19-23 @ 06:30) (72 - 145)  BP: 110/51 (01-19-23 @ 06:30) (84/39 - 150/96)  RR: 22 (01-19-23 @ 06:30) (19 - 44)  SpO2: 100% (01-19-23 @ 06:30) (88% - 100%)  Wt(kg): --        01-17-23 @ 07:01  -  01-18-23 @ 07:00  --------------------------------------------------------  IN: 1389.2 mL / OUT: 2085 mL / NET: -695.8 mL    01-18-23 @ 07:01  -  01-19-23 @ 06:31  --------------------------------------------------------  IN: 2487.6 mL / OUT: 1470 mL / NET: 1017.6 mL        Physical Exam:  	Gen: ill appearing  	HEENT: ETT  	Pulm: decreased breath sounds  	CV: S1S2  	Abd: Soft, +BS   	Ext: ++ LE edema B/L  	Neuro: sedated  	Skin: Warm and dry      LABS/STUDIES  --------------------------------------------------------------------------------              9.3    33.62 >-----------<  108      [01-18-23 @ 23:46]              27.3     136  |  105  |  81  ----------------------------<  180      [01-18-23 @ 23:46]  3.9   |  19  |  1.95        Ca     6.0     [01-18-23 @ 23:46]      Mg     1.6     [01-18-23 @ 23:46]      Phos  4.9     [01-18-23 @ 23:46]    TPro  3.8  /  Alb  1.3  /  TBili  0.6  /  DBili  x   /  AST  54  /  ALT  174  /  AlkPhos  178  [01-18-23 @ 23:46]    PT/INR: PT 16.6 , INR 1.43       [01-18-23 @ 23:46]  PTT: 30.5       [01-18-23 @ 23:46]      Creatinine Trend:  SCr 1.95 [01-18 @ 23:46]  SCr 2.16 [01-18 @ 00:38]  SCr 2.01 [01-17 @ 12:16]  SCr 1.87 [01-17 @ 00:22]  SCr 1.77 [01-16 @ 09:35]    Urinalysis - [01-08-23 @ 14:26]      Color Light Yellow / Appearance Slightly Turbid / SG 1.015 / pH 7.0      Gluc 1000 mg/dL / Ketone Small  / Bili Negative / Urobili Negative       Blood Trace / Protein 30 mg/dL / Leuk Est Large / Nitrite Negative      RBC 6 / WBC 53 / Hyaline 2 / Gran  / Sq Epi  / Non Sq Epi 3 / Bacteria Negative    Urine Creatinine 29      [01-14-23 @ 18:07]  Urine Protein 46      [01-14-23 @ 18:07]  Urine Sodium 74      [01-14-23 @ 18:09]  Urine Urea Nitrogen 455      [01-14-23 @ 18:07]  Urine Potassium 39      [01-14-23 @ 18:09]  Urine Osmolality 393      [01-14-23 @ 18:07]    HbA1c 6.6      [02-02-19 @ 10:59]  TSH 1.17      [01-09-23 @ 06:11]       [facility] DIVISION OF KIDNEY DISEASES AND HYPERTENSION -- FOLLOW UP NOTE  --------------------------------------------------------------------------------  Pt is a 77 year old female with PMH of DM, HTN, HLD, Asthma, and Afib who presented to the hospital with shortness of breath and noted to be in afib with RVR. The patient was emergently intubated for acute hypoxic respiratory failure and admitted to the MICU. The patient has failed multiple attempts at extubation. The nephrology team was consulted for SHANE, On review of [facility] pt noted to have a SCr of 0.64 on admission 1/8, which increased to 2.58 on 1/11, and since then has improved but remains elevated/stable at 1.95 this morning.    24 hour events/subjective:  Pt seen and examined this morning in the MICU. Pt remains intubated and on sedation. Unable to obtain ROS.     PAST HISTORY  --------------------------------------------------------------------------------  No significant changes to PMH, PSH, FHx, SHx, unless otherwise noted    ALLERGIES & MEDICATIONS  --------------------------------------------------------------------------------  Allergies    Avelox (Pruritus)  fish (Vomiting)  Levaquin (Unknown)  shellfish (Vomiting)    Intolerances    fentanyl (Other)    Standing Inpatient Medications  albuterol/ipratropium for Nebulization 3 milliLiter(s) Nebulizer every 6 hours  ascorbic acid 500 milliGRAM(s) Oral daily  atorvastatin 40 milliGRAM(s) Oral at bedtime  azithromycin   Tablet 250 milliGRAM(s) Oral <User Schedule>  buMETAnide Injectable 2 milliGRAM(s) IV Push every 12 hours  cefepime   IVPB 500 milliGRAM(s) IV Intermittent every 8 hours  chlorhexidine 0.12% Liquid 15 milliLiter(s) Oral Mucosa every 12 hours  chlorhexidine 4% Liquid 1 Application(s) Topical <User Schedule>  dexMEDEtomidine Infusion 0.2 MICROgram(s)/kG/Hr IV Continuous <Continuous>  dextrose 5%. 1000 milliLiter(s) IV Continuous <Continuous>  dextrose 5%. 1000 milliLiter(s) IV Continuous <Continuous>  dextrose 50% Injectable 50 milliLiter(s) IV Push every 15 minutes  dextrose 50% Injectable 50 milliLiter(s) IV Push every 15 minutes  diltiazem Infusion 13 mG/Hr IV Continuous <Continuous>  glucagon  Injectable 1 milliGRAM(s) IntraMuscular once  heparin   Injectable 5000 Unit(s) SubCutaneous every 12 hours  insulin lispro (ADMELOG) corrective regimen sliding scale   SubCutaneous every 6 hours  insulin NPH human recombinant 5 Unit(s) SubCutaneous every 6 hours  methylPREDNISolone sodium succinate Injectable 20 milliGRAM(s) IV Push daily  midodrine 10 milliGRAM(s) Oral every 8 hours  multivitamin 1 Tablet(s) Oral daily  pantoprazole  Injectable 40 milliGRAM(s) IV Push two times a day  phenylephrine    Infusion 0.5 MICROgram(s)/kG/Min IV Continuous <Continuous>  phytonadione   Solution 5 milliGRAM(s) Oral daily  senna Syrup 10 milliLiter(s) Oral at bedtime  sodium chloride 3%  Inhalation 4 milliLiter(s) Inhalation every 12 hours  thiamine 100 milliGRAM(s) Oral daily    PRN Inpatient Medications  acetaminophen    Suspension .. 350 milliGRAM(s) Oral every 6 hours PRN      REVIEW OF SYSTEMS  Unable to obtain    VITALS/PHYSICAL EXAM  --------------------------------------------------------------------------------  T(C): 37.8 (01-19-23 @ 06:15), Max: 38.1 (01-18-23 @ 20:00)  HR: 83 (01-19-23 @ 06:30) (72 - 145)  BP: 110/51 (01-19-23 @ 06:30) (84/39 - 150/96)  RR: 22 (01-19-23 @ 06:30) (19 - 44)  SpO2: 100% (01-19-23 @ 06:30) (88% - 100%)  Wt(kg): --        01-17-23 @ 07:01  -  01-18-23 @ 07:00  --------------------------------------------------------  IN: 1389.2 mL / OUT: 2085 mL / NET: -695.8 mL    01-18-23 @ 07:01  -  01-19-23 @ 06:31  --------------------------------------------------------  IN: 2487.6 mL / OUT: 1470 mL / NET: 1017.6 mL        Physical Exam:  	Gen: ill appearing  	HEENT: ETT  	Pulm: decreased breath sounds  	CV: S1S2  	Abd: Soft, +BS   	Ext: ++ LE edema B/L  	Neuro: sedated  	Skin: Warm and dry      LABS/STUDIES  --------------------------------------------------------------------------------              9.3    33.62 >-----------<  108      [01-18-23 @ 23:46]              27.3     136  |  105  |  81  ----------------------------<  180      [01-18-23 @ 23:46]  3.9   |  19  |  1.95        Ca     6.0     [01-18-23 @ 23:46]      Mg     1.6     [01-18-23 @ 23:46]      Phos  4.9     [01-18-23 @ 23:46]    TPro  3.8  /  Alb  1.3  /  TBili  0.6  /  DBili  x   /  AST  54  /  ALT  174  /  AlkPhos  178  [01-18-23 @ 23:46]    PT/INR: PT 16.6 , INR 1.43       [01-18-23 @ 23:46]  PTT: 30.5       [01-18-23 @ 23:46]      Creatinine Trend:  SCr 1.95 [01-18 @ 23:46]  SCr 2.16 [01-18 @ 00:38]  SCr 2.01 [01-17 @ 12:16]  SCr 1.87 [01-17 @ 00:22]  SCr 1.77 [01-16 @ 09:35]    Urinalysis - [01-08-23 @ 14:26]      Color Light Yellow / Appearance Slightly Turbid / SG 1.015 / pH 7.0      Gluc 1000 mg/dL / Ketone Small  / Bili Negative / Urobili Negative       Blood Trace / Protein 30 mg/dL / Leuk Est Large / Nitrite Negative      RBC 6 / WBC 53 / Hyaline 2 / Gran  / Sq Epi  / Non Sq Epi 3 / Bacteria Negative    Urine Creatinine 29      [01-14-23 @ 18:07]  Urine Protein 46      [01-14-23 @ 18:07]  Urine Sodium 74      [01-14-23 @ 18:09]  Urine Urea Nitrogen 455      [01-14-23 @ 18:07]  Urine Potassium 39      [01-14-23 @ 18:09]  Urine Osmolality 393      [01-14-23 @ 18:07]    HbA1c 6.6      [02-02-19 @ 10:59]  TSH 1.17      [01-09-23 @ 06:11]

## 2023-01-20 NOTE — PROGRESS NOTE ADULT - ASSESSMENT
ASSESSMENT/PLAN:  77-year-old female with past medical history of DM, HTN, HLD, asthma on daily steroids, A. fib on Eliquis and digoxin presenting with shortness of breath, found to be in Afib w/RVR; admitted to MICU for Acute Hypoxic Respiratory Failure in s/o Asthma and Bronchiectasis w/ possible PNA, leading to intubation and sedation. Currently urine output worsening, attempting to to SBP daily.     NEURO  intubated on precedex       CARDIAC  # Afib w/ RVR  Admitted in RVR; being followed by Dr. Marcus, Cardiology; Afib w/ RVR thought to be due to stress from hypoxemia  - Chronic atrial fib. with RVR.  Continue diltiazem drip, today at 10  - Echo (1/11) EF 75%  1. Endocardial visualization enhanced with intravenous  injection of Ultrasonic Enhancing Agent (Definity).  Hyperdynamic left ventricular systolic function. No left  ventricular thrombus.  2. Normal right ventricular size and function.  - TSH normal    Hypotension requiring pressors:   - continued on phenylephrine     PULM  #Acute on chronic  hypoxemic respiratory failure due to acute exacerbation of bronchiectasis / asthma and possible pneumonia.   Continue current AC vent settings.  Did not tolerate PS wean 1/17 or 1/18.   Continue solumedrol to 20mg  daily - decreased to 10mg qd  Continue cefepime day 11/14 for pseudomonas pneumonia  Chest PT   3% saline nebulizers - discontinued, with Duo nebs - changed to prn  Azithromycin  M, W, F for   antinflammatory effects.    RENAL  # SHANE  - urine lytes showing FeNa 2.2%-> ATN; treat with fluids. Creatinine stabilized at 1.8 now.   - urine output decreased to 5cc/hr 1/16 did not improve with IVF  - bumex 2mg bid -> bumex infusion   - overnight, urine output was decreasing. per nephro- rec standing metolazone and placement of central line for double concentrated tiffanie as getting large volume of fluid with tiffanie  - given albumin o/n    GI   - concern for GI bleed (hb drop requiring blood transfusion 1/15 and 1/16) and small volume hematoschezia visualized 1/16am per RN. GI consulted - hold off endoscopic evaluation unless further bleeding occurs.   - hold off CT abdomen given hb is stable, no gross gi bleed currently   - Diet: tube feeds  - Bowel regimen: miralax, senna    ID  # Sepsis  CT Chest w/ bilateral lower lobe predominant patchy and branching opacities suspicious for pneumonia. Small left pleural effusion. Leukocytosis, tachycardia and tachypnea; No active indications of infections, all infectious workup negative so far; leukocytosis likely in s/o solumedrol usage but no diff to conform neutrophilia so far; tachycardia likely in s/o tachypnea and AHRF  - was started on cefepime 1/8 = now it is day 12/14  - Urine legionella antigen - neg, final BCx 1/10 - neg, BAL cultures - numerous GNR, few gram pos       ENDO  DM2, hyperglycemia  - hyperglycemic 450s 1/15, started on insulin gtt 1/15pm. likely in setting of steroids.   - 1/16 - 1/17 was on insulin gtt, now on nph 5u q6h + SSI with improved glc       HEME/ONC  - Hold Lovenox 50mg BID - monitor for acute VTE  - heparin 5000 bid     ETHICS  full code  family declined to make decision today regarding code status. Though patient's children do not want chest compressions, patient's  is wanting further time to process the news. Palliative on board - f/u recs.

## 2023-01-20 NOTE — PROGRESS NOTE ADULT - SUBJECTIVE AND OBJECTIVE BOX
Date of Service: 01-20-23 @ 13:23    Patient is a 77y old  Female who presents with a chief complaint of sob/ rapid a.fib (20 Jan 2023 11:02)      Any change in ROS: remains intubated      MEDICATIONS  (STANDING):  albumin human 25% IVPB 50 milliLiter(s) IV Intermittent every 4 hours  ascorbic acid 500 milliGRAM(s) Oral daily  atorvastatin 40 milliGRAM(s) Oral at bedtime  azithromycin   Tablet 250 milliGRAM(s) Oral <User Schedule>  bacitracin   Ointment 1 Application(s) Topical daily  buMETAnide Infusion 3 mG/Hr (15 mL/Hr) IV Continuous <Continuous>  cefepime   IVPB 500 milliGRAM(s) IV Intermittent every 8 hours  chlorhexidine 0.12% Liquid 15 milliLiter(s) Oral Mucosa every 12 hours  chlorhexidine 4% Liquid 1 Application(s) Topical <User Schedule>  dexMEDEtomidine Infusion 0.2 MICROgram(s)/kG/Hr (1.89 mL/Hr) IV Continuous <Continuous>  dextrose 5%. 1000 milliLiter(s) (50 mL/Hr) IV Continuous <Continuous>  dextrose 5%. 1000 milliLiter(s) (100 mL/Hr) IV Continuous <Continuous>  dextrose 50% Injectable 50 milliLiter(s) IV Push every 15 minutes  dextrose 50% Injectable 50 milliLiter(s) IV Push every 15 minutes  diltiazem Infusion 13 mG/Hr (13 mL/Hr) IV Continuous <Continuous>  glucagon  Injectable 1 milliGRAM(s) IntraMuscular once  heparin   Injectable 5000 Unit(s) SubCutaneous every 12 hours  insulin lispro (ADMELOG) corrective regimen sliding scale   SubCutaneous every 6 hours  insulin NPH human recombinant 5 Unit(s) SubCutaneous every 6 hours  methylPREDNISolone sodium succinate Injectable 10 milliGRAM(s) IV Push daily  metolazone 10 milliGRAM(s) Oral <User Schedule>  midodrine 20 milliGRAM(s) Oral every 8 hours  multivitamin 1 Tablet(s) Oral daily  pantoprazole  Injectable 40 milliGRAM(s) IV Push two times a day  phenylephrine    Infusion 0.5 MICROgram(s)/kG/Min (7.07 mL/Hr) IV Continuous <Continuous>  propofol Infusion 10 MICROgram(s)/kG/Min (2.26 mL/Hr) IV Continuous <Continuous>  senna Syrup 10 milliLiter(s) Oral at bedtime  thiamine 100 milliGRAM(s) Oral daily    MEDICATIONS  (PRN):  acetaminophen    Suspension .. 350 milliGRAM(s) Oral every 6 hours PRN Temp greater or equal to 38C (100.4F), Mild Pain (1 - 3), Moderate Pain (4 - 6)  albuterol/ipratropium for Nebulization 3 milliLiter(s) Nebulizer every 6 hours PRN Respiratory Distress  fentaNYL    Injectable 50 MICROGram(s) IV Push every 4 hours PRN Severe Pain (7 - 10)  polyethylene glycol 3350 17 Gram(s) Oral two times a day PRN Constipation    Vital Signs Last 24 Hrs  T(C): 36.8 (20 Jan 2023 12:00), Max: 37.5 (19 Jan 2023 23:00)  T(F): 98.2 (20 Jan 2023 12:00), Max: 99.5 (19 Jan 2023 23:00)  HR: 90 (20 Jan 2023 12:45) (69 - 147)  BP: 117/51 (20 Jan 2023 12:45) (92/46 - 152/67)  BP(mean): 73 (20 Jan 2023 12:45) (64 - 97)  RR: 26 (20 Jan 2023 12:45) (20 - 35)  SpO2: 100% (20 Jan 2023 12:45) (88% - 100%)    Parameters below as of 20 Jan 2023 08:00  Patient On (Oxygen Delivery Method): ventilator    O2 Concentration (%): 40  Mode: AC/ CMV (Assist Control/ Continuous Mandatory Ventilation)  RR (machine): 20  TV (machine): 350  FiO2: 40  PEEP: 5  ITime: 1  MAP: 13  PIP: 36    I&O's Summary    19 Jan 2023 07:01  -  20 Jan 2023 07:00  --------------------------------------------------------  IN: 2926.7 mL / OUT: 1530 mL / NET: 1396.7 mL    20 Jan 2023 07:01  -  20 Jan 2023 13:23  --------------------------------------------------------  IN: 220 mL / OUT: 250 mL / NET: -30 mL          Physical Exam:   GENERAL: NAD, well-groomed, well-developed  HEENT: ROSA/   Atraumatic, Normocephalic  ENMT: No tonsillar erythema, exudates, or enlargement; Moist mucous membranes, Good dentition, No lesions  NECK: Supple, No JVD, Normal thyroid  CHEST/LUNG: Clear to auscultaion intubated  CVS: Regular rate and rhythm; No murmurs, rubs, or gallops  GI: : Soft, Nontender, Nondistended; Bowel sounds present  NERVOUS SYSTEM: sedated and intubated:   EXTREMITIES: - edema  LYMPH: No lymphadenopathy noted  SKIN: No rashes or lesions  ENDOCRINOLOGY: No Thyromegaly  PSYCH: calm     Labs:  ABG - ( 19 Jan 2023 04:10 )  pH, Arterial: 7.32  pH, Blood: x     /  pCO2: 44    /  pO2: 29    / HCO3: 23    / Base Excess: -3.3  /  SaO2: 49.9            30, 30, 40, 30, 30, 7                            9.8    36.18 )-----------( 133      ( 19 Jan 2023 23:41 )             29.5                         9.3    33.62 )-----------( 108      ( 18 Jan 2023 23:46 )             27.3                         10.6   30.63 )-----------( 90       ( 18 Jan 2023 00:38 )             30.3                         10.7   25.36 )-----------( 71       ( 17 Jan 2023 12:16 )             30.4                         9.9    21.30 )-----------( 60       ( 17 Jan 2023 00:23 )             28.0     01-19    137  |  102  |  104<H>  ----------------------------<  169<H>  4.2   |  20<L>  |  2.52<H>  01-18    136  |  105  |  81<H>  ----------------------------<  180<H>  3.9   |  19<L>  |  1.95<H>  01-18    136  |  100  |  81<H>  ----------------------------<  177<H>  3.8   |  25  |  2.16<H>  01-17    136  |  100  |  75<H>  ----------------------------<  108<H>  3.7   |  25  |  2.01<H>  01-17    134<L>  |  99  |  72<H>  ----------------------------<  157<H>  3.7   |  22  |  1.87<H>    Ca    7.6<L>      19 Jan 2023 23:41  Ca    6.0<LL>      18 Jan 2023 23:46  Phos  6.1     01-19  Phos  4.9     01-18  Mg     3.1     01-19  Mg     1.6     01-18    TPro  4.6<L>  /  Alb  1.7<L>  /  TBili  0.5  /  DBili  x   /  AST  47<H>  /  ALT  141<H>  /  AlkPhos  207<H>  01-19  TPro  3.8<L>  /  Alb  1.3<L>  /  TBili  0.6  /  DBili  x   /  AST  54<H>  /  ALT  174<H>  /  AlkPhos  178<H>  01-18  TPro  4.4<L>  /  Alb  1.9<L>  /  TBili  1.1  /  DBili  x   /  AST  123<H>  /  ALT  351<H>  /  AlkPhos  192<H>  01-18  TPro  4.4<L>  /  Alb  2.0<L>  /  TBili  2.4<H>  /  DBili  x   /  AST  163<H>  /  ALT  419<H>  /  AlkPhos  173<H>  01-17  TPro  4.2<L>  /  Alb  1.9<L>  /  TBili  2.6<H>  /  DBili  x   /  AST  180<H>  /  ALT  438<H>  /  AlkPhos  145<H>  01-17    CAPILLARY BLOOD GLUCOSE  181 (20 Jan 2023 05:45)  162 (19 Jan 2023 23:30)      POCT Blood Glucose.: 175 mg/dL (20 Jan 2023 12:48)  POCT Blood Glucose.: 181 mg/dL (20 Jan 2023 05:40)  POCT Blood Glucose.: 162 mg/dL (19 Jan 2023 23:24)  POCT Blood Glucose.: 161 mg/dL (19 Jan 2023 17:07)      LIVER FUNCTIONS - ( 19 Jan 2023 23:41 )  Alb: 1.7 g/dL / Pro: 4.6 g/dL / ALK PHOS: 207 U/L / ALT: 141 U/L / AST: 47 U/L / GGT: x           PT/INR - ( 19 Jan 2023 23:41 )   PT: 14.8 sec;   INR: 1.27 ratio         PTT - ( 19 Jan 2023 23:41 )  PTT:26.4 sec    D-Dimer Assay, Quantitative: 368 ng/mL DDU (01-16 @ 00:36)        RECENT CULTURES:  01-17 @ 19:52 .Blood Blood-Peripheral     rad< from: Xray Chest 1 View- PORTABLE-Urgent (Xray Chest 1 View- PORTABLE-Urgent .) (01.10.23 @ 19:55) >    ACC: [id number] EXAM:  XR CHEST PORTABLE URGENT 1V                          PROCEDURE DATE:  01/10/2023          INTERPRETATION:  CLINICAL INFORMATION: Intubation.    TECHNIQUE: Portable AP radiograph of the chest.    COMPARISON: Chest x-ray 1/8/2023.    FINDINGS: Clear lungs. No pleural effusions or pneumothorax. Cardiac   silhouette is unchanged. No acute osseous pathology.    Endotracheal tube terminates above the jack. Enteric tube projects   below the diaphragm with its distal segment excluded from view.      IMPRESSION:    Clear lungs. Tubes as described.    --- End of Report ---           OLIVA ALAMO MD; Resident Radiologist  This document has been electronically signed.  JAIRO TATUM MD; Attending Radiologist  This document has been electronically signed. Jan 11 2023  8:48AM    < end of copied text >             No growth to date.    01-17 @ 18:53 .Blood Blood-Peripheral                No growth to date.    01-14 @ 07:47 ET Tube ET Tube       Numerous polymorphonuclear leukocytes per low power field  Few Squamous epithelial cells per low power field  Numerous Gram Negative Rods seen per oil power field  Few Gram Positive Rods seen per oil power field  Few Yeast like cells seen per oil power field           Normal Respiratory Janette present          RESPIRATORY CULTURES:          Studies  Chest X-RAY  CT SCAN Chest   Venous Dopplers: LE:   CT Abdomen  Others

## 2023-01-20 NOTE — PROGRESS NOTE ADULT - SUBJECTIVE AND OBJECTIVE BOX
INTERVAL HPI/OVERNIGHT EVENTS:    SUBJECTIVE: Patient seen and examined at bedside.       VITAL SIGNS:  ICU Vital Signs Last 24 Hrs  T(C): 37.5 (20 Jan 2023 03:00), Max: 37.5 (19 Jan 2023 08:00)  T(F): 99.5 (20 Jan 2023 03:00), Max: 99.5 (19 Jan 2023 08:00)  HR: 96 (20 Jan 2023 07:00) (74 - 147)  BP: 116/53 (20 Jan 2023 07:00) (98/50 - 152/67)  BP(mean): 77 (20 Jan 2023 07:00) (64 - 97)  ABP: --  ABP(mean): --  RR: 25 (20 Jan 2023 07:00) (20 - 32)  SpO2: 100% (20 Jan 2023 07:00) (88% - 100%)    O2 Parameters below as of 19 Jan 2023 19:00  Patient On (Oxygen Delivery Method): ventilator,20/350/5    O2 Concentration (%): 40      Mode: AC/ CMV (Assist Control/ Continuous Mandatory Ventilation), RR (machine): 20, TV (machine): 350, FiO2: 40, PEEP: 5, ITime: 1, MAP: 15, PIP: 37  Plateau pressure:   P/F ratio:     01-19 @ 07:01  -  01-20 @ 07:00  --------------------------------------------------------  IN: 2926.7 mL / OUT: 1530 mL / NET: 1396.7 mL      CAPILLARY BLOOD GLUCOSE  181 (20 Jan 2023 05:45)      POCT Blood Glucose.: 181 mg/dL (20 Jan 2023 05:40)    ECG:    PHYSICAL EXAM:    General:   HEENT:   Neck:   Respiratory:   Cardiovascular:   Abdomen:   Extremities:  Neurological:    MEDICATIONS:  MEDICATIONS  (STANDING):  ascorbic acid 500 milliGRAM(s) Oral daily  atorvastatin 40 milliGRAM(s) Oral at bedtime  azithromycin   Tablet 250 milliGRAM(s) Oral <User Schedule>  buMETAnide Infusion 2 mG/Hr (10 mL/Hr) IV Continuous <Continuous>  cefepime   IVPB 500 milliGRAM(s) IV Intermittent every 8 hours  chlorhexidine 0.12% Liquid 15 milliLiter(s) Oral Mucosa every 12 hours  chlorhexidine 4% Liquid 1 Application(s) Topical <User Schedule>  dexMEDEtomidine Infusion 0.2 MICROgram(s)/kG/Hr (1.89 mL/Hr) IV Continuous <Continuous>  dextrose 5%. 1000 milliLiter(s) (50 mL/Hr) IV Continuous <Continuous>  dextrose 5%. 1000 milliLiter(s) (100 mL/Hr) IV Continuous <Continuous>  dextrose 50% Injectable 50 milliLiter(s) IV Push every 15 minutes  dextrose 50% Injectable 50 milliLiter(s) IV Push every 15 minutes  diltiazem Infusion 13 mG/Hr (13 mL/Hr) IV Continuous <Continuous>  glucagon  Injectable 1 milliGRAM(s) IntraMuscular once  heparin   Injectable 5000 Unit(s) SubCutaneous every 12 hours  insulin lispro (ADMELOG) corrective regimen sliding scale   SubCutaneous every 6 hours  insulin NPH human recombinant 5 Unit(s) SubCutaneous every 6 hours  methylPREDNISolone sodium succinate Injectable 10 milliGRAM(s) IV Push daily  metolazone 10 milliGRAM(s) Oral daily  midodrine 10 milliGRAM(s) Oral every 8 hours  multivitamin 1 Tablet(s) Oral daily  pantoprazole  Injectable 40 milliGRAM(s) IV Push two times a day  phenylephrine    Infusion 0.5 MICROgram(s)/kG/Min (7.07 mL/Hr) IV Continuous <Continuous>  senna Syrup 10 milliLiter(s) Oral at bedtime  thiamine 100 milliGRAM(s) Oral daily    MEDICATIONS  (PRN):  acetaminophen    Suspension .. 350 milliGRAM(s) Oral every 6 hours PRN Temp greater or equal to 38C (100.4F), Mild Pain (1 - 3), Moderate Pain (4 - 6)  albuterol/ipratropium for Nebulization 3 milliLiter(s) Nebulizer every 6 hours PRN Respiratory Distress  fentaNYL    Injectable 50 MICROGram(s) IV Push every 4 hours PRN Severe Pain (7 - 10)  polyethylene glycol 3350 17 Gram(s) Oral two times a day PRN Constipation      ALLERGIES:  Allergies    Avelox (Pruritus)  fish (Vomiting)  Levaquin (Unknown)  shellfish (Vomiting)    Intolerances    fentanyl (Other)      LABS:                        9.8    36.18 )-----------( 133      ( 19 Jan 2023 23:41 )             29.5     01-19    137  |  102  |  104<H>  ----------------------------<  169<H>  4.2   |  20<L>  |  2.52<H>    Ca    7.6<L>      19 Jan 2023 23:41  Phos  6.1     01-19  Mg     3.1     01-19    TPro  4.6<L>  /  Alb  1.7<L>  /  TBili  0.5  /  DBili  x   /  AST  47<H>  /  ALT  141<H>  /  AlkPhos  207<H>  01-19    PT/INR - ( 19 Jan 2023 23:41 )   PT: 14.8 sec;   INR: 1.27 ratio         PTT - ( 19 Jan 2023 23:41 )  PTT:26.4 sec      RADIOLOGY & ADDITIONAL TESTS: Reviewed.

## 2023-01-20 NOTE — PROGRESS NOTE ADULT - SUBJECTIVE AND OBJECTIVE BOX
[facility] DIVISION OF KIDNEY DISEASES AND HYPERTENSION -- FOLLOW UP NOTE  --------------------------------------------------------------------------------  Pt is a 77 year old female with PMH of DM, HTN, HLD, Asthma, and Afib who presented to the hospital with shortness of breath and noted to be in afib with RVR. The patient was emergently intubated for acute hypoxic respiratory failure and admitted to the MICU. The patient has failed multiple attempts at extubation. The nephrology team was consulted for SHANE, On review of [facility] pt noted to have a SCr of 0.64 on admission 1/8, which increased to 2.58 on 1/11, and since then has improved but remains elevated 2.52 this morning.    24 hour events/subjective:  Pt seen and examined this morning in the MICU. Pt remains intubated and on sedation. Unable to obtain ROS.         PAST HISTORY  --------------------------------------------------------------------------------  No significant changes to PMH, PSH, FHx, SHx, unless otherwise noted    ALLERGIES & MEDICATIONS  --------------------------------------------------------------------------------  Allergies    Avelox (Pruritus)  fish (Vomiting)  Levaquin (Unknown)  shellfish (Vomiting)    Intolerances    fentanyl (Other)    Standing Inpatient Medications  ascorbic acid 500 milliGRAM(s) Oral daily  atorvastatin 40 milliGRAM(s) Oral at bedtime  azithromycin   Tablet 250 milliGRAM(s) Oral <User Schedule>  buMETAnide Infusion 2 mG/Hr IV Continuous <Continuous>  cefepime   IVPB 500 milliGRAM(s) IV Intermittent every 8 hours  chlorhexidine 0.12% Liquid 15 milliLiter(s) Oral Mucosa every 12 hours  chlorhexidine 4% Liquid 1 Application(s) Topical <User Schedule>  dexMEDEtomidine Infusion 0.2 MICROgram(s)/kG/Hr IV Continuous <Continuous>  dextrose 5%. 1000 milliLiter(s) IV Continuous <Continuous>  dextrose 5%. 1000 milliLiter(s) IV Continuous <Continuous>  dextrose 50% Injectable 50 milliLiter(s) IV Push every 15 minutes  dextrose 50% Injectable 50 milliLiter(s) IV Push every 15 minutes  diltiazem Infusion 13 mG/Hr IV Continuous <Continuous>  glucagon  Injectable 1 milliGRAM(s) IntraMuscular once  heparin   Injectable 5000 Unit(s) SubCutaneous every 12 hours  insulin lispro (ADMELOG) corrective regimen sliding scale   SubCutaneous every 6 hours  insulin NPH human recombinant 5 Unit(s) SubCutaneous every 6 hours  methylPREDNISolone sodium succinate Injectable 10 milliGRAM(s) IV Push daily  metolazone 10 milliGRAM(s) Oral daily  midodrine 10 milliGRAM(s) Oral every 8 hours  multivitamin 1 Tablet(s) Oral daily  pantoprazole  Injectable 40 milliGRAM(s) IV Push two times a day  phenylephrine    Infusion 0.5 MICROgram(s)/kG/Min IV Continuous <Continuous>  senna Syrup 10 milliLiter(s) Oral at bedtime  thiamine 100 milliGRAM(s) Oral daily    PRN Inpatient Medications  acetaminophen    Suspension .. 350 milliGRAM(s) Oral every 6 hours PRN  albuterol/ipratropium for Nebulization 3 milliLiter(s) Nebulizer every 6 hours PRN  fentaNYL    Injectable 50 MICROGram(s) IV Push every 4 hours PRN  polyethylene glycol 3350 17 Gram(s) Oral two times a day PRN      REVIEW OF SYSTEMS  Unable to obtain    VITALS/PHYSICAL EXAM  --------------------------------------------------------------------------------  T(C): 37.5 (01-20-23 @ 03:00), Max: 37.6 (01-19-23 @ 07:00)  HR: 79 (01-20-23 @ 06:15) (74 - 147)  BP: 109/51 (01-20-23 @ 06:15) (98/50 - 152/67)  RR: 22 (01-20-23 @ 06:15) (20 - 32)  SpO2: 100% (01-20-23 @ 06:15) (88% - 100%)  Wt(kg): --        01-18-23 @ 07:01  -  01-19-23 @ 07:00  --------------------------------------------------------  IN: 2562.6 mL / OUT: 1490 mL / NET: 1072.6 mL    01-19-23 @ 07:01  -  01-20-23 @ 06:23  --------------------------------------------------------  IN: 2892.6 mL / OUT: 1410 mL / NET: 1482.6 mL        Physical Exam:  	Gen: ill appearing  	HEENT: ETT  	Pulm: decreased breath sounds  	CV: S1S2  	Abd: Soft, +BS   	Ext: ++ LE edema B/L  	Neuro: sedated  	Skin: Warm and dry      LABS/STUDIES  --------------------------------------------------------------------------------              9.8    36.18 >-----------<  133      [01-19-23 @ 23:41]              29.5     137  |  102  |  104  ----------------------------<  169      [01-19-23 @ 23:41]  4.2   |  20  |  2.52        Ca     7.6     [01-19-23 @ 23:41]      Mg     3.1     [01-19-23 @ 23:41]      Phos  6.1     [01-19-23 @ 23:41]    TPro  4.6  /  Alb  1.7  /  TBili  0.5  /  DBili  x   /  AST  47  /  ALT  141  /  AlkPhos  207  [01-19-23 @ 23:41]    PT/INR: PT 14.8 , INR 1.27       [01-19-23 @ 23:41]  PTT: 26.4       [01-19-23 @ 23:41]      Creatinine Trend:  SCr 2.52 [01-19 @ 23:41]  SCr 1.95 [01-18 @ 23:46]  SCr 2.16 [01-18 @ 00:38]  SCr 2.01 [01-17 @ 12:16]  SCr 1.87 [01-17 @ 00:22]    Urinalysis - [01-08-23 @ 14:26]      Color Light Yellow / Appearance Slightly Turbid / SG 1.015 / pH 7.0      Gluc 1000 mg/dL / Ketone Small  / Bili Negative / Urobili Negative       Blood Trace / Protein 30 mg/dL / Leuk Est Large / Nitrite Negative      RBC 6 / WBC 53 / Hyaline 2 / Gran  / Sq Epi  / Non Sq Epi 3 / Bacteria Negative    Urine Creatinine 29      [01-14-23 @ 18:07]  Urine Protein 46      [01-14-23 @ 18:07]  Urine Sodium 74      [01-14-23 @ 18:09]  Urine Urea Nitrogen 455      [01-14-23 @ 18:07]  Urine Potassium 39      [01-14-23 @ 18:09]  Urine Osmolality 393      [01-14-23 @ 18:07]    HbA1c 6.6      [02-02-19 @ 10:59]  TSH 1.17      [01-09-23 @ 06:11]

## 2023-01-20 NOTE — PROGRESS NOTE ADULT - SUBJECTIVE AND OBJECTIVE BOX
CARDIOLOGY     PROGRESS  NOTE   ________________________________________________    CHIEF COMPLAINT:Patient is a 77y old  Female who presents with a chief complaint of sob/ rapid a.fib (20 Jan 2023 07:20)  sedated on vent  	  REVIEW OF SYSTEMS:  CONSTITUTIONAL: No fever, weight loss, or fatigue  EYES: No eye pain, visual disturbances, or discharge  ENT:  No difficulty hearing, tinnitus, vertigo; No sinus or throat pain  NECK: No pain or stiffness  RESPIRATORY: No cough, wheezing, chills or hemoptysis; No Shortness of Breath  CARDIOVASCULAR: No chest pain, palpitations, passing out, dizziness, or leg swelling  GASTROINTESTINAL: No abdominal or epigastric pain. No nausea, vomiting, or hematemesis; No diarrhea or constipation. No melena or hematochezia.  GENITOURINARY: No dysuria, frequency, hematuria, or incontinence  NEUROLOGICAL: No headaches, memory loss, loss of strength, numbness, or tremors  SKIN: No itching, burning, rashes, or lesions   LYMPH Nodes: No enlarged glands  ENDOCRINE: No heat or cold intolerance; No hair loss  MUSCULOSKELETAL: No joint pain or swelling; No muscle, back, or extremity pain  PSYCHIATRIC: No depression, anxiety, mood swings, or difficulty sleeping  HEME/LYMPH: No easy bruising, or bleeding gums  ALLERGY AND IMMUNOLOGIC: No hives or eczema	    [ ] All others negative	  [ x] Unable to obtain    PHYSICAL EXAM:  T(C): 37.2 (01-20-23 @ 08:00), Max: 37.5 (01-19-23 @ 23:00)  HR: 72 (01-20-23 @ 10:15) (72 - 147)  BP: 107/50 (01-20-23 @ 10:15) (92/46 - 152/67)  RR: 22 (01-20-23 @ 10:15) (20 - 35)  SpO2: 100% (01-20-23 @ 10:15) (88% - 100%)  Wt(kg): --  I&O's Summary    19 Jan 2023 07:01  -  20 Jan 2023 07:00  --------------------------------------------------------  IN: 2926.7 mL / OUT: 1530 mL / NET: 1396.7 mL    20 Jan 2023 07:01  -  20 Jan 2023 11:02  --------------------------------------------------------  IN: 220 mL / OUT: 250 mL / NET: -30 mL        Appearance: Normal	  HEENT:   Normal oral mucosa, PERRL, EOMI	  Lymphatic: No lymphadenopathy  Cardiovascular: Normal S1 S2, No JVD, + murmurs, No edema  Respiratory: rhonchi/ vent  Gastrointestinal:  Soft, Non-tender, + BS	  Skin: No rashes, No ecchymoses, No cyanosis	  Neurologic: Non-focal  Extremities: Normal range of motion, No clubbing, cyanosis or edema  Vascular: Peripheral pulses palpable 2+ bilaterally    MEDICATIONS  (STANDING):  albumin human 25% IVPB 50 milliLiter(s) IV Intermittent every 4 hours  ascorbic acid 500 milliGRAM(s) Oral daily  atorvastatin 40 milliGRAM(s) Oral at bedtime  azithromycin   Tablet 250 milliGRAM(s) Oral <User Schedule>  buMETAnide Infusion 3 mG/Hr (15 mL/Hr) IV Continuous <Continuous>  cefepime   IVPB 500 milliGRAM(s) IV Intermittent every 8 hours  chlorhexidine 0.12% Liquid 15 milliLiter(s) Oral Mucosa every 12 hours  chlorhexidine 4% Liquid 1 Application(s) Topical <User Schedule>  dexMEDEtomidine Infusion 0.2 MICROgram(s)/kG/Hr (1.89 mL/Hr) IV Continuous <Continuous>  dextrose 5%. 1000 milliLiter(s) (50 mL/Hr) IV Continuous <Continuous>  dextrose 5%. 1000 milliLiter(s) (100 mL/Hr) IV Continuous <Continuous>  dextrose 50% Injectable 50 milliLiter(s) IV Push every 15 minutes  dextrose 50% Injectable 50 milliLiter(s) IV Push every 15 minutes  diltiazem Infusion 13 mG/Hr (13 mL/Hr) IV Continuous <Continuous>  glucagon  Injectable 1 milliGRAM(s) IntraMuscular once  heparin   Injectable 5000 Unit(s) SubCutaneous every 12 hours  insulin lispro (ADMELOG) corrective regimen sliding scale   SubCutaneous every 6 hours  insulin NPH human recombinant 5 Unit(s) SubCutaneous every 6 hours  methylPREDNISolone sodium succinate Injectable 10 milliGRAM(s) IV Push daily  metolazone 10 milliGRAM(s) Oral <User Schedule>  midodrine 20 milliGRAM(s) Oral every 8 hours  multivitamin 1 Tablet(s) Oral daily  pantoprazole  Injectable 40 milliGRAM(s) IV Push two times a day  phenylephrine    Infusion 0.5 MICROgram(s)/kG/Min (7.07 mL/Hr) IV Continuous <Continuous>  propofol Infusion 10 MICROgram(s)/kG/Min (2.26 mL/Hr) IV Continuous <Continuous>  senna Syrup 10 milliLiter(s) Oral at bedtime  thiamine 100 milliGRAM(s) Oral daily      TELEMETRY: 	    ECG:  	  RADIOLOGY:  OTHER: 	  	  LABS:	 	    CARDIAC MARKERS:                                9.8    36.18 )-----------( 133      ( 19 Jan 2023 23:41 )             29.5     01-19    137  |  102  |  104<H>  ----------------------------<  169<H>  4.2   |  20<L>  |  2.52<H>    Ca    7.6<L>      19 Jan 2023 23:41  Phos  6.1     01-19  Mg     3.1     01-19    TPro  4.6<L>  /  Alb  1.7<L>  /  TBili  0.5  /  DBili  x   /  AST  47<H>  /  ALT  141<H>  /  AlkPhos  207<H>  01-19    proBNP:   Lipid Profile:   HgA1c:   TSH: Thyroid Stimulating Hormone, Serum: 1.17 uIU/mL (01-09 @ 06:11)    PT/INR - ( 19 Jan 2023 23:41 )   PT: 14.8 sec;   INR: 1.27 ratio         PTT - ( 19 Jan 2023 23:41 )  PTT:26.4 sec      Assessment and plan  ---------------------------  77-year-old female with past medical history of DM, HTN, HLD, asthma on daily steroids, A. fib on Eliquis and digoxin presenting with shortness of breath.  Patient unable to provide history.  Daughter at bedside reports patient has had increasing shortness of breath, wheezing, cough for the past week now.  Patient also with increasing generalized weakness, fatigue and decreased p.o. intake.  Denies fever/chills, chest pain, abdominal pain, nausea, vomiting, diarrhea, dysuria.  Family reports patient had a similar episode 1 year ago when she was admitted to [facility] with UTI.  pt with sig medical hx, chronic a.fib with sob ?sec sepsis/ pneumoniae, UTI  sedated on vent  continue abx and steroid  echo noted  AC held sec to bleeding  still sedated on vent   continue abx  hr is well controlled still on Cardizem drip  continue pressor keep MAP>65  sepsis on iv abx  fu renal function closely  a.fib hr is well controlled on Cardizem 5 mg/ hr will increase drip as needed  on Bumex for diuresis  nutrition  micu care/ vent  discussed with family prognosis guarded

## 2023-01-20 NOTE — PROGRESS NOTE ADULT - PROBLEM SELECTOR PLAN 1
Pt. with SHANE suspected in setting of hemodynamics with notable hypotension throughout the hospital course requiring IV vasopressors. [facility] noted to have a SCr of 0.64 on admission 1/8, which increased to 2.58 on 1/11, and since then has improved but remains elevated/stable at 1.95 this morning. UA and urine lytes reviewed. Spot urine TP/CR ratio elevated to 1.6. Pt likely with ATN. Pt currently non-oliguric, currently on IV bumex infusion; UOP noted to be ~1.4L over the past 24 hours, however remains net positive fluid balance. Pt remains clinically volume overloaded. If possible please reduce amount inf intake in multiple infusions perhaps by double concentrating. Agree with augmenting diuresis with Metolazone. No acute indication for RRT at this time, however if kidney function worsens then we will need to consider. Monitor labs and urine output. Avoid nephrotoxins. Dose medications as per eGFR.    If any questions, please feel free to contact me     Tex Lynch  Nephrology Fellow  [facility] Pager: [phone].

## 2023-01-20 NOTE — CHART NOTE - NSCHARTNOTEFT_GEN_A_CORE
Patient seen and examined at bedside. Patient remains intubated/sedated. Patient appears tachypneic/dyspneic with facial grimacing. Daughter Shyann at bedside, no questions at this time. Said father is not ready to make decision yet regarding GOC. Support provided. Will continue to follow for GOC.

## 2023-01-20 NOTE — PROGRESS NOTE ADULT - ASSESSMENT
77-year-old female with past medical history of DM, HTN, HLD, asthma on daily steroids, A. fib on Eliquis and digoxin presenting with shortness of breath.  Patient unable to provide history.  Daughter at bedside reports patient has had increasing shortness of breath, wheezing, cough for the past week now.  Patient also with increasing generalized weakness, fatigue and decreased p.o. intake.  Denies fever/chills, chest pain, abdominal pain, nausea, vomiting, diarrhea, dysuria.  Family reports patient had a similar episode 1 year ago when she was admitted to [facility] with UTI.  pt with sig medical hx, chronic a.fib with sob ?sec sepsis/ pneumoniae, UTI  start on ceftriaxone  tele  for increase hr, will increase Cardizem dose  continue AC  cardiac enzyme  tsh  echo  RVP negative      ASTHMA/ COPD exacerbation;   A Fibrillation  with rvr:  DM:  HTN:   HLD:     1/09:    ASTHMA/ COPD exacerbation; she was using performist at home with 5 mg of prednisone  not sure why she was not on any other meds:  sill start Symbicort too:  along with BD and is already on cefepime:  though pneumonia to me is not very convincing : will probably need ct scan chest   A Fibrillation  with rvr: Hr still elevated:  on cardizem : would defer to cards:  on ac:  lovenox  + coumadin : check echo   DM:: monitor and control   HTN: : controlled  HLD: on statins  :    dw team    1/10:    ASTHMA/ COPD exacerbation; she was using performist at home with 5 mg of prednisone  not sure why she was not on any other meds:  sill start Symbicort too:  along with BD and is already on cefepime:  WBC increased : though pneumonia to me is not very convincing : will probably need ct scan chest : she rused for ct chest : increase steorids 40 mg Q 6 hours : ABG today seems reasonable   A Fibrillation  with rvr: Hr still elevated:  on cardizem : HR is till very high  :   DM:: monitor and control   HTN: : controlled  HLD: on statins:    MICU  consult:  high risk for intubation:   :    edy and daughter    1/11:    ASTHMA/ COPD exacerbation; now s/p bronch: RML lavage done:  wait cultures results: she was using performist at home with 5 mg of prednisone  not sure why she was not on any other meds:  Cont BD:  and antibiotics  WBC still increased :on cefepime:    A Fibrillation  with rvr: Hr still elevated:  on cardizem : HR is better now:  on cardiem   DM:: monitor and control   HTN: : controlled  HLD: on statins:    josias micu  ATTENDING      1/12:    ASTHMA/ COPD exacerbation; now s/p bronch: RML lavage done:  negative  cultures so far: : she was using performist at home with 5 mg of prednisone  not sure why she was not on any other meds:  Cont BD:  and antibiotics  WBC still increased: but little down today  :on cefepime:    A Fibrillation  with rvr: Hr still elevated:  on cardizem : HR is better now:  on cardiem drip   DM:: monitor and control   HTN: : controlled  HLD: on statins:    josias micu  team    1/13:       ASTHMA/ COPD exacerbation; now s/p bronch: RML lavage done:  negative  cultures so far: :  cont full vent support:  management  of vent per MICU : weaning as perprotocol  ;  on zothromax and cefepime : off steroids   A Fibrillation  with rvr: Hr still elevated:  on Cardizem : HR is better now:  on cardiem drip   DM:: monitor and control   HTN: : controlled  HLD: on statins:    josias micu  team   :  1/14:       ASTHMA/ COPD exacerbation; now s/p bronch: RML lavage done:  Pseudomonas:  on cefepime:  : :  cont full vent support:  management  of vent per MICU : weaning as pe rprotocol  ;  on 20 mg of steroids today   A Fibrillation  with rvr: Hr still elevated:  on Cardizem : HR is better now:  on cardiem drip : Blood pressure is stable:   DM:: monitor and control   HTN: : controlled  HLD: on statins:    josias micu  Attending    1/16:    ASTHMA/ COPD exacerbation; now s/p bronch: RML lavage done:  Pseudomonas:  on cefepime:  and azithromycin for anti inflammatory effects:  :  still on full vent support:  has severe copd:   A Fibrillation  with rvr: Hr still elevated:  on Cardizem : HR is better now:  on Cardizem drip : Blood pressure is stable:   Thrombocytopenia:  ? etiology : check for robbi   DM:: monitor and control   HTN: : controlled  HLD: on statins:   overall pt is still critically ill:    josias micu  Attending today      1/17:  ASTHMA/ COPD exacerbation; now s/p bronch: RML lavage done:  Pseudomonas:  on cefepime:  and azithromycin for anti inflammatory effects:  :  still on full vent support:  has severe copd: same rx today too :  A Fibrillation  with rvr: Hr still elevated:  on Cardizem : HR is better now:  on Cardizem drip : Blood pressure is stable:   Thrombocytopenia:  ? etiology : check for robbi  : still low:  defer to MICU team   DM:: monitor and control   HTN: : controlled  HLD: on statins:   overall pt is still critically ill:    dw micu  Attending    1/18/2023    ASTHMA/ COPD exacerbation; now s/p bronch: RML lavage done:  Pseudomonas:  on cefepime:  and azithromycin for anti inflammatory effects:  :  still on full vent support:  has severe copd: same and is on solumedrol : 20 mg a day : defer to primary team   A Fibrillation  with rvr: Hr still elevated:  on Cardizem : HR is better now:  on Cardizem drip : Blood pressure is stable:   Thrombocytopenia:  ? etiology :plts are increasing:   DM:: monitor and control   HTN: : controlled  HLD: on statins:   overall pt is still critically ill:    dw micu  team     1/19:    ASTHMA/ COPD exacerbation; now s/p bronch: RML lavage done:  Pseudomonas:  on cefepime:  and azithromycin for anti inflammatory effects:  :  still on full vent support:  has severe copd: same and is on solumedrol : 10 mg a day : she has not shira wheezing : peak not high   A Fibrillation  with rvr: Hr still elevated:  on Cardizem : HR is better now:  on Cardizem drip : Blood pressure is stable on vasopressors  Thrombocytopenia:  ? etiology :plts are increasing: and further increased today :   Leucocytosis:  further increased and is on antibiotics  DM:: monitor and control   HTN: : controlled  HLD: on statins:   overall pt is still critically ill:  intubated  ?: sedated and is on vasopressors   dw micu  team       1/20:    ASTHMA/ COPD exacerbation; now s/p bronch: RML lavage done:  Pseudomonas:  on cefepime:  and azithromycin for anti inflammatory effects:  :  still on full vent support:  has severe copd: same and is on solumedrol : 10 mg a day : cont current rx:   A Fibrillation  with rvr: Hr still elevated:  on Cardizem : HR is better now:  on Cardizem drip : Blood pressure is stable on vasopressors  Thrombocytopenia:  ? etiology :plts are increasing: and further increased today : and much better  now:   Leucocytosis:  further increased and is on antibiotics: defer to primary t eam  DM:: monitor and control   HTN: : controlled  HLD: on statins:   overall pt is still critically ill:  intubated  ?: sedated and is on vasopressors   dw micu  team

## 2023-01-21 NOTE — PROGRESS NOTE ADULT - ASSESSMENT
77-year-old female with past medical history of DM, HTN, HLD, asthma on daily steroids, A. fib on Eliquis and digoxin presenting with shortness of breath, found to be in Afib w/RVR. Pt admitted to MICU for Acute Hypoxic Respiratory Failure in s/o Asthma and Bronchiectasis w/ possible PNA, leading to intubation and sedation. GI consulted for episode of hematochezia.    #Chronic anemia in the setting of coffee ground emesis  Hgb dropped from 13>6 since admission on initial GI consult, when she had hematochezia. it was thought to be either hemorrhoidal or diverticular bleed. Opted to hold off on intervention as the patient's hgb stabilized and she was critically ill.   - Now developed 300 cc of coffee ground emesis this morning, however hgb has been stable around 8.2. Differentials include likely PUD, severe hemorrhagic gastritis/esophagitis, possible malignancy etc. In the setting of previous Eliquis and steroids use, PUD is high on the differentials.     #Hypoxic respiratory failure 2/2 asthma vs bronchiectasis vs pseudomonas PNA. Intubated/sedated  - on solumedrol and cefepime.     #Supratherapeutic INR - resolved.   on admission INR 11     Recommendations:   - Discussed w/ the family, due to stable hgb levels, would hold off on GI intervention at this time   - Place her on PPI drip and continue w/ sucralfate 1 gram Q6 hours.   - trend CBC and transfuse for Hgb<7  - Rest of the care, per primary team.    GI will continue to follow.     All recommendations are tentative until note is attested by an attending.     Regina Robles, PGY-4  Gastroenterology/Hepatology Fellow  Available on [employer] Teams  [phone] (Short Range Pager)  [phone] (Long Range Pager)    After 5pm, please contact the on-call GI fellow. [phone]

## 2023-01-21 NOTE — PROGRESS NOTE ADULT - SUBJECTIVE AND OBJECTIVE BOX
INTERVAL HPI/OVERNIGHT EVENTS:    SUBJECTIVE: Patient seen and examined at bedside.       VITAL SIGNS:  ICU Vital Signs Last 24 Hrs  T(C): 36.4 (21 Jan 2023 03:00), Max: 37.2 (20 Jan 2023 08:00)  T(F): 97.5 (21 Jan 2023 03:00), Max: 99 (20 Jan 2023 08:00)  HR: 79 (21 Jan 2023 06:45) (61 - 127)  BP: 105/49 (21 Jan 2023 06:45) (92/46 - 137/62)  BP(mean): 71 (21 Jan 2023 06:45) (65 - 90)  ABP: --  ABP(mean): --  RR: 24 (21 Jan 2023 06:45) (2 - 35)  SpO2: 100% (21 Jan 2023 06:45) (97% - 100%)    O2 Parameters below as of 20 Jan 2023 19:00  Patient On (Oxygen Delivery Method): ventilator,20/350/5    O2 Concentration (%): 40      Mode: AC/ CMV (Assist Control/ Continuous Mandatory Ventilation), RR (machine): 20, TV (machine): 350, FiO2: 40, PEEP: 5, ITime: 1, MAP: 13, PIP: 35  Plateau pressure:   P/F ratio:     01-20 @ 07:01  -  01-21 @ 07:00  --------------------------------------------------------  IN: 2676.2 mL / OUT: 1310 mL / NET: 1366.2 mL      CAPILLARY BLOOD GLUCOSE  202 (21 Jan 2023 05:45)      POCT Blood Glucose.: 202 mg/dL (21 Jan 2023 05:47)    ECG:    PHYSICAL EXAM:    General:   HEENT:   Neck:   Respiratory:   Cardiovascular:   Abdomen:   Extremities:  Neurological:    MEDICATIONS:  MEDICATIONS  (STANDING):  ascorbic acid 500 milliGRAM(s) Oral daily  atorvastatin 40 milliGRAM(s) Oral at bedtime  azithromycin   Tablet 250 milliGRAM(s) Oral <User Schedule>  bacitracin   Ointment 1 Application(s) Topical daily  buMETAnide Infusion 3 mG/Hr (15 mL/Hr) IV Continuous <Continuous>  cefepime   IVPB 500 milliGRAM(s) IV Intermittent every 8 hours  chlorhexidine 0.12% Liquid 15 milliLiter(s) Oral Mucosa every 12 hours  chlorhexidine 4% Liquid 1 Application(s) Topical <User Schedule>  dexMEDEtomidine Infusion 0.2 MICROgram(s)/kG/Hr (1.89 mL/Hr) IV Continuous <Continuous>  dextrose 5%. 1000 milliLiter(s) (50 mL/Hr) IV Continuous <Continuous>  dextrose 5%. 1000 milliLiter(s) (100 mL/Hr) IV Continuous <Continuous>  dextrose 50% Injectable 50 milliLiter(s) IV Push every 15 minutes  dextrose 50% Injectable 50 milliLiter(s) IV Push every 15 minutes  diltiazem    Tablet 60 milliGRAM(s) Enteral Tube every 6 hours  glucagon  Injectable 1 milliGRAM(s) IntraMuscular once  heparin   Injectable 5000 Unit(s) SubCutaneous every 12 hours  insulin lispro (ADMELOG) corrective regimen sliding scale   SubCutaneous every 6 hours  insulin NPH human recombinant 5 Unit(s) SubCutaneous every 6 hours  methylPREDNISolone sodium succinate Injectable 10 milliGRAM(s) IV Push daily  metolazone 10 milliGRAM(s) Oral <User Schedule>  midodrine 20 milliGRAM(s) Oral every 8 hours  multivitamin 1 Tablet(s) Oral daily  pantoprazole  Injectable 40 milliGRAM(s) IV Push two times a day  phenylephrine    Infusion 0.5 MICROgram(s)/kG/Min (7.07 mL/Hr) IV Continuous <Continuous>  propofol Infusion 10 MICROgram(s)/kG/Min (2.26 mL/Hr) IV Continuous <Continuous>  senna Syrup 10 milliLiter(s) Oral at bedtime  thiamine 100 milliGRAM(s) Oral daily    MEDICATIONS  (PRN):  acetaminophen    Suspension .. 350 milliGRAM(s) Oral every 6 hours PRN Temp greater or equal to 38C (100.4F), Mild Pain (1 - 3), Moderate Pain (4 - 6)  albuterol/ipratropium for Nebulization 3 milliLiter(s) Nebulizer every 6 hours PRN Respiratory Distress  fentaNYL    Injectable 50 MICROGram(s) IV Push every 4 hours PRN Severe Pain (7 - 10)  polyethylene glycol 3350 17 Gram(s) Oral two times a day PRN Constipation      ALLERGIES:  Allergies    Avelox (Pruritus)  fish (Vomiting)  Levaquin (Unknown)  shellfish (Vomiting)    Intolerances    fentanyl (Other)      LABS:                        8.3    24.24 )-----------( 99       ( 20 Jan 2023 23:43 )             24.8     01-20    136  |  101  |  111<H>  ----------------------------<  229<H>  4.3   |  19<L>  |  2.52<H>    Ca    7.6<L>      20 Jan 2023 23:43  Phos  6.3     01-20  Mg     1.8     01-20    TPro  4.5<L>  /  Alb  2.4<L>  /  TBili  0.5  /  DBili  x   /  AST  30  /  ALT  74<H>  /  AlkPhos  154<H>  01-20    PT/INR - ( 20 Jan 2023 23:43 )   PT: 15.2 sec;   INR: 1.32 ratio         PTT - ( 20 Jan 2023 23:43 )  PTT:29.2 sec      RADIOLOGY & ADDITIONAL TESTS: Reviewed.   INTERVAL HPI/OVERNIGHT EVENTS:    SUBJECTIVE: Patient seen and examined at bedside.     patient on precedex, propofol, bumex gtt, phenylephrine  opens eyes to pain  failed sbp trial today   1L urine retention, andrade replaced with improved urine output     VITAL SIGNS:  ICU Vital Signs Last 24 Hrs  T(C): 36.4 (21 Jan 2023 03:00), Max: 37.2 (20 Jan 2023 08:00)  T(F): 97.5 (21 Jan 2023 03:00), Max: 99 (20 Jan 2023 08:00)  HR: 79 (21 Jan 2023 06:45) (61 - 127)  BP: 105/49 (21 Jan 2023 06:45) (92/46 - 137/62)  BP(mean): 71 (21 Jan 2023 06:45) (65 - 90)  ABP: --  ABP(mean): --  RR: 24 (21 Jan 2023 06:45) (2 - 35)  SpO2: 100% (21 Jan 2023 06:45) (97% - 100%)    O2 Parameters below as of 20 Jan 2023 19:00  Patient On (Oxygen Delivery Method): ventilator,20/350/5    O2 Concentration (%): 40      Mode: AC/ CMV (Assist Control/ Continuous Mandatory Ventilation), RR (machine): 20, TV (machine): 350, FiO2: 40, PEEP: 5, ITime: 1, MAP: 13, PIP: 35  Plateau pressure:   P/F ratio:     01-20 @ 07:01  -  01-21 @ 07:00  --------------------------------------------------------  IN: 2676.2 mL / OUT: 1310 mL / NET: 1366.2 mL      CAPILLARY BLOOD GLUCOSE  202 (21 Jan 2023 05:45)      POCT Blood Glucose.: 202 mg/dL (21 Jan 2023 05:47)    ECG:    PHYSICAL EXAM:    General: sedated, intubated on pressors  Respiratory: b/l ronchi  Cardiovascular: irregular rate   Abdomen: firm to palpation  Extremities:  Neurological:    MEDICATIONS:  MEDICATIONS  (STANDING):  ascorbic acid 500 milliGRAM(s) Oral daily  atorvastatin 40 milliGRAM(s) Oral at bedtime  azithromycin   Tablet 250 milliGRAM(s) Oral <User Schedule>  bacitracin   Ointment 1 Application(s) Topical daily  buMETAnide Infusion 3 mG/Hr (15 mL/Hr) IV Continuous <Continuous>  cefepime   IVPB 500 milliGRAM(s) IV Intermittent every 8 hours  chlorhexidine 0.12% Liquid 15 milliLiter(s) Oral Mucosa every 12 hours  chlorhexidine 4% Liquid 1 Application(s) Topical <User Schedule>  dexMEDEtomidine Infusion 0.2 MICROgram(s)/kG/Hr (1.89 mL/Hr) IV Continuous <Continuous>  dextrose 5%. 1000 milliLiter(s) (50 mL/Hr) IV Continuous <Continuous>  dextrose 5%. 1000 milliLiter(s) (100 mL/Hr) IV Continuous <Continuous>  dextrose 50% Injectable 50 milliLiter(s) IV Push every 15 minutes  dextrose 50% Injectable 50 milliLiter(s) IV Push every 15 minutes  diltiazem    Tablet 60 milliGRAM(s) Enteral Tube every 6 hours  glucagon  Injectable 1 milliGRAM(s) IntraMuscular once  heparin   Injectable 5000 Unit(s) SubCutaneous every 12 hours  insulin lispro (ADMELOG) corrective regimen sliding scale   SubCutaneous every 6 hours  insulin NPH human recombinant 5 Unit(s) SubCutaneous every 6 hours  methylPREDNISolone sodium succinate Injectable 10 milliGRAM(s) IV Push daily  metolazone 10 milliGRAM(s) Oral <User Schedule>  midodrine 20 milliGRAM(s) Oral every 8 hours  multivitamin 1 Tablet(s) Oral daily  pantoprazole  Injectable 40 milliGRAM(s) IV Push two times a day  phenylephrine    Infusion 0.5 MICROgram(s)/kG/Min (7.07 mL/Hr) IV Continuous <Continuous>  propofol Infusion 10 MICROgram(s)/kG/Min (2.26 mL/Hr) IV Continuous <Continuous>  senna Syrup 10 milliLiter(s) Oral at bedtime  thiamine 100 milliGRAM(s) Oral daily    MEDICATIONS  (PRN):  acetaminophen    Suspension .. 350 milliGRAM(s) Oral every 6 hours PRN Temp greater or equal to 38C (100.4F), Mild Pain (1 - 3), Moderate Pain (4 - 6)  albuterol/ipratropium for Nebulization 3 milliLiter(s) Nebulizer every 6 hours PRN Respiratory Distress  fentaNYL    Injectable 50 MICROGram(s) IV Push every 4 hours PRN Severe Pain (7 - 10)  polyethylene glycol 3350 17 Gram(s) Oral two times a day PRN Constipation      ALLERGIES:  Allergies    Avelox (Pruritus)  fish (Vomiting)  Levaquin (Unknown)  shellfish (Vomiting)    Intolerances    fentanyl (Other)      LABS:                        8.3    24.24 )-----------( 99       ( 20 Jan 2023 23:43 )             24.8     01-20    136  |  101  |  111<H>  ----------------------------<  229<H>  4.3   |  19<L>  |  2.52<H>    Ca    7.6<L>      20 Jan 2023 23:43  Phos  6.3     01-20  Mg     1.8     01-20    TPro  4.5<L>  /  Alb  2.4<L>  /  TBili  0.5  /  DBili  x   /  AST  30  /  ALT  74<H>  /  AlkPhos  154<H>  01-20    PT/INR - ( 20 Jan 2023 23:43 )   PT: 15.2 sec;   INR: 1.32 ratio         PTT - ( 20 Jan 2023 23:43 )  PTT:29.2 sec      RADIOLOGY & ADDITIONAL TESTS: Reviewed.

## 2023-01-21 NOTE — PROGRESS NOTE ADULT - SUBJECTIVE AND OBJECTIVE BOX
[facility] DIVISION OF KIDNEY DISEASES AND HYPERTENSION -- FOLLOW UP NOTE  --------------------------------------------------------------------------------  Pt is a 77 year old female with PMH of DM, HTN, HLD, Asthma, and Afib who presented to the hospital with shortness of breath and noted to be in afib with RVR. The patient was emergently intubated for acute hypoxic respiratory failure and admitted to the MICU. The patient has failed multiple attempts at extubation. The nephrology team was consulted for SHANE, On review of [facility] pt noted to have a SCr of 0.64 on admission 1/8, which increased to 2.58 on 1/11, and since then has improved but remains elevated 2.52 this morning.    24 hour events/subjective:  Pt seen and examined this morning in the MICU. Pt remains intubated and on sedation. Unable to obtain ROS.         PAST HISTORY  --------------------------------------------------------------------------------  No significant changes to PMH, PSH, FHx, SHx, unless otherwise noted    ALLERGIES & MEDICATIONS  --------------------------------------------------------------------------------  Allergies    Avelox (Pruritus)  fish (Vomiting)  Levaquin (Unknown)  shellfish (Vomiting)    Intolerances    fentanyl (Other)    Standing Inpatient Medications  ascorbic acid 500 milliGRAM(s) Oral daily  atorvastatin 40 milliGRAM(s) Oral at bedtime  azithromycin   Tablet 250 milliGRAM(s) Oral <User Schedule>  bacitracin   Ointment 1 Application(s) Topical daily  buMETAnide Infusion 3 mG/Hr IV Continuous <Continuous>  cefepime   IVPB 500 milliGRAM(s) IV Intermittent every 8 hours  chlorhexidine 0.12% Liquid 15 milliLiter(s) Oral Mucosa every 12 hours  chlorhexidine 4% Liquid 1 Application(s) Topical <User Schedule>  desmopressin IVPB 20 MICROGram(s) IV Intermittent once  dexMEDEtomidine Infusion 0.2 MICROgram(s)/kG/Hr IV Continuous <Continuous>  dextrose 5%. 1000 milliLiter(s) IV Continuous <Continuous>  dextrose 5%. 1000 milliLiter(s) IV Continuous <Continuous>  dextrose 50% Injectable 50 milliLiter(s) IV Push every 15 minutes  dextrose 50% Injectable 50 milliLiter(s) IV Push every 15 minutes  diltiazem    Tablet 60 milliGRAM(s) Enteral Tube every 6 hours  glucagon  Injectable 1 milliGRAM(s) IntraMuscular once  insulin lispro (ADMELOG) corrective regimen sliding scale   SubCutaneous every 6 hours  insulin NPH human recombinant 5 Unit(s) SubCutaneous every 6 hours  methylPREDNISolone sodium succinate Injectable 10 milliGRAM(s) IV Push daily  metolazone 10 milliGRAM(s) Oral <User Schedule>  midodrine 20 milliGRAM(s) Oral every 8 hours  multivitamin 1 Tablet(s) Oral daily  pantoprazole  Injectable 40 milliGRAM(s) IV Push two times a day  phenylephrine    Infusion 0.5 MICROgram(s)/kG/Min IV Continuous <Continuous>  propofol Infusion 10 MICROgram(s)/kG/Min IV Continuous <Continuous>  senna Syrup 10 milliLiter(s) Oral at bedtime  sucralfate suspension 1 Gram(s) Oral every 6 hours  thiamine 100 milliGRAM(s) Oral daily    PRN Inpatient Medications  acetaminophen    Suspension .. 350 milliGRAM(s) Oral every 6 hours PRN  albuterol/ipratropium for Nebulization 3 milliLiter(s) Nebulizer every 6 hours PRN  fentaNYL    Injectable 50 MICROGram(s) IV Push every 4 hours PRN  polyethylene glycol 3350 17 Gram(s) Oral two times a day PRN      REVIEW OF SYSTEMS  --------------------------------------------------------------------------------  Unable to obtain    VITALS/PHYSICAL EXAM  --------------------------------------------------------------------------------  T(C): 36.5 (01-21-23 @ 08:00), Max: 36.8 (01-20-23 @ 12:00)  HR: 80 (01-21-23 @ 08:15) (61 - 116)  BP: 109/50 (01-21-23 @ 08:15) (92/46 - 137/62)  RR: 24 (01-21-23 @ 08:15) (2 - 32)  SpO2: 100% (01-21-23 @ 08:15) (99% - 100%)  Wt(kg): --        01-20-23 @ 07:01  -  01-21-23 @ 07:00  --------------------------------------------------------  IN: 2676.2 mL / OUT: 1310 mL / NET: 1366.2 mL    01-21-23 @ 07:01  -  01-21-23 @ 09:08  --------------------------------------------------------  IN: 114.1 mL / OUT: 0 mL / NET: 114.1 mL    Physical Exam:  	Gen: ill appearing  	HEENT: ETT  	Pulm: decreased breath sounds  	CV: S1S2  	Abd: Soft, +BS   	Ext: ++ LE edema B/L  	Neuro: sedated  	Skin: Warm and dry      LABS/STUDIES  --------------------------------------------------------------------------------              8.4    29.93 >-----------<  106      [01-21-23 @ 08:30]              25.3     136  |  101  |  111  ----------------------------<  229      [01-20-23 @ 23:43]  4.3   |  19  |  2.52        Ca     7.6     [01-20-23 @ 23:43]      Mg     1.8     [01-20-23 @ 23:43]      Phos  6.3     [01-20-23 @ 23:43]    TPro  4.5  /  Alb  2.4  /  TBili  0.5  /  DBili  x   /  AST  30  /  ALT  74  /  AlkPhos  154  [01-20-23 @ 23:43]    PT/INR: PT 15.2 , INR 1.32       [01-20-23 @ 23:43]  PTT: 29.2       [01-20-23 @ 23:43]      Creatinine Trend:  SCr 2.52 [01-20 @ 23:43]  SCr 2.53 [01-20 @ 15:52]  SCr 2.52 [01-19 @ 23:41]  SCr 1.95 [01-18 @ 23:46]  SCr 2.16 [01-18 @ 00:38]    Urinalysis - [01-08-23 @ 14:26]      Color Light Yellow / Appearance Slightly Turbid / SG 1.015 / pH 7.0      Gluc 1000 mg/dL / Ketone Small  / Bili Negative / Urobili Negative       Blood Trace / Protein 30 mg/dL / Leuk Est Large / Nitrite Negative      RBC 6 / WBC 53 / Hyaline 2 / Gran  / Sq Epi  / Non Sq Epi 3 / Bacteria Negative    Urine Creatinine 29      [01-14-23 @ 18:07]  Urine Protein 46      [01-14-23 @ 18:07]  Urine Sodium 74      [01-14-23 @ 18:09]  Urine Urea Nitrogen 455      [01-14-23 @ 18:07]  Urine Potassium 39      [01-14-23 @ 18:09]  Urine Osmolality 393      [01-14-23 @ 18:07]    HbA1c 6.6      [02-02-19 @ 10:59]  TSH 1.17      [01-09-23 @ 06:11]

## 2023-01-21 NOTE — PROGRESS NOTE ADULT - SUBJECTIVE AND OBJECTIVE BOX
CARDIOLOGY     PROGRESS  NOTE   ________________________________________________    CHIEF COMPLAINT:Patient is a 77y old  Female who presents with a chief complaint of sob/ rapid a.fib (21 Jan 2023 11:26)  sedated on vent  	  REVIEW OF SYSTEMS:  CONSTITUTIONAL: No fever, weight loss, or fatigue  EYES: No eye pain, visual disturbances, or discharge  ENT:  No difficulty hearing, tinnitus, vertigo; No sinus or throat pain  NECK: No pain or stiffness  RESPIRATORY: No cough, wheezing, chills or hemoptysis; No Shortness of Breath  CARDIOVASCULAR: No chest pain, palpitations, passing out, dizziness, or leg swelling  GASTROINTESTINAL: No abdominal or epigastric pain. No nausea, vomiting, or hematemesis; No diarrhea or constipation. No melena or hematochezia.  GENITOURINARY: No dysuria, frequency, hematuria, or incontinence  NEUROLOGICAL: No headaches, memory loss, loss of strength, numbness, or tremors  SKIN: No itching, burning, rashes, or lesions   LYMPH Nodes: No enlarged glands  ENDOCRINE: No heat or cold intolerance; No hair loss  MUSCULOSKELETAL: No joint pain or swelling; No muscle, back, or extremity pain  PSYCHIATRIC: No depression, anxiety, mood swings, or difficulty sleeping  HEME/LYMPH: No easy bruising, or bleeding gums  ALLERGY AND IMMUNOLOGIC: No hives or eczema	    [ ] All others negative	  [x ] Unable to obtain    PHYSICAL EXAM:  T(C): 36.9 (01-21-23 @ 12:00), Max: 36.9 (01-21-23 @ 12:00)  HR: 78 (01-21-23 @ 11:45) (61 - 90)  BP: 113/53 (01-21-23 @ 11:45) (100/49 - 137/62)  RR: 22 (01-21-23 @ 11:45) (2 - 29)  SpO2: 100% (01-21-23 @ 11:45) (100% - 100%)  Wt(kg): --  I&O's Summary    20 Jan 2023 07:01  -  21 Jan 2023 07:00  --------------------------------------------------------  IN: 2676.2 mL / OUT: 1310 mL / NET: 1366.2 mL    21 Jan 2023 07:01  -  21 Jan 2023 12:11  --------------------------------------------------------  IN: 297.3 mL / OUT: 825 mL / NET: -527.7 mL    Mode: AC/ CMV (Assist Control/ Continuous Mandatory Ventilation), RR (machine): 20, TV (machine): 350, FiO2: 40, PEEP: 5, ITime: 1, MAP: 13, PIP: 35  Plateau pressure:   P/F ratio:     Appearance: on vent  HEENT:   Normal oral mucosa, PERRL, EOMI	  Lymphatic: No lymphadenopathy  Cardiovascular: Normal S1 S2, No JVD, + murmurs, No edema  Respiratory:  on vent  Gastrointestinal:  Soft, Non-tender, + BS	  Skin: No rashes, No ecchymoses, No cyanosis	  Extremities: No clubbing, cyanosis or edema  Vascular: Peripheral pulses palpable 2+ bilaterally    MEDICATIONS  (STANDING):  ascorbic acid 500 milliGRAM(s) Oral daily  atorvastatin 40 milliGRAM(s) Oral at bedtime  azithromycin   Tablet 250 milliGRAM(s) Oral <User Schedule>  bacitracin   Ointment 1 Application(s) Topical daily  buMETAnide Infusion 2 mG/Hr (10 mL/Hr) IV Continuous <Continuous>  cefepime   IVPB 500 milliGRAM(s) IV Intermittent every 8 hours  chlorhexidine 0.12% Liquid 15 milliLiter(s) Oral Mucosa every 12 hours  chlorhexidine 4% Liquid 1 Application(s) Topical <User Schedule>  dexMEDEtomidine Infusion 0.2 MICROgram(s)/kG/Hr (1.89 mL/Hr) IV Continuous <Continuous>  dextrose 5%. 1000 milliLiter(s) (50 mL/Hr) IV Continuous <Continuous>  dextrose 5%. 1000 milliLiter(s) (100 mL/Hr) IV Continuous <Continuous>  dextrose 50% Injectable 50 milliLiter(s) IV Push every 15 minutes  dextrose 50% Injectable 50 milliLiter(s) IV Push every 15 minutes  diltiazem    Tablet 60 milliGRAM(s) Enteral Tube every 6 hours  glucagon  Injectable 1 milliGRAM(s) IntraMuscular once  insulin lispro (ADMELOG) corrective regimen sliding scale   SubCutaneous every 6 hours  insulin NPH human recombinant 5 Unit(s) SubCutaneous every 6 hours  methylPREDNISolone sodium succinate Injectable 10 milliGRAM(s) IV Push daily  metolazone 10 milliGRAM(s) Oral <User Schedule>  midodrine 30 milliGRAM(s) Oral every 8 hours  multivitamin 1 Tablet(s) Oral daily  pantoprazole  Injectable 40 milliGRAM(s) IV Push two times a day  phenylephrine    Infusion 0.5 MICROgram(s)/kG/Min (7.07 mL/Hr) IV Continuous <Continuous>  propofol Infusion 10 MICROgram(s)/kG/Min (2.26 mL/Hr) IV Continuous <Continuous>  senna Syrup 10 milliLiter(s) Oral at bedtime  sucralfate suspension 1 Gram(s) Oral every 6 hours  thiamine 100 milliGRAM(s) Oral daily      TELEMETRY: 	    ECG:  	  RADIOLOGY:  OTHER: 	  	  LABS:	 	    CARDIAC MARKERS:                                8.4    29.93 )-----------( 106      ( 21 Jan 2023 08:30 )             25.3     01-20    136  |  101  |  111<H>  ----------------------------<  229<H>  4.3   |  19<L>  |  2.52<H>    Ca    7.6<L>      20 Jan 2023 23:43  Phos  6.3     01-20  Mg     1.8     01-20    TPro  4.5<L>  /  Alb  2.4<L>  /  TBili  0.5  /  DBili  x   /  AST  30  /  ALT  74<H>  /  AlkPhos  154<H>  01-20    proBNP:   Lipid Profile:   HgA1c:   TSH: Thyroid Stimulating Hormone, Serum: 1.17 uIU/mL (01-09 @ 06:11)    PT/INR - ( 20 Jan 2023 23:43 )   PT: 15.2 sec;   INR: 1.32 ratio         PTT - ( 20 Jan 2023 23:43 )  PTT:29.2 sec      Assessment and plan  ---------------------------  77-year-old female with past medical history of DM, HTN, HLD, asthma on daily steroids, A. fib on Eliquis and digoxin presenting with shortness of breath.  Patient unable to provide history.  Daughter at bedside reports patient has had increasing shortness of breath, wheezing, cough for the past week now.  Patient also with increasing generalized weakness, fatigue and decreased p.o. intake.  Denies fever/chills, chest pain, abdominal pain, nausea, vomiting, diarrhea, dysuria.  Family reports patient had a similar episode 1 year ago when she was admitted to [facility] with UTI.  pt with sig medical hx, chronic a.fib with sob ?sec sepsis/ pneumoniae, UTI  sedated on vent  continue abx and steroid  echo noted  AC held sec to bleeding  still sedated on vent   continue abx  hr is well controlled still on po Cardizem 60 mg q 6h/ no ac  continue pressor keep MAP>65  sepsis on iv abx  fu renal function closely  a.fib hr is well controlled on Cardizem 5 mg/ hr will increase drip as needed  on Bumex/ metalazone for diuresis  nutrition  micu care/ vent  discussed with family prognosis guarded

## 2023-01-21 NOTE — PROGRESS NOTE ADULT - ASSESSMENT
ASSESSMENT/PLAN:  77-year-old female with past medical history of DM, HTN, HLD, asthma on daily steroids, A. fib on Eliquis and digoxin presenting with shortness of breath, found to be in Afib w/RVR; admitted to MICU for Acute Hypoxic Respiratory Failure in s/o Asthma and Bronchiectasis w/ possible PNA, leading to intubation and sedation. Currently urine output worsening, attempting to to SBP daily.     NEURO  intubated on precedex       CARDIAC  # Afib w/ RVR  Admitted in RVR; being followed by Dr. Marcus, Cardiology; Afib w/ RVR thought to be due to stress from hypoxemia  - Chronic atrial fib. with RVR.  Continue diltiazem drip, today at 10  - Echo (1/11) EF 75%  1. Endocardial visualization enhanced with intravenous  injection of Ultrasonic Enhancing Agent (Definity).  Hyperdynamic left ventricular systolic function. No left  ventricular thrombus.  2. Normal right ventricular size and function.  - TSH normal    Hypotension requiring pressors:   - continued on phenylephrine     PULM  #Acute on chronic  hypoxemic respiratory failure due to acute exacerbation of bronchiectasis / asthma and possible pneumonia.   Continue current AC vent settings.  Did not tolerate PS wean 1/17 or 1/18.   Continue solumedrol to 20mg  daily - decreased to 10mg qd  Continue cefepime day 11/14 for pseudomonas pneumonia  Chest PT   3% saline nebulizers - discontinued, with Duo nebs - changed to prn  Azithromycin  M, W, F for   antinflammatory effects.    RENAL  # SHANE  - urine lytes showing FeNa 2.2%-> ATN; treat with fluids. Creatinine stabilized at 1.8 now.   - urine output decreased to 5cc/hr 1/16 did not improve with IVF  - bumex 2mg bid -> bumex infusion   - overnight, urine output was decreasing. per nephro- rec standing metolazone and placement of central line for double concentrated tiffanie as getting large volume of fluid with tiffanie  - given albumin o/n    GI   - concern for GI bleed (hb drop requiring blood transfusion 1/15 and 1/16) and small volume hematoschezia visualized 1/16am per RN. GI consulted - hold off endoscopic evaluation unless further bleeding occurs.   - hold off CT abdomen given hb is stable, no gross gi bleed currently   - Diet: tube feeds  - Bowel regimen: miralax, senna    ID  # Sepsis  CT Chest w/ bilateral lower lobe predominant patchy and branching opacities suspicious for pneumonia. Small left pleural effusion. Leukocytosis, tachycardia and tachypnea; No active indications of infections, all infectious workup negative so far; leukocytosis likely in s/o solumedrol usage but no diff to conform neutrophilia so far; tachycardia likely in s/o tachypnea and AHRF  - was started on cefepime 1/8 = now it is day 12/14  - Urine legionella antigen - neg, final BCx 1/10 - neg, BAL cultures - numerous GNR, few gram pos       ENDO  DM2, hyperglycemia  - hyperglycemic 450s 1/15, started on insulin gtt 1/15pm. likely in setting of steroids.   - 1/16 - 1/17 was on insulin gtt, now on nph 5u q6h + SSI with improved glc       HEME/ONC  - Hold Lovenox 50mg BID - monitor for acute VTE  - heparin 5000 bid     ETHICS  full code  family declined to make decision today regarding code status. Though patient's children do not want chest compressions, patient's  is wanting further time to process the news. Palliative on board - f/u recs.    ASSESSMENT/PLAN:  77-year-old female with past medical history of DM, HTN, HLD, asthma on daily steroids, A. fib on Eliquis and digoxin presenting with shortness of breath, found to be in Afib w/RVR; admitted to MICU for Acute Hypoxic Respiratory Failure in s/o Asthma and Bronchiectasis w/ possible PNA, leading to intubation and sedation. Currently urine output worsening, attempting to to SBP daily.     NEURO  intubated on precedex, propofol       CARDIAC  # Afib w/ RVR  Admitted in RVR; being followed by Dr. Marcus, Cardiology; Afib w/ RVR thought to be due to stress from hypoxemia  - Chronic atrial fib. with RVR.  Continue diltiazem drip, today at 10  - Echo (1/11) EF 75%  1. Endocardial visualization enhanced with intravenous  injection of Ultrasonic Enhancing Agent (Definity).  Hyperdynamic left ventricular systolic function. No left  ventricular thrombus.  2. Normal right ventricular size and function.  - TSH normal    Hypotension requiring pressors:   - continued on phenylephrine     PULM  #Acute on chronic  hypoxemic respiratory failure due to acute exacerbation of bronchiectasis / asthma and possible pneumonia.   Continue current AC vent settings.  Did not tolerate PS wean 1/17 or 1/18.   Continue solumedrol to 20mg  daily - decreased to 10mg qd  Continue cefepime day 11/14 for pseudomonas pneumonia  Chest PT   3% saline nebulizers - discontinued, with Duo nebs - changed to prn  Azithromycin  M, W, F for   antinflammatory effects.    RENAL  # SHANE  - urine lytes showing FeNa 2.2%-> ATN; treat with fluids. Creatinine stabilized at 1.8 now.   - urine output decreased to 5cc/hr 1/16 did not improve with IVF  - bumex 2mg bid -> bumex infusion   - overnight, urine output was decreasing. per nephro- rec standing metolazone and placement of central line for double concentrated tiffanie as getting large volume of fluid with tiffanie - hold off for now  - given albumin o/n    GI   - concern for GI bleed (hb drop requiring blood transfusion 1/15 and 1/16) and small volume hematoschezia visualized 1/16am per RN. GI consulted - hold off endoscopic evaluation unless further bleeding occurs.   - hold off CT abdomen given hb is stable, no gross gi bleed currently   - Diet: tube feeds  - Bowel regimen: miralax, senna    ID  # Sepsis  CT Chest w/ bilateral lower lobe predominant patchy and branching opacities suspicious for pneumonia. Small left pleural effusion. Leukocytosis, tachycardia and tachypnea; No active indications of infections, all infectious workup negative so far; leukocytosis likely in s/o solumedrol usage but no diff to conform neutrophilia so far; tachycardia likely in s/o tachypnea and AHRF  - was started on cefepime 1/8 = now it is day 12/14  - Urine legionella antigen - neg, final BCx 1/10 - neg, BAL cultures - numerous GNR, few gram pos       ENDO  DM2, hyperglycemia  - hyperglycemic 450s 1/15, started on insulin gtt 1/15pm. likely in setting of steroids.   - 1/16 - 1/17 was on insulin gtt, now on nph 5u q6h + SSI with improved glc       HEME/ONC  - Hold Lovenox 50mg BID - monitor for acute VTE  - heparin 5000 bid     ETHICS  full code  family declined to make decision today regarding code status. Though patient's children do not want chest compressions, patient's  is wanting further time to process the news. Palliative on board - f/u recs.

## 2023-01-21 NOTE — PROGRESS NOTE ADULT - SUBJECTIVE AND OBJECTIVE BOX
Gastroenterology/Hepatology Progress Note      Interval Events:     Allergies:  Avelox (Pruritus)  fentanyl (Other)  fish (Vomiting)  Levaquin (Unknown)  shellfish (Vomiting)      Hospital Medications:  acetaminophen    Suspension .. 350 milliGRAM(s) Oral every 6 hours PRN  albuterol/ipratropium for Nebulization 3 milliLiter(s) Nebulizer every 6 hours PRN  ascorbic acid 500 milliGRAM(s) Oral daily  atorvastatin 40 milliGRAM(s) Oral at bedtime  azithromycin   Tablet 250 milliGRAM(s) Oral <User Schedule>  bacitracin   Ointment 1 Application(s) Topical daily  buMETAnide Infusion 3 mG/Hr IV Continuous <Continuous>  cefepime   IVPB 500 milliGRAM(s) IV Intermittent every 8 hours  chlorhexidine 0.12% Liquid 15 milliLiter(s) Oral Mucosa every 12 hours  chlorhexidine 4% Liquid 1 Application(s) Topical <User Schedule>  dexMEDEtomidine Infusion 0.2 MICROgram(s)/kG/Hr IV Continuous <Continuous>  dextrose 5%. 1000 milliLiter(s) IV Continuous <Continuous>  dextrose 5%. 1000 milliLiter(s) IV Continuous <Continuous>  dextrose 50% Injectable 50 milliLiter(s) IV Push every 15 minutes  dextrose 50% Injectable 50 milliLiter(s) IV Push every 15 minutes  diltiazem    Tablet 60 milliGRAM(s) Enteral Tube every 6 hours  fentaNYL    Injectable 50 MICROGram(s) IV Push every 4 hours PRN  glucagon  Injectable 1 milliGRAM(s) IntraMuscular once  insulin lispro (ADMELOG) corrective regimen sliding scale   SubCutaneous every 6 hours  insulin NPH human recombinant 5 Unit(s) SubCutaneous every 6 hours  methylPREDNISolone sodium succinate Injectable 10 milliGRAM(s) IV Push daily  metolazone 10 milliGRAM(s) Oral <User Schedule>  midodrine 30 milliGRAM(s) Oral every 8 hours  multivitamin 1 Tablet(s) Oral daily  pantoprazole  Injectable 40 milliGRAM(s) IV Push two times a day  phenylephrine    Infusion 0.5 MICROgram(s)/kG/Min IV Continuous <Continuous>  polyethylene glycol 3350 17 Gram(s) Oral two times a day PRN  propofol Infusion 10 MICROgram(s)/kG/Min IV Continuous <Continuous>  senna Syrup 10 milliLiter(s) Oral at bedtime  sucralfate suspension 1 Gram(s) Oral every 6 hours  thiamine 100 milliGRAM(s) Oral daily      ROS: 14 point ROS negative unless otherwise state in subjective    PHYSICAL EXAM:   Vital Signs:  Vital Signs Last 24 Hrs  T(C): 36.5 (2023 08:00), Max: 36.8 (2023 12:00)  T(F): 97.7 (2023 08:00), Max: 98.2 (2023 12:00)  HR: 89 (2023 11:00) (61 - 90)  BP: 103/51 (2023 11:00) (103/51 - 137/62)  BP(mean): 71 (2023 11:00) (65 - 90)  RR: 25 (2023 11:00) (2 - 29)  SpO2: 100% (2023 11:00) (100% - 100%)    Parameters below as of 2023 19:00  Patient On (Oxygen Delivery Method): ventilator,20/350/5    O2 Concentration (%): 40  Daily     Daily Weight in k (2023 05:00)    GENERAL:  No acute distress  HEENT:  NCAT, no scleral icterus  CHEST: no resp distress  HEART:  RRR  ABDOMEN:  Soft, non-tender, non-distended, normoactive bowel sounds, no masses  EXTREMITIES:  No cyanosis, clubbing, or edema  SKIN:  No rash/erythema/ecchymoses/petechiae/wounds/abscess/warm/dry  NEURO:  Alert and oriented x 3, no asterixis, no tremor    LABS:                        8.4    29.93 )-----------( 106      ( 2023 08:30 )             25.3     Mean Cell Volume: 84.9 fl (23 @ 08:30)        136  |  101  |  111<H>  ----------------------------<  229<H>  4.3   |  19<L>  |  2.52<H>    Ca    7.6<L>      2023 23:43  Phos  6.3       Mg     1.8         TPro  4.5<L>  /  Alb  2.4<L>  /  TBili  0.5  /  DBili  x   /  AST  30  /  ALT  74<H>  /  AlkPhos  154<H>      LIVER FUNCTIONS - ( 2023 23:43 )  Alb: 2.4 g/dL / Pro: 4.5 g/dL / ALK PHOS: 154 U/L / ALT: 74 U/L / AST: 30 U/L / GGT: x           PT/INR - ( 2023 23:43 )   PT: 15.2 sec;   INR: 1.32 ratio         PTT - ( 2023 23:43 )  PTT:29.2 sec          Imaging:           Gastroenterology/Hepatology Progress Note      Interval Events:     GI re-consulted for coffee ground emesis.     Pt. is intubated and sedated.   As per nurse and primary team, had 300 cc of coffee ground material from the OG tube early this morning. Has brown stools w/ no melena or hematochezia. Pressor support remains stable. Has decreased urine o/p on bumex drip.   - Palliative care consulted,  cannot make decisions regarding GOC at this time.     Allergies:  Avelox (Pruritus)  fentanyl (Other)  fish (Vomiting)  Levaquin (Unknown)  shellfish (Vomiting)      Hospital Medications:  acetaminophen    Suspension .. 350 milliGRAM(s) Oral every 6 hours PRN  albuterol/ipratropium for Nebulization 3 milliLiter(s) Nebulizer every 6 hours PRN  ascorbic acid 500 milliGRAM(s) Oral daily  atorvastatin 40 milliGRAM(s) Oral at bedtime  azithromycin   Tablet 250 milliGRAM(s) Oral <User Schedule>  bacitracin   Ointment 1 Application(s) Topical daily  buMETAnide Infusion 3 mG/Hr IV Continuous <Continuous>  cefepime   IVPB 500 milliGRAM(s) IV Intermittent every 8 hours  chlorhexidine 0.12% Liquid 15 milliLiter(s) Oral Mucosa every 12 hours  chlorhexidine 4% Liquid 1 Application(s) Topical <User Schedule>  dexMEDEtomidine Infusion 0.2 MICROgram(s)/kG/Hr IV Continuous <Continuous>  dextrose 5%. 1000 milliLiter(s) IV Continuous <Continuous>  dextrose 5%. 1000 milliLiter(s) IV Continuous <Continuous>  dextrose 50% Injectable 50 milliLiter(s) IV Push every 15 minutes  dextrose 50% Injectable 50 milliLiter(s) IV Push every 15 minutes  diltiazem    Tablet 60 milliGRAM(s) Enteral Tube every 6 hours  fentaNYL    Injectable 50 MICROGram(s) IV Push every 4 hours PRN  glucagon  Injectable 1 milliGRAM(s) IntraMuscular once  insulin lispro (ADMELOG) corrective regimen sliding scale   SubCutaneous every 6 hours  insulin NPH human recombinant 5 Unit(s) SubCutaneous every 6 hours  methylPREDNISolone sodium succinate Injectable 10 milliGRAM(s) IV Push daily  metolazone 10 milliGRAM(s) Oral <User Schedule>  midodrine 30 milliGRAM(s) Oral every 8 hours  multivitamin 1 Tablet(s) Oral daily  pantoprazole  Injectable 40 milliGRAM(s) IV Push two times a day  phenylephrine    Infusion 0.5 MICROgram(s)/kG/Min IV Continuous <Continuous>  polyethylene glycol 3350 17 Gram(s) Oral two times a day PRN  propofol Infusion 10 MICROgram(s)/kG/Min IV Continuous <Continuous>  senna Syrup 10 milliLiter(s) Oral at bedtime  sucralfate suspension 1 Gram(s) Oral every 6 hours  thiamine 100 milliGRAM(s) Oral daily      ROS: Unable to obtain as the patient is sedated.     PHYSICAL EXAM:   Vital Signs:  Vital Signs Last 24 Hrs  T(C): 36.5 (2023 08:00), Max: 36.8 (2023 12:00)  T(F): 97.7 (2023 08:00), Max: 98.2 (2023 12:00)  HR: 89 (2023 11:00) (61 - 90)  BP: 103/51 (2023 11:00) (103/51 - 137/62)  BP(mean): 71 (2023 11:00) (65 - 90)  RR: 25 (2023 11:00) (2 - 29)  SpO2: 100% (2023 11:00) (100% - 100%)    Parameters below as of 2023 19:00  Patient On (Oxygen Delivery Method): ventilator,20/350/5    O2 Concentration (%): 40  Daily     Daily Weight in k (2023 05:00)    GENERAL:  Intubated.   HEENT:  NCAT, no scleral icterus  CHEST: Intubated.   ABDOMEN:  Soft, mildly distended, no palpable masses  EXTREMITIES:  Has 1+ pitting edema b/l.   NEURO:  Sedated.     LABS:                        8.4    29.93 )-----------( 106      ( 2023 08:30 )             25.3     Mean Cell Volume: 84.9 fl (23 @ 08:30)        136  |  101  |  111<H>  ----------------------------<  229<H>  4.3   |  19<L>  |  2.52<H>    Ca    7.6<L>      2023 23:43  Phos  6.3       Mg     1.8         TPro  4.5<L>  /  Alb  2.4<L>  /  TBili  0.5  /  DBili  x   /  AST  30  /  ALT  74<H>  /  AlkPhos  154<H>      LIVER FUNCTIONS - ( 2023 23:43 )  Alb: 2.4 g/dL / Pro: 4.5 g/dL / ALK PHOS: 154 U/L / ALT: 74 U/L / AST: 30 U/L / GGT: x           PT/INR - ( 2023 23:43 )   PT: 15.2 sec;   INR: 1.32 ratio         PTT - ( 2023 23:43 )  PTT:29.2 sec    Imaging:      Abdominal XR    Findings/  Impression: Similar-appearing enteric tube with tip in the stomach and   sidehole at the gastroesophageal junction consider advancing 7 cm.   Limited evaluation for air-fluid level on this supine radiograph, however   bowel gas pattern appears nonobstructive. Again noted levocurvature of   the lumbar spine. No acute osseous process.

## 2023-01-21 NOTE — PROGRESS NOTE ADULT - PROBLEM SELECTOR PLAN 1
Pt. with SHANE suspected in setting of hemodynamics with notable hypotension throughout the hospital course requiring IV vasopressors. On review of [facility] HIE/[facility] pt noted to have a SCr of 0.64 on admission 1/8, which increased to 2.58 on 1/11, and since then has improved but remains elevated to 2.5 this morning. UA and urine lytes reviewed. Spot urine TP/CR ratio elevated to 1.6. Pt likely with ATN. Pt currently non-oliguric, currently on IV bumex infusion; UOP noted to be ~1.3L over the past 24 hours, however remains net positive fluid balance. Pt remains clinically volume overloaded. IV fluids double concentrated. Agree with augmenting diuresis with Metolazone. Watch Na levels with Metolazone. No absolute indication for RRT at this time, however if kidney function worsens then we will need to consider CRRT. Monitor labs and urine output. Avoid nephrotoxins. Dose medications as per eGFR.    If you have any questions, please feel free to contact me  Jose G Ferrari  Nephrology Fellow  [phone]; Prefer [employer] TEAMS  (After 5pm or on weekends please page the on-call fellow)

## 2023-01-22 NOTE — PROGRESS NOTE ADULT - SUBJECTIVE AND OBJECTIVE BOX
[facility] DIVISION OF KIDNEY DISEASES AND HYPERTENSION -- FOLLOW UP NOTE  --------------------------------------------------------------------------------  Pt is a 77 year old female with PMH of DM, HTN, HLD, Asthma, and Afib who presented to the hospital with shortness of breath and noted to be in afib with RVR. The patient was emergently intubated for acute hypoxic respiratory failure and admitted to the MICU. The patient has failed multiple attempts at extubation. The nephrology team was consulted for SHANE, On review of [facility] pt noted to have a SCr of 0.64 on admission 1/8, which increased to 2.58 on 1/11, and since then has improved but remains elevated 2.52 this morning.    24 hour events/subjective:  Pt seen and examined this morning in the MICU. Pt remains intubated and on sedation. Unable to obtain ROS. Bumex decreased. Good UOP. Failed TOV.       PAST HISTORY  --------------------------------------------------------------------------------  No significant changes to PMH, PSH, FHx, SHx, unless otherwise noted    ALLERGIES & MEDICATIONS  --------------------------------------------------------------------------------  Allergies    Avelox (Pruritus)  fish (Vomiting)  Levaquin (Unknown)  shellfish (Vomiting)    Intolerances    fentanyl (Other)    Standing Inpatient Medications  ascorbic acid 500 milliGRAM(s) Oral daily  atorvastatin 40 milliGRAM(s) Oral at bedtime  azithromycin   Tablet 250 milliGRAM(s) Oral <User Schedule>  bacitracin   Ointment 1 Application(s) Topical daily  buMETAnide Infusion 2 mG/Hr IV Continuous <Continuous>  cefepime   IVPB 500 milliGRAM(s) IV Intermittent every 8 hours  chlorhexidine 0.12% Liquid 15 milliLiter(s) Oral Mucosa every 12 hours  chlorhexidine 4% Liquid 1 Application(s) Topical <User Schedule>  dexMEDEtomidine Infusion 0.2 MICROgram(s)/kG/Hr IV Continuous <Continuous>  dextrose 5%. 1000 milliLiter(s) IV Continuous <Continuous>  dextrose 5%. 1000 milliLiter(s) IV Continuous <Continuous>  dextrose 50% Injectable 50 milliLiter(s) IV Push every 15 minutes  dextrose 50% Injectable 50 milliLiter(s) IV Push every 15 minutes  diltiazem    Tablet 60 milliGRAM(s) Enteral Tube every 6 hours  glucagon  Injectable 1 milliGRAM(s) IntraMuscular once  insulin lispro (ADMELOG) corrective regimen sliding scale   SubCutaneous every 6 hours  insulin NPH human recombinant 5 Unit(s) SubCutaneous every 6 hours  methylPREDNISolone sodium succinate Injectable 10 milliGRAM(s) IV Push daily  metolazone 10 milliGRAM(s) Oral <User Schedule>  midodrine 30 milliGRAM(s) Oral every 8 hours  multivitamin 1 Tablet(s) Oral daily  pantoprazole Infusion 8 mG/Hr IV Continuous <Continuous>  phenylephrine    Infusion 0.5 MICROgram(s)/kG/Min IV Continuous <Continuous>  propofol Infusion 10 MICROgram(s)/kG/Min IV Continuous <Continuous>  senna Syrup 10 milliLiter(s) Oral at bedtime  sucralfate suspension 1 Gram(s) Oral every 6 hours  thiamine 100 milliGRAM(s) Oral daily    PRN Inpatient Medications  acetaminophen    Suspension .. 350 milliGRAM(s) Oral every 6 hours PRN  albuterol/ipratropium for Nebulization 3 milliLiter(s) Nebulizer every 6 hours PRN  fentaNYL    Injectable 50 MICROGram(s) IV Push every 4 hours PRN  polyethylene glycol 3350 17 Gram(s) Oral two times a day PRN      REVIEW OF SYSTEMS  --------------------------------------------------------------------------------  Unable ot obtain    VITALS/PHYSICAL EXAM  --------------------------------------------------------------------------------  T(C): 37 (01-22-23 @ 08:00), Max: 37.6 (01-22-23 @ 02:00)  HR: 73 (01-22-23 @ 09:33) (68 - 133)  BP: 110/50 (01-22-23 @ 08:45) (88/40 - 132/53)  RR: 39 (01-22-23 @ 08:45) (20 - 40)  SpO2: 100% (01-22-23 @ 09:33) (95% - 100%)  Wt(kg): --    Weight (kg): 54 (01-21-23 @ 09:12)      01-21-23 @ 07:01  -  01-22-23 @ 07:00  --------------------------------------------------------  IN: 2072.4 mL / OUT: 3295 mL / NET: -1222.6 mL    01-22-23 @ 07:01  -  01-22-23 @ 09:46  --------------------------------------------------------  IN: 118.8 mL / OUT: 25 mL / NET: 93.8 mL      Physical Exam:  	Gen: ill appearing  	HEENT: ETT  	Pulm: decreased breath sounds  	CV: S1S2  	Abd: Soft, +BS   	Ext: ++ LE edema B/L  	Neuro: sedated  	Skin: Warm and dry      LABS/STUDIES  --------------------------------------------------------------------------------              8.2    32.06 >-----------<  118      [01-21-23 @ 23:45]              24.3     140  |  101  |  117  ----------------------------<  78      [01-21-23 @ 23:45]  3.9   |  21  |  2.39        Ca     7.6     [01-21-23 @ 23:45]      Mg     1.8     [01-21-23 @ 23:45]      Phos  6.7     [01-21-23 @ 23:45]    TPro  4.7  /  Alb  2.1  /  TBili  0.5  /  DBili  x   /  AST  36  /  ALT  63  /  AlkPhos  165  [01-21-23 @ 23:45]    PT/INR: PT 15.2 , INR 1.31       [01-21-23 @ 23:45]  PTT: 29.2       [01-21-23 @ 23:45]      Creatinine Trend:  SCr 2.39 [01-21 @ 23:45]  SCr 2.52 [01-20 @ 23:43]  SCr 2.53 [01-20 @ 15:52]  SCr 2.52 [01-19 @ 23:41]  SCr 1.95 [01-18 @ 23:46]    Urinalysis - [01-21-23 @ 13:38]      Color Light Orange / Appearance Turbid / SG 1.010 / pH 6.0      Gluc Negative / Ketone Negative  / Bili Negative / Urobili Negative       Blood Large / Protein 30 mg/dL / Leuk Est Large / Nitrite Negative       /  / Hyaline 0 / Gran  / Sq Epi  / Non Sq Epi 1 / Bacteria Negative      HbA1c 6.6      [02-02-19 @ 10:59]  TSH 1.17      [01-09-23 @ 06:11]

## 2023-01-22 NOTE — PROGRESS NOTE ADULT - ASSESSMENT
77-year-old female with past medical history of DM, HTN, HLD, asthma on daily steroids, A. fib on Eliquis and digoxin presenting with shortness of breath.  Patient unable to provide history.  Daughter at bedside reports patient has had increasing shortness of breath, wheezing, cough for the past week now.  Patient also with increasing generalized weakness, fatigue and decreased p.o. intake.  Denies fever/chills, chest pain, abdominal pain, nausea, vomiting, diarrhea, dysuria.  Family reports patient had a similar episode 1 year ago when she was admitted to [facility] with UTI.  pt with sig medical hx, chronic a.fib with sob ?sec sepsis/ pneumoniae, UTI  start on ceftriaxone  tele  for increase hr, will increase Cardizem dose  continue AC  cardiac enzyme  tsh  echo  RVP negative      ASTHMA/ COPD exacerbation;   A Fibrillation  with rvr:  DM:  HTN:   HLD:     1/09:    ASTHMA/ COPD exacerbation; she was using performist at home with 5 mg of prednisone  not sure why she was not on any other meds:  sill start Symbicort too:  along with BD and is already on cefepime:  though pneumonia to me is not very convincing : will probably need ct scan chest   A Fibrillation  with rvr: Hr still elevated:  on cardizem : would defer to cards:  on ac:  lovenox  + coumadin : check echo   DM:: monitor and control   HTN: : controlled  HLD: on statins  :    dw team    1/10:    ASTHMA/ COPD exacerbation; she was using performist at home with 5 mg of prednisone  not sure why she was not on any other meds:  sill start Symbicort too:  along with BD and is already on cefepime:  WBC increased : though pneumonia to me is not very convincing : will probably need ct scan chest : she rused for ct chest : increase steorids 40 mg Q 6 hours : ABG today seems reasonable   A Fibrillation  with rvr: Hr still elevated:  on cardizem : HR is till very high  :   DM:: monitor and control   HTN: : controlled  HLD: on statins:    MICU  consult:  high risk for intubation:   :    edy and daughter    1/11:    ASTHMA/ COPD exacerbation; now s/p bronch: RML lavage done:  wait cultures results: she was using performist at home with 5 mg of prednisone  not sure why she was not on any other meds:  Cont BD:  and antibiotics  WBC still increased :on cefepime:    A Fibrillation  with rvr: Hr still elevated:  on cardizem : HR is better now:  on cardiem   DM:: monitor and control   HTN: : controlled  HLD: on statins:    josias micu  ATTENDING      1/12:    ASTHMA/ COPD exacerbation; now s/p bronch: RML lavage done:  negative  cultures so far: : she was using performist at home with 5 mg of prednisone  not sure why she was not on any other meds:  Cont BD:  and antibiotics  WBC still increased: but little down today  :on cefepime:    A Fibrillation  with rvr: Hr still elevated:  on cardizem : HR is better now:  on cardiem drip   DM:: monitor and control   HTN: : controlled  HLD: on statins:    josias micu  team    1/13:       ASTHMA/ COPD exacerbation; now s/p bronch: RML lavage done:  negative  cultures so far: :  cont full vent support:  management  of vent per MICU : weaning as perprotocol  ;  on zothromax and cefepime : off steroids   A Fibrillation  with rvr: Hr still elevated:  on Cardizem : HR is better now:  on cardiem drip   DM:: monitor and control   HTN: : controlled  HLD: on statins:    josias micu  team   :  1/14:       ASTHMA/ COPD exacerbation; now s/p bronch: RML lavage done:  Pseudomonas:  on cefepime:  : :  cont full vent support:  management  of vent per MICU : weaning as pe rprotocol  ;  on 20 mg of steroids today   A Fibrillation  with rvr: Hr still elevated:  on Cardizem : HR is better now:  on cardiem drip : Blood pressure is stable:   DM:: monitor and control   HTN: : controlled  HLD: on statins:    josias micu  Attending    1/16:    ASTHMA/ COPD exacerbation; now s/p bronch: RML lavage done:  Pseudomonas:  on cefepime:  and azithromycin for anti inflammatory effects:  :  still on full vent support:  has severe copd:   A Fibrillation  with rvr: Hr still elevated:  on Cardizem : HR is better now:  on Cardizem drip : Blood pressure is stable:   Thrombocytopenia:  ? etiology : check for robbi   DM:: monitor and control   HTN: : controlled  HLD: on statins:   overall pt is still critically ill:    josias micu  Attending today      1/17:  ASTHMA/ COPD exacerbation; now s/p bronch: RML lavage done:  Pseudomonas:  on cefepime:  and azithromycin for anti inflammatory effects:  :  still on full vent support:  has severe copd: same rx today too :  A Fibrillation  with rvr: Hr still elevated:  on Cardizem : HR is better now:  on Cardizem drip : Blood pressure is stable:   Thrombocytopenia:  ? etiology : check for robbi  : still low:  defer to MICU team   DM:: monitor and control   HTN: : controlled  HLD: on statins:   overall pt is still critically ill:    dw micu  Attending    1/18/2023    ASTHMA/ COPD exacerbation; now s/p bronch: RML lavage done:  Pseudomonas:  on cefepime:  and azithromycin for anti inflammatory effects:  :  still on full vent support:  has severe copd: same and is on solumedrol : 20 mg a day : defer to primary team   A Fibrillation  with rvr: Hr still elevated:  on Cardizem : HR is better now:  on Cardizem drip : Blood pressure is stable:   Thrombocytopenia:  ? etiology :plts are increasing:   DM:: monitor and control   HTN: : controlled  HLD: on statins:   overall pt is still critically ill:    dw micu  team     1/19:    ASTHMA/ COPD exacerbation; now s/p bronch: RML lavage done:  Pseudomonas:  on cefepime:  and azithromycin for anti inflammatory effects:  :  still on full vent support:  has severe copd: same and is on solumedrol : 10 mg a day : she has not shira wheezing : peak not high   A Fibrillation  with rvr: Hr still elevated:  on Cardizem : HR is better now:  on Cardizem drip : Blood pressure is stable on vasopressors  Thrombocytopenia:  ? etiology :plts are increasing: and further increased today :   Leucocytosis:  further increased and is on antibiotics  DM:: monitor and control   HTN: : controlled  HLD: on statins:   overall pt is still critically ill:  intubated  ?: sedated and is on vasopressors   dw micu  team       1/20:    ASTHMA/ COPD exacerbation; now s/p bronch: RML lavage done:  Pseudomonas:  on cefepime:  and azithromycin for anti inflammatory effects:  :  still on full vent support:  has severe copd: same and is on solumedrol : 10 mg a day : cont current rx:   A Fibrillation  with rvr: Hr still elevated:  on Cardizem : HR is better now:  on Cardizem drip : Blood pressure is stable on vasopressors  Thrombocytopenia:  ? etiology :plts are increasing: and further increased today : and much better  now:   Leucocytosis:  further increased and is on antibiotics: defer to primary t eam  DM:: monitor and control   HTN: : controlled  HLD: on statins:   overall pt is still critically ill:  intubated  ?: sedated and is on vasopressors   dw micu  team     1/22:    ASTHMA/ COPD exacerbation; resp fialure: now s/p bronch: RML lavage done:  Pseudomonas:  on cefepime:  and azithromycin for anti inflammatory effects:  :  still on full vent support:  has severe copd: same and is on solumedrol : 10 mg a day : cont current rx: on cefepime for two weeks  : finishing in 2 days   A Fibrillation  with rvr: Hr still elevated:  on Cardizem : HR is better now:  on Cardizem drip : on vasopressors  Thrombocytopenia:  ? etiology :plts are increasing: and further increased today : and much better  now: and remains  > 100k:   Leucocytosis:  Cont increase:  defer to  MICU   DM:: monitor and control   HTN: : on vasopressors  HLD: on statins:   overall pt is still critically ill:  intubated  : sedated and is on vasopressors   dw micu  team

## 2023-01-22 NOTE — PROGRESS NOTE ADULT - SUBJECTIVE AND OBJECTIVE BOX
Gastroenterology/Hepatology Progress Note      Interval Events:     Allergies:  Avelox (Pruritus)  fentanyl (Other)  fish (Vomiting)  Levaquin (Unknown)  shellfish (Vomiting)      Hospital Medications:  acetaminophen    Suspension .. 350 milliGRAM(s) Oral every 6 hours PRN  albuterol/ipratropium for Nebulization 3 milliLiter(s) Nebulizer every 6 hours PRN  ascorbic acid 500 milliGRAM(s) Oral daily  atorvastatin 40 milliGRAM(s) Oral at bedtime  azithromycin   Tablet 250 milliGRAM(s) Oral <User Schedule>  bacitracin   Ointment 1 Application(s) Topical daily  buMETAnide Infusion 2 mG/Hr IV Continuous <Continuous>  cefepime   IVPB 500 milliGRAM(s) IV Intermittent every 8 hours  chlorhexidine 0.12% Liquid 15 milliLiter(s) Oral Mucosa every 12 hours  chlorhexidine 4% Liquid 1 Application(s) Topical <User Schedule>  dexMEDEtomidine Infusion 0.2 MICROgram(s)/kG/Hr IV Continuous <Continuous>  dextrose 5%. 1000 milliLiter(s) IV Continuous <Continuous>  dextrose 5%. 1000 milliLiter(s) IV Continuous <Continuous>  dextrose 50% Injectable 50 milliLiter(s) IV Push every 15 minutes  dextrose 50% Injectable 50 milliLiter(s) IV Push every 15 minutes  diltiazem    Tablet 60 milliGRAM(s) Enteral Tube every 6 hours  fentaNYL    Injectable 50 MICROGram(s) IV Push every 4 hours PRN  glucagon  Injectable 1 milliGRAM(s) IntraMuscular once  insulin lispro (ADMELOG) corrective regimen sliding scale   SubCutaneous every 6 hours  insulin NPH human recombinant 5 Unit(s) SubCutaneous every 6 hours  methylPREDNISolone sodium succinate Injectable 10 milliGRAM(s) IV Push daily  metolazone 10 milliGRAM(s) Oral <User Schedule>  midodrine 30 milliGRAM(s) Oral every 8 hours  multivitamin 1 Tablet(s) Oral daily  pantoprazole Infusion 8 mG/Hr IV Continuous <Continuous>  phenylephrine    Infusion 0.5 MICROgram(s)/kG/Min IV Continuous <Continuous>  polyethylene glycol 3350 17 Gram(s) Oral two times a day PRN  propofol Infusion 10 MICROgram(s)/kG/Min IV Continuous <Continuous>  senna Syrup 10 milliLiter(s) Oral at bedtime  sucralfate suspension 1 Gram(s) Oral every 6 hours  thiamine 100 milliGRAM(s) Oral daily      ROS: 14 point ROS negative unless otherwise state in subjective    PHYSICAL EXAM:   Vital Signs:  Vital Signs Last 24 Hrs  T(C): 37.1 (2023 07:00), Max: 37.6 (2023 02:00)  T(F): 98.8 (2023 07:00), Max: 99.7 (2023 02:00)  HR: 70 (2023 06:45) (70 - 133)  BP: 103/46 (2023 06:45) (88/40 - 132/53)  BP(mean): 67 (2023 06:45) (58 - 82)  RR: 20 (2023 07:00) (20 - 27)  SpO2: 100% (2023 07:00) (95% - 100%)    Parameters below as of 2023 19:00  Patient On (Oxygen Delivery Method): ventilator,20/350/5    O2 Concentration (%): 40  Daily     Daily Weight in k.7 (2023 05:00)    GENERAL:  No acute distress  HEENT:  NCAT, no scleral icterus  CHEST: no resp distress  HEART:  RRR  ABDOMEN:  Soft, non-tender, non-distended, normoactive bowel sounds, no masses  EXTREMITIES:  No cyanosis, clubbing, or edema  SKIN:  No rash/erythema/ecchymoses/petechiae/wounds/abscess/warm/dry  NEURO:  Alert and oriented x 3, no asterixis, no tremor    LABS:                        8.2    32.06 )-----------( 118      ( 2023 23:45 )             24.3     Mean Cell Volume: 83.5 fl (23 @ 23:45)        140  |  101  |  117<H>  ----------------------------<  78  3.9   |  21<L>  |  2.39<H>    Ca    7.6<L>      2023 23:45  Phos  6.7       Mg     1.8         TPro  4.7<L>  /  Alb  2.1<L>  /  TBili  0.5  /  DBili  x   /  AST  36  /  ALT  63<H>  /  AlkPhos  165<H>      LIVER FUNCTIONS - ( 2023 23:45 )  Alb: 2.1 g/dL / Pro: 4.7 g/dL / ALK PHOS: 165 U/L / ALT: 63 U/L / AST: 36 U/L / GGT: x           PT/INR - ( 2023 23:45 )   PT: 15.2 sec;   INR: 1.31 ratio         PTT - ( 2023 23:45 )  PTT:29.2 sec  Urinalysis Basic - ( 2023 13:38 )    Color: Light Orange / Appearance: Turbid / S.010 / pH: x  Gluc: x / Ketone: Negative  / Bili: Negative / Urobili: Negative   Blood: x / Protein: 30 mg/dL / Nitrite: Negative   Leuk Esterase: Large / RBC: 251 /hpf /  /HPF   Sq Epi: x / Non Sq Epi: 1 /hpf / Bacteria: Negative            Imaging:           Gastroenterology/Hepatology Progress Note      Interval Events:   Pt. remains intubated and sedated.   As per the nurse, had no bloody BM or bloody o/p from the OG tube.   Hgb has been stable.     Allergies:  Avelox (Pruritus)  fentanyl (Other)  fish (Vomiting)  Levaquin (Unknown)  shellfish (Vomiting)      Hospital Medications:  acetaminophen    Suspension .. 350 milliGRAM(s) Oral every 6 hours PRN  albuterol/ipratropium for Nebulization 3 milliLiter(s) Nebulizer every 6 hours PRN  ascorbic acid 500 milliGRAM(s) Oral daily  atorvastatin 40 milliGRAM(s) Oral at bedtime  azithromycin   Tablet 250 milliGRAM(s) Oral <User Schedule>  bacitracin   Ointment 1 Application(s) Topical daily  buMETAnide Infusion 2 mG/Hr IV Continuous <Continuous>  cefepime   IVPB 500 milliGRAM(s) IV Intermittent every 8 hours  chlorhexidine 0.12% Liquid 15 milliLiter(s) Oral Mucosa every 12 hours  chlorhexidine 4% Liquid 1 Application(s) Topical <User Schedule>  dexMEDEtomidine Infusion 0.2 MICROgram(s)/kG/Hr IV Continuous <Continuous>  dextrose 5%. 1000 milliLiter(s) IV Continuous <Continuous>  dextrose 5%. 1000 milliLiter(s) IV Continuous <Continuous>  dextrose 50% Injectable 50 milliLiter(s) IV Push every 15 minutes  dextrose 50% Injectable 50 milliLiter(s) IV Push every 15 minutes  diltiazem    Tablet 60 milliGRAM(s) Enteral Tube every 6 hours  fentaNYL    Injectable 50 MICROGram(s) IV Push every 4 hours PRN  glucagon  Injectable 1 milliGRAM(s) IntraMuscular once  insulin lispro (ADMELOG) corrective regimen sliding scale   SubCutaneous every 6 hours  insulin NPH human recombinant 5 Unit(s) SubCutaneous every 6 hours  methylPREDNISolone sodium succinate Injectable 10 milliGRAM(s) IV Push daily  metolazone 10 milliGRAM(s) Oral <User Schedule>  midodrine 30 milliGRAM(s) Oral every 8 hours  multivitamin 1 Tablet(s) Oral daily  pantoprazole Infusion 8 mG/Hr IV Continuous <Continuous>  phenylephrine    Infusion 0.5 MICROgram(s)/kG/Min IV Continuous <Continuous>  polyethylene glycol 3350 17 Gram(s) Oral two times a day PRN  propofol Infusion 10 MICROgram(s)/kG/Min IV Continuous <Continuous>  senna Syrup 10 milliLiter(s) Oral at bedtime  sucralfate suspension 1 Gram(s) Oral every 6 hours  thiamine 100 milliGRAM(s) Oral daily      ROS: 14 point ROS : Unable to obtain    PHYSICAL EXAM:   Vital Signs:  Vital Signs Last 24 Hrs  T(C): 37.1 (2023 07:00), Max: 37.6 (2023 02:00)  T(F): 98.8 (2023 07:00), Max: 99.7 (2023 02:00)  HR: 70 (2023 06:45) (70 - 133)  BP: 103/46 (2023 06:45) (88/40 - 132/53)  BP(mean): 67 (2023 06:45) (58 - 82)  RR: 20 (2023 07:00) (20 - 27)  SpO2: 100% (2023 07:00) (95% - 100%)    Parameters below as of 2023 19:00  Patient On (Oxygen Delivery Method): ventilator,20/350/5    O2 Concentration (%): 40  Daily     Daily Weight in k.7 (2023 05:00)    GENERAL:  Intubated.   HEENT:  NCAT, no scleral icterus  CHEST: Intubated.   ABDOMEN:  Soft, mildly distended, no palpable masses  EXTREMITIES:  Has 1+ pitting edema b/l.   NEURO:  Sedated.       LABS:                        8.2    32.06 )-----------( 118      ( 2023 23:45 )             24.3     Mean Cell Volume: 83.5 fl (23 @ 23:45)        140  |  101  |  117<H>  ----------------------------<  78  3.9   |  21<L>  |  2.39<H>    Ca    7.6<L>      2023 23:45  Phos  6.7       Mg     1.8         TPro  4.7<L>  /  Alb  2.1<L>  /  TBili  0.5  /  DBili  x   /  AST  36  /  ALT  63<H>  /  AlkPhos  165<H>      LIVER FUNCTIONS - ( 2023 23:45 )  Alb: 2.1 g/dL / Pro: 4.7 g/dL / ALK PHOS: 165 U/L / ALT: 63 U/L / AST: 36 U/L / GGT: x           PT/INR - ( 2023 23:45 )   PT: 15.2 sec;   INR: 1.31 ratio         PTT - ( 2023 23:45 )  PTT:29.2 sec  Urinalysis Basic - ( 2023 13:38 )    Color: Light Orange / Appearance: Turbid / S.010 / pH: x  Gluc: x / Ketone: Negative  / Bili: Negative / Urobili: Negative   Blood: x / Protein: 30 mg/dL / Nitrite: Negative   Leuk Esterase: Large / RBC: 251 /hpf /  /HPF   Sq Epi: x / Non Sq Epi: 1 /hpf / Bacteria: Negative            Imaging:        Abdominal XR    Findings/  Impression: Similar-appearing enteric tube with tip in the stomach and   sidehole at the gastroesophageal junction consider advancing 7 cm.   Limited evaluation for air-fluid level on this supine radiograph, however   bowel gas pattern appears nonobstructive. Again noted levocurvature of   the lumbar spine. No acute osseous process.

## 2023-01-22 NOTE — PROGRESS NOTE ADULT - SUBJECTIVE AND OBJECTIVE BOX
INTERVAL HPI/OVERNIGHT EVENTS:    SUBJECTIVE: Patient seen and examined at bedside.     ROS: All negative except as listed above.    VITAL SIGNS:  ICU Vital Signs Last 24 Hrs  T(C): 37.1 (2023 07:00), Max: 37.6 (2023 02:00)  T(F): 98.8 (2023 07:00), Max: 99.7 (2023 02:00)  HR: 70 (2023 06:45) (70 - 133)  BP: 103/46 (2023 06:45) (88/40 - 132/53)  BP(mean): 67 (2023 06:45) (58 - 82)  ABP: --  ABP(mean): --  RR: 20 (2023 07:00) (20 - 27)  SpO2: 100% (2023 07:00) (95% - 100%)    O2 Parameters below as of 2023 19:00  Patient On (Oxygen Delivery Method): ventilator,20/350/5    O2 Concentration (%): 40      Mode: AC/ CMV (Assist Control/ Continuous Mandatory Ventilation), RR (machine): 20, TV (machine): 350, FiO2: 40, PEEP: 5, ITime: 1, MAP: 12, PIP: 32  Plateau pressure:   P/F ratio:     - @ 07:01  -  - @ 07:00  --------------------------------------------------------  IN: 2072.4 mL / OUT: 3295 mL / NET: -1222.6 mL      CAPILLARY BLOOD GLUCOSE  89 (2023 05:45)      POCT Blood Glucose.: 89 mg/dL (2023 05:43)      ECG: reviewed.    PHYSICAL EXAM:    GENERAL: NAD, lying in bed comfortably  HEAD:  Atraumatic, normocephalic  EYES: EOMI, PERRLA, conjunctiva and sclera clear  NECK: Supple, trachea midline, no JVD  HEART: Regular rate and rhythm, no murmurs, rubs, or gallops  LUNGS: Unlabored respirations.  Clear to auscultation bilaterally, no crackles, wheezing, or rhonchi  ABDOMEN: Soft, nontender, nondistended, +BS  EXTREMITIES: 2+ peripheral pulses bilaterally, cap refill<2 secs. No clubbing, cyanosis, or edema  NERVOUS SYSTEM:  A&Ox3, following commands, moving all extremities, no focal deficits   SKIN: No rashes or lesions    MEDICATIONS:  MEDICATIONS  (STANDING):  ascorbic acid 500 milliGRAM(s) Oral daily  atorvastatin 40 milliGRAM(s) Oral at bedtime  azithromycin   Tablet 250 milliGRAM(s) Oral <User Schedule>  bacitracin   Ointment 1 Application(s) Topical daily  buMETAnide Infusion 2 mG/Hr (10 mL/Hr) IV Continuous <Continuous>  cefepime   IVPB 500 milliGRAM(s) IV Intermittent every 8 hours  chlorhexidine 0.12% Liquid 15 milliLiter(s) Oral Mucosa every 12 hours  chlorhexidine 4% Liquid 1 Application(s) Topical <User Schedule>  dexMEDEtomidine Infusion 0.2 MICROgram(s)/kG/Hr (1.89 mL/Hr) IV Continuous <Continuous>  dextrose 5%. 1000 milliLiter(s) (50 mL/Hr) IV Continuous <Continuous>  dextrose 5%. 1000 milliLiter(s) (100 mL/Hr) IV Continuous <Continuous>  dextrose 50% Injectable 50 milliLiter(s) IV Push every 15 minutes  dextrose 50% Injectable 50 milliLiter(s) IV Push every 15 minutes  diltiazem    Tablet 60 milliGRAM(s) Enteral Tube every 6 hours  glucagon  Injectable 1 milliGRAM(s) IntraMuscular once  insulin lispro (ADMELOG) corrective regimen sliding scale   SubCutaneous every 6 hours  insulin NPH human recombinant 5 Unit(s) SubCutaneous every 6 hours  methylPREDNISolone sodium succinate Injectable 10 milliGRAM(s) IV Push daily  metolazone 10 milliGRAM(s) Oral <User Schedule>  midodrine 30 milliGRAM(s) Oral every 8 hours  multivitamin 1 Tablet(s) Oral daily  pantoprazole Infusion 8 mG/Hr (10 mL/Hr) IV Continuous <Continuous>  phenylephrine    Infusion 0.5 MICROgram(s)/kG/Min (7.07 mL/Hr) IV Continuous <Continuous>  propofol Infusion 10 MICROgram(s)/kG/Min (2.26 mL/Hr) IV Continuous <Continuous>  senna Syrup 10 milliLiter(s) Oral at bedtime  sucralfate suspension 1 Gram(s) Oral every 6 hours  thiamine 100 milliGRAM(s) Oral daily    MEDICATIONS  (PRN):  acetaminophen    Suspension .. 350 milliGRAM(s) Oral every 6 hours PRN Temp greater or equal to 38C (100.4F), Mild Pain (1 - 3), Moderate Pain (4 - 6)  albuterol/ipratropium for Nebulization 3 milliLiter(s) Nebulizer every 6 hours PRN Respiratory Distress  fentaNYL    Injectable 50 MICROGram(s) IV Push every 4 hours PRN Severe Pain (7 - 10)  polyethylene glycol 3350 17 Gram(s) Oral two times a day PRN Constipation      ALLERGIES:  Allergies    Avelox (Pruritus)  fish (Vomiting)  Levaquin (Unknown)  shellfish (Vomiting)    Intolerances    fentanyl (Other)      LABS:                        8.2    32.06 )-----------( 118      ( 2023 23:45 )             24.3         140  |  101  |  117<H>  ----------------------------<  78  3.9   |  21<L>  |  2.39<H>    Ca    7.6<L>      2023 23:45  Phos  6.7       Mg     1.8         TPro  4.7<L>  /  Alb  2.1<L>  /  TBili  0.5  /  DBili  x   /  AST  36  /  ALT  63<H>  /  AlkPhos  165<H>      PT/INR - ( 2023 23:45 )   PT: 15.2 sec;   INR: 1.31 ratio         PTT - ( 2023 23:45 )  PTT:29.2 sec  Urinalysis Basic - ( 2023 13:38 )    Color: Light Orange / Appearance: Turbid / S.010 / pH: x  Gluc: x / Ketone: Negative  / Bili: Negative / Urobili: Negative   Blood: x / Protein: 30 mg/dL / Nitrite: Negative   Leuk Esterase: Large / RBC: 251 /hpf /  /HPF   Sq Epi: x / Non Sq Epi: 1 /hpf / Bacteria: Negative      ABG:      vBG:    Micro:    Culture - Blood (collected 23 @ 19:52)  Source: .Blood Blood-Peripheral  Preliminary Report (23 @ 02:02):    No growth to date.    Culture - Blood (collected 23 @ 18:53)  Source: .Blood Blood-Peripheral  Preliminary Report (23 @ 02:02):    No growth to date.    Culture - Blood (collected 01-10-23 @ 22:10)  Source: .Blood Blood-Peripheral  Final Report (23 @ 01:00):    No Growth Final    Culture - Blood (collected 01-10-23 @ 22:09)  Source: .Blood Blood-Peripheral  Final Report (23 @ 01:00):    No Growth Final    Culture - Blood (collected 23 @ 14:35)  Source: .Blood Blood-Peripheral  Final Report (23 @ 01:01):    No Growth Final    Culture - Blood (collected 23 @ 14:35)  Source: .Blood Blood-Peripheral  Final Report (23 @ 01:01):    No Growth Final        Culture - Sputum (collected 23 @ 07:47)  Source: ET Tube ET Tube  Gram Stain (23 @ 21:58):    Numerous polymorphonuclear leukocytes per low power field    Few Squamous epithelial cells per low power field    Numerous Gram Negative Rods seen per oil power field    Few Gram Positive Rods seen per oil power field    Few Yeast like cells seen per oil power field  Final Report (23 @ 18:55):    Normal Respiratory Janette present        RADIOLOGY & ADDITIONAL TESTS: Reviewed.   INTERVAL HPI/OVERNIGHT EVENTS:    SUBJECTIVE: Patient seen and examined at bedside. Daughter by bedside. She reports Pt's  is now fully aware of the situation. They have not yet reached a decision upon goals of care.     ROS: All negative except as listed above.    VITAL SIGNS:  ICU Vital Signs Last 24 Hrs  T(C): 37.1 (2023 07:00), Max: 37.6 (2023 02:00)  T(F): 98.8 (2023 07:00), Max: 99.7 (2023 02:00)  HR: 70 (2023 06:45) (70 - 133)  BP: 103/46 (2023 06:45) (88/40 - 132/53)  BP(mean): 67 (2023 06:45) (58 - 82)  ABP: --  ABP(mean): --  RR: 20 (2023 07:00) (20 - 27)  SpO2: 100% (2023 07:00) (95% - 100%)    O2 Parameters below as of 2023 19:00  Patient On (Oxygen Delivery Method): ventilator,20/350/5    O2 Concentration (%): 40      Mode: AC/ CMV (Assist Control/ Continuous Mandatory Ventilation), RR (machine): 20, TV (machine): 350, FiO2: 40, PEEP: 5, ITime: 1, MAP: 12, PIP: 32  Plateau pressure:   P/F ratio:     -21 @ 07:01  -  01-22 @ 07:00  --------------------------------------------------------  IN: 2072.4 mL / OUT: 3295 mL / NET: -1222.6 mL      CAPILLARY BLOOD GLUCOSE  89 (2023 05:45)      POCT Blood Glucose.: 89 mg/dL (2023 05:43)      ECG: reviewed.    PHYSICAL EXAM:    GENERAL: NAD, lying in bed comfortably  HEAD:  Atraumatic, normocephalic  NECK: Supple, trachea midline, no JVD  HEART: Irregular rhythm, rate controlled  LUNGS: Unlabored respirations.    ABDOMEN: Soft, nontender, nondistended,   EXTREMITIES: 2+ peripheral pulses bilaterally, + upper and lower extremity edema  NERVOUS SYSTEM:  intubated and sedated, no focal deficits       MEDICATIONS:  MEDICATIONS  (STANDING):  ascorbic acid 500 milliGRAM(s) Oral daily  atorvastatin 40 milliGRAM(s) Oral at bedtime  azithromycin   Tablet 250 milliGRAM(s) Oral <User Schedule>  bacitracin   Ointment 1 Application(s) Topical daily  buMETAnide Infusion 2 mG/Hr (10 mL/Hr) IV Continuous <Continuous>  cefepime   IVPB 500 milliGRAM(s) IV Intermittent every 8 hours  chlorhexidine 0.12% Liquid 15 milliLiter(s) Oral Mucosa every 12 hours  chlorhexidine 4% Liquid 1 Application(s) Topical <User Schedule>  dexMEDEtomidine Infusion 0.2 MICROgram(s)/kG/Hr (1.89 mL/Hr) IV Continuous <Continuous>  dextrose 5%. 1000 milliLiter(s) (50 mL/Hr) IV Continuous <Continuous>  dextrose 5%. 1000 milliLiter(s) (100 mL/Hr) IV Continuous <Continuous>  dextrose 50% Injectable 50 milliLiter(s) IV Push every 15 minutes  dextrose 50% Injectable 50 milliLiter(s) IV Push every 15 minutes  diltiazem    Tablet 60 milliGRAM(s) Enteral Tube every 6 hours  glucagon  Injectable 1 milliGRAM(s) IntraMuscular once  insulin lispro (ADMELOG) corrective regimen sliding scale   SubCutaneous every 6 hours  insulin NPH human recombinant 5 Unit(s) SubCutaneous every 6 hours  methylPREDNISolone sodium succinate Injectable 10 milliGRAM(s) IV Push daily  metolazone 10 milliGRAM(s) Oral <User Schedule>  midodrine 30 milliGRAM(s) Oral every 8 hours  multivitamin 1 Tablet(s) Oral daily  pantoprazole Infusion 8 mG/Hr (10 mL/Hr) IV Continuous <Continuous>  phenylephrine    Infusion 0.5 MICROgram(s)/kG/Min (7.07 mL/Hr) IV Continuous <Continuous>  propofol Infusion 10 MICROgram(s)/kG/Min (2.26 mL/Hr) IV Continuous <Continuous>  senna Syrup 10 milliLiter(s) Oral at bedtime  sucralfate suspension 1 Gram(s) Oral every 6 hours  thiamine 100 milliGRAM(s) Oral daily    MEDICATIONS  (PRN):  acetaminophen    Suspension .. 350 milliGRAM(s) Oral every 6 hours PRN Temp greater or equal to 38C (100.4F), Mild Pain (1 - 3), Moderate Pain (4 - 6)  albuterol/ipratropium for Nebulization 3 milliLiter(s) Nebulizer every 6 hours PRN Respiratory Distress  fentaNYL    Injectable 50 MICROGram(s) IV Push every 4 hours PRN Severe Pain (7 - 10)  polyethylene glycol 3350 17 Gram(s) Oral two times a day PRN Constipation      ALLERGIES:  Allergies    Avelox (Pruritus)  fish (Vomiting)  Levaquin (Unknown)  shellfish (Vomiting)    Intolerances    fentanyl (Other)      LABS:                        8.2    32.06 )-----------( 118      ( 2023 23:45 )             24.3         140  |  101  |  117<H>  ----------------------------<  78  3.9   |  21<L>  |  2.39<H>    Ca    7.6<L>      2023 23:45  Phos  6.7       Mg     1.8         TPro  4.7<L>  /  Alb  2.1<L>  /  TBili  0.5  /  DBili  x   /  AST  36  /  ALT  63<H>  /  AlkPhos  165<H>      PT/INR - ( 2023 23:45 )   PT: 15.2 sec;   INR: 1.31 ratio         PTT - ( 2023 23:45 )  PTT:29.2 sec  Urinalysis Basic - ( 2023 13:38 )    Color: Light Orange / Appearance: Turbid / S.010 / pH: x  Gluc: x / Ketone: Negative  / Bili: Negative / Urobili: Negative   Blood: x / Protein: 30 mg/dL / Nitrite: Negative   Leuk Esterase: Large / RBC: 251 /hpf /  /HPF   Sq Epi: x / Non Sq Epi: 1 /hpf / Bacteria: Negative      ABG:      vBG:    Micro:    Culture - Blood (collected 23 @ 19:52)  Source: .Blood Blood-Peripheral  Preliminary Report (23 @ 02:02):    No growth to date.    Culture - Blood (collected 23 @ 18:53)  Source: .Blood Blood-Peripheral  Preliminary Report (23 @ 02:02):    No growth to date.    Culture - Blood (collected 01-10-23 @ 22:10)  Source: .Blood Blood-Peripheral  Final Report (23 @ 01:00):    No Growth Final    Culture - Blood (collected 01-10-23 @ 22:09)  Source: .Blood Blood-Peripheral  Final Report (23 @ 01:00):    No Growth Final    Culture - Blood (collected 23 @ 14:35)  Source: .Blood Blood-Peripheral  Final Report (23 @ 01:01):    No Growth Final    Culture - Blood (collected 23 @ 14:35)  Source: .Blood Blood-Peripheral  Final Report (23 @ 01:01):    No Growth Final        Culture - Sputum (collected 23 @ 07:47)  Source: ET Tube ET Tube  Gram Stain (23 @ 21:58):    Numerous polymorphonuclear leukocytes per low power field    Few Squamous epithelial cells per low power field    Numerous Gram Negative Rods seen per oil power field    Few Gram Positive Rods seen per oil power field    Few Yeast like cells seen per oil power field  Final Report (23 @ 18:55):    Normal Respiratory Janette present        RADIOLOGY & ADDITIONAL TESTS: Reviewed.

## 2023-01-22 NOTE — PROGRESS NOTE ADULT - ASSESSMENT
ASSESSMENT/PLAN:  77-year-old female with past medical history of DM, HTN, HLD, asthma on daily steroids, A. fib on Eliquis and digoxin presenting with shortness of breath, found to be in Afib w/RVR; admitted to MICU for Acute Hypoxic Respiratory Failure in s/o Asthma and Bronchiectasis w/ possible PNA, leading to intubation and sedation. Currently urine output worsening, attempting to to SBP daily.     NEURO  intubated on precedex, propofol       CARDIAC  # Afib w/ RVR  Admitted in RVR; being followed by Dr. Marcus, Cardiology; Afib w/ RVR thought to be due to stress from hypoxemia  - Chronic atrial fib. with RVR.  Continue diltiazem drip, today at 10  - Echo (1/11) EF 75%  1. Endocardial visualization enhanced with intravenous  injection of Ultrasonic Enhancing Agent (Definity).  Hyperdynamic left ventricular systolic function. No left  ventricular thrombus.  2. Normal right ventricular size and function.  - TSH normal    Hypotension requiring pressors:   - continued on phenylephrine     PULM  #Acute on chronic  hypoxemic respiratory failure due to acute exacerbation of bronchiectasis / asthma and possible pneumonia.   Continue current AC vent settings.  Did not tolerate PS wean 1/17 or 1/18.   Continue solumedrol to 20mg  daily - decreased to 10mg qd  Continue cefepime day 11/14 for pseudomonas pneumonia  Chest PT   3% saline nebulizers - discontinued, with Duo nebs - changed to prn  Azithromycin  M, W, F for   antinflammatory effects.    RENAL  # SHANE  - urine lytes showing FeNa 2.2%-> ATN; treat with fluids. Creatinine stabilized at 1.8 now.   - urine output decreased to 5cc/hr 1/16 did not improve with IVF  - bumex 2mg bid -> bumex infusion   - overnight, urine output was decreasing. per nephro- rec standing metolazone and placement of central line for double concentrated tiffanie as getting large volume of fluid with tiffanie - hold off for now  - given albumin o/n    GI   - concern for GI bleed (hb drop requiring blood transfusion 1/15 and 1/16) and small volume hematoschezia visualized 1/16am per RN. GI consulted - hold off endoscopic evaluation unless further bleeding occurs.   - hold off CT abdomen given hb is stable, no gross gi bleed currently   - Diet: tube feeds  - Bowel regimen: miralax, senna    ID  # Sepsis  CT Chest w/ bilateral lower lobe predominant patchy and branching opacities suspicious for pneumonia. Small left pleural effusion. Leukocytosis, tachycardia and tachypnea; No active indications of infections, all infectious workup negative so far; leukocytosis likely in s/o solumedrol usage but no diff to conform neutrophilia so far; tachycardia likely in s/o tachypnea and AHRF  - was started on cefepime 1/8 = now it is day 12/14  - Urine legionella antigen - neg, final BCx 1/10 - neg, BAL cultures - numerous GNR, few gram pos       ENDO  DM2, hyperglycemia  - hyperglycemic 450s 1/15, started on insulin gtt 1/15pm. likely in setting of steroids.   - 1/16 - 1/17 was on insulin gtt, now on nph 5u q6h + SSI with improved glc       HEME/ONC  - Hold Lovenox 50mg BID - monitor for acute VTE  - heparin 5000 bid     ETHICS  full code  family declined to make decision today regarding code status. Though patient's children do not want chest compressions, patient's  is wanting further time to process the news. Palliative on board - f/u recs.    ASSESSMENT/PLAN:  77-year-old female with past medical history of DM, HTN, HLD, asthma on daily steroids, A. fib on Eliquis and digoxin presenting with shortness of breath, found to be in Afib w/RVR; admitted to MICU for Acute Hypoxic Respiratory Failure in s/o Asthma and Bronchiectasis w/ possible PNA, leading to intubation and sedation. Currently urine output worsening, attempting to to SBP daily.     NEURO  intubated on precedex, propofol       CARDIAC  # Afib w/ RVR  Admitted in RVR; being followed by Dr. Marcus, Cardiology; Afib w/ RVR thought to be due to stress from hypoxemia  - Chronic atrial fib. with RVR.  Continue diltiazem drip, today at 10  - Echo (1/11) EF 75%  1. Endocardial visualization enhanced with intravenous  injection of Ultrasonic Enhancing Agent (Definity).  Hyperdynamic left ventricular systolic function. No left  ventricular thrombus.  2. Normal right ventricular size and function.  - TSH normal    Hypotension requiring pressors:   - continued on phenylephrine     PULM  #Acute on chronic  hypoxemic respiratory failure due to acute exacerbation of bronchiectasis / asthma and possible pneumonia.   Continue current AC vent settings, increased respiratory rate.  Did not tolerate PS wean 1/17 or 1/18.   Continue solumedrol to 20mg  daily - decreased to 10mg qd  Continue cefepime day 11/14 for pseudomonas pneumonia  Chest PT   3% saline nebulizers - discontinued, with Duo nebs - changed to prn  Azithromycin  M, W, F for   antinflammatory effects.    RENAL  # SHANE  - urine lytes showing FeNa 2.2%-> ATN; treat with fluids. Creatinine stabilized at 1.8 now.   - urine output decreased to 5cc/hr 1/16 did not improve with IVF  - bumex 2mg bid -> bumex infusion, Cont with diuresis with bumex and metolazone.   - given albumin o/n      GI   - concern for GI bleed (hb drop requiring blood transfusion 1/15 and 1/16) and small volume hematoschezia visualized 1/16am per RN. GI consulted - hold off endoscopic evaluation unless further bleeding occurs.   - hold off CT abdomen given hb is stable, no gross gi bleed currently   - Diet: tube feeds  - Bowel regimen: miralax, senna  - Will switch PPI ggt to IV pushes if Pt does not have any bloody BMs    ID  # Sepsis  CT Chest w/ bilateral lower lobe predominant patchy and branching opacities suspicious for pneumonia. Small left pleural effusion. Leukocytosis, tachycardia and tachypnea; No active indications of infections, all infectious workup negative so far; leukocytosis likely in s/o solumedrol usage but no diff to conform neutrophilia so far; tachycardia likely in s/o tachypnea and AHRF  - was started on cefepime 1/8 = now it is day 12/14  - Urine legionella antigen - neg, final BCx 1/10 - neg, BAL cultures - numerous GNR, few gram pos       ENDO  DM2, hyperglycemia  - hyperglycemic 450s 1/15, started on insulin gtt 1/15pm. likely in setting of steroids.   - 1/16 - 1/17 was on insulin gtt, now on nph 5u q6h + SSI with improved glc       HEME/ONC  - Hold Lovenox 50mg BID - monitor for acute VTE  - heparin 5000 bid     ETHICS  full code  family declined to make decision today regarding code status. Though patient's children do not want chest compressions, patient's  is wanting further time to process the news. Palliative on board - f/u recs.

## 2023-01-22 NOTE — PROGRESS NOTE ADULT - ASSESSMENT
77-year-old female with past medical history of DM, HTN, HLD, asthma on daily steroids, A. fib on Eliquis and digoxin presenting with shortness of breath, found to be in Afib w/RVR. Pt admitted to MICU for Acute Hypoxic Respiratory Failure in s/o Asthma and Bronchiectasis w/ possible PNA, leading to intubation and sedation. GI consulted for episode of hematochezia.    #Chronic anemia in the setting of coffee ground emesis  Hgb dropped from 13>6 since admission on initial GI consult, when she had hematochezia. it was thought to be either hemorrhoidal or diverticular bleed. Opted to hold off on intervention as the patient's hgb stabilized and she was critically ill.   - Today, hgb has been stable at 8.2 with no overt signs of bleeding. Discussed with the daughter at bedside, would like conservative management and want to hold off on the intervention.     #Hypoxic respiratory failure 2/2 asthma vs bronchiectasis vs pseudomonas PNA. Intubated/sedated  - on solumedrol and cefepime.     #Supratherapeutic INR - resolved.   on admission INR 11     Recommendations:   - Discussed w/ the family, due to stable hgb levels, would hold off on GI intervention at this time   - Continue with PPI drip for 72 hours and can transition to IV PPI BID.   - Continue w/ sucralfate 1 gram Q6 hours.   - trend CBC and transfuse for Hgb<7  - Rest of the care, per primary team    Thank you for the consult. Please call us back if you have any questions or concerns.     All recommendations are tentative until the note is attested by an attending.     Regina Robles, PGY-4  Gastroenterology/Hepatology Fellow  Available on [employer] Teams  [phone] (Short Range Pager)  [phone] (Long Range Pager)    After 5pm, please contact the on-call GI fellow. [phone]

## 2023-01-22 NOTE — PROGRESS NOTE ADULT - PROBLEM SELECTOR PLAN 1
Pt. with SHANE suspected in setting of hemodynamics with notable hypotension throughout the hospital course requiring IV vasopressors. [facility] to have a SCr of 0.64 on admission 1/8, which increased to 2.58 on 1/11, and since then has improved but remains elevated to 2.3 this morning. UA and urine lytes reviewed. Spot urine TP/CR ratio elevated to 1.6. Pt likely with ATN. Pt currently non-oliguric, currently on IV bumex infusion; UOP noted to be ~3.1L over the past 24 hours. Pt remains clinically volume overloaded but improved. IV fluids double concentrated. Agree with augmenting diuresis with Metolazone. Watch Na levels with Metolazone. No absolute indication for RRT at this time, however if kidney function worsens then we will need to consider CRRT. Monitor labs and urine output. Avoid nephrotoxins. Dose medications as per eGFR.    If you have any questions, please feel free to contact me  Jose G Ferrari  Nephrology Fellow  [phone]; Prefer [employer] TEAMS  (After 5pm or on weekends please page the on-call fellow) Pt. with SHANE suspected in setting of hemodynamics with notable hypotension throughout the hospital course requiring IV vasopressors. [facility] noted to have a SCr of 0.64 on admission 1/8, which increased to 2.58 on 1/11, and since then has improved but remains elevated to 2.3 this morning. UA and urine lytes reviewed. Spot urine TP/CR ratio elevated to 1.6. Pt likely with ATN. Pt currently non-oliguric, currently on IV bumex infusion; UOP noted to be ~3.1L over the past 24 hours. Pt remains clinically volume overloaded but improved. IV fluids double concentrated. Agree with lowered dose of bumex. No absolute indication for RRT at this time, however if kidney function worsens then we will need to consider CRRT. Monitor labs and urine output. Avoid nephrotoxins. Dose medications as per eGFR.    If you have any questions, please feel free to contact me  Jose G Ferrari  Nephrology Fellow  [phone]; Prefer [employer] TEAMS  (After 5pm or on weekends please page the on-call fellow)

## 2023-01-22 NOTE — PROGRESS NOTE ADULT - SUBJECTIVE AND OBJECTIVE BOX
CARDIOLOGY     PROGRESS  NOTE   ________________________________________________    CHIEF COMPLAINT:Patient is a 77y old  Female who presents with a chief complaint of sob/ rapid a.fib (22 Jan 2023 12:45)  sedated on vent  	  REVIEW OF SYSTEMS:  CONSTITUTIONAL: No fever, weight loss, or fatigue  EYES: No eye pain, visual disturbances, or discharge  ENT:  No difficulty hearing, tinnitus, vertigo; No sinus or throat pain  NECK: No pain or stiffness  RESPIRATORY: No cough, wheezing, chills or hemoptysis; No Shortness of Breath  CARDIOVASCULAR: No chest pain, palpitations, passing out, dizziness, or leg swelling  GASTROINTESTINAL: No abdominal or epigastric pain. No nausea, vomiting, or hematemesis; No diarrhea or constipation. No melena or hematochezia.  GENITOURINARY: No dysuria, frequency, hematuria, or incontinence  NEUROLOGICAL: No headaches, memory loss, loss of strength, numbness, or tremors  SKIN: No itching, burning, rashes, or lesions   LYMPH Nodes: No enlarged glands  ENDOCRINE: No heat or cold intolerance; No hair loss  MUSCULOSKELETAL: No joint pain or swelling; No muscle, back, or extremity pain  PSYCHIATRIC: No depression, anxiety, mood swings, or difficulty sleeping  HEME/LYMPH: No easy bruising, or bleeding gums  ALLERGY AND IMMUNOLOGIC: No hives or eczema	    [x ] All others negative	  [ ] Unable to obtain    PHYSICAL EXAM:  T(C): 36.7 (01-22-23 @ 12:00), Max: 37.6 (01-22-23 @ 02:00)  HR: 60 (01-22-23 @ 15:00) (58 - 133)  BP: 99/47 (01-22-23 @ 15:00) (88/40 - 132/53)  RR: 20 (01-22-23 @ 15:00) (20 - 40)  SpO2: 100% (01-22-23 @ 15:00) (95% - 100%)  Wt(kg): --  I&O's Summary    21 Jan 2023 07:01  -  22 Jan 2023 07:00  --------------------------------------------------------  IN: 2072.4 mL / OUT: 3295 mL / NET: -1222.6 mL    22 Jan 2023 07:01  -  22 Jan 2023 15:34  --------------------------------------------------------  IN: 631.3 mL / OUT: 945 mL / NET: -313.7 mL        Appearance: on vent	  HEENT:   Normal oral mucosa, PERRL, EOMI	  Lymphatic: No lymphadenopathy  Cardiovascular: Normal S1 S2, No JVD, + murmurs, No edema  Respiratory: rhonchi  Psychiatry: on sedation  Gastrointestinal:  Soft, Non-tender, + BS	  Skin: No rashes, No ecchymoses, No cyanosis	  Extremities: Normal range of motion, No clubbing, cyanosis or edema  Vascular: Peripheral pulses palpable 2+ bilaterally    MEDICATIONS  (STANDING):  ascorbic acid 500 milliGRAM(s) Oral daily  atorvastatin 40 milliGRAM(s) Oral at bedtime  azithromycin   Tablet 250 milliGRAM(s) Oral <User Schedule>  bacitracin   Ointment 1 Application(s) Topical daily  buMETAnide Infusion 2 mG/Hr (10 mL/Hr) IV Continuous <Continuous>  cefepime   IVPB 500 milliGRAM(s) IV Intermittent every 8 hours  chlorhexidine 0.12% Liquid 15 milliLiter(s) Oral Mucosa every 12 hours  chlorhexidine 4% Liquid 1 Application(s) Topical <User Schedule>  dexMEDEtomidine Infusion 0.2 MICROgram(s)/kG/Hr (1.89 mL/Hr) IV Continuous <Continuous>  dextrose 5%. 1000 milliLiter(s) (50 mL/Hr) IV Continuous <Continuous>  dextrose 5%. 1000 milliLiter(s) (100 mL/Hr) IV Continuous <Continuous>  dextrose 50% Injectable 50 milliLiter(s) IV Push every 15 minutes  dextrose 50% Injectable 50 milliLiter(s) IV Push every 15 minutes  diltiazem    Tablet 60 milliGRAM(s) Enteral Tube every 6 hours  glucagon  Injectable 1 milliGRAM(s) IntraMuscular once  insulin lispro (ADMELOG) corrective regimen sliding scale   SubCutaneous every 6 hours  insulin NPH human recombinant 5 Unit(s) SubCutaneous every 6 hours  methylPREDNISolone sodium succinate Injectable 10 milliGRAM(s) IV Push daily  metolazone 10 milliGRAM(s) Oral <User Schedule>  midodrine 30 milliGRAM(s) Oral every 8 hours  multivitamin 1 Tablet(s) Oral daily  pantoprazole Infusion 8 mG/Hr (10 mL/Hr) IV Continuous <Continuous>  phenylephrine    Infusion 0.5 MICROgram(s)/kG/Min (7.07 mL/Hr) IV Continuous <Continuous>  propofol Infusion 10 MICROgram(s)/kG/Min (2.26 mL/Hr) IV Continuous <Continuous>  senna Syrup 10 milliLiter(s) Oral at bedtime  sucralfate suspension 1 Gram(s) Oral every 6 hours  thiamine 100 milliGRAM(s) Oral daily      TELEMETRY: 	    ECG:  	  RADIOLOGY:  OTHER: 	  	  LABS:	 	    CARDIAC MARKERS:                                8.1    40.48 )-----------( 133      ( 22 Jan 2023 12:07 )             24.6     01-21    140  |  101  |  117<H>  ----------------------------<  78  3.9   |  21<L>  |  2.39<H>    Ca    7.6<L>      21 Jan 2023 23:45  Phos  6.7     01-21  Mg     1.8     01-21    TPro  4.7<L>  /  Alb  2.1<L>  /  TBili  0.5  /  DBili  x   /  AST  36  /  ALT  63<H>  /  AlkPhos  165<H>  01-21    proBNP:   Lipid Profile:   HgA1c:   TSH: Thyroid Stimulating Hormone, Serum: 1.17 uIU/mL (01-09 @ 06:11)    PT/INR - ( 21 Jan 2023 23:45 )   PT: 15.2 sec;   INR: 1.31 ratio         PTT - ( 21 Jan 2023 23:45 )  PTT:29.2 sec      Assessment and plan  ---------------------------  77-year-old female with past medical history of DM, HTN, HLD, asthma on daily steroids, A. fib on Eliquis and digoxin presenting with shortness of breath.  Patient unable to provide history.  Daughter at bedside reports patient has had increasing shortness of breath, wheezing, cough for the past week now.  Patient also with increasing generalized weakness, fatigue and decreased p.o. intake.  Denies fever/chills, chest pain, abdominal pain, nausea, vomiting, diarrhea, dysuria.  Family reports patient had a similar episode 1 year ago when she was admitted to [facility] with UTI.  pt with sig medical hx, chronic a.fib with sob ?sec sepsis/ pneumoniae, UTI  sedated on vent  continue abx and steroid  echo noted  AC held sec to bleeding  still sedated on vent   continue abx  hr is well controlled still on po Cardizem 60 mg q 6h/ no ac  continue pressor keep MAP>65  sepsis on iv abx  fu renal function closely  a.fib hr is well controlled on Cardizem 5 mg/ hr will increase drip as needed  on Bumex/ metalazone for diuresis  nutrition  micu care/ vent  discussed with family prognosis guarded

## 2023-01-22 NOTE — PROGRESS NOTE ADULT - SUBJECTIVE AND OBJECTIVE BOX
Date of Service: 23 @ 12:46    Patient is a 77y old  Female who presents with a chief complaint of sob/ rapid a.fib (2023 09:44)      Any change in ROS: remains intubated  : on sedatives and vasopressors:    MEDICATIONS  (STANDING):  ascorbic acid 500 milliGRAM(s) Oral daily  atorvastatin 40 milliGRAM(s) Oral at bedtime  azithromycin   Tablet 250 milliGRAM(s) Oral <User Schedule>  bacitracin   Ointment 1 Application(s) Topical daily  buMETAnide Infusion 2 mG/Hr (10 mL/Hr) IV Continuous <Continuous>  cefepime   IVPB 500 milliGRAM(s) IV Intermittent every 8 hours  chlorhexidine 0.12% Liquid 15 milliLiter(s) Oral Mucosa every 12 hours  chlorhexidine 4% Liquid 1 Application(s) Topical <User Schedule>  dexMEDEtomidine Infusion 0.2 MICROgram(s)/kG/Hr (1.89 mL/Hr) IV Continuous <Continuous>  dextrose 5%. 1000 milliLiter(s) (50 mL/Hr) IV Continuous <Continuous>  dextrose 5%. 1000 milliLiter(s) (100 mL/Hr) IV Continuous <Continuous>  dextrose 50% Injectable 50 milliLiter(s) IV Push every 15 minutes  dextrose 50% Injectable 50 milliLiter(s) IV Push every 15 minutes  diltiazem    Tablet 60 milliGRAM(s) Enteral Tube every 6 hours  glucagon  Injectable 1 milliGRAM(s) IntraMuscular once  insulin lispro (ADMELOG) corrective regimen sliding scale   SubCutaneous every 6 hours  insulin NPH human recombinant 5 Unit(s) SubCutaneous every 6 hours  methylPREDNISolone sodium succinate Injectable 10 milliGRAM(s) IV Push daily  metolazone 10 milliGRAM(s) Oral <User Schedule>  midodrine 30 milliGRAM(s) Oral every 8 hours  multivitamin 1 Tablet(s) Oral daily  pantoprazole Infusion 8 mG/Hr (10 mL/Hr) IV Continuous <Continuous>  phenylephrine    Infusion 0.5 MICROgram(s)/kG/Min (7.07 mL/Hr) IV Continuous <Continuous>  propofol Infusion 10 MICROgram(s)/kG/Min (2.26 mL/Hr) IV Continuous <Continuous>  senna Syrup 10 milliLiter(s) Oral at bedtime  sucralfate suspension 1 Gram(s) Oral every 6 hours  thiamine 100 milliGRAM(s) Oral daily    MEDICATIONS  (PRN):  acetaminophen    Suspension .. 350 milliGRAM(s) Oral every 6 hours PRN Temp greater or equal to 38C (100.4F), Mild Pain (1 - 3), Moderate Pain (4 - 6)  albuterol/ipratropium for Nebulization 3 milliLiter(s) Nebulizer every 6 hours PRN Respiratory Distress  fentaNYL    Injectable 50 MICROGram(s) IV Push every 4 hours PRN Severe Pain (7 - 10)  polyethylene glycol 3350 17 Gram(s) Oral two times a day PRN Constipation    Vital Signs Last 24 Hrs  T(C): 36.7 (2023 12:00), Max: 37.6 (2023 02:00)  T(F): 98.1 (2023 12:00), Max: 99.7 (2023 02:00)  HR: 64 (2023 12:00) (58 - 133)  BP: 104/49 (2023 12:00) (88/40 - 132/53)  BP(mean): 71 (2023 12:00) (58 - 82)  RR: 20 (2023 12:00) (20 - 40)  SpO2: 100% (2023 12:00) (95% - 100%)    Parameters below as of 2023 11:55  Patient On (Oxygen Delivery Method): ventilator      Mode: AC/ CMV (Assist Control/ Continuous Mandatory Ventilation)  RR (machine): 20  TV (machine): 350  FiO2: 40  PEEP: 5  ITime: 1  MAP: 11  PIP: 34    I&O's Summary    2023 07:01  -  2023 07:00  --------------------------------------------------------  IN: 2072.4 mL / OUT: 3295 mL / NET: -1222.6 mL    2023 07:01  -  2023 12:46  --------------------------------------------------------  IN: 366.2 mL / OUT: 170 mL / NET: 196.2 mL          Physical Exam:   GENERAL: NAD, well-groomed, well-developed  HEENT: ROSA/   Atraumatic, Normocephalic  ENMT: No tonsillar erythema, exudates, or enlargement; Moist mucous membranes, Good dentition, No lesions  NECK: Supple, No JVD, Normal thyroid  CHEST/LUNG: poor air entry   CVS: Regular rate and rhythm; No murmurs, rubs, or gallops  GI: : Soft, Nontender, Nondistended; Bowel sounds present  NERVOUS SYSTEM:  sedated and intubated  EXTREMITIES:  -edema  LYMPH: No lymphadenopathy noted  SKIN: No rashes or lesions  ENDOCRINOLOGY: No Thyromegaly  PSYCH: sedated    Labs:  30, 30, 40                            8.1    40.48 )-----------( 133      ( 2023 12:07 )             24.6                         8.2    32.06 )-----------( 118      ( 2023 23:45 )             24.3                         8.6    35.49 )-----------( 114      ( 2023 16:34 )             25.6                         8.4    29.93 )-----------( 106      ( 2023 08:30 )             25.3                         8.3    24.24 )-----------( 99       ( 2023 23:43 )             24.8                         8.6    27.38 )-----------( 101      ( 2023 15:52 )             25.4                         9.8    36.18 )-----------( 133      ( 2023 23:41 )             29.5                         9.3    33.62 )-----------( 108      ( 2023 23:46 )             27.3         140  |  101  |  117<H>  ----------------------------<  78  3.9   |  21<L>  |  2.39<H>      136  |  101  |  111<H>  ----------------------------<  229<H>  4.3   |  19<L>  |  2.52<H>      137  |  100  |  108<H>  ----------------------------<  183<H>  3.6   |  21<L>  |  2.53<H>      137  |  102  |  104<H>  ----------------------------<  169<H>  4.2   |  20<L>  |  2.52<H>      136  |  105  |  81<H>  ----------------------------<  180<H>  3.9   |  19<L>  |  1.95<H>    Ca    7.6<L>      2023 23:45  Ca    7.6<L>      2023 23:43  Ca    7.6<L>      2023 15:52  Phos  6.7       Phos  6.3       Phos  6.2       Mg     1.8       Mg     1.8       Mg     1.8         TPro  4.7<L>  /  Alb  2.1<L>  /  TBili  0.5  /  DBili  x   /  AST  36  /  ALT  63<H>  /  AlkPhos  165<H>    TPro  4.5<L>  /  Alb  2.4<L>  /  TBili  0.5  /  DBili  x   /  AST  30  /  ALT  74<H>  /  AlkPhos  154<H>    TPro  4.6<L>  /  Alb  1.7<L>  /  TBili  0.5  /  DBili  x   /  AST  47<H>  /  ALT  141<H>  /  AlkPhos  207<H>    TPro  3.8<L>  /  Alb  1.3<L>  /  TBili  0.6  /  DBili  x   /  AST  54<H>  /  ALT  174<H>  /  AlkPhos  178<H>  01-18    CAPILLARY BLOOD GLUCOSE  89 (2023 05:45)  82 (2023 23:30)      POCT Blood Glucose.: 123 mg/dL (2023 11:32)  POCT Blood Glucose.: 89 mg/dL (2023 05:43)  POCT Blood Glucose.: 82 mg/dL (2023 23:31)  POCT Blood Glucose.: 191 mg/dL (2023 17:34)      LIVER FUNCTIONS - ( 2023 23:45 )  Alb: 2.1 g/dL / Pro: 4.7 g/dL / ALK PHOS: 165 U/L / ALT: 63 U/L / AST: 36 U/L / GGT: x           PT/INR - ( 2023 23:45 )   PT: 15.2 sec;   INR: 1.31 ratio         PTT - ( 2023 23:45 )  PTT:29.2 sec  Urinalysis Basic - ( 2023 13:38 )    Color: Light Orange / Appearance: Turbid / S.010 / pH: x  Gluc: x / Ketone: Negative  / Bili: Negative / Urobili: Negative   Blood: x / Protein: 30 mg/dL / Nitrite: Negative   Leuk Esterase: Large / RBC: 251 /hpf /  /HPF   Sq Epi: x / Non Sq Epi: 1 /hpf / Bacteria: Negative      D-Dimer Assay, Quantitative: 368 ng/mL DDU ( @ 00:36)        RECENT CULTURES:   @ 19:52 .Blood Blood-Peripheral                No growth to date.     @ 18:53 .Blood Blood-Peripheral       rad< from: Xray Chest 1 View- PORTABLE-Urgent (Xray Chest 1 View- PORTABLE-Urgent .) (23 @ 13:38) >  FINDINGS:  Enteric tube with tip in the mid stomach. Endotracheal tube terminates   above the jack.  The heart is normal in size.  Diffuse right patchy airspace opacities. Left lung is clear.  No pneumothorax or pleural effusion.  Redemonstration of levocurvature of the lumbar spine.    IMPRESSION:  Enteric tube with tip in the mid stomach.  Diffuse right patchy airspace opacities may represent atelectasis.   Underlying infection cannot be excluded.    --- End of Report ---           ALEX CASTRO MD; Resident Radiologist  This document has been electronically signed.  KAY DODGE MD; Attending Radiologist  This document has been electronically signed. 2023  2:15PM    < end of copied text >           No growth to date.          RESPIRATORY CULTURES:          Studies  Chest X-RAY  CT SCAN Chest   Venous Dopplers: LE:   CT Abdomen  Others

## 2023-01-23 NOTE — CHART NOTE - NSCHARTNOTEFT_GEN_A_CORE
Spoke with Shyann (daughter of patient) regarding patient's current status/decision making. Stated Dad(James, decision maker) is under the weather. Family does not want to make a decision until day 15 of intubation as per the MICU... patient able to remain on ventilator for 14 days. Daughter Shyann verbalized understanding that prognosis is poor despite medical optimization for Wednesday. Discussed will attempt to medically optimize, extubate, and based on respiratory status of patient determine if patient can be downgraded or possible transition to comfort care/PCU. As per Shyann, three children are in agreement for no trach/PEG, and no reintubation. Discussed that James is ultimately decision maker, Shaynn stated that her father has been speaking with a recent  in the neighborhood who has been helping guide and support him. Emotional support provided. Will follow up with family on Wednesday.

## 2023-01-23 NOTE — PROGRESS NOTE ADULT - SUBJECTIVE AND OBJECTIVE BOX
[facility] DIVISION OF KIDNEY DISEASES AND HYPERTENSION -- FOLLOW UP NOTE  --------------------------------------------------------------------------------  Pt is a 77 year old female with PMH of DM, HTN, HLD, Asthma, and Afib who presented to the hospital with shortness of breath and noted to be in afib with RVR. The patient was emergently intubated for acute hypoxic respiratory failure and admitted to the MICU. The patient has failed multiple attempts at extubation. The nephrology team was consulted for SHANE, On review of [facility] pt noted to have a SCr of 0.64 on admission 1/8, which increased to 2.58 on 1/11, and since then has improved but remains elevated/stable at 2.37 this morning.    24 hour events/subjective:  Pt seen and examined this morning in the MICU. Pt remains intubated and on sedation. Unable to obtain ROS.          PAST HISTORY  --------------------------------------------------------------------------------  No significant changes to PMH, PSH, FHx, SHx, unless otherwise noted    ALLERGIES & MEDICATIONS  --------------------------------------------------------------------------------  Allergies    Avelox (Pruritus)  fish (Vomiting)  Levaquin (Unknown)  shellfish (Vomiting)    Intolerances    fentanyl (Other)    Standing Inpatient Medications  ascorbic acid 500 milliGRAM(s) Oral daily  atorvastatin 40 milliGRAM(s) Oral at bedtime  azithromycin   Tablet 250 milliGRAM(s) Oral <User Schedule>  bacitracin   Ointment 1 Application(s) Topical daily  buMETAnide Infusion 2 mG/Hr IV Continuous <Continuous>  cefepime   IVPB 500 milliGRAM(s) IV Intermittent every 8 hours  chlorhexidine 0.12% Liquid 15 milliLiter(s) Oral Mucosa every 12 hours  chlorhexidine 4% Liquid 1 Application(s) Topical <User Schedule>  dexMEDEtomidine Infusion 0.2 MICROgram(s)/kG/Hr IV Continuous <Continuous>  dextrose 5%. 1000 milliLiter(s) IV Continuous <Continuous>  dextrose 5%. 1000 milliLiter(s) IV Continuous <Continuous>  dextrose 50% Injectable 50 milliLiter(s) IV Push every 15 minutes  dextrose 50% Injectable 50 milliLiter(s) IV Push every 15 minutes  diltiazem    Tablet 60 milliGRAM(s) Enteral Tube every 6 hours  glucagon  Injectable 1 milliGRAM(s) IntraMuscular once  insulin lispro (ADMELOG) corrective regimen sliding scale   SubCutaneous every 6 hours  insulin NPH human recombinant 5 Unit(s) SubCutaneous every 6 hours  methylPREDNISolone sodium succinate Injectable 10 milliGRAM(s) IV Push daily  metolazone 10 milliGRAM(s) Oral <User Schedule>  midodrine 30 milliGRAM(s) Oral every 8 hours  multivitamin 1 Tablet(s) Oral daily  naloxegol 25 milliGRAM(s) Oral daily  pantoprazole  Injectable 40 milliGRAM(s) IV Push daily  phenylephrine    Infusion 0.5 MICROgram(s)/kG/Min IV Continuous <Continuous>  propofol Infusion 10 MICROgram(s)/kG/Min IV Continuous <Continuous>  senna Syrup 10 milliLiter(s) Oral at bedtime  sucralfate suspension 1 Gram(s) Oral every 6 hours  thiamine 100 milliGRAM(s) Oral daily    PRN Inpatient Medications  acetaminophen    Suspension .. 350 milliGRAM(s) Oral every 6 hours PRN  albuterol/ipratropium for Nebulization 3 milliLiter(s) Nebulizer every 6 hours PRN  polyethylene glycol 3350 17 Gram(s) Oral two times a day PRN      REVIEW OF SYSTEMS  Unable to obtain    VITALS/PHYSICAL EXAM  --------------------------------------------------------------------------------  T(C): 36.1 (01-23-23 @ 03:45), Max: 37.1 (01-22-23 @ 07:00)  HR: 67 (01-23-23 @ 06:15) (52 - 84)  BP: 99/49 (01-23-23 @ 06:15) (79/41 - 124/53)  RR: 31 (01-23-23 @ 06:15) (20 - 40)  SpO2: 100% (01-23-23 @ 06:15) (97% - 100%)  Wt(kg): --    Weight (kg): 54 (01-21-23 @ 09:12)      01-21-23 @ 07:01  -  01-22-23 @ 07:00  --------------------------------------------------------  IN: 2072.4 mL / OUT: 3295 mL / NET: -1222.6 mL    01-22-23 @ 07:01  -  01-23-23 @ 06:35  --------------------------------------------------------  IN: 1949 mL / OUT: 2925 mL / NET: -976 mL        Physical Exam:  	Gen: ill appearing  	HEENT: ETT  	Pulm: decreased breath sounds  	CV: S1S2  	Abd: Soft, +BS   	Ext: ++ LE edema B/L  	Neuro: sedated  	Skin: Warm and dry      LABS/STUDIES  --------------------------------------------------------------------------------              7.9    33.98 >-----------<  131      [01-22-23 @ 23:49]              23.0     134  |  99  |  120  ----------------------------<  186      [01-22-23 @ 23:49]  3.4   |  18  |  2.37        Ca     6.9     [01-22-23 @ 23:49]      Mg     1.8     [01-22-23 @ 23:49]      Phos  7.7     [01-22-23 @ 23:49]    TPro  4.3  /  Alb  1.4  /  TBili  0.5  /  DBili  x   /  AST  29  /  ALT  54  /  AlkPhos  172  [01-22-23 @ 23:49]    PT/INR: PT 16.6 , INR 1.43       [01-22-23 @ 23:49]  PTT: 34.9       [01-22-23 @ 23:49]      Creatinine Trend:  SCr 2.37 [01-22 @ 23:49]  SCr 2.39 [01-21 @ 23:45]  SCr 2.52 [01-20 @ 23:43]  SCr 2.53 [01-20 @ 15:52]  SCr 2.52 [01-19 @ 23:41]    Urinalysis - [01-21-23 @ 13:38]      Color Light Orange / Appearance Turbid / SG 1.010 / pH 6.0      Gluc Negative / Ketone Negative  / Bili Negative / Urobili Negative       Blood Large / Protein 30 mg/dL / Leuk Est Large / Nitrite Negative       /  / Hyaline 0 / Gran  / Sq Epi  / Non Sq Epi 1 / Bacteria Negative      HbA1c 6.6      [02-02-19 @ 10:59]  TSH 1.17      [01-09-23 @ 06:11]

## 2023-01-23 NOTE — PROGRESS NOTE ADULT - SUBJECTIVE AND OBJECTIVE BOX
CARDIOLOGY     PROGRESS  NOTE   ________________________________________________    CHIEF COMPLAINT:Patient is a 77y old  Female who presents with a chief complaint of sob/ rapid a.fib (23 Jan 2023 08:12)  on vent  	  REVIEW OF SYSTEMS:  CONSTITUTIONAL: No fever, weight loss, or fatigue  EYES: No eye pain, visual disturbances, or discharge  ENT:  No difficulty hearing, tinnitus, vertigo; No sinus or throat pain  NECK: No pain or stiffness  RESPIRATORY: No cough, wheezing, chills or hemoptysis; No Shortness of Breath  CARDIOVASCULAR: No chest pain, palpitations, passing out, dizziness, or leg swelling  GASTROINTESTINAL: No abdominal or epigastric pain. No nausea, vomiting, or hematemesis; No diarrhea or constipation. No melena or hematochezia.  GENITOURINARY: No dysuria, frequency, hematuria, or incontinence  NEUROLOGICAL: No headaches, memory loss, loss of strength, numbness, or tremors  SKIN: No itching, burning, rashes, or lesions   LYMPH Nodes: No enlarged glands  ENDOCRINE: No heat or cold intolerance; No hair loss  MUSCULOSKELETAL: No joint pain or swelling; No muscle, back, or extremity pain  PSYCHIATRIC: No depression, anxiety, mood swings, or difficulty sleeping  HEME/LYMPH: No easy bruising, or bleeding gums  ALLERGY AND IMMUNOLOGIC: No hives or eczema	    [ ] All others negative	  [x ] Unable to obtain    PHYSICAL EXAM:  T(C): 36.4 (01-23-23 @ 08:00), Max: 36.8 (01-22-23 @ 16:00)  HR: 69 (01-23-23 @ 08:30) (52 - 76)  BP: 105/51 (01-23-23 @ 08:30) (79/41 - 124/53)  RR: 40 (01-23-23 @ 08:30) (20 - 40)  SpO2: 100% (01-23-23 @ 08:30) (97% - 100%)  Wt(kg): --  I&O's Summary    22 Jan 2023 07:01  -  23 Jan 2023 07:00  --------------------------------------------------------  IN: 1949 mL / OUT: 3175 mL / NET: -1226 mL    23 Jan 2023 07:01  -  23 Jan 2023 08:47  --------------------------------------------------------  IN: 114.6 mL / OUT: 30 mL / NET: 84.6 mL        Appearance: sedated on vent  HEENT:   Normal oral mucosa, PERRL, EOMI	  Lymphatic: No lymphadenopathy  Cardiovascular: Normal S1 S2, No JVD, + murmurs, No edema  Respiratory: on vent  Gastrointestinal:  Soft, Non-tender, + BS	  Skin: No rashes, No ecchymoses, No cyanosis	  Extremities: Normal range of motion, No clubbing, cyanosis or edema  Vascular: Peripheral pulses palpable 2+ bilaterally    MEDICATIONS  (STANDING):  ascorbic acid 500 milliGRAM(s) Oral daily  atorvastatin 40 milliGRAM(s) Oral at bedtime  azithromycin   Tablet 250 milliGRAM(s) Oral <User Schedule>  bacitracin   Ointment 1 Application(s) Topical daily  buMETAnide Infusion 2 mG/Hr (10 mL/Hr) IV Continuous <Continuous>  chlorhexidine 0.12% Liquid 15 milliLiter(s) Oral Mucosa every 12 hours  chlorhexidine 4% Liquid 1 Application(s) Topical <User Schedule>  dexMEDEtomidine Infusion 0.2 MICROgram(s)/kG/Hr (1.89 mL/Hr) IV Continuous <Continuous>  dextrose 5%. 1000 milliLiter(s) (50 mL/Hr) IV Continuous <Continuous>  dextrose 5%. 1000 milliLiter(s) (100 mL/Hr) IV Continuous <Continuous>  dextrose 50% Injectable 50 milliLiter(s) IV Push every 15 minutes  dextrose 50% Injectable 50 milliLiter(s) IV Push every 15 minutes  diltiazem    Tablet 60 milliGRAM(s) Enteral Tube every 6 hours  glucagon  Injectable 1 milliGRAM(s) IntraMuscular once  insulin lispro (ADMELOG) corrective regimen sliding scale   SubCutaneous every 6 hours  insulin NPH human recombinant 5 Unit(s) SubCutaneous every 6 hours  methylPREDNISolone sodium succinate Injectable 10 milliGRAM(s) IV Push daily  metolazone 10 milliGRAM(s) Oral <User Schedule>  midodrine 30 milliGRAM(s) Oral every 8 hours  multivitamin 1 Tablet(s) Oral daily  naloxegol 25 milliGRAM(s) Oral daily  pantoprazole  Injectable 40 milliGRAM(s) IV Push daily  phenylephrine    Infusion 0.5 MICROgram(s)/kG/Min (7.07 mL/Hr) IV Continuous <Continuous>  propofol Infusion 10 MICROgram(s)/kG/Min (2.26 mL/Hr) IV Continuous <Continuous>  senna Syrup 10 milliLiter(s) Oral at bedtime  sucralfate suspension 1 Gram(s) Oral every 6 hours  thiamine 100 milliGRAM(s) Oral daily      TELEMETRY: 	    ECG:  	  RADIOLOGY:  OTHER: 	  	  LABS:	 	    CARDIAC MARKERS:                                7.9    33.98 )-----------( 131      ( 22 Jan 2023 23:49 )             23.0     01-22    134<L>  |  99  |  120<H>  ----------------------------<  186<H>  3.4<L>   |  18<L>  |  2.37<H>    Ca    6.9<L>      22 Jan 2023 23:49  Phos  7.7     01-22  Mg     1.8     01-22    TPro  4.3<L>  /  Alb  1.4<L>  /  TBili  0.5  /  DBili  x   /  AST  29  /  ALT  54<H>  /  AlkPhos  172<H>  01-22    proBNP:   Lipid Profile:   HgA1c:   TSH: Thyroid Stimulating Hormone, Serum: 1.17 uIU/mL (01-09 @ 06:11)    PT/INR - ( 22 Jan 2023 23:49 )   PT: 16.6 sec;   INR: 1.43 ratio         PTT - ( 22 Jan 2023 23:49 )  PTT:34.9 sec    < from: Xray Chest 1 View- PORTABLE-Urgent (Xray Chest 1 View- PORTABLE-Urgent .) (01.21.23 @ 13:38) >  Enteric tube with tip in the mid stomach.  Diffuse right patchy airspace opacities may represent atelectasis.   Underlying infection cannot be excluded.      Assessment and plan  ---------------------------  77-year-old female with past medical history of DM, HTN, HLD, asthma on daily steroids, A. fib on Eliquis and digoxin presenting with shortness of breath.  Patient unable to provide history.  Daughter at bedside reports patient has had increasing shortness of breath, wheezing, cough for the past week now.  Patient also with increasing generalized weakness, fatigue and decreased p.o. intake.  Denies fever/chills, chest pain, abdominal pain, nausea, vomiting, diarrhea, dysuria.  Family reports patient had a similar episode 1 year ago when she was admitted to [facility] with UTI.  pt with sig medical hx, chronic a.fib with sob ?sec sepsis/ pneumoniae, UTI  sedated on vent  continue abx and steroid  echo noted  AC held sec to bleeding  still sedated on vent   continue abx  hr is well controlled still on po Cardizem 60 mg q 6h/ no ac  continue pressor keep MAP>65  sepsis on iv abx  fu renal function closely  a.fib hr is well controlled on Cardizem 5 mg/ hr will increase drip as needed  on Bumex/ metalazone for diuresis  nutrition  micu care/ vent  discussed with family prognosis guarded  decrease diuretics   discussed with the daughter are thinking about trach

## 2023-01-23 NOTE — PROGRESS NOTE ADULT - ASSESSMENT
77-year-old female with past medical history of DM, HTN, HLD, asthma on daily steroids, A. fib on Eliquis and digoxin presenting with shortness of breath.  Patient unable to provide history.  Daughter at bedside reports patient has had increasing shortness of breath, wheezing, cough for the past week now.  Patient also with increasing generalized weakness, fatigue and decreased p.o. intake.  Denies fever/chills, chest pain, abdominal pain, nausea, vomiting, diarrhea, dysuria.  Family reports patient had a similar episode 1 year ago when she was admitted to [facility] with UTI.  pt with sig medical hx, chronic a.fib with sob ?sec sepsis/ pneumoniae, UTI  start on ceftriaxone  tele  for increase hr, will increase Cardizem dose  continue AC  cardiac enzyme  tsh  echo  RVP negative      ASTHMA/ COPD exacerbation;   A Fibrillation  with rvr:  DM:  HTN:   HLD:     1/09:    ASTHMA/ COPD exacerbation; she was using performist at home with 5 mg of prednisone  not sure why she was not on any other meds:  sill start Symbicort too:  along with BD and is already on cefepime:  though pneumonia to me is not very convincing : will probably need ct scan chest   A Fibrillation  with rvr: Hr still elevated:  on cardizem : would defer to cards:  on ac:  lovenox  + coumadin : check echo   DM:: monitor and control   HTN: : controlled  HLD: on statins  :    dw team    1/10:    ASTHMA/ COPD exacerbation; she was using performist at home with 5 mg of prednisone  not sure why she was not on any other meds:  sill start Symbicort too:  along with BD and is already on cefepime:  WBC increased : though pneumonia to me is not very convincing : will probably need ct scan chest : she rused for ct chest : increase steorids 40 mg Q 6 hours : ABG today seems reasonable   A Fibrillation  with rvr: Hr still elevated:  on cardizem : HR is till very high  :   DM:: monitor and control   HTN: : controlled  HLD: on statins:    MICU  consult:  high risk for intubation:   :    edy and daughter    1/11:    ASTHMA/ COPD exacerbation; now s/p bronch: RML lavage done:  wait cultures results: she was using performist at home with 5 mg of prednisone  not sure why she was not on any other meds:  Cont BD:  and antibiotics  WBC still increased :on cefepime:    A Fibrillation  with rvr: Hr still elevated:  on cardizem : HR is better now:  on cardiem   DM:: monitor and control   HTN: : controlled  HLD: on statins:    josias micu  ATTENDING      1/12:    ASTHMA/ COPD exacerbation; now s/p bronch: RML lavage done:  negative  cultures so far: : she was using performist at home with 5 mg of prednisone  not sure why she was not on any other meds:  Cont BD:  and antibiotics  WBC still increased: but little down today  :on cefepime:    A Fibrillation  with rvr: Hr still elevated:  on cardizem : HR is better now:  on cardiem drip   DM:: monitor and control   HTN: : controlled  HLD: on statins:    josias micu  team    1/13:       ASTHMA/ COPD exacerbation; now s/p bronch: RML lavage done:  negative  cultures so far: :  cont full vent support:  management  of vent per MICU : weaning as perprotocol  ;  on zothromax and cefepime : off steroids   A Fibrillation  with rvr: Hr still elevated:  on Cardizem : HR is better now:  on cardiem drip   DM:: monitor and control   HTN: : controlled  HLD: on statins:    josias micu  team   :  1/14:       ASTHMA/ COPD exacerbation; now s/p bronch: RML lavage done:  Pseudomonas:  on cefepime:  : :  cont full vent support:  management  of vent per MICU : weaning as pe rprotocol  ;  on 20 mg of steroids today   A Fibrillation  with rvr: Hr still elevated:  on Cardizem : HR is better now:  on cardiem drip : Blood pressure is stable:   DM:: monitor and control   HTN: : controlled  HLD: on statins:    josias micu  Attending    1/16:    ASTHMA/ COPD exacerbation; now s/p bronch: RML lavage done:  Pseudomonas:  on cefepime:  and azithromycin for anti inflammatory effects:  :  still on full vent support:  has severe copd:   A Fibrillation  with rvr: Hr still elevated:  on Cardizem : HR is better now:  on Cardizem drip : Blood pressure is stable:   Thrombocytopenia:  ? etiology : check for robbi   DM:: monitor and control   HTN: : controlled  HLD: on statins:   overall pt is still critically ill:    josias micu  Attending today      1/17:  ASTHMA/ COPD exacerbation; now s/p bronch: RML lavage done:  Pseudomonas:  on cefepime:  and azithromycin for anti inflammatory effects:  :  still on full vent support:  has severe copd: same rx today too :  A Fibrillation  with rvr: Hr still elevated:  on Cardizem : HR is better now:  on Cardizem drip : Blood pressure is stable:   Thrombocytopenia:  ? etiology : check for robbi  : still low:  defer to MICU team   DM:: monitor and control   HTN: : controlled  HLD: on statins:   overall pt is still critically ill:    dw micu  Attending    1/18/2023    ASTHMA/ COPD exacerbation; now s/p bronch: RML lavage done:  Pseudomonas:  on cefepime:  and azithromycin for anti inflammatory effects:  :  still on full vent support:  has severe copd: same and is on solumedrol : 20 mg a day : defer to primary team   A Fibrillation  with rvr: Hr still elevated:  on Cardizem : HR is better now:  on Cardizem drip : Blood pressure is stable:   Thrombocytopenia:  ? etiology :plts are increasing:   DM:: monitor and control   HTN: : controlled  HLD: on statins:   overall pt is still critically ill:    dw micu  team     1/19:    ASTHMA/ COPD exacerbation; now s/p bronch: RML lavage done:  Pseudomonas:  on cefepime:  and azithromycin for anti inflammatory effects:  :  still on full vent support:  has severe copd: same and is on solumedrol : 10 mg a day : she has not shira wheezing : peak not high   A Fibrillation  with rvr: Hr still elevated:  on Cardizem : HR is better now:  on Cardizem drip : Blood pressure is stable on vasopressors  Thrombocytopenia:  ? etiology :plts are increasing: and further increased today :   Leucocytosis:  further increased and is on antibiotics  DM:: monitor and control   HTN: : controlled  HLD: on statins:   overall pt is still critically ill:  intubated  ?: sedated and is on vasopressors   dw micu  team       1/20:    ASTHMA/ COPD exacerbation; now s/p bronch: RML lavage done:  Pseudomonas:  on cefepime:  and azithromycin for anti inflammatory effects:  :  still on full vent support:  has severe copd: same and is on solumedrol : 10 mg a day : cont current rx:   A Fibrillation  with rvr: Hr still elevated:  on Cardizem : HR is better now:  on Cardizem drip : Blood pressure is stable on vasopressors  Thrombocytopenia:  ? etiology :plts are increasing: and further increased today : and much better  now:   Leucocytosis:  further increased and is on antibiotics: defer to primary t eam  DM:: monitor and control   HTN: : controlled  HLD: on statins:   overall pt is still critically ill:  intubated  ?: sedated and is on vasopressors   dw micu  team     1/22:    ASTHMA/ COPD exacerbation; resp fialure: now s/p bronch: RML lavage done:  Pseudomonas:  on cefepime:  and azithromycin for anti inflammatory effects:  :  still on full vent support:  has severe copd: same and is on solumedrol : 10 mg a day : cont current rx: on cefepime for two weeks  : finishing in 2 days   A Fibrillation  with rvr: Hr still elevated:  on Cardizem : HR is better now:  on Cardizem drip : on vasopressors  Thrombocytopenia:  ? etiology :plts are increasing: and further increased today : and much better  now: and remains  > 100k:   Leucocytosis:  Cont increase:  defer to  MICU   DM:: monitor and control   HTN: : on vasopressors  HLD: on statins:   overall pt is still critically ill:  intubated  : sedated and is on vasopressors   dw micu  team     1/23:      ASTHMA/ COPD exacerbation; resp fialure: now s/p bronch: RML lavage done:  Pseudomonas:  off cefepime now   and azithromycin for anti inflammatory effects:  :  still on full vent support:  has severe copd: same and is on solumedrol : 10 mg a day : cont current rx: ID following ? need for trach   A Fibrillation  with rvr: Hr still elevated:  on Cardizem : HR is better now:  on Cardizem drip : on vasopressors  Thrombocytopenia:  ? etiology :plts are increasing: and further increased today : and much better  now: and remains  > 100k:   Leucocytosis:  now seems to be downtrending:  t cont to moniro  DM:: monitor and control   HTN: : on vasopressors  HLD: on statins:   overall pt is still critically ill:  intubated  : sedated and is on vasopressors   dw micu  team

## 2023-01-23 NOTE — PROGRESS NOTE ADULT - SUBJECTIVE AND OBJECTIVE BOX
Date of Service: 23 @ 11:13    Patient is a 77y old  Female who presents with a chief complaint of sob/ rapid a.fib (2023 08:47)      Any change in ROS:  doing same:  sedated and intubated:   on Bumex infusion     MEDICATIONS  (STANDING):  ascorbic acid 500 milliGRAM(s) Oral daily  atorvastatin 40 milliGRAM(s) Oral at bedtime  azithromycin   Tablet 250 milliGRAM(s) Oral <User Schedule>  bacitracin   Ointment 1 Application(s) Topical daily  buMETAnide Infusion 2 mG/Hr (10 mL/Hr) IV Continuous <Continuous>  chlorhexidine 0.12% Liquid 15 milliLiter(s) Oral Mucosa every 12 hours  chlorhexidine 4% Liquid 1 Application(s) Topical <User Schedule>  dexMEDEtomidine Infusion 0.2 MICROgram(s)/kG/Hr (1.89 mL/Hr) IV Continuous <Continuous>  dextrose 5%. 1000 milliLiter(s) (50 mL/Hr) IV Continuous <Continuous>  dextrose 5%. 1000 milliLiter(s) (100 mL/Hr) IV Continuous <Continuous>  dextrose 50% Injectable 50 milliLiter(s) IV Push every 15 minutes  dextrose 50% Injectable 50 milliLiter(s) IV Push every 15 minutes  diltiazem    Tablet 60 milliGRAM(s) Enteral Tube every 6 hours  glucagon  Injectable 1 milliGRAM(s) IntraMuscular once  insulin lispro (ADMELOG) corrective regimen sliding scale   SubCutaneous every 6 hours  insulin NPH human recombinant 5 Unit(s) SubCutaneous every 6 hours  methylPREDNISolone sodium succinate Injectable 10 milliGRAM(s) IV Push daily  metolazone 10 milliGRAM(s) Oral <User Schedule>  midodrine 30 milliGRAM(s) Oral every 8 hours  multivitamin 1 Tablet(s) Oral daily  naloxegol 25 milliGRAM(s) Oral daily  pantoprazole  Injectable 40 milliGRAM(s) IV Push daily  phenylephrine    Infusion 0.5 MICROgram(s)/kG/Min (7.07 mL/Hr) IV Continuous <Continuous>  propofol Infusion 10 MICROgram(s)/kG/Min (2.26 mL/Hr) IV Continuous <Continuous>  senna Syrup 10 milliLiter(s) Oral at bedtime  sucralfate suspension 1 Gram(s) Oral every 6 hours  thiamine 100 milliGRAM(s) Oral daily    MEDICATIONS  (PRN):  acetaminophen    Suspension .. 350 milliGRAM(s) Oral every 6 hours PRN Temp greater or equal to 38C (100.4F), Mild Pain (1 - 3), Moderate Pain (4 - 6)  albuterol/ipratropium for Nebulization 3 milliLiter(s) Nebulizer every 6 hours PRN Respiratory Distress  polyethylene glycol 3350 17 Gram(s) Oral two times a day PRN Constipation    Vital Signs Last 24 Hrs  T(C): 36.4 (2023 08:00), Max: 36.8 (2023 16:00)  T(F): 97.5 (2023 08:00), Max: 98.2 (2023 16:00)  HR: 82 (2023 10:45) (52 - 104)  BP: 104/49 (2023 10:30) (79/41 - 136/61)  BP(mean): 70 (2023 10:30) (55 - 88)  RR: 35 (2023 10:45) (20 - 61)  SpO2: 91% (2023 10:45) (90% - 100%)    Parameters below as of 2023 20:00  Patient On (Oxygen Delivery Method): ventilator    O2 Concentration (%): 40  Mode: AC/ CMV (Assist Control/ Continuous Mandatory Ventilation)  RR (machine): 20  TV (machine): 350  FiO2: 40  PEEP: 5  ITime: 1  MAP: 15  PIP: 37    I&O's Summary    2023 07:  -  2023 07:00  --------------------------------------------------------  IN: 1949 mL / OUT: 3175 mL / NET: -1226 mL    2023 07:  -  2023 11:13  --------------------------------------------------------  IN: 217.9 mL / OUT: 355 mL / NET: -137.1 mL          Physical Exam:   GENERAL: NAD, well-groomed, well-developed  HEENT: ROSA/   Atraumatic, Normocephalic  ENMT: No tonsillar erythema, exudates, or enlargement; Moist mucous membranes, Good dentition, No lesions  NECK: Supple, No JVD, Normal thyroid  CHEST/LUNG:poor air entry :   intubated  CVS: Regular rate and rhythm; No murmurs, rubs, or gallops  GI: : Soft, Nontender, Nondistended; Bowel sounds present  NERVOUS SYSTEM:  sedated and intubated  EXTREMITIES: -edema  LYMPH: No lymphadenopathy noted  SKIN: No rashes or lesions  ENDOCRINOLOGY: No Thyromegaly  PSYCH: Appropriate    Labs:  19, 30                            7.9    33.98 )-----------( 131      ( 2023 23:49 )             23.0                         8.1    40.48 )-----------( 133      ( 2023 12:07 )             24.6                         8.2    32.06 )-----------( 118      ( 2023 23:45 )             24.3                         8.6    35.49 )-----------( 114      ( 2023 16:34 )             25.6                         8.4    29.93 )-----------( 106      ( 2023 08:30 )             25.3                         8.3    24.24 )-----------( 99       ( 2023 23:43 )             24.8                         8.6    27.38 )-----------( 101      ( 2023 15:52 )             25.4                         9.8    36.18 )-----------( 133      ( 2023 23:41 )             29.5     -    134<L>  |  99  |  120<H>  ----------------------------<  186<H>  3.4<L>   |  18<L>  |  2.37<H>  -    140  |  101  |  117<H>  ----------------------------<  78  3.9   |  21<L>  |  2.39<H>      136  |  101  |  111<H>  ----------------------------<  229<H>  4.3   |  19<L>  |  2.52<H>      137  |  100  |  108<H>  ----------------------------<  183<H>  3.6   |  21<L>  |  2.53<H>      137  |  102  |  104<H>  ----------------------------<  169<H>  4.2   |  20<L>  |  2.52<H>    Ca    6.9<L>      2023 23:49  Ca    7.6<L>      2023 23:45  Phos  7.7       Phos  6.7       Mg     1.8       Mg     1.8         TPro  4.3<L>  /  Alb  1.4<L>  /  TBili  0.5  /  DBili  x   /  AST  29  /  ALT  54<H>  /  AlkPhos  172<H>    TPro  4.7<L>  /  Alb  2.1<L>  /  TBili  0.5  /  DBili  x   /  AST  36  /  ALT  63<H>  /  AlkPhos  165<H>    TPro  4.5<L>  /  Alb  2.4<L>  /  TBili  0.5  /  DBili  x   /  AST  30  /  ALT  74<H>  /  AlkPhos  154<H>    TPro  4.6<L>  /  Alb  1.7<L>  /  TBili  0.5  /  DBili  x   /  AST  47<H>  /  ALT  141<H>  /  AlkPhos  207<H>      CAPILLARY BLOOD GLUCOSE      POCT Blood Glucose.: 239 mg/dL (2023 05:15)  POCT Blood Glucose.: 183 mg/dL (2023 23:11)  POCT Blood Glucose.: 151 mg/dL (2023 17:17)  POCT Blood Glucose.: 123 mg/dL (2023 11:32)      LIVER FUNCTIONS - ( 2023 23:49 )  Alb: 1.4 g/dL / Pro: 4.3 g/dL / ALK PHOS: 172 U/L / ALT: 54 U/L / AST: 29 U/L / GGT: x           PT/INR - ( 2023 23:49 )   PT: 16.6 sec;   INR: 1.43 ratio         PTT - ( 2023 23:49 )  PTT:34.9 sec  Urinalysis Basic - ( 2023 13:38 )    Color: Light Orange / Appearance: Turbid / S.010 / pH: x  Gluc: x / Ketone: Negative  / Bili: Negative / Urobili: Negative   Blood: x / Protein: 30 mg/dL / Nitrite: Negative   Leuk Esterase: Large / RBC: 251 /hpf /  /HPF   Sq Epi: x / Non Sq Epi: 1 /hpf / Bacteria: Negative            RECENT CULTURES:   @ 19:52 .Blood Blood-Peripheral     rad< from: Xray Chest 1 View- PORTABLE-Urgent (Xray Chest 1 View- PORTABLE-Urgent .) (23 @ 13:38) >    ACC: [id number] EXAM:  XR CHEST PORTABLE URGENT 1V   ORDERED BY:  ZEE DEL REAL     PROCEDURE DATE:  2023          INTERPRETATION:  EXAMINATION: XR CHEST URGENT    CLINICAL INDICATION: Evaluate OG tube placement.    TECHNIQUE: Single frontal, portable view of the chest was obtained.    COMPARISON: Chest x-ray 1/10/2023. Abdominal x-ray 2023.    FINDINGS:  Enteric tube with tip in the mid stomach. Endotracheal tube terminates   above the jack.  The heart is normal in size.  Diffuse right patchy airspace opacities. Left lung is clear.  No pneumothorax or pleural effusion.  Redemonstration of levocurvature of the lumbar spine.    IMPRESSION:  Enteric tube with tip in the mid stomach.  Diffuse right patchy airspace opacities may represent atelectasis.   Underlying infection cannot be excluded.    --- End of Report ---           ALEX CASTRO MD; Resident Radiologist  This document has been electronically signed.  KAY DODGE MD; Attending Radiologist  This document has been electronically signed. 2023  2:15PM    < end of copied text >  < from: Xray Abdomen 1 View PORTABLE -Urgent (Xray Abdomen 1 View PORTABLE -Urgent .) (23 @ 11:15) >    ACC: [id number] EXAM:  XR ABDOMEN PORTABLE URGENT 1V   ORDERED BY:   BREE MAHMOOD     PROCEDURE DATE:  2023          INTERPRETATION:  XR ABDOMEN URGENT DATED 2023 11:15 AM    INDICATION: 77 years-old Female with distended, firm abdomen.    PRIOR STUDIES: 2022 radiograph    Findings/  Impression: Similar-appearing enteric tube with tip in the stomach and   sidehole at the gastroesophageal junction consider advancing 7 cm.   Limited evaluation for air-fluid level on this supine radiograph, however   bowel gas pattern appears nonobstructive. Again noted levocurvature of   the lumbar spine. No acute osseous process.    --- End of Report ---            MOHAMMAD HALAIBEH MD; Attending Radiologist  This document has been electronically signed. 2023 11:23AM    < end of copied text >             No Growth Final     @ 18:53 .Blood Blood-Peripheral                No Growth Final          RESPIRATORY CULTURES:          Studies  Chest X-RAY  CT SCAN Chest   Venous Dopplers: LE:   CT Abdomen  Others

## 2023-01-23 NOTE — PROGRESS NOTE ADULT - SUBJECTIVE AND OBJECTIVE BOX
INTERVAL HPI/OVERNIGHT EVENTS:    SUBJECTIVE: Patient seen and examined at bedside.   overnight - fentanyl was discontinued for hx ileus.     VITAL SIGNS:  ICU Vital Signs Last 24 Hrs  T(C): 36.1 (2023 03:45), Max: 37 (2023 08:00)  T(F): 97 (2023 03:45), Max: 98.6 (2023 08:00)  HR: 67 (2023 06:15) (52 - 84)  BP: 99/49 (2023 06:15) (79/41 - 124/53)  BP(mean): 71 (2023 06:15) (55 - 81)  ABP: --  ABP(mean): --  RR: 31 (2023 06:15) (20 - 40)  SpO2: 100% (2023 06:15) (97% - 100%)    O2 Parameters below as of 2023 20:00  Patient On (Oxygen Delivery Method): ventilator    O2 Concentration (%): 40      Mode: AC/ CMV (Assist Control/ Continuous Mandatory Ventilation), RR (machine): 20, TV (machine): 350, FiO2: 40, PEEP: 5, ITime: 1, MAP: 11, PIP: 34  Plateau pressure:   P/F ratio:     -22 @ 07:01  -  - @ 07:00  --------------------------------------------------------  IN: 1949 mL / OUT: 3175 mL / NET: -1226 mL      CAPILLARY BLOOD GLUCOSE  89 (2023 05:45)      POCT Blood Glucose.: 239 mg/dL (2023 05:15)    ECG:    PHYSICAL EXAM:    General: sedated, intubated on pressors   Respiratory: ronchi b/l anterior  Cardiovascular: irregular rate and rhythm, no murmur  Abdomen: soft  Extremities: edema b/l LE  Neurological: sedation     MEDICATIONS:  MEDICATIONS  (STANDING):  ascorbic acid 500 milliGRAM(s) Oral daily  atorvastatin 40 milliGRAM(s) Oral at bedtime  azithromycin   Tablet 250 milliGRAM(s) Oral <User Schedule>  bacitracin   Ointment 1 Application(s) Topical daily  buMETAnide Infusion 2 mG/Hr (10 mL/Hr) IV Continuous <Continuous>  cefepime   IVPB 500 milliGRAM(s) IV Intermittent every 8 hours  chlorhexidine 0.12% Liquid 15 milliLiter(s) Oral Mucosa every 12 hours  chlorhexidine 4% Liquid 1 Application(s) Topical <User Schedule>  dexMEDEtomidine Infusion 0.2 MICROgram(s)/kG/Hr (1.89 mL/Hr) IV Continuous <Continuous>  dextrose 5%. 1000 milliLiter(s) (50 mL/Hr) IV Continuous <Continuous>  dextrose 5%. 1000 milliLiter(s) (100 mL/Hr) IV Continuous <Continuous>  dextrose 50% Injectable 50 milliLiter(s) IV Push every 15 minutes  dextrose 50% Injectable 50 milliLiter(s) IV Push every 15 minutes  diltiazem    Tablet 60 milliGRAM(s) Enteral Tube every 6 hours  glucagon  Injectable 1 milliGRAM(s) IntraMuscular once  insulin lispro (ADMELOG) corrective regimen sliding scale   SubCutaneous every 6 hours  insulin NPH human recombinant 5 Unit(s) SubCutaneous every 6 hours  methylPREDNISolone sodium succinate Injectable 10 milliGRAM(s) IV Push daily  metolazone 10 milliGRAM(s) Oral <User Schedule>  midodrine 30 milliGRAM(s) Oral every 8 hours  multivitamin 1 Tablet(s) Oral daily  naloxegol 25 milliGRAM(s) Oral daily  pantoprazole  Injectable 40 milliGRAM(s) IV Push daily  phenylephrine    Infusion 0.5 MICROgram(s)/kG/Min (7.07 mL/Hr) IV Continuous <Continuous>  propofol Infusion 10 MICROgram(s)/kG/Min (2.26 mL/Hr) IV Continuous <Continuous>  senna Syrup 10 milliLiter(s) Oral at bedtime  sucralfate suspension 1 Gram(s) Oral every 6 hours  thiamine 100 milliGRAM(s) Oral daily    MEDICATIONS  (PRN):  acetaminophen    Suspension .. 350 milliGRAM(s) Oral every 6 hours PRN Temp greater or equal to 38C (100.4F), Mild Pain (1 - 3), Moderate Pain (4 - 6)  albuterol/ipratropium for Nebulization 3 milliLiter(s) Nebulizer every 6 hours PRN Respiratory Distress  polyethylene glycol 3350 17 Gram(s) Oral two times a day PRN Constipation      ALLERGIES:  Allergies    Avelox (Pruritus)  fish (Vomiting)  Levaquin (Unknown)  shellfish (Vomiting)    Intolerances    fentanyl (Other)      LABS:                        7.9    33.98 )-----------( 131      ( 2023 23:49 )             23.0         134<L>  |  99  |  120<H>  ----------------------------<  186<H>  3.4<L>   |  18<L>  |  2.37<H>    Ca    6.9<L>      2023 23:49  Phos  7.7       Mg     1.8         TPro  4.3<L>  /  Alb  1.4<L>  /  TBili  0.5  /  DBili  x   /  AST  29  /  ALT  54<H>  /  AlkPhos  172<H>      PT/INR - ( 2023 23:49 )   PT: 16.6 sec;   INR: 1.43 ratio         PTT - ( 2023 23:49 )  PTT:34.9 sec  Urinalysis Basic - ( 2023 13:38 )    Color: Light Orange / Appearance: Turbid / S.010 / pH: x  Gluc: x / Ketone: Negative  / Bili: Negative / Urobili: Negative   Blood: x / Protein: 30 mg/dL / Nitrite: Negative   Leuk Esterase: Large / RBC: 251 /hpf /  /HPF   Sq Epi: x / Non Sq Epi: 1 /hpf / Bacteria: Negative        RADIOLOGY & ADDITIONAL TESTS: Reviewed.

## 2023-01-23 NOTE — PROGRESS NOTE ADULT - ASSESSMENT
ASSESSMENT/PLAN:  77-year-old female with past medical history of DM, HTN, HLD, asthma on daily steroids, A. fib on Eliquis and digoxin presenting with shortness of breath, found to be in Afib w/RVR; admitted to MICU for Acute Hypoxic Respiratory Failure in s/o Asthma and Bronchiectasis w/ possible PNA, leading to intubation and sedation. Currently urine output worsening, attempting to to SBP daily.     NEURO  intubated on precedex, propofol       CARDIAC  # Afib w/ RVR  Admitted in RVR; being followed by Dr. Marcus, Cardiology; Afib w/ RVR thought to be due to stress from hypoxemia  - Chronic atrial fib. with RVR.  Continue diltiazem drip, today at 10  - Echo (1/11) EF 75%  1. Endocardial visualization enhanced with intravenous  injection of Ultrasonic Enhancing Agent (Definity).  Hyperdynamic left ventricular systolic function. No left  ventricular thrombus.  2. Normal right ventricular size and function.  - TSH normal    Hypotension requiring pressors:   - continued on phenylephrine     PULM  #Acute on chronic  hypoxemic respiratory failure due to acute exacerbation of bronchiectasis / asthma and possible pneumonia.   Continue current AC vent settings, increased respiratory rate.  Did not tolerate PS wean 1/17 or 1/18.   Continue solumedrol to 20mg  daily - decreased to 10mg qd  Continue cefepime day 11/14 for pseudomonas pneumonia  Chest PT   3% saline nebulizers - discontinued, with Duo nebs - changed to prn  Azithromycin  M, W, F for   antinflammatory effects.    RENAL  # SHANE  - urine lytes showing FeNa 2.2%-> ATN; treat with fluids. Creatinine stabilized at 1.8 now.   - urine output decreased to 5cc/hr 1/16 did not improve with IVF  - bumex 2mg bid -> bumex infusion, Cont with diuresis with bumex and metolazone.   - given albumin o/n      GI   - concern for GI bleed (hb drop requiring blood transfusion 1/15 and 1/16) and small volume hematoschezia visualized 1/16am per RN. GI consulted - hold off endoscopic evaluation unless further bleeding occurs.   - hold off CT abdomen given hb is stable, no gross gi bleed currently   - Diet: tube feeds  - Bowel regimen: miralax, senna  - Will switch PPI ggt to IV pushes if Pt does not have any bloody BMs    ID  # Sepsis  CT Chest w/ bilateral lower lobe predominant patchy and branching opacities suspicious for pneumonia. Small left pleural effusion. Leukocytosis, tachycardia and tachypnea; No active indications of infections, all infectious workup negative so far; leukocytosis likely in s/o solumedrol usage but no diff to conform neutrophilia so far; tachycardia likely in s/o tachypnea and AHRF  - was started on cefepime 1/8 = now it is day 12/14  - Urine legionella antigen - neg, final BCx 1/10 - neg, BAL cultures - numerous GNR, few gram pos       ENDO  DM2, hyperglycemia  - hyperglycemic 450s 1/15, started on insulin gtt 1/15pm. likely in setting of steroids.   - 1/16 - 1/17 was on insulin gtt, now on nph 5u q6h + SSI with improved glc       HEME/ONC  - Hold Lovenox 50mg BID - monitor for acute VTE  - heparin 5000 bid     ETHICS  full code  family declined to make decision today regarding code status. Though patient's children do not want chest compressions, patient's  is wanting further time to process the news. Palliative on board - f/u recs.    ASSESSMENT/PLAN:  77-year-old female with past medical history of DM, HTN, HLD, asthma on daily steroids, A. fib on Eliquis and digoxin presenting with shortness of breath, found to be in Afib w/RVR; admitted to MICU for Acute Hypoxic Respiratory Failure in s/o Asthma and Bronchiectasis w/ possible PNA, leading to intubation and sedation. Currently urine output worsening, attempting to to SBP daily.     NEURO  intubated on precedex, propofol       CARDIAC  # Afib w/ RVR  Admitted in RVR; being followed by Dr. Marcus, Cardiology; Afib w/ RVR thought to be due to stress from hypoxemia  - Chronic atrial fib. with RVR.  dilt gtt switched to oral 60mg q6h  - Echo (1/11) EF 75%  1. Endocardial visualization enhanced with intravenous  injection of Ultrasonic Enhancing Agent (Definity).  Hyperdynamic left ventricular systolic function. No left  ventricular thrombus.  2. Normal right ventricular size and function.  - TSH normal    Hypotension requiring pressors:   - continued on phenylephrine, started on midodrine 30mg q8h    PULM  #Acute on chronic  hypoxemic respiratory failure due to acute exacerbation of bronchiectasis / asthma and possible pneumonia.   Continue current AC vent settings, increased respiratory rate.  Did not tolerate PS wean 1/17 or 1/18.   Continue solumedrol to 20mg  daily - decreased to 10mg qd  Continue cefepime day 11/14 for pseudomonas pneumonia  Chest PT   3% saline nebulizers - discontinued, with Duo nebs - changed to prn  Azithromycin  M, W, F for   antinflammatory effects - discontinued 1/23 per ID recs    RENAL  # SHANE  - urine lytes showing FeNa 2.2%-> ATN; treat with fluids. Creatinine stabilized at 1.8 now.   - urine output decreased to 5cc/hr 1/16 did not improve with IVF  - bumex 2mg bid -> bumex infusion, Cont with diuresis with bumex and metolazone.   - given albumin o/n      GI   - concern for GI bleed (hb drop requiring blood transfusion 1/15 and 1/16) and small volume hematoschezia visualized 1/16am per RN. GI consulted - hold off endoscopic evaluation unless further bleeding occurs.   - hold off CT abdomen given hb is stable, no gross gi bleed currently   - Diet: tube feeds  - Bowel regimen: miralax, senna  - pantoprazole gtt switched to daily overnight     ID  # Sepsis  CT Chest w/ bilateral lower lobe predominant patchy and branching opacities suspicious for pneumonia. Small left pleural effusion. Leukocytosis, tachycardia and tachypnea; No active indications of infections, all infectious workup negative so far; leukocytosis likely in s/o solumedrol usage but no diff to conform neutrophilia so far; tachycardia likely in s/o tachypnea and AHRF  - was started on cefepime 1/8 = finished 2 week course  - Urine legionella antigen - neg, final BCx 1/10 - neg, BAL cultures - numerous GNR, few gram pos       ENDO  DM2, hyperglycemia  - hyperglycemic 450s 1/15, started on insulin gtt 1/15pm. likely in setting of steroids.   - 1/16 - 1/17 was on insulin gtt, now on nph 5u q6h + SSI with improved glc       HEME/ONC  - Hold Lovenox 50mg BID - monitor for acute VTE  - heparin 5000 bid     ETHICS  full code  family declined to make decision today regarding code status. Though patient's children do not want chest compressions, patient's  is wanting further time to process the news. Palliative on board - f/u recs.

## 2023-01-23 NOTE — PROGRESS NOTE ADULT - SUBJECTIVE AND OBJECTIVE BOX
infectious diseases progress note:    Patient is a 77y old  Female who presents with a chief complaint of sob/ rapid a.fib (2023 07:47)        Acute respiratory failure with hypoxia               Allergies    Avelox (Pruritus)  fish (Vomiting)  Levaquin (Unknown)  shellfish (Vomiting)    Intolerances    fentanyl (Other)      ANTIBIOTICS/RELEVANT:  antimicrobials  azithromycin   Tablet 250 milliGRAM(s) Oral <User Schedule>    immunologic:    OTHER:  acetaminophen    Suspension .. 350 milliGRAM(s) Oral every 6 hours PRN  albuterol/ipratropium for Nebulization 3 milliLiter(s) Nebulizer every 6 hours PRN  ascorbic acid 500 milliGRAM(s) Oral daily  atorvastatin 40 milliGRAM(s) Oral at bedtime  bacitracin   Ointment 1 Application(s) Topical daily  buMETAnide Infusion 2 mG/Hr IV Continuous <Continuous>  chlorhexidine 0.12% Liquid 15 milliLiter(s) Oral Mucosa every 12 hours  chlorhexidine 4% Liquid 1 Application(s) Topical <User Schedule>  dexMEDEtomidine Infusion 0.2 MICROgram(s)/kG/Hr IV Continuous <Continuous>  dextrose 5%. 1000 milliLiter(s) IV Continuous <Continuous>  dextrose 5%. 1000 milliLiter(s) IV Continuous <Continuous>  dextrose 50% Injectable 50 milliLiter(s) IV Push every 15 minutes  dextrose 50% Injectable 50 milliLiter(s) IV Push every 15 minutes  diltiazem    Tablet 60 milliGRAM(s) Enteral Tube every 6 hours  glucagon  Injectable 1 milliGRAM(s) IntraMuscular once  insulin lispro (ADMELOG) corrective regimen sliding scale   SubCutaneous every 6 hours  insulin NPH human recombinant 5 Unit(s) SubCutaneous every 6 hours  methylPREDNISolone sodium succinate Injectable 10 milliGRAM(s) IV Push daily  metolazone 10 milliGRAM(s) Oral <User Schedule>  midodrine 30 milliGRAM(s) Oral every 8 hours  multivitamin 1 Tablet(s) Oral daily  naloxegol 25 milliGRAM(s) Oral daily  pantoprazole  Injectable 40 milliGRAM(s) IV Push daily  phenylephrine    Infusion 0.5 MICROgram(s)/kG/Min IV Continuous <Continuous>  polyethylene glycol 3350 17 Gram(s) Oral two times a day PRN  propofol Infusion 10 MICROgram(s)/kG/Min IV Continuous <Continuous>  senna Syrup 10 milliLiter(s) Oral at bedtime  sucralfate suspension 1 Gram(s) Oral every 6 hours  thiamine 100 milliGRAM(s) Oral daily      Objective:  Vital Signs Last 24 Hrs  T(C): 36.1 (2023 03:45), Max: 36.8 (2023 16:00)  T(F): 97 (2023 03:45), Max: 98.2 (2023 16:00)  HR: 67 (2023 06:15) (52 - 84)  BP: 99/49 (2023 06:15) (79/41 - 124/53)  BP(mean): 71 (2023 06:15) (55 - 81)  RR: 31 (2023 06:15) (20 - 40)  SpO2: 100% (2023 06:15) (97% - 100%)    Parameters below as of 2023 20:00  Patient On (Oxygen Delivery Method): ventilator    O2 Concentration (%): 40       Eyes:ROSA, EOMI  Ear/Nose/Throat: no oral lesion, no sinus tenderness on percussion	  Neck:no JVD, no lymphadenopathy, supple  Respiratory: CTA hansel  Cardiovascular: S1S2 RRR, no murmurs  Gastrointestinal:soft, (+) BS, no HSM  Extremities:no e/e/c        LABS:                        7.9    33.98 )-----------( 131      ( 2023 23:49 )             23.0     22    134<L>  |  99  |  120<H>  ----------------------------<  186<H>  3.4<L>   |  18<L>  |  2.37<H>    Ca    6.9<L>      2023 23:49  Phos  7.7       Mg     1.8         TPro  4.3<L>  /  Alb  1.4<L>  /  TBili  0.5  /  DBili  x   /  AST  29  /  ALT  54<H>  /  AlkPhos  172<H>  22    PT/INR - ( 2023 23:49 )   PT: 16.6 sec;   INR: 1.43 ratio         PTT - ( 2023 23:49 )  PTT:34.9 sec  Urinalysis Basic - ( 2023 13:38 )    Color: Light Orange / Appearance: Turbid / S.010 / pH: x  Gluc: x / Ketone: Negative  / Bili: Negative / Urobili: Negative   Blood: x / Protein: 30 mg/dL / Nitrite: Negative   Leuk Esterase: Large / RBC: 251 /hpf /  /HPF   Sq Epi: x / Non Sq Epi: 1 /hpf / Bacteria: Negative          MICROBIOLOGY:    RECENT CULTURES:   @ 19:52 .Blood Blood-Peripheral                No Growth Final     @ 18:53 .Blood Blood-Peripheral                No Growth Final          RESPIRATORY CULTURES:              RADIOLOGY & ADDITIONAL STUDIES:        Pager [phone]  After 5 pm/weekends or if no response :[phone]

## 2023-01-23 NOTE — PROGRESS NOTE ADULT - PROBLEM SELECTOR PLAN 1
Pt. with SHANE suspected in setting of hemodynamics with notable hypotension throughout the hospital course requiring IV vasopressors. [facility] to have a SCr of 0.64 on admission 1/8, which increased to 2.58 on 1/11, and since then has improved but remains elevated/stable to 2.37 this morning. UA and urine lytes reviewed. Spot urine TP/CR ratio elevated to 1.6. Pt likely with ATN. Pt currently non-oliguric, currently on IV bumex infusion; UOP noted to be ~2.2L over the past 24 hours. Pt remains clinically volume overloaded but improved, continue with IV bumex infusion. No absolute indication for RRT at this time. Monitor labs and urine output. Avoid nephrotoxins. Dose medications as per eGFR.    If any questions, please feel free to contact me     Tex Lynch  Nephrology Fellow  [facility] Pager: [phone]

## 2023-01-23 NOTE — PROGRESS NOTE ADULT - ASSESSMENT
77 year old with  dementia, DM, on home oxygen for chronic pulmonary disease, non ambulatory  Admitted for sob, anorexia, malaise for the last 8 days  CXR with Left Lung base ? pneumonia  Worsening leukocytosis, no fever  SOB/hypoxia    Worsening WBC--given solumedrol  Treat for bacterial pneumonia         CT chest --f reviewed  likely pna and underlying  bronchiectasis   doing poorly  reculture as needced       Zithromax can be stopped

## 2023-01-24 NOTE — PROGRESS NOTE ADULT - RESPIRATORY DISTRESS OBSERVATION: HEART RATE
Greater than or equal to 110 beats

## 2023-01-24 NOTE — PROGRESS NOTE ADULT - PROBLEM SELECTOR PLAN 2
- Defer to MICU team   - Failed CPAP trial today, will continue to monitor  - Will continue GOC regarding code status and reintubation if pt deemed medically safe to be extubated.
- Defer to MICU team   - Failed CPAP trial today, will continue to monitor  - Will continue GOC regarding code status and reintubation if pt deemed medically safe to be extubated.
- Following extubation fentanyl 100mcg for severe pain q15 minutes, fentanyl 50mcg for moderate pain q15 minutes-> patient appear comfortable will change regimen to 100mcg fentanyl q2 hours prn for severe pain, and 50mcg fentanyl q2 hours prn for moderate pain

## 2023-01-24 NOTE — PROGRESS NOTE ADULT - ATTENDING SUPERVISION STATEMENT
Fellow
Resident
Fellow
Fellow
Resident
Resident
Fellow
Resident
Fellow
Fellow
Resident

## 2023-01-24 NOTE — PROGRESS NOTE ADULT - ASSESSMENT
77-year-old female with PMHx of DM, HTN, HLD, asthma on daily steroids, A. fib on Eliquis and digoxin presenting with had increasing SOB, wheezing, cough x 1 week. Pt intubated in ER for acute hypoxic respiratory failure and admitted to MICU. Course c/b Afib w/ RVR started on diltiazem gtt. 1/13 Failed CPAP trial. Palliative care now following for [facility]

## 2023-01-24 NOTE — PROGRESS NOTE ADULT - PROBLEM SELECTOR PROBLEM 1
SHANE (acute kidney injury)
Respiratory failure with hypoxia
Functional quadriplegia
Functional quadriplegia

## 2023-01-24 NOTE — PROGRESS NOTE ADULT - PROBLEM SELECTOR PLAN 1
Pt. with SHANE suspected in setting of hemodynamics with notable hypotension throughout the hospital course requiring IV vasopressors. [facility] to have a SCr of 0.64 on admission 1/8, which increased to 2.58 on 1/11, and since then has improved but remains elevated/stable to 2.37 this morning. UA and urine lytes reviewed. Spot urine TP/CR ratio elevated to 1.6. Pt likely with ATN. Pt currently non-oliguric, currently on IV bumex infusion; UOP noted to be ~1L over the past 24 hours, however UOP noted to be decreasing. Pt remains clinically volume overloaded on IV bumex infusion. No absolute indication for RRT at this time. Prognosis is guarded. GOC discussion underway. Monitor labs and urine output. Avoid nephrotoxins. Dose medications as per eGFR.    If any questions, please feel free to contact me     Tex Lynch  Nephrology Fellow  [facility] Pager: [phone].

## 2023-01-24 NOTE — DISCHARGE NOTE FOR THE EXPIRED PATIENT - HOSPITAL COURSE
77-year-old female with PMHx of DM, HTN, HLD, asthma on daily steroids, A. fib on Eliquis and digoxin presenting with had increasing SOB, wheezing, cough x 1 week. Pt intubated in ER for acute hypoxic respiratory failure and admitted to MICU. Patient's course was complicated by AF w/ RVR, requiring diltiazem gtt and persistent encephalopathy. Hospital course further complicated by worsening acidosis and renal failure, w/ limited improvement w/ bumex gtt. Attempts were made w/ SBT trials which had failed. CTH w/o acute findings, suspecting encephalopathy was in the setting of uremia. Given patient's overall poor prognosis, nephrology was consulted and determined patient was a poor candidate for RRT. Extensive goals of care conversations were made, ultimately w/ family deciding to place patient on comfort care measures. Patient was palliatively extubated at 12 PM 1/24. Patient passed at 1/24/2023 at 9:30 PM.

## 2023-01-24 NOTE — PROGRESS NOTE ADULT - PROBLEM SELECTOR PLAN 7
- If further support needed, can be reached by TEAMS M-F 9-5 Caryl Aldana Any other time please page [phone] if needed.

## 2023-01-24 NOTE — PROGRESS NOTE ADULT - PROBLEM SELECTOR PLAN 4
- 100mcg fentanyl given IVP prior to extubation for dyspnea, patient dyspneic on ventilator  - 100mcg fentanyl IVP q15 min prn for dyspnea-> symptoms well managed changed to 100mcg fentanyl IVP prn q2 hours for dyspnea
see [facility] note above  - pt remains full code as all of family is involved in decision making, ultimately James() has final decision.
see [facility] note above  - pt remains full code as all of family is involved in decision making, ultimately James() has final decision.

## 2023-01-24 NOTE — PROGRESS NOTE ADULT - PROBLEM SELECTOR PLAN 3
- Defer to MICU team  - Discussed with family that chronic lung disease makes it increasingly difficult to wean off ventilator     - On home O2 3.5L.
- Defer to MICU team  - Discussed with family that chronic lung disease makes it increasingly difficult to wean off ventilator     - On home O2 3.5L.
- 2mg versed given prior to compassionate extubation for anxiety/agitation  - 2mg versed prn q15 min for agitation/delirium/anxiety-> symptoms are well controlled will change to 2mg versed IVP prn q2 hours for agitation/delirium/anxiety

## 2023-01-24 NOTE — PROGRESS NOTE ADULT - CONVERSATION/DISCUSSION
Prognosis/MOLST Discussed/Treatment Options
Diagnosis/Prognosis/MOLST Discussed/Treatment Options
Diagnosis/Prognosis/MOLST Discussed/Treatment Options

## 2023-01-24 NOTE — PROGRESS NOTE ADULT - SUBJECTIVE AND OBJECTIVE BOX
INTERVAL HPI/OVERNIGHT EVENTS:    SUBJECTIVE: Patient seen and examined at bedside.       VITAL SIGNS:  ICU Vital Signs Last 24 Hrs  T(C): 36.7 (24 Jan 2023 00:00), Max: 37.2 (23 Jan 2023 16:00)  T(F): 98.1 (24 Jan 2023 00:00), Max: 99 (23 Jan 2023 16:00)  HR: 90 (24 Jan 2023 06:45) (67 - 126)  BP: 101/55 (24 Jan 2023 06:45) (76/36 - 136/61)  BP(mean): 72 (24 Jan 2023 06:45) (49 - 88)  ABP: --  ABP(mean): --  RR: 26 (24 Jan 2023 06:45) (21 - 61)  SpO2: 95% (24 Jan 2023 06:45) (90% - 100%)      Mode: AC/ CMV (Assist Control/ Continuous Mandatory Ventilation), RR (machine): 20, TV (machine): 350, FiO2: 40, PEEP: 5, ITime: 1, MAP: 13, PIP: 36  Plateau pressure:   P/F ratio:     01-23 @ 07:01  -  01-24 @ 07:00  --------------------------------------------------------  IN: 1868.6 mL / OUT: 1080 mL / NET: 788.6 mL      CAPILLARY BLOOD GLUCOSE      POCT Blood Glucose.: 187 mg/dL (23 Jan 2023 17:42)    ECG:    PHYSICAL EXAM:    General:   HEENT:   Neck:   Respiratory:   Cardiovascular:   Abdomen:   Extremities:  Neurological:    MEDICATIONS:  MEDICATIONS  (STANDING):  ascorbic acid 500 milliGRAM(s) Oral daily  atorvastatin 40 milliGRAM(s) Oral at bedtime  bacitracin   Ointment 1 Application(s) Topical daily  buMETAnide Infusion 2 mG/Hr (10 mL/Hr) IV Continuous <Continuous>  chlorhexidine 0.12% Liquid 15 milliLiter(s) Oral Mucosa every 12 hours  chlorhexidine 4% Liquid 1 Application(s) Topical <User Schedule>  dexMEDEtomidine Infusion 0.2 MICROgram(s)/kG/Hr (1.89 mL/Hr) IV Continuous <Continuous>  dextrose 5%. 1000 milliLiter(s) (50 mL/Hr) IV Continuous <Continuous>  dextrose 5%. 1000 milliLiter(s) (100 mL/Hr) IV Continuous <Continuous>  dextrose 50% Injectable 50 milliLiter(s) IV Push every 15 minutes  dextrose 50% Injectable 50 milliLiter(s) IV Push every 15 minutes  diltiazem    Tablet 60 milliGRAM(s) Enteral Tube every 6 hours  glucagon  Injectable 1 milliGRAM(s) IntraMuscular once  heparin   Injectable 5000 Unit(s) SubCutaneous every 8 hours  insulin lispro (ADMELOG) corrective regimen sliding scale   SubCutaneous every 6 hours  insulin NPH human recombinant 5 Unit(s) SubCutaneous every 6 hours  ketamine Infusion. 0.25 mG/kG/Hr (1.35 mL/Hr) IV Continuous <Continuous>  methylPREDNISolone sodium succinate Injectable 10 milliGRAM(s) IV Push daily  metolazone 10 milliGRAM(s) Oral <User Schedule>  midodrine 30 milliGRAM(s) Oral every 8 hours  multivitamin 1 Tablet(s) Oral daily  naloxegol 25 milliGRAM(s) Oral daily  pantoprazole  Injectable 40 milliGRAM(s) IV Push daily  phenylephrine    Infusion 0.5 MICROgram(s)/kG/Min (7.07 mL/Hr) IV Continuous <Continuous>  senna Syrup 10 milliLiter(s) Oral at bedtime  sucralfate suspension 1 Gram(s) Oral every 6 hours  thiamine 100 milliGRAM(s) Oral daily    MEDICATIONS  (PRN):  acetaminophen    Suspension .. 350 milliGRAM(s) Oral every 6 hours PRN Temp greater or equal to 38C (100.4F), Mild Pain (1 - 3), Moderate Pain (4 - 6)  albuterol/ipratropium for Nebulization 3 milliLiter(s) Nebulizer every 6 hours PRN Respiratory Distress  polyethylene glycol 3350 17 Gram(s) Oral two times a day PRN Constipation      ALLERGIES:  Allergies    Avelox (Pruritus)  fish (Vomiting)  Levaquin (Unknown)  shellfish (Vomiting)    Intolerances    fentanyl (Other)      LABS:                        8.2    28.63 )-----------( 200      ( 24 Jan 2023 00:36 )             24.6     01-24    135  |  101  |  138<H>  ----------------------------<  159<H>  4.4   |  15<L>  |  2.50<H>    Ca    7.7<L>      24 Jan 2023 00:37  Phos  8.2     01-24  Mg     1.9     01-24    TPro  4.5<L>  /  Alb  1.5<L>  /  TBili  0.4  /  DBili  x   /  AST  32  /  ALT  51<H>  /  AlkPhos  177<H>  01-24    PT/INR - ( 24 Jan 2023 00:36 )   PT: 15.5 sec;   INR: 1.33 ratio         PTT - ( 24 Jan 2023 00:36 )  PTT:38.2 sec      RADIOLOGY & ADDITIONAL TESTS: Reviewed.   INTERVAL HPI/OVERNIGHT EVENTS:    SUBJECTIVE: Patient seen and examined at bedside.   overnight pupils found to be fixed dilated, CT head without acute infarct      VITAL SIGNS:  ICU Vital Signs Last 24 Hrs  T(C): 36.7 (24 Jan 2023 00:00), Max: 37.2 (23 Jan 2023 16:00)  T(F): 98.1 (24 Jan 2023 00:00), Max: 99 (23 Jan 2023 16:00)  HR: 90 (24 Jan 2023 06:45) (67 - 126)  BP: 101/55 (24 Jan 2023 06:45) (76/36 - 136/61)  BP(mean): 72 (24 Jan 2023 06:45) (49 - 88)  ABP: --  ABP(mean): --  RR: 26 (24 Jan 2023 06:45) (21 - 61)  SpO2: 95% (24 Jan 2023 06:45) (90% - 100%)      Mode: AC/ CMV (Assist Control/ Continuous Mandatory Ventilation), RR (machine): 20, TV (machine): 350, FiO2: 40, PEEP: 5, ITime: 1, MAP: 13, PIP: 36  Plateau pressure:   P/F ratio:     01-23 @ 07:01  -  01-24 @ 07:00  --------------------------------------------------------  IN: 1868.6 mL / OUT: 1080 mL / NET: 788.6 mL      CAPILLARY BLOOD GLUCOSE      POCT Blood Glucose.: 187 mg/dL (23 Jan 2023 17:42)    ECG:    PHYSICAL EXAM:    General: sedated, intubated, pupils fixed dilated b/l unresponsive to light   Respiratory: intubated on ventilator, fio2 40%, peep 5  Cardiovascular: regular rate rhythm   Abdomen: distended   Extremities: b/l edema pitting  Neurological: A&O x0, no response to verbal/tactile stimuli     MEDICATIONS:  MEDICATIONS  (STANDING):  ascorbic acid 500 milliGRAM(s) Oral daily  atorvastatin 40 milliGRAM(s) Oral at bedtime  bacitracin   Ointment 1 Application(s) Topical daily  buMETAnide Infusion 2 mG/Hr (10 mL/Hr) IV Continuous <Continuous>  chlorhexidine 0.12% Liquid 15 milliLiter(s) Oral Mucosa every 12 hours  chlorhexidine 4% Liquid 1 Application(s) Topical <User Schedule>  dexMEDEtomidine Infusion 0.2 MICROgram(s)/kG/Hr (1.89 mL/Hr) IV Continuous <Continuous>  dextrose 5%. 1000 milliLiter(s) (50 mL/Hr) IV Continuous <Continuous>  dextrose 5%. 1000 milliLiter(s) (100 mL/Hr) IV Continuous <Continuous>  dextrose 50% Injectable 50 milliLiter(s) IV Push every 15 minutes  dextrose 50% Injectable 50 milliLiter(s) IV Push every 15 minutes  diltiazem    Tablet 60 milliGRAM(s) Enteral Tube every 6 hours  glucagon  Injectable 1 milliGRAM(s) IntraMuscular once  heparin   Injectable 5000 Unit(s) SubCutaneous every 8 hours  insulin lispro (ADMELOG) corrective regimen sliding scale   SubCutaneous every 6 hours  insulin NPH human recombinant 5 Unit(s) SubCutaneous every 6 hours  ketamine Infusion. 0.25 mG/kG/Hr (1.35 mL/Hr) IV Continuous <Continuous>  methylPREDNISolone sodium succinate Injectable 10 milliGRAM(s) IV Push daily  metolazone 10 milliGRAM(s) Oral <User Schedule>  midodrine 30 milliGRAM(s) Oral every 8 hours  multivitamin 1 Tablet(s) Oral daily  naloxegol 25 milliGRAM(s) Oral daily  pantoprazole  Injectable 40 milliGRAM(s) IV Push daily  phenylephrine    Infusion 0.5 MICROgram(s)/kG/Min (7.07 mL/Hr) IV Continuous <Continuous>  senna Syrup 10 milliLiter(s) Oral at bedtime  sucralfate suspension 1 Gram(s) Oral every 6 hours  thiamine 100 milliGRAM(s) Oral daily    MEDICATIONS  (PRN):  acetaminophen    Suspension .. 350 milliGRAM(s) Oral every 6 hours PRN Temp greater or equal to 38C (100.4F), Mild Pain (1 - 3), Moderate Pain (4 - 6)  albuterol/ipratropium for Nebulization 3 milliLiter(s) Nebulizer every 6 hours PRN Respiratory Distress  polyethylene glycol 3350 17 Gram(s) Oral two times a day PRN Constipation      ALLERGIES:  Allergies    Avelox (Pruritus)  fish (Vomiting)  Levaquin (Unknown)  shellfish (Vomiting)    Intolerances    fentanyl (Other)      LABS:                        8.2    28.63 )-----------( 200      ( 24 Jan 2023 00:36 )             24.6     01-24    135  |  101  |  138<H>  ----------------------------<  159<H>  4.4   |  15<L>  |  2.50<H>    Ca    7.7<L>      24 Jan 2023 00:37  Phos  8.2     01-24  Mg     1.9     01-24    TPro  4.5<L>  /  Alb  1.5<L>  /  TBili  0.4  /  DBili  x   /  AST  32  /  ALT  51<H>  /  AlkPhos  177<H>  01-24    PT/INR - ( 24 Jan 2023 00:36 )   PT: 15.5 sec;   INR: 1.33 ratio         PTT - ( 24 Jan 2023 00:36 )  PTT:38.2 sec      RADIOLOGY & ADDITIONAL TESTS: Reviewed.

## 2023-01-24 NOTE — PROGRESS NOTE ADULT - PROBLEM SELECTOR PLAN 6
see GOC note above  - James Boyle deferred decision making to children: Leonardo Boothe and Ariana  - elected for compassionate extubation and symptom directed care

## 2023-01-24 NOTE — PROGRESS NOTE ADULT - RESPIRATORY DISTRESS OBSERVATION: TOTAL SCORE
Progress Note      Patient: Bonita Gaxiola               Sex: female          MRN: [id number]           YOB: 1937      Age:  78 y.o. PCP:  Sundeep Dior MD  Treatment Team: Attending Provider: Paulo Donnelly MD; Consulting Provider: Donnie Quintero MD; Speech Language Pathologist: Bk Olmstead, ZOYA; Care Manager: Chavo Gabriel. Drake Decker; Consulting Provider: Tia Faria MD; Consulting Provider: Richard Hutchinson RN; Consulting Provider: Cherelle Montejo MD; Utilization Review: Jeanine Mahmood [facility] [address] (6/5/2018)      Overview: Last Assessment & Plan:       Needs to cut out soda. Hyperglycemia (6/5/2018)      Acute respiratory failure with hypercapnia ([facility] [address] (6/5/2018)      Acute on chronic kidney failure ([facility] [address] (Miguel Booker, Ca-125 slightly elevated, gyn-valencia  · Chronic Hypercapnic respiratory failure- ?MARYAM, Brayd Meyer MD  June 26, 2018 8

## 2023-01-24 NOTE — PROGRESS NOTE ADULT - CONVERSATION DETAILS
Met with family at bedside. Patient intubated, alert on precedex gtt, able to follow simple commands, lethargic, unable to participate in conversation.  James verbalized devastation that his wife is so sick, and that there has to be some intervention available to help her, they have been  for 56 years and he will do anything for her. Emotional support provided. James speaks English but offered translation services for conversation as James's primary language is Sicilian but James and family refused translation services at this time. Shyann (daughter) expressed concerns about providing Dad (James) with all medical information as he has a heart condition. She endorses that stress in the past has triggered exacerbation of cardiovascular events. Explained to Shyann that [facility] designates James as the surrogate decision maker therefore he will have to be provided information in order for him to make appropriate decisions. Shyann expressed understanding but would like to be able to speak with Dad at home in a less stressful environment regarding patient's prognosis.     Given permission by James to discuss patient's current status with 2 daughters, he stated he would like to remain with his wife in the room during the conversation. Patient has had multiple failed CPAP trials due to tachypnea and afib with RVR. Daughters verbalized understanding that prognosis for patient is poor with chronic respiratory illness and multiple comorbidities. Discussed conversation with MICU team regarding 14 day intubation and then a discussion will need to be had for trach/peg. Educated that length of time for mechanical ventilation is a recommendation but patient is hemodynamically unstable and it is okay to hope for the best and plan in case of the worst. Daughters do not want trach and PEG but feel that James is desperate to try and save patient. Offered gentle information to be provided so he has better understanding of current situation. Daughters continue to refuse continuing conversation with James, continued to review [facility] and that he will need to be involved in decisions or if unable to make these decisions defer the decision making to the children. Discussed implementing DNR while patient is still intubated as she has had numerous CV complications in the ICU, and despite all interventions CPR would not benefit patient or provide hopeful recovery to previous status. Daughters in agreement. Continued discussion that this ultimately is James's decision. Shyann and Ariana stated they will discuss this with him at home tonight in a less stressful environment and will continue conversation tomorrow.
Continued [facility] conversation with  James and three adult children, grandchildren also at bedside. Discussed patient's prognosis as pressors were capped overnight. Family expressed that the medical team has tried everything to try and medically extubate the patient is unable to be at this time. James expressed that he does not want his wife to suffer any longer, but he is unable to make the decision himself as he feels that it is killing her. James Boyle elects his three children Shyann Boyle, Leonardo Boyle, and Ariana Cuca. The three children verbalized that they would elect to compassionately extubate the patient. Emotional support provided. Discussed symptom directed approach following extubation: no IVF, ABX, blood draws, comfort measures only. MOLST Completed at bedside, arrangements for compassionate extubation made.
Continued discussions with James, Shyann, and Robert. Continued conversation about poor prognosis of patient. Patient has had daily failed cpap trials with episodes of afib w/ RVR. James expressed concern that MICU team wanted him to make a decision regarding trach/peg but he does not want his wife to be put through that, yet at the same time he feels that not doing it will be "killing" her.  Explained that patient has chronic lung disease and this is end stage of the disease process, despite interventions we may be unable to manage patient's respiratory symptoms. Discussed with family that we can attempt to medically optimize patient for extuabtion, continue medical management in the hospital, hopeful patient can recover but if patient does not recover then allow patient to die a natural death. Shyann expressed the desire from all 3 children is for patient to be DNR/I, with continued medical management but James verbalized that, "that would still be killing her" and that he would "never sign a paper to kill his wife". James visibly distraught. Emotional support provided. Explained that James does not physically have to sign documentation/defer decision making to his children if he feels that his own health is at risk, or he is unable to make the decision to make patient DNR/I. James also wants to remain as surrogate decision maker as he feels no one else understands what he is going through having been  to his wife for 56 years. Emotional support provided. Plan is for James to observe CPAP trial today, and continue conversations with his children regarding extubation and code status.

## 2023-01-24 NOTE — PROGRESS NOTE ADULT - SUBJECTIVE AND OBJECTIVE BOX
CARDIOLOGY     PROGRESS  NOTE   ________________________________________________    CHIEF COMPLAINT:Patient is a 77y old  Female who presents with a chief complaint of sob/ rapid a.fib (24 Jan 2023 07:21)  no complain  	  REVIEW OF SYSTEMS:  CONSTITUTIONAL: No fever, weight loss, or fatigue  EYES: No eye pain, visual disturbances, or discharge  ENT:  No difficulty hearing, tinnitus, vertigo; No sinus or throat pain  NECK: No pain or stiffness  RESPIRATORY: No cough, wheezing, chills or hemoptysis; No Shortness of Breath  CARDIOVASCULAR: No chest pain, palpitations, passing out, dizziness, or leg swelling  GASTROINTESTINAL: No abdominal or epigastric pain. No nausea, vomiting, or hematemesis; No diarrhea or constipation. No melena or hematochezia.  GENITOURINARY: No dysuria, frequency, hematuria, or incontinence  NEUROLOGICAL: No headaches, memory loss, loss of strength, numbness, or tremors  SKIN: No itching, burning, rashes, or lesions   LYMPH Nodes: No enlarged glands  ENDOCRINE: No heat or cold intolerance; No hair loss  MUSCULOSKELETAL: No joint pain or swelling; No muscle, back, or extremity pain  PSYCHIATRIC: No depression, anxiety, mood swings, or difficulty sleeping  HEME/LYMPH: No easy bruising, or bleeding gums  ALLERGY AND IMMUNOLOGIC: No hives or eczema	    [ ] All others negative	  [x ] Unable to obtain    PHYSICAL EXAM:  T(C): 37.2 (01-24-23 @ 04:00), Max: 37.2 (01-23-23 @ 16:00)  HR: 90 (01-24-23 @ 07:05) (74 - 126)  BP: 117/101 (01-24-23 @ 07:05) (76/36 - 136/61)  RR: 34 (01-24-23 @ 07:05) (21 - 61)  SpO2: 95% (01-24-23 @ 06:45) (90% - 100%)  Wt(kg): --  I&O's Summary    23 Jan 2023 07:01  -  24 Jan 2023 07:00  --------------------------------------------------------  IN: 2299.3 mL / OUT: 1110 mL / NET: 1189.3 mL        Appearance: cachectic, sedated on vent  HEENT:   Normal oral mucosa, PERRL, EOMI	  Lymphatic: No lymphadenopathy  Cardiovascular: Normal S1 S2, No JVD, + murmurs, No edema  Respiratory: vent  Psychiatry: A & O x 3, Mood & affect appropriate  Gastrointestinal:  Soft, Non-tender, + BS	  Skin: No rashes, No ecchymoses, No cyanosis	  Extremities: No clubbing, cyanosis or edema  Vascular: Peripheral pulses palpable 2+ bilaterally    MEDICATIONS  (STANDING):  ascorbic acid 500 milliGRAM(s) Oral daily  atorvastatin 40 milliGRAM(s) Oral at bedtime  bacitracin   Ointment 1 Application(s) Topical daily  buMETAnide Infusion 2 mG/Hr (10 mL/Hr) IV Continuous <Continuous>  chlorhexidine 0.12% Liquid 15 milliLiter(s) Oral Mucosa every 12 hours  chlorhexidine 4% Liquid 1 Application(s) Topical <User Schedule>  dexMEDEtomidine Infusion 0.2 MICROgram(s)/kG/Hr (1.89 mL/Hr) IV Continuous <Continuous>  dextrose 5%. 1000 milliLiter(s) (50 mL/Hr) IV Continuous <Continuous>  dextrose 5%. 1000 milliLiter(s) (100 mL/Hr) IV Continuous <Continuous>  dextrose 50% Injectable 50 milliLiter(s) IV Push every 15 minutes  dextrose 50% Injectable 50 milliLiter(s) IV Push every 15 minutes  diltiazem    Tablet 60 milliGRAM(s) Enteral Tube every 6 hours  glucagon  Injectable 1 milliGRAM(s) IntraMuscular once  heparin   Injectable 5000 Unit(s) SubCutaneous every 8 hours  insulin lispro (ADMELOG) corrective regimen sliding scale   SubCutaneous every 6 hours  insulin NPH human recombinant 5 Unit(s) SubCutaneous every 6 hours  ketamine Infusion. 0.25 mG/kG/Hr (1.35 mL/Hr) IV Continuous <Continuous>  methylPREDNISolone sodium succinate Injectable 10 milliGRAM(s) IV Push daily  metolazone 10 milliGRAM(s) Oral <User Schedule>  midodrine 30 milliGRAM(s) Oral every 8 hours  multivitamin 1 Tablet(s) Oral daily  naloxegol 25 milliGRAM(s) Oral daily  pantoprazole  Injectable 40 milliGRAM(s) IV Push daily  phenylephrine    Infusion 0.5 MICROgram(s)/kG/Min (7.07 mL/Hr) IV Continuous <Continuous>  senna Syrup 10 milliLiter(s) Oral at bedtime  sucralfate suspension 1 Gram(s) Oral every 6 hours  thiamine 100 milliGRAM(s) Oral daily      TELEMETRY: 	    ECG:  	  RADIOLOGY:  OTHER: 	  	  LABS:	 	    CARDIAC MARKERS:    Mode: AC/ CMV (Assist Control/ Continuous Mandatory Ventilation), RR (machine): 20, TV (machine): 350, FiO2: 40, PEEP: 5, ITime: 1, MAP: 13, PIP: 36  Plateau pressure:   P/F ratio:                             8.2    28.63 )-----------( 200      ( 24 Jan 2023 00:36 )             24.6     01-24    135  |  101  |  138<H>  ----------------------------<  159<H>  4.4   |  15<L>  |  2.50<H>    Ca    7.7<L>      24 Jan 2023 00:37  Phos  8.2     01-24  Mg     1.9     01-24    TPro  4.5<L>  /  Alb  1.5<L>  /  TBili  0.4  /  DBili  x   /  AST  32  /  ALT  51<H>  /  AlkPhos  177<H>  01-24    proBNP:   Lipid Profile:   HgA1c:   TSH: Thyroid Stimulating Hormone, Serum: 1.17 uIU/mL (01-09 @ 06:11)    PT/INR - ( 24 Jan 2023 00:36 )   PT: 15.5 sec;   INR: 1.33 ratio         PTT - ( 24 Jan 2023 00:36 )  PTT:38.2 sec    < from: Xray Chest 1 View- PORTABLE-Urgent (Xray Chest 1 View- PORTABLE-Urgent .) (01.21.23 @ 13:38) >  Enteric tube with tip in the mid stomach.  Diffuse right patchy airspace opacities may represent atelectasis.   Underlying infection cannot be excluded.        Assessment and plan  ---------------------------  77-year-old female with past medical history of DM, HTN, HLD, asthma on daily steroids, A. fib on Eliquis and digoxin presenting with shortness of breath.  Patient unable to provide history.  Daughter at bedside reports patient has had increasing shortness of breath, wheezing, cough for the past week now.  Patient also with increasing generalized weakness, fatigue and decreased p.o. intake.  Denies fever/chills, chest pain, abdominal pain, nausea, vomiting, diarrhea, dysuria.  Family reports patient had a similar episode 1 year ago when she was admitted to [facility] with UTI.  pt with sig medical hx, chronic a.fib with sob ?sec sepsis/ pneumoniae, UTI  sedated on vent  continue abx and steroid  echo noted  AC held sec to bleeding  still sedated on vent   continue abx  hr is well controlled still on po Cardizem 60 mg q 6h/ no ac  continue pressor keep MAP>65  sepsis on iv abx  fu renal function closely  a.fib hr is well controlled on Cardizem 5 mg/ hr will increase drip as needed  on Bumex/ metalazone for diuresis  nutrition  micu care/ vent  discussed with family prognosis guarded  decrease diuretics   discussed with the daughter are thinking about trach

## 2023-01-24 NOTE — CHART NOTE - NSCHARTNOTEFT_GEN_A_CORE
CRUZITO BATES  [id number]    Death Note    Called to see the patient for unresponsiveness.   On exam, patient is unresponsive to verbal or noxious stimuli.  Pupils are fixed and dilated.  No spontaneous breathing.  Absent heart and breath sounds.  No palpable carotid and radial pulses.  Absent peripheral pulses.  Patient pronounced  at 9:30 AM Dr Turcios notified. Family notified: yes. Family at bedside during pronouncement.   Autopsy: declined.    Saeid Babcock MD  Internal Medicine, PGY-2  Please Contact via TEAMS

## 2023-01-24 NOTE — PROGRESS NOTE ADULT - NUTRITIONAL ASSESSMENT
This patient has been assessed with a concern for Malnutrition and has been determined to have a diagnosis/diagnoses of Severe protein-calorie malnutrition and Underweight (BMI < 19).    This patient is being managed with:   Diet NPO with Tube Feed-  Tube Feeding Modality: Orogastric  Glucerna 1.5 Gavin (GLUCERNA1.5RTH)  Total Volume for 24 Hours (mL): 180  Continuous  Starting Tube Feed Rate {mL per Hour}: 10  Until Goal Tube Feed Rate (mL per Hour): 10  Tube Feed Duration (in Hours): 18  Tube Feed Start Time: 11:00  Tube Feed Stop Time: 07:00  Entered: Tim 15 2023 11:08AM    
This patient has been assessed with a concern for Malnutrition and has been determined to have a diagnosis/diagnoses of Severe protein-calorie malnutrition and Underweight (BMI < 19).    This patient is being managed with:   Diet NPO with Tube Feed-  Tube Feeding Modality: Orogastric  Glucerna 1.5 Gavin (GLUCERNA1.5RTH)  Total Volume for 24 Hours (mL): 990  Continuous  Starting Tube Feed Rate {mL per Hour}: 10     Every 4 hours  Until Goal Tube Feed Rate (mL per Hour): 55  Tube Feed Duration (in Hours): 18  Tube Feed Start Time: 11:00  Tube Feed Stop Time: 07:00  Entered: Jan 19 2023  4:11PM    
This patient has been assessed with a concern for Malnutrition and has been determined to have a diagnosis/diagnoses of Severe protein-calorie malnutrition and Underweight (BMI < 19).    This patient is being managed with:   Diet NPO with Tube Feed-  Tube Feeding Modality: Orogastric  Vital AF (VITALAFRTH)  Total Volume for 24 Hours (mL): 900  Intermittent  Starting Tube Feed Rate {mL per Hour}: 10  Increase Tube Feed Rate by (mL): 10    Every 4 hours  Until Goal Tube Feed Rate (mL per Hour): 50  Tube Feeding Hours ON: 18  Tube Feeding OFF (Hours): 6  Tube Feed Start Time: 11:00  Entered: Jan 12 2023  1:10PM    
This patient has been assessed with a concern for Malnutrition and has been determined to have a diagnosis/diagnoses of Severe protein-calorie malnutrition and Underweight (BMI < 19).    This patient is being managed with:   Diet NPO with Tube Feed-  Tube Feeding Modality: Orogastric  Glucerna 1.5 Gavin (GLUCERNA1.5RTH)  Total Volume for 24 Hours (mL): 720  Intermittent  Starting Tube Feed Rate {mL per Hour}: 10  Increase Tube Feed Rate by (mL): 10    Every 4 hours  Until Goal Tube Feed Rate (mL per Hour): 40  Tube Feeding Hours ON: 18  Tube Feeding OFF (Hours): 6  Tube Feed Start Time: 11:00  Entered: Jan 11 2023  3:30AM    
This patient has been assessed with a concern for Malnutrition and has been determined to have a diagnosis/diagnoses of Severe protein-calorie malnutrition and Underweight (BMI < 19).    This patient is being managed with:   Diet NPO with Tube Feed-  Tube Feeding Modality: Orogastric  Glucerna 1.5 Gavin (GLUCERNA1.5RTH)  Total Volume for 24 Hours (mL): 990  Continuous  Starting Tube Feed Rate {mL per Hour}: 10     Every 4 hours  Until Goal Tube Feed Rate (mL per Hour): 55  Tube Feed Duration (in Hours): 18  Tube Feed Start Time: 11:00  Tube Feed Stop Time: 07:00  Entered: Jan 19 2023  4:11PM    
This patient has been assessed with a concern for Malnutrition and has been determined to have a diagnosis/diagnoses of Severe protein-calorie malnutrition and Underweight (BMI < 19).    This patient is being managed with:   Diet NPO-  Entered: Jan 14 2023 11:47AM    
This patient has been assessed with a concern for Malnutrition and has been determined to have a diagnosis/diagnoses of Severe protein-calorie malnutrition and Underweight (BMI < 19).    This patient is being managed with:   Diet NPO with Tube Feed-  Tube Feeding Modality: Orogastric  Glucerna 1.5 Gavin (GLUCERNA1.5RTH)  Total Volume for 24 Hours (mL): 180  Continuous  Starting Tube Feed Rate {mL per Hour}: 10  Until Goal Tube Feed Rate (mL per Hour): 10  Tube Feed Duration (in Hours): 18  Tube Feed Start Time: 11:00  Tube Feed Stop Time: 07:00  Entered: Tim 15 2023 11:08AM    
This patient has been assessed with a concern for Malnutrition and has been determined to have a diagnosis/diagnoses of Severe protein-calorie malnutrition and Underweight (BMI < 19).    This patient is being managed with:   Diet NPO with Tube Feed-  Tube Feeding Modality: Orogastric  Glucerna 1.5 Gavin (GLUCERNA1.5RTH)  Total Volume for 24 Hours (mL): 180  Continuous  Starting Tube Feed Rate {mL per Hour}: 10  Until Goal Tube Feed Rate (mL per Hour): 10  Tube Feed Duration (in Hours): 18  Tube Feed Start Time: 11:00  Tube Feed Stop Time: 07:00  Entered: Tim 15 2023 11:08AM    
This patient has been assessed with a concern for Malnutrition and has been determined to have a diagnosis/diagnoses of Severe protein-calorie malnutrition and Underweight (BMI < 19).    This patient is being managed with:   Diet NPO with Tube Feed-  Tube Feeding Modality: Orogastric  Glucerna 1.5 Gavin (GLUCERNA1.5RTH)  Total Volume for 24 Hours (mL): 360  Continuous  Starting Tube Feed Rate {mL per Hour}: 10  Until Goal Tube Feed Rate (mL per Hour): 20  Tube Feed Duration (in Hours): 18  Tube Feed Start Time: 11:00  Tube Feed Stop Time: 07:00  Entered: Jan 17 2023  5:32PM    
This patient has been assessed with a concern for Malnutrition and has been determined to have a diagnosis/diagnoses of Severe protein-calorie malnutrition and Underweight (BMI < 19).    This patient is being managed with:   Diet NPO with Tube Feed-  Tube Feeding Modality: Orogastric  Glucerna 1.5 Gavin (GLUCERNA1.5RTH)  Total Volume for 24 Hours (mL): 900  Intermittent  Starting Tube Feed Rate {mL per Hour}: 10  Increase Tube Feed Rate by (mL): 10    Every 4 hours  Until Goal Tube Feed Rate (mL per Hour): 50  Tube Feeding Hours ON: 18  Tube Feeding OFF (Hours): 6  Tube Feed Start Time: 11:00  Entered: Jan 13 2023 12:54PM    
This patient has been assessed with a concern for Malnutrition and has been determined to have a diagnosis/diagnoses of Severe protein-calorie malnutrition and Underweight (BMI < 19).    This patient is being managed with:   Diet NPO with Tube Feed-  Tube Feeding Modality: Orogastric  Glucerna 1.5 Gavin (GLUCERNA1.5RTH)  Total Volume for 24 Hours (mL): 990  Continuous  Starting Tube Feed Rate {mL per Hour}: 10     Every 4 hours  Until Goal Tube Feed Rate (mL per Hour): 55  Tube Feed Duration (in Hours): 18  Tube Feed Start Time: 11:00  Tube Feed Stop Time: 07:00  Entered: Jan 19 2023  4:11PM    
This patient has been assessed with a concern for Malnutrition and has been determined to have a diagnosis/diagnoses of Severe protein-calorie malnutrition and Underweight (BMI < 19).    This patient is being managed with:   Diet NPO with Tube Feed-  Tube Feeding Modality: Orogastric  Glucerna 1.5 Gavin (GLUCERNA1.5RTH)  Total Volume for 24 Hours (mL): 990  Continuous  Starting Tube Feed Rate {mL per Hour}: 10     Every 4 hours  Until Goal Tube Feed Rate (mL per Hour): 55  Tube Feed Duration (in Hours): 18  Tube Feed Start Time: 11:00  Tube Feed Stop Time: 07:00  Entered: Jan 22 2023 10:13AM    
This patient has been assessed with a concern for Malnutrition and has been determined to have a diagnosis/diagnoses of Severe protein-calorie malnutrition and Underweight (BMI < 19).    This patient is being managed with:   Diet NPO with Tube Feed-  Tube Feeding Modality: Orogastric  Vital AF (VITALAFRTH)  Total Volume for 24 Hours (mL): 900  Intermittent  Starting Tube Feed Rate {mL per Hour}: 10  Increase Tube Feed Rate by (mL): 10    Every 4 hours  Until Goal Tube Feed Rate (mL per Hour): 50  Tube Feeding Hours ON: 18  Tube Feeding OFF (Hours): 6  Tube Feed Start Time: 11:00  Entered: Jan 12 2023  1:10PM    
This patient has been assessed with a concern for Malnutrition and has been determined to have a diagnosis/diagnoses of Severe protein-calorie malnutrition and Underweight (BMI < 19).    This patient is being managed with:   Diet NPO-  Entered: Jan 14 2023 11:47AM    
This patient has been assessed with a concern for Malnutrition and has been determined to have a diagnosis/diagnoses of Severe protein-calorie malnutrition and Underweight (BMI < 19).    This patient is being managed with:   Diet NPO-  Except Medications  Entered: Jan 21 2023  7:59AM    
This patient has been assessed with a concern for Malnutrition and has been determined to have a diagnosis/diagnoses of Severe protein-calorie malnutrition and Underweight (BMI < 19).    This patient is being managed with:   Diet NPO with Tube Feed-  Tube Feeding Modality: Orogastric  Glucerna 1.5 Gavin (GLUCERNA1.5RTH)  Total Volume for 24 Hours (mL): 180  Continuous  Starting Tube Feed Rate {mL per Hour}: 10  Until Goal Tube Feed Rate (mL per Hour): 10  Tube Feed Duration (in Hours): 18  Tube Feed Start Time: 11:00  Tube Feed Stop Time: 07:00  Entered: Tim 15 2023 11:08AM    
This patient has been assessed with a concern for Malnutrition and has been determined to have a diagnosis/diagnoses of Severe protein-calorie malnutrition and Underweight (BMI < 19).    This patient is being managed with:   Diet NPO with Tube Feed-  Tube Feeding Modality: Orogastric  Glucerna 1.5 Gavin (GLUCERNA1.5RTH)  Total Volume for 24 Hours (mL): 360  Continuous  Starting Tube Feed Rate {mL per Hour}: 10  Until Goal Tube Feed Rate (mL per Hour): 20  Tube Feed Duration (in Hours): 18  Tube Feed Start Time: 11:00  Tube Feed Stop Time: 07:00  Entered: Jan 17 2023  5:32PM    
This patient has been assessed with a concern for Malnutrition and has been determined to have a diagnosis/diagnoses of Severe protein-calorie malnutrition and Underweight (BMI < 19).    This patient is being managed with:   Diet NPO with Tube Feed-  Tube Feeding Modality: Orogastric  Glucerna 1.5 Gavin (GLUCERNA1.5RTH)  Total Volume for 24 Hours (mL): 360  Continuous  Starting Tube Feed Rate {mL per Hour}: 20     Every 4 hours  Until Goal Tube Feed Rate (mL per Hour): 20  Tube Feed Duration (in Hours): 18  Tube Feed Start Time: 11:00  Tube Feed Stop Time: 07:00  Entered: Jan 24 2023 12:59AM    
This patient has been assessed with a concern for Malnutrition and has been determined to have a diagnosis/diagnoses of Severe protein-calorie malnutrition and Underweight (BMI < 19).    This patient is being managed with:   Diet NPO with Tube Feed-  Tube Feeding Modality: Orogastric  Glucerna 1.5 Gavin (GLUCERNA1.5RTH)  Total Volume for 24 Hours (mL): 720  Continuous  Starting Tube Feed Rate {mL per Hour}: 10  Until Goal Tube Feed Rate (mL per Hour): 40  Tube Feed Duration (in Hours): 18  Tube Feed Start Time: 11:00  Tube Feed Stop Time: 07:00  Entered: Jan 19 2023  8:12AM    
This patient has been assessed with a concern for Malnutrition and has been determined to have a diagnosis/diagnoses of Severe protein-calorie malnutrition and Underweight (BMI < 19).    This patient is being managed with:   Diet NPO with Tube Feed-  Tube Feeding Modality: Orogastric  Glucerna 1.5 Gavin (GLUCERNA1.5RTH)  Total Volume for 24 Hours (mL): 990  Continuous  Starting Tube Feed Rate {mL per Hour}: 10     Every 4 hours  Until Goal Tube Feed Rate (mL per Hour): 55  Tube Feed Duration (in Hours): 18  Tube Feed Start Time: 11:00  Tube Feed Stop Time: 07:00  Entered: Jan 22 2023 10:13AM    
This patient has been assessed with a concern for Malnutrition and has been determined to have a diagnosis/diagnoses of Severe protein-calorie malnutrition and Underweight (BMI < 19).    This patient is being managed with:   Diet NPO with Tube Feed-  Tube Feeding Modality: Orogastric  Vital AF (VITALAFRTH)  Total Volume for 24 Hours (mL): 900  Intermittent  Starting Tube Feed Rate {mL per Hour}: 10  Increase Tube Feed Rate by (mL): 10    Every 4 hours  Until Goal Tube Feed Rate (mL per Hour): 50  Tube Feeding Hours ON: 18  Tube Feeding OFF (Hours): 6  Tube Feed Start Time: 11:00  Entered: Jan 12 2023  1:10PM    
This patient has been assessed with a concern for Malnutrition and has been determined to have a diagnosis/diagnoses of Severe protein-calorie malnutrition and Underweight (BMI < 19).    This patient is being managed with:   Diet NPO with Tube Feed-  Tube Feeding Modality: Orogastric  Glucerna 1.5 Gavin (GLUCERNA1.5RTH)  Total Volume for 24 Hours (mL): 180  Continuous  Starting Tube Feed Rate {mL per Hour}: 10  Until Goal Tube Feed Rate (mL per Hour): 10  Tube Feed Duration (in Hours): 18  Tube Feed Start Time: 11:00  Tube Feed Stop Time: 07:00  Entered: Tim 15 2023 11:08AM    
This patient has been assessed with a concern for Malnutrition and has been determined to have a diagnosis/diagnoses of Severe protein-calorie malnutrition and Underweight (BMI < 19).    This patient is being managed with:   Diet NPO with Tube Feed-  Tube Feeding Modality: Orogastric  Glucerna 1.5 Gavin (GLUCERNA1.5RTH)  Total Volume for 24 Hours (mL): 360  Continuous  Starting Tube Feed Rate {mL per Hour}: 10  Until Goal Tube Feed Rate (mL per Hour): 20  Tube Feed Duration (in Hours): 18  Tube Feed Start Time: 11:00  Tube Feed Stop Time: 07:00  Entered: Jan 17 2023  5:32PM    
This patient has been assessed with a concern for Malnutrition and has been determined to have a diagnosis/diagnoses of Severe protein-calorie malnutrition and Underweight (BMI < 19).    This patient is being managed with:   Diet NPO with Tube Feed-  Tube Feeding Modality: Orogastric  Glucerna 1.5 Gavin (GLUCERNA1.5RTH)  Total Volume for 24 Hours (mL): 360  Continuous  Starting Tube Feed Rate {mL per Hour}: 20     Every 4 hours  Until Goal Tube Feed Rate (mL per Hour): 20  Tube Feed Duration (in Hours): 18  Tube Feed Start Time: 11:00  Tube Feed Stop Time: 07:00  Entered: Jan 24 2023 12:59AM    
This patient has been assessed with a concern for Malnutrition and has been determined to have a diagnosis/diagnoses of Severe protein-calorie malnutrition and Underweight (BMI < 19).    This patient is being managed with:   Diet NPO with Tube Feed-  Tube Feeding Modality: Orogastric  Glucerna 1.5 Gavin (GLUCERNA1.5RTH)  Total Volume for 24 Hours (mL): 990  Continuous  Starting Tube Feed Rate {mL per Hour}: 10     Every 4 hours  Until Goal Tube Feed Rate (mL per Hour): 55  Tube Feed Duration (in Hours): 18  Tube Feed Start Time: 11:00  Tube Feed Stop Time: 07:00  Entered: Jan 22 2023 10:13AM    
This patient has been assessed with a concern for Malnutrition and has been determined to have a diagnosis/diagnoses of Severe protein-calorie malnutrition and Underweight (BMI < 19).    This patient is being managed with:   Diet NPO-  Except Medications  Entered: Jan 21 2023  7:59AM

## 2023-01-24 NOTE — PROGRESS NOTE ADULT - ATTENDING COMMENTS
As above  Bloody output yesterday per NGT  Multiple comorbidites in MICU critically ill  Hgb has been stable    Will not plan for endoscopic intervention at this time  Monitor Hgb, transfuse as needed    Thank you for this interesting consult.  Please call the advanced GI service with any questions or concerns.
GI consulted for anemia and small volume hematochezia over the weekend.   No further episodes hematocheiza, now having brown stool  Hgb now stable.       Defer urgent/emergent colonoscopy in this critically ill patient unless further overt signs of bleeding or persistent anemia   Consider imaging to r/o RP bleed   Consider heme/onc c/s   Trend h/h  monitor bowel movements   Rest of care per ICU team  GI to sign off, please call with questions.
GI consulted for anemia and small volume hematochezia over the weekend.   No further episodes, had brown stool today.   Unclear etiology of anemia.   Consider imaging to r/o RP bleed   Consider heme/onc c/s   Defer urgent/emergent colonoscopy in this critically ill patient unless further overt signs of bleeding or persistent anemia   Trend h/h  monitor bowel movements   Case d/w ICU team.   GI to follow, please call with questions.
1.Acute on chronic  hypoxemic respiratory failure due to acute exacerbation of bronchiectasis / asthma and possible pneumonia.       Continue current AC vent settings.  Did not tolerate PS wean today. Restart propofol.      Continue solumedrol to 20mg  daily.      Continue bronchodilators      Continue cefepime  Adjust dose for renal failure.       Stop IPV. Minimal secretions. Stop.     Chest PT      3% saline nebulizers with Duo nebs.     Azithromycin  M, W, F for   antiinflammatory effects.  2. Ileus  again after fentanyl. Stop all narcotics. > 500mls from OGT so far. Continue on low intermittent suction. Check Abf film.  3. Iv hydration. Pt clinically dry. Hyponatremia  slowly improving with hydration. Give additional fluid 1 liter bolus to start with. Pt with increasing lactic acid and NGT output. May need CT abd.  4. DVT prophylaxis sq heparin  5. GOC. Full code.  6. Chronic atrial fib. with RVR.  Continue diltiazem drip.  7. Hypotension. IV hydration. Phenylephrine as needed.  8. Renal Acute renal failure from hypovolemia and ATN. Urine color now clear. Creatinine continues to decrease. Follow  UO and creatinine.
77F with multiple comorbidities in ICU for respiratory failure  Now with recurrent coffee grounds in OGT this morning but since has stopped  Hgb stable    PPI drip  Sucralfate per OGT  OGT to low intermittent suction (not continuous)  If Hgb dropping significantly or large continuous OGT output (red) would plan for endoscopic evaluation and possible treatment    Thank you for this interesting consult.  Please call the advanced GI service with any questions or concerns.
78 y/o F with a PMH as above here with the followin. Acute on chronic  hypoxemic respiratory failure due to acute exacerbation of bronchiectasis / asthma and possible pneumonia.       Continue current AC vent settings, failed SBT this AM again      Continue solumedrol to 20mg  daily.      Continue bronchodilators      Continue cefepime         Cont airway clearance       Cont Azithromycin  M, W, F for antiinflammatory effects.  2.  Ileus. Clinically improved.   3.  Hypotension: wean pressors as tolerated with goal MAP>65  4. DVT prophylaxis sq heparin  5. GOC. Full code.  6. Chronic atrial fib. with RVR.  Continue diltiazem drip.  7. Renal: Acute renal failure-diuretic challenge seems to have improved, cont to keep net negative   8. Shock liver. LFTS noted, cont to trend.  9. Episode of BRBPR over the weekend, no intervention and stable at this time, cont to trend CBC .     [facility] had with daughter at the bedside and they need time to process her overall clinical state.  Her daughters are amenable to compassionate extubation but the  is reluctant.
1.Acute on chronic  hypoxemic respiratory failure due to acute exacerbation of bronchiectasis / asthma and possible pneumonia.       Continue current AC vent settings.  Did not tolerate PS wean today. Restart propofol.      Continue solumedrol to 20mg  daily.      Continue bronchodilators      Continue cefepime  Adjust dose for renal failure.       Stop IPV. Minimal secretions. Stop.     Chest PT      3% saline nebulizers with Duo nebs.     Azithromycin  M, W, F for   antiinflammatory effects.  2. Ileus. Clinically resolved.   3.  Hypotension.  Pt now on phenylephrine 3. Titrate for  SBP  Lactic acid decreasing. Pt with metabolic acidosis from renal failure.  4. DVT prophylaxis sq heparin  5. GOC. Full code.  6. Chronic atrial fib. with RVR.  Continue diltiazem drip.  7. Renal Acute renal failure from hypovolemia and ATN. . Creatinine increasing again. Bicarb 13. Start bicarbonate drip.  8. Shock liver. LFTS increasing.  9. Episode of BRBPR yesterday. and agn this afternoon. ? hemorrhoids. Follow H/H . May need Gi evaluation.
78 y/o F with a PMH as above here with the followin. Acute on chronic  hypoxemic respiratory failure due to acute exacerbation of bronchiectasis / asthma and possible pneumonia.       Continue current AC vent settings, failed SBT this AM again      Continue solumedrol to 20mg  daily.      Continue bronchodilators      Continue cefepime         Cont airway clearance       Cont Azithromycin  M, W, F for   antiinflammatory effects.  2.  Ileus. Clinically improved.   3.  Hypotension: wean pressors as tolerated with goal MAP>65  4. DVT prophylaxis sq heparin  5. GOC. Full code.  6. Chronic atrial fib. with RVR.  Continue diltiazem drip.  7. Renal: Acute renal failure-diuretic challenge seems to have improved, cont to keep net negative   8. Shock liver. LFTS noted, cont to trend.  9. Episode of BRBPR over the weekend, no intervention and stable at this time, cont to trend CBC .     [facility] had with daughter at the bedside and given that she has failed mutliple attempts at SBT, will plan to extubate to comfort, family requests time to process, she is DNR otherwise.
Delores Baptiste MD  Office : [phone]  Contact me on Microsoft Teams.
Jaida Gallego MD  Off: [phone]  contact me on teams    (After 5 pm or on weekends please page the on-call fellow/attending, can check [email or web address] for schedule. Login is charlene escobedo, schedule under [facility] medicine, psych, derm)
SHANE Resp failure  Continue diuresis  Poor candidate for renal replacement therapy  Agree with goals of care and favor palliative  Poor prognosis    Jaida Gallego MD  Off: [phone]  contact me on teams    (After 5 pm or on weekends please page the on-call fellow/attending, can check [email or web address] for schedule. Login is charlene escobedo, schedule under [facility] medicine, psych, derm)
SHANE hemodynamically mediated from AFib RVR, resp failure on vent, hypotension on pressors.   She appears to be in ATN at present with volume overload, venous congestion  Diuretics improved volume status but is still net positive last 24 hours  Challenging situation  Nonoliguric-continue bumex gtt- keep net negative'  Try to minimize IVs to double concentrated    Jaida Gallego MD  Off: [phone]  contact me on teams    (After 5 pm or on weekends please page the on-call fellow/attending, can check [email or web address] for schedule. Login is charlene escobedo, schedule under [facility] medicine, psych, derm)
1.Acute on chronic  hypoxemic respiratory failure due to acute exacerbation of bronchiectasis / asthma and possible pneumonia.       Pt intubated last night. Continue current AC vent settings.      Change solumedrol to 20mg tid      Continue bronchodilators      Continue cefepime  Adjust dose for renal failure.       Stop IPV. Minimal secretions.     Chest PT      3% saline nebulizers with Duo nebs.     Azithromycin daily for  antiinflammatory effects.  2. Ileus improved. Start trickle tube feeds.  3. Iv hydration. Pt clinically dry. Hyponatremia  slowly improving with hydration.  4. DVT prophylaxis. Lovenox  5. GOC. Full code.  6. Chronic atrial fib. with RVR.  Start diltiazem drip.  7. Hypotension. IV hydration. Phenylephrine as needed.  8. Renal Acute renal failure from hypovolemia and ATN. Urine color now clear. Creatinine decreasing. Follow  UO and creatinine.
78 y/o F with a PMH as above here with the followin. Acute on chronic  hypoxemic respiratory failure due to acute exacerbation of bronchiectasis / asthma and possible pneumonia.       Continue current AC vent settings, attempt SAT/SBT this AM      Continue solumedrol to 20mg  daily.      Continue bronchodilators      Continue cefepime         Cont airway clearance with 3% saline nebulizers with Duo nebs.      Cont Azithromycin  M, W, F for   antiinflammatory effects.  2.  Ileus. Clinically improved.   3.  Hypotension: wean pressors as tolerated with goal MAP>65  4. DVT prophylaxis sq heparin  5. GOC. Full code.  6. Chronic atrial fib. with RVR.  Continue diltiazem drip.  7. Renal: Acute renal failure-diuretic challenge this afternoon, consulted renal  8. Shock liver. LFTS noted, cont to trend.  9. Episode of BRBPR over the weekend, no intervention and stable at this time, cont to trend CBC
78 y/o F with a PMH as above here with the followin. Acute on chronic  hypoxemic respiratory failure due to acute exacerbation of bronchiectasis / asthma and possible pneumonia.       Continue current AC vent settings, failed SBT this AM      Continue solumedrol to 20mg  daily.      Continue bronchodilators      Continue cefepime         Cont airway clearance with 3% saline nebulizers with Duo nebs.      Cont Azithromycin  M, W, F for   antiinflammatory effects.  2.  Ileus. Clinically improved.   3.  Hypotension: wean pressors as tolerated with goal MAP>65  4. DVT prophylaxis sq heparin  5. GOC. Full code.  6. Chronic atrial fib. with RVR.  Continue diltiazem drip.  7. Renal: Acute renal failure-diuretic challenge seems to have improved, cont to keep net negative   8. Shock liver. LFTS noted, cont to trend.  9. Episode of BRBPR over the weekend, no intervention and stable at this time, cont to trend CBC .     [facility] had with daughter at the bedside and given that she has failed mutliple attempts at SBT, will plan to extubate to NIPPV and not reintubate if does not tolerate with focus on comfort.
78 y/o F with a PMH as above here with the followin. Acute on chronic  hypoxemic respiratory failure due to acute exacerbation of bronchiectasis / asthma and possible pneumonia.       Continue current AC vent settings, failed SBT this AM again      Continue solumedrol to 10mg  daily.      Continue bronchodilators      Continue cefepime         Cont airway clearance       Cont Azithromycin  M, W, F for antiinflammatory effects.  2.  Ileus. Clinically improved.   3.  Hypotension: wean pressors as tolerated with goal MAP>65  4. DVT prophylaxis sq heparin  5. GOC. Full code.  6. Chronic atrial fib. with RVR.  transitioned to oral cardizem  7. Renal: Positional andrade, after repositioning, responded well to diuretics and had good UOP. Net negative.   8. Shock liver. LFTS noted, cont to trend.  9. Episode of blood NGT sucton this am, add protonix bid, no intervention and stable at this time, cont to trend CBC .     [facility] had with daughter at the bedside and they need time to process her overall clinical state.  Her daughters are amenable to compassionate extubation but the  is reluctant as per previous discussion. Will continue GOC discussion. They would not want tracheostomy.  wants to use full 14 days after intubation to make decision as per daughter today at bedside.
Critically ill patient with frequent bedside visits. Patient seen and examined on rounds and plan of care discussed with team.     78 y/o F with a PMH as above here with the followin. Acute on chronic  hypoxemic respiratory failure due to acute exacerbation of bronchiectasis / asthma and possible pneumonia.       Continue current AC vent settings, failed SBT this AM again      Continue solumedrol to 20mg  daily.      Continue bronchodilators      Continue cefepime         Cont airway clearance       Cont Azithromycin  M, W, F for antiinflammatory effects.  2.  Ileus. Clinically improved.   3.  Hypotension: wean pressors as tolerated with goal MAP>65  4. DVT prophylaxis sq heparin  5. GOC. Full code.  6. Chronic atrial fib. with RVR.  transitioned to oral cardizem  7. Renal: Acute renal failure- worse renal output today, will challenge with diuretics.   8. Shock liver. LFTS noted, cont to trend.  9. Episode of blood NGT sucton this am, add protonix bid, no intervention and stable at this time, cont to trend CBC .     [facility] had with daughter at the bedside and they need time to process her overall clinical state.  Her daughters are amenable to compassionate extubation but the  is reluctant as per previous discussion. Will continue GOC discussion. They would not want tracheostomy.
Delores Baptiste MD  Office : [phone]  Contact me on [employer] Teams.
SHANE hemodynamically mediated from AFib RVR, resp failure on vent, hypotension on pressors. Asked to see for Oliguria  She appears to be in ATN at present with volume overload, venous congestion  Diuretics improved volume status  She is still net positive last 24 hours  Nonoliguric-Switch to bumex gtt- keep net negative    Jaida Gallego MD  Off: [phone]  contact me on teams    (After 5 pm or on weekends please page the on-call fellow/attending, can check [email or web address] for schedule. Login is charlene escobedo, schedule under [facility] medicine, psych, derm)
SHANE hemodynamically mediated from AFib RVR, resp failure on vent, hypotension on pressors. Asked to see for Oliguria  She appears to be in ATN at present with volume overload, venous congestion  Diuretics improved volume status- good response with diuretics   Nonoliguric- Monitor urine output  Trend Cr daily  No acute needs for renal replacement  Discussed with daughter bedside     Jaida Gallego MD  Off: [phone]  contact me on teams    (After 5 pm or on weekends please page the on-call fellow/attending, can check [email or web address] for schedule. Login is charlene escobedo, schedule under [facility] medicine, psych, derm)
1.Acute on chronic  hypoxemic respiratory failure due to acute exacerbation of bronchiectasis / asthma and possible pneumonia.       Continue current AC vent settings.        Continue solumedrol to 20mg  daily.      Continue bronchodilators      Continue cefepime  Adjust dose for renal failure.       3% saline nebulizers with Duo nebs.     Azithromycin  M, W, F for   antiinflammatory effects.  2. Ileus. Clinically resolved.   3.  Hypotension.  Pt now on phenylephrine 3. Titrate for  SBP  Lactic acid decreasing. Pt with metabolic acidosis from renal failure.  4. DVT prophylaxis sq heparin  5. GOC. Full code.  6. Chronic atrial fib. with RVR.  Continue diltiazem drip.  7. Renal Acute renal failure from hypovolemia and ATN. . Creatinine increasing again.  Bicarbonate drip continue. Bicarb 19. Will stop drip when serum bicarb 20..  8. Shock liver. LFTS  decreasing.  9. Episode of BRBPR yesterday. . Received 1 unit PRBC last night. Hb<7 again tonight. Give additional unit PRBC.  Gi consult.
1.Acute on chronic  hypoxemic respiratory failure due to acute exacerbation of bronchiectasis / asthma and possible pneumonia.       Continue current AC vent settings.  Did not tolerate PS wean today.      Change solumedrol to 20mg  daily.      Continue bronchodilators      Continue cefepime  Adjust dose for renal failure.       Stop IPV. Minimal secretions. Stop.     Chest PT      3% saline nebulizers with Duo nebs.     Azithromycin  M, W, F for   antiinflammatory effects.  2. Ileus improved.  Tolerating  tube feeds. Increase to goal as tolerated.  3. Iv hydration. Pt clinically dry. Hyponatremia  slowly improving with hydration.  4. DVT prophylaxis sq heparin  5. GOC. Full code.  6. Chronic atrial fib. with RVR.  Continue diltiazem drip.  7. Hypotension. IV hydration. Phenylephrine as needed.  8. Renal Acute renal failure from hypovolemia and ATN. Urine color now clear. Creatinine continues to decrease. Follow  UO and creatinine.
1.Acute on chronic  hypoxemic respiratory failure due to acute exacerbation of bronchiectasis / asthma and possible pneumonia.       Pt intubated last night. Continue current AC vent settings.      Change solumedrol to 20mg tid      Continue bronchodilators      Continue cefepime Check MRSA nasal swab.     Start IPV     Chest PT and airway clearance devices     3% saline nebulizers with Duo nebs.     Azithromycin daily for  antiinflammatory effects.  2. Pt with Ileus. Stop fentanyl.  3. Iv hydration. Pt clinically dry. Hyponatremia from hypovolemia. Give 1 liter NS bolus and then start 60mls/hr  4. DVT prophylaxis. Lovenox  5. GOC. Full code.  6. Chronic atrial fib. with RVR.  Start diltiazem drip.  7. Hypotension. IV hydration. Phenylephrine as needed.

## 2023-01-24 NOTE — PROGRESS NOTE ADULT - NS ATTEND AMEND GEN_ALL_CORE FT
Palliative care continues to follow patient with respiratory failure in MICU, failing weaning trials. Options for care reviewed with family today, however, patients spouse having difficulty coping with situation and although no one wants patient to receive trach/peg, they are not ready to transition focus of care. Family requesting time to speak together independent of providers regarding ACP and future options. Palliative will continue to follow for ongoing [facility].   [phone]
Palliative continues to follow case for ongoing goc. options reviewed with family as well as advance care planning given patients inability to tolerate vent weaning and issues with cardiac arrhythmias. We recommended DNR, and although children in agreement,  reluctant to accept this as an options, stating his goals of care are rooted in his dora. We agreed to attempt follow up again tomorrow 1/20, hopefully with a [place]  if one can be found. Much support provided to family with GOC narrative above. rest as stated above. will continue to follow.    [phone]
Palliative following patient for goals of care; family elected today for compassionate extubation. emotional support provided and recommendations for symptoms given. Agree with the above recommendations for management of pain, dyspnea, anxiety/agitation. secretions and bowel regimen. We will continue to follow symptoms pending clinical course. At present time, patient is hypotensive and bradycardic, therefore no impending transfer to PCU.  was called who provided last rites for patient.

## 2023-01-24 NOTE — PROGRESS NOTE ADULT - ASSESSMENT
ASSESSMENT/PLAN:  77-year-old female with past medical history of DM, HTN, HLD, asthma on daily steroids, A. fib on Eliquis and digoxin presenting with shortness of breath, found to be in Afib w/RVR; admitted to MICU for Acute Hypoxic Respiratory Failure in s/o Asthma and Bronchiectasis w/ possible PNA, leading to intubation and sedation. Currently urine output worsening, attempting to to SBP daily.     NEURO  intubated on precedex, propofol       CARDIAC  # Afib w/ RVR  Admitted in RVR; being followed by Dr. Marcus, Cardiology; Afib w/ RVR thought to be due to stress from hypoxemia  - Chronic atrial fib. with RVR.  dilt gtt switched to oral 60mg q6h  - Echo (1/11) EF 75%  1. Endocardial visualization enhanced with intravenous  injection of Ultrasonic Enhancing Agent (Definity).  Hyperdynamic left ventricular systolic function. No left  ventricular thrombus.  2. Normal right ventricular size and function.  - TSH normal    Hypotension requiring pressors:   - continued on phenylephrine, started on midodrine 30mg q8h    PULM  #Acute on chronic  hypoxemic respiratory failure due to acute exacerbation of bronchiectasis / asthma and possible pneumonia.   Continue current AC vent settings, increased respiratory rate.  Did not tolerate PS wean 1/17 or 1/18.   Continue solumedrol to 20mg  daily - decreased to 10mg qd  Continue cefepime day 11/14 for pseudomonas pneumonia  Chest PT   3% saline nebulizers - discontinued, with Duo nebs - changed to prn  Azithromycin  M, W, F for   antinflammatory effects - discontinued 1/23 per ID recs    RENAL  # SHANE  - urine lytes showing FeNa 2.2%-> ATN; treat with fluids. Creatinine stabilized at 1.8 now.   - urine output decreased to 5cc/hr 1/16 did not improve with IVF  - bumex 2mg bid -> bumex infusion, Cont with diuresis with bumex and metolazone.   - given albumin o/n      GI   - concern for GI bleed (hb drop requiring blood transfusion 1/15 and 1/16) and small volume hematoschezia visualized 1/16am per RN. GI consulted - hold off endoscopic evaluation unless further bleeding occurs.   - hold off CT abdomen given hb is stable, no gross gi bleed currently   - Diet: tube feeds  - Bowel regimen: miralax, senna  - pantoprazole gtt switched to daily overnight     ID  # Sepsis  CT Chest w/ bilateral lower lobe predominant patchy and branching opacities suspicious for pneumonia. Small left pleural effusion. Leukocytosis, tachycardia and tachypnea; No active indications of infections, all infectious workup negative so far; leukocytosis likely in s/o solumedrol usage but no diff to conform neutrophilia so far; tachycardia likely in s/o tachypnea and AHRF  - was started on cefepime 1/8 = finished 2 week course  - Urine legionella antigen - neg, final BCx 1/10 - neg, BAL cultures - numerous GNR, few gram pos       ENDO  DM2, hyperglycemia  - hyperglycemic 450s 1/15, started on insulin gtt 1/15pm. likely in setting of steroids.   - 1/16 - 1/17 was on insulin gtt, now on nph 5u q6h + SSI with improved glc       HEME/ONC  - Hold Lovenox 50mg BID - monitor for acute VTE  - heparin 5000 bid     ETHICS  full code  family declined to make decision today regarding code status. Though patient's children do not want chest compressions, patient's  is wanting further time to process the news. Palliative on board - f/u recs.    ASSESSMENT/PLAN:  77-year-old female with past medical history of DM, HTN, HLD, asthma on daily steroids, A. fib on Eliquis and digoxin presenting with shortness of breath, found to be in Afib w/RVR; admitted to MICU for Acute Hypoxic Respiratory Failure in s/o Asthma and Bronchiectasis w/ possible PNA, leading to intubation and sedation. Currently urine output worsening, attempting to to SBP daily.     NEURO  intubated on ketamine, precedex       CARDIAC  # Afib w/ RVR  Admitted in RVR; being followed by Dr. Marcus, Cardiology; Afib w/ RVR thought to be due to stress from hypoxemia  - Chronic atrial fib. with RVR.  dilt gtt switched to oral 60mg q6h  - Echo (1/11) EF 75%  1. Endocardial visualization enhanced with intravenous  injection of Ultrasonic Enhancing Agent (Definity).  Hyperdynamic left ventricular systolic function. No left  ventricular thrombus.  2. Normal right ventricular size and function.  - TSH normal    Hypotension requiring pressors:   - continued on phenylephrine, started on midodrine 30mg q8h  - pressors capped    PULM  #Acute on chronic  hypoxemic respiratory failure due to acute exacerbation of bronchiectasis / asthma and possible pneumonia.   Continue current AC vent settings, increased respiratory rate.  Did not tolerate PS wean 1/17 or 1/18.   Continue solumedrol to 20mg  daily - decreased to 10mg qd  Continue cefepime day 11/14 for pseudomonas pneumonia  Chest PT   3% saline nebulizers - discontinued, with Duo nebs - changed to prn  Azithromycin  M, W, F for   antinflammatory effects - discontinued 1/23 per ID recs    RENAL  # SHANE  - urine lytes showing FeNa 2.2%-> ATN; treat with fluids. Creatinine stabilized at 1.8 now.   - urine output decreased to 5cc/hr 1/16 did not improve with IVF  - bumex 2mg bid -> bumex infusion, Cont with diuresis with bumex and metolazone. bumex gtt discontinued  - given albumin o/n      GI   - concern for GI bleed (hb drop requiring blood transfusion 1/15 and 1/16) and small volume hematoschezia visualized 1/16am per RN. GI consulted - hold off endoscopic evaluation unless further bleeding occurs.   - hold off CT abdomen given hb is stable, no gross gi bleed currently   - Diet: tube feeds  - Bowel regimen: miralax, senna  - pantoprazole gtt -> daily     ID  # Sepsis  CT Chest w/ bilateral lower lobe predominant patchy and branching opacities suspicious for pneumonia. Small left pleural effusion. Leukocytosis, tachycardia and tachypnea; No active indications of infections, all infectious workup negative so far; leukocytosis likely in s/o solumedrol usage but no diff to conform neutrophilia so far; tachycardia likely in s/o tachypnea and AHRF  - was started on cefepime 1/8 = finished 2 week course  - Urine legionella antigen - neg, final BCx 1/10 - neg, BAL cultures - numerous GNR, few gram pos   - off cefepime       ENDO  DM2, hyperglycemia  - hyperglycemic 450s 1/15, started on insulin gtt 1/15pm. likely in setting of steroids.   - 1/16 - 1/17 was on insulin gtt, now on nph 5u q6h + SSI with improved glc       HEME/ONC  - Hold Lovenox 50mg BID - monitor for acute VTE  - heparin 5000 bid     ETHICS  comfort care 1/24  family aware patient will likely pass soon,    ASSESSMENT/PLAN:  77-year-old female with past medical history of DM, HTN, HLD, asthma on daily steroids, A. fib on Eliquis and digoxin presenting with shortness of breath, found to be in Afib w/RVR; admitted to MICU for Acute Hypoxic Respiratory Failure in s/o Asthma and Bronchiectasis w/ possible PNA, leading to intubation and sedation. Currently urine output worsening, attempting to to SBP daily.     NEURO  intubated on ketamine, precedex       CARDIAC  # Afib w/ RVR  Admitted in RVR; being followed by Dr. Marcus, Cardiology; Afib w/ RVR thought to be due to stress from hypoxemia  - Chronic atrial fib. with RVR.  dilt gtt switched to oral 60mg q6h  - Echo (1/11) EF 75%  1. Endocardial visualization enhanced with intravenous  injection of Ultrasonic Enhancing Agent (Definity).  Hyperdynamic left ventricular systolic function. No left  ventricular thrombus.  2. Normal right ventricular size and function.  - TSH normal    Hypotension requiring pressors:   - continued on phenylephrine, started on midodrine 30mg q8h  - pressors capped    PULM  #Acute on chronic  hypoxemic respiratory failure due to acute exacerbation of bronchiectasis / asthma and possible pneumonia.   Continue current AC vent settings, increased respiratory rate.  Did not tolerate PS wean 1/17 or 1/18.   Continue solumedrol to 20mg  daily - decreased to 10mg qd  Continue cefepime day 11/14 for pseudomonas pneumonia  Chest PT   3% saline nebulizers - discontinued, with Duo nebs - changed to prn  Azithromycin  M, W, F for   antinflammatory effects - discontinued 1/23 per ID recs    RENAL  # SHANE  - urine lytes showing FeNa 2.2%-> ATN; treat with fluids. Creatinine stabilized at 1.8 now.   - urine output decreased to 5cc/hr 1/16 did not improve with IVF  - bumex 2mg bid -> bumex infusion, Cont with diuresis with bumex and metolazone. bumex gtt discontinued  - given albumin o/n      GI   - concern for GI bleed (hb drop requiring blood transfusion 1/15 and 1/16) and small volume hematoschezia visualized 1/16am per RN. GI consulted - hold off endoscopic evaluation unless further bleeding occurs.   - hold off CT abdomen given hb is stable, no gross gi bleed currently   - Diet: tube feeds  - Bowel regimen: miralax, senna  - pantoprazole gtt -> daily     ID  # Sepsis  CT Chest w/ bilateral lower lobe predominant patchy and branching opacities suspicious for pneumonia. Small left pleural effusion. Leukocytosis, tachycardia and tachypnea; No active indications of infections, all infectious workup negative so far; leukocytosis likely in s/o solumedrol usage but no diff to conform neutrophilia so far; tachycardia likely in s/o tachypnea and AHRF  - was started on cefepime 1/8 = finished 2 week course  - Urine legionella antigen - neg, final BCx 1/10 - neg, BAL cultures - numerous GNR, few gram pos   - off cefepime       ENDO  DM2, hyperglycemia  - hyperglycemic 450s 1/15, started on insulin gtt 1/15pm. likely in setting of steroids.   - 1/16 - 1/17 was on insulin gtt, now on nph 5u q6h + SSI with improved glc       HEME/ONC  - Hold Lovenox 50mg BID - monitor for acute VTE  - heparin 5000 bid     ETHICS  comfort care 1/24  family aware patient will likely pass soon, they are present in person   palliative discussed comfort care measures with family

## 2023-01-24 NOTE — AIRWAY REMOVAL NOTE  ADULT & PEDS - ARTIFICAL AIRWAY REMOVAL COMMENTS
Written order for extubation verified.The patient was identified by full name and birth date compared to the identification band. Present during the procedure was Vicenta Hannah (EM)

## 2023-01-24 NOTE — PROGRESS NOTE ADULT - REASON FOR ADMISSION
sob/ rapid a.fib

## 2023-01-24 NOTE — PROGRESS NOTE ADULT - PAIN ASSESSMENT ADVANCED DEMENTIA: BREATHING
Noise labored breathing. Long period of hyperventilation. Cheyne-Dumont respirations.

## 2023-01-24 NOTE — PROGRESS NOTE ADULT - PROBLEM SELECTOR PLAN 5
- PPSV 10%  - Requires 2 RN for all care/ turn and position.
- Will continue to follow for GOC   - Can be reached by TEAMS M-F 9-5 Caryl Aldana Any other time please page [phone] if needed.
- Will continue to follow for GOC   - Can be reached by TEAMS M-F 9-5 Caryl Aldana Any other time please page [phone] if needed.

## 2023-01-24 NOTE — PROGRESS NOTE ADULT - PAIN ASSESSMENT ADVANCED DEMENTIA: BODY LANGUAGE
Tense. Distressed pacing. Fidgeting.

## 2023-01-24 NOTE — PROGRESS NOTE ADULT - PROVIDER SPECIALTY LIST ADULT
Cardiology
MICU
Pulmonology
Cardiology
Gastroenterology
Infectious Disease
MICU
Pulmonology
Cardiology
Cardiology
Gastroenterology
Gastroenterology
Infectious Disease
Infectious Disease
MICU
Pulmonology
Cardiology
Gastroenterology
Infectious Disease
Infectious Disease
MICU
Pulmonology
MICU
Nephrology
Palliative Care

## 2023-01-24 NOTE — PROGRESS NOTE ADULT - SUBJECTIVE AND OBJECTIVE BOX
[facility] DIVISION OF KIDNEY DISEASES AND HYPERTENSION -- FOLLOW UP NOTE  --------------------------------------------------------------------------------  Pt is a 77 year old female with PMH of DM, HTN, HLD, Asthma, and Afib who presented to the hospital with shortness of breath and noted to be in afib with RVR. The patient was emergently intubated for acute hypoxic respiratory failure and admitted to the MICU. The patient has failed multiple attempts at extubation. The nephrology team was consulted for SHANE, On review of [facility] pt noted to have a SCr of 0.64 on admission 1/8, which increased to 2.58 on 1/11, and since then remains elevated/stable at 2.5 this morning.    24 hour events/subjective:  Pt seen and examined this morning in the MICU. Pt remains intubated and on sedation. Unable to obtain ROS. As per nursing staff no response to painful stimuli.         PAST HISTORY  --------------------------------------------------------------------------------  No significant changes to PMH, PSH, FHx, SHx, unless otherwise noted    ALLERGIES & MEDICATIONS  --------------------------------------------------------------------------------  Allergies    Avelox (Pruritus)  fish (Vomiting)  Levaquin (Unknown)  shellfish (Vomiting)    Intolerances    fentanyl (Other)    Standing Inpatient Medications  ascorbic acid 500 milliGRAM(s) Oral daily  atorvastatin 40 milliGRAM(s) Oral at bedtime  bacitracin   Ointment 1 Application(s) Topical daily  buMETAnide Infusion 2 mG/Hr IV Continuous <Continuous>  chlorhexidine 0.12% Liquid 15 milliLiter(s) Oral Mucosa every 12 hours  chlorhexidine 4% Liquid 1 Application(s) Topical <User Schedule>  dexMEDEtomidine Infusion 0.2 MICROgram(s)/kG/Hr IV Continuous <Continuous>  dextrose 5%. 1000 milliLiter(s) IV Continuous <Continuous>  dextrose 5%. 1000 milliLiter(s) IV Continuous <Continuous>  dextrose 50% Injectable 50 milliLiter(s) IV Push every 15 minutes  dextrose 50% Injectable 50 milliLiter(s) IV Push every 15 minutes  diltiazem    Tablet 60 milliGRAM(s) Enteral Tube every 6 hours  glucagon  Injectable 1 milliGRAM(s) IntraMuscular once  heparin   Injectable 5000 Unit(s) SubCutaneous every 8 hours  insulin lispro (ADMELOG) corrective regimen sliding scale   SubCutaneous every 6 hours  insulin NPH human recombinant 5 Unit(s) SubCutaneous every 6 hours  ketamine Infusion. 0.25 mG/kG/Hr IV Continuous <Continuous>  methylPREDNISolone sodium succinate Injectable 10 milliGRAM(s) IV Push daily  metolazone 10 milliGRAM(s) Oral <User Schedule>  midodrine 30 milliGRAM(s) Oral every 8 hours  multivitamin 1 Tablet(s) Oral daily  naloxegol 25 milliGRAM(s) Oral daily  pantoprazole  Injectable 40 milliGRAM(s) IV Push daily  phenylephrine    Infusion 0.5 MICROgram(s)/kG/Min IV Continuous <Continuous>  senna Syrup 10 milliLiter(s) Oral at bedtime  sucralfate suspension 1 Gram(s) Oral every 6 hours  thiamine 100 milliGRAM(s) Oral daily    PRN Inpatient Medications  acetaminophen    Suspension .. 350 milliGRAM(s) Oral every 6 hours PRN  albuterol/ipratropium for Nebulization 3 milliLiter(s) Nebulizer every 6 hours PRN  polyethylene glycol 3350 17 Gram(s) Oral two times a day PRN      REVIEW OF SYSTEMS  Unable to obtain     VITALS/PHYSICAL EXAM  --------------------------------------------------------------------------------  T(C): 36.7 (01-24-23 @ 00:00), Max: 37.2 (01-23-23 @ 16:00)  HR: 111 (01-24-23 @ 04:00) (67 - 126)  BP: 125/53 (01-24-23 @ 03:45) (76/36 - 136/61)  RR: 39 (01-24-23 @ 04:00) (21 - 61)  SpO2: 100% (01-24-23 @ 04:00) (90% - 100%)  Wt(kg): --        01-22-23 @ 07:01  -  01-23-23 @ 07:00  --------------------------------------------------------  IN: 1949 mL / OUT: 3175 mL / NET: -1226 mL    01-23-23 @ 07:01  -  01-24-23 @ 06:16  --------------------------------------------------------  IN: 1868.6 mL / OUT: 1080 mL / NET: 788.6 mL        Physical Exam:  	Gen: ill appearing  	HEENT: ETT  	Pulm: decreased breath sounds  	CV: S1S2  	Abd: Soft, +BS   	Ext: ++ LE edema B/L  	Neuro: sedated  	Skin: Warm and dry      LABS/STUDIES  --------------------------------------------------------------------------------              8.2    28.63 >-----------<  200      [01-24-23 @ 00:36]              24.6     135  |  101  |  138  ----------------------------<  159      [01-24-23 @ 00:37]  4.4   |  15  |  2.50        Ca     7.7     [01-24-23 @ 00:37]      Mg     1.9     [01-24-23 @ 00:37]      Phos  8.2     [01-24-23 @ 00:37]    TPro  4.5  /  Alb  1.5  /  TBili  0.4  /  DBili  x   /  AST  32  /  ALT  51  /  AlkPhos  177  [01-24-23 @ 00:37]    PT/INR: PT 15.5 , INR 1.33       [01-24-23 @ 00:36]  PTT: 38.2       [01-24-23 @ 00:36]      Creatinine Trend:  SCr 2.50 [01-24 @ 00:37]  SCr 2.37 [01-22 @ 23:49]  SCr 2.39 [01-21 @ 23:45]  SCr 2.52 [01-20 @ 23:43]  SCr 2.53 [01-20 @ 15:52]    Urinalysis - [01-21-23 @ 13:38]      Color Light Orange / Appearance Turbid / SG 1.010 / pH 6.0      Gluc Negative / Ketone Negative  / Bili Negative / Urobili Negative       Blood Large / Protein 30 mg/dL / Leuk Est Large / Nitrite Negative       /  / Hyaline 0 / Gran  / Sq Epi  / Non Sq Epi 1 / Bacteria Negative      HbA1c 6.6      [02-02-19 @ 10:59]  TSH 1.17      [01-09-23 @ 06:11]

## 2023-01-24 NOTE — PROGRESS NOTE ADULT - SUBJECTIVE AND OBJECTIVE BOX
Indication for Geriatrics and Palliative Care Services/INTERVAL HPI: GOC  SUBJECTIVE AND OBJECTIVE: Pt seen and examined at bedside. Patient appears tachpneic on ventilator. Family electing for compassionate extubation.   OVERNIGHT EVENTS: Pressors capped by MICU team     DNR on chart:Yes  Yes      Allergies    Avelox (Pruritus)  fish (Vomiting)  Levaquin (Unknown)  shellfish (Vomiting)    Intolerances    fentanyl (Other)  MEDICATIONS  (STANDING):  bacitracin   Ointment 1 Application(s) Topical daily  chlorhexidine 0.12% Liquid 15 milliLiter(s) Oral Mucosa every 12 hours  chlorhexidine 4% Liquid 1 Application(s) Topical <User Schedule>  dexMEDEtomidine Infusion 0.2 MICROgram(s)/kG/Hr (1.89 mL/Hr) IV Continuous <Continuous>  dextrose 5%. 1000 milliLiter(s) (50 mL/Hr) IV Continuous <Continuous>  dextrose 5%. 1000 milliLiter(s) (100 mL/Hr) IV Continuous <Continuous>  dextrose 50% Injectable 50 milliLiter(s) IV Push every 15 minutes  dextrose 50% Injectable 50 milliLiter(s) IV Push every 15 minutes  diltiazem    Tablet 60 milliGRAM(s) Enteral Tube every 6 hours  fentaNYL    Injectable 100 MICROGram(s) IV Push once  glucagon  Injectable 1 milliGRAM(s) IntraMuscular once  heparin   Injectable 5000 Unit(s) SubCutaneous every 8 hours  insulin lispro (ADMELOG) corrective regimen sliding scale   SubCutaneous every 6 hours  insulin NPH human recombinant 5 Unit(s) SubCutaneous every 6 hours  naloxegol 25 milliGRAM(s) Oral daily  pantoprazole  Injectable 40 milliGRAM(s) IV Push daily  phenylephrine    Infusion 0.5 MICROgram(s)/kG/Min (7.07 mL/Hr) IV Continuous <Continuous>  senna Syrup 10 milliLiter(s) Oral at bedtime  sucralfate suspension 1 Gram(s) Oral every 6 hours    MEDICATIONS  (PRN):  albuterol/ipratropium for Nebulization 3 milliLiter(s) Nebulizer every 6 hours PRN Respiratory Distress  fentaNYL    Injectable 100 MICROGram(s) IV Push every 15 minutes PRN Severe Pain (7 - 10)  fentaNYL    Injectable 50 MICROGram(s) IV Push every 15 minutes PRN Moderate Pain (4 - 6)  fentaNYL    Injectable 100 MICROGram(s) IV Push every 15 minutes PRN dyspnea  glycopyrrolate Injectable 0.4 milliGRAM(s) IV Push every 6 hours PRN audible secretions  midazolam Injectable 2 milliGRAM(s) IV Push every 15 minutes PRN anxiety/agitation/delirium  polyethylene glycol 3350 17 Gram(s) Oral two times a day PRN Constipation      ITEMS UNCHECKED ARE NOT PRESENT    PRESENT SYMPTOMS: [x ]Unable to self-report - see [ ] CPOT [ x] PAINADS [x ] RDOS  Source if other than patient:  [ ]Family   [x ]Team     Pain:  [ ]yes [ ]no  QOL impact -   Location -                    Aggravating factors -  Quality -  Radiation -  Timing-  Severity (0-10 scale):  Minimal acceptable level (0-10 scale):     CPOT:    https://www.[facility].org/getattachment/[email or web address]/Critical-Care-Pain-Observation-Tool-(CPOT)    PAINAD Score: See PAINAD tool and score below       Dyspnea:                           [ ]Mild [ ]Moderate [ ]Severe    RDOS: See RDOS tool and score below   0 to 2  minimal or no respiratory distress   3  mild distress  4 to 6 moderate distress  >7 severe distress      Anxiety:                             [ ]Mild [ ]Moderate [ ]Severe  Fatigue:                             [ ]Mild [ ]Moderate [ ]Severe  Nausea:                             [ ]Mild [ ]Moderate [ ]Severe  Loss of appetite:              [ ]Mild [ ]Moderate [ ]Severe  Constipation:                    [ ]Mild [ ]Moderate [ ]Severe    PCSSQ[Palliative Care Spiritual Screening Question]   Severity (0-10):  Score of 4 or > indicate consideration of Chaplaincy referral.  Chaplaincy Referral: [x ] yes [ ] refused [x ] following [ ] Deferred     Caregiver [place]? : [x ] yes [ ] no [ ] Deferred [ ] Declined             Social work referral [x ] Patient & Family Centered Care Referral [ ]     Anticipatory Grief present?:  [x ] yes [ ] no  [ ] Deferred                  Social work referral [ ] Chaplaincy Referral [ x]    		  Other Symptoms:  [x ]All other review of systems negative: pt unable to participate in exam     Palliative Performance Status Version 2:   See PPSv2 tool and score below         PHYSICAL EXAM:  Vital Signs Last 24 Hrs  T(C): 36.1 (24 Jan 2023 12:00), Max: 37.2 (23 Jan 2023 16:00)  T(F): 97 (24 Jan 2023 12:00), Max: 99 (23 Jan 2023 16:00)  HR: 123 (24 Jan 2023 12:15) (79 - 128)  BP: 99/46 (24 Jan 2023 12:15) (76/36 - 160/63)  BP(mean): 67 (24 Jan 2023 12:15) (49 - 106)  RR: 26 (24 Jan 2023 12:15) (13 - 48)  SpO2: 97% (24 Jan 2023 12:15) (51% - 100%)    Parameters below as of 24 Jan 2023 12:04    O2 Flow (L/min): 4   I&O's Summary    23 Jan 2023 07:01  -  24 Jan 2023 07:00  --------------------------------------------------------  IN: 2299.3 mL / OUT: 1110 mL / NET: 1189.3 mL    24 Jan 2023 07:01  -  24 Jan 2023 13:32  --------------------------------------------------------  IN: 390.6 mL / OUT: 5 mL / NET: 385.6 mL       GENERAL: [ ]Cachexia    [ ]Alert  [ ]Oriented x   [ ]Lethargic  [x ]Unarousable  [ ]Verbal  [ ]Non-Verbal  Behavioral:   [ ]Anxiety  [ ]Delirium [ ]Agitation [ ]Other  HEENT:  [ ]Normal   [x ]Dry mouth   [x ]ET Tube/Trach  [ ]Oral lesions  PULMONARY:   [ ]Clear [ ]Tachypnea  [ ]Audible excessive secretions   [ ]Rhonchi        [ ]Right [ ]Left [ ]Bilateral  [x ]Crackles        [ ]Right [ ]Left [x ]Bilateral  [ ]Wheezing     [ ]Right [ ]Left [ ]Bilateral  [ ]Diminished BS [ ] Right [ ]Left [ ]Bilateral  CARDIOVASCULAR:    [ ]Regular [x ]Irregular [ ]Tachy  [ ]Catracho [ ]Murmur [ ]Other  GASTROINTESTINAL:  [x ]Soft  [ ]Distended   [x ]+BS  [x ]Non tender [ ]Tender  [ ]Other [ ]PEG [ ]OGT/ NGT   Last BM: 1/24/2023  GENITOURINARY:  [ ]Normal [x ]Incontinent   [ ]Oliguria/Anuria   [x ]Morales  MUSCULOSKELETAL:   [ ]Normal   [x ]Weakness  [x ]Bed/Wheelchair bound [ ]Edema  NEUROLOGIC:   [ ]No focal deficits  [x ] Cognitive impairment  [ ] Dysphagia [ ]Dysarthria [ ] Paresis [ ]Other   SKIN:   [ ]Normal  [ ]Rash  [ ]Other  [x ]Pressure ulcer(s) [x ]y [ ]n present on admission    CRITICAL CARE:  [x ]Shock Present  [ ]Septic [ ]Cardiogenic [ ]Neurologic [ ]Hypovolemic  [x ]Vasopressors [ ]Inotropes  [ ]Respiratory failure present [ x]Mechanical Ventilation [ ]Non-invasive ventilatory support [ ]High-Flow Mode: AC/ CMV (Assist Control/ Continuous Mandatory Ventilation), RR (machine): 20, TV (machine): 350, FiO2: 40, PEEP: 5, ITime: 1, MAP: 15, PIP: 37  [x ]Acute  [x ]Chronic [ ]Hypoxic  [ ]Hypercarbic [ ]Other  [ ]Other organ failure     LABS:                        8.2    28.63 )-----------( 200      ( 24 Jan 2023 00:36 )             24.6   01-24    135  |  101  |  138<H>  ----------------------------<  159<H>  4.4   |  15<L>  |  2.50<H>    Ca    7.7<L>      24 Jan 2023 00:37  Phos  8.2     01-24  Mg     1.9     01-24    TPro  4.5<L>  /  Alb  1.5<L>  /  TBili  0.4  /  DBili  x   /  AST  32  /  ALT  51<H>  /  AlkPhos  177<H>  01-24  PT/INR - ( 24 Jan 2023 00:36 )   PT: 15.5 sec;   INR: 1.33 ratio         PTT - ( 24 Jan 2023 00:36 )  PTT:38.2 sec      RADIOLOGY & ADDITIONAL STUDIES:  < from: CT Head No Cont (01.24.23 @ 01:56) >  ACC: [id number] EXAM:  CT BRAIN   ORDERED BY:  MAUDE CULLEN     PROCEDURE DATE:  01/24/2023          INTERPRETATION:  CLINICAL STATEMENT: Pupillary defect. Change in   responsiveness.    TECHNIQUE: Noncontrast CT of the head was performed. Beam hardening   artifact from the petrous pyramid limits evaluation of the brainstem and   posterior fossa. KV and MA adjusted according to patient's body habitus.   Sagittal and coronal reformatted images provided.  COMPARISON: CT head 10/24/2016.    FINDINGS:    Progressive, mild parenchymal atrophy, manifest by sulcal and ventricular   prominence. No evidence of acute territorial infarction. Progressive   regions of low-attenuation periventricular white matter, right greater   than left subinsular regions and right cerebellar hemisphere.    No evidence of extra-axial collection, intracranial hemorrhage, mass or   mass effect. Gray-white matter differentiation appears preserved.    Incompletely pneumatized bilateral mastoid air cells with partial   opacification left greater than right, as well as evidence of partial   opacification of the left middle ear cavity. Paranasal sinuses appear   well-aerated. No acute calvarial fracture. Bilateral lens replacement.    IMPRESSION:    Compared with 10/24/2016.  1. No evidence of acute territorial infarction, hemorrhage or mass effect.  2. Progressive regions of low-attenuation periventricular white matter,   right greater than left subinsular regions and right cerebellar   hemispheres; nonspecificfindings which may reflect age-indeterminate   ischemia.  3. Left middle ear and left greater than right mastoid air cell disease,   as above; the significance of which should be determined on a clinical   basis.    If clinical symptoms persist consider further imaging with noncontrast   MRI of the brain, provided there are no medical contraindications.    --- End of Report ---            FATMATA ELLIS M.D., ATTENDING RADIOLOGIST  This document has been electronically signed. Jan 24 2023  9:47AM    < end of copied text >    Protein Calorie Malnutrition Present: [ ]mild [ ]moderate [ ]severe [ ]underweight [ ]morbid obesity  https://www.andeal.org/vault/2440/web/files/ONC/Table_Clinical%20Characteristics%20to%20Document%20Malnutrition-White%20JV%20et%20al%202012.pdf    Height (cm): 154.9 (01-10-23 @ 18:10), 152.4 (06-03-22 @ 16:04)  Weight (kg): 54 (01-21-23 @ 09:12), 43.1 (06-03-22 @ 16:04)  BMI (kg/m2): 22.5 (01-21-23 @ 09:12), 18 (01-10-23 @ 18:10), 18.6 (06-03-22 @ 16:04)    [xPPSV2 < or = 30%  [ ]significant weight loss [ ]poor nutritional intake [ ]anasarca[ ]Artificial Nutrition    Other REFERRALS:  [ ]Hospice  [ ]Child Life  [ ]Social Work  [ ]Case management [ ]Holistic Therapy     Goals of Care Document: see below  Indication for Geriatrics and Palliative Care Services/INTERVAL HPI: GOC  SUBJECTIVE AND OBJECTIVE: Pt seen and examined at bedside. Patient appears tachpneic on ventilator. Family electing for compassionate extubation.   OVERNIGHT EVENTS: Pressors capped by MICU team     DNR on chart:Yes  Yes      Allergies    Avelox (Pruritus)  fish (Vomiting)  Levaquin (Unknown)  shellfish (Vomiting)    Intolerances    fentanyl (Other)  MEDICATIONS  (STANDING):  bacitracin   Ointment 1 Application(s) Topical daily  chlorhexidine 0.12% Liquid 15 milliLiter(s) Oral Mucosa every 12 hours  chlorhexidine 4% Liquid 1 Application(s) Topical <User Schedule>  dexMEDEtomidine Infusion 0.2 MICROgram(s)/kG/Hr (1.89 mL/Hr) IV Continuous <Continuous>  dextrose 5%. 1000 milliLiter(s) (50 mL/Hr) IV Continuous <Continuous>  dextrose 5%. 1000 milliLiter(s) (100 mL/Hr) IV Continuous <Continuous>  dextrose 50% Injectable 50 milliLiter(s) IV Push every 15 minutes  dextrose 50% Injectable 50 milliLiter(s) IV Push every 15 minutes  diltiazem    Tablet 60 milliGRAM(s) Enteral Tube every 6 hours  fentaNYL    Injectable 100 MICROGram(s) IV Push once  glucagon  Injectable 1 milliGRAM(s) IntraMuscular once  heparin   Injectable 5000 Unit(s) SubCutaneous every 8 hours  insulin lispro (ADMELOG) corrective regimen sliding scale   SubCutaneous every 6 hours  insulin NPH human recombinant 5 Unit(s) SubCutaneous every 6 hours  naloxegol 25 milliGRAM(s) Oral daily  pantoprazole  Injectable 40 milliGRAM(s) IV Push daily  phenylephrine    Infusion 0.5 MICROgram(s)/kG/Min (7.07 mL/Hr) IV Continuous <Continuous>  senna Syrup 10 milliLiter(s) Oral at bedtime  sucralfate suspension 1 Gram(s) Oral every 6 hours    MEDICATIONS  (PRN):  albuterol/ipratropium for Nebulization 3 milliLiter(s) Nebulizer every 6 hours PRN Respiratory Distress  fentaNYL    Injectable 100 MICROGram(s) IV Push every 15 minutes PRN Severe Pain (7 - 10)  fentaNYL    Injectable 50 MICROGram(s) IV Push every 15 minutes PRN Moderate Pain (4 - 6)  fentaNYL    Injectable 100 MICROGram(s) IV Push every 15 minutes PRN dyspnea  glycopyrrolate Injectable 0.4 milliGRAM(s) IV Push every 6 hours PRN audible secretions  midazolam Injectable 2 milliGRAM(s) IV Push every 15 minutes PRN anxiety/agitation/delirium  polyethylene glycol 3350 17 Gram(s) Oral two times a day PRN Constipation      ITEMS UNCHECKED ARE NOT PRESENT    PRESENT SYMPTOMS: [x ]Unable to self-report - see [ ] CPOT [ x] PAINADS [x ] RDOS  Source if other than patient:  [ ]Family   [x ]Team     Pain:  [ ]yes [ ]no  QOL impact -   Location -                    Aggravating factors -  Quality -  Radiation -  Timing-  Severity (0-10 scale):  Minimal acceptable level (0-10 scale):     CPOT:    https://www.[facility].org/getattachment/[email or web address]/Critical-Care-Pain-Observation-Tool-(CPOT)    PAINAD Score: See PAINAD tool and score below       Dyspnea:                           [ ]Mild [ ]Moderate [ ]Severe    RDOS: See RDOS tool and score below   0 to 2  minimal or no respiratory distress   3  mild distress  4 to 6 moderate distress  >7 severe distress      Anxiety:                             [ ]Mild [ ]Moderate [ ]Severe  Fatigue:                             [ ]Mild [ ]Moderate [ ]Severe  Nausea:                             [ ]Mild [ ]Moderate [ ]Severe  Loss of appetite:              [ ]Mild [ ]Moderate [ ]Severe  Constipation:                    [ ]Mild [ ]Moderate [ ]Severe    PCSSQ[Palliative Care Spiritual Screening Question]   Severity (0-10):  Score of 4 or > indicate consideration of Chaplaincy referral.  Chaplaincy Referral: [x ] yes [ ] refused [x ] following [ ] Deferred     Caregiver [place]? : [x ] yes [ ] no [ ] Deferred [ ] Declined             Social work referral [x ] Patient & Family Centered Care Referral [ ]     Anticipatory Grief present?:  [x ] yes [ ] no  [ ] Deferred                  Social work referral [ ] Chaplaincy Referral [ x]    		  Other Symptoms:  [x ]All other review of systems negative: pt unable to participate in exam     Palliative Performance Status Version 2:   See PPSv2 tool and score below         PHYSICAL EXAM:  Vital Signs Last 24 Hrs  T(C): 36.1 (24 Jan 2023 12:00), Max: 37.2 (23 Jan 2023 16:00)  T(F): 97 (24 Jan 2023 12:00), Max: 99 (23 Jan 2023 16:00)  HR: 123 (24 Jan 2023 12:15) (79 - 128)  BP: 99/46 (24 Jan 2023 12:15) (76/36 - 160/63)  BP(mean): 67 (24 Jan 2023 12:15) (49 - 106)  RR: 26 (24 Jan 2023 12:15) (13 - 48)  SpO2: 97% (24 Jan 2023 12:15) (51% - 100%)    Parameters below as of 24 Jan 2023 12:04    O2 Flow (L/min): 4   I&O's Summary    23 Jan 2023 07:01  -  24 Jan 2023 07:00  --------------------------------------------------------  IN: 2299.3 mL / OUT: 1110 mL / NET: 1189.3 mL    24 Jan 2023 07:01  -  24 Jan 2023 13:32  --------------------------------------------------------  IN: 390.6 mL / OUT: 5 mL / NET: 385.6 mL       GENERAL: [ ]Cachexia    [ ]Alert  [ ]Oriented x   [ ]Lethargic  [x ]Unarousable  [ ]Verbal  [ ]Non-Verbal  Behavioral:   [ ]Anxiety  [ ]Delirium [ ]Agitation [ ]Other  HEENT:  [ ]Normal   [x ]Dry mouth   [x ]ET Tube/Trach  [ ]Oral lesions  PULMONARY:   [ ]Clear [ ]Tachypnea  [ ]Audible excessive secretions   [ ]Rhonchi        [ ]Right [ ]Left [ ]Bilateral  [x ]Crackles        [ ]Right [ ]Left [x ]Bilateral  [ ]Wheezing     [ ]Right [ ]Left [ ]Bilateral  [ ]Diminished BS [ ] Right [ ]Left [ ]Bilateral  CARDIOVASCULAR:    [ ]Regular [x ]Irregular [ ]Tachy  [ ]Catracho [ ]Murmur [ ]Other  GASTROINTESTINAL:  [x ]Soft  [ ]Distended   [x ]+BS  [x ]Non tender [ ]Tender  [ ]Other [ ]PEG [ ]OGT/ NGT   Last BM: 1/24/2023  GENITOURINARY:  [ ]Normal [x ]Incontinent   [ ]Oliguria/Anuria   [x ]Morales  MUSCULOSKELETAL:   [ ]Normal   [x ]Weakness  [x ]Bed/Wheelchair bound [ ]Edema  NEUROLOGIC:   [ ]No focal deficits  [x ] Cognitive impairment  [ ] Dysphagia [ ]Dysarthria [ ] Paresis [ ]Other   SKIN:   [ ]Normal  [ ]Rash  [ ]Other  [x ]Pressure ulcer(s) [x ]y [ ]n present on admission    CRITICAL CARE:  [x ]Shock Present  [ ]Septic [ ]Cardiogenic [ ]Neurologic [ ]Hypovolemic  [x ]Vasopressors [ ]Inotropes  [ ]Respiratory failure present [ x]Mechanical Ventilation [ ]Non-invasive ventilatory support [ ]High-Flow Mode: AC/ CMV (Assist Control/ Continuous Mandatory Ventilation), RR (machine): 20, TV (machine): 350, FiO2: 40, PEEP: 5, ITime: 1, MAP: 15, PIP: 37  [x ]Acute  [x ]Chronic [ ]Hypoxic  [ ]Hypercarbic [ ]Other  [ ]Other organ failure     LABS:                        8.2    28.63 )-----------( 200      ( 24 Jan 2023 00:36 )             24.6   01-24    135  |  101  |  138<H>  ----------------------------<  159<H>  4.4   |  15<L>  |  2.50<H>    Ca    7.7<L>      24 Jan 2023 00:37  Phos  8.2     01-24  Mg     1.9     01-24    TPro  4.5<L>  /  Alb  1.5<L>  /  TBili  0.4  /  DBili  x   /  AST  32  /  ALT  51<H>  /  AlkPhos  177<H>  01-24  PT/INR - ( 24 Jan 2023 00:36 )   PT: 15.5 sec;   INR: 1.33 ratio         PTT - ( 24 Jan 2023 00:36 )  PTT:38.2 sec      RADIOLOGY & ADDITIONAL STUDIES:  < from: CT Head No Cont (01.24.23 @ 01:56) >  ACC: [id number] EXAM:  CT BRAIN   ORDERED BY:  MAUDE CULLEN     PROCEDURE DATE:  01/24/2023          INTERPRETATION:  CLINICAL STATEMENT: Pupillary defect. Change in   responsiveness.    TECHNIQUE: Noncontrast CT of the head was performed. Beam hardening   artifact from the petrous pyramid limits evaluation of the brainstem and   posterior fossa. KV and MA adjusted according to patient's body habitus.   Sagittal and coronal reformatted images provided.  COMPARISON: CT head 10/24/2016.    FINDINGS:    Progressive, mild parenchymal atrophy, manifest by sulcal and ventricular   prominence. No evidence of acute territorial infarction. Progressive   regions of low-attenuation periventricular white matter, right greater   than left subinsular regions and right cerebellar hemisphere.    No evidence of extra-axial collection, intracranial hemorrhage, mass or   mass effect. Gray-white matter differentiation appears preserved.    Incompletely pneumatized bilateral mastoid air cells with partial   opacification left greater than right, as well as evidence of partial   opacification of the left middle ear cavity. Paranasal sinuses appear   well-aerated. No acute calvarial fracture. Bilateral lens replacement.    IMPRESSION:    Compared with 10/24/2016.  1. No evidence of acute territorial infarction, hemorrhage or mass effect.  2. Progressive regions of low-attenuation periventricular white matter,   right greater than left subinsular regions and right cerebellar   hemispheres; nonspecificfindings which may reflect age-indeterminate   ischemia.  3. Left middle ear and left greater than right mastoid air cell disease,   as above; the significance of which should be determined on a clinical   basis.    If clinical symptoms persist consider further imaging with noncontrast   MRI of the brain, provided there are no medical contraindications.    --- End of Report ---            FATMATA ELLIS M.D., ATTENDING RADIOLOGIST  This document has been electronically signed. Jan 24 2023  9:47AM    < end of copied text >    Protein Calorie Malnutrition Present: [ ]mild [ ]moderate [ ]severe [ ]underweight [ ]morbid obesity  https://www.andeal.org/vault/2440/web/files/ONC/Table_Clinical%20Characteristics%20to%20Document%20Malnutrition-White%20JV%20et%20al%202012.pdf    Height (cm): 154.9 (01-10-23 @ 18:10), 152.4 (06-03-22 @ 16:04)  Weight (kg): 54 (01-21-23 @ 09:12), 43.1 (06-03-22 @ 16:04)  BMI (kg/m2): 22.5 (01-21-23 @ 09:12), 18 (01-10-23 @ 18:10), 18.6 (06-03-22 @ 16:04)    [xPPSV2 < or = 30%  [ ]significant weight loss [ ]poor nutritional intake [ ]anasarca[ ]Artificial Nutrition    Other REFERRALS:  [ ]Hospice  [ ]Child Life  [x ]Social Work  [ ]Case management [ ]Holistic Therapy     Goals of Care Document: see below

## 2023-01-24 NOTE — PROGRESS NOTE ADULT - NS ATTEST RISK PROBLEM GEN_ALL_CORE FT
safety/toxicity monitoring of intravenous opiates and/or  benzodiazepines in end stage disease process

## 2023-01-31 NOTE — CHART NOTE - NSCHARTNOTEFT_GEN_A_CORE
Critical care Medicine Attending Addendum.    Pt has severe sepsis on admission.    Ashok Gallegos MD

## 2023-02-08 LAB
CULTURE RESULTS: SIGNIFICANT CHANGE UP
SPECIMEN SOURCE: SIGNIFICANT CHANGE UP

## 2023-07-29 NOTE — ED PROVIDER NOTE - ENMT, MLM
Airway patent, Nasal mucosa clear. Mouth with normal mucosa. Throat has no vesicles, no oropharyngeal exudates and uvula is midline.
maryann

## 2023-11-06 NOTE — CONSULT NOTE ADULT - SKIN
Pt father luis fernando is requesting a call back he states that he is having trouble with getting him schedule for the mri in this area. the closing one is in [facility] and he is looking for something here. Luis Fernando can be reached at [phone] he is asking can you get him in one closer sooner   No lesions; no rash

## 2024-02-22 NOTE — ED ADULT TRIAGE NOTE - AS HEIGHT TYPE
"Preventive Care Visit  [facility] JOB Santoyo MD, Internal Medicine - Pediatrics  Feb 22, 2024    Assessment & Plan     (I10) Essential hypertension  (primary encounter diagnosis)  Comment: Stable on current medication, lab ordered and med refilled.  Plan: BASIC METABOLIC PANEL, CBC with platelets and         differential        Will follow up with the result.    (Z29.11) Need for vaccination against respiratory syncytial virus  Comment: Would prefer to get vaccine in fall      (E78.5) Hyperlipidemia, unspecified hyperlipidemia type  Comment: stable on current treatment plan, Lab ordered today for recheck  Plan: Lipid panel reflex to direct LDL Non-fasting            (M85.80) Osteopenia, unspecified location  Comment: Health preventive and maintenance screening discussed. Dexa scan ordered  Plan: DX Hip/Pelvis/Spine        Schedule and follow up    (M80.0B2A) Age-related osteoporosis with current pathological fracture, left pelvis, initial encounter for fracture  Comment: History of pelvic fracture and osteoporosis, scan ordered today.  Plan: DX Hip/Pelvis/Spine                      BMI  Estimated body mass index is 33.8 kg/m  as calculated from the following:    Height as of this encounter: 1.6 m (5' 3\").    Weight as of this encounter: 86.5 kg (190 lb 12.8 oz).   Weight management plan: Discussed healthy diet and exercise guidelines    Counseling  Appropriate preventive services were discussed with this patient, including applicable screening as appropriate for fall prevention, nutrition, physical activity, Tobacco-use cessation, weight loss and cognition.  Checklist reviewing preventive services available has been given to the patient.  Reviewed patient's diet, addressing concerns and/or questions.   She is at risk for lack of exercise and has been provided with information to increase physical activity for the benefit of her well-being.       Work on weight loss  Regular " exercise    Subjective   Masha is a 67 year old, presenting for the following:  Physical        2/22/2024     2:40 PM   Additional Questions   Roomed by Monet Abbott MA   Accompanied by          Health Care Directive  Patient does not have a Health Care Directive or Living Will: Discussed advance care planning with patient; information given to patient to review.    Masha is a 67 years old female with past medical history of cataract, vertigo, hyperlipidemia and essential hypertension who presented to the clinic with her spouse for annual physical exam. She reported that she had her cataract removed last year and has been doing well. She denied any chest pain, shortness of breath, lightheadedness or dizziness. Patient denied any other concerns during this visit.                  2/22/2024   General Health   How would you rate your overall physical health? Good   Feel stress (tense, anxious, or unable to sleep) Not at all         2/22/2024   Nutrition   Diet: Regular (no restrictions)         2/22/2024   Exercise   Days per week of moderate/strenous exercise 1 day   Average minutes spent exercising at this level 10 min   (!) EXERCISE CONCERN      2/22/2024   Social Factors   Frequency of gathering with friends or relatives More than three times a week   Worry food won't last until get money to buy more No   Food not last or not have enough money for food? No   Do you have housing?  Yes   Are you worried about losing your housing? No   Lack of transportation? No   Unable to get utilities (heat,electricity)? No         2/22/2024   Fall Risk   Fallen 2 or more times in the past year? No    No   Trouble with walking or balance? No    No          2/22/2024   Activities of Daily Living- Home Safety   Needs help with the following daily activites None of the above   Safety concerns in the home None of the above         2/22/2024   Dental   Dentist two times every year? Yes         2/22/2024   Hearing Screening    Hearing concerns? None of the above         2024   Driving Risk Screening   Patient/family members have concerns about driving No         2024   General Alertness/Fatigue Screening   Have you been more tired than usual lately? No         2024   Urinary Incontinence Screening   Bothered by leaking urine in past 6 months No            Today's PHQ-2 Score:       2024     2:32 PM   PHQ-2 (  [employer])   Q1: Little interest or pleasure in doing things 0   Q2: Feeling down, depressed or hopeless 0   PHQ-2 Score 0   Q1: Little interest or pleasure in doing things Not at all   Q2: Feeling down, depressed or hopeless Not at all   PHQ-2 Score 0           2024   Substance Use   Alcohol more than 3/day or more than 7/wk Not Applicable   Do you have a current opioid prescription? No   How severe/bad is pain from 1 to 10? 0/10 (No Pain)   Do you use any other substances recreationally? No     Social History     Tobacco Use    Smoking status: Former     Types: Cigarettes     Start date: 1973     Quit date: 1983     Years since quittin.0     Passive exposure: Never    Smokeless tobacco: Never   Vaping Use    Vaping Use: Never used   Substance Use Topics    Alcohol use: No    Drug use: No            No data to display                     ASCVD Risk   The 10-year ASCVD risk score (Mami WHITNEY, et al., 2019) is: 10.6%    Values used to calculate the score:      Age: 67 years      Sex: Female      Is Non- : No      Diabetic: No      Tobacco smoker: No      Systolic Blood Pressure: 125 mmHg      Is BP treated: Yes      HDL Cholesterol: 57 mg/dL      Total Cholesterol: 194 mg/dL            Reviewed and updated as needed this visit by Provider                      Current providers sharing in care for this patient include:  Patient Care Team:  Jim Santoyo MD as PCP - General (Internal Medicine - Pediatrics)  Neyda Vega APRN CNP as Assigned  "PCP  Mikki Muro MD as Assigned Musculoskeletal Provider    The following health maintenance items are reviewed in Epic and correct as of today:  Health Maintenance   Topic Date Due    RSV VACCINE (Pregnancy & 60+) (1 - 1-dose 60+ series) Never done    BMP  02/13/2024    LIPID  02/13/2024    MEDICARE ANNUAL WELLNESS VISIT  02/13/2024    COLORECTAL CANCER SCREENING  01/19/2025    ANNUAL REVIEW OF HM ORDERS  02/22/2025    FALL RISK ASSESSMENT  02/22/2025    MAMMO SCREENING  03/10/2025    GLUCOSE  02/13/2026    ADVANCE CARE PLANNING  02/14/2028    DTAP/TDAP/TD IMMUNIZATION (4 - Td or Tdap) 09/16/2029    DEXA  03/04/2037    HEPATITIS C SCREENING  Completed    PHQ-2 (once per calendar year)  Completed    INFLUENZA VACCINE  Completed    Pneumococcal Vaccine: 65+ Years  Completed    ZOSTER IMMUNIZATION  Completed    COVID-19 Vaccine  Completed    IPV IMMUNIZATION  Aged Out    HPV IMMUNIZATION  Aged Out    MENINGITIS IMMUNIZATION  Aged Out    RSV MONOCLONAL ANTIBODY  Aged Out            Objective    Exam  /84 (BP Location: Right arm, Patient Position: Chair, Cuff Size: Adult Regular)   Pulse 65   Temp 98  F (36.7  C) (Oral)   Resp 18   Ht 1.6 m (5' 3\")   Wt 86.5 kg (190 lb 12.8 oz)   SpO2 95%   BMI 33.80 kg/m     Estimated body mass index is 33.8 kg/m  as calculated from the following:    Height as of this encounter: 1.6 m (5' 3\").    Weight as of this encounter: 86.5 kg (190 lb 12.8 oz).    Physical Exam  Constitutional:       General: She is not in acute distress.     Appearance: Normal appearance. She is not ill-appearing.   HENT:      Head: Normocephalic and atraumatic.      Right Ear: Tympanic membrane, ear canal and external ear normal.      Left Ear: Tympanic membrane, ear canal and external ear normal.      Nose: Nose normal.      Mouth/Throat:      Mouth: Mucous membranes are moist.      Pharynx: Oropharynx is clear. No oropharyngeal exudate or posterior oropharyngeal erythema.   Eyes:      " General:         Right eye: No discharge.         Left eye: No discharge.      Extraocular Movements: Extraocular movements intact.      Conjunctiva/sclera: Conjunctivae normal.      Pupils: Pupils are equal, round, and reactive to light.   Cardiovascular:      Rate and Rhythm: Normal rate and regular rhythm.      Pulses: Normal pulses.      Heart sounds: Normal heart sounds.   Pulmonary:      Effort: Pulmonary effort is normal.      Breath sounds: Normal breath sounds.   Abdominal:      General: Bowel sounds are normal. There is no distension.      Palpations: Abdomen is soft. There is no mass.      Tenderness: There is no abdominal tenderness.      Hernia: No hernia is present.   Musculoskeletal:         General: Normal range of motion.      Cervical back: Normal range of motion and neck supple.   Skin:     General: Skin is warm.   Neurological:      Mental Status: She is alert and oriented to person, place, and time.   Psychiatric:         Mood and Affect: Mood normal.         Behavior: Behavior normal.               2/22/2024   Mini Cog   Clock Draw Score 2 Normal    2 Normal   3 Item Recall 3 objects recalled    3 objects recalled   Mini Cog Total Score 5    5            Judy Diaz NP Student      Signed Electronically by: Jim Santoyo MD     stated

## 2024-03-27 NOTE — CONSULT NOTE ADULT - CONSULT REASON
sepsis, elevated LFTs I have personally performed a history and physical exam on this patient and personally directed the management of the patient.

## 2024-04-07 NOTE — H&P ADULT - PMH
Injured right ankle 3 hours ago, having trouble bearing weight on that foot/ankle.         
Asthma    Atrial fibrillation, chronic    Dementia    Diabetes mellitus    Hypertension    Type 2 diabetes mellitus

## 2024-04-08 NOTE — ED ADULT NURSE NOTE - NSICDXFAMILYHX_GEN_ALL_CORE_FT
100% of the time/able to follow single-step instructions FAMILY HISTORY:  Mother  Still living? Unknown  Family history of diabetes mellitus, Age at diagnosis: Age Unknown

## 2024-05-16 NOTE — DISCHARGE NOTE ADULT - NS AS DC PROVIDER CONTACT Y/N MULTI
Outpatient Wound Clinician Visit Note    Chief Complaint:   Chief Complaint   Patient presents with    Wound     The below orders were released and performed during the visit:   Orders Placed This Encounter    Complete Wound Care Venous Ulcer Circumferential (cluster of 7 wounds) Leg     Lidocaine 2% GEL to wound/s PRN prior to debridement  Elevate legs to avoid pressure to wounds and edema control.  Follow up in 3 weeks     Order Specific Question:   Diagnosis     Answer:   Venous insufficiency     Order Specific Question:   Dressing change(s) to be done by     Answer:   Wound Care Team     Order Specific Question:   Dressing change(s) to be done by     Answer:   Other (specify)     Order Specific Question:   Other (specify)     Answer:   pt at home     Order Specific Question:   Dressing frequency     Answer:   Daily     Order Specific Question:   Dressing change(s) to be done using     Answer:   Clean Technique     Order Specific Question:   Clean wound with     Answer:   Normal saline     Order Specific Question:   Topical agents     Answer:   Hydrogel     Order Specific Question:   Dressing type     Answer:   Gauze roll-conforming (e.g. Gallo)     Order Specific Question:   Dressing type     Answer:   Gauze     Order Specific Question:   Dressing type     Answer:   Oil emulsion (e.g. Adaptic)     Order Specific Question:   Specify order of application:     Answer:   hydrogel, adaptic, gauze gallo, tape     Order Specific Question:   Wound compression     Answer:   Other (specify)     Order Specific Question:   Other (specify)     Answer:   farrow wraps     Order Specific Question:   Compression for extremity     Answer:   Left lower leg     Home Care Service:  No  Type of Supervision: Patient seen by provider  Was care transitioned to this department today? No   Was care transitioned from this department today?  No Hospitalization within the last 30 days (if yes, date of discharge)? No   Arrival Disposition:  Ambulates  Transfer Assist: None  Special Needs: Special Needs List: none  Additional POCT Services Provided: Lower extremity assessment  Departure Instruction: Simple D/C (Rx, simple instructions)  Comments:  Patient arrival information, vital signs and dressing removed by staff member noted.  Staff obtained consents, records, test results or processed orders.  Patient and Caregiver education was given regarding procedures/therapy planned.  A review of the H&P and other pertinent information was done.  Pedal pulses were able to palpated on this patient.  Departure Disposition: Home self care or family careElevate legs to avoid pressure to wounds and edema control.  Cleanse wounds  with normal saline  Moisturize around the wounds daily with dressing changes   Or wound cleanser spray(over the counter) and gauze every time you change your dressing. Soak the gauze to prevent it from sticking on wound.  NO TAPE ON SKIN  Hydrogel cover with vaseleine gauze, gauze pad and rolled gauze with tape. Change dressing daily.  Farro wrap daily to the left leg  Return to wound clinic  on 6/6/2024 at 915am  Adaptic and DC instructions sent home with the patient.   Yes

## 2024-05-29 NOTE — ED ADULT NURSE NOTE - NURSING MUSC STRENGTH
hand grasp, leg strength strong and equal bilaterally Patient is a 59y old  Female who presents with a chief complaint of SOB (29 May 2024 11:30)      SUBJECTIVE / OVERNIGHT EVENTS: Patient hypoxic yesterday, but back at baseline. no complaints. still wants medical management as offered.    MEDICATIONS  (STANDING):  albuterol/ipratropium for Nebulization 3 milliLiter(s) Nebulizer every 6 hours  apixaban 5 milliGRAM(s) Oral two times a day  budesonide 160 MICROgram(s)/formoterol 4.5 MICROgram(s) Inhaler 2 Puff(s) Inhalation two times a day  buMETAnide Injectable 2 milliGRAM(s) IV Push two times a day  chlorhexidine 2% Cloths 1 Application(s) Topical daily  dextrose 10% Bolus 125 milliLiter(s) IV Bolus once  dextrose 5%. 1000 milliLiter(s) (100 mL/Hr) IV Continuous <Continuous>  dextrose 5%. 1000 milliLiter(s) (50 mL/Hr) IV Continuous <Continuous>  dextrose 50% Injectable 12.5 Gram(s) IV Push once  dextrose 50% Injectable 25 Gram(s) IV Push once  diazepam  Injectable 2.5 milliGRAM(s) IV Push once  glucagon  Injectable 1 milliGRAM(s) IntraMuscular once  insulin glargine Injectable (LANTUS) 8 Unit(s) SubCutaneous at bedtime  insulin lispro (ADMELOG) corrective regimen sliding scale   SubCutaneous <User Schedule>  insulin lispro (ADMELOG) corrective regimen sliding scale   SubCutaneous three times a day before meals  insulin lispro Injectable (ADMELOG) 10 Unit(s) SubCutaneous with dinner  insulin lispro Injectable (ADMELOG) 10 Unit(s) SubCutaneous with breakfast  insulin lispro Injectable (ADMELOG) 10 Unit(s) SubCutaneous with lunch  methylPREDNISolone sodium succinate Injectable 40 milliGRAM(s) IV Push two times a day  Ofev (Nintedanib) 150 milliGRAM(s) 150 milliGRAM(s) Oral two times a day  pantoprazole  Injectable 40 milliGRAM(s) IV Push every 12 hours  sildenafil (REVATIO) 10 milliGRAM(s) Oral every 8 hours  simethicone 80 milliGRAM(s) Chew two times a day  traZODone 150 milliGRAM(s) Oral at bedtime  trimethoprim   80 mG/sulfamethoxazole 400 mG 1 Tablet(s) Oral daily    MEDICATIONS  (PRN):  acetaminophen     Tablet .. 650 milliGRAM(s) Oral every 6 hours PRN Temp greater or equal to 38C (100.4F), Mild Pain (1 - 3)  cyclobenzaprine 5 milliGRAM(s) Oral three times a day PRN Muscle Spasm  dextrose Oral Gel 15 Gram(s) Oral once PRN Blood Glucose LESS THAN 70 milliGRAM(s)/deciliter  diazepam  Injectable 2.5 milliGRAM(s) IV Push every 12 hours PRN severe anxiety  HYDROmorphone  Injectable 0.5 milliGRAM(s) IV Push every 4 hours PRN respiratory distress  loperamide 2 milliGRAM(s) Oral four times a day PRN Diarrhea      CAPILLARY BLOOD GLUCOSE      POCT Blood Glucose.: 302 mg/dL (29 May 2024 11:35)  POCT Blood Glucose.: 123 mg/dL (29 May 2024 07:50)  POCT Blood Glucose.: 146 mg/dL (29 May 2024 02:10)  POCT Blood Glucose.: 128 mg/dL (28 May 2024 21:27)  POCT Blood Glucose.: 200 mg/dL (28 May 2024 16:37)    I&O's Summary    28 May 2024 07:01  -  29 May 2024 07:00  --------------------------------------------------------  IN: 240 mL / OUT: 850 mL / NET: -610 mL        PHYSICAL EXAM:  Vital Signs Last 24 Hrs  T(C): 36.6 (29 May 2024 11:46), Max: 36.9 (29 May 2024 05:04)  T(F): 97.8 (29 May 2024 11:46), Max: 98.4 (29 May 2024 05:04)  HR: 108 (29 May 2024 11:46) (99 - 114)  BP: 111/73 (29 May 2024 11:46) (101/68 - 130/89)  BP(mean): --  RR: 18 (29 May 2024 11:46) (18 - 20)  SpO2: 97% (29 May 2024 11:46) (93% - 97%)    Parameters below as of 29 May 2024 11:46  Patient On (Oxygen Delivery Method): nasal cannula, high flow  O2 Flow (L/min): 60  O2 Concentration (%): 100    GEN: female in NAD, appears comfortable, no diaphoresis  EYES: No scleral injection, EOMI  ENTM: neck supple & symmetric without tracheal deviation, moist membranes, no gross hearing impairment, thyroid gland not enlarged  CV: +S1/S2, no m/r/g, no abdominal bruit, no LE edema  RESP: breathing comfortably, no respiratory accessory muscle use, CTAB, no w/r/r  GI: normoactive BS, soft, NTND, no rebounding/guarding, no palpable masses    LABS:    05-28    135  |  95<L>  |  22  ----------------------------<  186<H>  4.0   |  28  |  0.70    Ca    9.1      28 May 2024 05:49            Urinalysis Basic - ( 28 May 2024 05:49 )    Color: x / Appearance: x / SG: x / pH: x  Gluc: 186 mg/dL / Ketone: x  / Bili: x / Urobili: x   Blood: x / Protein: x / Nitrite: x   Leuk Esterase: x / RBC: x / WBC x   Sq Epi: x / Non Sq Epi: x / Bacteria: x          RADIOLOGY & ADDITIONAL TESTS:  Results Reviewed:   Imaging Personally Reviewed:  Electrocardiogram Personally Reviewed:    COORDINATION OF CARE:  Care Discussed with Consultants/Other Providers [Y/N]:  Prior or Outpatient Records Reviewed [Y/N]:

## 2024-07-11 NOTE — DISCHARGE NOTE ADULT - PATIENT PORTAL LINK FT
You can access the [facility] Patient Portal, offered by [facility], by registering with the following website: [email or web address]
no
